# Patient Record
Sex: FEMALE | Race: WHITE | Employment: OTHER | URBAN - METROPOLITAN AREA
[De-identification: names, ages, dates, MRNs, and addresses within clinical notes are randomized per-mention and may not be internally consistent; named-entity substitution may affect disease eponyms.]

---

## 2017-01-10 ENCOUNTER — OFFICE VISIT (OUTPATIENT)
Dept: INTERNAL MEDICINE CLINIC | Age: 82
End: 2017-01-10

## 2017-01-10 VITALS
SYSTOLIC BLOOD PRESSURE: 122 MMHG | TEMPERATURE: 97 F | RESPIRATION RATE: 16 BRPM | HEIGHT: 62 IN | OXYGEN SATURATION: 97 % | HEART RATE: 70 BPM | WEIGHT: 193.6 LBS | BODY MASS INDEX: 35.63 KG/M2 | DIASTOLIC BLOOD PRESSURE: 68 MMHG

## 2017-01-10 DIAGNOSIS — J01.40 SUBACUTE PANSINUSITIS: Primary | ICD-10-CM

## 2017-01-10 RX ORDER — AZITHROMYCIN 250 MG/1
250 TABLET, FILM COATED ORAL SEE ADMIN INSTRUCTIONS
Qty: 6 TAB | Refills: 0 | Status: SHIPPED | OUTPATIENT
Start: 2017-01-10 | End: 2021-01-01 | Stop reason: SDUPTHER

## 2017-01-10 NOTE — PROGRESS NOTES
HISTORY OF PRESENT ILLNESS  Eduardo Santos is a 80 y.o. female. Wheezing    The history is provided by the patient. This is a new problem. Episode onset: 10 d. The problem occurs every several days. The problem has not changed since onset. Associated symptoms include ear pain, rhinorrhea, sore throat and cough. Pertinent negatives include no fever, no vomiting, no diarrhea and no sputum production. Precipitated by: Teri Lively. Treatments tried: claritin x 3 dose. The treatment provided no relief. Her past medical history is significant for heart failure. Her past medical history does not include asthma. Nasal Congestion    The history is provided by the patient. This is a new problem. The current episode started more than 1 week ago. Associated symptoms include ear pain, sore throat, cough and rhinorrhea. Review of Systems   Constitutional: Negative for fever. HENT: Positive for ear pain, rhinorrhea and sore throat. Respiratory: Positive for cough and wheezing. Negative for sputum production. Gastrointestinal: Negative for diarrhea and vomiting. Physical Exam   Constitutional: No distress. HENT:   Right Ear: Tympanic membrane and ear canal normal.   Left Ear: Tympanic membrane and ear canal normal.   Nose: Right sinus exhibits no maxillary sinus tenderness and no frontal sinus tenderness. Left sinus exhibits no maxillary sinus tenderness and no frontal sinus tenderness. Mouth/Throat: Posterior oropharyngeal erythema present. No oropharyngeal exudate or posterior oropharyngeal edema. Eyes: Conjunctivae are normal. Right eye exhibits no discharge. Left eye exhibits no discharge. Neck: Neck supple. Cardiovascular: Normal rate and regular rhythm. Exam reveals no gallop and no friction rub. No murmur heard. Pulmonary/Chest: Effort normal and breath sounds normal.   Lymphadenopathy:     She has no cervical adenopathy. Nursing note and vitals reviewed.       ASSESSMENT and PLAN  Jannie Byers was seen today for wheezing and nasal congestion. Diagnoses and all orders for this visit:    Subacute pansinusitis  -     azithromycin (ZITHROMAX) 250 mg tablet; Take 1 Tab by mouth See Admin Instructions for 5 days.  -     guaiFENesin SR (MUCINEX) 600 mg SR tablet; Take 1 Tab by mouth two (2) times a day for 10 days. This has been fully explained to the patient, who indicates understanding.

## 2017-01-10 NOTE — MR AVS SNAPSHOT
Visit Information Date & Time Provider Department Dept. Phone Encounter #  
 1/10/2017  1:40 PM Ann Marie Mcnulty, [facility] Internal Medicine [phone] [id number] Follow-up Instructions Return in about 3 weeks (around 1/30/2017), or if symptoms worsen or fail to improve. Your Appointments 1/30/2017  1:20 PM  
ROUTINE CARE with Ann Marie Mcnulty MD  
[facility] Internal Medicine [address]) Appt Note: f/u 2m  
 [address] [facility] [address]  
[address] [facility] Upcoming Health Maintenance Date Due  
 MEDICARE YEARLY EXAM 1/22/2016 FOOT EXAM Q1 3/1/2017 LIPID PANEL Q1 3/21/2017 HEMOGLOBIN A1C Q6M 5/28/2017 MICROALBUMIN Q1 6/2/2017 EYE EXAM RETINAL OR DILATED Q1 8/29/2017 GLAUCOMA SCREENING Q2Y 8/29/2018 DTaP/Tdap/Td series (2 - Td) 9/21/2026 Allergies as of 1/10/2017  Review Complete On: 1/10/2017 By: Ann Marie Mcnulty MD  
  
 Severity Noted Reaction Type Reactions Amoxicillin  03/28/2014    Other (comments) \"makes me feel like I\"m going to faint\" Aspirin  03/28/2014    Palpitations Levaquin [Levofloxacin]  03/20/2015    Hives, Rash Losartan  06/17/2015    Other (comments) Tongue swelling Lyrica [Pregabalin]  03/28/2014    Rash Current Immunizations  Reviewed on 11/3/2016 Name Date Influenza High Dose Vaccine PF 11/3/2016, 11/2/2015 Influenza Vaccine 9/18/2014 Pneumococcal Conjugate (PCV-13) 1/22/2015 Pneumococcal Vaccine (Unspecified Type) 11/12/2011 Zoster Vaccine, Live 1/28/2016 Not reviewed this visit You Were Diagnosed With   
  
 Codes Comments Subacute pansinusitis    -  Primary ICD-10-CM: J01.40 ICD-9-CM: 461.8 Vitals BP Pulse Temp Resp Height(growth percentile) Weight(growth percentile) 122/68 70 97 °F (36.1 °C) (Oral) 16 5' 2\" (1.575 m) 193 lb 9.6 oz (87.8 kg) SpO2 BMI OB Status Smoking Status 97% 35.41 kg/m2 Postmenopausal Former Smoker BMI and BSA Data Body Mass Index Body Surface Area  
 35.41 kg/m 2 1.96 m 2 Preferred Pharmacy Pharmacy Name Phone [phone], [phone] [facility] [address] [phone] Your Updated Medication List  
  
   
This list is accurate as of: 1/10/17  2:34 PM.  Always use your most recent med list. amLODIPine 5 mg tablet Commonly known as:  Esvin Frantz TAKE 1 TAB BY MOUTH DAILY. aspirin delayed-release 81 mg tablet Take 1 Tab by mouth daily. azithromycin 250 mg tablet Commonly known as:  Hedwig Pine Take 1 Tab by mouth See Admin Instructions for 5 days. BIOTIN PO Take 5,000 mcg by mouth daily. diclofenac 1 % Gel Commonly known as:  VOLTAREN Apply  to affected area four (4) times daily. Apply 4 gm QID prn. fluocinoNIDE 0.05 % topical cream  
Commonly known as:  LIDEX Apply  to affected area two (2) times daily as needed for Skin Irritation. furosemide 40 mg tablet Commonly known as:  LASIX Take 1 Tab by mouth daily. As needed only with leg swelling  
  
 glimepiride 1 mg tablet Commonly known as:  AMARYL Take 1 Tab by mouth Daily (before breakfast). Take only if sugar is above 140  
  
 glucose blood VI test strips strip Commonly known as:  CONTOUR NEXT STRIPS  
TEST 3 TIMES DAILY. Dx E11.42  
  
 guaiFENesin  mg SR tablet Commonly known as:  Lionel & Lionel Take 1 Tab by mouth two (2) times a day for 10 days. Lancets Misc  
1 Package by Does Not Apply route daily. Test glucose daily and as needed Dx type 2 dm E11.42  
  
 linaclotide 145 mcg Cap capsule Commonly known as:  Gross Coon Take 1 Cap by mouth Daily (before breakfast). Per dr Leann Johnson  
  
 loratadine 10 mg tablet Commonly known as:  Napamanda Linnette Take 1 Tab by mouth daily as needed for Allergies. magnesium hydroxide 2,400 mg/10 mL Susp suspension Commonly known as:  MILK OF MAGNESIA Take 10 mL by mouth nightly. OTHER  
  
 potassium chloride SR 10 mEq tablet Commonly known as:  KLOR-CON 10 Take 2 Tabs by mouth daily. Only when furosemide taken VITAMIN D3 1,000 unit Cap Generic drug:  cholecalciferol Take  by mouth. Prescriptions Sent to Pharmacy Refills  
 azithromycin (ZITHROMAX) 250 mg tablet 0 Sig: Take 1 Tab by mouth See Admin Instructions for 5 days. Class: Normal  
 Pharmacy: [facility] [address] [facility] #: [phone] Route: Oral  
  
Follow-up Instructions Return in about 3 weeks (around 1/30/2017), or if symptoms worsen or fail to improve. Patient Instructions Sinusitis: Care Instructions Your Care Instructions Sinusitis is an infection of the lining of the sinus cavities in your head. Sinusitis often follows a cold. It causes pain and pressure in your head and face. In most cases, sinusitis gets better on its own in 1 to 2 weeks. But some mild symptoms may last for several weeks. Sometimes antibiotics are needed. Follow-up care is a key part of your treatment and safety. Be sure to make and go to all appointments, and call your doctor if you are having problems. It's also a good idea to know your test results and keep a list of the medicines you take. How can you care for yourself at home? · Take an over-the-counter pain medicine, such as acetaminophen (Tylenol), ibuprofen (Advil, Motrin), or naproxen (Aleve). Read and follow all instructions on the label. · If the doctor prescribed antibiotics, take them as directed. Do not stop taking them just because you feel better. You need to take the full course of antibiotics.  
· Be careful when taking over-the-counter cold or flu medicines and Tylenol at the same time. Many of these medicines have acetaminophen, which is Tylenol. Read the labels to make sure that you are not taking more than the recommended dose. Too much acetaminophen (Tylenol) can be harmful. · Breathe warm, moist air from a steamy shower, a hot bath, or a sink filled with hot water. Avoid cold, dry air. Using a humidifier in your home may help. Follow the directions for cleaning the machine. · Use saline (saltwater) nasal washes to help keep your nasal passages open and wash out mucus and bacteria. You can buy saline nose drops at a grocery store or drugstore. Or you can make your own at home by adding 1 teaspoon of salt and 1 teaspoon of baking soda to 2 cups of distilled water. If you make your own, fill a bulb syringe with the solution, insert the tip into your nostril, and squeeze gently. Marleta Messina your nose. · Put a hot, wet towel or a warm gel pack on your face 3 or 4 times a day for 5 to 10 minutes each time. · Try a decongestant nasal spray like oxymetazoline (Afrin). Do not use it for more than 3 days in a row. Using it for more than 3 days can make your congestion worse. When should you call for help? Call your doctor now or seek immediate medical care if: 
· You have new or worse swelling or redness in your face or around your eyes. · You have a new or higher fever. Watch closely for changes in your health, and be sure to contact your doctor if: 
· You have new or worse facial pain. · The mucus from your nose becomes thicker (like pus) or has new blood in it. · You are not getting better as expected. Where can you learn more? Go to http://myrna-alayna.info/. Enter G732 in the search box to learn more about \"Sinusitis: Care Instructions. \" Current as of: July 29, 2016 Content Version: 11.1 © 9007-8551 [employer].  Care instructions adapted under license by [employer] (which disclaims liability or warranty for this information). If you have questions about a medical condition or this instruction, always ask your healthcare professional. [address] any warranty or liability for your use of this information. Introducing [facility] SERVICES! Dear Sonny Salamanca: 
Thank you for requesting a [employer] account. Our records indicate that you have previously registered for a [employer] account but its currently inactive. Please call our [employer] support line at [phone]. Additional Information If you have questions, please visit the Frequently Asked Questions section of the [employer] website at [email or web address] Remember, [employer]apor[employer] is NOT to be used for urgent needs. For medical emergencies, dial 911. Now available from your iPhone and Android! Please provide this summary of care documentation to your next provider. Your primary care clinician is listed as JOCELYN MCKEON. If you have any questions after today's visit, please call [phone].

## 2017-01-10 NOTE — PATIENT INSTRUCTIONS
Sinusitis: Care Instructions  Your Care Instructions    Sinusitis is an infection of the lining of the sinus cavities in your head. Sinusitis often follows a cold. It causes pain and pressure in your head and face. In most cases, sinusitis gets better on its own in 1 to 2 weeks. But some mild symptoms may last for several weeks. Sometimes antibiotics are needed. Follow-up care is a key part of your treatment and safety. Be sure to make and go to all appointments, and call your doctor if you are having problems. It's also a good idea to know your test results and keep a list of the medicines you take. How can you care for yourself at home? · Take an over-the-counter pain medicine, such as acetaminophen (Tylenol), ibuprofen (Advil, Motrin), or naproxen (Aleve). Read and follow all instructions on the label. · If the doctor prescribed antibiotics, take them as directed. Do not stop taking them just because you feel better. You need to take the full course of antibiotics. · Be careful when taking over-the-counter cold or flu medicines and Tylenol at the same time. Many of these medicines have acetaminophen, which is Tylenol. Read the labels to make sure that you are not taking more than the recommended dose. Too much acetaminophen (Tylenol) can be harmful. · Breathe warm, moist air from a steamy shower, a hot bath, or a sink filled with hot water. Avoid cold, dry air. Using a humidifier in your home may help. Follow the directions for cleaning the machine. · Use saline (saltwater) nasal washes to help keep your nasal passages open and wash out mucus and bacteria. You can buy saline nose drops at a grocery store or drugstore. Or you can make your own at home by adding 1 teaspoon of salt and 1 teaspoon of baking soda to 2 cups of distilled water. If you make your own, fill a bulb syringe with the solution, insert the tip into your nostril, and squeeze gently. Sushil Opitz your nose.   · Put a hot, wet towel or a warm gel pack on your face 3 or 4 times a day for 5 to 10 minutes each time. · Try a decongestant nasal spray like oxymetazoline (Afrin). Do not use it for more than 3 days in a row. Using it for more than 3 days can make your congestion worse. When should you call for help? Call your doctor now or seek immediate medical care if:  · You have new or worse swelling or redness in your face or around your eyes. · You have a new or higher fever. Watch closely for changes in your health, and be sure to contact your doctor if:  · You have new or worse facial pain. · The mucus from your nose becomes thicker (like pus) or has new blood in it. · You are not getting better as expected. Where can you learn more? Go to http://myrna-alayna.info/. Enter D191 in the search box to learn more about \"Sinusitis: Care Instructions. \"  Current as of: July 29, 2016  Content Version: 11.1  © [id number] [employer]. Care instructions adapted under license by [employer] (which disclaims liability or warranty for this information). If you have questions about a medical condition or this instruction, always ask your healthcare professional. [place] [address] any warranty or liability for your use of this information.

## 2017-01-13 DIAGNOSIS — E11.40 TYPE 2 DIABETES, CONTROLLED, WITH NEUROPATHY (HCC): ICD-10-CM

## 2017-01-30 ENCOUNTER — OFFICE VISIT (OUTPATIENT)
Dept: INTERNAL MEDICINE CLINIC | Age: 82
End: 2017-01-30

## 2017-01-30 ENCOUNTER — HOSPITAL ENCOUNTER (OUTPATIENT)
Dept: LAB | Age: 82
Discharge: HOME | End: 2017-01-30
Payer: MEDICARE

## 2017-01-30 VITALS
HEART RATE: 67 BPM | TEMPERATURE: 97.7 F | SYSTOLIC BLOOD PRESSURE: 140 MMHG | DIASTOLIC BLOOD PRESSURE: 62 MMHG | OXYGEN SATURATION: 96 % | RESPIRATION RATE: 18 BRPM | HEIGHT: 62 IN | BODY MASS INDEX: 33.82 KG/M2 | WEIGHT: 183.8 LBS

## 2017-01-30 DIAGNOSIS — Z71.89 ADVANCED CARE PLANNING/COUNSELING DISCUSSION: ICD-10-CM

## 2017-01-30 DIAGNOSIS — I48.20 CHRONIC ATRIAL FIBRILLATION (HCC): ICD-10-CM

## 2017-01-30 DIAGNOSIS — R60.0 LOCALIZED EDEMA: ICD-10-CM

## 2017-01-30 DIAGNOSIS — E11.40 TYPE 2 DIABETES, CONTROLLED, WITH NEUROPATHY (HCC): ICD-10-CM

## 2017-01-30 DIAGNOSIS — Z13.31 SCREENING FOR DEPRESSION: ICD-10-CM

## 2017-01-30 DIAGNOSIS — I10 ESSENTIAL HYPERTENSION, BENIGN: Primary | ICD-10-CM

## 2017-01-30 PROCEDURE — 80048 BASIC METABOLIC PNL TOTAL CA: CPT

## 2017-01-30 PROCEDURE — 36415 COLL VENOUS BLD VENIPUNCTURE: CPT

## 2017-01-30 PROCEDURE — 83735 ASSAY OF MAGNESIUM: CPT

## 2017-01-30 NOTE — PROGRESS NOTES
HISTORY OF PRESENT ILLNESS  Esther Meier is a 80 y.o. female. [facility] is seen today for follow up of diabetes and other problems. 1. Sinusitis. This was a pan sinusitis. It is improved significantly. She did end up seeing her ENT specialist.  2. Diabetes, type 2, controlled, no complication, without long term insulin usage. Up to date with A1c assessment which has been excellent. 3. Essential hypertension. Stable on current regimen. 4. Peripheral edema, bilateral, stasis. Improved with BEULAH's. She is on daily Lasix. Review of systems notable for an episode of back pain that resolved on its own in a very brief period of time. MedDATA/gwo     Past Medical History   Diagnosis Date    Bleeding nose     Diabetes ([address]     Hypertension          Review of Systems   Constitutional: Positive for weight loss. Respiratory: Negative. Cardiovascular: Positive for leg swelling. Negative for chest pain, palpitations and PND. Musculoskeletal: Negative for myalgias. Neurological: Negative for focal weakness. Physical Exam   Constitutional: She appears well-nourished. Neck: Carotid bruit is not present. Cardiovascular: Normal rate. An irregularly irregular rhythm present. Exam reveals no gallop and no friction rub. No murmur heard. Pulmonary/Chest: Effort normal and breath sounds normal. No respiratory distress. Musculoskeletal: She exhibits edema. Moderate bilateral lower extremity edema - improved   Nursing note and vitals reviewed. RC and Keri Adams was seen today for annual wellness visit.     Diagnoses and all orders for this visit:    Essential hypertension, benign- Continue current regimen of prescription and / or OTC medications   -     METABOLIC PANEL, BASIC  -     MAGNESIUM    Screening for depression    Advanced care planning/counseling discussion    Type 2 diabetes, controlled, with neuropathy ([address]- Follow off treatment     Localized edema  -     METABOLIC PANEL, BASIC  -     MAGNESIUM    Chronic atrial fibrillation ([facility]- Continue current regimen of prescription and / or OTC medications     This has been fully explained to the patient, who indicates understanding. This has been fully explained to the patient, who indicates understanding.

## 2017-01-30 NOTE — MR AVS SNAPSHOT
Visit Information Date & Time Provider Department Dept. Phone Encounter #  
 1/30/2017  1:20 PM Kj Ordoñez, [address] [place] [address] Internal Medicine [phone] [id number] Follow-up Instructions Return in about 2 months (around 3/30/2017). Upcoming Health Maintenance Date Due  
 FOOT EXAM Q1 3/1/2017 LIPID PANEL Q1 3/21/2017 HEMOGLOBIN A1C Q6M 5/28/2017 MICROALBUMIN Q1 6/2/2017 EYE EXAM RETINAL OR DILATED Q1 8/29/2017 MEDICARE YEARLY EXAM 1/31/2018 GLAUCOMA SCREENING Q2Y 8/29/2018 DTaP/Tdap/Td series (2 - Td) 9/21/2026 Allergies as of 1/30/2017  Review Complete On: 1/30/2017 By: Kj Ordoñez MD  
  
 Severity Noted Reaction Type Reactions Amoxicillin  03/28/2014    Other (comments) \"makes me feel like I\"m going to faint\" Aspirin  03/28/2014    Palpitations Levaquin [Levofloxacin]  03/20/2015    Hives, Rash Losartan  06/17/2015    Other (comments) Tongue swelling Lyrica [Pregabalin]  03/28/2014    Rash Current Immunizations  Reviewed on 11/3/2016 Name Date Influenza High Dose Vaccine PF 11/3/2016, 11/2/2015 Influenza Vaccine 9/18/2014 Pneumococcal Conjugate (PCV-13) 1/22/2015 Pneumococcal Vaccine (Unspecified Type) 11/12/2011 Zoster Vaccine, Live 1/28/2016 Not reviewed this visit You Were Diagnosed With   
  
 Codes Comments Essential hypertension, benign    -  Primary ICD-10-CM: I10 
ICD-9-CM: 401.1 Screening for depression     ICD-10-CM: Z13.89 ICD-9-CM: V79.0 Advanced care planning/counseling discussion     ICD-10-CM: Z71.89 ICD-9-CM: V65.49 Type 2 diabetes, controlled, with neuropathy ([facility]     ICD-10-CM: E11.40 ICD-9-CM: 250.60, 357.2 Localized edema     ICD-10-CM: R60.0 ICD-9-CM: 029. 3 Chronic atrial fibrillation (HCC)     ICD-10-CM: G00.9 ICD-9-CM: 427.31 Vitals BP Pulse Temp Resp Height(growth percentile) Weight(growth percentile) 140/62 (BP 1 Location: Right arm, BP Patient Position: Sitting) 67 97.7 °F (36.5 °C) (Oral) 18 5' 2\" (1.575 m) 183 lb 12.8 oz (83.4 kg) SpO2 BMI OB Status Smoking Status 96% 33.62 kg/m2 Postmenopausal Former Smoker BMI and BSA Data Body Mass Index Body Surface Area  
 33.62 kg/m 2 1.91 m 2 Preferred Pharmacy Pharmacy Name Phone [address], [address] [facility] [address] [phone] Your Updated Medication List  
  
   
This list is accurate as of: 1/30/17  1:49 PM.  Always use your most recent med list. amLODIPine 5 mg tablet Commonly known as:  Gay Oumou TAKE 1 TAB BY MOUTH DAILY. aspirin delayed-release 81 mg tablet Take 1 Tab by mouth daily. BIOTIN PO Take 5,000 mcg by mouth daily. diclofenac 1 % Gel Commonly known as:  VOLTAREN Apply  to affected area four (4) times daily. Apply 4 gm QID prn. fluocinoNIDE 0.05 % topical cream  
Commonly known as:  LIDEX Apply  to affected area two (2) times daily as needed for Skin Irritation. furosemide 40 mg tablet Commonly known as:  LASIX Take 1 Tab by mouth daily. As needed only with leg swelling  
  
 glimepiride 1 mg tablet Commonly known as:  AMARYL Take 1 Tab by mouth Daily (before breakfast). Take only if sugar is above 140  
  
 glucose blood VI test strips strip Commonly known as:  FREESTYLE TEST  
TEST 3 TIMES DAILY. Dx M33.99 Lancets Misc  
1 Package by Does Not Apply route daily. Test glucose daily and as needed Dx type 2 dm E11.42  
  
 linaclotide 145 mcg Cap capsule Commonly known as:  Angie Timber Take 1 Cap by mouth Daily (before breakfast). Per dr Neal Corona  
  
 loratadine 10 mg tablet Commonly known as:  Avani Grossman Take 1 Tab by mouth daily as needed for Allergies. magnesium hydroxide 2,400 mg/10 mL Susp suspension Commonly known as:  MILK OF MAGNESIA Take 10 mL by mouth nightly. OTHER  
  
 potassium chloride SR 10 mEq tablet Commonly known as:  KLOR-CON 10 Take 2 Tabs by mouth daily. Only when furosemide taken VITAMIN D3 1,000 unit Cap Generic drug:  cholecalciferol Take  by mouth. We Performed the Following MAGNESIUM [id number] CPT(R)] METABOLIC PANEL, BASIC [26933 CPT(R)] Follow-up Instructions Return in about 2 months (around 3/30/2017). Introducing [facility] & HEALTH SERVICES! Dear Michelle Rose: 
Thank you for requesting a misterbnb account. Our records indicate that you have previously registered for a misterbnb account but its currently inactive. Please call our misterbnb support line at [phone]. Additional Information If you have questions, please visit the Frequently Asked Questions section of the misterbnb website at [email or web address] Remember, misterbnb is NOT to be used for urgent needs. For medical emergencies, dial 911. Now available from your iPhone and Android! Please provide this summary of care documentation to your next provider. Your primary care clinician is listed as JOCELYN MCKEON. If you have any questions after today's visit, please call [phone].

## 2017-01-31 LAB
BUN SERPL-MCNC: 22 MG/DL (ref 10–36)
BUN/CREAT SERPL: 28 (ref 11–26)
CALCIUM SERPL-MCNC: 8.4 MG/DL (ref 8.7–10.3)
CHLORIDE SERPL-SCNC: 98 MMOL/L (ref 96–106)
CO2 SERPL-SCNC: 26 MMOL/L (ref 18–29)
CREAT SERPL-MCNC: 0.8 MG/DL (ref 0.57–1)
GLUCOSE SERPL-MCNC: 153 MG/DL (ref 65–99)
MAGNESIUM SERPL-MCNC: 2.3 MG/DL (ref 1.6–2.3)
POTASSIUM SERPL-SCNC: 4.6 MMOL/L (ref 3.5–5.2)
SODIUM SERPL-SCNC: 139 MMOL/L (ref 134–144)

## 2017-03-06 DIAGNOSIS — M79.89 LEG SWELLING: ICD-10-CM

## 2017-03-06 DIAGNOSIS — R06.02 SOB (SHORTNESS OF BREATH) ON EXERTION: ICD-10-CM

## 2017-03-06 RX ORDER — POTASSIUM CHLORIDE 750 MG/1
20 TABLET, FILM COATED, EXTENDED RELEASE ORAL DAILY
Qty: 30 TAB | Refills: 11 | Status: SHIPPED | OUTPATIENT
Start: 2017-03-06 | End: 2017-09-11 | Stop reason: SDUPTHER

## 2017-03-30 ENCOUNTER — HOSPITAL ENCOUNTER (OUTPATIENT)
Dept: LAB | Age: 82
Discharge: HOME | End: 2017-03-30
Payer: MEDICARE

## 2017-03-30 ENCOUNTER — OFFICE VISIT (OUTPATIENT)
Dept: INTERNAL MEDICINE CLINIC | Age: 82
End: 2017-03-30

## 2017-03-30 VITALS
HEART RATE: 60 BPM | RESPIRATION RATE: 18 BRPM | TEMPERATURE: 96.7 F | WEIGHT: 186 LBS | SYSTOLIC BLOOD PRESSURE: 120 MMHG | OXYGEN SATURATION: 99 % | BODY MASS INDEX: 34.23 KG/M2 | DIASTOLIC BLOOD PRESSURE: 60 MMHG | HEIGHT: 62 IN

## 2017-03-30 DIAGNOSIS — R60.0 LOCALIZED EDEMA: ICD-10-CM

## 2017-03-30 DIAGNOSIS — I10 ESSENTIAL HYPERTENSION, BENIGN: ICD-10-CM

## 2017-03-30 DIAGNOSIS — I48.20 CHRONIC ATRIAL FIBRILLATION (HCC): ICD-10-CM

## 2017-03-30 DIAGNOSIS — E11.40 TYPE 2 DIABETES, CONTROLLED, WITH NEUROPATHY (HCC): Primary | ICD-10-CM

## 2017-03-30 PROCEDURE — 83036 HEMOGLOBIN GLYCOSYLATED A1C: CPT

## 2017-03-30 PROCEDURE — 36415 COLL VENOUS BLD VENIPUNCTURE: CPT

## 2017-03-30 PROCEDURE — 80048 BASIC METABOLIC PNL TOTAL CA: CPT

## 2017-03-30 PROCEDURE — 85025 COMPLETE CBC W/AUTO DIFF WBC: CPT

## 2017-03-30 NOTE — PROGRESS NOTES
HISTORY OF PRESENT ILLNESS  Shanita Young is a 80 y.o. female. JELANI Garcia was seen today for follow up of diabetes and other concerns. She is accompanied by her aide and friend Justice Álvarez. 1. Chronic atrial fibrillation. Asymptomatic. Denies symptoms of CHF. 2. 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 Due for an A1c.  3. Lower extremity edema, chronic stasis. Reviewed conservative measures. She does use compression stockings but has trouble getting a good fit. I have asked her to consider going to [address] [place] [address] to see if she can be custom fitted. 4. Essential hypertension. Stable on current regimen. Review of systems notable for bilateral knee pain improvement recently. The neuropathy symptoms are stable. She notes mild bruising but no significant bleeding. MedDATA/gwo     Current Outpatient Prescriptions   Medication Sig    potassium chloride SR (KLOR-CON 10) 10 mEq tablet Take 2 Tabs by mouth daily. Only when furosemide taken    glucose blood VI test strips (FREESTYLE TEST) strip TEST 3 TIMES DAILY. Dx E11.42    fluocinoNIDE (LIDEX) 0.05 % topical cream Apply  to affected area two (2) times daily as needed for Skin Irritation.  OTHER     diclofenac (VOLTAREN) 1 % gel Apply  to affected area four (4) times daily. Apply 4 gm QID prn.  amLODIPine (NORVASC) 5 mg tablet TAKE 1 TAB BY MOUTH DAILY.  furosemide (LASIX) 40 mg tablet Take 1 Tab by mouth daily. As needed only with leg swelling    loratadine (CLARITIN) 10 mg tablet Take 1 Tab by mouth daily as needed for Allergies.  Lancets misc 1 Package by Does Not Apply route daily. Test glucose daily and as needed Dx type 2 dm E11.42    aspirin delayed-release 81 mg tablet Take 1 Tab by mouth daily. (Patient taking differently: Take 81 mg by mouth every other day.)    glimepiride (AMARYL) 1 mg tablet Take 1 Tab by mouth Daily (before breakfast).  Take only if sugar is above 140    linaclotide (LINZESS) 145 mcg cap capsule Take 1 Cap by mouth Daily (before breakfast). Per dr Anthony Garza    magnesium hydroxide (MILK OF MAGNESIA) 2,400 mg/10 mL susp suspension Take 10 mL by mouth nightly.  BIOTIN PO Take 5,000 mcg by mouth daily.  Cholecalciferol, Vitamin D3, (VITAMIN D3) 1,000 unit cap Take  by mouth. No current facility-administered medications for this visit. Review of Systems   Constitutional: Negative for weight loss. Respiratory: Negative. Cardiovascular: Positive for leg swelling. Negative for chest pain, palpitations and PND. Musculoskeletal: Positive for joint pain. Negative for myalgias. Neurological: Negative for focal weakness. Endo/Heme/Allergies: Bruises/bleeds easily. Physical Exam   Constitutional: She appears well-nourished. Neck: Carotid bruit is not present. Cardiovascular: Normal rate. An irregular rhythm present. Exam reveals no gallop and no friction rub. No murmur heard. Pulmonary/Chest: Effort normal and breath sounds normal. No respiratory distress. Musculoskeletal: She exhibits edema. Moderate bilateral lower extremity edema    Nursing note and vitals reviewed. ASSESSMENT and Saleem Cummings was seen today for follow-up.     Diagnoses and all orders for this visit:    Type 2 diabetes, controlled, with neuropathy ([facility]  -     CBC WITH AUTOMATED DIFF  -     HEMOGLOBIN A1C WITH EAG  -     METABOLIC PANEL, BASIC    Chronic atrial fibrillation ([facility]- Continue current regimen of prescription and / or OTC medications     Localized edema- Continue current regimen of prescription and / or OTC medications     Essential hypertension, benign- Continue current regimen of prescription and / or OTC medications     Other orders  -     Cancel: LIPID PANEL  -     Cancel: METABOLIC PANEL, COMPREHENSIVE  -     Cancel: MAGNESIUM

## 2017-03-30 NOTE — MR AVS SNAPSHOT
Visit Information Date & Time Provider Department Dept. Phone Encounter #  
 3/30/2017  1:20 PM Debbie Ferrara, [address] [place] [address] Internal Medicine [phone] [id number] Follow-up Instructions Return in about 2 months (around 5/30/2017). Upcoming Health Maintenance Date Due  
 LIPID PANEL Q1 3/21/2017 HEMOGLOBIN A1C Q6M 5/28/2017 MICROALBUMIN Q1 6/2/2017 EYE EXAM RETINAL OR DILATED Q1 8/29/2017 MEDICARE YEARLY EXAM 1/31/2018 FOOT EXAM Q1 3/20/2018 GLAUCOMA SCREENING Q2Y 8/29/2018 DTaP/Tdap/Td series (2 - Td) 9/21/2026 Allergies as of 3/30/2017  Review Complete On: 3/30/2017 By: Debbie Ferrara MD  
  
 Severity Noted Reaction Type Reactions Amoxicillin  03/28/2014    Other (comments) \"makes me feel like I\"m going to faint\" Aspirin  03/28/2014    Palpitations Levaquin [Levofloxacin]  03/20/2015    Hives, Rash Losartan  06/17/2015    Other (comments) Tongue swelling Lyrica [Pregabalin]  03/28/2014    Rash Current Immunizations  Reviewed on 11/3/2016 Name Date Influenza High Dose Vaccine PF 11/3/2016, 11/2/2015 Influenza Vaccine 9/18/2014 Pneumococcal Conjugate (PCV-13) 1/22/2015 Pneumococcal Vaccine (Unspecified Type) 11/12/2011 Zoster Vaccine, Live 1/28/2016 Not reviewed this visit You Were Diagnosed With   
  
 Codes Comments Type 2 diabetes, controlled, with neuropathy ([facility]    -  Primary ICD-10-CM: E11.40 ICD-9-CM: 250.60, 357.2 Chronic atrial fibrillation (HCC)     ICD-10-CM: V62.3 ICD-9-CM: 427.31 Localized edema     ICD-10-CM: R60.0 ICD-9-CM: 782.3 Essential hypertension, benign     ICD-10-CM: I10 
ICD-9-CM: 401.1 Vitals BP Pulse Temp Resp Height(growth percentile) Weight(growth percentile) 120/60 (BP 1 Location: Right arm, BP Patient Position: Sitting) 60 96.7 °F (35.9 °C) (Oral) 18 5' 2\" (1.575 m) 186 lb (84.4 kg) SpO2 BMI OB Status Smoking Status 99% 34.02 kg/m2 Postmenopausal Former Smoker BMI and BSA Data Body Mass Index Body Surface Area 34.02 kg/m 2 1.92 m 2 Preferred Pharmacy Pharmacy Name Phone [address] [facility] [address] [phone] Your Updated Medication List  
  
   
This list is accurate as of: 3/30/17  2:05 PM.  Always use your most recent med list. amLODIPine 5 mg tablet Commonly known as:  Valladares Mullet TAKE 1 TAB BY MOUTH DAILY. aspirin delayed-release 81 mg tablet Take 1 Tab by mouth daily. BIOTIN PO Take 5,000 mcg by mouth daily. diclofenac 1 % Gel Commonly known as:  VOLTAREN Apply  to affected area four (4) times daily. Apply 4 gm QID prn. fluocinoNIDE 0.05 % topical cream  
Commonly known as:  LIDEX Apply  to affected area two (2) times daily as needed for Skin Irritation. furosemide 40 mg tablet Commonly known as:  LASIX Take 1 Tab by mouth daily. As needed only with leg swelling  
  
 glimepiride 1 mg tablet Commonly known as:  AMARYL Take 1 Tab by mouth Daily (before breakfast). Take only if sugar is above 140  
  
 glucose blood VI test strips strip Commonly known as:  FREESTYLE TEST  
TEST 3 TIMES DAILY. Dx K62.89 Lancets Misc  
1 Package by Does Not Apply route daily. Test glucose daily and as needed Dx type 2 dm E11.42  
  
 linaclotide 145 mcg Cap capsule Commonly known as:  Leonor Andrea Take 1 Cap by mouth Daily (before breakfast). Per dr Heraclio Daley  
  
 loratadine 10 mg tablet Commonly known as:  Jemma Draft Take 1 Tab by mouth daily as needed for Allergies. magnesium hydroxide 2,400 mg/10 mL Susp suspension Commonly known as:  MILK OF MAGNESIA Take 10 mL by mouth nightly. OTHER  
  
 potassium chloride SR 10 mEq tablet Commonly known as:  KLOR-CON 10 Take 2 Tabs by mouth daily. Only when furosemide taken VITAMIN D3 1,000 unit Cap Generic drug:  cholecalciferol Take  by mouth. We Performed the Following CBC WITH AUTOMATED DIFF [87414 CPT(R)] HEMOGLOBIN A1C WITH EAG [89921 CPT(R)] METABOLIC PANEL, BASIC [64702 CPT(R)] Follow-up Instructions Return in about 2 months (around 5/30/2017). Introducing \[facility]\"" & HEALTH SERVICES! Dear Jyotsna Martinez: 
Thank you for requesting a [facility] account. Our records indicate that you have previously registered for a [facility] account but its currently inactive. Please call our [facility] support line at [phone]. Additional Information If you have questions, please visit the Frequently Asked Questions section of the [facility] website at [email or web address] Remember, [facility] is NOT to be used for urgent needs. For medical emergencies, dial 911. Now available from your iPhone and Android! Please provide this summary of care documentation to your next provider. Your primary care clinician is listed as JOCELYN MCKEON. If you have any questions after today's visit, please call [phone].

## 2017-03-31 LAB
BASOPHILS # BLD AUTO: 0 X10E3/UL (ref 0–0.2)
BASOPHILS NFR BLD AUTO: 1 %
BUN SERPL-MCNC: 14 MG/DL (ref 10–36)
BUN/CREAT SERPL: 22 (ref 11–26)
CALCIUM SERPL-MCNC: 8.6 MG/DL (ref 8.7–10.3)
CHLORIDE SERPL-SCNC: 100 MMOL/L (ref 96–106)
CO2 SERPL-SCNC: 25 MMOL/L (ref 18–29)
CREAT SERPL-MCNC: 0.63 MG/DL (ref 0.57–1)
EOSINOPHIL # BLD AUTO: 0.1 X10E3/UL (ref 0–0.4)
EOSINOPHIL NFR BLD AUTO: 1 %
ERYTHROCYTE [DISTWIDTH] IN BLOOD BY AUTOMATED COUNT: 15.9 % (ref 12.3–15.4)
EST. AVERAGE GLUCOSE BLD GHB EST-MCNC: 157 MG/DL
GLUCOSE SERPL-MCNC: 86 MG/DL (ref 65–99)
HBA1C MFR BLD: 7.1 % (ref 4.8–5.6)
HCT VFR BLD AUTO: 34.9 % (ref 34–46.6)
HGB BLD-MCNC: 11.4 G/DL (ref 11.1–15.9)
IMM GRANULOCYTES # BLD: 0 X10E3/UL (ref 0–0.1)
IMM GRANULOCYTES NFR BLD: 0 %
LYMPHOCYTES # BLD AUTO: 1.6 X10E3/UL (ref 0.7–3.1)
LYMPHOCYTES NFR BLD AUTO: 31 %
MCH RBC QN AUTO: 28.6 PG (ref 26.6–33)
MCHC RBC AUTO-ENTMCNC: 32.7 G/DL (ref 31.5–35.7)
MCV RBC AUTO: 88 FL (ref 79–97)
MONOCYTES # BLD AUTO: 0.4 X10E3/UL (ref 0.1–0.9)
MONOCYTES NFR BLD AUTO: 8 %
NEUTROPHILS # BLD AUTO: 3.1 X10E3/UL (ref 1.4–7)
NEUTROPHILS NFR BLD AUTO: 59 %
PLATELET # BLD AUTO: 260 X10E3/UL (ref 150–379)
POTASSIUM SERPL-SCNC: 4.5 MMOL/L (ref 3.5–5.2)
RBC # BLD AUTO: 3.98 X10E6/UL (ref 3.77–5.28)
SODIUM SERPL-SCNC: 139 MMOL/L (ref 134–144)
WBC # BLD AUTO: 5.2 X10E3/UL (ref 3.4–10.8)

## 2017-04-02 NOTE — PROGRESS NOTES
Call- Labs look great overall except sugar average is up some. There is slight elevation of average blood sugar based on the A1c measurement. This can be controlled / improved by staying active and watching the diet for concentrated sweets and excessive starchy carbohydrates.

## 2017-04-03 NOTE — PROGRESS NOTES
Advised pt her labs look great overall except sugar average is up some. There is slight elevation of average blood sugar based on the A1c measurement. This can be controlled / improved by staying active and watching the diet for concentrated sweets and excessive starchy carbohydrates.

## 2017-04-24 ENCOUNTER — TELEPHONE (OUTPATIENT)
Dept: INTERNAL MEDICINE CLINIC | Age: 82
End: 2017-04-24

## 2017-04-24 NOTE — TELEPHONE ENCOUNTER
[phone] pt is requesting a call back regarding her test strips rx, she says that she is testing twice a day.

## 2017-04-24 NOTE — TELEPHONE ENCOUNTER
Left detailed message I spoke with her pharmacy - pt testing bid. She is waiting on insurance to approve. Have faxed request form back to [facility] [phone]. Confirmation received.

## 2017-05-01 ENCOUNTER — TELEPHONE (OUTPATIENT)
Dept: INTERNAL MEDICINE CLINIC | Age: 82
End: 2017-05-01

## 2017-05-01 NOTE — TELEPHONE ENCOUNTER
Called to check on pt - states she saw Dr Koko Matthews today - states she has \"bleeding behind left eye\" - has appt tomorrow 5/2/17 at 8:20 am with retinal specialist Dr Xiomy Gorman.  Will forward to MD.

## 2017-05-01 NOTE — TELEPHONE ENCOUNTER
[phone] pt says that she is having a problem with her eye and is not able to get in touch with her eye dr.  She says she is going to have her  take her to his office since she cannot reach them by phone.

## 2017-05-02 ENCOUNTER — TELEPHONE (OUTPATIENT)
Dept: INTERNAL MEDICINE CLINIC | Age: 82
End: 2017-05-02

## 2017-05-02 NOTE — TELEPHONE ENCOUNTER
Spoke with pt - states she saw Dr Herman Clark today - states he \"injected medicine in eye to help with the bleed. \" She has to go back for 3 more treatments. She was pleased with the visit. His office is to send office note from today.  Will forward to MD.

## 2017-05-31 ENCOUNTER — OFFICE VISIT (OUTPATIENT)
Dept: INTERNAL MEDICINE CLINIC | Age: 82
End: 2017-05-31

## 2017-05-31 VITALS
BODY MASS INDEX: 34.52 KG/M2 | DIASTOLIC BLOOD PRESSURE: 60 MMHG | RESPIRATION RATE: 20 BRPM | OXYGEN SATURATION: 97 % | SYSTOLIC BLOOD PRESSURE: 120 MMHG | HEART RATE: 62 BPM | TEMPERATURE: 98 F | WEIGHT: 187.6 LBS | HEIGHT: 62 IN

## 2017-05-31 DIAGNOSIS — I48.20 CHRONIC ATRIAL FIBRILLATION (HCC): ICD-10-CM

## 2017-05-31 DIAGNOSIS — H35.62 RETINAL HEMORRHAGE, LEFT: ICD-10-CM

## 2017-05-31 DIAGNOSIS — E11.40 TYPE 2 DIABETES, CONTROLLED, WITH NEUROPATHY (HCC): Primary | ICD-10-CM

## 2017-05-31 DIAGNOSIS — I10 ESSENTIAL HYPERTENSION, BENIGN: ICD-10-CM

## 2017-05-31 DIAGNOSIS — R60.0 LOCALIZED EDEMA: ICD-10-CM

## 2017-05-31 LAB — HBA1C MFR BLD HPLC: 7.1 %

## 2017-05-31 NOTE — PROGRESS NOTES
HISTORY OF PRESENT ILLNESS  Frederick Bolden is a 80 y.o. female. JELANI Acosta is seen today accompanied by her home health aide for follow up of diabetes and other concerns. 1. Left eye vision problem. She developed what sounds to be a retinal hemorrhage on 5/1/17. She saw her ophthalmologist and is referred ASA to a retinal specialist.  She did have injection treatment with some improvement. She has follow up in the near future. 2. Diabetes. Her diet has been fair. She admits to loving candy. Fingerstick readings in the morning are good but they are fair 2-4 hours after eating. 3. Hypertension. Stable on current regimen. 4. Atrial fibrillation, chronic. She is on aspirin, no symptoms of stroke or unusual bleeding. MedDATA/gwo     Current Outpatient Prescriptions   Medication Sig    glucose blood VI test strips (FREESTYLE TEST) strip TEST 2 TIMES DAILY. Dx E11.42    potassium chloride SR (KLOR-CON 10) 10 mEq tablet Take 2 Tabs by mouth daily. Only when furosemide taken    fluocinoNIDE (LIDEX) 0.05 % topical cream Apply  to affected area two (2) times daily as needed for Skin Irritation.  OTHER     diclofenac (VOLTAREN) 1 % gel Apply  to affected area four (4) times daily. Apply 4 gm QID prn.  amLODIPine (NORVASC) 5 mg tablet TAKE 1 TAB BY MOUTH DAILY.  furosemide (LASIX) 40 mg tablet Take 1 Tab by mouth daily. As needed only with leg swelling    loratadine (CLARITIN) 10 mg tablet Take 1 Tab by mouth daily as needed for Allergies.  Lancets misc 1 Package by Does Not Apply route daily. Test glucose daily and as needed Dx type 2 dm E11.42    aspirin delayed-release 81 mg tablet Take 1 Tab by mouth daily. (Patient taking differently: Take 81 mg by mouth every other day.)    glimepiride (AMARYL) 1 mg tablet Take 1 Tab by mouth Daily (before breakfast). Take only if sugar is above 140    linaclotide (LINZESS) 145 mcg cap capsule Take 1 Cap by mouth Daily (before breakfast).  Per dr Keith Feldman  magnesium hydroxide (MILK OF MAGNESIA) 2,400 mg/10 mL susp suspension Take 10 mL by mouth nightly.  BIOTIN PO Take 5,000 mcg by mouth daily. No current facility-administered medications for this visit. Review of Systems   Constitutional: Negative for weight loss. Eyes: Positive for blurred vision. Respiratory: Negative. Cardiovascular: Positive for leg swelling. Negative for chest pain, palpitations and PND. Chronic   Musculoskeletal: Negative for myalgias. Neurological: Negative for focal weakness. Physical Exam   Constitutional: No distress. Cardiovascular: Normal rate. An irregularly irregular rhythm present. Exam reveals no gallop and no friction rub. No murmur heard. Pulmonary/Chest: Effort normal and breath sounds normal.   Musculoskeletal: She exhibits edema. Moderate bilateral lower extremity edema    Nursing note and vitals reviewed. ASSESSMENT and PLAN  Rommel Whitfield was seen today for medication evaluation.     Diagnoses and all orders for this visit:    Type 2 diabetes, controlled, with neuropathy ([facility] [address]  -     AMB POC HEMOGLOBIN A1C, encouraged Diet and exercise     Chronic atrial fibrillation (HCC)- Continue current regimen of prescription and / or OTC medications     Essential hypertension, benign- Continue current regimen of prescription and / or OTC medications     Localized edema- Continue current regimen of prescription and / or OTC medications     Retinal hemorrhage, left- See ophthalmologist as discussed/directed

## 2017-05-31 NOTE — MR AVS SNAPSHOT
Visit Information Date & Time Provider Department Dept. Phone Encounter #  
 5/31/2017  1:20 PM Vince Fitch, [address] [place] [address] Internal Medicine [phone] [id number] Follow-up Instructions Return in about 2 months (around 7/31/2017). Upcoming Health Maintenance Date Due  
 LIPID PANEL Q1 3/21/2017 MICROALBUMIN Q1 6/2/2017 INFLUENZA AGE 9 TO ADULT 8/1/2017 EYE EXAM RETINAL OR DILATED Q1 8/29/2017 HEMOGLOBIN A1C Q6M 9/30/2017 MEDICARE YEARLY EXAM 1/31/2018 FOOT EXAM Q1 3/20/2018 GLAUCOMA SCREENING Q2Y 8/29/2018 DTaP/Tdap/Td series (2 - Td) 9/21/2026 Allergies as of 5/31/2017  Review Complete On: 5/31/2017 By: Vince Fitch MD  
  
 Severity Noted Reaction Type Reactions Amoxicillin  03/28/2014    Other (comments) \"makes me feel like I\"m going to faint\" Aspirin  03/28/2014    Palpitations Levaquin [Levofloxacin]  03/20/2015    Hives, Rash Losartan  06/17/2015    Other (comments) Tongue swelling Lyrica [Pregabalin]  03/28/2014    Rash Current Immunizations  Reviewed on 11/3/2016 Name Date Influenza High Dose Vaccine PF 11/3/2016, 11/2/2015 Influenza Vaccine 9/18/2014 Pneumococcal Conjugate (PCV-13) 1/22/2015 Pneumococcal Vaccine (Unspecified Type) 11/12/2011 Zoster Vaccine, Live 1/28/2016 Not reviewed this visit You Were Diagnosed With   
  
 Codes Comments Type 2 diabetes, controlled, with neuropathy [facility]    -  Primary ICD-10-CM: E11.40 ICD-9-CM: 250.60, 357.2 Chronic atrial fibrillation (HCC)     ICD-10-CM: U23.5 ICD-9-CM: 427.31 Essential hypertension, benign     ICD-10-CM: I10 
ICD-9-CM: 401.1 Localized edema     ICD-10-CM: R60.0 ICD-9-CM: 173. 3 Retinal hemorrhage, left     ICD-10-CM: H35.62 
ICD-9-CM: 362.81 Vitals BP Pulse Temp Resp Height(growth percentile) Weight(growth percentile)  120/60 (BP 1 Location: Right arm, BP Patient Position: Sitting) 62 98 °F (36.7 °C) (Oral) 20 5' 2\" (1.575 m) 187 lb 9.6 oz (85.1 kg) SpO2 BMI OB Status Smoking Status 97% 34.31 kg/m2 Postmenopausal Former Smoker BMI and BSA Data Body Mass Index Body Surface Area  
 34.31 kg/m 2 1.93 m 2 Preferred Pharmacy Pharmacy Name Phone [address], [address] [place] [address] [address] [phone] Your Updated Medication List  
  
   
This list is accurate as of: 5/31/17  2:00 PM.  Always use your most recent med list. amLODIPine 5 mg tablet Commonly known as:  Eward Pallavi TAKE 1 TAB BY MOUTH DAILY. aspirin delayed-release 81 mg tablet Take 1 Tab by mouth daily. BIOTIN PO Take 5,000 mcg by mouth daily. diclofenac 1 % Gel Commonly known as:  VOLTAREN Apply  to affected area four (4) times daily. Apply 4 gm QID prn. fluocinoNIDE 0.05 % topical cream  
Commonly known as:  LIDEX Apply  to affected area two (2) times daily as needed for Skin Irritation. furosemide 40 mg tablet Commonly known as:  LASIX Take 1 Tab by mouth daily. As needed only with leg swelling  
  
 glimepiride 1 mg tablet Commonly known as:  AMARYL Take 1 Tab by mouth Daily (before breakfast). Take only if sugar is above 140  
  
 glucose blood VI test strips strip Commonly known as:  FREESTYLE TEST  
TEST 2 TIMES DAILY. Dx X90.75 Lancets Misc  
1 Package by Does Not Apply route daily. Test glucose daily and as needed Dx type 2 dm E11.42  
  
 linaclotide 145 mcg Cap capsule Commonly known as:  Rhina Getting Take 1 Cap by mouth Daily (before breakfast). Per dr Angie Banerjee  
  
 loratadine 10 mg tablet Commonly known as:  Rosey Pound Take 1 Tab by mouth daily as needed for Allergies. magnesium hydroxide 2,400 mg/10 mL Susp suspension Commonly known as:  MILK OF MAGNESIA Take 10 mL by mouth nightly. OTHER  
  
 potassium chloride SR 10 mEq tablet Commonly known as:  KLOR-CON 10 Take 2 Tabs by mouth daily. Only when furosemide taken We Performed the Following AMB POC HEMOGLOBIN A1C [57023 CPT(R)] Follow-up Instructions Return in about 2 months (around 7/31/2017). Introducing \[facility]\"" & HEALTH SERVICES! Dear Floyr Fernández: 
Thank you for requesting a [employer] account. Our records indicate that you have previously registered for a [employer] account but its currently inactive. Please call our [employer] support line at [phone]. Additional Information If you have questions, please visit the Frequently Asked Questions section of the [employer] website at [email or web address] Remember, [employer] is NOT to be used for urgent needs. For medical emergencies, dial 911. Now available from your iPhone and Android! Please provide this summary of care documentation to your next provider. Your primary care clinician is listed as JOCELYN MCKEON. If you have any questions after today's visit, please call [phone].

## 2017-06-08 RX ORDER — AMLODIPINE BESYLATE 5 MG/1
TABLET ORAL
Qty: 30 TAB | Refills: 11 | Status: SHIPPED | OUTPATIENT
Start: 2017-06-08 | End: 2017-12-11

## 2017-08-03 ENCOUNTER — OFFICE VISIT (OUTPATIENT)
Dept: INTERNAL MEDICINE CLINIC | Age: 82
End: 2017-08-03

## 2017-08-03 ENCOUNTER — HOSPITAL ENCOUNTER (OUTPATIENT)
Dept: LAB | Age: 82
Discharge: HOME | End: 2017-08-03
Payer: MEDICARE

## 2017-08-03 VITALS
WEIGHT: 191 LBS | RESPIRATION RATE: 18 BRPM | DIASTOLIC BLOOD PRESSURE: 72 MMHG | TEMPERATURE: 97.7 F | OXYGEN SATURATION: 99 % | HEIGHT: 62 IN | HEART RATE: 52 BPM | SYSTOLIC BLOOD PRESSURE: 140 MMHG | BODY MASS INDEX: 35.15 KG/M2

## 2017-08-03 DIAGNOSIS — I48.20 CHRONIC ATRIAL FIBRILLATION (HCC): ICD-10-CM

## 2017-08-03 DIAGNOSIS — I10 ESSENTIAL HYPERTENSION, BENIGN: ICD-10-CM

## 2017-08-03 DIAGNOSIS — R60.0 LOCALIZED EDEMA: ICD-10-CM

## 2017-08-03 DIAGNOSIS — M79.10 MYALGIA: ICD-10-CM

## 2017-08-03 DIAGNOSIS — E11.40 TYPE 2 DIABETES, CONTROLLED, WITH NEUROPATHY (HCC): Primary | ICD-10-CM

## 2017-08-03 PROCEDURE — 85651 RBC SED RATE NONAUTOMATED: CPT

## 2017-08-03 PROCEDURE — 82043 UR ALBUMIN QUANTITATIVE: CPT

## 2017-08-03 PROCEDURE — 80053 COMPREHEN METABOLIC PANEL: CPT

## 2017-08-03 PROCEDURE — 81001 URINALYSIS AUTO W/SCOPE: CPT

## 2017-08-03 PROCEDURE — 36415 COLL VENOUS BLD VENIPUNCTURE: CPT

## 2017-08-03 PROCEDURE — 86140 C-REACTIVE PROTEIN: CPT

## 2017-08-03 PROCEDURE — 83036 HEMOGLOBIN GLYCOSYLATED A1C: CPT

## 2017-08-03 PROCEDURE — 85025 COMPLETE CBC W/AUTO DIFF WBC: CPT

## 2017-08-03 NOTE — PROGRESS NOTES
HISTORY OF PRESENT ILLNESS  Lucille Panda is a 80 y.o. female. HPI Yue May is seen today accompanied by her home health aide for follow up of hypertension and other problems. 1. Hypertension. Stable on current regimen. 2. Diabetes. Due for routine lab. 3. Atrial fibrillation with chronic lower extremity edema. Her leg swelling has worsened. She has gained weight, about four pounds. She is not taking Lasix as it gives her the side effect of excessive urination. I have given her the okay to try taking 1/2 tablet and I would like her to do this on a regular basis for the next three weeks. She will also try to elevate the legs as much as possible and watch sodium intake. Review of Systems. 1. Notable for some morning sore throat recently, but this has actually resolved. 2. She notes some myalgias and malaise. The myalgias are especially marked in the morning. Reviewed with her the need to check inflammatory markers to rule out PMR. 3. Lastly, she has some lower extremity neuropathy symptoms which she has had in the past.    MedDATA/gwo     Current Outpatient Prescriptions   Medication Sig    amLODIPine (NORVASC) 5 mg tablet TAKE 1 TAB BY MOUTH DAILY.  glucose blood VI test strips (FREESTYLE TEST) strip TEST 2 TIMES DAILY. Dx E11.42    potassium chloride SR (KLOR-CON 10) 10 mEq tablet Take 2 Tabs by mouth daily. Only when furosemide taken    fluocinoNIDE (LIDEX) 0.05 % topical cream Apply  to affected area two (2) times daily as needed for Skin Irritation.  OTHER     diclofenac (VOLTAREN) 1 % gel Apply  to affected area four (4) times daily. Apply 4 gm QID prn.  furosemide (LASIX) 40 mg tablet Take 1 Tab by mouth daily. As needed only with leg swelling    loratadine (CLARITIN) 10 mg tablet Take 1 Tab by mouth daily as needed for Allergies.  Lancets misc 1 Package by Does Not Apply route daily.  Test glucose daily and as needed Dx type 2 dm E11.42    aspirin delayed-release 81 mg tablet Take 1 Tab by mouth daily. (Patient taking differently: Take 81 mg by mouth every other day.)    glimepiride (AMARYL) 1 mg tablet Take 1 Tab by mouth Daily (before breakfast). Take only if sugar is above 140    linaclotide (LINZESS) 145 mcg cap capsule Take 1 Cap by mouth Daily (before breakfast). Per dr Isabelle Blood    magnesium hydroxide (MILK OF MAGNESIA) 2,400 mg/10 mL susp suspension Take 10 mL by mouth nightly.  BIOTIN PO Take 5,000 mcg by mouth daily. No current facility-administered medications for this visit. Review of Systems   Constitutional: Positive for malaise/fatigue. Negative for weight loss. Respiratory: Negative. Cardiovascular: Positive for leg swelling. Negative for chest pain, palpitations and PND. Musculoskeletal: Positive for myalgias. Neurological: Positive for weakness. Negative for focal weakness. Physical Exam   Constitutional: No distress. Cardiovascular: Normal rate. An irregularly irregular rhythm present. Exam reveals no gallop and no friction rub. No murmur heard. Pulmonary/Chest: Effort normal and breath sounds normal.   Musculoskeletal: She exhibits edema. Bilateral lower extremity edema, severe    Nursing note and vitals reviewed. ASSESSMENT and PLAN  Diagnoses and all orders for this visit:    1. Type 2 diabetes, controlled, with neuropathy (HCC)  -     MICROALBUMIN, UR, RAND W/ MICROALBUMIN/CREA RATIO  -     METABOLIC PANEL, COMPREHENSIVE  -     HEMOGLOBIN A1C WITH EAG  -     URINALYSIS W/ RFLX MICROSCOPIC    2. Myalgia  -     SED RATE (ESR)  -     C REACTIVE PROTEIN, QT    3. Chronic atrial fibrillation (HCC)  -     CBC WITH AUTOMATED DIFF    4. Localized edema- see hpi    5.  Essential hypertension, benign- Continue current regimen of prescription and / or OTC medications

## 2017-08-03 NOTE — MR AVS SNAPSHOT
Visit Information Date & Time Provider Department Dept. Phone Encounter #  
 8/3/2017  1:00 PM Cuco Worthy, [address] [place] [address] Internal Medicine [phone] [id number] Follow-up Instructions Return in about 3 weeks (around 8/24/2017). Upcoming Health Maintenance Date Due  
 LIPID PANEL Q1 3/21/2017 MICROALBUMIN Q1 6/2/2017 INFLUENZA AGE 9 TO ADULT 8/1/2017 EYE EXAM RETINAL OR DILATED Q1 8/29/2017 HEMOGLOBIN A1C Q6M 11/30/2017 MEDICARE YEARLY EXAM 1/31/2018 FOOT EXAM Q1 3/20/2018 GLAUCOMA SCREENING Q2Y 8/29/2018 DTaP/Tdap/Td series (2 - Td) 9/21/2026 Allergies as of 8/3/2017  Review Complete On: 8/3/2017 By: Cuco Worthy MD  
  
 Severity Noted Reaction Type Reactions Amoxicillin  03/28/2014    Other (comments) \"makes me feel like I\"m going to faint\" Aspirin  03/28/2014    Palpitations Levaquin [Levofloxacin]  03/20/2015    Hives, Rash Losartan  06/17/2015    Other (comments) Tongue swelling Lyrica [Pregabalin]  03/28/2014    Rash Current Immunizations  Reviewed on 11/3/2016 Name Date Influenza High Dose Vaccine PF 11/3/2016, 11/2/2015 Influenza Vaccine 9/18/2014 Pneumococcal Conjugate (PCV-13) 1/22/2015 Pneumococcal Vaccine (Unspecified Type) 11/12/2011 Zoster Vaccine, Live 1/28/2016 Not reviewed this visit You Were Diagnosed With   
  
 Codes Comments Type 2 diabetes, controlled, with neuropathy ([facility]    -  Primary ICD-10-CM: E11.40 ICD-9-CM: 250.60, 357.2 Myalgia     ICD-10-CM: M79.1 ICD-9-CM: 729.1 Chronic atrial fibrillation (HCC)     ICD-10-CM: J57.2 ICD-9-CM: 427.31 Localized edema     ICD-10-CM: R60.0 ICD-9-CM: 782.3 Essential hypertension, benign     ICD-10-CM: I10 
ICD-9-CM: 401.1 Vitals BP Pulse Temp Resp Height(growth percentile) Weight(growth percentile)  140/72 (BP 1 Location: Right arm, BP Patient Position: Sitting) (!) 52 97.7 °F (36.5 °C) 18 5' 2\" (1.575 m) 191 lb (86.6 kg) SpO2 BMI OB Status Smoking Status 99% 34.93 kg/m2 Postmenopausal Former Smoker BMI and BSA Data Body Mass Index Body Surface Area 34.93 kg/m 2 1.95 m 2 Preferred Pharmacy Pharmacy Name Phone [address] [facility], [address] [place] [address] [address] [phone] Your Updated Medication List  
  
   
This list is accurate as of: 8/3/17  1:37 PM.  Always use your most recent med list. amLODIPine 5 mg tablet Commonly known as:  [place] TAKE 1 TAB BY MOUTH DAILY. aspirin delayed-release 81 mg tablet Take 1 Tab by mouth daily. BIOTIN PO Take 5,000 mcg by mouth daily. diclofenac 1 % Gel Commonly known as:  VOLTAREN Apply  to affected area four (4) times daily. Apply 4 gm QID prn. fluocinoNIDE 0.05 % topical cream  
Commonly known as:  LIDEX Apply  to affected area two (2) times daily as needed for Skin Irritation. furosemide 40 mg tablet Commonly known as:  LASIX Take 1 Tab by mouth daily. As needed only with leg swelling  
  
 glimepiride 1 mg tablet Commonly known as:  AMARYL Take 1 Tab by mouth Daily (before breakfast). Take only if sugar is above 140  
  
 glucose blood VI test strips strip Commonly known as:  FREESTYLE TEST  
TEST 2 TIMES DAILY. Dx I11.48 Lancets Misc  
1 Package by Does Not Apply route daily. Test glucose daily and as needed Dx type 2 dm E11.42  
  
 linaclotide 145 mcg Cap capsule Commonly known as:  Cheril Sorrow Take 1 Cap by mouth Daily (before breakfast). Per dr Rolando Zhang  
  
 loratadine 10 mg tablet Commonly known as:  Marilu Schlossman Take 1 Tab by mouth daily as needed for Allergies. magnesium hydroxide 2,400 mg/10 mL Susp suspension Commonly known as:  MILK OF MAGNESIA Take 10 mL by mouth nightly. OTHER  
  
 potassium chloride SR 10 mEq tablet Commonly known as:  KLOR-CON 10 Take 2 Tabs by mouth daily. Only when furosemide taken We Performed the Following C REACTIVE PROTEIN, QT [59936 CPT(R)] CBC WITH AUTOMATED DIFF [65328 CPT(R)] HEMOGLOBIN A1C WITH EAG [23162 CPT(R)] METABOLIC PANEL, COMPREHENSIVE [15071 CPT(R)] MICROALBUMIN, UR, RAND W/ MICROALBUMIN/CREA RATIO [id number] CPT(R)] SED RATE (ESR) [id number] CPT(R)] URINALYSIS W/ RFLX MICROSCOPIC [03974 CPT(R)] Follow-up Instructions Return in about 3 weeks (around 8/24/2017). Introducing [facility] & HE[facility] SERVICES! Dear Clotilde Gutierrez: 
Thank you for requesting a [employer] account. Our records indicate that you have previously registered for a [employer] account but its currently inactive. Please call our [employer] support line at [phone]. Additional Information If you have questions, please visit the Frequently Asked Questions section of the [employer] website at [email or web address] Remember, [employer] is NOT to be used for urgent needs. For medical emergencies, dial 911. Now available from your iPhone and Android! Please provide this summary of care documentation to your next provider. Your primary care clinician is listed as JOCELYN MCKEON. If you have any questions after today's visit, please call [phone].

## 2017-08-04 LAB
ALBUMIN SERPL-MCNC: 3.9 G/DL (ref 3.2–4.6)
ALBUMIN/CREAT UR: 170.7 MG/G CREAT (ref 0–30)
ALBUMIN/GLOB SERPL: 1.4 {RATIO} (ref 1.2–2.2)
ALP SERPL-CCNC: 102 IU/L (ref 39–117)
ALT SERPL-CCNC: 20 IU/L (ref 0–32)
APPEARANCE UR: CLEAR
AST SERPL-CCNC: 23 IU/L (ref 0–40)
BASOPHILS # BLD AUTO: 0 X10E3/UL (ref 0–0.2)
BASOPHILS NFR BLD AUTO: 0 %
BILIRUB SERPL-MCNC: 0.4 MG/DL (ref 0–1.2)
BILIRUB UR QL STRIP: NEGATIVE
BUN SERPL-MCNC: 18 MG/DL (ref 10–36)
BUN/CREAT SERPL: 25 (ref 12–28)
CALCIUM SERPL-MCNC: 8.6 MG/DL (ref 8.7–10.3)
CHLORIDE SERPL-SCNC: 99 MMOL/L (ref 96–106)
CO2 SERPL-SCNC: 27 MMOL/L (ref 18–29)
COLOR UR: YELLOW
CREAT SERPL-MCNC: 0.73 MG/DL (ref 0.57–1)
CREAT UR-MCNC: 22.9 MG/DL
CRP SERPL-MCNC: 2.7 MG/L (ref 0–4.9)
EOSINOPHIL # BLD AUTO: 0.1 X10E3/UL (ref 0–0.4)
EOSINOPHIL NFR BLD AUTO: 1 %
ERYTHROCYTE [DISTWIDTH] IN BLOOD BY AUTOMATED COUNT: 15.4 % (ref 12.3–15.4)
ERYTHROCYTE [SEDIMENTATION RATE] IN BLOOD BY WESTERGREN METHOD: 11 MM/HR (ref 0–40)
EST. AVERAGE GLUCOSE BLD GHB EST-MCNC: 137 MG/DL
GLOBULIN SER CALC-MCNC: 2.8 G/DL (ref 1.5–4.5)
GLUCOSE SERPL-MCNC: 78 MG/DL (ref 65–99)
GLUCOSE UR QL: NEGATIVE
HBA1C MFR BLD: 6.4 % (ref 4.8–5.6)
HCT VFR BLD AUTO: 35.8 % (ref 34–46.6)
HGB BLD-MCNC: 11.5 G/DL (ref 11.1–15.9)
HGB UR QL STRIP: NEGATIVE
IMM GRANULOCYTES # BLD: 0 X10E3/UL (ref 0–0.1)
IMM GRANULOCYTES NFR BLD: 0 %
KETONES UR QL STRIP: NEGATIVE
LEUKOCYTE ESTERASE UR QL STRIP: NEGATIVE
LYMPHOCYTES # BLD AUTO: 1.4 X10E3/UL (ref 0.7–3.1)
LYMPHOCYTES NFR BLD AUTO: 24 %
MCH RBC QN AUTO: 28.3 PG (ref 26.6–33)
MCHC RBC AUTO-ENTMCNC: 32.1 G/DL (ref 31.5–35.7)
MCV RBC AUTO: 88 FL (ref 79–97)
MICRO URNS: NORMAL
MICROALBUMIN UR-MCNC: 39.1 UG/ML
MONOCYTES # BLD AUTO: 0.5 X10E3/UL (ref 0.1–0.9)
MONOCYTES NFR BLD AUTO: 9 %
NEUTROPHILS # BLD AUTO: 3.8 X10E3/UL (ref 1.4–7)
NEUTROPHILS NFR BLD AUTO: 66 %
NITRITE UR QL STRIP: NEGATIVE
PH UR STRIP: 7 [PH] (ref 5–7.5)
PLATELET # BLD AUTO: 283 X10E3/UL (ref 150–379)
POTASSIUM SERPL-SCNC: 4.4 MMOL/L (ref 3.5–5.2)
PROT SERPL-MCNC: 6.7 G/DL (ref 6–8.5)
PROT UR QL STRIP: NEGATIVE
RBC # BLD AUTO: 4.06 X10E6/UL (ref 3.77–5.28)
SODIUM SERPL-SCNC: 139 MMOL/L (ref 134–144)
SP GR UR: 1.01 (ref 1–1.03)
UROBILINOGEN UR STRIP-MCNC: 0.2 MG/DL (ref 0.2–1)
WBC # BLD AUTO: 5.8 X10E3/UL (ref 3.4–10.8)

## 2017-08-05 NOTE — PROGRESS NOTES
Call- diabetes is excellent, Labs look great overall , inflammatory markers are normal. Only problem is higher protein in the urine which we'll continue to track

## 2017-08-07 NOTE — PROGRESS NOTES
Advised pt her diabetes is excellent. Labs look great overall - inflammatory markers are normal. Only problem is higher protein in the urine which MD will continue to track.

## 2017-08-21 ENCOUNTER — TELEPHONE (OUTPATIENT)
Dept: INTERNAL MEDICINE CLINIC | Age: 82
End: 2017-08-21

## 2017-08-21 NOTE — TELEPHONE ENCOUNTER
Advised pt MD said medicines for anxiety can be dangerous in her age group - she really should be checked before this could be considered. Pt has appt already on 8/24/17.
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
## 2017-08-24 ENCOUNTER — OFFICE VISIT (OUTPATIENT)
Dept: INTERNAL MEDICINE CLINIC | Age: 82
End: 2017-08-24

## 2017-08-24 VITALS
WEIGHT: 183.2 LBS | HEART RATE: 64 BPM | BODY MASS INDEX: 33.71 KG/M2 | RESPIRATION RATE: 16 BRPM | TEMPERATURE: 97.7 F | SYSTOLIC BLOOD PRESSURE: 120 MMHG | OXYGEN SATURATION: 99 % | HEIGHT: 62 IN | DIASTOLIC BLOOD PRESSURE: 62 MMHG

## 2017-08-24 DIAGNOSIS — R04.0 BLEEDING NOSE: ICD-10-CM

## 2017-08-24 DIAGNOSIS — F43.0 STRESS REACTION: Primary | ICD-10-CM

## 2017-08-24 DIAGNOSIS — I48.20 CHRONIC ATRIAL FIBRILLATION (HCC): ICD-10-CM

## 2017-08-24 RX ORDER — CHOLECALCIFEROL (VITAMIN D3) 125 MCG
1000 CAPSULE ORAL DAILY
COMMUNITY

## 2017-08-24 RX ORDER — SERTRALINE HYDROCHLORIDE 25 MG/1
25 TABLET, FILM COATED ORAL DAILY
Qty: 30 TAB | Refills: 11 | Status: SHIPPED | OUTPATIENT
Start: 2017-08-24 | End: 2017-09-06 | Stop reason: ALTCHOICE
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
## 2017-08-24 NOTE — MR AVS SNAPSHOT
Visit Information Date & Time Provider Department Dept. Phone Encounter #  
 8/24/2017  1:40 PM Mitul Joel, [address] [place] [address] Internal Medicine [phone] [id number] Follow-up Instructions Return in about 3 weeks (around 9/14/2017). Upcoming Health Maintenance Date Due  
 LIPID PANEL Q1 3/21/2017 INFLUENZA AGE 9 TO ADULT 8/1/2017 EYE EXAM RETINAL OR DILATED Q1 8/29/2017 MEDICARE YEARLY EXAM 1/31/2018 HEMOGLOBIN A1C Q6M 2/3/2018 FOOT EXAM Q1 3/20/2018 MICROALBUMIN Q1 8/3/2018 GLAUCOMA SCREENING Q2Y 8/29/2018 DTaP/Tdap/Td series (2 - Td) 9/21/2026 Allergies as of 8/24/2017  Review Complete On: 8/24/2017 By: Mitul Joel MD  
  
 Severity Noted Reaction Type Reactions Amoxicillin  03/28/2014    Other (comments) \"makes me feel like I\"m going to faint\" Aspirin  03/28/2014    Palpitations Levaquin [Levofloxacin]  03/20/2015    Hives, Rash Losartan  06/17/2015    Other (comments) Tongue swelling Lyrica [Pregabalin]  03/28/2014    Rash Current Immunizations  Reviewed on 11/3/2016 Name Date Influenza High Dose Vaccine PF 11/3/2016, 11/2/2015 Influenza Vaccine 9/18/2014 Pneumococcal Conjugate (PCV-13) 1/22/2015 Pneumococcal Vaccine (Unspecified Type) 11/12/2011 Zoster Vaccine, Live 1/28/2016 Not reviewed this visit You Were Diagnosed With   
  
 Codes Comments Stress reaction    -  Primary ICD-10-CM: F43.0 ICD-9-CM: 308. 9 Chronic atrial fibrillation (HCC)     ICD-10-CM: R16.6 ICD-9-CM: 427.31 Bleeding nose     ICD-10-CM: R04.0 ICD-9-CM: 418. 7 Vitals BP Pulse Temp Resp Height(growth percentile) Weight(growth percentile) 120/62 (BP 1 Location: Right arm, BP Patient Position: Sitting) 64 97.7 °F (36.5 °C) (Oral) 16 5' 2\" (1.575 m) 183 lb 3.2 oz (83.1 kg) SpO2 BMI OB Status Smoking Status 99% 33.51 kg/m2 Postmenopausal Former Smoker Vitals History BMI and BSA Data Body Mass Index Body Surface Area  
 33.51 kg/m 2 1.91 m 2 Preferred Pharmacy Pharmacy Name Phone [address] [facility], [facility] [address] [phone] Your Updated Medication List  
  
   
This list is accurate as of: 8/24/17  2:26 PM.  Always use your most recent med list. amLODIPine 5 mg tablet Commonly known as:  Harlon Doyne TAKE 1 TAB BY MOUTH DAILY. aspirin delayed-release 81 mg tablet Take 1 Tab by mouth daily. BIOTIN PO Take 5,000 mcg by mouth daily. diclofenac 1 % Gel Commonly known as:  VOLTAREN Apply  to affected area four (4) times daily. Apply 4 gm QID prn. fluocinoNIDE 0.05 % topical cream  
Commonly known as:  LIDEX Apply  to affected area two (2) times daily as needed for Skin Irritation. furosemide 40 mg tablet Commonly known as:  LASIX Take 1 Tab by mouth daily. As needed only with leg swelling  
  
 glimepiride 1 mg tablet Commonly known as:  AMARYL Take 1 Tab by mouth Daily (before breakfast). Take only if sugar is above 140  
  
 glucose blood VI test strips strip Commonly known as:  FREESTYLE TEST  
TEST 2 TIMES DAILY. Dx V91.04 Lancets Misc  
1 Package by Does Not Apply route daily. Test glucose daily and as needed Dx type 2 dm E11.42  
  
 linaclotide 145 mcg Cap capsule Commonly known as:  Alissa Laos Take 1 Cap by mouth Daily (before breakfast). Per dr Kelley Meyers  
  
 loratadine 10 mg tablet Commonly known as:  Curlee Arms Take 1 Tab by mouth daily as needed for Allergies. magnesium hydroxide 2,400 mg/10 mL Susp suspension Commonly known as:  MILK OF MAGNESIA Take 10 mL by mouth nightly. OTHER  
  
 potassium chloride SR 10 mEq tablet Commonly known as:  KLOR-CON 10 Take 2 Tabs by mouth daily. Only when furosemide taken  
  
 sertraline 25 mg tablet Commonly known as:  ZOLOFT Take 1 Tab by mouth daily. VITAMIN D3 PO Take  by mouth daily. Prescriptions Sent to Pharmacy Refills  
 sertraline (ZOLOFT) 25 mg tablet 11 Sig: Take 1 Tab by mouth daily. Class: Normal  
 Pharmacy: [facility] [address] [place] [address] Drive [address]  #: [phone] Route: Oral  
  
Follow-up Instructions Return in about 3 weeks (around 9/14/2017). Introducing [facility] SERVICES! Dear [facility] [employer] [facility]: 
Thank you for requesting a [employer] account. Our records indicate that you have previously registered for a [employer] account but its currently inactive. Please call our [employer] support line at [phone]. Additional Information If you have questions, please visit the Frequently Asked Questions section of the [employer] website at [email or web address] Remember, [employer] is NOT to be used for urgent needs. For medical emergencies, dial 911. Now available from your iPhone and Android! Please provide this summary of care documentation to your next provider. Your primary care clinician is listed as JOCELYN MCKEON. If you have any questions after today's visit, please call [phone].

## 2017-08-25 ENCOUNTER — TELEPHONE (OUTPATIENT)
Dept: INTERNAL MEDICINE CLINIC | Age: 82
End: 2017-08-25

## 2017-08-25 NOTE — TELEPHONE ENCOUNTER
Spoke with pt - states she started this am with right sided abdominal pain that has radiated to her mid back area - same side. Pain is dull and constant. No fever, nausea or vomiting. Worried it may be her appendix. Advised her it could be many different things - may be kidney stone (no hx of this) or UTI. Recommended she go to ER for further evaluation.  Will forward to MD.
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
## 2017-08-28 DIAGNOSIS — R06.02 SOB (SHORTNESS OF BREATH) ON EXERTION: ICD-10-CM

## 2017-08-28 DIAGNOSIS — M79.89 LEG SWELLING: ICD-10-CM

## 2017-08-28 RX ORDER — FUROSEMIDE 40 MG/1
40 TABLET ORAL DAILY
Qty: 30 TAB | Refills: 11 | Status: SHIPPED | OUTPATIENT
Start: 2017-08-28 | End: 2017-10-02 | Stop reason: SINTOL

## 2017-09-06 ENCOUNTER — TELEPHONE (OUTPATIENT)
Dept: INTERNAL MEDICINE CLINIC | Age: 82
End: 2017-09-06

## 2017-09-06 RX ORDER — LORAZEPAM 0.5 MG/1
0.5 TABLET ORAL
Qty: 30 TAB | Refills: 0 | OUTPATIENT
Start: 2017-09-06 | End: 2017-10-18 | Stop reason: SDDI

## 2017-09-11 DIAGNOSIS — M79.89 LEG SWELLING: ICD-10-CM

## 2017-09-11 DIAGNOSIS — R06.02 SOB (SHORTNESS OF BREATH) ON EXERTION: ICD-10-CM

## 2017-09-11 RX ORDER — POTASSIUM CHLORIDE 750 MG/1
20 TABLET, FILM COATED, EXTENDED RELEASE ORAL DAILY
Qty: 90 TAB | Refills: 3 | Status: SHIPPED | OUTPATIENT
Start: 2017-09-11 | End: 2017-11-09 | Stop reason: SDUPTHER

## 2017-09-14 ENCOUNTER — OFFICE VISIT (OUTPATIENT)
Dept: INTERNAL MEDICINE CLINIC | Age: 82
End: 2017-09-14

## 2017-09-14 ENCOUNTER — HOSPITAL ENCOUNTER (OUTPATIENT)
Dept: LAB | Age: 82
Discharge: HOME | End: 2017-09-14
Payer: MEDICARE

## 2017-09-14 VITALS
SYSTOLIC BLOOD PRESSURE: 133 MMHG | OXYGEN SATURATION: 99 % | HEIGHT: 62 IN | HEART RATE: 61 BPM | TEMPERATURE: 98.4 F | BODY MASS INDEX: 33.2 KG/M2 | WEIGHT: 180.4 LBS | RESPIRATION RATE: 16 BRPM | DIASTOLIC BLOOD PRESSURE: 85 MMHG

## 2017-09-14 DIAGNOSIS — R10.31 RIGHT LOWER QUADRANT ABDOMINAL PAIN: Primary | ICD-10-CM

## 2017-09-14 DIAGNOSIS — F43.0 STRESS REACTION: ICD-10-CM

## 2017-09-14 LAB
BILIRUB UR QL STRIP: NEGATIVE
GLUCOSE UR-MCNC: NEGATIVE MG/DL
KETONES P FAST UR STRIP-MCNC: NEGATIVE MG/DL
PH UR STRIP: 7 [PH] (ref 4.6–8)
PROT UR QL STRIP: NORMAL MG/DL
SP GR UR STRIP: 1.02 (ref 1–1.03)
UA UROBILINOGEN AMB POC: NORMAL (ref 0.2–1)
URINALYSIS CLARITY POC: CLEAR
URINALYSIS COLOR POC: YELLOW
URINE BLOOD POC: NEGATIVE
URINE LEUKOCYTES POC: NORMAL
URINE NITRITES POC: NEGATIVE

## 2017-09-14 PROCEDURE — 81001 URINALYSIS AUTO W/SCOPE: CPT

## 2017-09-14 PROCEDURE — 87086 URINE CULTURE/COLONY COUNT: CPT

## 2017-09-14 RX ORDER — SERTRALINE HYDROCHLORIDE 25 MG/1
25 TABLET, FILM COATED ORAL DAILY
Qty: 30 TAB | Refills: 11
Start: 2017-09-14 | End: 2017-10-02 | Stop reason: SDUPTHER

## 2017-09-14 NOTE — MR AVS SNAPSHOT
Visit Information Date & Time Provider Department Dept. Phone Encounter #  
 9/14/2017  2:20 PM Ba [place], [address] [facility] Internal Medicine [phone] [id number] Follow-up Instructions Return in about 3 weeks (around 10/5/2017). Upcoming Health Maintenance Date Due  
 LIPID PANEL Q1 3/21/2017 INFLUENZA AGE 9 TO ADULT 8/1/2017 EYE EXAM RETINAL OR DILATED Q1 8/29/2017 MEDICARE YEARLY EXAM 1/31/2018 HEMOGLOBIN A1C Q6M 2/3/2018 FOOT EXAM Q1 3/20/2018 MICROALBUMIN Q1 8/3/2018 GLAUCOMA SCREENING Q2Y 8/29/2018 DTaP/Tdap/Td series (2 - Td) 9/21/2026 Allergies as of 9/14/2017  Review Complete On: 9/14/2017 By: Ba [place] MD  
  
 Severity Noted Reaction Type Reactions Amoxicillin  03/28/2014    Other (comments) \"makes me feel like I\"m going to faint\" Aspirin  03/28/2014    Palpitations Levaquin [Levofloxacin]  03/20/2015    Hives, Rash Losartan  06/17/2015    Other (comments) Tongue swelling Lyrica [Pregabalin]  03/28/2014    Rash Current Immunizations  Reviewed on 11/3/2016 Name Date Influenza High Dose Vaccine PF 11/3/2016, 11/2/2015 Influenza Vaccine 9/18/2014 Pneumococcal Conjugate (PCV-13) 1/22/2015 Pneumococcal Vaccine (Unspecified Type) 11/12/2011 Zoster Vaccine, Live 1/28/2016 Not reviewed this visit You Were Diagnosed With   
  
 Codes Comments Right lower quadrant abdominal pain    -  Primary ICD-10-CM: R10.31 ICD-9-CM: 789.03 Stress reaction     ICD-10-CM: F43.0 ICD-9-CM: 308. 9 Vitals BP Pulse Temp Resp Height(growth percentile) Weight(growth percentile) 133/85 (BP 1 Location: Left arm, BP Patient Position: Sitting) 61 98.4 °F (36.9 °C) (Oral) 16 5' 2\" (1.575 m) 180 lb 6.4 oz (81.8 kg) SpO2 BMI OB Status Smoking Status 99% 33 kg/m2 Postmenopausal Former Smoker BMI and BSA Data  Body Mass Index Body Surface Area  
 33 kg/m 2 1.89 m 2  
  
  
 Preferred Pharmacy Pharmacy Name Phone [phone] [facility] [address] [phone] Your Updated Medication List  
  
   
This list is accurate as of: 9/14/17  3:20 PM.  Always use your most recent med list. amLODIPine 5 mg tablet Commonly known as:  [place] TAKE 1 TAB BY MOUTH DAILY. aspirin delayed-release 81 mg tablet Take 1 Tab by mouth daily. BIOTIN PO Take 5,000 mcg by mouth daily. diclofenac 1 % Gel Commonly known as:  VOLTAREN Apply  to affected area four (4) times daily. Apply 4 gm QID prn. fluocinoNIDE 0.05 % topical cream  
Commonly known as:  LIDEX Apply  to affected area two (2) times daily as needed for Skin Irritation. furosemide 40 mg tablet Commonly known as:  LASIX Take 1 Tab by mouth daily. As needed only with leg swelling  
  
 glimepiride 1 mg tablet Commonly known as:  AMARYL Take 1 Tab by mouth Daily (before breakfast). Take only if sugar is above 140  
  
 glucose blood VI test strips strip Commonly known as:  FREESTYLE TEST  
TEST 2 TIMES DAILY. Dx T48.23 Lancets Misc  
1 Package by Does Not Apply route daily. Test glucose daily and as needed Dx type 2 dm E11.42  
  
 linaclotide 145 mcg Cap capsule Commonly known as:  Trenda Amabile Take 1 Cap by mouth Daily (before breakfast). Per dr Khurram Ayala  
  
 loratadine 10 mg tablet Commonly known as:  Susa Fuchs Take 1 Tab by mouth daily as needed for Allergies. LORazepam 0.5 mg tablet Commonly known as:  ATIVAN Take 1 Tab by mouth every eight (8) hours as needed for Anxiety. Max Daily Amount: 1.5 mg.  
  
 magnesium hydroxide 2,400 mg/10 mL Susp suspension Commonly known as:  MILK OF MAGNESIA Take 10 mL by mouth nightly. OTHER  
  
 potassium chloride SR 10 mEq tablet Commonly known as:  KLOR-CON 10 Take 2 Tabs by mouth daily. Only when furosemide taken sertraline 25 mg tablet Commonly known as:  ZOLOFT Take 1 Tab by mouth daily. VITAMIN D3 PO Take  by mouth daily. We Performed the Following AMB POC URINALYSIS DIP STICK AUTO W/O MICRO [22349 CPT(R)] Follow-up Instructions Return in about 3 weeks (around 10/5/2017). Introducing [facility] & HEALTH SERVICES! Dear Srini Toledo: 
Thank you for requesting a [employer] account. Our records indicate that you have previously registered for a [employer] account but its currently inactive. Please call our [employer] support line at [phone]. Additional Information If you have questions, please visit the Frequently Asked Questions section of the [employer] website at [email or web address] Remember, [employer] is NOT to be used for urgent needs. For medical emergencies, dial 911. Now available from your iPhone and Android! Please provide this summary of care documentation to your next provider. Your primary care clinician is listed as JOCELYN MCKEON. If you have any questions after today's visit, please call [phone].

## 2017-09-14 NOTE — PROGRESS NOTES
HISTORY OF PRESENT ILLNESS  Yuni Duran is a 80 y.o. female. HPI Nevin Fajardo is seen today accompanied by her home health aide for follow up of anxiety and evaluation of recurrent abdominal pain. Right lower quadrant abdominal pain. This has been present for several days. It radiates toward the groin. A urinalysis today is normal. Her exam is normal. I suspect this is either muscular or some functional balance issue which she has been susceptible to. She reports having normal BMs, she denies fevers. Anxiety. It turns out she never took Zoloft. She had called and reported side effects but it turns out it was more concern about possible side effects based on the information from the pharmacy on the medication. I have reassured her the medication is very safe and represents no danger to her. I told her I really thought this is what she needed and she agrees to give it a trial. She will use Ativan as a backup. She has not taken this either. MedDATA/gwo                 Review of Systems   Gastrointestinal: Positive for abdominal pain. Negative for blood in stool, melena and nausea. Psychiatric/Behavioral: The patient is nervous/anxious. Physical Exam   Constitutional: No distress. Abdominal: Soft. She exhibits no distension. There is no tenderness. There is no rebound and no guarding. Psychiatric:   anxious   Nursing note and vitals reviewed. ASSESSMENT and PLAN  Diagnoses and all orders for this visit:    1. Right lower quadrant abdominal pain  -     AMB POC URINALYSIS DIP STICK AUTO W/O MICRO  -     CULTURE, URINE  -     URINALYSIS W/MICROSCOPIC    2. Stress reaction  -     sertraline (ZOLOFT) 25 mg tablet; Take 1 Tab by mouth daily.

## 2017-09-16 LAB
APPEARANCE UR: CLEAR
BACTERIA #/AREA URNS HPF: NORMAL /[HPF]
BACTERIA UR CULT: NORMAL
BILIRUB UR QL STRIP: NEGATIVE
CASTS URNS QL MICRO: NORMAL /LPF
COLOR UR: YELLOW
EPI CELLS #/AREA URNS HPF: NORMAL /HPF
GLUCOSE UR QL: NEGATIVE
HGB UR QL STRIP: NEGATIVE
KETONES UR QL STRIP: NEGATIVE
LEUKOCYTE ESTERASE UR QL STRIP: ABNORMAL
MICRO URNS: ABNORMAL
MUCOUS THREADS URNS QL MICRO: PRESENT
NITRITE UR QL STRIP: NEGATIVE
PH UR STRIP: 7 [PH] (ref 5–7.5)
PROT UR QL STRIP: ABNORMAL
RBC #/AREA URNS HPF: NORMAL /HPF
SP GR UR: 1.02 (ref 1–1.03)
UROBILINOGEN UR STRIP-MCNC: 0.2 MG/DL (ref 0.2–1)
WBC #/AREA URNS HPF: NORMAL /HPF

## 2017-09-20 ENCOUNTER — TELEPHONE (OUTPATIENT)
Dept: INTERNAL MEDICINE CLINIC | Age: 82
End: 2017-09-20

## 2017-09-20 RX ORDER — RANITIDINE 150 MG/1
150 TABLET, FILM COATED ORAL DAILY
Qty: 30 TAB | Refills: 11 | Status: SHIPPED | OUTPATIENT
Start: 2017-09-20 | End: 2017-10-02 | Stop reason: ALTCHOICE

## 2017-09-20 NOTE — TELEPHONE ENCOUNTER
Called pt to follow up on how she was feeling and if Lorazepam helped any with anxiety. She states she still felt the same. Advised her MD wants her to try Zantac 150 mg to see if this will ease her stomach pains. She will call me to follow up how she is doing. Rx Sent to pharmacy.
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
## 2017-09-29 ENCOUNTER — TELEPHONE (OUTPATIENT)
Dept: INTERNAL MEDICINE CLINIC | Age: 82
End: 2017-09-29

## 2017-09-29 NOTE — TELEPHONE ENCOUNTER
Called to see how she was doing since talking to [facility] this morning. She states better this afternoon. She states she really felt like she was going to die today. She was shaking all over and her heart was racing. She thinks she may have taken 2 lasix pills instead of one the past 2 days by mistake. She did not want to take anymore. Advised her she has an appt with MD on Monday and can discuss this with him. She also told me she has been experiencing burning in both feet and lower legs x 2 weeks. She can not sleep because of it. By end of conversation she was calmer.  Will forward to MD.

## 2017-10-02 ENCOUNTER — OFFICE VISIT (OUTPATIENT)
Dept: INTERNAL MEDICINE CLINIC | Age: 82
End: 2017-10-02

## 2017-10-02 ENCOUNTER — TELEPHONE (OUTPATIENT)
Dept: INTERNAL MEDICINE CLINIC | Age: 82
End: 2017-10-02

## 2017-10-02 VITALS
HEART RATE: 78 BPM | OXYGEN SATURATION: 97 % | BODY MASS INDEX: 32.39 KG/M2 | SYSTOLIC BLOOD PRESSURE: 139 MMHG | DIASTOLIC BLOOD PRESSURE: 79 MMHG | TEMPERATURE: 98.5 F | HEIGHT: 62 IN | WEIGHT: 176 LBS | RESPIRATION RATE: 18 BRPM

## 2017-10-02 DIAGNOSIS — R60.0 LOCALIZED EDEMA: Primary | ICD-10-CM

## 2017-10-02 DIAGNOSIS — F43.0 STRESS REACTION: ICD-10-CM

## 2017-10-02 DIAGNOSIS — F41.9 ANXIETY: ICD-10-CM

## 2017-10-02 RX ORDER — SERTRALINE HYDROCHLORIDE 50 MG/1
50 TABLET, FILM COATED ORAL DAILY
Qty: 30 TAB | Refills: 11 | Status: SHIPPED | OUTPATIENT
Start: 2017-10-02 | End: 2017-10-18 | Stop reason: SINTOL

## 2017-10-02 RX ORDER — BUMETANIDE 1 MG/1
1 TABLET ORAL
Qty: 15 TAB | Refills: 11 | Status: SHIPPED | OUTPATIENT
Start: 2017-10-02 | End: 2017-10-20 | Stop reason: SDUPTHER
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
## 2017-10-02 NOTE — MR AVS SNAPSHOT
Visit Information Date & Time Provider Department Dept. Phone Encounter #  
 10/2/2017 10:20 AM Kristi Reese MD [facility] Internal Medicine [phone] [id number] Follow-up Instructions Return in about 2 weeks (around 10/16/2017). Your Appointments 10/5/2017  1:40 PM  
ROUTINE CARE with Kristi Reese MD  
[facility] Internal Medicine [facility] Appt Note: 3wk fuv  
 [address] [facility] [address]  
[address] [address] [place] [address] Upcoming Health Maintenance Date Due  
 LIPID PANEL Q1 3/21/2017 INFLUENZA AGE 9 TO ADULT 8/1/2017 EYE EXAM RETINAL OR DILATED Q1 8/29/2017 MEDICARE YEARLY EXAM 1/31/2018 HEMOGLOBIN A1C Q6M 2/3/2018 FOOT EXAM Q1 3/20/2018 MICROALBUMIN Q1 8/3/2018 GLAUCOMA SCREENING Q2Y 8/29/2018 DTaP/Tdap/Td series (2 - Td) 9/21/2026 Allergies as of 10/2/2017  Review Complete On: 10/2/2017 By: Kristi Reese MD  
  
 Severity Noted Reaction Type Reactions Amoxicillin  03/28/2014    Other (comments) \"makes me feel like I\"m going to faint\" Aspirin  03/28/2014    Palpitations Levaquin [Levofloxacin]  03/20/2015    Hives, Rash Losartan  06/17/2015    Other (comments) Tongue swelling Lyrica [Pregabalin]  03/28/2014    Rash Current Immunizations  Reviewed on 11/3/2016 Name Date Influenza High Dose Vaccine PF 11/3/2016, 11/2/2015 Influenza Vaccine 9/18/2014 Pneumococcal Conjugate (PCV-13) 1/22/2015 Pneumococcal Vaccine (Unspecified Type) 11/12/2011 Zoster Vaccine, Live 1/28/2016 Not reviewed this visit You Were Diagnosed With   
  
 Codes Comments Localized edema    -  Primary ICD-10-CM: R60.0 ICD-9-CM: 060. 3 Anxiety     ICD-10-CM: F41.9 ICD-9-CM: 300.00 Stress reaction     ICD-10-CM: F43.0 ICD-9-CM: 308. 9 Vitals BP Pulse Temp Resp Height(growth percentile) Weight(growth percentile) 139/79 (BP 1 Location: Right arm, BP Patient Position: Sitting) 78 98.5 °F (36.9 °C) (Oral) 18 5' 2\" (1.575 m) 176 lb (79.8 kg) SpO2 BMI OB Status Smoking Status 97% 32.19 kg/m2 Postmenopausal Former Smoker BMI and BSA Data Body Mass Index Body Surface Area  
 32.19 kg/m 2 1.87 m 2 Preferred Pharmacy Pharmacy Name Phone [address] [facility],[address], [address] [facility] [address] [place] [address] [phone] Your Updated Medication List  
  
   
This list is accurate as of: 10/2/17 11:11 AM.  Always use your most recent med list. amLODIPine 5 mg tablet Commonly known as:  Tad Augustine TAKE 1 TAB BY MOUTH DAILY. aspirin delayed-release 81 mg tablet Take 1 Tab by mouth daily. BIOTIN PO Take 5,000 mcg by mouth daily. bumetanide 1 mg tablet Commonly known as:  Royal Hutching Take 1 Tab by mouth every fourty-eight (48) hours. Replaces furosemide  
  
 fluocinoNIDE 0.05 % topical cream  
Commonly known as:  LIDEX Apply  to affected area two (2) times daily as needed for Skin Irritation. glimepiride 1 mg tablet Commonly known as:  AMARYL Take 1 Tab by mouth Daily (before breakfast). Take only if sugar is above 140  
  
 glucose blood VI test strips strip Commonly known as:  FREESTYLE TEST  
TEST 2 TIMES DAILY. Dx L48.34 Lancets Misc  
1 Package by Does Not Apply route daily. Test glucose daily and as needed Dx type 2 dm E11.42  
  
 linaclotide 145 mcg Cap capsule Commonly known as:  [place] Runner Take 1 Cap by mouth Daily (before breakfast). Per dr Kelsey Sneed  
  
 loratadine 10 mg tablet Commonly known as:  Radha Hunting Take 1 Tab by mouth daily as needed for Allergies. LORazepam 0.5 mg tablet Commonly known as:  ATIVAN Take 1 Tab by mouth every eight (8) hours as needed for Anxiety.  Max Daily Amount: 1.5 mg.  
  
 magnesium hydroxide 2,400 mg/10 mL Susp suspension Commonly known as:  MILK OF MAGNESIA Take 10 mL by mouth nightly. OTHER  
  
 potassium chloride SR 10 mEq tablet Commonly known as:  KLOR-CON 10 Take 2 Tabs by mouth daily. Only when furosemide taken  
  
 sertraline 50 mg tablet Commonly known as:  ZOLOFT Take 1 Tab by mouth daily. New dose VITAMIN D3 PO Take  by mouth daily. Prescriptions Sent to Pharmacy Refills  
 bumetanide (BUMEX) 1 mg tablet 11 Sig: Take 1 Tab by mouth every fourty-eight (48) hours. Replaces furosemide Class: Normal  
 Pharmacy: [address] [place] [address] [facility] Ph #: [phone] Route: Oral  
 sertraline (ZOLOFT) 50 mg tablet 11 Sig: Take 1 Tab by mouth daily. New dose Class: Normal  
 Pharmacy: [address] Ph #: [phone] Route: Oral  
  
Follow-up Instructions Return in about 2 weeks (around 10/16/2017). Introducing [address] [facility] IN [facility]: 
Thank you for requesting a [employer] account. Our records indicate that you have previously registered for a [employer] account but its currently inactive. Please call our [employer] support line at [phone]. Additional Information If you have questions, please visit the Frequently Asked Questions section of the [employer] website at [email or web address] Remember, [employer] is NOT to be used for urgent needs. For medical emergencies, dial 911. Now available from your iPhone and Android! Please provide this summary of care documentation to your next provider. Your primary care clinician is listed as JOCELYN MCKEON. If you have any questions after today's visit, please call [phone].

## 2017-10-04 ENCOUNTER — TELEPHONE (OUTPATIENT)
Dept: INTERNAL MEDICINE CLINIC | Age: 82
End: 2017-10-04

## 2017-10-09 ENCOUNTER — TELEPHONE (OUTPATIENT)
Dept: INTERNAL MEDICINE CLINIC | Age: 82
End: 2017-10-09
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
## 2017-10-10 NOTE — TELEPHONE ENCOUNTER
Advised pt MD think the zoloft will kick in. He feels she may benefit from counseling. He recommends Dr Meet Orellana - number given [phone]. She should continue to solicit the support of her family as well.

## 2017-10-16 ENCOUNTER — TELEPHONE (OUTPATIENT)
Dept: INTERNAL MEDICINE CLINIC | Age: 82
End: 2017-10-16

## 2017-10-16 NOTE — TELEPHONE ENCOUNTER
Advised pt MD has reviewed her med list - he said there really are no meds he can see that should be discontinued - but he said she may stop the zoloft/sertraline. MD also recommended she try a lorazepam at bedtime, perhaps the anxiety reduction will help her sleep better and feel better overall. Pt states she is too scared to take that. She should see psychologist as discussed/directed.
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
## 2017-10-18 ENCOUNTER — OFFICE VISIT (OUTPATIENT)
Dept: INTERNAL MEDICINE CLINIC | Age: 82
End: 2017-10-18

## 2017-10-18 VITALS
TEMPERATURE: 98.1 F | DIASTOLIC BLOOD PRESSURE: 52 MMHG | HEART RATE: 68 BPM | HEIGHT: 62 IN | SYSTOLIC BLOOD PRESSURE: 144 MMHG | OXYGEN SATURATION: 98 % | WEIGHT: 172.2 LBS | BODY MASS INDEX: 31.69 KG/M2 | RESPIRATION RATE: 20 BRPM

## 2017-10-18 DIAGNOSIS — F41.9 ANXIETY: Primary | ICD-10-CM

## 2017-10-18 DIAGNOSIS — Z23 ENCOUNTER FOR IMMUNIZATION: ICD-10-CM

## 2017-10-18 RX ORDER — PAROXETINE 10 MG/1
10 TABLET, FILM COATED ORAL DAILY
Qty: 30 TAB | Refills: 11 | Status: SHIPPED | OUTPATIENT
Start: 2017-10-18 | End: 2017-11-09 | Stop reason: ALTCHOICE
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
## 2017-10-18 NOTE — MR AVS SNAPSHOT
Visit Information Date & Time Provider Department Dept. Phone Encounter #  
 10/18/2017  2:40 PM Katie Valles, [address] [place] [address] Internal Medicine [phone] [id number] Follow-up Instructions Return in about 3 weeks (around 11/8/2017). Upcoming Health Maintenance Date Due  
 LIPID PANEL Q1 3/21/2017 INFLUENZA AGE 9 TO ADULT 8/1/2017 MEDICARE YEARLY EXAM 1/31/2018 HEMOGLOBIN A1C Q6M 2/3/2018 FOOT EXAM Q1 3/20/2018 MICROALBUMIN Q1 8/3/2018 EYE EXAM RETINAL OR DILATED Q1 9/1/2018 GLAUCOMA SCREENING Q2Y 9/1/2019 DTaP/Tdap/Td series (2 - Td) 9/21/2026 Allergies as of 10/18/2017  Review Complete On: 10/18/2017 By: Katie Valles MD  
  
 Severity Noted Reaction Type Reactions Amoxicillin  03/28/2014    Other (comments) \"makes me feel like I\"m going to faint\" Aspirin  03/28/2014    Palpitations Gabapentin  10/18/2017    Drowsiness * pt states this med makes her feel very drowsy , gives her a drunk feeling. Levaquin [Levofloxacin]  03/20/2015    Hives, Rash Lorazepam  10/18/2017    Other (comments) Feeling faint. Losartan  06/17/2015    Other (comments) Tongue swelling Lyrica [Pregabalin]  03/28/2014    Rash Other Medication  10/18/2017    Other (comments) Feeling faint, does not decrease pain. Sertraline  10/18/2017    Other (comments) Feeling faint. Current Immunizations  Reviewed on 11/3/2016 Name Date Influenza High Dose Vaccine PF  Incomplete, 11/3/2016, 11/2/2015 Influenza Vaccine 9/18/2014 Pneumococcal Conjugate (PCV-13) 1/22/2015 Pneumococcal Vaccine (Unspecified Type) 11/12/2011 Zoster Vaccine, Live 1/28/2016 Not reviewed this visit You Were Diagnosed With   
  
 Codes Comments Anxiety    -  Primary ICD-10-CM: F41.9 ICD-9-CM: 300.00 Encounter for immunization     ICD-10-CM: B88 ICD-9-CM: V03.89 Vitals BP Pulse Temp Resp Height(growth percentile) Weight(growth percentile) 144/52 (BP 1 Location: Right arm, BP Patient Position: Sitting) 68 98.1 °F (36.7 °C) (Oral) 20 5' 2\" (1.575 m) 172 lb 3.2 oz (78.1 kg) SpO2 BMI OB Status Smoking Status 98% 31.5 kg/m2 Postmenopausal Former Smoker BMI and BSA Data Body Mass Index Body Surface Area 31.5 kg/m 2 1.85 m 2 Preferred Pharmacy Pharmacy Name Phone [address], [address] [facility] [address] [phone] Your Updated Medication List  
  
   
This list is accurate as of: 10/18/17  3:36 PM.  Always use your most recent med list. amLODIPine 5 mg tablet Commonly known as:  [place] TAKE 1 TAB BY MOUTH DAILY. aspirin delayed-release 81 mg tablet Take 1 Tab by mouth daily. BIOTIN PO Take 5,000 mcg by mouth daily. bumetanide 1 mg tablet Commonly known as:  Beata Wall Take 1 Tab by mouth every fourty-eight (48) hours. Replaces furosemide EYE HEALTH PO Take  by mouth daily. fluocinoNIDE 0.05 % topical cream  
Commonly known as:  LIDEX Apply  to affected area two (2) times daily as needed for Skin Irritation. glimepiride 1 mg tablet Commonly known as:  AMARYL Take 1 Tab by mouth Daily (before breakfast). Take only if sugar is above 140  
  
 glucose blood VI test strips strip Commonly known as:  FREESTYLE TEST  
TEST 2 TIMES DAILY. Dx M64.22 Lancets Misc  
1 Package by Does Not Apply route daily. Test glucose daily and as needed Dx type 2 dm E11.42  
  
 linaclotide 145 mcg Cap capsule Commonly known as:  Cameron Flock Take 1 Cap by mouth Daily (before breakfast). Per dr Solo Iglesias  
  
 loratadine 10 mg tablet Commonly known as:  Malini Julio Take 1 Tab by mouth daily as needed for Allergies. magnesium hydroxide 2,400 mg/10 mL Susp suspension Commonly known as:  MILK OF MAGNESIA Take 10 mL by mouth nightly. OTHER PARoxetine 10 mg tablet Commonly known as:  PAXIL Take 1 Tab by mouth daily. potassium chloride SR 10 mEq tablet Commonly known as:  KLOR-CON 10 Take 2 Tabs by mouth daily. Only when furosemide taken VITAMIN D3 PO Take  by mouth daily. Prescriptions Printed Refills PARoxetine (PAXIL) 10 mg tablet 11 Sig: Take 1 Tab by mouth daily. Class: Print Route: Oral  
  
We Performed the Following ADMIN INFLUENZA VIRUS VAC [ [facility]] INFLUENZA VIRUS VACCINE, HIGH DOSE SEASONAL, PRESERVATIVE FREE [71884 CPT(R)] Follow-up Instructions Return in about 3 weeks (around 11/8/2017). Introducing [facility]! Dear [facility]: 
Thank you for requesting a [employer] account. Our records indicate that you have previously registered for a [employer] account but its currently inactive. Please call our [employer] support line at [phone]. Additional Information If you have questions, please visit the Frequently Asked Questions section of the [employer] website at [email or web address]. [email or web address]/"[employer]/. Remember, [employer] is NOT to be used for urgent needs. For medical emergencies, dial 911. Now available from your iPhone and Android! Please provide this summary of care documentation to your next provider. Your primary care clinician is listed as JOCELYN MCKEON. If you have any questions after today's visit, please call [phone].

## 2017-10-20 ENCOUNTER — TELEPHONE (OUTPATIENT)
Dept: INTERNAL MEDICINE CLINIC | Age: 82
End: 2017-10-20

## 2017-10-20 DIAGNOSIS — R60.0 LOCALIZED EDEMA: ICD-10-CM

## 2017-10-20 RX ORDER — BUMETANIDE 1 MG/1
0.5 TABLET ORAL
Qty: 15 TAB | Refills: 11
Start: 2017-10-20 | End: 2017-12-11

## 2017-10-20 NOTE — TELEPHONE ENCOUNTER
Advised pt MD said she may adjust her Bumex as she was asking - med list updated. He doesn't feel she'll do well with stopping altogether as her swelling would increase and she might get SOB.

## 2017-10-20 NOTE — TELEPHONE ENCOUNTER
Spoke with pt - states she had another \"bad night\" with burning all over, shaky and up all night. She feels it is her Bumex that's causing her issues. Wants o know if she could change to 1/2 tab every other day or stop it completely?  She said she saw Leidy Marinelli yesterday - she wants to hold off on pt starting Paxil at this time - she did not know why but she wanted to discuss with Dr Sinan Swartz first. Will forward to MD.

## 2017-10-29 ENCOUNTER — TELEPHONE (OUTPATIENT)
Dept: INTERNAL MEDICINE CLINIC | Age: 82
End: 2017-10-29

## 2017-11-09 ENCOUNTER — HOSPITAL ENCOUNTER (OUTPATIENT)
Dept: LAB | Age: 82
Discharge: HOME | End: 2017-11-09
Payer: MEDICARE

## 2017-11-09 ENCOUNTER — OFFICE VISIT (OUTPATIENT)
Dept: INTERNAL MEDICINE CLINIC | Age: 82
End: 2017-11-09

## 2017-11-09 VITALS
HEART RATE: 63 BPM | RESPIRATION RATE: 16 BRPM | WEIGHT: 178.2 LBS | HEIGHT: 62 IN | BODY MASS INDEX: 32.79 KG/M2 | SYSTOLIC BLOOD PRESSURE: 118 MMHG | OXYGEN SATURATION: 95 % | DIASTOLIC BLOOD PRESSURE: 52 MMHG | TEMPERATURE: 97.8 F

## 2017-11-09 DIAGNOSIS — I10 ESSENTIAL HYPERTENSION, BENIGN: ICD-10-CM

## 2017-11-09 DIAGNOSIS — I48.20 CHRONIC ATRIAL FIBRILLATION (HCC): Primary | ICD-10-CM

## 2017-11-09 DIAGNOSIS — M79.89 LEG SWELLING: ICD-10-CM

## 2017-11-09 DIAGNOSIS — R60.0 LOCALIZED EDEMA: ICD-10-CM

## 2017-11-09 DIAGNOSIS — E11.40 TYPE 2 DIABETES, CONTROLLED, WITH NEUROPATHY (HCC): ICD-10-CM

## 2017-11-09 DIAGNOSIS — R06.02 SOB (SHORTNESS OF BREATH) ON EXERTION: ICD-10-CM

## 2017-11-09 PROCEDURE — 36415 COLL VENOUS BLD VENIPUNCTURE: CPT

## 2017-11-09 PROCEDURE — 80048 BASIC METABOLIC PNL TOTAL CA: CPT

## 2017-11-09 PROCEDURE — 85025 COMPLETE CBC W/AUTO DIFF WBC: CPT

## 2017-11-09 PROCEDURE — 83036 HEMOGLOBIN GLYCOSYLATED A1C: CPT

## 2017-11-09 RX ORDER — POTASSIUM CHLORIDE 750 MG/1
20 TABLET, FILM COATED, EXTENDED RELEASE ORAL
Qty: 1 TAB | Refills: 0 | Status: ON HOLD
Start: 2017-11-09 | End: 2017-12-11

## 2017-11-09 NOTE — PROGRESS NOTES
HISTORY OF PRESENT ILLNESS  Chey Willson is a 80 y.o. female. HPI Chica Lynn is seen today for follow up of anxiety, edema and other concerns. 1. Anxiety. Much improved. She is in counseling with Dr. Jarrod Mallory. She did not require starting Paxil. 2. Chronic lower extremity edema, paroxysmal atrial fibrillation. Due to an intolerance of Lasix, she is now on Bumex one-half tab every other day. She is concerned because she continued with daily potassium. I told her it was likely fine, but we will check a BMP. She should be able to change the potassium to every other day as well. Edema is well controlled. 3. Diabetes. Will check labs. I have reviewed the fingerstick BG readings and overall they look pretty good. Review of systems notable for ongoing vision disturbance, but following up closely with her ophthalmologist. Weight has gone up which seems to be more from eating better than fluid retention. MedDATA/gwo     Current Outpatient Prescriptions   Medication Sig    potassium chloride SR (KLOR-CON 10) 10 mEq tablet Take 2 Tabs by mouth every fourty-eight (48) hours. Only when furosemide taken    bumetanide (BUMEX) 1 mg tablet Take 0.5 Tabs by mouth every fourty-eight (48) hours. Replaces furosemide    BETA-CAROTENE,A,-VITS C,E/MINS (EYE HEALTH PO) Take  by mouth daily.  CHOLECALCIFEROL, VITAMIN D3, (VITAMIN D3 PO) Take  by mouth daily.  amLODIPine (NORVASC) 5 mg tablet TAKE 1 TAB BY MOUTH DAILY.  glucose blood VI test strips (FREESTYLE TEST) strip TEST 2 TIMES DAILY. Dx E11.42    fluocinoNIDE (LIDEX) 0.05 % topical cream Apply  to affected area two (2) times daily as needed for Skin Irritation.  OTHER     loratadine (CLARITIN) 10 mg tablet Take 1 Tab by mouth daily as needed for Allergies.  Lancets misc 1 Package by Does Not Apply route daily. Test glucose daily and as needed Dx type 2 dm E11.42    aspirin delayed-release 81 mg tablet Take 1 Tab by mouth daily.  (Patient taking differently: Take 81 mg by mouth every other day.)    glimepiride (AMARYL) 1 mg tablet Take 1 Tab by mouth Daily (before breakfast). Take only if sugar is above 140    linaclotide (LINZESS) 145 mcg cap capsule Take 1 Cap by mouth Daily (before breakfast). Per dr Kim Gutierrez    magnesium hydroxide (MILK OF MAGNESIA) 2,400 mg/10 mL susp suspension Take 10 mL by mouth nightly.  BIOTIN PO Take 5,000 mcg by mouth daily. No current facility-administered medications for this visit. Review of Systems   Cardiovascular: Positive for leg swelling. Psychiatric/Behavioral: The patient is nervous/anxious. Physical Exam   Constitutional: No distress. Cardiovascular: Normal rate and regular rhythm. Exam reveals no gallop and no friction rub. No murmur heard. Pulmonary/Chest: Effort normal and breath sounds normal.   Musculoskeletal: She exhibits edema. Mild bilateral lower extremity edema    Nursing note and vitals reviewed. ASSESSMENT and PLAN  Diagnoses and all orders for this visit:    1. Chronic atrial fibrillation ([facility] [address]- Continue current regimen of prescription and / or OTC medications     2. Type 2 diabetes, controlled, with neuropathy ([facility] [address]  -     CBC WITH AUTOMATED DIFF  -     METABOLIC PANEL, BASIC  -     HEMOGLOBIN A1C WITH EAG    3. Essential hypertension, benign-  Continue current regimen of prescription and / or OTC medications     4. Localized edema- Continue current regimen of prescription and / or OTC medications     5. SOB (shortness of breath) on exertion  -     potassium chloride SR (KLOR-CON 10) 10 mEq tablet; Take 2 Tabs by mouth every fourty-eight (48) hours. Only when furosemide taken    6. Leg swelling  -     potassium chloride SR (KLOR-CON 10) 10 mEq tablet; Take 2 Tabs by mouth every fourty-eight (48) hours.  Only when furosemide taken

## 2017-11-09 NOTE — MR AVS SNAPSHOT
Visit Information Date & Time Provider Department Dept. Phone Encounter #  
 11/9/2017  3:25 PM Sandhya Pelletier, [facility] [phone] [id number] Follow-up Instructions Return in about 2 months (around 1/9/2018). Upcoming Health Maintenance Date Due  
 LIPID PANEL Q1 3/21/2017 MEDICARE YEARLY EXAM 1/31/2018 HEMOGLOBIN A1C Q6M 2/3/2018 FOOT EXAM Q1 3/20/2018 MICROALBUMIN Q1 8/3/2018 EYE EXAM RETINAL OR DILATED Q1 9/1/2018 GLAUCOMA SCREENING Q2Y 9/1/2019 DTaP/Tdap/Td series (2 - Td) 9/21/2026 Allergies as of 11/9/2017  Review Complete On: 11/9/2017 By: Sandhya Pelletier MD  
  
 Severity Noted Reaction Type Reactions Amoxicillin  03/28/2014    Other (comments) \"makes me feel like I\"m going to faint\" Aspirin  03/28/2014    Palpitations Gabapentin  10/18/2017    Drowsiness * pt states this med makes her feel very drowsy , gives her a drunk feeling. Levaquin [Levofloxacin]  03/20/2015    Hives, Rash Lorazepam  10/18/2017    Other (comments) Feeling faint. Losartan  06/17/2015    Other (comments) Tongue swelling Lyrica [Pregabalin]  03/28/2014    Rash Other Medication  10/18/2017    Other (comments) Feeling faint, does not decrease pain. Sertraline  10/18/2017    Other (comments) Feeling faint. Current Immunizations  Reviewed on 10/18/2017 Name Date Influenza High Dose Vaccine PF 10/18/2017, 11/3/2016, 11/2/2015 Influenza Vaccine 9/18/2014 Pneumococcal Conjugate (PCV-13) 1/22/2015 Pneumococcal Vaccine (Unspecified Type) 11/12/2011 Zoster Vaccine, Live 1/28/2016 Not reviewed this visit You Were Diagnosed With   
  
 Codes Comments Chronic atrial fibrillation (HCC)    -  Primary ICD-10-CM: B71.1 ICD-9-CM: 427.31 Type 2 diabetes, controlled, with neuropathy [facility]     ICD-10-CM: E11.40 ICD-9-CM: 250.60, 357.2 Essential hypertension, benign     ICD-10-CM: I10 
ICD-9-CM: 401.1 Localized edema     ICD-10-CM: R60.0 ICD-9-CM: 782.3 SOB (shortness of breath) on exertion     ICD-10-CM: R06.02 
ICD-9-CM: 786.05 Leg swelling     ICD-10-CM: M79.89 ICD-9-CM: 729.81 Vitals BP Pulse Temp Resp Height(growth percentile) Weight(growth percentile) 118/52 (BP 1 Location: Right arm, BP Patient Position: Sitting) 63 97.8 °F (36.6 °C) (Oral) 16 5' 2\" (1.575 m) 178 lb 3.2 oz (80.8 kg) SpO2 BMI OB Status Smoking Status 95% 32.59 kg/m2 Postmenopausal Former Smoker BMI and BSA Data Body Mass Index Body Surface Area 32.59 kg/m 2 1.88 m 2 Preferred Pharmacy Pharmacy Name Phone [address] [address] [phone] Your Updated Medication List  
  
   
This list is accurate as of: 11/9/17  4:23 PM.  Always use your most recent med list. amLODIPine 5 mg tablet Commonly known as:  Suzon Salle TAKE 1 TAB BY MOUTH DAILY. aspirin delayed-release 81 mg tablet Take 1 Tab by mouth daily. BIOTIN PO Take 5,000 mcg by mouth daily. bumetanide 1 mg tablet Commonly known as:  Vista Vick Take 0.5 Tabs by mouth every fourty-eight (48) hours. Replaces furosemide EYE HEALTH PO Take  by mouth daily. fluocinoNIDE 0.05 % topical cream  
Commonly known as:  LIDEX Apply  to affected area two (2) times daily as needed for Skin Irritation. glimepiride 1 mg tablet Commonly known as:  AMARYL Take 1 Tab by mouth Daily (before breakfast). Take only if sugar is above 140  
  
 glucose blood VI test strips strip Commonly known as:  FREESTYLE TEST  
TEST 2 TIMES DAILY. Dx Z90.84 Lancets Misc  
1 Package by Does Not Apply route daily. Test glucose daily and as needed Dx type 2 dm E11.42  
  
 linaclotide 145 mcg Cap capsule Commonly known as:  Song Carcamo  
 Take 1 Cap by mouth Daily (before breakfast). Per dr Tessy Yap  
  
 loratadine 10 mg tablet Commonly known as:  Ragland Petri Take 1 Tab by mouth daily as needed for Allergies. magnesium hydroxide 2,400 mg/10 mL Susp suspension Commonly known as:  MILK OF MAGNESIA Take 10 mL by mouth nightly. OTHER  
  
 potassium chloride SR 10 mEq tablet Commonly known as:  KLOR-CON 10 Take 2 Tabs by mouth every fourty-eight (48) hours. Only when furosemide taken VITAMIN D3 PO Take  by mouth daily. We Performed the Following CBC WITH AUTOMATED DIFF [26614 CPT(R)] HEMOGLOBIN A1C WITH EAG [46975 CPT(R)] METABOLIC PANEL, BASIC [96576 CPT(R)] Follow-up Instructions Return in about 2 months (around 1/9/2018). Introducing [facility] & HEALTH SERVICES! Dear Jyotsna Martinez: 
Thank you for requesting a Telly account. Our records indicate that you have previously registered for a Telly account but its currently inactive. Please call our Telly support line at [phone]. Additional Information If you have questions, please visit the Frequently Asked Questions section of the Telly website at [email or web address] Remember, Telly is NOT to be used for urgent needs. For medical emergencies, dial 911. Now available from your iPhone and Android! Please provide this summary of care documentation to your next provider. Your primary care clinician is listed as JOCELYN MCKEON. If you have any questions after today's visit, please call [phone].

## 2017-11-10 LAB
BASOPHILS # BLD AUTO: 0 X10E3/UL (ref 0–0.2)
BASOPHILS NFR BLD AUTO: 1 %
BUN SERPL-MCNC: 25 MG/DL (ref 10–36)
BUN/CREAT SERPL: 28 (ref 12–28)
CALCIUM SERPL-MCNC: 8.6 MG/DL (ref 8.7–10.3)
CHLORIDE SERPL-SCNC: 100 MMOL/L (ref 96–106)
CO2 SERPL-SCNC: 29 MMOL/L (ref 18–29)
CREAT SERPL-MCNC: 0.89 MG/DL (ref 0.57–1)
EOSINOPHIL # BLD AUTO: 0.1 X10E3/UL (ref 0–0.4)
EOSINOPHIL NFR BLD AUTO: 1 %
ERYTHROCYTE [DISTWIDTH] IN BLOOD BY AUTOMATED COUNT: 15.8 % (ref 12.3–15.4)
EST. AVERAGE GLUCOSE BLD GHB EST-MCNC: 143 MG/DL
GFR SERPLBLD CREATININE-BSD FMLA CKD-EPI: 56 ML/MIN/1.73
GFR SERPLBLD CREATININE-BSD FMLA CKD-EPI: 64 ML/MIN/1.73
GLUCOSE SERPL-MCNC: 106 MG/DL (ref 65–99)
HBA1C MFR BLD: 6.6 % (ref 4.8–5.6)
HCT VFR BLD AUTO: 35.7 % (ref 34–46.6)
HGB BLD-MCNC: 11.7 G/DL (ref 11.1–15.9)
IMM GRANULOCYTES # BLD: 0 X10E3/UL (ref 0–0.1)
IMM GRANULOCYTES NFR BLD: 0 %
LYMPHOCYTES # BLD AUTO: 1.5 X10E3/UL (ref 0.7–3.1)
LYMPHOCYTES NFR BLD AUTO: 25 %
MCH RBC QN AUTO: 28.9 PG (ref 26.6–33)
MCHC RBC AUTO-ENTMCNC: 32.8 G/DL (ref 31.5–35.7)
MCV RBC AUTO: 88 FL (ref 79–97)
MONOCYTES # BLD AUTO: 0.5 X10E3/UL (ref 0.1–0.9)
MONOCYTES NFR BLD AUTO: 8 %
NEUTROPHILS # BLD AUTO: 4 X10E3/UL (ref 1.4–7)
NEUTROPHILS NFR BLD AUTO: 65 %
PLATELET # BLD AUTO: 321 X10E3/UL (ref 150–379)
POTASSIUM SERPL-SCNC: 5.1 MMOL/L (ref 3.5–5.2)
RBC # BLD AUTO: 4.05 X10E6/UL (ref 3.77–5.28)
SODIUM SERPL-SCNC: 139 MMOL/L (ref 134–144)
WBC # BLD AUTO: 6.1 X10E3/UL (ref 3.4–10.8)

## 2017-11-13 NOTE — PROGRESS NOTES
Advised pt her labs look great overall. Continue current regimen of prescription and / or OTC medications.

## 2017-12-04 ENCOUNTER — TELEPHONE (OUTPATIENT)
Dept: INTERNAL MEDICINE CLINIC | Age: 82
End: 2017-12-04

## 2017-12-04 NOTE — TELEPHONE ENCOUNTER
Pt called with complaints of swelling in lower legs and feet x 2 weeks. She has been having trouble sleeping due to this. She has Bumex 1 mg that she takes 1/2 tab every other day. She took whole pill today to see if it would help with swelling. She wanted to know what MD could do for her? Advised her she needs to schedule an appt for further evaluation.  She is scheduled for tomorrow 12/5/17 at 11:15 am. Will forward to MD.

## 2017-12-05 ENCOUNTER — OFFICE VISIT (OUTPATIENT)
Dept: INTERNAL MEDICINE CLINIC | Age: 82
End: 2017-12-05

## 2017-12-05 VITALS
HEIGHT: 62 IN | OXYGEN SATURATION: 94 % | SYSTOLIC BLOOD PRESSURE: 149 MMHG | BODY MASS INDEX: 36.58 KG/M2 | DIASTOLIC BLOOD PRESSURE: 47 MMHG | TEMPERATURE: 98 F | HEART RATE: 76 BPM | WEIGHT: 198.8 LBS | RESPIRATION RATE: 20 BRPM

## 2017-12-05 DIAGNOSIS — I48.20 CHRONIC ATRIAL FIBRILLATION (HCC): ICD-10-CM

## 2017-12-05 DIAGNOSIS — R60.0 LOCALIZED EDEMA: Primary | ICD-10-CM

## 2017-12-05 NOTE — PATIENT INSTRUCTIONS
Leg and Ankle Edema: Care Instructions  Your Care Instructions  Swelling in the legs, ankles, and feet is called edema. It is common after you sit or stand for a while. Long plane flights or car rides often cause swelling in the legs and feet. You may also have swelling if you have to stand for long periods of time at your job. Problems with the veins in the legs (varicose veins) and changes in hormones can also cause swelling. Sometimes the swelling in the ankles and feet is caused by a more serious problem, such as heart failure, infection, blood clots, or liver or kidney disease. Follow-up care is a key part of your treatment and safety. Be sure to make and go to all appointments, and call your doctor if you are having problems. It's also a good idea to know your test results and keep a list of the medicines you take. How can you care for yourself at home? · If your doctor gave you medicine, take it as prescribed. Call your doctor if you think you are having a problem with your medicine. · Whenever you are resting, raise your legs up. Try to keep the swollen area higher than the level of your heart. · Take breaks from standing or sitting in one position. ¨ Walk around to increase the blood flow in your lower legs. ¨ Move your feet and ankles often while you stand, or tighten and relax your leg muscles. · Wear support stockings. Put them on in the morning, before swelling gets worse. · Eat a balanced diet. Lose weight if you need to. · Limit the amount of salt (sodium) in your diet. Salt holds fluid in the body and may increase swelling. When should you call for help? Call 911 anytime you think you may need emergency care. For example, call if:  ? · You have symptoms of a blood clot in your lung (called a pulmonary embolism). These may include:  ¨ Sudden chest pain. ¨ Trouble breathing. ¨ Coughing up blood.    ?Call your doctor now or seek immediate medical care if:  ? · You have signs of a blood clot, such as:  ¨ Pain in your calf, back of the knee, thigh, or groin. ¨ Redness and swelling in your leg or groin. ? · You have symptoms of infection, such as:  ¨ Increased pain, swelling, warmth, or redness. ¨ Red streaks or pus. ¨ A fever. ? Watch closely for changes in your health, and be sure to contact your doctor if:  ? · Your swelling is getting worse. ? · You have new or worsening pain in your legs. ? · You do not get better as expected. Where can you learn more? Go to http://myrna-alayna.info/. Enter Z663 in the search box to learn more about \"Leg and Ankle Edema: Care Instructions. \"  Current as of: March 20, 2017  Content Version: 11.4  © 8808-6198 [employer]. Care instructions adapted under license by "[employer] (which disclaims liability or warranty for this information). If you have questions about a medical condition or this instruction, always ask your healthcare professional. [place] [address] any warranty or liability for your use of this information.

## 2017-12-05 NOTE — PROGRESS NOTES
HISTORY OF PRESENT ILLNESS  Abraham Kohli is a 80 y.o. female. Leg Swelling   The history is provided by the patient and caregiver (in with aide). This is a chronic problem. Episode onset: 2 wks. The problem has been gradually worsening. Associated symptoms include shortness of breath. Pertinent negatives include no chest pain. Treatments tried: yesterday and this AM took full 1 mg bumex. The treatment provided mild relief. Shortness of Breath   The history is provided by the patient and caregiver. This is a recurrent problem. The average episode lasts 2 weeks. The problem has not changed since onset. Associated symptoms include wheezing, PND, orthopnea and leg swelling. Pertinent negatives include no fever, no sore throat, no cough, no sputum production and no chest pain. It is unknown what precipitated the problem. She has had prior hospitalizations. She has had prior ED visits. She has had no prior ICU admissions. Associated medical issues include heart failure. Associated medical issues comments: chf is diastolic, also chronic a fib. Review of Systems   Constitutional: Negative for fever. HENT: Negative for sore throat. Respiratory: Positive for shortness of breath and wheezing. Negative for cough and sputum production. Cardiovascular: Positive for orthopnea, leg swelling and PND. Negative for chest pain. Physical Exam   Constitutional: No distress. Cardiovascular: Normal rate. An irregularly irregular rhythm present. No murmur heard. Pulmonary/Chest: No accessory muscle usage. No respiratory distress. She has no wheezes. She has no rhonchi. She has rales in the right lower field and the left lower field. Musculoskeletal: She exhibits edema. Bilateral lower extremity edema, severe   Wt gain 20 # one month   Nursing note and vitals reviewed. ASSESSMENT and PLAN  Diagnoses and all orders for this visit:    1.  Localized edema- Continue current regimen of prescription and / or OTC medications , take potassium with bumex, ER red flags reviewed    2.  Chronic atrial fibrillation ([facility] [facility]- Continue current regimen of prescription and / or OTC medications

## 2017-12-05 NOTE — MR AVS SNAPSHOT
Visit Information Date & Time Provider Department Dept. Phone Encounter #  
 12/5/2017 11:15 AM Debbie Ferrara MD [address] [facility] [facility] [phone] [id number] Follow-up Instructions Return in about 2 days (around 12/7/2017). Your Appointments 1/9/2018  2:20 PM  
ROUTINE CARE with Debbie Ferrara MD  
[address] Internal Medicine [facility] Appt Note: 2mo f/u  
 [address] [facility] [address]  
[address] [address] [place] [address] Upcoming Health Maintenance Date Due  
 LIPID PANEL Q1 3/21/2017 MEDICARE YEARLY EXAM 1/31/2018 FOOT EXAM Q1 3/20/2018 HEMOGLOBIN A1C Q6M 5/9/2018 MICROALBUMIN Q1 8/3/2018 EYE EXAM RETINAL OR DILATED Q1 9/1/2018 GLAUCOMA SCREENING Q2Y 9/1/2019 DTaP/Tdap/Td series (2 - Td) 9/21/2026 Allergies as of 12/5/2017  Review Complete On: 12/5/2017 By: Debbie Ferrara MD  
  
 Severity Noted Reaction Type Reactions Amoxicillin  03/28/2014    Other (comments) \"makes me feel like I\"m going to faint\" Aspirin  03/28/2014    Palpitations Gabapentin  10/18/2017    Drowsiness * pt states this med makes her feel very drowsy , gives her a drunk feeling. Levaquin [Levofloxacin]  03/20/2015    Hives, Rash Lorazepam  10/18/2017    Other (comments) Feeling faint. Losartan  06/17/2015    Other (comments) Tongue swelling Lyrica [Pregabalin]  03/28/2014    Rash Other Medication  10/18/2017    Other (comments) Feeling faint, does not decrease pain. Sertraline  10/18/2017    Other (comments) Feeling faint. Current Immunizations  Reviewed on 10/18/2017 Name Date Influenza High Dose Vaccine PF 10/18/2017, 11/3/2016, 11/2/2015 Influenza Vaccine 9/18/2014 Pneumococcal Conjugate (PCV-13) 1/22/2015 Pneumococcal Vaccine (Unspecified Type) 11/12/2011 Zoster Vaccine, Live 1/28/2016 Not reviewed this visit You Were Diagnosed With   
  
 Codes Comments Localized edema    -  Primary ICD-10-CM: R60.0 ICD-9-CM: 405. 3 Chronic atrial fibrillation (HCC)     ICD-10-CM: B74.8 ICD-9-CM: 427.31 Vitals BP Pulse Temp Resp Height(growth percentile) Weight(growth percentile) 149/47 (BP 1 Location: Right arm, BP Patient Position: Sitting) 76 98 °F (36.7 °C) (Oral) 20 5' 2\" (1.575 m) 198 lb 12.8 oz (90.2 kg) SpO2 BMI OB Status Smoking Status 94% 36.36 kg/m2 Postmenopausal Former Smoker BMI and BSA Data Body Mass Index Body Surface Area  
 36.36 kg/m 2 1.99 m 2 Preferred Pharmacy Pharmacy Name Phone [address] [phone] Your Updated Medication List  
  
   
This list is accurate as of: 12/5/17 12:17 PM.  Always use your most recent med list. amLODIPine 5 mg tablet Commonly known as:  Herrera Fanti TAKE 1 TAB BY MOUTH DAILY. aspirin delayed-release 81 mg tablet Take 1 Tab by mouth daily. BIOTIN PO Take 5,000 mcg by mouth daily. bumetanide 1 mg tablet Commonly known as:  Eduardo Mauro Take 0.5 Tabs by mouth every fourty-eight (48) hours. Replaces furosemide EYE HEALTH PO Take  by mouth daily. fluocinoNIDE 0.05 % topical cream  
Commonly known as:  LIDEX Apply  to affected area two (2) times daily as needed for Skin Irritation. glimepiride 1 mg tablet Commonly known as:  AMARYL Take 1 Tab by mouth Daily (before breakfast). Take only if sugar is above 140  
  
 glucose blood VI test strips strip Commonly known as:  FREESTYLE TEST  
TEST 2 TIMES DAILY. Dx Z63.66 Lancets Misc  
1 Package by Does Not Apply route daily. Test glucose daily and as needed Dx type 2 dm E11.42  
  
 linaclotide 145 mcg Cap capsule Commonly known as:  Xochitl Leaven Take 1 Cap by mouth Daily (before breakfast). Per dr Stephen Agarwal loratadine 10 mg tablet Commonly known as:  Landen Adrianna Take 1 Tab by mouth daily as needed for Allergies. magnesium hydroxide 2,400 mg/10 mL Susp suspension Commonly known as:  MILK OF MAGNESIA Take 10 mL by mouth nightly. OTHER  
  
 potassium chloride SR 10 mEq tablet Commonly known as:  KLOR-CON 10 Take 2 Tabs by mouth every fourty-eight (48) hours. Only when furosemide taken VITAMIN D3 PO Take  by mouth daily. Follow-up Instructions Return in about 2 days (around 12/7/2017). Patient Instructions Leg and Ankle Edema: Care Instructions Your Care Instructions Swelling in the legs, ankles, and feet is called edema. It is common after you sit or stand for a while. Long plane flights or car rides often cause swelling in the legs and feet. You may also have swelling if you have to stand for long periods of time at your job. Problems with the veins in the legs (varicose veins) and changes in hormones can also cause swelling. Sometimes the swelling in the ankles and feet is caused by a more serious problem, such as heart failure, infection, blood clots, or liver or kidney disease. Follow-up care is a key part of your treatment and safety. Be sure to make and go to all appointments, and call your doctor if you are having problems. It's also a good idea to know your test results and keep a list of the medicines you take. How can you care for yourself at home? · If your doctor gave you medicine, take it as prescribed. Call your doctor if you think you are having a problem with your medicine. · Whenever you are resting, raise your legs up. Try to keep the swollen area higher than the level of your heart. · Take breaks from standing or sitting in one position. ¨ Walk around to increase the blood flow in your lower legs. ¨ Move your feet and ankles often while you stand, or tighten and relax your leg muscles. · Wear support stockings. Put them on in the morning, before swelling gets worse. · Eat a balanced diet. Lose weight if you need to. · Limit the amount of salt (sodium) in your diet. Salt holds fluid in the body and may increase swelling. When should you call for help? Call 911 anytime you think you may need emergency care. For example, call if: 
? · You have symptoms of a blood clot in your lung (called a pulmonary embolism). These may include: 
¨ Sudden chest pain. ¨ Trouble breathing. ¨ Coughing up blood. ?Call your doctor now or seek immediate medical care if: 
? · You have signs of a blood clot, such as: 
¨ Pain in your calf, back of the knee, thigh, or groin. ¨ Redness and swelling in your leg or groin. ? · You have symptoms of infection, such as: 
¨ Increased pain, swelling, warmth, or redness. ¨ Red streaks or pus. ¨ A fever. ? Watch closely for changes in your health, and be sure to contact your doctor if: 
? · Your swelling is getting worse. ? · You have new or worsening pain in your legs. ? · You do not get better as expected. Where can you learn more? Go to http://myrna-alayna.info/. Enter I447 in the search box to learn more about \"Leg and Ankle Edema: Care Instructions. \" Current as of: March 20, 2017 Content Version: 11.4 © 2319-4569 [employer]. Care instructions adapted under license by [employer] (which disclaims liability or warranty for this information). If you have questions about a medical condition or this instruction, always ask your healthcare professional. [place] [address] any warranty or liability for your use of this information. Introducing [facility] & HEALTH SERVICES! Dear Zelalem Vieyra: 
Thank you for requesting a CoinEx.pw account. Our records indicate that you have previously registered for a CoinEx.pw account but its currently inactive. Please call our CoinEx.pw support line at [phone]. Additional Information If you have questions, please visit the Frequently Asked Questions section of the [employer] website at [email or web address] Remember, Pixalate is NOT to be used for urgent needs. For medical emergencies, dial 911. Now available from your iPhone and Android! Please provide this summary of care documentation to your next provider. Your primary care clinician is listed as JOCELYN MCKEON. If you have any questions after today's visit, please call [phone].

## 2017-12-06 ENCOUNTER — TELEPHONE (OUTPATIENT)
Dept: INTERNAL MEDICINE CLINIC | Age: 82
End: 2017-12-06

## 2017-12-06 ENCOUNTER — HOSPITAL ENCOUNTER (INPATIENT)
Age: 82
LOS: 5 days | Discharge: HOME HEALTH | DRG: 292 | End: 2017-12-11
Attending: EMERGENCY MEDICINE | Admitting: FAMILY MEDICINE
Payer: MEDICARE

## 2017-12-06 ENCOUNTER — APPOINTMENT (OUTPATIENT)
Dept: GENERAL RADIOLOGY | Age: 82
DRG: 292 | End: 2017-12-06
Attending: NURSE PRACTITIONER
Payer: MEDICARE

## 2017-12-06 DIAGNOSIS — M79.89 LEG SWELLING: ICD-10-CM

## 2017-12-06 DIAGNOSIS — R06.02 SOB (SHORTNESS OF BREATH) ON EXERTION: ICD-10-CM

## 2017-12-06 DIAGNOSIS — I50.33 ACUTE ON CHRONIC DIASTOLIC HEART FAILURE (HCC): Primary | ICD-10-CM

## 2017-12-06 PROBLEM — I50.9 CHF (CONGESTIVE HEART FAILURE) (HCC): Status: ACTIVE | Noted: 2017-12-06

## 2017-12-06 LAB
ALBUMIN SERPL-MCNC: 3.1 G/DL (ref 3.5–5)
ALBUMIN/GLOB SERPL: 0.8 {RATIO} (ref 1.1–2.2)
ALP SERPL-CCNC: 158 U/L (ref 45–117)
ALT SERPL-CCNC: 27 U/L (ref 12–78)
ANION GAP SERPL CALC-SCNC: 6 MMOL/L (ref 5–15)
APPEARANCE UR: CLEAR
AST SERPL-CCNC: 22 U/L (ref 15–37)
BACTERIA URNS QL MICRO: NEGATIVE /HPF
BASOPHILS # BLD: 0 K/UL (ref 0–0.1)
BASOPHILS NFR BLD: 0 % (ref 0–1)
BILIRUB SERPL-MCNC: 0.4 MG/DL (ref 0.2–1)
BILIRUB UR QL: NEGATIVE
BNP SERPL-MCNC: 3786 PG/ML (ref 0–450)
BUN SERPL-MCNC: 17 MG/DL (ref 6–20)
BUN/CREAT SERPL: 15 (ref 12–20)
CALCIUM SERPL-MCNC: 8 MG/DL (ref 8.5–10.1)
CHLORIDE SERPL-SCNC: 101 MMOL/L (ref 97–108)
CO2 SERPL-SCNC: 30 MMOL/L (ref 21–32)
COLOR UR: NORMAL
CREAT SERPL-MCNC: 1.16 MG/DL (ref 0.55–1.02)
EOSINOPHIL # BLD: 0.1 K/UL (ref 0–0.4)
EOSINOPHIL NFR BLD: 1 % (ref 0–7)
EPITH CASTS URNS QL MICRO: NORMAL /LPF
ERYTHROCYTE [DISTWIDTH] IN BLOOD BY AUTOMATED COUNT: 15.8 % (ref 11.5–14.5)
EST. AVERAGE GLUCOSE BLD GHB EST-MCNC: 151 MG/DL
GLOBULIN SER CALC-MCNC: 4.1 G/DL (ref 2–4)
GLUCOSE BLD STRIP.AUTO-MCNC: 233 MG/DL (ref 65–100)
GLUCOSE SERPL-MCNC: 139 MG/DL (ref 65–100)
GLUCOSE UR STRIP.AUTO-MCNC: NEGATIVE MG/DL
HBA1C MFR BLD: 6.9 % (ref 4.2–6.3)
HCT VFR BLD AUTO: 28.6 % (ref 35–47)
HGB BLD-MCNC: 9.2 G/DL (ref 11.5–16)
HGB UR QL STRIP: NEGATIVE
HYALINE CASTS URNS QL MICRO: NORMAL /LPF (ref 0–5)
IRON SATN MFR SERPL: 11 % (ref 20–50)
IRON SERPL-MCNC: 32 UG/DL (ref 35–150)
KETONES UR QL STRIP.AUTO: NEGATIVE MG/DL
LEUKOCYTE ESTERASE UR QL STRIP.AUTO: NEGATIVE
LYMPHOCYTES # BLD: 1.2 K/UL (ref 0.8–3.5)
LYMPHOCYTES NFR BLD: 21 % (ref 12–49)
MAGNESIUM SERPL-MCNC: 2.2 MG/DL (ref 1.6–2.4)
MCH RBC QN AUTO: 29 PG (ref 26–34)
MCHC RBC AUTO-ENTMCNC: 32.2 G/DL (ref 30–36.5)
MCV RBC AUTO: 90.2 FL (ref 80–99)
MONOCYTES # BLD: 0.5 K/UL (ref 0–1)
MONOCYTES NFR BLD: 9 % (ref 5–13)
NEUTS SEG # BLD: 4.1 K/UL (ref 1.8–8)
NEUTS SEG NFR BLD: 69 % (ref 32–75)
NITRITE UR QL STRIP.AUTO: NEGATIVE
PH UR STRIP: 7.5 [PH] (ref 5–8)
PLATELET # BLD AUTO: 259 K/UL (ref 150–400)
POTASSIUM SERPL-SCNC: 4.4 MMOL/L (ref 3.5–5.1)
PROT SERPL-MCNC: 7.2 G/DL (ref 6.4–8.2)
PROT UR STRIP-MCNC: NEGATIVE MG/DL
RBC # BLD AUTO: 3.17 M/UL (ref 3.8–5.2)
RBC #/AREA URNS HPF: NORMAL /HPF (ref 0–5)
SERVICE CMNT-IMP: ABNORMAL
SODIUM SERPL-SCNC: 137 MMOL/L (ref 136–145)
SP GR UR REFRACTOMETRY: 1.01 (ref 1–1.03)
TIBC SERPL-MCNC: 281 UG/DL (ref 250–450)
TROPONIN I SERPL-MCNC: <0.04 NG/ML
UR CULT HOLD, URHOLD: NORMAL
UROBILINOGEN UR QL STRIP.AUTO: 0.2 EU/DL (ref 0.2–1)
WBC # BLD AUTO: 5.9 K/UL (ref 3.6–11)
WBC URNS QL MICRO: NORMAL /HPF (ref 0–4)

## 2017-12-06 PROCEDURE — 74011250636 HC RX REV CODE- 250/636: Performed by: NURSE PRACTITIONER

## 2017-12-06 PROCEDURE — 74011250637 HC RX REV CODE- 250/637: Performed by: INTERNAL MEDICINE

## 2017-12-06 PROCEDURE — 83036 HEMOGLOBIN GLYCOSYLATED A1C: CPT | Performed by: FAMILY MEDICINE

## 2017-12-06 PROCEDURE — 65270000029 HC RM PRIVATE

## 2017-12-06 PROCEDURE — 71020 XR CHEST PA LAT: CPT

## 2017-12-06 PROCEDURE — 83540 ASSAY OF IRON: CPT | Performed by: FAMILY MEDICINE

## 2017-12-06 PROCEDURE — 93005 ELECTROCARDIOGRAM TRACING: CPT

## 2017-12-06 PROCEDURE — 74011000250 HC RX REV CODE- 250: Performed by: FAMILY MEDICINE

## 2017-12-06 PROCEDURE — 74011636637 HC RX REV CODE- 636/637: Performed by: FAMILY MEDICINE

## 2017-12-06 PROCEDURE — 83735 ASSAY OF MAGNESIUM: CPT | Performed by: NURSE PRACTITIONER

## 2017-12-06 PROCEDURE — 74011250636 HC RX REV CODE- 250/636: Performed by: FAMILY MEDICINE

## 2017-12-06 PROCEDURE — 94640 AIRWAY INHALATION TREATMENT: CPT

## 2017-12-06 PROCEDURE — 83880 ASSAY OF NATRIURETIC PEPTIDE: CPT | Performed by: NURSE PRACTITIONER

## 2017-12-06 PROCEDURE — 80053 COMPREHEN METABOLIC PANEL: CPT | Performed by: NURSE PRACTITIONER

## 2017-12-06 PROCEDURE — 85025 COMPLETE CBC W/AUTO DIFF WBC: CPT | Performed by: NURSE PRACTITIONER

## 2017-12-06 PROCEDURE — 84484 ASSAY OF TROPONIN QUANT: CPT | Performed by: NURSE PRACTITIONER

## 2017-12-06 PROCEDURE — 82962 GLUCOSE BLOOD TEST: CPT

## 2017-12-06 PROCEDURE — 99284 EMERGENCY DEPT VISIT MOD MDM: CPT

## 2017-12-06 PROCEDURE — 36415 COLL VENOUS BLD VENIPUNCTURE: CPT | Performed by: NURSE PRACTITIONER

## 2017-12-06 PROCEDURE — 96374 THER/PROPH/DIAG INJ IV PUSH: CPT

## 2017-12-06 PROCEDURE — 81001 URINALYSIS AUTO W/SCOPE: CPT | Performed by: NURSE PRACTITIONER

## 2017-12-06 RX ORDER — SODIUM CHLORIDE 0.9 % (FLUSH) 0.9 %
5-10 SYRINGE (ML) INJECTION AS NEEDED
Status: DISCONTINUED | OUTPATIENT
Start: 2017-12-06 | End: 2017-12-11 | Stop reason: HOSPADM

## 2017-12-06 RX ORDER — DEXTROSE 50 % IN WATER (D50W) INTRAVENOUS SYRINGE
12.5-25 AS NEEDED
Status: DISCONTINUED | OUTPATIENT
Start: 2017-12-06 | End: 2017-12-11 | Stop reason: HOSPADM

## 2017-12-06 RX ORDER — ADHESIVE BANDAGE
10 BANDAGE TOPICAL EVERY EVENING
Status: DISCONTINUED | OUTPATIENT
Start: 2017-12-06 | End: 2017-12-11 | Stop reason: HOSPADM

## 2017-12-06 RX ORDER — AMLODIPINE BESYLATE 5 MG/1
5 TABLET ORAL DAILY
Status: DISCONTINUED | OUTPATIENT
Start: 2017-12-07 | End: 2017-12-08

## 2017-12-06 RX ORDER — FUROSEMIDE 10 MG/ML
60 INJECTION INTRAMUSCULAR; INTRAVENOUS ONCE
Status: COMPLETED | OUTPATIENT
Start: 2017-12-06 | End: 2017-12-06

## 2017-12-06 RX ORDER — IPRATROPIUM BROMIDE AND ALBUTEROL SULFATE 2.5; .5 MG/3ML; MG/3ML
3 SOLUTION RESPIRATORY (INHALATION)
Status: DISCONTINUED | OUTPATIENT
Start: 2017-12-06 | End: 2017-12-07

## 2017-12-06 RX ORDER — ASPIRIN 81 MG/1
81 TABLET ORAL DAILY
Status: DISCONTINUED | OUTPATIENT
Start: 2017-12-07 | End: 2017-12-11 | Stop reason: HOSPADM

## 2017-12-06 RX ORDER — MAGNESIUM SULFATE 100 %
4 CRYSTALS MISCELLANEOUS AS NEEDED
Status: DISCONTINUED | OUTPATIENT
Start: 2017-12-06 | End: 2017-12-11 | Stop reason: HOSPADM

## 2017-12-06 RX ORDER — FUROSEMIDE 10 MG/ML
40 INJECTION INTRAMUSCULAR; INTRAVENOUS EVERY 12 HOURS
Status: DISCONTINUED | OUTPATIENT
Start: 2017-12-06 | End: 2017-12-09

## 2017-12-06 RX ORDER — INSULIN LISPRO 100 [IU]/ML
INJECTION, SOLUTION INTRAVENOUS; SUBCUTANEOUS EVERY 6 HOURS
Status: DISCONTINUED | OUTPATIENT
Start: 2017-12-06 | End: 2017-12-11 | Stop reason: HOSPADM

## 2017-12-06 RX ORDER — SODIUM CHLORIDE 0.9 % (FLUSH) 0.9 %
5-10 SYRINGE (ML) INJECTION EVERY 8 HOURS
Status: DISCONTINUED | OUTPATIENT
Start: 2017-12-06 | End: 2017-12-11 | Stop reason: HOSPADM

## 2017-12-06 RX ORDER — GLIMEPIRIDE 1 MG/1
1 TABLET ORAL
Status: DISCONTINUED | OUTPATIENT
Start: 2017-12-07 | End: 2017-12-11 | Stop reason: HOSPADM

## 2017-12-06 RX ADMIN — INSULIN LISPRO 3 UNITS: 100 INJECTION, SOLUTION INTRAVENOUS; SUBCUTANEOUS at 22:29

## 2017-12-06 RX ADMIN — FUROSEMIDE 60 MG: 10 INJECTION, SOLUTION INTRAMUSCULAR; INTRAVENOUS at 18:36

## 2017-12-06 RX ADMIN — MAGNESIUM HYDROXIDE 10 ML: 400 SUSPENSION ORAL at 23:22

## 2017-12-06 RX ADMIN — IPRATROPIUM BROMIDE AND ALBUTEROL SULFATE 3 ML: .5; 3 SOLUTION RESPIRATORY (INHALATION) at 20:44

## 2017-12-06 RX ADMIN — Medication 10 ML: at 22:30

## 2017-12-06 NOTE — IP AVS SNAPSHOT
[address] [facility] [address]  
[phone] Patient: Shanita Young MRN: [id number] ZVU:[id number] My Medications STOP taking these medications   
 amLODIPine 5 mg tablet Commonly known as:  NORVASC  
   
  
 bumetanide 1 mg tablet Commonly known as:  Rufina Meyer TAKE these medications as instructed Instructions Each Dose to Equal  
 Morning Noon Evening Bedtime  
 aspirin delayed-release 81 mg tablet Your last dose was: Your next dose is: Take 1 Tab by mouth daily. 81 mg  
    
   
   
   
  
 BIOTIN PO Your last dose was: Your next dose is: Take 5,000 mcg by mouth daily. 5000 mcg  
    
   
   
   
  
 carvedilol 6.25 mg tablet Commonly known as:  Elizabeth Stoner Your last dose was: Your next dose is: Take 1 Tab by mouth two (2) times daily (with meals). Indications: Chronic Heart Failure, hypertension 6.25 mg  
    
   
   
   
  
 EYE HEALTH PO Your last dose was: Your next dose is: Take  by mouth daily. ferrous sulfate 325 mg (65 mg iron) tablet Start taking on:  12/12/2017 Your last dose was: Your next dose is: Take 1 Tab by mouth daily (with breakfast). 325 mg  
    
   
   
   
  
 fluocinoNIDE 0.05 % topical cream  
Commonly known as:  LIDEX Your last dose was: Your next dose is:    
   
   
 Apply  to affected area two (2) times daily as needed for Skin Irritation. furosemide 40 mg tablet Commonly known as:  LASIX Your last dose was: Your next dose is: Take 1 Tab by mouth daily. 40 mg  
    
   
   
   
  
 glimepiride 1 mg tablet Commonly known as:  AMARYL Your last dose was: Your next dose is: Take 1 Tab by mouth Daily (before breakfast). Take only if sugar is above 140  
 1 mg  
    
   
   
   
  
 glucose blood VI test strips strip Commonly known as:  FREESTYLE TEST Your last dose was: Your next dose is:    
   
   
 TEST 2 TIMES DAILY. Dx L71.30  
     
   
   
   
  
 guaiFENesin 100 mg/5 mL liquid Commonly known as:  ROBITUSSIN MUCUS-CHEST CONGEST Your last dose was: Your next dose is: Take 5 mL by mouth three (3) times daily as needed for Cough. Indications: Cough 100 mg Lancets Misc Your last dose was: Your next dose is:    
   
   
 1 Package by Does Not Apply route daily. Test glucose daily and as needed Dx type 2 dm E11.42  
 1 Package  
    
   
   
   
  
 linaclotide 145 mcg Cap capsule Commonly known as:  Umer Thomas Your last dose was: Your next dose is: Take 1 Cap by mouth Daily (before breakfast). Per dr dean  
 145 mcg  
    
   
   
   
  
 loratadine 10 mg tablet Commonly known as:  Landen Rodas Your last dose was: Your next dose is: Take 1 Tab by mouth daily as needed for Allergies. 10 mg  
    
   
   
   
  
 magnesium hydroxide 2,400 mg/10 mL Susp suspension Commonly known as:  MILK OF MAGNESIA Your last dose was: Your next dose is: Take 10 mL by mouth nightly. 10 mL  
    
   
   
   
  
 OTHER Your last dose was: Your next dose is:    
   
   
      
   
   
   
  
 potassium chloride SR 10 mEq tablet Commonly known as:  KLOR-CON 10 Your last dose was: Your next dose is: Take 2 Tabs by mouth daily. Only when furosemide taken  Indications: hypokalemia prevention 20 mEq VITAMIN D3 PO Your last dose was: Your next dose is: Take  by mouth daily. Where to Get Your Medications These medications were sent to [address] [place] [address] AT [address]  [address], [address] [facility] [address] Phone:  [phone]  
  carvedilol 6.25 mg tablet Information on where to get these meds will be given to you by the nurse or doctor. ! Ask your nurse or doctor about these medications  
  ferrous sulfate 325 mg (65 mg iron) tablet  
 furosemide 40 mg tablet  
 guaiFENesin 100 mg/5 mL liquid  
 potassium chloride SR 10 mEq tablet

## 2017-12-06 NOTE — ED PROVIDER NOTES
HPI Comments: The patient is a 80-year-old female who presents to the emergency room accompanied by her nurses with complaints of weight gain, bilateral lower extremity edema, PND, orthopnea, dyspnea on exertion, and shortness of breath at rest that has been progressive over the last 2 weeks. Her primary care physician attempted to manage her outpatient by increasing her p.o. Bumex. Patient states that last week she does think she missed 2 of her diuretic doses on 2 days. She was referred to the emergency room by her primary care for admission of CHF exacerbation. History of atrial fibrillation but only takes a baby aspirin every other day. She is very concerned about her breathing which she believes is making her baseline anxiety worse. Pt denies fevers, chills, night sweats, chest pain, pressure, abdominal pain, n/v/d, melena, hematuria, dysuria, constipation, HA, dizziness, and syncope. Past Medical History:  No date: Anxiety  No date: Bleeding nose  No date: Chronic atrial fibrillation (HCC)  No date: Diabetes ([facility] [address]  No date: Hypertension    Past Surgical History:  No date: HX CHOLECYSTECTOMY  No date: HX HEENT      Comment: myringotomy  07/2016: HX HEENT      Comment: basal cell removal -forehead  No date: HX TONSILLECTOMY      Comment: childhood  07/11/2016: HX TYMPANOSTOMY      Comment: left ear    PCP:  Abe Barcenas MD        Patient is a 80 y.o. female presenting with shortness of breath. The history is provided by the patient. Shortness of Breath   Associated symptoms include leg swelling. Pertinent negatives include no fever, no headaches, no rhinorrhea, no sore throat, no ear pain, no neck pain, no cough, no wheezing, no chest pain, no vomiting, no abdominal pain and no rash.         Past Medical History:   Diagnosis Date    Anxiety     Bleeding nose     Chronic atrial fibrillation (HCC)     Diabetes (HCC)     Hypertension        Past Surgical History:   Procedure Laterality Date    HX CHOLECYSTECTOMY      HX HEENT      myringotomy    HX HEENT  07/2016    basal cell removal -forehead    HX TONSILLECTOMY      childhood    HX TYMPANOSTOMY  07/11/2016    left ear         No family history on file. Social History     Social History    Marital status:      Spouse name: N/A    Number of children: N/A    Years of education: N/A     Occupational History    Not on file. Social History Main Topics    Smoking status: Former Smoker     Packs/day: 1.00     Years: 10.00     Quit date: 10/12/1993    Smokeless tobacco: Never Used    Alcohol use Yes      Comment: 1 glass of wine nightly    Drug use: No    Sexual activity: Not Currently     Other Topics Concern    Not on file     Social History Narrative         ALLERGIES: Amoxicillin; Aspirin; Gabapentin; Levaquin [levofloxacin]; Lorazepam; Losartan; Lyrica [pregabalin]; Other medication; and Sertraline    Review of Systems   Constitutional: Positive for unexpected weight change. Negative for activity change, appetite change, chills, diaphoresis, fatigue and fever. HENT: Negative for congestion, ear pain, rhinorrhea, sinus pressure, sore throat and tinnitus. Eyes: Negative for photophobia, pain, discharge and visual disturbance. Respiratory: Positive for shortness of breath. Negative for apnea, cough, choking, chest tightness, wheezing and stridor. Cardiovascular: Positive for leg swelling. Negative for chest pain and palpitations. Gastrointestinal: Negative for abdominal pain, constipation, diarrhea, nausea and vomiting. Endocrine: Negative for polydipsia, polyphagia and polyuria. Genitourinary: Negative for decreased urine volume, dyspareunia, dysuria, enuresis, flank pain, frequency, hematuria and urgency. Musculoskeletal: Negative for arthralgias, back pain, gait problem, myalgias and neck pain. Skin: Negative for color change, pallor, rash and wound. Allergic/Immunologic: Negative for immunocompromised state. Neurological: Negative for dizziness, seizures, syncope, weakness, light-headedness and headaches. Hematological: Does not bruise/bleed easily. Psychiatric/Behavioral: Negative for agitation and confusion. The patient is not nervous/anxious. Vitals:    12/06/17 1641   BP: 120/58   Pulse: 74   Resp: 16   Temp: 98.2 °F (36.8 °C)   SpO2: 96%   Height: 4' 11\" (1.499 m)            Physical Exam   Constitutional: She is oriented to person, place, and time. She appears well-developed and well-nourished. No distress. HENT:   Head: Normocephalic. Right Ear: External ear normal.   Left Ear: External ear normal.   Mouth/Throat: Oropharynx is clear and moist. No oropharyngeal exudate. Eyes: Conjunctivae and EOM are normal. Pupils are equal, round, and reactive to light. Right eye exhibits no discharge. Left eye exhibits no discharge. No scleral icterus. Neck: Normal range of motion. Neck supple. No JVD present. No tracheal deviation present. No thyromegaly present. Cardiovascular: Normal rate, regular rhythm, normal heart sounds and intact distal pulses. Exam reveals no gallop and no friction rub. No murmur heard. +3 pitting edema of BLE   Pulmonary/Chest: Effort normal. No stridor. No respiratory distress. She has decreased breath sounds in the right lower field and the left lower field. She has wheezes in the right middle field. She has no rhonchi. She has rales in the left middle field. She exhibits no tenderness. Abdominal: Soft. Bowel sounds are normal. She exhibits no distension and no mass. There is no tenderness. There is no rebound and no guarding. Musculoskeletal: Normal range of motion. She exhibits no edema or tenderness. Lymphadenopathy:     She has no cervical adenopathy. Neurological: She is alert and oriented to person, place, and time. She displays normal reflexes. No cranial nerve deficit. Coordination normal.   Skin: Skin is warm and dry. No rash noted.  She is not diaphoretic. No erythema. No pallor. Psychiatric: She has a normal mood and affect. Her behavior is normal. Judgment and thought content normal.   Nursing note and vitals reviewed. MDM  Number of Diagnoses or Management Options  Diagnosis management comments:    * routine laboratory data and UA   * CXR   * EKG          Amount and/or Complexity of Data Reviewed  Clinical lab tests: ordered and reviewed  Tests in the radiology section of CPT®: ordered and reviewed  Discussion of test results with the performing providers: yes  Review and summarize past medical records: yes  Discuss the patient with other providers: yes    Risk of Complications, Morbidity, and/or Mortality  General comments:    - hemodynamically stable pt in NAD    Patient Progress  Patient progress: stable    ED Course       Procedures           ED EKG interpretation:  Rhythm: atrial fib; and irregular. Rate (approx.): 72; Axis: normal; P wave: absent; QRS interval: normal ; ST/T wave: non-specific changes; in  Lead. This EKG was interpreted by Sindy Ennis NP,ED Provider. CONSULT NOTE:   Aileen Villa NP spoke with Dr. Amador Agarwal MD,   Specialty: Hospitalist  Discussed pt's hx, disposition, and available diagnostic and imaging results. Reviewed care plans. Consultant agrees with plans as outlined. Admit to inpatient. Sindy Ennis NP        7:26 PM  Patient is being admitted to the hospital.  The results of their tests and reasons for their admission have been discussed with them and/or available family. They convey agreement and understanding for the need to be admitted and for their admission diagnosis. Consultation has been made with the inpatient physician specialist for hospitalization.     LABORATORY TESTS:  Recent Results (from the past 12 hour(s))   EKG, 12 LEAD, INITIAL    Collection Time: 12/06/17  4:49 PM   Result Value Ref Range    Ventricular Rate 72 BPM    Atrial Rate 277 BPM    QRS Duration 76 ms    Q-T Interval 438 ms    QTC Calculation (Bezet) 479 ms    Calculated R Axis 9 degrees    Calculated T Axis 21 degrees    Diagnosis       Atrial fibrillation  When compared with ECG of 02-JAN-2015 10:48,  No significant change was found     METABOLIC PANEL, COMPREHENSIVE    Collection Time: 12/06/17  4:55 PM   Result Value Ref Range    Sodium 137 136 - 145 mmol/L    Potassium 4.4 3.5 - 5.1 mmol/L    Chloride 101 97 - 108 mmol/L    CO2 30 21 - 32 mmol/L    Anion gap 6 5 - 15 mmol/L    Glucose 139 (H) 65 - 100 mg/dL    BUN 17 6 - 20 MG/DL    Creatinine 1.16 (H) 0.55 - 1.02 MG/DL    BUN/Creatinine ratio 15 12 - 20      GFR est AA 53 (L) >60 ml/min/1.73m2    GFR est non-AA 44 (L) >60 ml/min/1.73m2    Calcium 8.0 (L) 8.5 - 10.1 MG/DL    Bilirubin, total 0.4 0.2 - 1.0 MG/DL    ALT (SGPT) 27 12 - 78 U/L    AST (SGOT) 22 15 - 37 U/L    Alk. phosphatase 158 (H) 45 - 117 U/L    Protein, total 7.2 6.4 - 8.2 g/dL    Albumin 3.1 (L) 3.5 - 5.0 g/dL    Globulin 4.1 (H) 2.0 - 4.0 g/dL    A-G Ratio 0.8 (L) 1.1 - 2.2     CBC WITH AUTOMATED DIFF    Collection Time: 12/06/17  4:55 PM   Result Value Ref Range    WBC 5.9 3.6 - 11.0 K/uL    RBC 3.17 (L) 3.80 - 5.20 M/uL    HGB 9.2 (L) 11.5 - 16.0 g/dL    HCT 28.6 (L) 35.0 - 47.0 %    MCV 90.2 80.0 - 99.0 FL    MCH 29.0 26.0 - 34.0 PG    MCHC 32.2 30.0 - 36.5 g/dL    RDW 15.8 (H) 11.5 - 14.5 %    PLATELET 560 752 - 500 K/uL    NEUTROPHILS 69 32 - 75 %    LYMPHOCYTES 21 12 - 49 %    MONOCYTES 9 5 - 13 %    EOSINOPHILS 1 0 - 7 %    BASOPHILS 0 0 - 1 %    ABS. NEUTROPHILS 4.1 1.8 - 8.0 K/UL    ABS. LYMPHOCYTES 1.2 0.8 - 3.5 K/UL    ABS. MONOCYTES 0.5 0.0 - 1.0 K/UL    ABS. EOSINOPHILS 0.1 0.0 - 0.4 K/UL    ABS.  BASOPHILS 0.0 0.0 - 0.1 K/UL   NT-PRO BNP    Collection Time: 12/06/17  4:55 PM   Result Value Ref Range    NT pro-BNP 3786 (H) 0 - 450 PG/ML   TROPONIN I    Collection Time: 12/06/17  4:55 PM   Result Value Ref Range    Troponin-I, Qt. <0.04 <0.05 ng/mL   MAGNESIUM    Collection Time: 12/06/17  4:55 PM   Result Value Ref Range    Magnesium 2.2 1.6 - 2.4 mg/dL   URINALYSIS W/MICROSCOPIC    Collection Time: 12/06/17  4:56 PM   Result Value Ref Range    Color YELLOW/STRAW      Appearance CLEAR CLEAR      Specific gravity 1.006 1.003 - 1.030      pH (UA) 7.5 5.0 - 8.0      Protein NEGATIVE  NEG mg/dL    Glucose NEGATIVE  NEG mg/dL    Ketone NEGATIVE  NEG mg/dL    Bilirubin NEGATIVE  NEG      Blood NEGATIVE  NEG      Urobilinogen 0.2 0.2 - 1.0 EU/dL    Nitrites NEGATIVE  NEG      Leukocyte Esterase NEGATIVE  NEG      WBC 0-4 0 - 4 /hpf    RBC 0-5 0 - 5 /hpf    Epithelial cells FEW FEW /lpf    Bacteria NEGATIVE  NEG /hpf    Hyaline cast 0-2 0 - 5 /lpf   URINE CULTURE HOLD SAMPLE    Collection Time: 12/06/17  4:56 PM   Result Value Ref Range    Urine culture hold URINE ON HOLD IN MICROBIOLOGY DEPT FOR 3 DAYS         IMAGING RESULTS:  XR CHEST PA LAT   Final Result        Xr Chest Pa Lat    Result Date: 12/6/2017  EXAM:  XR CHEST PA LAT INDICATION:   Two-week history of shortness of breath. Increased diuretic though not getting any better. Has had increased leg swelling as well. COMPARISON: April 6, 2016. FINDINGS: PA and lateral radiographs of the chest demonstrate pulmonary venous congestion with borderline interstitial pulmonary edema. There is no consolidation, pneumothorax or pleural effusion. Cardiac size is mildly enlarged. Atherosclerotic calcification of the thoracic aorta is again noted. No mediastinal or hilar enlargement is shown. The bones are moderately osteopenic. IMPRESSION: Probably venous congestion with borderline interstitial pulmonary edema. MEDICATIONS GIVEN:  Medications   furosemide (LASIX) injection 60 mg (60 mg IntraVENous Given 12/6/17 1836)       IMPRESSION:  1. Acute on chronic diastolic heart failure ([facility] [address]        PLAN:  1.  Admit to [address] [place] [address]   7:26 PM

## 2017-12-06 NOTE — ED NOTES
I personally saw and examined the patient. I have reviewed and agree with the MLP's findings, including all diagnostic interpretations, and plans as written. I was present during the key portions of separately billed procedures.     Alie Maurer MD

## 2017-12-06 NOTE — ED TRIAGE NOTES
C/o  Intermittent SOB x 2 weeks. Seen yesterday at PCP, increased PO diuretic but not getting any better.   +increased leg swelling, but reports \"a tiny bit better today\"

## 2017-12-06 NOTE — IP AVS SNAPSHOT
[address] 
[phone] Patient: Gerald Montaño MRN: [id number] SSQ:[id number] About your hospitalization You were admitted on:  December 6, 2017 You last received care in the:  [facility] [place] 6S NEURO-SCI TELE You were discharged on:  December 11, 2017 Why you were hospitalized Your primary diagnosis was:  Chf (Congestive Heart Failure) (Hcc) Things You Need To Do (next 8 weeks) Follow up with [facility] Office on Aging Phone:  [phone] Where:  [address] [address] Follow up with [address] [place] [address] PT, OT, SN/ If you have not heard from this agency by 12 noon on the day after discharge, please call them. Phone:  [phone] Where:  [address] [facility], [address] [place] [address] Follow up with Smith Owusu MD  
Monday, 12/18/17 at 1:20pm.  
  
Phone:  [phone] Where:  [address] [address] Thursday Dec 14, 2017 ESTABLISHED PATIENT with Yang Wade MD at  3:00 PM  
Where:  [address] ([address] [place] [address]) Monday Dec 18, 2017 TRANSITIONAL CARE MANAGEMENT with Smith Owusu MD at  1:20 PM  
Where:  [facility] ([address] [place] [address]) Tuesday Jan 09, 2018 ROUTINE CARE with Smith Owusu MD at  2:20 PM  
Where:  [facility] [address] [place] [address]) Discharge Orders None A check arnol indicates which time of day the medication should be taken. My Medications STOP taking these medications   
 amLODIPine 5 mg tablet Commonly known as:  NORVASC  
   
  
 bumetanide 1 mg tablet Commonly known as:  Tuckernie Pulse TAKE these medications as instructed Instructions Each Dose to Equal  
 Morning Noon Evening Bedtime  
 aspirin delayed-release 81 mg tablet Your last dose was: Your next dose is: Take 1 Tab by mouth daily. 81 mg  
    
   
   
   
  
 BIOTIN PO Your last dose was: Your next dose is: Take 5,000 mcg by mouth daily. 5000 mcg  
    
   
   
   
  
 carvedilol 6.25 mg tablet Commonly known as:  Micki Bejarano Your last dose was: Your next dose is: Take 1 Tab by mouth two (2) times daily (with meals). Indications: Chronic Heart Failure, hypertension 6.25 mg  
    
   
   
   
  
 EYE HEALTH PO Your last dose was: Your next dose is: Take  by mouth daily. ferrous sulfate 325 mg (65 mg iron) tablet Start taking on:  12/12/2017 Your last dose was: Your next dose is: Take 1 Tab by mouth daily (with breakfast). 325 mg  
    
   
   
   
  
 fluocinoNIDE 0.05 % topical cream  
Commonly known as:  LIDEX Your last dose was: Your next dose is:    
   
   
 Apply  to affected area two (2) times daily as needed for Skin Irritation. furosemide 40 mg tablet Commonly known as:  LASIX Your last dose was: Your next dose is: Take 1 Tab by mouth daily. 40 mg  
    
   
   
   
  
 glimepiride 1 mg tablet Commonly known as:  AMARYL Your last dose was: Your next dose is: Take 1 Tab by mouth Daily (before breakfast). Take only if sugar is above 140  
 1 mg  
    
   
   
   
  
 glucose blood VI test strips strip Commonly known as:  FREESTYLE TEST Your last dose was: Your next dose is:    
   
   
 TEST 2 TIMES DAILY. Dx M22.62  
     
   
   
   
  
 guaiFENesin 100 mg/5 mL liquid Commonly known as:  ROBITUSSIN MUCUS-CHEST CONGEST Your last dose was: Your next dose is: Take 5 mL by mouth three (3) times daily as needed for Cough. Indications: Cough  100 mg  
    
   
   
   
 Lancets Misc Your last dose was: Your next dose is:    
   
   
 1 Package by Does Not Apply route daily. Test glucose daily and as needed Dx type 2 dm E11.42  
 1 Package  
    
   
   
   
  
 linaclotide 145 mcg Cap capsule Commonly known as:  Genevive Bird Your last dose was: Your next dose is: Take 1 Cap by mouth Daily (before breakfast). Per dr dean  
 145 mcg  
    
   
   
   
  
 loratadine 10 mg tablet Commonly known as:  Garlan Baas Your last dose was: Your next dose is: Take 1 Tab by mouth daily as needed for Allergies. 10 mg  
    
   
   
   
  
 magnesium hydroxide 2,400 mg/10 mL Susp suspension Commonly known as:  MILK OF MAGNESIA Your last dose was: Your next dose is: Take 10 mL by mouth nightly. 10 mL  
    
   
   
   
  
 OTHER Your last dose was: Your next dose is:    
   
   
      
   
   
   
  
 potassium chloride SR 10 mEq tablet Commonly known as:  KLOR-CON 10 Your last dose was: Your next dose is: Take 2 Tabs by mouth daily. Only when furosemide taken  Indications: hypokalemia prevention 20 mEq VITAMIN D3 PO Your last dose was: Your next dose is: Take  by mouth daily. Where to Get Your Medications These medications were sent to [address] [place] [address] AT Memo Jovel [address] [place] [address], [address] [place] [id number] Phone:  [phone]  
  carvedilol 6.25 mg tablet Information on where to get these meds will be given to you by the nurse or doctor. ! Ask your nurse or doctor about these medications  
  ferrous sulfate 325 mg (65 mg iron) tablet  
 furosemide 40 mg tablet  
 guaiFENesin 100 mg/5 mL liquid  
 potassium chloride SR 10 mEq tablet Discharge Instructions Discharge Instructions PATIENT ID: Pepe Echeverria MRN: [id number] YOB: 1922 DATE OF ADMISSION: 12/6/2017  4:39 PM   
DATE OF DISCHARGE: 12/11/2017 PRIMARY CARE PROVIDER: Lazara Eugene MD  
 
 
DISCHARGING PHYSICIAN: Ritesh Nassar MD   
To contact this individual call [phone] and ask the  to page. If unavailable ask to be transferred the Adult Hospitalist Department. DISCHARGE DIAGNOSES : Acute on chronic diastolic heart failure, Shortness of breath CONSULTATIONS: IP CONSULT TO HOSPITALIST 
IP CONSULT TO CARDIOLOGY PROCEDURES/SURGERIES: * No surgery found * PENDING TEST RESULTS:  
At the time of discharge the following test results are still pending: FOLLOW UP APPOINTMENTS:  
Follow-up Information Follow up With Details Comments Contact [facility] Office on [place] [address] 
[phone] Lewis Borges MD On 12/14/2017 at 3:00 pm [address] [facility] 57 
[phone] 350[address] [facility] PT, OT, SN/ If you have not heard from this agency by 12 noon on the day after discharge, please call them. [address] 
1st Floor [facility] [address] 
[phone] Lazara Eugene MD  Monday, 12/18/17 at 1:20pm. [address] [facility] [address] [facility] 57 
[phone] ADDITIONAL CARE RECOMMENDATIONS:  
 
DIET: Cardiac Diet and Diabetic Diet ACTIVITY: Activity as tolerated WOUND CARE: none EQUIPMENT needed:  
 
 
DISCHARGE MEDICATIONS: 
 See Medication Reconciliation Form · It is important that you take the medication exactly as they are prescribed. · Keep your medication in the bottles provided by the pharmacist and keep a list of the medication names, dosages, and times to be taken in your wallet. · Do not take other medications without consulting your doctor. NOTIFY YOUR PHYSICIAN FOR ANY OF THE FOLLOWING:  
Fever over 101 degrees for 24 hours. Chest pain, shortness of breath, fever, chills, nausea, vomiting, diarrhea, change in mentation, falling, weakness, bleeding. Severe pain or pain not relieved by medications. Or, any other signs or symptoms that you may have questions about. DISPOSITION: 
x  Home With: 
 OT  PT  [facility]  RN  
  
 SNF/Inpatient Rehab/LTAC Independent/assisted living Hospice Other: CDMP Checked: Yes X Signed:  
Rosita Duke MD 
12/11/2017 
1:03 PM 
 
ACO Transitions of Care Introducing [employer] [address] Naomi Abel offers a voluntary care coordination program to provide high quality service and care to [facility] fee-for-service beneficiaries. Baljit Engel was designed to help you enhance your health and well-being through the following services: ? Transitions of Care  support for individuals who are transitioning from one care setting to another (example: Hospital to home). ? Chronic and Complex Care Coordination  support for individuals and caregivers of those with serious or chronic illnesses or with more than one chronic (ongoing) condition and those who take a number of different medications. If you meet specific medical criteria, a [address] [place] [address] Rd may call you directly to coordinate your care with your primary care physician and your other care providers. For questions about the [facility] programs, please, contact your physicians office. For general questions or additional information about Accountable Care Organizations: 
Please visit www.medicare.gov/acos. html or call 1-800-MEDICARE (0-176.844.1472) TTY users should call 4-948.402.7391. Jackiehart Announcement  We are excited to announce that we are making your provider's discharge notes available to you in [employer]. You will see these notes when they are completed and signed by the physician that discharged you from your recent hospital stay. If you have any questions or concerns about any information you see in [employer], please call the Health Information Department where you were seen or reach out to your Primary Care Provider for more information about your plan of care. Introducing [facility]! Dear Vignesh Jones: 
Thank you for requesting a [employer] account. Our records indicate that you have previously registered for a [employer] account but its currently inactive. Please call our [employer] support line at [phone]. Additional Information If you have questions, please visit the Frequently Asked Questions section of the [employer] website at [email or web address] Remember, [employer] is NOT to be used for urgent needs. For medical emergencies, dial 911. Now available from your iPhone and Android! Providers Seen During Your Hospitalization Provider Specialty Primary office phone Kirby Tracey MD Emergency Medicine [phone] Derrell Delgadillo MD Hospitalist [phone] Lisa Anderson MD Internal Medicine [phone] Your Primary Care Physician (PCP) Primary Care Physician Office Phone Office Fax Diana Stewart [phone] You are allergic to the following Allergen Reactions Amoxicillin Other (comments) \"makes me feel like I\"m going to faint\" Aspirin Palpitations Gabapentin Drowsiness * pt states this med makes her feel very drowsy , gives her a drunk feeling. Levaquin (Levofloxacin) Hives Rash Lorazepam Other (comments) Feeling faint. Losartan Other (comments) Tongue swelling Lyrica (Pregabalin) Rash Other Medication Other (comments) Feeling faint, does not decrease pain. Sertraline Other (comments) Feeling faint. Recent Documentation Height Weight BMI OB Status Smoking Status 1.499 m 89.8 kg 39.97 kg/m2 Postmenopausal Former Smoker Emergency Contacts Name Discharge Info Relation Home Work Mobile [address] CAREGIVER [3] Sister [23] [phone] Patient Belongings The following personal items are in your possession at time of discharge: 
  Dental Appliances: Uppers, Lowers  Visual Aid: Glasses, With patient      Home Medications: None   Jewelry: Ring, Watch  Clothing: Pants, Socks, Footwear, Shirt    Other Valuables:  (cell phone) Discharge Instructions Attachments/References HEART FAILURE: AVOIDING TRIGGERS (ENGLISH) Patient Handouts Avoiding Triggers With Heart Failure: Care Instructions Your Care Instructions Triggers are anything that make your heart failure flare up. A flare-up is also called \"sudden heart failure\" or \"acute heart failure. \" When you have a flare-up, fluid builds up in your lungs, and you have problems breathing. You might need to go to the hospital. By watching for changes in your condition and avoiding triggers, you can prevent heart failure flare-ups. Follow-up care is a key part of your treatment and safety. Be sure to make and go to all appointments, and call your doctor if you are having problems. It's also a good idea to know your test results and keep a list of the medicines you take. How can you care for yourself at home? Watch for changes in your weight and condition · Weigh yourself without clothing at the same time each day. Record your weight. Call your doctor if you have sudden weight gain, such as more than 2 to 3 pounds in a day or 5 pounds in a week. (Your doctor may suggest a different range of weight gain.) A sudden weight gain may mean that your heart failure is getting worse. · Keep a daily record of your symptoms.  Write down any changes in how you feel, such as new shortness of breath, cough, or problems eating. Also record if your ankles are more swollen than usual and if you feel more tired than usual. Note anything that you ate or did that could have triggered these changes. Limit sodium Sodium causes your body to hold on to extra water. This may cause your heart failure symptoms to get worse. People get most of their sodium from processed foods. Fast food and restaurant meals also tend to be very high in sodium. · Your doctor may suggest that you limit sodium to 2,000 milligrams (mg) a day or less. That is less than 1 teaspoon of salt a day, including all the salt you eat in cooking or in packaged foods. · Read food labels on cans and food packages. They tell you how much sodium you get in one serving. Check the serving size. If you eat more than one serving, you are getting more sodium. · Be aware that sodium can come in forms other than salt, including monosodium glutamate (MSG), sodium citrate, and sodium bicarbonate (baking soda). MSG is often added to Asian food. You can sometimes ask for food without MSG or salt. · Slowly reducing salt will help you adjust to the taste. Take the salt shaker off the table. · Flavor your food with garlic, lemon juice, onion, vinegar, herbs, and spices instead of salt. Do not use soy sauce, steak sauce, onion salt, garlic salt, mustard, or ketchup on your food, unless it is labeled \"low-sodium\" or \"low-salt. \" 
· Make your own salad dressings, sauces, and ketchup without adding salt. · Use fresh or frozen ingredients, instead of canned ones, whenever you can. Choose low-sodium canned goods. · Eat less processed food and food from restaurants, including fast food. Exercise as directed Moderate, regular exercise is very good for your heart. It improves your blood flow and helps control your weight. But too much exercise can stress your heart and cause a heart failure flare-up. · Check with your doctor before you start an exercise program. 
· Walking is an easy way to get exercise. Start out slowly. Gradually increase the length and pace of your walk. Swimming, riding a bike, and using a treadmill are also good forms of exercise. · When you exercise, watch for signs that your heart is working too hard. You are pushing yourself too hard if you cannot talk while you are exercising. If you become short of breath or dizzy or have chest pain, stop, sit down, and rest. 
· Do not exercise when you do not feel well. Take medicines correctly · Take your medicines exactly as prescribed. Call your doctor if you think you are having a problem with your medicine. · Make a list of all the medicines you take. Include those prescribed to you by other doctors and any over-the-counter medicines, vitamins, or supplements you take. Take this list with you when you go to any doctor. · Take your medicines at the same time every day. It may help you to post a list of all the medicines you take every day and what time of day you take them. · Make taking your medicine as simple as you can. Plan times to take your medicines when you are doing other things, such as eating a meal or getting ready for bed. This will make it easier to remember to take your medicines. · Get organized. Use helpful tools, such as daily or weekly pill containers. When should you call for help? Call 911 if you have symptoms of sudden heart failure such as: 
? · You have severe trouble breathing. ? · You cough up pink, foamy mucus. ? · You have a new irregular or rapid heartbeat. ?Call your doctor now or seek immediate medical care if: 
? · You have new or increased shortness of breath. ? · You are dizzy or lightheaded, or you feel like you may faint. ? · You have sudden weight gain, such as more than 2 to 3 pounds in a day or 5 pounds in a week. (Your doctor may suggest a different range of weight gain.) ? · You have increased swelling in your legs, ankles, or feet. ? · You are suddenly so tired or weak that you cannot do your usual activities. ? Watch closely for changes in your health, and be sure to contact your doctor if you develop new symptoms. Where can you learn more? Go to http://myrna-alayna.info/. Enter N548 in the search box to learn more about \"Avoiding Triggers With Heart Failure: Care Instructions. \" Current as of: September 21, 2016 Content Version: 11.4 © 4117-4077 Healthwise, Incorporated. Care instructions adapted under license by [employer] (which disclaims liability or warranty for this information). If you have questions about a medical condition or this instruction, always ask your healthcare professional. [address] any warranty or liability for your use of this information. Please provide this summary of care documentation to your next provider. Signatures-by signing, you are acknowledging that this After Visit Summary has been reviewed with you and you have received a copy. Patient Signature:  ____________________________________________________________ Date:  ____________________________________________________________  
  
Yary Duron Provider Signature:  ____________________________________________________________ Date:  ____________________________________________________________

## 2017-12-06 NOTE — TELEPHONE ENCOUNTER
Spoke with Lisseth Evans- not improving with increased PO diuretic, advised ER. She agrees.  Not in severe distress so Aide will drive her

## 2017-12-07 ENCOUNTER — HOME HEALTH ADMISSION (OUTPATIENT)
Dept: HOME HEALTH SERVICES | Facility: HOME HEALTH | Age: 82
End: 2017-12-07
Payer: MEDICARE

## 2017-12-07 LAB
ANION GAP SERPL CALC-SCNC: 8 MMOL/L (ref 5–15)
ANION GAP SERPL CALC-SCNC: 9 MMOL/L (ref 5–15)
BASOPHILS # BLD: 0 K/UL (ref 0–0.1)
BASOPHILS NFR BLD: 0 % (ref 0–1)
BUN SERPL-MCNC: 16 MG/DL (ref 6–20)
BUN SERPL-MCNC: 17 MG/DL (ref 6–20)
BUN/CREAT SERPL: 18 (ref 12–20)
BUN/CREAT SERPL: 18 (ref 12–20)
CALCIUM SERPL-MCNC: 7.8 MG/DL (ref 8.5–10.1)
CALCIUM SERPL-MCNC: 8.1 MG/DL (ref 8.5–10.1)
CHLORIDE SERPL-SCNC: 101 MMOL/L (ref 97–108)
CHLORIDE SERPL-SCNC: 101 MMOL/L (ref 97–108)
CO2 SERPL-SCNC: 30 MMOL/L (ref 21–32)
CO2 SERPL-SCNC: 31 MMOL/L (ref 21–32)
CREAT SERPL-MCNC: 0.9 MG/DL (ref 0.55–1.02)
CREAT SERPL-MCNC: 0.95 MG/DL (ref 0.55–1.02)
EOSINOPHIL # BLD: 0.1 K/UL (ref 0–0.4)
EOSINOPHIL NFR BLD: 1 % (ref 0–7)
ERYTHROCYTE [DISTWIDTH] IN BLOOD BY AUTOMATED COUNT: 15.9 % (ref 11.5–14.5)
GLUCOSE BLD STRIP.AUTO-MCNC: 103 MG/DL (ref 65–100)
GLUCOSE BLD STRIP.AUTO-MCNC: 152 MG/DL (ref 65–100)
GLUCOSE BLD STRIP.AUTO-MCNC: 156 MG/DL (ref 65–100)
GLUCOSE BLD STRIP.AUTO-MCNC: 175 MG/DL (ref 65–100)
GLUCOSE BLD STRIP.AUTO-MCNC: 196 MG/DL (ref 65–100)
GLUCOSE SERPL-MCNC: 140 MG/DL (ref 65–100)
GLUCOSE SERPL-MCNC: 162 MG/DL (ref 65–100)
HCT VFR BLD AUTO: 26.6 % (ref 35–47)
HGB BLD-MCNC: 8.6 G/DL (ref 11.5–16)
LYMPHOCYTES # BLD: 1 K/UL (ref 0.8–3.5)
LYMPHOCYTES NFR BLD: 17 % (ref 12–49)
MCH RBC QN AUTO: 29.1 PG (ref 26–34)
MCHC RBC AUTO-ENTMCNC: 32.3 G/DL (ref 30–36.5)
MCV RBC AUTO: 89.9 FL (ref 80–99)
MONOCYTES # BLD: 0.8 K/UL (ref 0–1)
MONOCYTES NFR BLD: 14 % (ref 5–13)
NEUTS SEG # BLD: 3.9 K/UL (ref 1.8–8)
NEUTS SEG NFR BLD: 68 % (ref 32–75)
PLATELET # BLD AUTO: 232 K/UL (ref 150–400)
POTASSIUM SERPL-SCNC: 3.5 MMOL/L (ref 3.5–5.1)
POTASSIUM SERPL-SCNC: 3.5 MMOL/L (ref 3.5–5.1)
RBC # BLD AUTO: 2.96 M/UL (ref 3.8–5.2)
SERVICE CMNT-IMP: ABNORMAL
SODIUM SERPL-SCNC: 140 MMOL/L (ref 136–145)
SODIUM SERPL-SCNC: 140 MMOL/L (ref 136–145)
TROPONIN I SERPL-MCNC: <0.04 NG/ML
TROPONIN I SERPL-MCNC: <0.04 NG/ML
TSH SERPL DL<=0.05 MIU/L-ACNC: 3.86 UIU/ML (ref 0.36–3.74)
WBC # BLD AUTO: 5.8 K/UL (ref 3.6–11)

## 2017-12-07 PROCEDURE — 80061 LIPID PANEL: CPT | Performed by: FAMILY MEDICINE

## 2017-12-07 PROCEDURE — 74011636637 HC RX REV CODE- 636/637: Performed by: FAMILY MEDICINE

## 2017-12-07 PROCEDURE — 84443 ASSAY THYROID STIM HORMONE: CPT | Performed by: FAMILY MEDICINE

## 2017-12-07 PROCEDURE — 82962 GLUCOSE BLOOD TEST: CPT

## 2017-12-07 PROCEDURE — 36415 COLL VENOUS BLD VENIPUNCTURE: CPT | Performed by: FAMILY MEDICINE

## 2017-12-07 PROCEDURE — 93306 TTE W/DOPPLER COMPLETE: CPT

## 2017-12-07 PROCEDURE — 74011250636 HC RX REV CODE- 250/636: Performed by: FAMILY MEDICINE

## 2017-12-07 PROCEDURE — 80048 BASIC METABOLIC PNL TOTAL CA: CPT | Performed by: FAMILY MEDICINE

## 2017-12-07 PROCEDURE — 74011250637 HC RX REV CODE- 250/637: Performed by: INTERNAL MEDICINE

## 2017-12-07 PROCEDURE — 65660000000 HC RM CCU STEPDOWN

## 2017-12-07 PROCEDURE — 74011250637 HC RX REV CODE- 250/637: Performed by: FAMILY MEDICINE

## 2017-12-07 PROCEDURE — 94640 AIRWAY INHALATION TREATMENT: CPT

## 2017-12-07 PROCEDURE — 85025 COMPLETE CBC W/AUTO DIFF WBC: CPT | Performed by: FAMILY MEDICINE

## 2017-12-07 PROCEDURE — 77010033678 HC OXYGEN DAILY

## 2017-12-07 PROCEDURE — 84484 ASSAY OF TROPONIN QUANT: CPT | Performed by: FAMILY MEDICINE

## 2017-12-07 PROCEDURE — 77030029684 HC NEB SM VOL KT MONA -A

## 2017-12-07 PROCEDURE — 74011000250 HC RX REV CODE- 250: Performed by: FAMILY MEDICINE

## 2017-12-07 RX ORDER — LANOLIN ALCOHOL/MO/W.PET/CERES
1 CREAM (GRAM) TOPICAL
Status: DISCONTINUED | OUTPATIENT
Start: 2017-12-08 | End: 2017-12-11 | Stop reason: HOSPADM

## 2017-12-07 RX ORDER — IPRATROPIUM BROMIDE AND ALBUTEROL SULFATE 2.5; .5 MG/3ML; MG/3ML
3 SOLUTION RESPIRATORY (INHALATION)
Status: DISCONTINUED | OUTPATIENT
Start: 2017-12-08 | End: 2017-12-11 | Stop reason: HOSPADM

## 2017-12-07 RX ADMIN — AMLODIPINE BESYLATE 5 MG: 5 TABLET ORAL at 09:14

## 2017-12-07 RX ADMIN — FUROSEMIDE 40 MG: 10 INJECTION, SOLUTION INTRAMUSCULAR; INTRAVENOUS at 21:59

## 2017-12-07 RX ADMIN — IPRATROPIUM BROMIDE AND ALBUTEROL SULFATE 3 ML: .5; 3 SOLUTION RESPIRATORY (INHALATION) at 03:25

## 2017-12-07 RX ADMIN — ASPIRIN 81 MG: 81 TABLET, COATED ORAL at 09:14

## 2017-12-07 RX ADMIN — INSULIN LISPRO 2 UNITS: 100 INJECTION, SOLUTION INTRAVENOUS; SUBCUTANEOUS at 07:10

## 2017-12-07 RX ADMIN — Medication 10 ML: at 07:03

## 2017-12-07 RX ADMIN — INSULIN LISPRO 2 UNITS: 100 INJECTION, SOLUTION INTRAVENOUS; SUBCUTANEOUS at 13:41

## 2017-12-07 RX ADMIN — Medication 10 ML: at 13:42

## 2017-12-07 RX ADMIN — Medication 10 ML: at 21:59

## 2017-12-07 RX ADMIN — IPRATROPIUM BROMIDE AND ALBUTEROL SULFATE 3 ML: .5; 3 SOLUTION RESPIRATORY (INHALATION) at 12:37

## 2017-12-07 RX ADMIN — IPRATROPIUM BROMIDE AND ALBUTEROL SULFATE 3 ML: .5; 3 SOLUTION RESPIRATORY (INHALATION) at 18:05

## 2017-12-07 RX ADMIN — INSULIN LISPRO 2 UNITS: 100 INJECTION, SOLUTION INTRAVENOUS; SUBCUTANEOUS at 18:03

## 2017-12-07 RX ADMIN — MAGNESIUM HYDROXIDE 10 ML: 400 SUSPENSION ORAL at 18:03

## 2017-12-07 RX ADMIN — FUROSEMIDE 40 MG: 10 INJECTION, SOLUTION INTRAMUSCULAR; INTRAVENOUS at 09:14

## 2017-12-07 NOTE — ED NOTES
Bedside and Verbal shift change report given to Damon Horner (oncoming nurse) by Tobias Kiser RN (offgoing nurse). Report included the following information SBAR, Kardex, ED Summary and MAR.

## 2017-12-07 NOTE — PROGRESS NOTES
Hospitalist Progress Note  Ritesh Nassar MD  Answering service: [phone] OR [phone] from in house phone  Cell: [phone]      Date of Service:  2017  NAME:  Pepe Echeverria  :  1922  MRN:  [id number]      Admission Summary:     Patient is a 80year old  female with past medical history of volume overload, lower extremity edema, who presented to the ER with shortness of breath. Interval history / Subjective:       No acute overnight events. No new complaints. Feeling better now. States the lower extremity swelling is getting better     Assessment & Plan:     Acute on chronic diastolic heart failure: NYHA class 1/2  She has not been complaint with her lasix  CXR shows probably venous congestion with borderline interstitial pulmonary edema. Echo: EF 60-65% with no obvious wall motion abnormalities. LA/RA- Mod dilation  On Lasix, will switch to PO tomorrow. Continue aspirin. Cardiology eval appreciated. Daily weights    SOB: probably due to above in the setting of anemia. Resolved  Oxygen sat stable    DM type 2; BG is stable  Continue ISS and amaryl     Anemia: Iron panel consistent with iron def anemia. Probably related to intermittent nose bleeding she has been having. Start ferrous sulfate  Monitor H/H closely    Bilateral lower extremity edema: Continue lasix    Code status: Full  DVT prophylaxis:     Care Plan discussed with: Patient/Family and Nurse  Disposition: TBD     Hospital Problems  Date Reviewed: 2017          Codes Class Noted POA    * (Principal)CHF (congestive heart failure) ([facility] [address] ICD-10-CM: I50.9  ICD-9-CM: 428.0  2017 Unknown                Review of Systems:   Pertinent items are noted in HPI. Vital Signs:    Last 24hrs VS reviewed since prior progress note.  Most recent are:  Visit Vitals    /43 (BP 1 Location: Left arm, BP Patient Position: At rest)    Pulse 73    Temp 97.9 °F (36.6 °C)    Resp 16    Ht 4' 11\" (1.499 m)    Wt 87.8 kg (193 lb 9.6 oz)    SpO2 98%    BMI 39.1 kg/m2         Intake/Output Summary (Last 24 hours) at 12/07/17 1401  Last data filed at 12/07/17 1200   Gross per 24 hour   Intake              170 ml   Output             3900 ml   Net            -3730 ml        Physical Examination:             Constitutional:  No acute distress, cooperative, pleasant    ENT:  Oral mucous moist, oropharynx benign. Neck supple,    Resp:  CTA bilaterally. No wheezing/rhonchi/rales. No accessory muscle use   CV:  Regular rhythm, normal rate, no murmurs, gallops, rubs    GI:  Soft, non distended, non tender. normoactive bowel sounds, no hepatosplenomegaly     Musculoskeletal:  No edema, warm, 2+ pulses throughout    Neurologic:  Moves all extremities. AAOx3, CN II-XII reviewed    Ext: +2-3 pitting pedal edema. Data Review:          Labs:     Recent Labs      12/07/17 0324 12/06/17   1655   WBC  5.8  5.9   HGB  8.6*  9.2*   HCT  26.6*  28.6*   PLT  232  259     Recent Labs      12/07/17   0324 12/07/17   0039  12/06/17   1655   NA  140  140  137   K  3.5  3.5  4.4   CL  101  101  101   CO2  31  30  30   BUN  16 17 17   CREA  0.90  0.95  1.16*   GLU  140*  162*  139*   CA  7.8*  8.1*  8.0*   MG   --    --   2.2     Recent Labs      12/06/17   1655   SGOT  22   ALT  27   AP  158*   TBILI  0.4   TP  7.2   ALB  3.1*   GLOB  4.1*     No results for input(s): INR, PTP, APTT in the last 72 hours. No lab exists for component: INREXT   Recent Labs      12/06/17   1655   TIBC  281   PSAT  11*      No results found for: FOL, RBCF   No results for input(s): PH, PCO2, PO2 in the last 72 hours.   Recent Labs      12/07/17   0324  12/07/17   0039  12/06/17   1655   TROIQ  <0.04  <0.04  <0.04     Lab Results   Component Value Date/Time    Cholesterol, total 102 12/07/2017 03:24 AM    HDL Cholesterol 66 12/07/2017 03:24 AM    LDL, calculated 25.6 12/07/2017 03:24 AM    Triglyceride 52 12/07/2017 03:24 AM    CHOL/HDL Ratio 1.5 12/07/2017 03:24 AM     Lab Results   Component Value Date/Time    Glucose (POC) 196 12/07/2017 11:45 AM    Glucose (POC) 152 12/07/2017 07:02 AM    Glucose (POC) 103 12/07/2017 01:28 AM    Glucose (POC) 233 12/06/2017 10:15 PM     Lab Results   Component Value Date/Time    Color YELLOW/STRAW 12/06/2017 04:56 PM    Appearance CLEAR 12/06/2017 04:56 PM    Specific gravity 1.006 12/06/2017 04:56 PM    pH (UA) 7.5 12/06/2017 04:56 PM    Protein NEGATIVE  12/06/2017 04:56 PM    Glucose NEGATIVE  12/06/2017 04:56 PM    Ketone NEGATIVE  12/06/2017 04:56 PM    Bilirubin NEGATIVE  12/06/2017 04:56 PM    Urobilinogen 0.2 12/06/2017 04:56 PM    Nitrites NEGATIVE  12/06/2017 04:56 PM    Leukocyte Esterase NEGATIVE  12/06/2017 04:56 PM    Epithelial cells FEW 12/06/2017 04:56 PM    Bacteria NEGATIVE  12/06/2017 04:56 PM    WBC 0-4 12/06/2017 04:56 PM    RBC 0-5 12/06/2017 04:56 PM         Medications Reviewed:     Current Facility-Administered Medications   Medication Dose Route Frequency    amLODIPine (NORVASC) tablet 5 mg  5 mg Oral DAILY    aspirin delayed-release tablet 81 mg  81 mg Oral DAILY    glimepiride (AMARYL) tablet 1 mg  1 mg Oral ACB    . PHARMACY TO SUBSTITUTE PER PROTOCOL    Per Protocol    sodium chloride (NS) flush 5-10 mL  5-10 mL IntraVENous Q8H    sodium chloride (NS) flush 5-10 mL  5-10 mL IntraVENous PRN    furosemide (LASIX) injection 40 mg  40 mg IntraVENous Q12H    glucose chewable tablet 16 g  4 Tab Oral PRN    dextrose (D50W) injection syrg 12.5-25 g  12.5-25 g IntraVENous PRN    glucagon (GLUCAGEN) injection 1 mg  1 mg IntraMUSCular PRN    insulin lispro (HUMALOG) injection   SubCUTAneous Q6H    albuterol-ipratropium (DUO-NEB) 2.5 MG-0.5 MG/3 ML  3 mL Nebulization Q4H RT    magnesium hydroxide (MILK OF MAGNESIA) 400 mg/5 mL oral suspension 10 mL  10 mL Oral QPM ______________________________________________________________________  EXPECTED LENGTH OF STAY: 2d 12h  ACTUAL LENGTH OF STAY:          1                 Nancy Swift MD

## 2017-12-07 NOTE — PROGRESS NOTES

## 2017-12-07 NOTE — PROGRESS NOTES
Bedside and Verbal shift change report given to ALLYSON Higgins (oncoming nurse) by Hermelinda Palmer RN (offgoing nurse). Report included the following information SBAR, Kardex, Intake/Output, MAR and Recent Results.

## 2017-12-07 NOTE — PROGRESS NOTES
CM met with pt to introduce her to the role of CM and transition of care. She verbalized understanding. This pt lives at home alone and is doing well alone. She stated that she has a transfer chair that she \"shuffles\" in around the home. She stated that she has 2 or 3 aides that she has hired to assist her with transportation, housecleaning, and preparing food. Pt plans to go home at d/c. Matias informed this pt that we have an order for a hospital to home visit. She is in agreement with this. MATIAS sent referral to [address] [place] [address] for this H to H visit. [address] Management Interventions  PCP Verified by CM: Yes  Palliative Care Criteria Met (RRAT>21 & CHF Dx)?: No  Transition of Care Consult (CM Consult): Discharge Planning  MyChart Signup: No  Discharge Durable Medical Equipment: No  Physical Therapy Consult: No  Occupational Therapy Consult: No  Speech Therapy Consult: No  Current Support Network: Lives Alone  Confirm Follow Up Transport: Family  Plan discussed with Pt/Family/Caregiver: Yes  Freedom of Choice Offered:  Yes

## 2017-12-07 NOTE — PROGRESS NOTES
Bedside and Verbal shift change report given to Den Barrios RN (oncoming nurse) by Isabel Woo RN (offgoing nurse). Report included the following information SBAR, Kardex, MAR, Recent Results and Cardiac Rhythm NSR/Afib.

## 2017-12-07 NOTE — CDMP QUERY
Dear Hospitalists,    Noted a diagnosis of \"Acute congestive heart failure exacerbation\". Please include specificity (systolic, diastolic or combined) in the documentation, including discharge summary. =>Other Explanation of clinical findings  =>Unable to Determine (no explanation of clinical findings)    The medical record reflects the following clinical findings, treatment, and risk factors:    Risk Factors: adm with SOB and leg edema  Clinical Indicators: BNP 3786 , per H&P \"Shortness of breath. The patient appears to be in acute congestive heart failure exacerbation\"  Treatment: lab monitoring, echo, lasix    Please clarify and document your clinical opinion in the progress notes and discharge summary including the definitive and/or presumptive diagnosis, (suspected or probable), related to the above clinical findings. Please include clinical findings supporting your diagnosis.     Thank You  David Lara,MSN,BSN,RN,[facility]  [phone]

## 2017-12-07 NOTE — H&P
[address] [facility] [place] [address]   HISTORY AND PHYSICAL       Name:  Shubham Almazan   MR#:  [id number]   :  1922   Account #:  [de-identified]        Date of Adm:  2017       CHIEF COMPLAINT: Shortness of breath. HISTORY OF PRESENT ILLNESS: The patient is a 80-year-old   female with past medical history of volume overload, lower extremity   edema, history of anxiety, diabetes mellitus type 2, chronic atrial   fibrillation, hypertension, presents to the hospital with the above   mentioned symptoms. The patient reports that for about a week or so,   she has been getting some swelling in her legs and has been having   shortness of breath. The patient reports that she thinks she has gained   weight and feels that she is \"bloated with water\". The patient reports   that she was seen by her primary care physician, was started on   increased dose of diuretics, but continued to be short of breath and got   concerned and decided to come to the hospital. The patient reports   that last week she did miss 2 of her diuretic doses for 2 consecutive   days. The patient reports that today she called her primary care   physician who told her to come to the ER for further management and   evaluation. The patient reports she also has some orthopnea   associated symptoms and shortness of breath gets worse when she   lies down. The patient reports that she has had increased dyspnea on   exertion, has not been able to walk much because she feels her legs   are pretty heavy. She has history of atrial fibrillation since \"I was 6  years old\" which has been maintained on Plavix. The patient reports   that she has some gait issues and walks with the help of a cane or a   walker, but denies any falls recently. The patient denies any other   complaints or problems. Denies any productive cough.  Denies any   headache, blurry vision, sore throat, trouble swallowing, trouble with   speech, any chest pain, abdominal pain, constipation, diarrhea, urinary   symptoms, focal or generalized neurological weakness, recent travel,   sick contacts, falls, injuries or any other concerns or problems. The   patient denies any hematemesis, melena, hemoptysis. PAST MEDICAL HISTORY: See above. HOME MEDICATIONS   Currently the patient is on:   1. Potassium chloride 20 mEq q.48 hours. 2. Bumex 0.5 mg every 48 hours. 3. Beta-carotene. 4. Amlodipine 5 mg daily. 5. Lidex. 6. Loratadine 10 mg daily for allergies. 7. Aspirin 81 mg daily. 8.  1 mg daily. 9. Linzess. 10. Magnesium hydroxide. 11. Biotin. SOCIAL HISTORY: The patient is a former smoker, quit in 1993,   smoked for \"many years\". The patient drinks 1 glass of wine every   night. Denies IV drug abuse. Lives at home. ALLERGIES   1. AMOXICILLIN. 2. GABAPENTIN. 3. LAMISIL. 4. LORAZEPAM.   5. LOSARTAN. 6. LYRICA. 7. SERTRALINE. REVIEW OF SYSTEMS: All systems were reviewed and were found to   be essentially negative except for the symptoms mentioned above. FAMILY HISTORY: Was discussed, was found to be noncontributory. PHYSICAL EXAMINATION   VITAL SIGNS: Temperature 98.2, pulse 72, respiratory rate 17, blood   pressure 111/98, pulse oximetry 98% on room air. GENERAL: Alert and oriented x3, awake, mildly distressed, pleasant   female, appears to be stated age. HEENT: Pupils equal and reactive to light. Dry mucous membranes. Tympanic membranes clear. NECK: Supple. CHEST: Decreased basal breath sounds with crackles. CORONARY: S1, S2 were heard. ABDOMEN: Soft, nontender, nondistended. Bowel sounds are   physiological.   EXTREMITIES: No clubbing, no cyanosis; 2+ pitting edema, bilateral   lower extremities. NEUROPSYCHIATRIC: Pleasant mood and affect. No focal   neurological deficits were noted. SKIN: Warm. LABORATORY DATA: White count 5.9, hemoglobin 9.2, hematocrit   28.6, platelets 010.  Urine shows no signs of infection. Sodium 137,   potassium 4.4, chloride 101, bicarbonate 30, glucose 139, BUN 17,   creatinine 1.16, calcium 8, magnesium 2.2, bilirubin total 0.4, ALT 27,   AST , alkaline phosphatase 158. Troponin less than 0.04. BNP   3786. X-ray of the chest shows probably venous congestion with   borderline interstitial pulmonary edema. ASSESSMENT AND PLAN   1. Shortness of breath. The patient appears to be in acute congestive   heart failure exacerbation. Will admit the patient on a telemetry bed. Will start the patient on intravenous diuresis, strict intake and output,   daily weights, troponins. Will get an echocardiogram and a cardiology   consultation in the morning and continue to closely. Will continue to   closely monitor on telemetry bed. We will await input from Cardiology. Continue aspirin and further intervention will be per hospital course. We will reassess as needed. Continue to monitor. The patient reports   that she does not like Bumex and the last time she was in the hospital   was given Lasix, and the Bumex intravenous does not work for her,   thus will continue Lasix for now. Further intervention will be per   hospital course. Will continue to monitor. 2. Diabetes. The patient with sliding scale NovoLog insulin, Accu-  Cheks, diet control and close monitoring. Further intervention will be   per hospital course. 3. History of atrial fibrillation, currently rate controlled. We will continue   to closely monitor. Continue aspirin. 4. Anemia, questionable . The patient denies any hematemesis,   melena, hemoptysis, hematuria. Will check stool for occult blood and   iron profile, and continue to monitor hemoglobin and hematocrit. Further intervention will be per hospital course, reassess as needed. 5. Gastrointestinal and deep venous thrombosis prophylaxis. The   patient will be on sequential compression devices.         Kamila Alexander MD MM / MP   D:  12/06/2017   19:41   T: 12/06/2017   20:42   Job #:  [id number]

## 2017-12-07 NOTE — PROGRESS NOTES
Problem: Falls - Risk of  Goal: *Absence of Falls  Document Polo Fall Risk and appropriate interventions in the flowsheet.    Outcome: Progressing Towards Goal  Fall Risk Interventions:  Mobility Interventions: Bed/chair exit alarm, OT consult for ADLs, Patient to call before getting OOB, PT Consult for assist device competence, PT Consult for mobility concerns         Medication Interventions: Evaluate medications/consider consulting pharmacy, Patient to call before getting OOB, Teach patient to arise slowly    Elimination Interventions: Call light in reach, Patient to call for help with toileting needs, Toileting schedule/hourly rounds

## 2017-12-07 NOTE — CONSULTS
Cardiology Consult Note      Patient Name: Elayne Soulier  : 1922 MRN: [id number]  Date: 2017  Time: 11:06 AM    Admit Diagnosis: CHF (congestive heart failure) ([facility]    Primary Cardiologist: Marco Antonio Mayers M.D. Consulting Cardiologist: Vanessa James M.D.    Reason for Consult: CHF    Requesting MD:    HPI:  Elayne Soulier is a 80 y.o. female admitted on 2017  for CHF (congestive heart failure) ([facility] has a past medical history of Anxiety; Bleeding nose; Chronic atrial fibrillation ([facility] Diabetes ([facility] and Hypertension. Reports to ED for subjective SOB, swelling and feelings of weight gain. She is currently under the care of Dr. Andrew Arenas and last saw him on . She was getting oral diuretics for chronic leg edema. She also reports orthopnea. This morning, she does not seem SOB and is currently only concerned for getting her Linzess for her bowel habits. She does note her edema is better. Subjective:  Reports SOB and swelling of the legs. Denies CP. Assessment and Plan     1. SOB    - PCP notes this is a chronic problem -    - CXR with scant edema   - Scant, rare rales in bases   - proBNP 9000   - Echo completed, results pending   - Lasix 40 mg IV BID  2. DM   - per Primary team  3. Chronic AF   - ASA   - No rate controllers  4. Chronic leg edema   - Lasix as above  5. HTN   - Norvasc 5 mg  6. Body mass index is 39.1 kg/(m^2). - Morbidly obese - she has dietary indiscretions and does not exercise d/t back pain. Review of office notes from Dr. Ndiaye Agent, leg edema is a chronic problem for which this patient is somewhat non compliant with treatment. She has dietary indiscretions, she refuses Lasix because it makes her use the bathroom too much, she refuses to wear compression stockings. Weights in office in 2016 187-189 pounds. Continue Lasix IV, follow up echo results.   I have seen and examined the patient and agree with PA assessment. Sob better I suspect HFpEF, preliminary EF 60%  Continue IV lasix follow I/o  Dr. Lakia Arenas to see tomorrow          Review of Symptoms:  A comprehensive review of systems was negative except for that written in the HPI. Previous treatment/evaluation includes echocardiogram .  Cardiac risk factors: sedentary life style, hypertension, post-menopausal.    Past Medical History:   Diagnosis Date    Anxiety     Bleeding nose     Chronic atrial fibrillation (HCC)     Diabetes (HCC)     Hypertension      Past Surgical History:   Procedure Laterality Date    HX CHOLECYSTECTOMY      HX HEENT      myringotomy    HX HEENT  07/2016    basal cell removal -forehead    HX TONSILLECTOMY      childhood    HX TYMPANOSTOMY  07/11/2016    left ear     Current Facility-Administered Medications   Medication Dose Route Frequency    amLODIPine (NORVASC) tablet 5 mg  5 mg Oral DAILY    aspirin delayed-release tablet 81 mg  81 mg Oral DAILY    glimepiride (AMARYL) tablet 1 mg  1 mg Oral ACB    . PHARMACY TO SUBSTITUTE PER PROTOCOL    Per Protocol    sodium chloride (NS) flush 5-10 mL  5-10 mL IntraVENous Q8H    sodium chloride (NS) flush 5-10 mL  5-10 mL IntraVENous PRN    furosemide (LASIX) injection 40 mg  40 mg IntraVENous Q12H    glucose chewable tablet 16 g  4 Tab Oral PRN    dextrose (D50W) injection syrg 12.5-25 g  12.5-25 g IntraVENous PRN    glucagon (GLUCAGEN) injection 1 mg  1 mg IntraMUSCular PRN    insulin lispro (HUMALOG) injection   SubCUTAneous Q6H    albuterol-ipratropium (DUO-NEB) 2.5 MG-0.5 MG/3 ML  3 mL Nebulization Q4H RT    magnesium hydroxide (MILK OF MAGNESIA) 400 mg/5 mL oral suspension 10 mL  10 mL Oral QPM       Allergies   Allergen Reactions    Amoxicillin Other (comments)     \"makes me feel like I\"m going to faint\"    Aspirin Palpitations    Gabapentin Drowsiness     * pt states this med makes her feel very drowsy , gives her a drunk feeling.  Levaquin [Levofloxacin] Hives and Rash    Lorazepam Other (comments)     Feeling faint.  Losartan Other (comments)     Tongue swelling      Lyrica [Pregabalin] Rash    Other Medication Other (comments)     Feeling faint, does not decrease pain.  Sertraline Other (comments)     Feeling faint. No family history on file. Social History     Social History    Marital status:      Spouse name: N/A    Number of children: N/A    Years of education: N/A     Social History Main Topics    Smoking status: Former Smoker     Packs/day: 1.00     Years: 10.00     Quit date: 10/12/1993    Smokeless tobacco: Never Used    Alcohol use Yes      Comment: 1 glass of wine nightly    Drug use: No    Sexual activity: Not Currently     Other Topics Concern    Not on file     Social History Narrative       Objective:    Physical Exam    Vitals:   Vitals:    12/07/17 0106 12/07/17 0325 12/07/17 0700 12/07/17 1100   BP: 140/63  133/77 130/43   Pulse: 74  86 73   Resp: 18  17 16   Temp: 98.3 °F (36.8 °C)  97.8 °F (36.6 °C) 97.9 °F (36.6 °C)   SpO2: 95% 94% 99% 98%   Weight: 87.8 kg (193 lb 9.6 oz)      Height:           General:    Alert, cooperative, no distress, appears stated age. Neck:   Supple, no carotid bruit and no JVD. Back:     Symmetric, normal curvature. Lungs:     Scant rales to auscultation bilaterally. Heart[de-identified]    Regular rate and rhythm, S1, S2 normal, no murmur, click, rub or gallop. Abdomen:     Soft, non-tender. Bowel sounds normal.    Extremities:   Extremities normal, atraumatic, no cyanosis. 1-2+ edema. Vascular:   Pulses - 2+ radials   Skin:   Skin color normal. No rashes or lesions   Neurologic:   CN II-XII grossly intact.         Telemetry: AFIB    ECG:   atrial fibrillation, rate 74    Data Review:     Radiology:   XR Results (most recent):    Results from Hospital Encounter encounter on 12/06/17   XR CHEST PA LAT   Narrative EXAM:  XR CHEST PA LAT    INDICATION: Two-week history of shortness of breath. Increased diuretic though  not getting any better. Has had increased leg swelling as well. COMPARISON: April 6, 2016. FINDINGS: PA and lateral radiographs of the chest demonstrate pulmonary venous  congestion with borderline interstitial pulmonary edema. There is no  consolidation, pneumothorax or pleural effusion. Cardiac size is mildly  enlarged. Atherosclerotic calcification of the thoracic aorta is again noted. No  mediastinal or hilar enlargement is shown. The bones are moderately osteopenic. Impression IMPRESSION: Probably venous congestion with borderline interstitial pulmonary  edema. Recent Labs      12/07/17   0324  12/07/17   0039  12/06/17   1655   TROIQ  <0.04  <0.04  <0.04     Recent Labs      12/07/17   0324  12/07/17   0039   NA  140  140   K  3.5  3.5   CL  101  101   CO2  31  30   BUN  16  17   CREA  0.90  0.95   GLU  140*  162*   CA  7.8*  8.1*     Recent Labs      12/07/17   0324  12/06/17   1655   WBC  5.8  5.9   HGB  8.6*  9.2*   HCT  26.6*  28.6*   PLT  232  259     Recent Labs      12/06/17   1655   SGOT  22   AP  158*     Recent Labs      12/07/17   0324   CHOL  102   LDLC  25.6     Recent Labs      12/07/17   0324   TSH  3.86*       Seth Kwon MD         [facility] [address] [place] [address] [address]     [facility]     [phone]    CC: Wendy Neal MD

## 2017-12-07 NOTE — INTERDISCIPLINARY ROUNDS
IDR/SLIDR Summary          Patient: Paolo Mendes MRN: [id number]    Age: 80 y.o. YOB: 1922 Room/Bed: [facility]   Admit Diagnosis: CHF (congestive heart failure) ([facility])  Principal Diagnosis: CHF (congestive heart failure) ([facility]   Goals: safety, consults  Readmission: NO  Quality Measure: CHF  VTE Prophylaxis: Mechanical  Influenza Vaccine screening completed? YES  Pneumococcal Vaccine screening completed? NO  Mobility needs: Yes   Nutrition plan:No  Consults: P. T and O.T. Financial concerns:No  Escalated to CM? NO  RRAT Score: 27   Interventions:  Testing due for pt today?  YES  LOS: 1 days Expected length of stay 2 days  Discharge plan: home when diuresed   PCP: Gabriel Higgins MD  Transportation needs: Yes    Days before discharge:one day until discharge   Discharge disposition: Home    Signed:     Ora Quinonez RN  12/7/2017  11:33 AM

## 2017-12-07 NOTE — PROGRESS NOTES
TRANSFER - OUT REPORT:    Verbal report given to Shreya(name) on Cristina Almendarez  being transferred to NSTU(unit) for routine progression of care       Report consisted of patients Situation, Background, Assessment and   Recommendations(SBAR). Information from the following report(s) SBAR, Kardex, ED Summary, Intake/Output, MAR, Recent Results, Med Rec Status and Cardiac Rhythm Afib was reviewed with the receiving nurse. Lines:   Peripheral IV 12/06/17 Right Forearm (Active)   Site Assessment Clean, dry, & intact 12/6/2017  8:36 PM   Phlebitis Assessment 0 12/6/2017  8:36 PM   Infiltration Assessment 0 12/6/2017  8:36 PM   Dressing Status Clean, dry, & intact 12/6/2017  8:36 PM   Dressing Type Transparent 12/6/2017  8:30 PM   Hub Color/Line Status Blue;Flushed;Capped 12/6/2017  8:30 PM   Action Taken Open ports on tubing capped 12/6/2017  8:30 PM   Alcohol Cap Used Yes 12/6/2017  8:30 PM        Opportunity for questions and clarification was provided.       Patient transported with:   Monitor  Registered Nurse  Tech

## 2017-12-07 NOTE — NURSE NAVIGATOR
Chart reviewed by Heart Failure Nurse Navigator. Heart Failure database completed. EF -Pending;  Previous EF 55-60% (echo dated 4/8/16). ACEi/ARB: not indicated. Listed as allergy to Losartan   BB: not indicated. CRT not indicated. NYHA Functional Class not assigned. Documentation requested NYHA Functional Class via Provider message on the [employer]  Heart Failure Teach Back in Patient Education. Heart Failure Avoiding Triggers on Discharge Instructions. Cardiology consulted- not yet done.   OPNN/PCP JRusincovitch/[place]allard notified of admission

## 2017-12-07 NOTE — PROGRESS NOTES
Primary Nurse Jensen Garay RN and Gerhard Reyes RN performed a dual skin assessment on this patient No impairment noted  Isaias score is 19

## 2017-12-08 LAB
ANION GAP SERPL CALC-SCNC: 7 MMOL/L (ref 5–15)
ATRIAL RATE: 277 BPM
BASOPHILS # BLD: 0 K/UL (ref 0–0.1)
BASOPHILS NFR BLD: 0 % (ref 0–1)
BUN SERPL-MCNC: 17 MG/DL (ref 6–20)
BUN/CREAT SERPL: 21 (ref 12–20)
CALCIUM SERPL-MCNC: 8.2 MG/DL (ref 8.5–10.1)
CALCULATED R AXIS, ECG10: 9 DEGREES
CALCULATED T AXIS, ECG11: 21 DEGREES
CHLORIDE SERPL-SCNC: 99 MMOL/L (ref 97–108)
CO2 SERPL-SCNC: 34 MMOL/L (ref 21–32)
CREAT SERPL-MCNC: 0.8 MG/DL (ref 0.55–1.02)
DIAGNOSIS, 93000: NORMAL
EOSINOPHIL # BLD: 0.2 K/UL (ref 0–0.4)
EOSINOPHIL NFR BLD: 3 % (ref 0–7)
ERYTHROCYTE [DISTWIDTH] IN BLOOD BY AUTOMATED COUNT: 15.8 % (ref 11.5–14.5)
GLUCOSE BLD STRIP.AUTO-MCNC: 113 MG/DL (ref 65–100)
GLUCOSE BLD STRIP.AUTO-MCNC: 146 MG/DL (ref 65–100)
GLUCOSE BLD STRIP.AUTO-MCNC: 166 MG/DL (ref 65–100)
GLUCOSE BLD STRIP.AUTO-MCNC: 93 MG/DL (ref 65–100)
GLUCOSE SERPL-MCNC: 78 MG/DL (ref 65–100)
HCT VFR BLD AUTO: 28.8 % (ref 35–47)
HEMOCCULT STL QL: NEGATIVE
HGB BLD-MCNC: 9.1 G/DL (ref 11.5–16)
LYMPHOCYTES # BLD: 1.7 K/UL (ref 0.8–3.5)
LYMPHOCYTES NFR BLD: 27 % (ref 12–49)
MAGNESIUM SERPL-MCNC: 2.1 MG/DL (ref 1.6–2.4)
MCH RBC QN AUTO: 28.4 PG (ref 26–34)
MCHC RBC AUTO-ENTMCNC: 31.6 G/DL (ref 30–36.5)
MCV RBC AUTO: 90 FL (ref 80–99)
MONOCYTES # BLD: 0.6 K/UL (ref 0–1)
MONOCYTES NFR BLD: 10 % (ref 5–13)
NEUTS SEG # BLD: 3.7 K/UL (ref 1.8–8)
NEUTS SEG NFR BLD: 60 % (ref 32–75)
PLATELET # BLD AUTO: 286 K/UL (ref 150–400)
POTASSIUM SERPL-SCNC: 3.6 MMOL/L (ref 3.5–5.1)
Q-T INTERVAL, ECG07: 438 MS
QRS DURATION, ECG06: 76 MS
QTC CALCULATION (BEZET), ECG08: 479 MS
RBC # BLD AUTO: 3.2 M/UL (ref 3.8–5.2)
SERVICE CMNT-IMP: ABNORMAL
SERVICE CMNT-IMP: NORMAL
SODIUM SERPL-SCNC: 140 MMOL/L (ref 136–145)
VENTRICULAR RATE, ECG03: 72 BPM
WBC # BLD AUTO: 6.2 K/UL (ref 3.6–11)

## 2017-12-08 PROCEDURE — 74011250637 HC RX REV CODE- 250/637: Performed by: FAMILY MEDICINE

## 2017-12-08 PROCEDURE — 82272 OCCULT BLD FECES 1-3 TESTS: CPT | Performed by: HOSPITALIST

## 2017-12-08 PROCEDURE — 74011636637 HC RX REV CODE- 636/637: Performed by: FAMILY MEDICINE

## 2017-12-08 PROCEDURE — 74011250636 HC RX REV CODE- 250/636: Performed by: FAMILY MEDICINE

## 2017-12-08 PROCEDURE — 82962 GLUCOSE BLOOD TEST: CPT

## 2017-12-08 PROCEDURE — 80048 BASIC METABOLIC PNL TOTAL CA: CPT | Performed by: HOSPITALIST

## 2017-12-08 PROCEDURE — 36415 COLL VENOUS BLD VENIPUNCTURE: CPT | Performed by: HOSPITALIST

## 2017-12-08 PROCEDURE — 83735 ASSAY OF MAGNESIUM: CPT | Performed by: HOSPITALIST

## 2017-12-08 PROCEDURE — 77010033678 HC OXYGEN DAILY

## 2017-12-08 PROCEDURE — 65660000000 HC RM CCU STEPDOWN

## 2017-12-08 PROCEDURE — 74011250637 HC RX REV CODE- 250/637: Performed by: HOSPITALIST

## 2017-12-08 PROCEDURE — 85025 COMPLETE CBC W/AUTO DIFF WBC: CPT | Performed by: HOSPITALIST

## 2017-12-08 PROCEDURE — 74011250637 HC RX REV CODE- 250/637: Performed by: INTERNAL MEDICINE

## 2017-12-08 RX ORDER — RANITIDINE 150 MG/1
150 TABLET, FILM COATED ORAL DAILY
Status: DISCONTINUED | OUTPATIENT
Start: 2017-12-09 | End: 2017-12-11 | Stop reason: HOSPADM

## 2017-12-08 RX ORDER — CARVEDILOL 6.25 MG/1
6.25 TABLET ORAL 2 TIMES DAILY WITH MEALS
Status: DISCONTINUED | OUTPATIENT
Start: 2017-12-08 | End: 2017-12-11 | Stop reason: HOSPADM

## 2017-12-08 RX ADMIN — INSULIN LISPRO 2 UNITS: 100 INJECTION, SOLUTION INTRAVENOUS; SUBCUTANEOUS at 01:03

## 2017-12-08 RX ADMIN — FERROUS SULFATE TAB 325 MG (65 MG ELEMENTAL FE) 325 MG: 325 (65 FE) TAB at 09:08

## 2017-12-08 RX ADMIN — CARVEDILOL 6.25 MG: 6.25 TABLET, FILM COATED ORAL at 09:08

## 2017-12-08 RX ADMIN — CARVEDILOL 6.25 MG: 6.25 TABLET, FILM COATED ORAL at 18:17

## 2017-12-08 RX ADMIN — Medication 10 ML: at 05:50

## 2017-12-08 RX ADMIN — INSULIN LISPRO 2 UNITS: 100 INJECTION, SOLUTION INTRAVENOUS; SUBCUTANEOUS at 18:16

## 2017-12-08 RX ADMIN — MAGNESIUM HYDROXIDE 10 ML: 400 SUSPENSION ORAL at 18:40

## 2017-12-08 RX ADMIN — FUROSEMIDE 40 MG: 10 INJECTION, SOLUTION INTRAMUSCULAR; INTRAVENOUS at 21:35

## 2017-12-08 RX ADMIN — ASPIRIN 81 MG: 81 TABLET, COATED ORAL at 09:08

## 2017-12-08 RX ADMIN — Medication 10 ML: at 21:41

## 2017-12-08 RX ADMIN — GLIMEPIRIDE 1 MG: 1 TABLET ORAL at 07:57

## 2017-12-08 RX ADMIN — Medication 10 ML: at 13:08

## 2017-12-08 RX ADMIN — FUROSEMIDE 40 MG: 10 INJECTION, SOLUTION INTRAMUSCULAR; INTRAVENOUS at 09:08

## 2017-12-08 NOTE — PROGRESS NOTES
0000 Bedside and Verbal shift change report given to Mulugeta Farmer (oncoming nurse) by Charmaine Manning (offgoing nurse). Report included the following information SBAR, MAR and Recent Results.

## 2017-12-08 NOTE — PROGRESS NOTES
Bedside and Verbal shift change report given to Faye Yañez RN (oncoming nurse) by Dvaid Shaikh RN (offgoing nurse). Report included the following information SBAR, Kardex, MAR, Recent Results and Cardiac Rhythm Afib.

## 2017-12-08 NOTE — PROGRESS NOTES
Problem: Falls - Risk of  Goal: *Absence of Falls  Document Polo Fall Risk and appropriate interventions in the flowsheet.    Outcome: Progressing Towards Goal  Fall Risk Interventions:  Mobility Interventions: PT Consult for assist device competence, PT Consult for mobility concerns, Patient to call before getting OOB, OT consult for ADLs, Bed/chair exit alarm, Communicate number of staff needed for ambulation/transfer         Medication Interventions: Teach patient to arise slowly, Patient to call before getting OOB, Bed/chair exit alarm, Evaluate medications/consider consulting pharmacy    Elimination Interventions: Bed/chair exit alarm, Call light in reach, Patient to call for help with toileting needs, Toileting schedule/hourly rounds

## 2017-12-08 NOTE — PROGRESS NOTES
Cardiology Progress Note            Admit Date: 12/6/2017  Admit Diagnosis: CHF (congestive heart failure) ([address]  Date: 12/8/2017     Time: 10:38 AM    Subjective:  Denies CP. SOB some better. Admits to not keeping up with her diuretic. Assessment and Plan     1. SOB - improved                        - Scant, rare rales in bases                        - proBNP 9000                        - Echo EF 60-65%. [address]. Moderate wall thickness. Mod dilated LA/RA, mild to mod MR and TR. Mod PHTN                        - Lasix 40 mg IV BID  2. DM                        - per Primary team  3. Chronic AF                        - ASA                        - No rate controllers  4. Chronic leg edema                        - Lasix as above             - Agreeable to compression stockings  5. HTN                        - Norvasc 5 mg  6. Body mass index is 39.1 kg/(m^2). - Morbidly obese - she has dietary indiscretions and does not exercise d/t back pain. She admits to not using her diuretics. Continue Lasix and follow up BMP. Agreeable to compression stockings, will order. PT/OT ordered. Needs no further cardiac testing at this time. Cardiology attending: seen and examined. Agree with assess and plan  Talked at length about need to take diuretics daily and keep ahead of edema. ROS:  A comprehensive review of systems was negative except for that written in the HPI. Objective:      Physical Exam:                Visit Vitals    /57 (BP 1 Location: Left arm, BP Patient Position: At rest)    Pulse 72    Temp 98.2 °F (36.8 °C)    Resp 14    Ht 4' 11\" (1.499 m)    Wt 193 lb 9 oz (87.8 kg)    SpO2 100%    BMI 39.1 kg/m2        General Appearance:   Well developed, well nourished,alert and oriented x 3, and   individual in no acute distress.    Ears/Nose/Mouth/Throat:    Hearing grossly normal.         Neck: Supple. Chest:    Lungs clear to auscultation bilaterally. Cardiovascular:   Regular rate and rhythm, S1, S2 normal, no murmur. Abdomen:    Soft, non-tender, bowel sounds are active. Extremities:  2+ edema bilaterally. Skin:  Warm and dry. Telemetry: normal sinus rhythm          Data Review:    Labs:    Recent Results (from the past 24 hour(s))   GLUCOSE, POC    Collection Time: 12/07/17 11:45 AM   Result Value Ref Range    Glucose (POC) 196 (H) 65 - 100 mg/dL    Performed by Arlin Hernandez    GLUCOSE, POC    Collection Time: 12/07/17  5:57 PM   Result Value Ref Range    Glucose (POC) 175 (H) 65 - 100 mg/dL    Performed by Bruno Tamayo    GLUCOSE, POC    Collection Time: 12/07/17 11:24 PM   Result Value Ref Range    Glucose (POC) 156 (H) 65 - 100 mg/dL    Performed by [address] [place] [address] Street, POC    Collection Time: 12/08/17 12:57 AM   Result Value Ref Range    Glucose (POC) 146 (H) 65 - 100 mg/dL    Performed by Perfecto Worthy    CBC WITH AUTOMATED DIFF    Collection Time: 12/08/17  3:58 AM   Result Value Ref Range    WBC 6.2 3.6 - 11.0 K/uL    RBC 3.20 (L) 3.80 - 5.20 M/uL    HGB 9.1 (L) 11.5 - 16.0 g/dL    HCT 28.8 (L) 35.0 - 47.0 %    MCV 90.0 80.0 - 99.0 FL    MCH 28.4 26.0 - 34.0 PG    MCHC 31.6 30.0 - 36.5 g/dL    RDW 15.8 (H) 11.5 - 14.5 %    PLATELET 524 422 - 620 K/uL    NEUTROPHILS 60 32 - 75 %    LYMPHOCYTES 27 12 - 49 %    MONOCYTES 10 5 - 13 %    EOSINOPHILS 3 0 - 7 %    BASOPHILS 0 0 - 1 %    ABS. NEUTROPHILS 3.7 1.8 - 8.0 K/UL    ABS. LYMPHOCYTES 1.7 0.8 - 3.5 K/UL    ABS. MONOCYTES 0.6 0.0 - 1.0 K/UL    ABS. EOSINOPHILS 0.2 0.0 - 0.4 K/UL    ABS.  BASOPHILS 0.0 0.0 - 0.1 K/UL   METABOLIC PANEL, BASIC    Collection Time: 12/08/17  3:58 AM   Result Value Ref Range    Sodium 140 136 - 145 mmol/L    Potassium 3.6 3.5 - 5.1 mmol/L    Chloride 99 97 - 108 mmol/L    CO2 34 (H) 21 - 32 mmol/L    Anion gap 7 5 - 15 mmol/L    Glucose 78 65 - 100 mg/dL    BUN 17 6 - 20 MG/DL    Creatinine 0. 80 0.55 - 1.02 MG/DL    BUN/Creatinine ratio 21 (H) 12 - 20      GFR est AA >60 >60 ml/min/1.73m2    GFR est non-AA >60 >60 ml/min/1.73m2    Calcium 8.2 (L) 8.5 - 10.1 MG/DL   MAGNESIUM    Collection Time: 12/08/17  3:58 AM   Result Value Ref Range    Magnesium 2.1 1.6 - 2.4 mg/dL   GLUCOSE, POC    Collection Time: 12/08/17  5:48 AM   Result Value Ref Range    Glucose (POC) 93 65 - 100 mg/dL    Performed by Guss Severin           Radiology:        Current Facility-Administered Medications   Medication Dose Route Frequency    carvedilol (COREG) tablet 6.25 mg  6.25 mg Oral BID WITH MEALS    ferrous sulfate tablet 325 mg  1 Tab Oral DAILY WITH BREAKFAST    albuterol-ipratropium (DUO-NEB) 2.5 MG-0.5 MG/3 ML  3 mL Nebulization Q4H PRN    aspirin delayed-release tablet 81 mg  81 mg Oral DAILY    glimepiride (AMARYL) tablet 1 mg  1 mg Oral ACB    linaclotide (LINZESS) capsule 145 mcg (Patient Supplied)  145 mcg Oral ACB    sodium chloride (NS) flush 5-10 mL  5-10 mL IntraVENous Q8H    sodium chloride (NS) flush 5-10 mL  5-10 mL IntraVENous PRN    furosemide (LASIX) injection 40 mg  40 mg IntraVENous Q12H    glucose chewable tablet 16 g  4 Tab Oral PRN    dextrose (D50W) injection syrg 12.5-25 g  12.5-25 g IntraVENous PRN    glucagon (GLUCAGEN) injection 1 mg  1 mg IntraMUSCular PRN    insulin lispro (HUMALOG) injection   SubCUTAneous Q6H    magnesium hydroxide (MILK OF MAGNESIA) 400 mg/5 mL oral suspension 10 mL  10 mL Oral QPM   Yang Wade MD         [facility] [address] [facility] [address]   Lawrence Ferrer   [phone]

## 2017-12-08 NOTE — NURSE NAVIGATOR
Follow up scheduled with Dr. Steffany Lopez for 12/14/17 at 3 pm.  Information on After Visit Summary.

## 2017-12-08 NOTE — PROGRESS NOTES
Spiritual Care Partner Volunteer visited patient in room 674 on 12.08.17. Documented by: : Rev. Harmeet Andrade.  Adams Thorne; [facility], to contact [phone] [address] call: [phone]

## 2017-12-08 NOTE — PROGRESS NOTES
Hospitalist Progress Note  Maxine Méndez MD  Answering service: [phone] OR [phone] from in house phone  Cell: [phone]      Date of Service:  2017  NAME:  Zaid Iglesias  :  1922  MRN:  [id number]      Admission Summary:     Patient is a 80year old  female with past medical history of volume overload, lower extremity edema, who presented to the ER with shortness of breath. Interval history / Subjective:       No acute overnight events. She said her lips feels sore. Overall feels better. SOB is better. She tells me she was wheezing earlier but not anymore. No fever or chills. She lives in an apartment alone. Assessment & Plan:     Concern for Acute on chronic diastolic heart failure: Preserved EF; NYHA class 1/2  She has not been complaint with her lasix  CXR shows probably venous congestion with borderline interstitial pulmonary edema. Pro BNP 3786  Echo: EF 60-65% with no obvious wall motion abnormalities. LA/RA- Mod dilation  Continue aspirin, coreg, lasix. Cardiology eval appreciated. Daily weights    SOB: probably due to above in the setting of anemia. Resolved  Oxygen sat stable  Resolved. DM type 2; BG is stable  Continue ISS and amaryl     Iron deficiency Anemia: Iron panel consistent with iron def anemia. Continue ferrous sulfate  H/H stable    Bilateral lower extremity edema: Continue lasix  Compression stockings    Consult PT/OT for evaluation and then dispo    Code status: Full  DVT prophylaxis:     Care Plan discussed with: Patient/Family and Nurse  Disposition: TBD     Hospital Problems  Date Reviewed: 2017          Codes Class Noted POA    * (Principal)CHF (congestive heart failure) ([facility] ICD-10-CM: I50.9  ICD-9-CM: 428.0  2017 Unknown                Review of Systems:   Pertinent items are noted in HPI. Vital Signs:    Last 24hrs VS reviewed since prior progress note.  Most recent are:  Visit Vitals    /52 (BP 1 Location: Left arm, BP Patient Position: At rest)    Pulse 76    Temp 98.3 °F (36.8 °C)    Resp 24    Ht 4' 11\" (1.499 m)    Wt 87.8 kg (193 lb 9 oz)    SpO2 96%    BMI 39.1 kg/m2         Intake/Output Summary (Last 24 hours) at 12/08/17 1646  Last data filed at 12/08/17 0330   Gross per 24 hour   Intake                0 ml   Output             2950 ml   Net            -2950 ml        Physical Examination:             Constitutional:  No acute distress, cooperative, pleasant    ENT:  Oral mucous moist, oropharynx benign. Neck supple,    Resp:  CTA bilaterally. No wheezing/rhonchi/rales. No accessory muscle use   CV:  Regular rhythm, normal rate, no murmurs, gallops, rubs    GI:  Soft, non distended, non tender. normoactive bowel sounds, no hepatosplenomegaly     Musculoskeletal:  No edema, warm, 2+ pulses throughout    Neurologic:  Moves all extremities. AAOx3, CN II-XII reviewed    Ext: +2-3 pitting pedal edema on compression stockings       Data Review:          Labs:     Recent Labs      12/08/17   0358  12/07/17   0324   WBC  6.2  5.8   HGB  9.1*  8.6*   HCT  28.8*  26.6*   PLT  286  232     Recent Labs      12/08/17   0358  12/07/17   0324  12/07/17   0039  12/06/17   1655   NA  140  140  140  137   K  3.6  3.5  3.5  4.4   CL  99  101  101  101   CO2  34*  31  30  30   BUN  17  16  17  17   CREA  0.80  0.90  0.95  1.16*   GLU  78  140*  162*  139*   CA  8.2*  7.8*  8.1*  8.0*   MG  2.1   --    --   2.2     Recent Labs      12/06/17   1655   SGOT  22   ALT  27   AP  158*   TBILI  0.4   TP  7.2   ALB  3.1*   GLOB  4.1*     No results for input(s): INR, PTP, APTT in the last 72 hours. No lab exists for component: INREXT, INREXT   Recent Labs      12/06/17   1655   TIBC  281   PSAT  11*      No results found for: FOL, RBCF   No results for input(s): PH, PCO2, PO2 in the last 72 hours.   Recent Labs      12/07/17   0324  12/07/17   0039  12/06/17   1655   CHEY <0.04  <0.04  <0.04     Lab Results   Component Value Date/Time    Cholesterol, total 102 12/07/2017 03:24 AM    HDL Cholesterol 66 12/07/2017 03:24 AM    LDL, calculated 25.6 12/07/2017 03:24 AM    Triglyceride 52 12/07/2017 03:24 AM    CHOL/HDL Ratio 1.5 12/07/2017 03:24 AM     Lab Results   Component Value Date/Time    Glucose (POC) 113 12/08/2017 11:47 AM    Glucose (POC) 93 12/08/2017 05:48 AM    Glucose (POC) 146 12/08/2017 12:57 AM    Glucose (POC) 156 12/07/2017 11:24 PM    Glucose (POC) 175 12/07/2017 05:57 PM     Lab Results   Component Value Date/Time    Color YELLOW/STRAW 12/06/2017 04:56 PM    Appearance CLEAR 12/06/2017 04:56 PM    Specific gravity 1.006 12/06/2017 04:56 PM    pH (UA) 7.5 12/06/2017 04:56 PM    Protein NEGATIVE  12/06/2017 04:56 PM    Glucose NEGATIVE  12/06/2017 04:56 PM    Ketone NEGATIVE  12/06/2017 04:56 PM    Bilirubin NEGATIVE  12/06/2017 04:56 PM    Urobilinogen 0.2 12/06/2017 04:56 PM    Nitrites NEGATIVE  12/06/2017 04:56 PM    Leukocyte Esterase NEGATIVE  12/06/2017 04:56 PM    Epithelial cells FEW 12/06/2017 04:56 PM    Bacteria NEGATIVE  12/06/2017 04:56 PM    WBC 0-4 12/06/2017 04:56 PM    RBC 0-5 12/06/2017 04:56 PM         Medications Reviewed:     Current Facility-Administered Medications   Medication Dose Route Frequency    carvedilol (COREG) tablet 6.25 mg  6.25 mg Oral BID WITH MEALS    ferrous sulfate tablet 325 mg  1 Tab Oral DAILY WITH BREAKFAST    albuterol-ipratropium (DUO-NEB) 2.5 MG-0.5 MG/3 ML  3 mL Nebulization Q4H PRN    aspirin delayed-release tablet 81 mg  81 mg Oral DAILY    glimepiride (AMARYL) tablet 1 mg  1 mg Oral ACB    linaclotide (LINZESS) capsule 145 mcg (Patient Supplied)  145 mcg Oral ACB    sodium chloride (NS) flush 5-10 mL  5-10 mL IntraVENous Q8H    sodium chloride (NS) flush 5-10 mL  5-10 mL IntraVENous PRN    furosemide (LASIX) injection 40 mg  40 mg IntraVENous Q12H    glucose chewable tablet 16 g  4 Tab Oral PRN    dextrose (D50W) injection syrg 12.5-25 g  12.5-25 g IntraVENous PRN    glucagon (GLUCAGEN) injection 1 mg  1 mg IntraMUSCular PRN    insulin lispro (HUMALOG) injection   SubCUTAneous Q6H    magnesium hydroxide (MILK OF MAGNESIA) 400 mg/5 mL oral suspension 10 mL  10 mL Oral QPM     ______________________________________________________________________  EXPECTED LENGTH OF STAY: 2d 12h  ACTUAL LENGTH OF STAY:          2                 Thaddeus Mccullough MD

## 2017-12-08 NOTE — PROGRESS NOTES
Problem: Falls - Risk of  Goal: *Absence of Falls  Document Polo Fall Risk and appropriate interventions in the flowsheet.    Outcome: Progressing Towards Goal  Fall Risk Interventions:  Mobility Interventions: Bed/chair exit alarm, Assess mobility with egress test, Communicate number of staff needed for ambulation/transfer, OT consult for ADLs, Patient to call before getting OOB, PT Consult for mobility concerns, PT Consult for assist device competence, Strengthening exercises (ROM-active/passive), Utilize walker, cane, or other assitive device         Medication Interventions: Assess postural VS orthostatic hypotension, Bed/chair exit alarm, Evaluate medications/consider consulting pharmacy, Patient to call before getting OOB, Teach patient to arise slowly, Utilize gait belt for transfers/ambulation    Elimination Interventions: Bed/chair exit alarm, Call light in reach, Patient to call for help with toileting needs, Toilet paper/wipes in reach

## 2017-12-08 NOTE — PROGRESS NOTES
Problem: Falls - Risk of  Goal: *Absence of Falls  Document Polo Fall Risk and appropriate interventions in the flowsheet.    Outcome: Progressing Towards Goal  Fall Risk Interventions:  Mobility Interventions: Bed/chair exit alarm, Communicate number of staff needed for ambulation/transfer, Patient to call before getting OOB, PT Consult for mobility concerns, PT Consult for assist device competence, Utilize walker, cane, or other assitive device         Medication Interventions: Patient to call before getting OOB, Evaluate medications/consider consulting pharmacy, Bed/chair exit alarm    Elimination Interventions: Bed/chair exit alarm, Call light in reach, Patient to call for help with toileting needs, Toileting schedule/hourly rounds

## 2017-12-09 LAB
ANION GAP SERPL CALC-SCNC: 7 MMOL/L (ref 5–15)
BUN SERPL-MCNC: 27 MG/DL (ref 6–20)
BUN/CREAT SERPL: 26 (ref 12–20)
CALCIUM SERPL-MCNC: 8.1 MG/DL (ref 8.5–10.1)
CHLORIDE SERPL-SCNC: 95 MMOL/L (ref 97–108)
CO2 SERPL-SCNC: 34 MMOL/L (ref 21–32)
CREAT SERPL-MCNC: 1.03 MG/DL (ref 0.55–1.02)
GLUCOSE BLD STRIP.AUTO-MCNC: 123 MG/DL (ref 65–100)
GLUCOSE BLD STRIP.AUTO-MCNC: 180 MG/DL (ref 65–100)
GLUCOSE BLD STRIP.AUTO-MCNC: 241 MG/DL (ref 65–100)
GLUCOSE BLD STRIP.AUTO-MCNC: 95 MG/DL (ref 65–100)
GLUCOSE BLD STRIP.AUTO-MCNC: 95 MG/DL (ref 65–100)
GLUCOSE SERPL-MCNC: 92 MG/DL (ref 65–100)
POTASSIUM SERPL-SCNC: 3.4 MMOL/L (ref 3.5–5.1)
SERVICE CMNT-IMP: ABNORMAL
SERVICE CMNT-IMP: NORMAL
SERVICE CMNT-IMP: NORMAL
SODIUM SERPL-SCNC: 136 MMOL/L (ref 136–145)

## 2017-12-09 PROCEDURE — 82962 GLUCOSE BLOOD TEST: CPT

## 2017-12-09 PROCEDURE — 65660000000 HC RM CCU STEPDOWN

## 2017-12-09 PROCEDURE — 74011250637 HC RX REV CODE- 250/637: Performed by: INTERNAL MEDICINE

## 2017-12-09 PROCEDURE — 74011636637 HC RX REV CODE- 636/637: Performed by: FAMILY MEDICINE

## 2017-12-09 PROCEDURE — 74011250637 HC RX REV CODE- 250/637: Performed by: HOSPITALIST

## 2017-12-09 PROCEDURE — 80048 BASIC METABOLIC PNL TOTAL CA: CPT | Performed by: HOSPITALIST

## 2017-12-09 PROCEDURE — 36415 COLL VENOUS BLD VENIPUNCTURE: CPT | Performed by: HOSPITALIST

## 2017-12-09 PROCEDURE — 74011250637 HC RX REV CODE- 250/637: Performed by: FAMILY MEDICINE

## 2017-12-09 RX ORDER — POTASSIUM CHLORIDE 750 MG/1
40 TABLET, FILM COATED, EXTENDED RELEASE ORAL DAILY
Status: DISCONTINUED | OUTPATIENT
Start: 2017-12-10 | End: 2017-12-11

## 2017-12-09 RX ORDER — FUROSEMIDE 10 MG/ML
40 INJECTION INTRAMUSCULAR; INTRAVENOUS DAILY
Status: DISCONTINUED | OUTPATIENT
Start: 2017-12-10 | End: 2017-12-10

## 2017-12-09 RX ORDER — FUROSEMIDE 40 MG/1
40 TABLET ORAL DAILY
Status: DISCONTINUED | OUTPATIENT
Start: 2017-12-09 | End: 2017-12-09

## 2017-12-09 RX ADMIN — RANITIDINE 150 MG: 150 TABLET, FILM COATED ORAL at 09:34

## 2017-12-09 RX ADMIN — CARVEDILOL 6.25 MG: 6.25 TABLET, FILM COATED ORAL at 17:44

## 2017-12-09 RX ADMIN — FUROSEMIDE 40 MG: 40 TABLET ORAL at 09:34

## 2017-12-09 RX ADMIN — INSULIN LISPRO 3 UNITS: 100 INJECTION, SOLUTION INTRAVENOUS; SUBCUTANEOUS at 23:45

## 2017-12-09 RX ADMIN — CARVEDILOL 6.25 MG: 6.25 TABLET, FILM COATED ORAL at 09:28

## 2017-12-09 RX ADMIN — GLIMEPIRIDE 1 MG: 1 TABLET ORAL at 07:20

## 2017-12-09 RX ADMIN — Medication 10 ML: at 07:20

## 2017-12-09 RX ADMIN — MAGNESIUM HYDROXIDE 10 ML: 400 SUSPENSION ORAL at 17:43

## 2017-12-09 RX ADMIN — Medication 10 ML: at 22:00

## 2017-12-09 RX ADMIN — FERROUS SULFATE TAB 325 MG (65 MG ELEMENTAL FE) 325 MG: 325 (65 FE) TAB at 09:27

## 2017-12-09 RX ADMIN — INSULIN LISPRO 2 UNITS: 100 INJECTION, SOLUTION INTRAVENOUS; SUBCUTANEOUS at 00:16

## 2017-12-09 RX ADMIN — Medication 10 ML: at 14:53

## 2017-12-09 NOTE — PROGRESS NOTES
Problem: Falls - Risk of  Goal: *Absence of Falls  Document Polo Fall Risk and appropriate interventions in the flowsheet.    Outcome: Progressing Towards Goal  Fall Risk Interventions:  Mobility Interventions: Bed/chair exit alarm, Communicate number of staff needed for ambulation/transfer, OT consult for ADLs, Patient to call before getting OOB, PT Consult for mobility concerns, PT Consult for assist device competence, Strengthening exercises (ROM-active/passive), Utilize walker, cane, or other assitive device, Utilize gait belt for transfers/ambulation         Medication Interventions: Bed/chair exit alarm, Evaluate medications/consider consulting pharmacy, Patient to call before getting OOB, Teach patient to arise slowly, Utilize gait belt for transfers/ambulation    Elimination Interventions: Bed/chair exit alarm, Call light in reach, Patient to call for help with toileting needs, Toileting schedule/hourly rounds, Toilet paper/wipes in reach

## 2017-12-09 NOTE — PROGRESS NOTES
Hospitalist Progress Note  Maryana Henderson MD  Answering service: [phone] OR [phone] from in house phone  Cell: [phone]      Date of Service:  2017  NAME:  Randolph Adkins  :  1922  MRN:  [id number]      Admission Summary:     Patient is a 80year old  female with past medical history of volume overload, lower extremity edema, who presented to the ER with shortness of breath. Interval history / Subjective:       No acute overnight events. She states that she is still feeling short of breath. Her lower extremity edema is resolving. She will like to walk. No fever or chills. No N/V or abdominal pain     Assessment & Plan:     Concern for Acute on chronic diastolic heart failure: Preserved EF; NYHA class 1/2  She has not been complaint with her lasix  CXR shows probably venous congestion with borderline interstitial pulmonary edema. Pro BNP 3786  Echo: EF 60-65% with no obvious wall motion abnormalities. LA/RA- Mod dilation  Continue aspirin, coreg, lasix. Cardiology eval appreciated. Daily weights- weight is stable  Reduce lasix to once daily due to rising creatinine. SOB: probably due to above in the setting of anemia. Resolved  Oxygen sat stable  Resolving    DM type 2; BG is stable  Continue ISS and amaryl     Iron deficiency Anemia: Iron panel consistent with iron def anemia. Continue ferrous sulfate  H/H stable    Bilateral lower extremity edema: Continue lasix  Compression stockings    Hypokalemia: start kcl 40 mg po daily.  Keep k > 4   monitor    PT/OT consult    Code status: Full  DVT prophylaxis:     Care Plan discussed with: Patient/Family and Nurse  Disposition: TBD     Hospital Problems  Date Reviewed: 2017          Codes Class Noted POA    * (Principal)CHF (congestive heart failure) ([facility] ICD-10-CM: I50.9  ICD-9-CM: 428.0  2017 Unknown                Review of Systems:   Pertinent items are noted in HPI. Vital Signs:    Last 24hrs VS reviewed since prior progress note. Most recent are:  Visit Vitals    BP (!) 110/38 (BP 1 Location: Left arm)    Pulse 64    Temp 98.1 °F (36.7 °C)    Resp 15    Ht 4' 11\" (1.499 m)    Wt 86.6 kg (190 lb 14.4 oz)    SpO2 96%    BMI 38.56 kg/m2         Intake/Output Summary (Last 24 hours) at 12/09/17 1417  Last data filed at 12/08/17 2100   Gross per 24 hour   Intake              120 ml   Output              650 ml   Net             -530 ml        Physical Examination:             Constitutional:  No acute distress, cooperative, pleasant    ENT:  Oral mucous moist, oropharynx benign. Neck supple,    Resp:  CTA bilaterally. Fine posterior rales. No accessory muscle use   CV:  Regular rhythm, normal rate, no murmurs, gallops, rubs    GI:  Soft, non distended, non tender. normoactive bowel sounds, no hepatosplenomegaly     Musculoskeletal:  No edema, warm, 2+ pulses throughout    Neurologic:  Moves all extremities. AAOx3, CN II-XII reviewed    Ext: +2 pitting pedal edema on compression stockings       Data Review:          Labs:     Recent Labs      12/08/17   0358  12/07/17   0324   WBC  6.2  5.8   HGB  9.1*  8.6*   HCT  28.8*  26.6*   PLT  286  232     Recent Labs      12/09/17   0250  12/08/17   0358  12/07/17   0324   12/06/17   1655   NA  136  140  140   < >  137   K  3.4*  3.6  3.5   < >  4.4   CL  95*  99  101   < >  101   CO2  34*  34*  31   < >  30   BUN  27*  17  16   < >  17   CREA  1.03*  0.80  0.90   < >  1.16*   GLU  92  78  140*   < >  139*   CA  8.1*  8.2*  7.8*   < >  8.0*   MG   --   2.1   --    --   2.2    < > = values in this interval not displayed. Recent Labs      12/06/17   1655   SGOT  22   ALT  27   AP  158*   TBILI  0.4   TP  7.2   ALB  3.1*   GLOB  4.1*     No results for input(s): INR, PTP, APTT in the last 72 hours.     No lab exists for component: INREXT, INREXT   Recent Labs      12/06/17   1655   TIBC  281   PSAT  11*      No results found for: FOL, RBCF   No results for input(s): PH, PCO2, PO2 in the last 72 hours.   Recent Labs      12/07/17   0324  12/07/17   0039  12/06/17   1655   TROIQ  <0.04  <0.04  <0.04     Lab Results   Component Value Date/Time    Cholesterol, total 102 12/07/2017 03:24 AM    HDL Cholesterol 66 12/07/2017 03:24 AM    LDL, calculated 25.6 12/07/2017 03:24 AM    Triglyceride 52 12/07/2017 03:24 AM    CHOL/HDL Ratio 1.5 12/07/2017 03:24 AM     Lab Results   Component Value Date/Time    Glucose (POC) 123 12/09/2017 12:15 PM    Glucose (POC) 95 12/09/2017 06:04 AM    Glucose (POC) 180 12/09/2017 12:00 AM    Glucose (POC) 166 12/08/2017 06:07 PM    Glucose (POC) 113 12/08/2017 11:47 AM     Lab Results   Component Value Date/Time    Color YELLOW/STRAW 12/06/2017 04:56 PM    Appearance CLEAR 12/06/2017 04:56 PM    Specific gravity 1.006 12/06/2017 04:56 PM    pH (UA) 7.5 12/06/2017 04:56 PM    Protein NEGATIVE  12/06/2017 04:56 PM    Glucose NEGATIVE  12/06/2017 04:56 PM    Ketone NEGATIVE  12/06/2017 04:56 PM    Bilirubin NEGATIVE  12/06/2017 04:56 PM    Urobilinogen 0.2 12/06/2017 04:56 PM    Nitrites NEGATIVE  12/06/2017 04:56 PM    Leukocyte Esterase NEGATIVE  12/06/2017 04:56 PM    Epithelial cells FEW 12/06/2017 04:56 PM    Bacteria NEGATIVE  12/06/2017 04:56 PM    WBC 0-4 12/06/2017 04:56 PM    RBC 0-5 12/06/2017 04:56 PM         Medications Reviewed:     Current Facility-Administered Medications   Medication Dose Route Frequency    furosemide (LASIX) tablet 40 mg  40 mg Oral DAILY    carvedilol (COREG) tablet 6.25 mg  6.25 mg Oral BID WITH MEALS    vit C,T-Ef-dtals-lutein-zeaxan [id number] mg-unit-mg-mg cap (PRESERVISION 2) 1 cap (Patient Supplied)  1 Tab Oral BID    raNITIdine (ZANTAC) tablet 150 mg (Patient Supplied)  150 mg Oral DAILY    ferrous sulfate tablet 325 mg  1 Tab Oral DAILY WITH BREAKFAST    albuterol-ipratropium (DUO-NEB) 2.5 MG-0.5 MG/3 ML  3 mL Nebulization Q4H PRN    aspirin delayed-release tablet 81 mg  81 mg Oral DAILY    glimepiride (AMARYL) tablet 1 mg  1 mg Oral ACB    linaclotide (LINZESS) capsule 145 mcg (Patient Supplied)  145 mcg Oral ACB    sodium chloride (NS) flush 5-10 mL  5-10 mL IntraVENous Q8H    sodium chloride (NS) flush 5-10 mL  5-10 mL IntraVENous PRN    glucose chewable tablet 16 g  4 Tab Oral PRN    dextrose (D50W) injection syrg 12.5-25 g  12.5-25 g IntraVENous PRN    glucagon (GLUCAGEN) injection 1 mg  1 mg IntraMUSCular PRN    insulin lispro (HUMALOG) injection   SubCUTAneous Q6H    magnesium hydroxide (MILK OF MAGNESIA) 400 mg/5 mL oral suspension 10 mL  10 mL Oral QPM     ______________________________________________________________________  EXPECTED LENGTH OF STAY: 2d 12h  ACTUAL LENGTH OF STAY:          3                 Adenike Snow MD

## 2017-12-09 NOTE — PROGRESS NOTES
Problem: Falls - Risk of  Goal: *Absence of Falls  Document Polo Fall Risk and appropriate interventions in the flowsheet.    Outcome: Progressing Towards Goal  Fall Risk Interventions:  Mobility Interventions: Communicate number of staff needed for ambulation/transfer, OT consult for ADLs, Patient to call before getting OOB, PT Consult for mobility concerns, PT Consult for assist device competence, Strengthening exercises (ROM-active/passive)         Medication Interventions: Evaluate medications/consider consulting pharmacy, Patient to call before getting OOB, Teach patient to arise slowly    Elimination Interventions: Call light in reach, Patient to call for help with toileting needs, Toileting schedule/hourly rounds, Elevated toilet seat

## 2017-12-09 NOTE — PROGRESS NOTES
Bedside and Verbal shift change report given to Rosita Patten RN (oncoming nurse) by Nadia Hernandez RN (offgoing nurse). Report included the following information SBAR, Kardex, MAR, Recent Results and Cardiac Rhythm Afib.

## 2017-12-09 NOTE — PROGRESS NOTES
Cardiology Progress Note            Admit Date: 12/6/2017  Admit Diagnosis: CHF (congestive heart failure) ([address]  Date: 12/9/2017     Time: 10:38 AM    Subjective:  Denies CP. Breathing improved but still short of breath. Assessment and Plan     1. Acute on chronic diastolic CHF, NYHA II-III - improved                        - Scant, rare rales in bases                        - proBNP 9000                        - Echo EF 60-65%. [address] [place]. Moderate wall thickness. Mod dilated LA/RA, mild to mod MR and TR. Mod PHTN                        - Continue IV lasix. 2. DM                        - per Primary team  3. Chronic AF                        - ASA                        - No rate controllers  4. Chronic leg edema                        - Lasix as above             - Agreeable to compression stockings  5. HTN                        - Norvasc 5 mg  6. Body mass index is 39.1 kg/(m^2). - Morbidly obese - she has dietary indiscretions and does not exercise d/t back pain. ROS:  A comprehensive review of systems was negative except for that written in the HPI. Objective:      Physical Exam:                Visit Vitals    /45    Pulse 76    Temp 97.6 °F (36.4 °C)    Resp 18    Ht 4' 11\" (1.499 m)    Wt 190 lb 14.4 oz (86.6 kg)    SpO2 96%    BMI 38.56 kg/m2        General Appearance:   alert, appropriate, no acute distress. Ears/Nose/Mouth/Throat:    Hearing grossly normal.         Neck:  Supple. Chest:    Lungs clear to auscultation bilaterally. Cardiovascular:   Regular rate and rhythm, S1, S2 normal, no murmur. Abdomen:    Soft, non-tender, bowel sounds are active. Extremities:  2+ edema bilaterally. Skin:  Warm and dry.      Telemetry: normal sinus rhythm          Data Review:    Labs:    Recent Results (from the past 24 hour(s))   OCCULT BLOOD, STOOL    Collection Time: 12/08/17 10:30 AM   Result Value Ref Range    Occult blood, stool NEGATIVE  NEG     GLUCOSE, POC    Collection Time: 12/08/17 11:47 AM   Result Value Ref Range    Glucose (POC) 113 (H) 65 - 100 mg/dL    Performed by Rebecca Hartley    GLUCOSE, POC    Collection Time: 12/08/17  6:07 PM   Result Value Ref Range    Glucose (POC) 166 (H) 65 - 100 mg/dL    Performed by STUART CARPENTER(CON)    GLUCOSE, POC    Collection Time: 12/09/17 12:00 AM   Result Value Ref Range    Glucose (POC) 180 (H) 65 - 100 mg/dL    Performed by Huma Doshi    METABOLIC PANEL, BASIC    Collection Time: 12/09/17  2:50 AM   Result Value Ref Range    Sodium 136 136 - 145 mmol/L    Potassium 3.4 (L) 3.5 - 5.1 mmol/L    Chloride 95 (L) 97 - 108 mmol/L    CO2 34 (H) 21 - 32 mmol/L    Anion gap 7 5 - 15 mmol/L    Glucose 92 65 - 100 mg/dL    BUN 27 (H) 6 - 20 MG/DL    Creatinine 1.03 (H) 0.55 - 1.02 MG/DL    BUN/Creatinine ratio 26 (H) 12 - 20      GFR est AA >60 >60 ml/min/1.73m2    GFR est non-AA 50 (L) >60 ml/min/1.73m2    Calcium 8.1 (L) 8.5 - 10.1 MG/DL   GLUCOSE, POC    Collection Time: 12/09/17  6:04 AM   Result Value Ref Range    Glucose (POC) 95 65 - 100 mg/dL    Performed by Evangelina Cervantes           Radiology:        Current Facility-Administered Medications   Medication Dose Route Frequency    furosemide (LASIX) tablet 40 mg  40 mg Oral DAILY    carvedilol (COREG) tablet 6.25 mg  6.25 mg Oral BID WITH MEALS    vit C,Q-Lr-rsmcx-lutein-zeaxan 681-653-61-1 mg-unit-mg-mg cap (PRESERVISION 2) 1 cap (Patient Supplied)  1 Tab Oral BID    raNITIdine (ZANTAC) tablet 150 mg (Patient Supplied)  150 mg Oral DAILY    ferrous sulfate tablet 325 mg  1 Tab Oral DAILY WITH BREAKFAST    albuterol-ipratropium (DUO-NEB) 2.5 MG-0.5 MG/3 ML  3 mL Nebulization Q4H PRN    aspirin delayed-release tablet 81 mg  81 mg Oral DAILY    glimepiride (AMARYL) tablet 1 mg  1 mg Oral ACB    linaclotide (LINZESS) capsule 145 mcg (Patient Supplied)  145 mcg Oral ACB    sodium chloride (NS) flush 5-10 mL  5-10 mL IntraVENous Q8H    sodium chloride (NS) flush 5-10 mL  5-10 mL IntraVENous PRN    glucose chewable tablet 16 g  4 Tab Oral PRN    dextrose (D50W) injection syrg 12.5-25 g  12.5-25 g IntraVENous PRN    glucagon (GLUCAGEN) injection 1 mg  1 mg IntraMUSCular PRN    insulin lispro (HUMALOG) injection   SubCUTAneous Q6H    magnesium hydroxide (MILK OF MAGNESIA) 400 mg/5 mL oral suspension 10 mL  10 mL Oral QPM   Mike Bello MD         [facility], [address] [facility] [address]   [address]   [phone]

## 2017-12-10 LAB
ANION GAP SERPL CALC-SCNC: 8 MMOL/L (ref 5–15)
BUN SERPL-MCNC: 31 MG/DL (ref 6–20)
BUN/CREAT SERPL: 27 (ref 12–20)
CALCIUM SERPL-MCNC: 8 MG/DL (ref 8.5–10.1)
CHLORIDE SERPL-SCNC: 95 MMOL/L (ref 97–108)
CO2 SERPL-SCNC: 31 MMOL/L (ref 21–32)
CREAT SERPL-MCNC: 1.15 MG/DL (ref 0.55–1.02)
GLUCOSE BLD STRIP.AUTO-MCNC: 100 MG/DL (ref 65–100)
GLUCOSE BLD STRIP.AUTO-MCNC: 153 MG/DL (ref 65–100)
GLUCOSE BLD STRIP.AUTO-MCNC: 219 MG/DL (ref 65–100)
GLUCOSE SERPL-MCNC: 123 MG/DL (ref 65–100)
POTASSIUM SERPL-SCNC: 3.6 MMOL/L (ref 3.5–5.1)
SERVICE CMNT-IMP: ABNORMAL
SERVICE CMNT-IMP: ABNORMAL
SERVICE CMNT-IMP: NORMAL
SODIUM SERPL-SCNC: 134 MMOL/L (ref 136–145)

## 2017-12-10 PROCEDURE — 74011250637 HC RX REV CODE- 250/637: Performed by: INTERNAL MEDICINE

## 2017-12-10 PROCEDURE — 80048 BASIC METABOLIC PNL TOTAL CA: CPT | Performed by: HOSPITALIST

## 2017-12-10 PROCEDURE — 97116 GAIT TRAINING THERAPY: CPT | Performed by: PHYSICAL THERAPIST

## 2017-12-10 PROCEDURE — 74011250637 HC RX REV CODE- 250/637: Performed by: HOSPITALIST

## 2017-12-10 PROCEDURE — 97161 PT EVAL LOW COMPLEX 20 MIN: CPT | Performed by: PHYSICAL THERAPIST

## 2017-12-10 PROCEDURE — 74011636637 HC RX REV CODE- 636/637: Performed by: FAMILY MEDICINE

## 2017-12-10 PROCEDURE — 74011250637 HC RX REV CODE- 250/637: Performed by: FAMILY MEDICINE

## 2017-12-10 PROCEDURE — 82962 GLUCOSE BLOOD TEST: CPT

## 2017-12-10 PROCEDURE — 36415 COLL VENOUS BLD VENIPUNCTURE: CPT | Performed by: HOSPITALIST

## 2017-12-10 PROCEDURE — 65660000000 HC RM CCU STEPDOWN

## 2017-12-10 RX ORDER — FUROSEMIDE 40 MG/1
20 TABLET ORAL DAILY
Status: DISCONTINUED | OUTPATIENT
Start: 2017-12-10 | End: 2017-12-11

## 2017-12-10 RX ADMIN — Medication 10 ML: at 07:15

## 2017-12-10 RX ADMIN — POTASSIUM CHLORIDE 40 MEQ: 750 TABLET, FILM COATED, EXTENDED RELEASE ORAL at 10:06

## 2017-12-10 RX ADMIN — CARVEDILOL 6.25 MG: 6.25 TABLET, FILM COATED ORAL at 10:06

## 2017-12-10 RX ADMIN — CARVEDILOL 6.25 MG: 6.25 TABLET, FILM COATED ORAL at 18:34

## 2017-12-10 RX ADMIN — ASPIRIN 81 MG: 81 TABLET, COATED ORAL at 10:06

## 2017-12-10 RX ADMIN — MAGNESIUM HYDROXIDE 10 ML: 400 SUSPENSION ORAL at 22:39

## 2017-12-10 RX ADMIN — Medication 10 ML: at 22:00

## 2017-12-10 RX ADMIN — INSULIN LISPRO 2 UNITS: 100 INJECTION, SOLUTION INTRAVENOUS; SUBCUTANEOUS at 18:42

## 2017-12-10 RX ADMIN — Medication 10 ML: at 14:08

## 2017-12-10 RX ADMIN — RANITIDINE 150 MG: 150 TABLET, FILM COATED ORAL at 10:07

## 2017-12-10 RX ADMIN — GLIMEPIRIDE 1 MG: 1 TABLET ORAL at 07:15

## 2017-12-10 RX ADMIN — FUROSEMIDE 20 MG: 40 TABLET ORAL at 10:06

## 2017-12-10 RX ADMIN — FERROUS SULFATE TAB 325 MG (65 MG ELEMENTAL FE) 325 MG: 325 (65 FE) TAB at 10:06

## 2017-12-10 RX ADMIN — INSULIN LISPRO 3 UNITS: 100 INJECTION, SOLUTION INTRAVENOUS; SUBCUTANEOUS at 14:08

## 2017-12-10 NOTE — PROGRESS NOTES
Problem: Mobility Impaired (Adult and Pediatric)  Goal: *Acute Goals and Plan of Care (Insert Text)  Physical Therapy Goals  Initiated 12/10/2017  1. Patient will move from supine to sit and sit to supine  in bed with modified independence within 7 day(s). 2.  Patient will transfer from bed to chair and chair to bed with modified independence using the least restrictive device within 7 day(s). 3.  Patient will perform sit to stand with modified independence within 7 day(s). 4.  Patient will ambulate with modified independence for 200 feet with the least restrictive device within 7 day(s). physical Therapy EVALUATION  Patient: Wendy Guzmán (64 y.o. female)  Date: 12/10/2017  Primary Diagnosis: CHF (congestive heart failure) ([facility] [address]        Precautions:   Fall    ASSESSMENT :  Based on the objective data described below, the patient presents with decreased functional mobility from baseline level of function. Prior to admit patient was ambulatory for mainly short distances using a SPC vs pushing her transport chair and walking behind it. Lives alone in a 1 level home with ramp to enter. Has paid caregivers who help her but not 24/7. Currently sitting EOB and needs CGA for transfers. Amb approx 100 feet with RW and CGA with decreased step clearance and slow amy but no overt LOB. Patient returned to room and positioned up in bedside chair. Based on current level of function [facility] pending progress. Given that patient does not have 24 hour assistance she may need SNF. Will continue to follow for mobility progression and DC needs. Patient will benefit from skilled intervention to address the above impairments.   Patients rehabilitation potential is considered to be Good  Factors which may influence rehabilitation potential include:   [x]         None noted  []         Mental ability/status  []         Medical condition  []         Home/family situation and support systems  []         Safety awareness  []         Pain tolerance/management  []         Other:      PLAN :  Recommendations and Planned Interventions:  [x]           Bed Mobility Training             []    Neuromuscular Re-Education  [x]           Transfer Training                   []    Orthotic/Prosthetic Training  [x]           Gait Training                         []    Modalities  [x]           Therapeutic Exercises           []    Edema Management/Control  [x]           Therapeutic Activities            [x]    Patient and Family Training/Education  []           Other (comment):    Frequency/Duration: Patient will be followed by physical therapy  5 times a week to address goals. Discharge Recommendations: Rehab VS Home Health  Further Equipment Recommendations for Discharge: TBD (? If she owns a RW)     SUBJECTIVE:   Patient stated Ricky Soliz will go to rehab if you think that's where I need to go.     OBJECTIVE DATA SUMMARY:   HISTORY:    Past Medical History:   Diagnosis Date    Anxiety     Bleeding nose     [facility]     Diabetes ([facility]     Hypertension      Past Surgical History:   Procedure Laterality Date    HX CHOLECYSTECTOMY      HX HEENT      myringotomy    HX HEENT  07/2016    basal cell removal -forehead    HX TONSILLECTOMY      childhood    HX TYMPANOSTOMY  07/11/2016    left ear     Prior Level of Function/Home Situation: Amb mainly short distances and has paid caregivers at home but not 24/7. States she normally pushes her transport chair and walks behind it.   Has a ramp to enter house  Personal factors and/or comorbidities impacting plan of care:     Home Situation  Home Environment: Private residence  # Steps to Enter: 1  Wheelchair Ramp: Yes  One/Two Story Residence: One story  Living Alone: Yes  Support Systems: Home care staff  Patient Expects to be Discharged to[de-identified] Private residence  Current DME Used/Available at Home: alfredo Merritt, Wheelchair    EXAMINATION/PRESENTATION/DECISION MAKING:   Critical Behavior:  Neurologic State: Alert, Eyes open spontaneously  Orientation Level: Oriented X4  Cognition: Appropriate decision making, Appropriate for age attention/concentration, Appropriate safety awareness, Follows commands     Hearing: Auditory  Auditory Impairment: None    Range Of Motion:  AROM: Generally decreased, functional                       Strength:    Strength: Generally decreased, functional         Functional Mobility:  Bed Mobility:   not tested           Transfers:  Sit to Stand: Contact guard assistance;Assist x1  Stand to Sit: Contact guard assistance;Assist x1 (uncontrolled stand to sit)                       Balance:   Sitting: Intact  Standing: Impaired  Standing - Static: Constant support;Good  Standing - Dynamic : Fair  Ambulation/Gait Training:  Distance (ft): 100 Feet (ft)  Assistive Device: Gait belt;Walker, rolling  Ambulation - Level of Assistance: Contact guard assistance        Gait Abnormalities: Decreased step clearance; Path deviations; Shuffling gait        Base of Support: Narrowed     Speed/Aliyah: Pace decreased (<100 feet/min); Shuffled; Slow  Step Length: Left shortened;Right shortened       Functional Measure:  Barthel Index:    Bathin  Bladder: 5  Bowels: 5  Groomin  Dressin  Feeding: 10  Mobility: 10  Stairs: 5  Toilet Use: 5  Transfer (Bed to Chair and Back): 10  Total: 60       Barthel and G-code impairment scale:  Percentage of impairment CH  0% CI  1-19% CJ  20-39% CK  40-59% CL  60-79% CM  80-99% CN  100%   Barthel Score 0-100 100 99-80 79-60 59-40 20-39 1-19   0   Barthel Score 0-20 20 17-19 13-16 9-12 5-8 1-4 0      The Barthel ADL Index: Guidelines  1. The index should be used as a record of what a patient does, not as a record of what a patient could do. 2. The main aim is to establish degree of independence from any help, physical or verbal, however minor and for whatever reason.   3. The need for supervision renders the patient not independent. 4. A patient's performance should be established using the best available evidence. Asking the patient, friends/relatives and nurses are the usual sources, but direct observation and common sense are also important. However direct testing is not needed. 5. Usually the patient's performance over the preceding 24-48 hours is important, but occasionally longer periods will be relevant. 6. Middle categories imply that the patient supplies over 50 per cent of the effort. 7. Use of aids to be independent is allowed. Lizzie Escalona., Barthel, DCandyW. (2786). Functional evaluation: the Barthel Index. [address] [facility] (14)2. [facility] ajit CHRISTIANNE Hemphill, Manuela Ferguson., Michelle Blackwell., [place], [address] (1999). Measuring the change indisability after inpatient rehabilitation; comparison of the responsiveness of the Barthel Index and Functional Labette Measure. Journal of Neurology, Neurosurgery, and Psychiatry, 66(4), 153-780. [place], [place], ONELIA Whitlock, & Federica Alvarez MELISEO. (2004.) Assessment of post-stroke quality of life in cost-effectiveness studies: The usefulness of the Barthel Index and the EuroQoL-5D. Quality of Life Research, 13, 021-95         G codes: In compliance with CMSs Claims Based Outcome Reporting, the following G-code set was chosen for this patient based on their primary functional limitation being treated: The outcome measure chosen to determine the severity of the functional limitation was the Barthel with a score of 60/100 which was correlated with the impairment scale.     ? Mobility - Walking and Moving Around:     - CURRENT STATUS: CJ - 20%-39% impaired, limited or restricted    - GOAL STATUS: CI - 1%-19% impaired, limited or restricted    - D/C STATUS:  ---------------To be determined---------------       Pain:  Pain Scale 1: Numeric (0 - 10)  Pain Intensity 1: 0              Activity Tolerance: VSS  Please refer to the flowsheet for vital signs taken during this treatment. After treatment:   [x]         Patient left in no apparent distress sitting up in chair  []         Patient left in no apparent distress in bed  [x]         Call bell left within reach  [x]         Nursing notified  []         Caregiver present  []         Bed alarm activated    COMMUNICATION/EDUCATION:   The patients plan of care was discussed with: Physical Therapist, Occupational Therapist and Registered Nurse. [x]         Fall prevention education was provided and the patient/caregiver indicated understanding. [x]         Patient/family have participated as able in goal setting and plan of care. [x]         Patient/family agree to work toward stated goals and plan of care. []         Patient understands intent and goals of therapy, but is neutral about his/her participation. []         Patient is unable to participate in goal setting and plan of care.     Thank you for this referral.  Latricia Gallagher, PT, DPT   Time Calculation: 15 mins

## 2017-12-10 NOTE — ROUTINE PROCESS
Bedside and Verbal shift change report given to Duy Tinajero RN (oncoming nurse) by Chapo Driver RN (offgoing nurse). Report included the following information SBAR, Kardex, Intake/Output, MAR, Accordion, Recent Results and Cardiac Rhythm A fib.

## 2017-12-10 NOTE — PROGRESS NOTES
Problem: Falls - Risk of  Goal: *Absence of Falls  Document Polo Fall Risk and appropriate interventions in the flowsheet.    Outcome: Progressing Towards Goal  Fall Risk Interventions:  Mobility Interventions: Assess mobility with egress test, Bed/chair exit alarm, Communicate number of staff needed for ambulation/transfer, OT consult for ADLs, Patient to call before getting OOB, PT Consult for mobility concerns, PT Consult for assist device competence, Strengthening exercises (ROM-active/passive), Utilize walker, cane, or other assitive device, Utilize gait belt for transfers/ambulation         Medication Interventions: Assess postural VS orthostatic hypotension, Evaluate medications/consider consulting pharmacy, Utilize gait belt for transfers/ambulation, Teach patient to arise slowly, Patient to call before getting OOB    Elimination Interventions: Bed/chair exit alarm, Call light in reach, Patient to call for help with toileting needs, Toilet paper/wipes in reach, Toileting schedule/hourly rounds

## 2017-12-10 NOTE — PROGRESS NOTES
Cardiology Progress Note            Admit Date: 12/6/2017  Admit Diagnosis: CHF (congestive heart failure) ([facility] [address]  Date: 12/10/2017     Time: 10:38 AM    Subjective:  Denies CP. Breathing improved but still short of breath. Assessment and Plan     1. Acute on chronic diastolic CHF, NYHA II-III - improved                        - proBNP 9000                        - Echo EF 60-65%. [address] [place]. Moderate wall thickness. Mod dilated LA/RA, mild to mod MR and TR. Mod PHTN                        - Cr climbing, agree with lasix iv to po. Dr. Tawnya Caban to follow back tomorrow if pt is still here; outpt f/u.  2. DM                        - per Primary team  3. Chronic AF                        - ASA                        - No rate controllers  4. Chronic leg edema                        - Lasix as above             - Agreeable to compression stockings  5. HTN                        - Norvasc 5 mg  6. Body mass index is 39.1 kg/(m^2). - Morbidly obese - she has dietary indiscretions and does not exercise d/t back pain. ROS:  A comprehensive review of systems was negative except for that written in the HPI. Objective:      Physical Exam:                Visit Vitals    /47 (BP 1 Location: Left arm, BP Patient Position: At rest)    Pulse 61    Temp 97.6 °F (36.4 °C)    Resp 15    Ht 4' 11\" (1.499 m)    Wt 199 lb 8 oz (90.5 kg)    SpO2 98%    BMI 40.29 kg/m2        General Appearance:   alert, appropriate, no acute distress. Ears/Nose/Mouth/Throat:    Hearing grossly normal.         Neck:  Supple. Chest:    Lungs clear to auscultation bilaterally. Cardiovascular:   Regular rate and rhythm, S1, S2 normal, no murmur. Abdomen:    Soft, non-tender, bowel sounds are active. Extremities:  2+ edema bilaterally. Skin:  Warm and dry.      Telemetry: normal sinus rhythm          Data Review:    Labs:    Recent Results (from the past 24 hour(s))   GLUCOSE, POC    Collection Time: 12/09/17 12:15 PM   Result Value Ref Range    Glucose (POC) 123 (H) 65 - 100 mg/dL    Performed by Haroon Gutierrez    GLUCOSE, POC    Collection Time: 12/09/17  5:54 PM   Result Value Ref Range    Glucose (POC) 95 65 - 100 mg/dL    Performed by Jj Berry    GLUCOSE, POC    Collection Time: 12/09/17 11:19 PM   Result Value Ref Range    Glucose (POC) 241 (H) 65 - 100 mg/dL    Performed by [address] [place] [address], [address], BASIC    Collection Time: 12/10/17  3:08 AM   Result Value Ref Range    Sodium 134 (L) 136 - 145 mmol/L    Potassium 3.6 3.5 - 5.1 mmol/L    Chloride 95 (L) 97 - 108 mmol/L    CO2 31 21 - 32 mmol/L    Anion gap 8 5 - 15 mmol/L    Glucose 123 (H) 65 - 100 mg/dL    BUN 31 (H) 6 - 20 MG/DL    Creatinine 1.15 (H) 0.55 - 1.02 MG/DL    BUN/Creatinine ratio 27 (H) 12 - 20      GFR est AA 53 (L) >60 ml/min/1.73m2    GFR est non-AA 44 (L) >60 ml/min/1.73m2    Calcium 8.0 (L) 8.5 - 10.1 MG/DL   GLUCOSE, POC    Collection Time: 12/10/17  6:05 AM   Result Value Ref Range    Glucose (POC) 100 65 - 100 mg/dL    Performed by Gamal Krishnan           Radiology:        Current Facility-Administered Medications   Medication Dose Route Frequency    furosemide (LASIX) tablet 20 mg  20 mg Oral DAILY    potassium chloride SR (KLOR-CON 10) tablet 40 mEq  40 mEq Oral DAILY    carvedilol (COREG) tablet 6.25 mg  6.25 mg Oral BID WITH MEALS    vit C,C-Vg-taqoi-lutein-zeaxan 876-420-90-1 mg-unit-mg-mg cap (PRESERVISION 2) 1 cap (Patient Supplied)  1 Tab Oral BID    raNITIdine (ZANTAC) tablet 150 mg (Patient Supplied)  150 mg Oral DAILY    ferrous sulfate tablet 325 mg  1 Tab Oral DAILY WITH BREAKFAST    albuterol-ipratropium (DUO-NEB) 2.5 MG-0.5 MG/3 ML  3 mL Nebulization Q4H PRN    aspirin delayed-release tablet 81 mg  81 mg Oral DAILY    glimepiride (AMARYL) tablet 1 mg  1 mg Oral ACB    linaclotide (LINZESS) capsule 145 mcg (Patient Supplied)  145 mcg Oral ACB    sodium chloride (NS) flush 5-10 mL  5-10 mL IntraVENous Q8H    sodium chloride (NS) flush 5-10 mL  5-10 mL IntraVENous PRN    glucose chewable tablet 16 g  4 Tab Oral PRN    dextrose (D50W) injection syrg 12.5-25 g  12.5-25 g IntraVENous PRN    glucagon (GLUCAGEN) injection 1 mg  1 mg IntraMUSCular PRN    insulin lispro (HUMALOG) injection   SubCUTAneous Q6H    magnesium hydroxide (MILK OF MAGNESIA) 400 mg/5 mL oral suspension 10 mL  10 mL Oral QPM   Ann Marie Sanchez MD         [facility] [address] [place] [address] 13, [address] [facility] [address]   Katelyn Ferrer   [phone]

## 2017-12-10 NOTE — PROGRESS NOTES
IDR/SLIDR Summary          Patient: Rainer Davalos MRN: [id number]    Age: 80 y.o. YOB: 1922 Room/Bed: [facility]   Admit Diagnosis: CHF (congestive heart failure) ([facility]  Principal Diagnosis: CHF (congestive heart failure) ([facility]   Goals: Mobility, Oxygenation  Readmission: NO  Quality Measure: CHF  VTE Prophylaxis: Mechanical  Influenza Vaccine screening completed? YES  Pneumococcal Vaccine screening completed? NO  Mobility needs: Yes   Nutrition plan:No  Consults: P. T and Case Management    Financial concerns:No  Escalated to CM? NO  RRAT Score: 27   Interventions:H2H  Testing due for pt today?  NO  LOS: 4 days Expected length of stay 5 days  Discharge plan: Home    PCP: Minnie Phan MD  Transportation needs: Yes    Days before discharge:one day until discharge   Discharge disposition: Home    Signed:     Chanda Barrera RN  12/10/2017  10:15 AM

## 2017-12-10 NOTE — PROGRESS NOTES
Bedside and Verbal shift change report given to Sophie Vargas (oncoming nurse) by Radha Degroot (offgoing nurse). Report included the following information SBAR, Kardex, Intake/Output, MAR and Recent Results.

## 2017-12-10 NOTE — PROGRESS NOTES
Hospitalist Progress Note  Victory Nissen, MD  Answering service: [phone] OR [phone] from in house phone  Cell: [phone]      Date of Service:  12/10/2017  NAME:  Wendy Guzmán  :  1922  MRN:  [id number]      Admission Summary:     Patient is a 80year old  female with past medical history of volume overload, lower extremity edema, who presented to the ER with shortness of breath. Interval history / Subjective:       No acute overnight events. No new complaints     Assessment & Plan:     Concern for Acute on chronic diastolic heart failure: Preserved EF; NYHA class 2/3  She has not been complaint with her lasix  CXR shows probably venous congestion with borderline interstitial pulmonary edema. Pro BNP 3786  Echo: EF 60-65% with no obvious wall motion abnormalities. LA/RA- Mod dilation  Continue aspirin, coreg, lasix. Cardiology eval appreciated. Switch I.V lasix to PO lasix due to rising creating  Continue medical management. F/u with cardiology as an OP. SOB: probably due to above in the setting of anemia. Resolved  Oxygen sat stable  improved    DM type 2; BG is stable  Continue ISS and amaryl     Iron deficiency Anemia: Iron panel consistent with iron def anemia. Continue ferrous sulfate  H/H stable    Bilateral lower extremity edema: Continue lasix  Compression stockings      Hyponatremia: Due to lasix  Hypokalemia: Continue kcl 40 mg po daily. Keep k > 4   Monitor. Reduce lasix    Renal insufficiency: Due to lasix  Switch I.V lasix to PO and a reduced dose  monitor    PT/OT consult    Code status: Full  DVT prophylaxis:     Care Plan discussed with: Patient/Family and Nurse  Disposition: Likely discharge home with Home health tomorrow.      Hospital Problems  Date Reviewed: 2017          Codes Class Noted POA    * (Principal)[facility]congestive [facility]) [facility] ICD-10-CM: I50.9  ICD-9-CM: 428.0  2017 Unknown                Review of Systems:   Pertinent items are noted in HPI. Vital Signs:    Last 24hrs VS reviewed since prior progress note. Most recent are:  Visit Vitals    /54 (BP Patient Position: At rest)    Pulse 62    Temp 97.8 °F (36.6 °C)    Resp 20    Ht 4' 11\" (1.499 m)    Wt 90.5 kg (199 lb 8 oz)    SpO2 99%    BMI 40.29 kg/m2         Intake/Output Summary (Last 24 hours) at 12/10/17 1323  Last data filed at 12/10/17 1200   Gross per 24 hour   Intake              800 ml   Output             1150 ml   Net             -350 ml        Physical Examination:             Constitutional:  No acute distress, cooperative, pleasant    ENT:  Oral mucous moist, oropharynx benign. Neck supple,    Resp:  CTA bilaterally. No wheeze or rales. No accessory muscle use   CV:  Regular rhythm, normal rate, no murmurs, gallops, rubs    GI:  Soft, non distended, non tender. normoactive bowel sounds, no hepatosplenomegaly     Musculoskeletal:  No edema, warm, 2+ pulses throughout    Neurologic:  Moves all extremities. AAOx3, CN II-XII reviewed    Ext: +2 pitting pedal edema on compression stockings       Data Review:          Labs:     Recent Labs      12/08/17   0358   WBC  6.2   HGB  9.1*   HCT  28.8*   PLT  286     Recent Labs      12/10/17   0308  12/09/17   0250  12/08/17   0358   NA  134*  136  140   K  3.6  3.4*  3.6   CL  95*  95*  99   CO2  31  34*  34*   BUN  31*  27*  17   CREA  1.15*  1.03*  0.80   GLU  123*  92  78   CA  8.0*  8.1*  8.2*   MG   --    --   2.1     No results for input(s): SGOT, GPT, ALT, AP, TBIL, TBILI, TP, ALB, GLOB, GGT, AML, LPSE in the last 72 hours. No lab exists for component: AMYP, HLPSE  No results for input(s): INR, PTP, APTT in the last 72 hours. No lab exists for component: INREXT, INREXT   No results for input(s): FE, TIBC, PSAT, FERR in the last 72 hours. No results found for: FOL, RBCF   No results for input(s): PH, PCO2, PO2 in the last 72 hours.   No results for input(s): CPK, CKNDX, TROIQ in the last 72 hours.     No lab exists for component: CPKMB  Lab Results   Component Value Date/Time    Cholesterol, total 146 03/21/2016 10:31 AM    HDL Cholesterol 85 03/21/2016 10:31 AM    LDL, calculated 48 03/21/2016 10:31 AM    Triglyceride 64 03/21/2016 10:31 AM     Lab Results   Component Value Date/Time    Glucose (POC) 219 12/10/2017 12:34 PM    Glucose (POC) 100 12/10/2017 06:05 AM    Glucose (POC) 241 12/09/2017 11:19 PM    Glucose (POC) 95 12/09/2017 05:54 PM    Glucose (POC) 123 12/09/2017 12:15 PM     Lab Results   Component Value Date/Time    Color YELLOW/STRAW 12/06/2017 04:56 PM    Appearance CLEAR 12/06/2017 04:56 PM    Specific gravity 1.006 12/06/2017 04:56 PM    pH (UA) 7.5 12/06/2017 04:56 PM    Protein NEGATIVE  12/06/2017 04:56 PM    Glucose NEGATIVE  12/06/2017 04:56 PM    Ketone NEGATIVE  12/06/2017 04:56 PM    Bilirubin NEGATIVE  12/06/2017 04:56 PM    Urobilinogen 0.2 12/06/2017 04:56 PM    Nitrites NEGATIVE  12/06/2017 04:56 PM    Leukocyte Esterase NEGATIVE  12/06/2017 04:56 PM    Epithelial cells FEW 12/06/2017 04:56 PM    Bacteria NEGATIVE  12/06/2017 04:56 PM    WBC 0-4 12/06/2017 04:56 PM    RBC 0-5 12/06/2017 04:56 PM         Medications Reviewed:     Current Facility-Administered Medications   Medication Dose Route Frequency    furosemide (LASIX) tablet 20 mg  20 mg Oral DAILY    potassium chloride SR (KLOR-CON 10) tablet 40 mEq  40 mEq Oral DAILY    carvedilol (COREG) tablet 6.25 mg  6.25 mg Oral BID WITH MEALS    vit C,J-Ea-giapk-lutein-zeaxan [id number] mg-unit-mg-mg cap (PRESERVISION 2) 1 cap (Patient Supplied)  1 Tab Oral BID    raNITIdine (ZANTAC) tablet 150 mg (Patient Supplied)  150 mg Oral DAILY    ferrous sulfate tablet 325 mg  1 Tab Oral DAILY WITH BREAKFAST    albuterol-ipratropium (DUO-NEB) 2.5 MG-0.5 MG/3 ML  3 mL Nebulization Q4H PRN    aspirin delayed-release tablet 81 mg  81 mg Oral DAILY    glimepiride (AMARYL) tablet 1 mg  1 mg Oral ACB    linaclotide (LINZESS) capsule 145 mcg (Patient Supplied)  145 mcg Oral ACB    sodium chloride (NS) flush 5-10 mL  5-10 mL IntraVENous Q8H    sodium chloride (NS) flush 5-10 mL  5-10 mL IntraVENous PRN    glucose chewable tablet 16 g  4 Tab Oral PRN    dextrose (D50W) injection syrg 12.5-25 g  12.5-25 g IntraVENous PRN    glucagon (GLUCAGEN) injection 1 mg  1 mg IntraMUSCular PRN    insulin lispro (HUMALOG) injection   SubCUTAneous Q6H    magnesium hydroxide (MILK OF MAGNESIA) 400 mg/5 mL oral suspension 10 mL  10 mL Oral QPM     ______________________________________________________________________  EXPECTED LENGTH OF STAY: 2d 12h  ACTUAL LENGTH OF STAY:          [id number] [facility] MD Dania

## 2017-12-10 NOTE — PROGRESS NOTES
Problem: Falls - Risk of  Goal: *Absence of Falls  Document [place] Fall Risk and appropriate interventions in the flowsheet.    Outcome: Progressing Towards Goal  Fall Risk Interventions:  Mobility Interventions: Communicate number of staff needed for ambulation/transfer, Patient to call before getting OOB, PT Consult for mobility concerns, PT Consult for assist device competence, Strengthening exercises (ROM-active/passive)         Medication Interventions: Evaluate medications/consider consulting pharmacy, Patient to call before getting OOB, Teach patient to arise slowly    Elimination Interventions: Call light in reach, Elevated toilet seat, Patient to call for help with toileting needs, Toileting schedule/hourly rounds

## 2017-12-11 ENCOUNTER — PATIENT OUTREACH (OUTPATIENT)
Dept: INTERNAL MEDICINE CLINIC | Age: 82
End: 2017-12-11

## 2017-12-11 VITALS
RESPIRATION RATE: 18 BRPM | SYSTOLIC BLOOD PRESSURE: 112 MMHG | TEMPERATURE: 98 F | OXYGEN SATURATION: 100 % | WEIGHT: 197.9 LBS | DIASTOLIC BLOOD PRESSURE: 39 MMHG | BODY MASS INDEX: 39.9 KG/M2 | HEIGHT: 59 IN | HEART RATE: 55 BPM

## 2017-12-11 LAB
ANION GAP SERPL CALC-SCNC: 8 MMOL/L (ref 5–15)
BUN SERPL-MCNC: 30 MG/DL (ref 6–20)
BUN/CREAT SERPL: 31 (ref 12–20)
CALCIUM SERPL-MCNC: 8.1 MG/DL (ref 8.5–10.1)
CHLORIDE SERPL-SCNC: 98 MMOL/L (ref 97–108)
CO2 SERPL-SCNC: 29 MMOL/L (ref 21–32)
CREAT SERPL-MCNC: 0.97 MG/DL (ref 0.55–1.02)
GLUCOSE BLD STRIP.AUTO-MCNC: 150 MG/DL (ref 65–100)
GLUCOSE BLD STRIP.AUTO-MCNC: 171 MG/DL (ref 65–100)
GLUCOSE BLD STRIP.AUTO-MCNC: 81 MG/DL (ref 65–100)
GLUCOSE SERPL-MCNC: 105 MG/DL (ref 65–100)
MAGNESIUM SERPL-MCNC: 2.5 MG/DL (ref 1.6–2.4)
POTASSIUM SERPL-SCNC: 3.9 MMOL/L (ref 3.5–5.1)
SERVICE CMNT-IMP: ABNORMAL
SERVICE CMNT-IMP: ABNORMAL
SERVICE CMNT-IMP: NORMAL
SODIUM SERPL-SCNC: 135 MMOL/L (ref 136–145)

## 2017-12-11 PROCEDURE — 97116 GAIT TRAINING THERAPY: CPT

## 2017-12-11 PROCEDURE — 36415 COLL VENOUS BLD VENIPUNCTURE: CPT | Performed by: HOSPITALIST

## 2017-12-11 PROCEDURE — 80048 BASIC METABOLIC PNL TOTAL CA: CPT | Performed by: HOSPITALIST

## 2017-12-11 PROCEDURE — 74011250637 HC RX REV CODE- 250/637: Performed by: FAMILY MEDICINE

## 2017-12-11 PROCEDURE — 83735 ASSAY OF MAGNESIUM: CPT | Performed by: HOSPITALIST

## 2017-12-11 PROCEDURE — 74011250637 HC RX REV CODE- 250/637: Performed by: HOSPITALIST

## 2017-12-11 PROCEDURE — 82962 GLUCOSE BLOOD TEST: CPT

## 2017-12-11 PROCEDURE — 74011636637 HC RX REV CODE- 636/637: Performed by: FAMILY MEDICINE

## 2017-12-11 RX ORDER — CARVEDILOL 6.25 MG/1
6.25 TABLET ORAL 2 TIMES DAILY WITH MEALS
Qty: 30 TAB | Refills: 2 | Status: SHIPPED | OUTPATIENT
Start: 2017-12-11 | End: 2018-01-25 | Stop reason: SDUPTHER

## 2017-12-11 RX ORDER — LANOLIN ALCOHOL/MO/W.PET/CERES
325 CREAM (GRAM) TOPICAL
Qty: 30 TAB | Refills: 2 | Status: SHIPPED | OUTPATIENT
Start: 2017-12-12 | End: 2018-03-03 | Stop reason: SDUPTHER

## 2017-12-11 RX ORDER — GUAIFENESIN 100 MG/5ML
100 SOLUTION ORAL
Qty: 1 BOTTLE | Refills: 0 | Status: SHIPPED | OUTPATIENT
Start: 2017-12-11 | End: 2017-12-19

## 2017-12-11 RX ORDER — FUROSEMIDE 40 MG/1
40 TABLET ORAL DAILY
Qty: 30 TAB | Refills: 2 | Status: SHIPPED | OUTPATIENT
Start: 2017-12-11 | End: 2018-03-09 | Stop reason: SDUPTHER

## 2017-12-11 RX ORDER — FUROSEMIDE 40 MG/1
40 TABLET ORAL DAILY
Status: DISCONTINUED | OUTPATIENT
Start: 2017-12-12 | End: 2017-12-11 | Stop reason: HOSPADM

## 2017-12-11 RX ORDER — POTASSIUM CHLORIDE 750 MG/1
20 TABLET, FILM COATED, EXTENDED RELEASE ORAL DAILY
Qty: 1 TAB | Refills: 0 | Status: SHIPPED | OUTPATIENT
Start: 2017-12-11 | End: 2018-03-05 | Stop reason: SDUPTHER

## 2017-12-11 RX ORDER — POTASSIUM CHLORIDE 750 MG/1
20 TABLET, FILM COATED, EXTENDED RELEASE ORAL DAILY
Status: DISCONTINUED | OUTPATIENT
Start: 2017-12-12 | End: 2017-12-11 | Stop reason: HOSPADM

## 2017-12-11 RX ADMIN — GLIMEPIRIDE 1 MG: 1 TABLET ORAL at 07:23

## 2017-12-11 RX ADMIN — Medication 10 ML: at 13:35

## 2017-12-11 RX ADMIN — CARVEDILOL 6.25 MG: 6.25 TABLET, FILM COATED ORAL at 08:17

## 2017-12-11 RX ADMIN — ASPIRIN 81 MG: 81 TABLET, COATED ORAL at 08:17

## 2017-12-11 RX ADMIN — FERROUS SULFATE TAB 325 MG (65 MG ELEMENTAL FE) 325 MG: 325 (65 FE) TAB at 08:17

## 2017-12-11 RX ADMIN — INSULIN LISPRO 2 UNITS: 100 INJECTION, SOLUTION INTRAVENOUS; SUBCUTANEOUS at 00:36

## 2017-12-11 RX ADMIN — POTASSIUM CHLORIDE 40 MEQ: 750 TABLET, FILM COATED, EXTENDED RELEASE ORAL at 08:17

## 2017-12-11 RX ADMIN — INSULIN LISPRO 2 UNITS: 100 INJECTION, SOLUTION INTRAVENOUS; SUBCUTANEOUS at 13:35

## 2017-12-11 RX ADMIN — FUROSEMIDE 20 MG: 40 TABLET ORAL at 08:17

## 2017-12-11 RX ADMIN — RANITIDINE 150 MG: 150 TABLET, FILM COATED ORAL at 08:19

## 2017-12-11 RX ADMIN — Medication 10 ML: at 07:23

## 2017-12-11 NOTE — PROGRESS NOTES
Discharge medications reviewed with patient and appropriate educational materials and side effects teaching were provided. Discharge medications reviewed with patient and appropriate educational materials and side effects teaching were provided. Bedside RN performed patient education and medication education. Discharge concerns initiated and discussed with patient, including clarification on \"who\" assists the patient at their home and instructions for when the home going patient should call their provider after discharge. Opportunity for questions and clarification was provided. Patient receptive to education: YES  Patient stated: thank you  Barriers to Education:   Diagnosis Education given:  YES    Length of stay: 5  Expected Day of Discharge: today  Ask if they have \"Help at Home\" & add to white board? YES    Education Day #: 4    Medication Education Given:  YES  M in the box Medication name: coreg    Pt aware of [facility]CA[facility] survey: YES      PIV and telemetry removed from pt. Signed copy of discharge instructions placed on pt chart. Pt wheeled down via volunteer to transportation home via family.

## 2017-12-11 NOTE — PROGRESS NOTES
CM met with attending today and he is planning to discharge pt today. CM reviewed therapy notes and they are recommending short term SNF vs home health. CM met with pt at her bedside to discuss. She stated that she would like to go home today with home health. She called one of her caregivers as this CM listened and she is going to have a caregiver to pick her up today and another to stay with her at home tonight. She made those arrangements for today. Cm discussed available home health agencies and offered her choice.  She did not have a preference, so CM sent a referral to [address] [place] [address] in the [facility] portal. This information was placed on pt's AVS.VIVEK Stoner,ACM-SW

## 2017-12-11 NOTE — PROGRESS NOTES
Problem: Mobility Impaired (Adult and Pediatric)  Goal: *Acute Goals and Plan of Care (Insert Text)  Physical Therapy Goals  Initiated 12/10/2017  1. Patient will move from supine to sit and sit to supine  in bed with modified independence within 7 day(s). 2.  Patient will transfer from bed to chair and chair to bed with modified independence using the least restrictive device within 7 day(s). 3.  Patient will perform sit to stand with modified independence within 7 day(s). 4.  Patient will ambulate with modified independence for 200 feet with the least restrictive device within 7 day(s). physical Therapy TREATMENT  Patient: Aly Mosuqeda (77 y.o. female)  Date: 12/11/2017  Diagnosis: CHF (congestive heart failure) ([facility]) [facility]est[facility]       Precautions: Fall  Chart, physical therapy assessment, plan of care and goals were reviewed. ASSESSMENT:  Pt was able to ambulate with pushing transport chair with CGA. This is how pt typically ambulates at home. Pt has caregivers at home to assist as needed. Attempted 6MWT but unable to complete. Pt may benefit from HHPT and continued assist from caregivers. Progression toward goals:  [x]      Improving appropriately and progressing toward goals  []      Improving slowly and progressing toward goals  []      Not making progress toward goals and plan of care will be adjusted     PLAN:  Patient continues to benefit from skilled intervention to address the above impairments. Continue treatment per established plan of care. Discharge Recommendations:  Home Health with caregivers   Further Equipment Recommendations for Discharge: Owns transport chair     SUBJECTIVE:   Patient stated I haven't walked much since I have been here  .     OBJECTIVE DATA SUMMARY   Critical Behavior:  Neurologic State: Alert, Eyes open spontaneously  Orientation Level: Oriented X4  Cognition: Follows commands, Appropriate for age attention/concentration Functional Mobility Training:  Bed Mobility:                     Transfers:  Sit to Stand: Contact guard assistance (required several trial. pt reports doesnt sit in low chairs)  Stand to Sit: Contact guard assistance (decrease control)                             Balance:  Sitting: Intact  Standing: Impaired  Standing - Static: Good  Standing - Dynamic : Fair  Ambulation/Gait Training:  Distance (ft): 105 Feet (ft)  Assistive Device: Gait belt  Ambulation - Level of Assistance: Contact guard assistance        Gait Abnormalities: Decreased step clearance              Speed/Aliyah: Pace decreased (<100 feet/min)  Step Length: Left shortened;Right shortened          6 MWT results: (Specify if any supplemental oxygen is used, the type, pre, during and post sats.)  Distance Walked in Feet (ft): 105 ft. (did not complete 6MWT did [id number])    Pre Heart Rate: 60    Pre O2 Saturation: 95        Post Heart Rate: 56    Post O2 Saturation: 96    Assistive device used: Assistive Device: Gait belt        Normative data:   Men 39-80 years old = 1889 feet; Women 3680 years jrd=7311 feet  Modified 10 point Gallito RPE scale utilized:  0 = no breathlessness at all ---> 10 = maximum exertion  Please refer to the flowsheet for any additional vital signs taken during this treatment. Stairs:             Neuro Re-Education:    Therapeutic Exercises:     Pain:  Pain Scale 1: Numeric (0 - 10)  Pain Intensity 1: 0              Activity Tolerance:   Limited   Please refer to the flowsheet for vital signs taken during this treatment.   After treatment:   [x] Patient left in no apparent distress sitting up in chair  [] Patient left in no apparent distress in bed  [x] Call bell left within reach  [x] Nursing notified  [] Caregiver present  [] Bed alarm activated    COMMUNICATION/COLLABORATION:   The patients plan of care was discussed with: Registered Nurse    Paulette Baird PTA   Time Calculation: 15 mins

## 2017-12-11 NOTE — PROGRESS NOTES
Cardiology Progress Note            Admit Date: 12/6/2017  Admit Diagnosis: CHF (congestive heart failure) ([facility]  Date: 12/11/2017     Time: 10:38 AM    Subjective:  Feels her leg swelling is improved. Offers no complaints for SOB or CP     Assessment and Plan     1. SOB - improved                        - Scant, rare rales in bases                        - Echo EF 60-65%. [address]. Moderate wall thickness. Mod dilated LA/RA, mild to mod MR and TR. Mod PHTN                        - Lasix changed to 40 mg PO daily. Would discharge on this dose             - Klor con changed to 20 meq daily and would discharge on this dose  2. DM                        - per Primary team  3. Chronic AF                        - ASA                        - No rate controllers  4. Chronic leg edema - much improved. - Lasix as above             - Agreeable to compression stockings             - Norvasc stopped  5. HTN                        - Norvasc stopped for possible contribution to edema of legs             - Coreg 6.25 mg BID - watch rate  6. Body mass index is 39.1 kg/(m^2). - Morbidly obese - she has dietary indiscretions and does not exercise d/t back pain. - will ask for standing weight    Much improved leg edema. Slight crackles in lungs, but oxygen sats good on room air for several days. Overall better. Would continue Lasix 40 mg PO daily and Klor Con 20 meq daily through to discharge. knee high compression stockings. Stable and improved from a Cardiology standpoint and can be discharged when ready. F/u as below and will see again as needed. Cardiology attending: seen and examined.  Agree with assess and plan      Future Appointments  Date Time Provider Tristan Ebony   12/14/2017 3:00 PM Hitesh Kohli  [address]   1/9/2018 2:20 PM Main Boyce [place] [address] [facility] ROS:  A comprehensive review of systems was negative except for that written in the subjective. Objective:      Physical Exam:                Visit Vitals    /56    Pulse 70    Temp 97.8 °F (36.6 °C)    Resp 16    Ht 4' 11\" (1.499 m)    Wt 197 lb 14.4 oz (89.8 kg)    SpO2 98%    BMI 39.97 kg/m2        General Appearance:   Well developed, well nourished,alert and oriented x 3, and   individual in no acute distress. Ears/Nose/Mouth/Throat:    Hearing grossly normal.         Neck:  Supple. Chest:    Lungs scant crackles in bases to auscultation bilaterally. Cardiovascular:   Regular rate and rhythm, S1, S2 normal, no murmur. Abdomen:    Soft, non-tender, bowel sounds are active. Extremities:  tr edema bilaterally. Skin:  Warm and dry.      Telemetry: normal sinus rhythm          Data Review:    Labs:    Recent Results (from the past 24 hour(s))   GLUCOSE, POC    Collection Time: 12/10/17 12:34 PM   Result Value Ref Range    Glucose (POC) 219 (H) 65 - 100 mg/dL    Performed by Haroon Gutierrez    GLUCOSE, POC    Collection Time: 12/10/17  6:12 PM   Result Value Ref Range    Glucose (POC) 153 (H) 65 - 100 mg/dL    Performed by Jj Berry    GLUCOSE, POC    Collection Time: 12/11/17 12:13 AM   Result Value Ref Range    Glucose (POC) 171 (H) 65 - 100 mg/dL    Performed by Gamal Krishnan    GLUCOSE, POC    Collection Time: 12/11/17  6:10 AM   Result Value Ref Range    Glucose (POC) 81 65 - 100 mg/dL    Performed by Gamal Krishnan    METABOLIC PANEL, BASIC    Collection Time: 12/11/17  7:17 AM   Result Value Ref Range    Sodium 135 (L) 136 - 145 mmol/L    Potassium 3.9 3.5 - 5.1 mmol/L    Chloride 98 97 - 108 mmol/L    CO2 29 21 - 32 mmol/L    Anion gap 8 5 - 15 mmol/L    Glucose 105 (H) 65 - 100 mg/dL    BUN 30 (H) 6 - 20 MG/DL    Creatinine 0.97 0.55 - 1.02 MG/DL    BUN/Creatinine ratio 31 (H) 12 - 20      GFR est AA >60 >60 ml/min/1.73m2    GFR est non-AA 53 (L) >60 ml/min/1.73m2 Calcium 8.1 (L) 8.5 - 10.1 MG/DL   MAGNESIUM    Collection Time: 12/11/17  7:17 AM   Result Value Ref Range    Magnesium 2.5 (H) 1.6 - 2.4 mg/dL          Radiology:        Current Facility-Administered Medications   Medication Dose Route Frequency    [START ON 12/12/2017] furosemide (LASIX) tablet 40 mg  40 mg Oral DAILY    [START ON 12/12/2017] potassium chloride SR (KLOR-CON 10) tablet 20 mEq  20 mEq Oral DAILY    carvedilol (COREG) tablet 6.25 mg  6.25 mg Oral BID WITH MEALS    vit C,W-Lm-skgoq-lutein-zeaxan 892-653-89-1 mg-unit-mg-mg cap (PRESERVISION 2) 1 cap (Patient Supplied)  1 Tab Oral BID    raNITIdine (ZANTAC) tablet 150 mg (Patient Supplied)  150 mg Oral DAILY    ferrous sulfate tablet 325 mg  1 Tab Oral DAILY WITH BREAKFAST    albuterol-ipratropium (DUO-NEB) 2.5 MG-0.5 MG/3 ML  3 mL Nebulization Q4H PRN    aspirin delayed-release tablet 81 mg  81 mg Oral DAILY    glimepiride (AMARYL) tablet 1 mg  1 mg Oral ACB    linaclotide (LINZESS) capsule 145 mcg (Patient Supplied)  145 mcg Oral ACB    sodium chloride (NS) flush 5-10 mL  5-10 mL IntraVENous Q8H    sodium chloride (NS) flush 5-10 mL  5-10 mL IntraVENous PRN    glucose chewable tablet 16 g  4 Tab Oral PRN    dextrose (D50W) injection syrg 12.5-25 g  12.5-25 g IntraVENous PRN    glucagon (GLUCAGEN) injection 1 mg  1 mg IntraMUSCular PRN    insulin lispro (HUMALOG) injection   SubCUTAneous Q6H    magnesium hydroxide (MILK OF MAGNESIA) 400 mg/5 mL oral suspension 10 mL  10 mL Oral QPM   Yang Wade MD         [facility] [facility] [address]   Katelyn Ferrer   [phone]

## 2017-12-11 NOTE — PROGRESS NOTES
NN Transitions of Care Note:    Hospital Follow Up for [facility] Admission from 12/8/17 - 12/11/17 for CHF. Pt d/c home with [facility] for PT,OT,SN. RRAT score: 27 High    Advance Medical Directive on file in EMR? no has an advanced directive - a copy HAS NOT been provided. This represents Transitions of Care because NN spoke with patient and/or caregiver within 1 business days of discharge. Pt's TCM follow up appt is scheduled with Dr. Pranav Yap as noted below and is within 4 days of discharge. Future Appointments  Date Time Provider Tristan Beck   12/14/2017 3:00 PM Aníbal Kong  [address]   12/18/2017 1:20 PM Frederick Webb MD [facility] [address]   1/9/2018 2:20 PM Frederick Webb MD [address] [facility]     Called patient on 12/12/17 and verified with 2 identifiers. Says she doesn't feel well d/t lack of sleep and constipation. Says her SOB is better. Pt states she is not weighing daily d/t not being able to see the scale. Doesn't sound like she is willing to change this. Says her ankles \"aren't pretty\", but not swollen. Reviewed meds. She is very knowledgeable about what she takes. She currently has an aide staying with her. HH was currently at house for therapy. Summary of patients top three problems:     Problem 1: Constipation   Pt will have BM return to normal within 3 days. On Linzess (3-4 yrs now) and takes MOM  at . She is trying to drink more water. Told her she needs to be careful about how  much water she drinks since her heart isn't as strong as it used to be. Explained to her  that she just needs to stay hydrated. Encouraged her to drink six 8oz of water daily. Says this is a lot for her. Recommended a stool softener, but she really doesn't want  to take  another medication. Problem 2: Risk of Falls   Pt will not fall x 30 days. Pt will work with PT and OT to get stronger. Pt will use DME  when ambulating in house.   Pt will ask aides for assistance with ADLs. Admitted to Hospitalist Service with consults from Cardiology. Course of current Hospitalization (referenced by Gómez Birmingham MD note dated 12/11/17): Concern for Acute on chronic diastolic heart failure: Preserved EF; NYHA class 2/3  She has not been complaint with her lasix  CXR shows probably venous congestion with borderline interstitial pulmonary edema. Pro BNP 3786  Echo: EF 60-65% with no obvious wall motion abnormalities. LA/RA- Mod dilation  Continue aspirin, coreg, lasix. Cardiology eval appreciated. Switch I.V lasix to PO lasix due to rising creating  Continue lasix 40 mg Po daily and klor-con 20 mg po daily. F/u with PCP/cardiology as an outpatient.      SOB: probably due to above in the setting of anemia. Resolved  Oxygen sat stable  improved      DM type 2; BG is stable  Continue ISS and amaryl       Iron deficiency Anemia: Iron panel consistent with iron def anemia. Continue ferrous sulfate  H/H stable      Bilateral lower extremity edema: Continue lasix  Compression stockings      Hyponatremia: Due to lasix    Hypokalemia: Continue kcl 40 mg po daily. Keep k > 4   Monitor. resolved      Renal insufficiency: Due to lasix  Switch I.V lasix to PO  Resolved         Follow up With Details Comments Contact [facility] Office on Aging     [place] [address]  [phone]     Teodoro Vallecillo MD On 12/14/2017 at 3:00 pm [address] [facility] health PT, OT, SN/ If you have not heard from this agency by 12 noon on the day after discharge, please call them. [address].  [address] Sharma MD   Monday, 12/18/17 at 1:20pm. [address] orders at discharge? yes If yes, what agency and what services?  [facility], PT,OT,SN Medication Reconciliation completed: yes            CONTINUE these medications which have CHANGED     Details   potassium chloride SR (KLOR-CON 10) 10 mEq tablet Take 2 Tabs by mouth daily. Only when furosemide taken  Indications: hypokalemia prevention  Qty: 1 Tab, Refills: 0     Associated Diagnoses: SOB (shortness of breath) on exertion; Leg swelling                 CONTINUE these medications which have NOT CHANGED     Details   aspirin delayed-release 81 mg tablet Take 1 Tab by mouth daily. Qty: 30 Tab, Refills: 11     Associated Diagnoses: Chronic atrial fibrillation (HCC)       linaclotide (LINZESS) 145 mcg cap capsule Take 1 Cap by mouth Daily (before breakfast). Per dr Lizett Hutchinson: 1 Cap, Refills: 0     Associated Diagnoses: Unspecified constipation       BETA-CAROTENE,A,-VITS C,E/MINS (EYE HEALTH PO) Take  by mouth daily.       CHOLECALCIFEROL, VITAMIN D3, (VITAMIN D3 PO) Take  by mouth daily.       glucose blood VI test strips (FREESTYLE TEST) strip TEST 2 TIMES DAILY. Dx E11.42  Qty: 100 Strip, Refills: 11     Comments: Use freestyle lite test strips       fluocinoNIDE (LIDEX) 0.05 % topical cream Apply  to affected area two (2) times daily as needed for Skin Irritation. Qty: 30 g, Refills: 3     Associated Diagnoses: Dermatitis       OTHER         loratadine (CLARITIN) 10 mg tablet Take 1 Tab by mouth daily as needed for Allergies. Qty: 30 Tab, Refills: 11     Associated Diagnoses: Other allergic rhinitis       Lancets misc 1 Package by Does Not Apply route daily. Test glucose daily and as needed Dx type 2 dm E11.42  Qty: 1 Package, Refills: 6       glimepiride (AMARYL) 1 mg tablet Take 1 Tab by mouth Daily (before breakfast). Take only if sugar is above 140  Qty: 1 Tab, Refills: 11       magnesium hydroxide (MILK OF MAGNESIA) 2,400 mg/10 mL susp suspension Take 10 mL by mouth nightly.   Qty: 1 Bottle, Refills: 11     Associated Diagnoses: Unspecified constipation       BIOTIN PO Take 5,000 mcg by mouth daily.                STOP taking these medications         amLODIPine (NORVASC) 5 mg tablet Comments:   Reason for Stopping:            bumetanide (BUMEX) 1 mg tablet Comments:   Reason for Stopping:                New medications at discharge include            START taking these medications     Details   carvedilol (COREG) 6.25 mg tablet Take 1 Tab by mouth two (2) times daily (with meals). Indications: Chronic Heart Failure, hypertension  Qty: 30 Tab, Refills: 2       ferrous sulfate 325 mg (65 mg iron) tablet Take 1 Tab by mouth daily (with breakfast). Qty: 30 Tab, Refills: 2       furosemide (LASIX) 40 mg tablet Take 1 Tab by mouth daily.   Qty: 30 Tab, Refills: 2     Prescription Medication total: 6 (pharmacy consult for polypharm needed?) no     Adherence to previous treatment and likelihood for f/u: Good

## 2017-12-11 NOTE — PROGRESS NOTES
Bedside and Verbal shift change report given to Mary Centeno RN (oncoming nurse) by Hamp Pallas, RN (offgoing nurse). Report included the following information SBAR, Kardex, MAR, Recent Results and Cardiac Rhythm Afit.

## 2017-12-11 NOTE — DISCHARGE SUMMARY
Discharge Summary       PATIENT ID: Eduardo Santos  MRN: [id number]   YOB: 1922    DATE OF ADMISSION: 12/6/2017  4:39 PM    DATE OF DISCHARGE: 12/11/2017  PRIMARY CARE PROVIDER: Gaurav Ruelas MD       DISCHARGING PHYSICIAN: Jean Osorio MD    To contact this individual call [phone] and ask the  to page. If unavailable ask to be transferred the Adult Hospitalist Department. CONSULTATIONS: IP CONSULT TO HOSPITALIST  IP CONSULT TO CARDIOLOGY    PROCEDURES/SURGERIES: * No surgery found *    ADMITTING [address] [place] [address] COURSE:     She is a 80year old  female with past medical history of volume overload, lower extremity edema, anxiety disorder, diabetes mellitus, chronic atrial fibrillation who presented to the ER with the complaint of SOB. CXR done showed boderline interstitial pulmonary edema. She was admitted for acute on chronic heart failure. Echocardiogram done EF of 60-65% with no abvious well motion abnormalities. Cardiology was consulted. She was diurese with lasix on admission with improvement of her symptoms. Her Lower extremity edema also improved. She will follow up with cardiology as an out-patient. Patient was discharged home with home gaurav services including Physical therapy and skilled nursing services. DISCHARGE DIAGNOSES / PLAN:      Concern for Acute on chronic diastolic heart failure: Preserved EF; NYHA class 2/3  She has not been complaint with her lasix  CXR shows probably venous congestion with borderline interstitial pulmonary edema. Pro BNP 3786  Echo: EF 60-65% with no obvious wall motion abnormalities. LA/RA- Mod dilation  Continue aspirin, coreg, lasix. Cardiology eval appreciated. Switch I.V lasix to PO lasix due to rising creating  Continue lasix 40 mg Po daily and klor-con 20 mg po daily. F/u with PCP/cardiology as an outpatient.     SOB: probably due to above in the setting of anemia.   Resolved  Oxygen sat stable  improved     DM type 2; BG is stable  Continue ISS and amaryl      Iron deficiency Anemia: Iron panel consistent with iron def anemia. Continue ferrous sulfate  H/H stable     Bilateral lower extremity edema: Continue lasix  Compression stockings     Hyponatremia: Due to lasix  Hypokalemia: Continue kcl 40 mg po daily. Keep k > 4   Monitor. resolved     Renal insufficiency: Due to lasix  Switch I.V lasix to PO  Resolved    Disposition; Patient is hemodynamically stable and has improved. PENDING TEST RESULTS:   At the time of discharge the following test results are still pending:    FOLLOW UP APPOINTMENTS:    Follow-up Information     Follow up With Details Comments Contact [facility] Office on Aging   [address]    Rosalinda Gregorio MD On 12/14/2017 at 3:00 pm [address] PT, OT, SN/ If you have not heard from this agency by 12 noon on the day after discharge, please call them. [address] [facility] [address], MD  Monday, 12/18/17 at 1:20pm. [address] [place] [address]  [phone]             ADDITIONAL CARE RECOMMENDATIONS:     DIET: Diabetic Diet    ACTIVITY: Activity as tolerated    WOUND CARE: none    EQUIPMENT needed:       DISCHARGE MEDICATIONS:  Current Discharge Medication List      START taking these medications    Details   carvedilol (COREG) 6.25 mg tablet Take 1 Tab by mouth two (2) times daily (with meals). Indications: Chronic Heart Failure, hypertension  Qty: 30 Tab, Refills: 2      ferrous sulfate 325 mg (65 mg iron) tablet Take 1 Tab by mouth daily (with breakfast). Qty: 30 Tab, Refills: 2      furosemide (LASIX) 40 mg tablet Take 1 Tab by mouth daily.   Qty: 30 Tab, Refills: 2         CONTINUE these medications which have CHANGED    Details potassium chloride SR (KLOR-CON 10) 10 mEq tablet Take 2 Tabs by mouth daily. Only when furosemide taken  Indications: hypokalemia prevention  Qty: 1 Tab, Refills: 0    Associated Diagnoses: SOB (shortness of breath) on exertion; Leg swelling         CONTINUE these medications which have NOT CHANGED    Details   aspirin delayed-release 81 mg tablet Take 1 Tab by mouth daily. Qty: 30 Tab, Refills: 11    Associated Diagnoses: Chronic atrial fibrillation (HCC)      linaclotide (LINZESS) 145 mcg cap capsule Take 1 Cap by mouth Daily (before breakfast). Per dr Antonia Schaefer [place]: 1 Cap, Refills: 0    Associated Diagnoses: Unspecified constipation      BETA-CAROTENE,A,-VITS C,E/MINS (EYE HEALTH PO) Take  by mouth daily. CHOLECALCIFEROL, VITAMIN D3, (VITAMIN D3 PO) Take  by mouth daily. glucose blood VI test strips (FREESTYLE TEST) strip TEST 2 TIMES DAILY. Dx E11.42  Qty: 100 Strip, Refills: 11    Comments: Use freestyle lite test strips      fluocinoNIDE (LIDEX) 0.05 % topical cream Apply  to affected area two (2) times daily as needed for Skin Irritation. Qty: 30 g, Refills: 3    Associated Diagnoses: Dermatitis      OTHER       loratadine (CLARITIN) 10 mg tablet Take 1 Tab by mouth daily as needed for Allergies. Qty: 30 Tab, Refills: 11    Associated Diagnoses: Other allergic rhinitis      Lancets misc 1 Package by Does Not Apply route daily. Test glucose daily and as needed Dx type 2 dm E11.42  Qty: 1 Package, Refills: 6      glimepiride (AMARYL) 1 mg tablet Take 1 Tab by mouth Daily (before breakfast). Take only if sugar is above 140  Qty: 1 Tab, Refills: 11      magnesium hydroxide (MILK OF MAGNESIA) 2,400 mg/10 mL susp suspension Take 10 mL by mouth nightly. Qty: 1 Bottle, Refills: 11    Associated Diagnoses: Unspecified constipation      BIOTIN PO Take 5,000 mcg by mouth daily.          STOP taking these medications       amLODIPine (NORVASC) 5 mg tablet Comments:   Reason for Stopping:         bumetanide (BUMEX) 1 mg tablet Comments:   Reason for Stopping:                 NOTIFY YOUR PHYSICIAN FOR ANY OF THE FOLLOWING:   Fever over 101 degrees for 24 hours. Chest pain, shortness of breath, fever, chills, nausea, vomiting, diarrhea, change in mentation, falling, weakness, bleeding. Severe pain or pain not relieved by medications. Or, any other signs or symptoms that you may have questions about. DISPOSITION:  x  Home With:   OT  PT  HH  RN       Long term SNF/Inpatient Rehab    Independent/assisted living    Hospice    Other:       PATIENT CONDITION AT DISCHARGE:     Functional status    Poor    x Deconditioned     Independent      Cognition   x  Lucid     Forgetful     Dementia      Catheters/lines (plus indication)    Cifuentes     PICC     PEG    x None      Code status    x Full code     DNR      PHYSICAL EXAMINATION AT DISCHARGE:   Refer to Progress Note      CHRONIC MEDICAL DIAGNOSES:  Problem List as of 12/11/2017  Date Reviewed: 12/6/2017          Codes Class Noted - Resolved    * (Principal)CHF (congestive heart failure) ([facility] ICD-10-CM: I50.9  ICD-9-CM: 428.0  12/6/2017 - Present        Anxiety ICD-10-CM: F41.9  ICD-9-CM: 300.00  10/2/2017 - Present        Allergic rhinitis ICD-10-CM: J30.9  ICD-9-CM: 477.9  4/11/2016 - Present        Chronic atrial fibrillation ([facility].) ICD-10-CM: I48.2  ICD-9-CM: 427.31  4/8/2016 - Present        Bleeding nose ICD-10-CM: R04.0  ICD-9-CM: 375. 7  Unknown - Present        Advanced directives, counseling/discussion ICD-10-CM: Z71.89  ICD-9-CM: V65.49  3/15/2016 - Present        Type 2 diabetes, controlled, with neuropathy ([facility].) ICD-10-CM: E11.40  ICD-9-CM: 250.60, 357.2  1/4/2016 - Present        Essential hypertension, benign ICD-10-CM: I10  ICD-9-CM: 401.1  6/4/2015 - Present        Edema ICD-10-CM: R60.9  ICD-9-CM: 782.3  11/12/2014 - Present    Overview Signed 4/8/2016  5:36 PM by Rufus Almeida MD     4/16 echo normal lvef, lae, mild tr             Headache(784.0) ICD-10-CM: R51  ICD-9-CM: 784.0  11/12/2014 - Present        Diverticulosis of colon (without mention of hemorrhage) ICD-10-CM: K57.30  ICD-9-CM: 562.10  11/12/2014 - Present        Constipation ICD-10-CM: K59.00  ICD-9-CM: 564.00  11/12/2014 - Present        Cyst of left kidney ICD-10-CM: N28.1  ICD-9-CM: 753.10  11/12/2014 - Present        RESOLVED: Diabetes mellitus type 2, controlled, without complications ([facility] [id number]: E11.9  ICD-9-CM: 250.00  1/4/2016 - 3/15/2016        RESOLVED: Elevated blood pressure reading without diagnosis of hypertension ICD-10-CM: R03.0  ICD-9-CM: 796.2  1/21/2015 - 9/1/2015        RESOLVED: Type II or unspecified type diabetes mellitus with unspecified complication, not stated as uncontrolled ICD-10-CM: E11.8  ICD-9-CM: 250.90  11/12/2014 - 11/2/2015              Greater than 30 minutes were spent with the patient on counseling and coordination of care    Signed:   Sean Ma MD  12/11/2017  12:52 PM

## 2017-12-11 NOTE — PROGRESS NOTES
Occupational Therapy Note 1210    Orders acknowledged, chart reviewed. Spoke with RN, patient planning to d/c home today with family and aides assistance. Patient ambulating 100' CGA using RW with PT. Patient has assist for all ADLs. Patient is at her functional baseline and has no further acute OT needs at this time. Will complete orders. Thank you.     Remi Wooten OTR/ALTAGRACIA

## 2017-12-11 NOTE — PROGRESS NOTES
Problem: Falls - Risk of  Goal: *Absence of Falls  Document Polo Fall Risk and appropriate interventions in the flowsheet.    Outcome: Progressing Towards Goal  Fall Risk Interventions:  Mobility Interventions: Assess mobility with egress test, Bed/chair exit alarm, Communicate number of staff needed for ambulation/transfer, OT consult for ADLs, Patient to call before getting OOB, PT Consult for mobility concerns         Medication Interventions: Assess postural VS orthostatic hypotension, Bed/chair exit alarm, Evaluate medications/consider consulting pharmacy, Patient to call before getting OOB, Teach patient to arise slowly    Elimination Interventions: Bed/chair exit alarm, Call light in reach, Patient to call for help with toileting needs

## 2017-12-11 NOTE — PROGRESS NOTES
Problem: Falls - Risk of  Goal: *Absence of Falls  Document Polo Fall Risk and appropriate interventions in the flowsheet.    Fall Risk Interventions:  Mobility Interventions: Assess mobility with egress test, Bed/chair exit alarm, Communicate number of staff needed for ambulation/transfer, OT consult for ADLs, Patient to call before getting OOB, PT Consult for mobility concerns         Medication Interventions: Assess postural VS orthostatic hypotension, Bed/chair exit alarm, Evaluate medications/consider consulting pharmacy, Patient to call before getting OOB, Teach patient to arise slowly    Elimination Interventions: Bed/chair exit alarm, Call light in reach, Patient to call for help with toileting needs

## 2017-12-11 NOTE — INTERDISCIPLINARY ROUNDS
IDR/SLIDR Summary          Patient: Dante Paulino MRN: [id number]    Age: 80 y.o. YOB: 1922 Room/Bed: [facility]   Admit Diagnosis: CHF (congestive heart failure) (HCC)  Principal Diagnosis: CHF (congestive heart failure) (HCC)   Goals: discharge  Readmission: NO  Quality Measure: CHF  VTE Prophylaxis: Mechanical  Influenza Vaccine screening completed? YES  Pneumococcal Vaccine screening completed? NO  Mobility needs: Yes   Nutrition plan:Yes  Consults:P.T, O.T. and Case Management    Financial concerns:No  Escalated to CM? YES  RRAT Score: 27   Interventions:  Testing due for pt today?  NO  LOS: 5 days Expected length of stay 6 days  Discharge plan: TBD   PCP: Braulio Saini MD  Transportation needs: Yes    Days before discharge:one day until discharge   Discharge disposition: Home    Signed:     Yanira Kemp  12/11/2017  7:47 AM

## 2017-12-11 NOTE — DISCHARGE INSTRUCTIONS
Discharge Instructions       PATIENT ID: Delmi Jacobo  MRN: [id number]   YOB: 1922    DATE OF ADMISSION: 12/6/2017  4:39 PM    DATE OF DISCHARGE: 12/11/2017    PRIMARY CARE PROVIDER: Samantha Land MD       DISCHARGING PHYSICIAN: Gómez Birmingham MD    To contact this individual call [phone] and ask the  to page. If unavailable ask to be transferred the Adult Hospitalist Department. DISCHARGE DIAGNOSES : Acute on chronic diastolic heart failure, Shortness of breath    CONSULTATIONS: IP CONSULT TO HOSPITALIST  IP CONSULT TO CARDIOLOGY    PROCEDURES/SURGERIES: * No surgery found *    PENDING TEST RESULTS:   At the time of discharge the following test results are still pending:     FOLLOW UP APPOINTMENTS:   Follow-up Information     Follow up With Details Comments Contact [facility] Office on Aging   [facility]    Teodoro Vallecillo MD On 12/14/2017 at 3:00 pm [address] PT, OT, SN/ If you have not heard from this agency by 12 noon on the day after discharge, please call them. 719 [facility] [address], MD  Monday, 12/18/17 at 1:20pm. [address]  [phone]             ADDITIONAL CARE RECOMMENDATIONS:     DIET: Cardiac Diet and Diabetic Diet    ACTIVITY: Activity as tolerated    WOUND CARE: none    EQUIPMENT needed:       DISCHARGE MEDICATIONS:   See Medication Reconciliation Form    · It is important that you take the medication exactly as they are prescribed. · Keep your medication in the bottles provided by the pharmacist and keep a list of the medication names, dosages, and times to be taken in your wallet. · Do not take other medications without consulting your doctor.        NOTIFY [facility] THE FOLLOWING:   Fever over 101 degrees for 24 hours. Chest pain, shortness of breath, fever, chills, nausea, vomiting, diarrhea, change in mentation, falling, weakness, bleeding. Severe pain or pain not relieved by medications. Or, any other signs or symptoms that you may have questions about. DISPOSITION:  x  Home With:   OT  PT  HH  RN       SNF/Inpatient Rehab/LTAC    Independent/assisted living    Hospice    Other:     CDMP Checked:    Yes X       Signed:   Tiffany Dockery MD  12/11/2017  1:03 PM

## 2017-12-11 NOTE — PROGRESS NOTES
CM noted that [facility] is able to accept this pt for home care. Pt to be discharged today.  Sonny Rosales

## 2017-12-12 ENCOUNTER — HOME CARE VISIT (OUTPATIENT)
Dept: SCHEDULING | Facility: HOME HEALTH | Age: 82
End: 2017-12-12
Payer: MEDICARE

## 2017-12-12 ENCOUNTER — PATIENT OUTREACH (OUTPATIENT)
Dept: CARDIOLOGY CLINIC | Age: 82
End: 2017-12-12

## 2017-12-12 VITALS
RESPIRATION RATE: 16 BRPM | DIASTOLIC BLOOD PRESSURE: 66 MMHG | HEART RATE: 67 BPM | TEMPERATURE: 98.7 F | SYSTOLIC BLOOD PRESSURE: 132 MMHG | OXYGEN SATURATION: 96 %

## 2017-12-12 PROCEDURE — G0299 HHS/HOSPICE OF RN EA 15 MIN: HCPCS

## 2017-12-12 PROCEDURE — 3331090002 HH PPS REVENUE DEBIT

## 2017-12-12 PROCEDURE — 3331090001 HH PPS REVENUE CREDIT

## 2017-12-12 PROCEDURE — 400013 HH SOC

## 2017-12-12 PROCEDURE — G0151 HHCP-SERV OF PT,EA 15 MIN: HCPCS

## 2017-12-12 RX ORDER — GLIMEPIRIDE 1 MG/1
1 TABLET ORAL DAILY
Qty: 30 TAB | Refills: 3 | Status: SHIPPED | OUTPATIENT
Start: 2017-12-12 | End: 2019-01-15 | Stop reason: SDUPTHER

## 2017-12-12 NOTE — PROGRESS NOTES
Tito Velasco RN NN contacted the patient by telephone to perform CHF Follow Up. Ms. Storm Rodriguez came to [facility] because of difficulty breathing; which was escalating and because of lower leg swelling - she was in contact with her primary MD - Her RRAT was noted to be high at 27. Verified  and address as identifiers. ?  Noted Priorities/Plan:    Concern for Acute on chronic diastolic heart failure: Preserved EF; NYHA class 2/3        She has not been complaint with her lasix        CXR shows probably venous congestion with borderline interstitial pulmonary edema. Pro BNP 3786        Echo: EF 60-65% with no obvious wall motion abnormalities. LA/RA- Mod dilation        Continue aspirin, coreg, lasix. Cardiology eval appreciated. Switch I.V lasix to PO lasix due to rising creating        Continue lasix 40 mg Po daily and klor-con 20 mg po daily. F/u with PCP/cardiology as an outpatient. SOB: probably due to above in the setting of anemia. Resolved  Oxygen sat stable  improved      DM type 2; BG is stable  Continue ISS and amaryl       Iron deficiency Anemia: Iron panel consistent with iron def anemia. Continue ferrous sulfate  H/H stable      Bilateral lower extremity edema: Continue lasix  Compression stockings      Hyponatremia: Due to lasix  Hypokalemia: Continue kcl 40 mg po daily. Keep k > 4 (Pt d/c on Klor Con 20 meq)  Monitor. resolved      Renal insufficiency: Due to lasix- D/c on Lasix 40 mg every day - (don't skip, miss)  Switch I.V lasix to PO  Resolved  ? Provider Notified: n/a  Pt reports lower leg edema - needs compression stockings - notified provider  ? Zone:Green  ? Goals    ?  Attends follow-up appointments as directed. ?   ? ? ? ?   ? ? 17 - Pt had cardiology follow up  - Stacey Low; pcp follow up .    ?  Knowledge and adherence of prescribed medication (ie. action, side effects, missed dose, etc.). ?   ? ? ? ?   ? 17  Compliance with meds - Lasix 40 mg every day - Klor Con 20 meq every day, Coreg 6.25 mg bid; stop Norvasc and bumex    ?  Prevent complications post hospitalization. ?   ? ? ? ?   ? ? 2017 Home health - nursing, PT/ OT / condition mgmt, safety and mobility in the home - Medication reconciliation. ?  Supportive resources in place to maintain patient in the community (ie. Home Health, DME equipment, refer to, medication assistant plan, etc.) ?   ? ? ? ?   ? ? 17 -Pt has help in home - self pay 10-2p M-; she is alone otherwise - family in area - but not involved per pt -  staff/ nursing, for condition mgmt; PT for strength, mobility/ OT for safety and functionality. Life Alert - safety device - to be evaluated - discussed and recommended. ?   ?? Needs addressed from pathway: Discharge 17 - NN call 17 - follow up 17 - cardiology  PCP: [address] [place] [address]  Cardiologist: Dr. Cal Quinonez st: Cardiology Follow up appointment with Cardiology (1-3 days): Dr. Stacey Low 17   Follow up appointment with PCP (within 14 days): [facility] [place] [facility]  Cardiologist consult while IP: Yes - CAV - Stacey Low  ? EF: 60-67% - echo 2017. Type of HF:??HFpER  Cardiac Device present:None  Heart Failure Medications: Lasix 40 mg , Klor Con 20 meq, Coreg 6.25 mg bid; stop Bumex and Amlodipine - JOYCE? Disposition of patient:? Home with care aids - 10a -2p M-F  ?  Services/Tele Monitorin [address], PT/OT  Social support: Paid  care M-F 10a-2p  Do you have a Scale: ? Yes - but cannot see it - Will give scale at office visit - encouraged pt to enlist aides to help her monitor. How often do you weigh:? to start daily  Does patient have an Advance Directive: - No - will provide information   ?   Patient reminded that there are physicians on call 24 hours a day / 7 days a week (M- 5pm to 8am and from Friday 5pm until Monday 8a for the weekend) should the patient have questions or concerns. ? Patient reminded to call 911 if situation is emergent or patient feels the situation is emergent. ? Pt verbalizes understanding.  ______________________________________________________________________________________________________________________     Note to providers - Kenan/ Manoj re: areas of concern / pt needs/ and appts.     Ronnell Restrepo [facility]  Honoring Choices facilitator   [address]  [phone]

## 2017-12-13 ENCOUNTER — TELEPHONE (OUTPATIENT)
Dept: INTERNAL MEDICINE CLINIC | Age: 82
End: 2017-12-13

## 2017-12-13 VITALS
DIASTOLIC BLOOD PRESSURE: 60 MMHG | RESPIRATION RATE: 17 BRPM | HEART RATE: 72 BPM | OXYGEN SATURATION: 97 % | SYSTOLIC BLOOD PRESSURE: 122 MMHG

## 2017-12-13 DIAGNOSIS — I48.20 CHRONIC ATRIAL FIBRILLATION (HCC): ICD-10-CM

## 2017-12-13 PROCEDURE — 3331090002 HH PPS REVENUE DEBIT

## 2017-12-13 PROCEDURE — 3331090001 HH PPS REVENUE CREDIT

## 2017-12-13 RX ORDER — ASPIRIN 81 MG/1
81 TABLET ORAL DAILY
Qty: 30 TAB | Refills: 11
Start: 2017-12-13 | End: 2022-01-01

## 2017-12-13 RX ORDER — RANITIDINE 150 MG/1
TABLET, FILM COATED ORAL
Qty: 1 TAB | Refills: 0
Start: 2017-12-13 | End: 2017-12-19 | Stop reason: ALTCHOICE

## 2017-12-13 NOTE — TELEPHONE ENCOUNTER
Pt called - confused about 2 medications - wants to clarify with MD. She use to take asa every other day but now has on her med list after dc from hospital has daily. Also she takes Zantac and this is not listed on med list - she takes as needed. Reviewing chart this was last filled back 9/20/17 to take daily. She does not want to do that - wants to take as needed. Discussed with MD - he wants pt to take asa daily and she may take her zantac as needed. Med list updated.  Will forward to MD.

## 2017-12-14 ENCOUNTER — OFFICE VISIT (OUTPATIENT)
Dept: CARDIOLOGY CLINIC | Age: 82
End: 2017-12-14

## 2017-12-14 ENCOUNTER — PATIENT OUTREACH (OUTPATIENT)
Dept: INTERNAL MEDICINE CLINIC | Age: 82
End: 2017-12-14

## 2017-12-14 VITALS
HEART RATE: 60 BPM | SYSTOLIC BLOOD PRESSURE: 112 MMHG | WEIGHT: 190 LBS | HEIGHT: 59 IN | DIASTOLIC BLOOD PRESSURE: 70 MMHG | BODY MASS INDEX: 38.3 KG/M2

## 2017-12-14 DIAGNOSIS — R60.0 LOCALIZED EDEMA: ICD-10-CM

## 2017-12-14 DIAGNOSIS — I48.20 CHRONIC ATRIAL FIBRILLATION (HCC): Primary | ICD-10-CM

## 2017-12-14 DIAGNOSIS — R06.02 SOB (SHORTNESS OF BREATH) ON EXERTION: ICD-10-CM

## 2017-12-14 DIAGNOSIS — I10 ESSENTIAL HYPERTENSION, BENIGN: ICD-10-CM

## 2017-12-14 DIAGNOSIS — F41.9 ANXIETY: ICD-10-CM

## 2017-12-14 PROCEDURE — 3331090002 HH PPS REVENUE DEBIT

## 2017-12-14 PROCEDURE — 3331090001 HH PPS REVENUE CREDIT

## 2017-12-14 NOTE — PROGRESS NOTES
[address] Follow Up Phone Call #1    Called pt. Verified her with 2 identifiers. States her constipation is better. Still planning on attending her appt this afternoon with Dr Lauren Chapman. Goals      Advanced Medical Directive            12/14/17 - Pt will bring in her AMD to review. Pt states she has a document. Asked she bring to her appt. Pt said \"I am not guaranteeing that I will bring it in, since I don't like what it says. I may just tear it up. I don't know why you need a copy of it. \"  Explained to her that we can make any necessary changes at office. Stef Dave, RN, BSN, [facility]  Nurse Navigator  DRE         Constipation Relief            12/12/17 - Pt will have BM return to normal within 3 days. On Linzess (3-4 yrs now) and takes MOM at HS. She is trying to drink more water. Told her she needs to be careful about how much water she drinks since her heart isn't as strong as it used to be. Explained to her that she just needs to stay hydrated. Encouraged her to drink six 8oz of water daily. Says this is a lot for her. Recommended a stool softener, but she really doesn't want to take another medication. 12/14/17 - Pt states her constipation is better. Stef Dave, RN, BSN, [facility]  Nurse Navigator  DRE         Risk for Falls            12/12/17 - Pt will not fall x 30 days. Pt will work with PT and OT to get stronger. Pt will use DME when ambulating in house. Pt will ask aides for assistance with ADLs.    Stef Dave, RN, BSN, [facility]  Nurse Navigator  Erasto Walsh

## 2017-12-14 NOTE — PROGRESS NOTES
HISTORY OF PRESENT ILLNESS  Loida Fuentes is a 80 y.o. female     SUMMARY:   Problem List  Date Reviewed: 12/14/2017          Codes Class Noted    CHF (congestive heart failure) (HCC) ICD-10-CM: I50.9  ICD-9-CM: 428.0  12/6/2017        Anxiety ICD-10-CM: F41.9  ICD-9-CM: 300.00  10/2/2017        Allergic rhinitis ICD-10-CM: J30.9  ICD-9-CM: 477.9  4/11/2016        Chronic atrial fibrillation ([facility] ICD-10-CM: I48.2  ICD-9-CM: 427.31  4/8/2016        Bleeding nose ICD-10-CM: R04.0  ICD-9-CM: 367. 7  Unknown        Advanced directives, counseling/discussion ICD-10-CM: Z71.89  ICD-9-CM: V65.49  3/15/2016        Type 2 diabetes, controlled, with neuropathy ([facility] ICD-10-CM: E11.40  ICD-9-CM: 250.60, 357.2  1/4/2016        Essential hypertension, benign ICD-10-CM: I10  ICD-9-CM: 401.1  6/4/2015        Edema ICD-10-CM: R60.9  ICD-9-CM: 782.3  11/12/2014    Overview Addendum 12/14/2017  8:24 AM by James Bartholomew MD     4/16 echo normal lvef, lae, mild tr  12/17 echo normal lvef, elisha, mild to mod mr and tr, pa pressure 50mm             Headache(784.0) ICD-10-CM: R51  ICD-9-CM: 784.0  11/12/2014        Diverticulosis of colon (without mention of hemorrhage) ICD-10-CM: K57.30  ICD-9-CM: 562.10  11/12/2014        Constipation ICD-10-CM: K59.00  ICD-9-CM: 564.00  11/12/2014        Cyst of left kidney ICD-10-CM: N28.1  ICD-9-CM: 753.10  11/12/2014              Current Outpatient Prescriptions on File Prior to Visit   Medication Sig    aspirin delayed-release 81 mg tablet Take 1 Tab by mouth daily.  raNITIdine (ZANTAC) 150 mg tablet As needed.  glimepiride (AMARYL) 1 mg tablet Take 1 Tab by mouth daily. Take only if sugar is above 140    potassium chloride SR (KLOR-CON 10) 10 mEq tablet Take 2 Tabs by mouth daily. Only when furosemide taken  Indications: hypokalemia prevention    carvedilol (COREG) 6.25 mg tablet Take 1 Tab by mouth two (2) times daily (with meals).  Indications: Chronic Heart Failure, hypertension    ferrous sulfate 325 mg (65 mg iron) tablet Take 1 Tab by mouth daily (with breakfast).  furosemide (LASIX) 40 mg tablet Take 1 Tab by mouth daily.  guaiFENesin (ROBITUSSIN MUCUS-CHEST CONGEST) 100 mg/5 mL liquid Take 5 mL by mouth three (3) times daily as needed for Cough. Indications: Cough    BETA-CAROTENE,A,-VITS C,E/MINS (EYE HEALTH PO) Take  by mouth daily.  CHOLECALCIFEROL, VITAMIN D3, (VITAMIN D3 PO) Take  by mouth daily.  glucose blood VI test strips (FREESTYLE TEST) strip TEST 2 TIMES DAILY. Dx E11.42    fluocinoNIDE (LIDEX) 0.05 % topical cream Apply  to affected area two (2) times daily as needed for Skin Irritation.  OTHER     loratadine (CLARITIN) 10 mg tablet Take 1 Tab by mouth daily as needed for Allergies.  Lancets misc 1 Package by Does Not Apply route daily. Test glucose daily and as needed Dx type 2 dm E11.42    linaclotide (LINZESS) 145 mcg cap capsule Take 1 Cap by mouth Daily (before breakfast). Per dr Ortega Matthew    magnesium hydroxide (MILK OF MAGNESIA) 2,400 mg/10 mL susp suspension Take 10 mL by mouth nightly.  BIOTIN PO Take 5,000 mcg by mouth daily. No current facility-administered medications on file prior to visit. CARDIOLOGY STUDIES TO DATE:  4/16 echo normal lvef, lae, mild tr  12/17 echo normal lvef, elisha, mild to mod mr and tr, pa pressure 50mm        Chief Complaint   Patient presents with    Irregular Heart Beat     HPI :  This is a transition of care visit for Ms. Almendarez, who was recently hospitalized with volume overload. Our nurse navigator reached out to her yesterday and it is documented in her chart. She had been slowly developing edema and shortness of breath over a period of about a month. This was associated with worsening and fairly significant anemia, as well as her unwillingness to take Lasix or Bumex regularly and her inability to avoid salt.   She was diuresed in the hospital and is now wearing compression stockings and doing well. Today, our navigator provided her with a home scale, which she is going to use daily. We spent a long time talking about salt restriction and strategies in this regard with both Ms. Almendarez, her out-of-town sister who is with her now and her helper, Stella Langsamir. She says she is still not totally back to normal with some mild dyspnea on exertion, but she swears she is taking all her medications. She is concerned about a lot of nasal drainage and mucus at night and is going to discuss this with Dr. Sinan Swartz on Monday when they meet. CARDIAC ROS:   negative for chest pain, palpitations, syncope, orthopnea, paroxysmal nocturnal dyspnea, exertional chest pressure/discomfort, claudication    No family history on file. Past Medical History:   Diagnosis Date    Anxiety     Bleeding nose     Chronic atrial fibrillation (HCC)     Diabetes ([facility]     Hypertension        GENERAL ROS:  A comprehensive review of systems was negative except for that written in the HPI.     Visit Vitals    /70    Pulse 60    Ht 4' 11\" (1.499 m)    Wt 190 lb (86.2 kg)    BMI 38.38 kg/m2       Wt Readings from Last 3 Encounters:   12/14/17 190 lb (86.2 kg)   12/11/17 197 lb 14.4 oz (89.8 kg)   12/05/17 198 lb 12.8 oz (90.2 kg)            BP Readings from Last 3 Encounters:   12/14/17 112/70   12/12/17 132/66   12/12/17 122/60       PHYSICAL EXAM  General appearance: alert, cooperative, no distress, appears stated age  Neck: supple, symmetrical, trachea midline, no adenopathy, no carotid bruit and no JVD  Lungs: clear to auscultation bilaterally  Heart: irregularly irregular rhythm, S1, S2 normal, no S3 or S4  Extremities: edema tr compression hose in place    Lab Results   Component Value Date/Time    Cholesterol, total 146 03/21/2016 10:31 AM    Cholesterol, total 147 03/13/2015 02:06 PM    HDL Cholesterol 85 03/21/2016 10:31 AM    HDL Cholesterol 60 03/13/2015 02:06 PM    LDL, calculated 48 03/21/2016 10:31 AM    LDL, calculated 69 03/13/2015 02:06 PM    Triglyceride 64 03/21/2016 10:31 AM    Triglyceride 90 03/13/2015 02:06 PM     ASSESSMENT  Ms. Almendarez is stable, asymptomatic and well compensated at this point and I think if her hemoglobin comes up and she continues to avoid salt, wear compression hose and take her diuretics, her issues should be fairly easy to manage. current treatment plan is effective, no change in therapy  lab results and schedule of future lab studies reviewed with patient  reviewed diet, exercise and weight control    Encounter Diagnoses   Name Primary?  Chronic atrial fibrillation (HCC) Yes    Essential hypertension, benign     Localized edema     Anxiety     SOB (shortness of breath) on exertion      No orders of the defined types were placed in this encounter. Follow-up Disposition:  Return in about 3 months (around 3/14/2018).     Lewis Borges MD  12/14/2017

## 2017-12-14 NOTE — MR AVS SNAPSHOT
Visit Information Date & Time Provider Department Dept. Phone Encounter #  
 12/14/2017  3:00 PM Hilario Lockwood MD [facility] Stella Montez [phone] [id number] Follow-up Instructions Return in about 3 months (around 3/14/2018). Your Appointments 1/9/2018  2:20 PM  
ROUTINE CARE with Bell Madison MD  
[facility]) Appt Note: 2mo f/u  
 [address] [place]  
[address] [place]  
  
    
  
 12/18/2017  1:20 PM  
TRANSITIONAL CARE MANAGEMENT with Bell Madison MD  
[facility]) Appt Note: [facility]- Acute on Chronic DHF  
 [address] [facility] [address]  
[address] [place] Upcoming Health Maintenance Date Due  
 LIPID PANEL Q1 3/21/2017 MEDICARE YEARLY EXAM 1/31/2018 FOOT EXAM Q1 3/20/2018 HEMOGLOBIN A1C Q6M 6/6/2018 MICROALBUMIN Q1 8/3/2018 EYE EXAM RETINAL OR DILATED Q1 9/1/2018 GLAUCOMA SCREENING Q2Y 9/1/2019 DTaP/Tdap/Td series (2 - Td) 9/21/2026 Allergies as of 12/14/2017  Review Complete On: 12/14/2017 By: Hilario Lockwood MD  
  
 Severity Noted Reaction Type Reactions Amoxicillin  03/28/2014    Other (comments) \"makes me feel like I\"m going to faint\" Aspirin  03/28/2014    Palpitations Gabapentin  10/18/2017    Drowsiness * pt states this med makes her feel very drowsy , gives her a drunk feeling. Levaquin [Levofloxacin]  03/20/2015    Hives, Rash Lorazepam  10/18/2017    Other (comments) Feeling faint. Losartan  06/17/2015    Other (comments) Tongue swelling Lyrica [Pregabalin]  03/28/2014    Rash Other Medication  10/18/2017    Other (comments) Feeling faint, does not decrease pain. Sertraline  10/18/2017    Other (comments) Feeling faint. Current Immunizations  Reviewed on 10/18/2017 Name Date Influenza High Dose Vaccine PF 10/18/2017, 11/3/2016, 11/2/2015 Influenza Vaccine 9/18/2014 Pneumococcal Conjugate (PCV-13) 1/22/2015 Pneumococcal Vaccine (Unspecified Type) 11/12/2011 Zoster Vaccine, Live 1/28/2016 Not reviewed this visit You Were Diagnosed With   
  
 Codes Comments Chronic atrial fibrillation (HCC)    -  Primary ICD-10-CM: E91.0 ICD-9-CM: 427.31 Essential hypertension, benign     ICD-10-CM: I10 
ICD-9-CM: 401.1 Localized edema     ICD-10-CM: R60.0 ICD-9-CM: 278. 3 Anxiety     ICD-10-CM: F41.9 ICD-9-CM: 300.00 SOB (shortness of breath) on exertion     ICD-10-CM: R06.02 
ICD-9-CM: 786.05 Vitals BP Pulse Height(growth percentile) Weight(growth percentile) BMI OB Status 112/70 60 4' 11\" (1.499 m) 190 lb (86.2 kg) 38.38 kg/m2 Postmenopausal  
 Smoking Status Former Smoker Vitals History BMI and BSA Data Body Mass Index Body Surface Area  
 38.38 kg/m 2 1.89 m 2 Preferred Pharmacy Pharmacy Name Phone [facility] [facility] [address] [phone] Your Updated Medication List  
  
   
This list is accurate as of: 12/14/17  3:25 PM.  Always use your most recent med list.  
  
  
  
  
 aspirin delayed-release 81 mg tablet Take 1 Tab by mouth daily. BIOTIN PO Take 5,000 mcg by mouth daily. carvedilol 6.25 mg tablet Commonly known as:  Braulio Pintos Take 1 Tab by mouth two (2) times daily (with meals). Indications: Chronic Heart Failure, hypertension EYE HEALTH PO Take  by mouth daily. ferrous sulfate 325 mg (65 mg iron) tablet Take 1 Tab by mouth daily (with breakfast). fluocinoNIDE 0.05 % topical cream  
Commonly known as:  LIDEX Apply  to affected area two (2) times daily as needed for Skin Irritation. furosemide 40 mg tablet Commonly known as:  LASIX Take 1 Tab by mouth daily. glimepiride 1 mg tablet Commonly known as:  AMARYL Take 1 Tab by mouth daily. Take only if sugar is above 140  
  
 glucose blood VI test strips strip Commonly known as:  FREESTYLE TEST  
TEST 2 TIMES DAILY. Dx Y47.23  
  
 guaiFENesin 100 mg/5 mL liquid Commonly known as:  ROBITUSSIN MUCUS-CHEST CONGEST Take 5 mL by mouth three (3) times daily as needed for Cough. Indications: Cough Lancets Misc  
1 Package by Does Not Apply route daily. Test glucose daily and as needed Dx type 2 dm E11.42  
  
 linaclotide 145 mcg Cap capsule Commonly known as:  Valorie Maay Take 1 Cap by mouth Daily (before breakfast). Per dr Cj Rios  
  
 loratadine 10 mg tablet Commonly known as:  Peerless Fleeting Take 1 Tab by mouth daily as needed for Allergies. magnesium hydroxide 2,400 mg/10 mL Susp suspension Commonly known as:  MILK OF MAGNESIA Take 10 mL by mouth nightly. OTHER  
  
 potassium chloride SR 10 mEq tablet Commonly known as:  KLOR-CON 10 Take 2 Tabs by mouth daily. Only when furosemide taken  Indications: hypokalemia prevention  
  
 raNITIdine 150 mg tablet Commonly known as:  ZANTAC As needed. VITAMIN D3 PO Take  by mouth daily. Follow-up Instructions Return in about 3 months (around 3/14/2018). To-Do List   
 12/15/2017 To Be Determined Appointment with Heather Nuno PTA at [place] [address]  
  
 12/15/2017 To Be Determined Appointment with Julita Moody RN at [place] [address]  
  
 12/15/2017 10:30 AM  
  Appointment with Sumaya Mayer OT at [place] [address]  
  
 12/18/2017 To Be Determined Appointment with Heather Nuno PTA at [place] [address]  
  
 12/19/2017 To Be Determined   Appointment with Rima Hernandez LPN at [place] [address]  
  
 12/21/2017 To Be Determined Appointment with Barbara Gautam PTA at [place] [address]  
  
 12/22/2017 To Be Determined Appointment with Art Casey LPN at [place] [address]  
  
 12/26/2017 To Be Determined Appointment with Art Casey LPN at [place] [address]  
  
 12/26/2017 To Be Determined Appointment with Barbara Gautam PTA at [place] [address]  
  
 12/29/2017 To Be Determined Appointment with Tameka Ventura. ALLYSON Moody at [place] [address]  
  
 12/29/2017 To Be Determined Appointment with Barbara Gautam PTA at [place] [address]  
  
 01/02/2018 To Be Determined Appointment with Barbara Gautam PTA at [place] [address]  
  
 01/05/2018 To Be Determined Appointment with Sindi Rosas PT at [address]! Dear Zelalem Vieyra: 
Thank you for requesting a [employer] account. Our records indicate that you have previously registered for a [employer] account but its currently inactive. Please call our [employer] support line at [phone]. Additional Information If you have questions, please visit the Frequently Asked Questions section of the Ex[employer] website at [email or web address] Remember, Ex[employer] is NOT to be used for urgent needs. For medical emergencies, dial 911. Now available from your iPhone and Android! Please provide this summary of care documentation to your next provider. Your primary care clinician is listed as JOCELYN MCKEON. If you have any questions after today's visit, please call [phone].

## 2017-12-15 ENCOUNTER — HOME CARE VISIT (OUTPATIENT)
Dept: HOME HEALTH SERVICES | Facility: HOME HEALTH | Age: 82
End: 2017-12-15
Payer: MEDICARE

## 2017-12-15 ENCOUNTER — HOME CARE VISIT (OUTPATIENT)
Dept: SCHEDULING | Facility: HOME HEALTH | Age: 82
End: 2017-12-15
Payer: MEDICARE

## 2017-12-15 VITALS
HEART RATE: 50 BPM | DIASTOLIC BLOOD PRESSURE: 58 MMHG | OXYGEN SATURATION: 98 % | SYSTOLIC BLOOD PRESSURE: 106 MMHG | RESPIRATION RATE: 18 BRPM | TEMPERATURE: 98 F

## 2017-12-15 PROCEDURE — 3331090001 HH PPS REVENUE CREDIT

## 2017-12-15 PROCEDURE — G0152 HHCP-SERV OF OT,EA 15 MIN: HCPCS

## 2017-12-15 PROCEDURE — 3331090002 HH PPS REVENUE DEBIT

## 2017-12-15 PROCEDURE — G0157 HHC PT ASSISTANT EA 15: HCPCS

## 2017-12-16 ENCOUNTER — TELEPHONE (OUTPATIENT)
Dept: INTERNAL MEDICINE CLINIC | Age: 82
End: 2017-12-16

## 2017-12-16 PROCEDURE — 3331090001 HH PPS REVENUE CREDIT

## 2017-12-16 PROCEDURE — 3331090002 HH PPS REVENUE DEBIT

## 2017-12-16 NOTE — TELEPHONE ENCOUNTER
----- Message from Lillie Fan, RN sent at 12/12/2017  1:50 PM EST -----  Regarding: discharge 12/11 - to Dr. Ilia Rojo and [address] [place] [address] 12/14 - [address] [place] [address] - 12/18/ ttk  Discharge 12/11 - HFpEF - ACO pt -   Dr. Ilia Rojo - [facility] [address] - with follow up visit 12/14    NN call to pt 12/12 -   Home health in place/nursing/ PT/OT    Fall risk - I have information for her re: Life Alert - she needs one - she lives alone with help from 10-2 M-F ; weakness, frailty, debility  She needs family oversight - if they are not actively involved -    Med changes - please do careful check - as she does her meds -   Lasix 40, Klor con 20 , Coreg 6.25 bid;  amaryl 1 mg, iron; Asa -   Amlodipine and Bumex stopped - (leg swelling - ) watch rate!  With stopping Amlodipine -     She needs rx for compression stockings - [facility] - she should call before going for appt - [phone] - as they do fittings    PCP will watch re: anemia - I did see something about nose bleeds -     She needs advanced directive and determine code status wishes -     Pt given scale at cardiology follow up / Life Alert information - advanced directive information    Thanks all -  Triston Finney

## 2017-12-17 VITALS
DIASTOLIC BLOOD PRESSURE: 58 MMHG | SYSTOLIC BLOOD PRESSURE: 106 MMHG | TEMPERATURE: 98 F | HEART RATE: 50 BPM | OXYGEN SATURATION: 98 % | RESPIRATION RATE: 18 BRPM

## 2017-12-17 PROCEDURE — 3331090001 HH PPS REVENUE CREDIT

## 2017-12-17 PROCEDURE — 3331090002 HH PPS REVENUE DEBIT

## 2017-12-18 PROCEDURE — 3331090002 HH PPS REVENUE DEBIT

## 2017-12-18 PROCEDURE — 3331090001 HH PPS REVENUE CREDIT

## 2017-12-19 ENCOUNTER — HOME CARE VISIT (OUTPATIENT)
Dept: SCHEDULING | Facility: HOME HEALTH | Age: 82
End: 2017-12-19
Payer: MEDICARE

## 2017-12-19 ENCOUNTER — HOME CARE VISIT (OUTPATIENT)
Dept: HOME HEALTH SERVICES | Facility: HOME HEALTH | Age: 82
End: 2017-12-19
Payer: MEDICARE

## 2017-12-19 ENCOUNTER — OFFICE VISIT (OUTPATIENT)
Dept: INTERNAL MEDICINE CLINIC | Age: 82
End: 2017-12-19

## 2017-12-19 ENCOUNTER — TELEPHONE (OUTPATIENT)
Dept: INTERNAL MEDICINE CLINIC | Age: 82
End: 2017-12-19

## 2017-12-19 VITALS
OXYGEN SATURATION: 96 % | WEIGHT: 190.2 LBS | RESPIRATION RATE: 20 BRPM | HEART RATE: 50 BPM | SYSTOLIC BLOOD PRESSURE: 110 MMHG | HEIGHT: 59 IN | TEMPERATURE: 97.9 F | DIASTOLIC BLOOD PRESSURE: 50 MMHG | BODY MASS INDEX: 38.34 KG/M2

## 2017-12-19 DIAGNOSIS — E11.21 TYPE 2 DIABETES MELLITUS WITH NEPHROPATHY (HCC): ICD-10-CM

## 2017-12-19 DIAGNOSIS — I48.20 CHRONIC ATRIAL FIBRILLATION (HCC): ICD-10-CM

## 2017-12-19 DIAGNOSIS — I10 ESSENTIAL HYPERTENSION, BENIGN: ICD-10-CM

## 2017-12-19 DIAGNOSIS — I50.32 CHRONIC DIASTOLIC CONGESTIVE HEART FAILURE (HCC): Primary | ICD-10-CM

## 2017-12-19 PROCEDURE — G0157 HHC PT ASSISTANT EA 15: HCPCS

## 2017-12-19 PROCEDURE — 3331090002 HH PPS REVENUE DEBIT

## 2017-12-19 PROCEDURE — 3331090001 HH PPS REVENUE CREDIT

## 2017-12-19 PROCEDURE — G0158 HHC OT ASSISTANT EA 15: HCPCS

## 2017-12-19 NOTE — TELEPHONE ENCOUNTER
Called [facility] - spoke with Josh Perera in regards to order for over night pulse oximetry. She states we need to fax order, demographics and office note with diagnosis to ATTN: Josh Perera [phone]. Will forward to MD - need office note from today.

## 2017-12-19 NOTE — PROGRESS NOTES
HISTORY OF PRESENT ILLNESS  Jason Medrano is a 80 y.o. female. JELANI Rivera is seen today accompanied by her home health aide for follow up of recent hospitalization with CHF. She has other concerns as well. 1. Chronic atrial fibrillation, CHF (diastolic). This is improved. She has seen Dr. Katelynn Brandt of cardiology. She is on a low dose of Coreg as well as her regular dosage of Lasix. She is wearing compression hose for her lower leg edema. She has previously had some compliance issues. She is worried that Coreg may be giving her some side effects. She is suffering with some general malaise. She feels as if it is taking her a very long time to arouse in the mornings. She was on oxygen in the hospital. I think we should check an overnight oxygen record to see if she needs nocturnal O2.   2. Left eye vision change. She believes this is due to her difficulty waking up. 3. Diabetes. Stable. Social history notable for transitional visit being conducted by her cardiologist last week. This represents a face-to-face visit for home health and PT. She is homebound. Full medication reconciliation undertaken. See below. This was an extended visit of high complexity. MedDATA/gwo     Current Outpatient Prescriptions   Medication Sig    aspirin delayed-release 81 mg tablet Take 1 Tab by mouth daily.  glimepiride (AMARYL) 1 mg tablet Take 1 Tab by mouth daily. Take only if sugar is above 140    potassium chloride SR (KLOR-CON 10) 10 mEq tablet Take 2 Tabs by mouth daily. Only when furosemide taken  Indications: hypokalemia prevention    carvedilol (COREG) 6.25 mg tablet Take 1 Tab by mouth two (2) times daily (with meals). Indications: Chronic Heart Failure, hypertension    ferrous sulfate 325 mg (65 mg iron) tablet Take 1 Tab by mouth daily (with breakfast).  furosemide (LASIX) 40 mg tablet Take 1 Tab by mouth daily.  BETA-CAROTENE,A,-VITS C,E/MINS (EYE HEALTH PO) Take  by mouth two (2) times a day.     CHOLECALCIFEROL, VITAMIN D3, (VITAMIN D3 PO) Take  by mouth daily.  glucose blood VI test strips (FREESTYLE TEST) strip TEST 2 TIMES DAILY. Dx E11.42    fluocinoNIDE (LIDEX) 0.05 % topical cream Apply  to affected area two (2) times daily as needed for Skin Irritation.  loratadine (CLARITIN) 10 mg tablet Take 1 Tab by mouth daily as needed for Allergies.  Lancets misc 1 Package by Does Not Apply route daily. Test glucose daily and as needed Dx type 2 dm E11.42    linaclotide (LINZESS) 145 mcg cap capsule Take 1 Cap by mouth Daily (before breakfast). Per dr Antonia Aguilar    magnesium hydroxide (MILK OF MAGNESIA) 2,400 mg/10 mL susp suspension Take 10 mL by mouth nightly. No current facility-administered medications for this visit. Review of Systems   Constitutional: Negative for weight loss. Respiratory: Positive for cough and wheezing. Cardiovascular: Positive for leg swelling. Negative for chest pain, palpitations and PND. Musculoskeletal: Negative for myalgias. Neurological: Negative for focal weakness. All other systems reviewed and are negative. Physical Exam   Constitutional: No distress. Cardiovascular: Normal rate. An irregularly irregular rhythm present. Exam reveals no gallop and no friction rub. No murmur heard. Pulmonary/Chest: Effort normal and breath sounds normal.   Musculoskeletal: She exhibits edema. Mild bilateral lower extremity edema with TEDS   Nursing note and vitals reviewed. ASSESSMENT and PLAN  Diagnoses and all orders for this visit:    1. Chronic diastolic congestive heart failure (HCC)  -     AMB SUPPLY ORDER, Continue current regimen of prescription and / or OTC medications , See cardiologist as directed. 2. Type 2 diabetes mellitus with nephropathy ([facility] [address]- Continue current regimen of prescription and / or OTC medications     3. Chronic atrial fibrillation (HCC)- Continue current regimen of prescription and / or OTC medications     4. Essential hypertension, benign- Continue current regimen of prescription and / or OTC medications     5. Chronic nasal congestion- declines nasal spray due to concern re epistaxis .  Agrees to every day claritin    Plan was reviewed with patient and aide, understanding expressed

## 2017-12-19 NOTE — MR AVS SNAPSHOT
Visit Information Date & Time Provider Department Dept. Phone Encounter #  
 12/19/2017 11:45 AM Sandhya Pelletier MD [facility] Internal Medicine [phone] [id number] Follow-up Instructions Return in about 3 weeks (around 1/9/2018). Your Appointments 1/9/2018  2:20 PM  
ROUTINE CARE with Sandhya Pelletier MD  
[facility] Internal Medicine [facility]) Appt Note: 2mo f/u  
 [address]  [address] [place] [address] [address]  
[address] [facility]  
  
    
 3/14/2018  2:00 PM  
ESTABLISHED PATIENT with [place] MD Kadeem  
[facility] ([facility]) Appt Note: 3 mo f/u per Dr. Fabrizio Vargas [address]  [address] [place] [address]  
[address] [address] Upcoming Health Maintenance Date Due  
 LIPID PANEL Q1 3/21/2017 MEDICARE YEARLY EXAM 1/31/2018 FOOT EXAM Q1 3/20/2018 HEMOGLOBIN A1C Q6M 6/6/2018 MICROALBUMIN Q1 8/3/2018 EYE EXAM RETINAL OR DILATED Q1 9/1/2018 GLAUCOMA SCREENING Q2Y 9/1/2019 DTaP/Tdap/Td series (2 - Td) 9/21/2026 Allergies as of 12/19/2017  Review Complete On: 12/19/2017 By: Sandhya Pelletier MD  
  
 Severity Noted Reaction Type Reactions Amoxicillin  03/28/2014    Other (comments) \"makes me feel like I\"m going to faint\" Aspirin  03/28/2014    Palpitations Gabapentin  10/18/2017    Drowsiness * pt states this med makes her feel very drowsy , gives her a drunk feeling. Levaquin [Levofloxacin]  03/20/2015    Hives, Rash Lorazepam  10/18/2017    Other (comments) Feeling faint. Losartan  06/17/2015    Other (comments) Tongue swelling Lyrica [Pregabalin]  03/28/2014    Rash Other Medication  10/18/2017    Other (comments) Feeling faint, does not decrease pain. Sertraline  10/18/2017    Other (comments) Feeling faint. Current Immunizations  Reviewed on 10/18/2017 Name Date Influenza High Dose Vaccine PF 10/18/2017, 11/3/2016, 11/2/2015 Influenza Vaccine 9/18/2014 Pneumococcal Conjugate (PCV-13) 1/22/2015 Pneumococcal Vaccine (Unspecified Type) 11/12/2011 Zoster Vaccine, Live 1/28/2016 Not reviewed this visit You Were Diagnosed With   
  
 Codes Comments Chronic diastolic congestive heart failure (HCC)    -  Primary ICD-10-CM: I50.32 
ICD-9-CM: 428.32, 428.0 Type 2 diabetes mellitus with nephropathy (HCC)     ICD-10-CM: E11.21 
ICD-9-CM: 250.40, 583.81 Chronic atrial fibrillation (HCC)     ICD-10-CM: F34.6 ICD-9-CM: 427.31 Essential hypertension, benign     ICD-10-CM: I10 
ICD-9-CM: 401.1 Vitals BP Pulse Temp Resp Height(growth percentile) Weight(growth percentile) 110/50 (BP 1 Location: Left arm, BP Patient Position: Sitting) (!) 50 97.9 °F (36.6 °C) (Oral) 20 4' 11\" (1.499 m) 190 lb 3.2 oz (86.3 kg) SpO2 BMI OB Status Smoking Status 96% 38.42 kg/m2 Postmenopausal Former Smoker Vitals History BMI and BSA Data Body Mass Index Body Surface Area  
 38.42 kg/m 2 1.9 m 2 Preferred Pharmacy Pharmacy Name Phone [address] [place] [address] [phone] Your Updated Medication List  
  
   
This list is accurate as of: 12/19/17 12:36 PM.  Always use your most recent med list.  
  
  
  
  
 aspirin delayed-release 81 mg tablet Take 1 Tab by mouth daily. carvedilol 6.25 mg tablet Commonly known as:  Jestine Pellet Take 1 Tab by mouth two (2) times daily (with meals). Indications: Chronic Heart Failure, hypertension EYE HEALTH PO Take  by mouth two (2) times a day. ferrous sulfate 325 mg (65 mg iron) tablet Take 1 Tab by mouth daily (with breakfast). fluocinoNIDE 0.05 % topical cream  
Commonly known as:  LIDEX Apply  to affected area two (2) times daily as needed for Skin Irritation. furosemide 40 mg tablet Commonly known as:  LASIX Take 1 Tab by mouth daily. glimepiride 1 mg tablet Commonly known as:  AMARYL Take 1 Tab by mouth daily. Take only if sugar is above 140  
  
 glucose blood VI test strips strip Commonly known as:  FREESTYLE TEST  
TEST 2 TIMES DAILY. Dx Y03.37 Lancets Misc  
1 Package by Does Not Apply route daily. Test glucose daily and as needed Dx type 2 dm E11.42  
  
 linaclotide 145 mcg Cap capsule Commonly known as:  Angie [place] Take 1 Cap by mouth Daily (before breakfast). Per dr Neal Corona  
  
 loratadine 10 mg tablet Commonly known as:  Avani Grossman Take 1 Tab by mouth daily as needed for Allergies. magnesium hydroxide 2,400 mg/10 mL Susp suspension Commonly known as:  MILK OF MAGNESIA Take 10 mL by mouth nightly. potassium chloride SR 10 mEq tablet Commonly known as:  KLOR-CON 10 Take 2 Tabs by mouth daily. Only when furosemide taken  Indications: hypokalemia prevention VITAMIN D3 PO Take  by mouth daily. We Performed the Following AMB SUPPLY ORDER [[id number] Custom] Comments:  
 Overnight pulse oximetry, dx= chf, diasotolic Follow-up Instructions Return in about 3 weeks (around 1/9/2018). To-Do List   
 12/19/2017 3:00 PM  
  Appointment with Marcell Harmon PTA at [place] [address]  
  
 12/20/2017 To Be Determined Appointment with Alyssa Chandra at [place] [address]  
  
 12/21/2017 12:00 PM  
  Appointment with Marcell Harmon PTA at [place] [address]  
  
 12/22/2017 To Be Determined Appointment with Farzaneh Moran LPN at [place] [address]  
  
 12/26/2017 To Be Determined Appointment with Farzaneh Moran LPN at [place] [address]  
  
 12/26/2017 To Be Determined Appointment with Keira Landa PTA at [place] [address]  
  
 12/27/2017 To Be Determined Appointment with Gabrielle Bodily, OT at [place] [address]  
  
 12/28/2017 To Be Determined Appointment with Keira Landa PTA at [place] [address]  
  
 12/29/2017 To Be Determined Appointment with Gabrielle Bodily, OT at [place] [address]  
  
 12/29/2017 To Be Determined Appointment with Bhargav Moody RN at [place] [address]  
  
 01/02/2018 To Be Determined Appointment with Keira Landa PTA at [place] [address]  
  
 01/02/2018 To Be Determined Appointment with Gabrielle Bodily, OT at [place] [address]  
  
 01/04/2018 To Be Determined Appointment with Gabrielle Bodily, OT at [place] [address]  
  
 01/05/2018 To Be Determined Appointment with Marina Ugarte PT at [place] [address]  
  
 01/08/2018 To Be Determined Appointment with Gabrielle Bodily, OT at [place] [address] Introducing [facility] SERVICES! Dear Sheila Comer: 
Thank you for requesting a [employer] account. Our records indicate that you have previously registered for a [employer] account but its currently inactive. Please call our [employer] support line at [phone]. Additional Information If you have questions, please visit the Frequently Asked Questions section of the [employer] website at [email or web address] Remember, [employer] is NOT to be used for urgent needs. For medical emergencies, dial 911. Now available from your iPhone and Android! Please provide this summary of care documentation to your next provider. Your primary care clinician is listed as JOCELYN MCKEON.  If you have any questions after today's visit, please call [phone].

## 2017-12-20 VITALS
SYSTOLIC BLOOD PRESSURE: 124 MMHG | TEMPERATURE: 97.3 F | OXYGEN SATURATION: 96 % | RESPIRATION RATE: 17 BRPM | HEART RATE: 55 BPM | DIASTOLIC BLOOD PRESSURE: 65 MMHG

## 2017-12-20 VITALS
OXYGEN SATURATION: 96 % | WEIGHT: 190.3 LBS | TEMPERATURE: 97.4 F | RESPIRATION RATE: 18 BRPM | DIASTOLIC BLOOD PRESSURE: 66 MMHG | BODY MASS INDEX: 38.44 KG/M2 | SYSTOLIC BLOOD PRESSURE: 124 MMHG | HEART RATE: 55 BPM

## 2017-12-20 PROCEDURE — 3331090001 HH PPS REVENUE CREDIT

## 2017-12-20 PROCEDURE — 3331090002 HH PPS REVENUE DEBIT

## 2017-12-20 NOTE — TELEPHONE ENCOUNTER
Faxed pt's office note, order and demographics to [facility] ATTN: [place] [phone]. Confirmation received.

## 2017-12-21 ENCOUNTER — HOME CARE VISIT (OUTPATIENT)
Dept: SCHEDULING | Facility: HOME HEALTH | Age: 82
End: 2017-12-21
Payer: MEDICARE

## 2017-12-21 VITALS
RESPIRATION RATE: 16 BRPM | TEMPERATURE: 98.4 F | OXYGEN SATURATION: 98 % | DIASTOLIC BLOOD PRESSURE: 50 MMHG | HEART RATE: 54 BPM | SYSTOLIC BLOOD PRESSURE: 115 MMHG

## 2017-12-21 PROCEDURE — 3331090001 HH PPS REVENUE CREDIT

## 2017-12-21 PROCEDURE — G0158 HHC OT ASSISTANT EA 15: HCPCS

## 2017-12-21 PROCEDURE — G0157 HHC PT ASSISTANT EA 15: HCPCS

## 2017-12-21 PROCEDURE — 3331090002 HH PPS REVENUE DEBIT

## 2017-12-22 ENCOUNTER — HOME CARE VISIT (OUTPATIENT)
Dept: SCHEDULING | Facility: HOME HEALTH | Age: 82
End: 2017-12-22
Payer: MEDICARE

## 2017-12-22 ENCOUNTER — PATIENT OUTREACH (OUTPATIENT)
Dept: INTERNAL MEDICINE CLINIC | Age: 82
End: 2017-12-22

## 2017-12-22 VITALS
OXYGEN SATURATION: 98 % | SYSTOLIC BLOOD PRESSURE: 124 MMHG | RESPIRATION RATE: 18 BRPM | BODY MASS INDEX: 37.49 KG/M2 | DIASTOLIC BLOOD PRESSURE: 60 MMHG | HEART RATE: 57 BPM | WEIGHT: 185.6 LBS | TEMPERATURE: 98.4 F

## 2017-12-22 PROCEDURE — 3331090001 HH PPS REVENUE CREDIT

## 2017-12-22 PROCEDURE — 3331090002 HH PPS REVENUE DEBIT

## 2017-12-22 PROCEDURE — G0300 HHS/HOSPICE OF LPN EA 15 MIN: HCPCS

## 2017-12-22 NOTE — PROGRESS NOTES
[address] [place] Follow Up Phone Call # 1    Called pt. Verified her with 2 identifiers. Pt came to [facility] appt with Dr Rayna Padgett on 12/19/17. Pt says she is not getting any rest.  Keeps getting interrupted by [facility] staff, phone calls, packages being delivered. Progress Towards Goals:      Goals      Advanced Medical Directive            12/14/17 - Pt will bring in her AMD to review. Pt states she has a document. Asked she bring to her appt. Pt said \"I am not guaranteeing that I will bring it in, since I don't like what it says. I may just tear it up. I don't know why you need a copy of it. \"  Explained to her that we can make any necessary changes at office. Cristino Pallas, RN, BSN, [facility]  Nurse Navigator  DRE         Constipation Relief            12/12/17 - Pt will have BM return to normal within 3 days. On Linzess (3-4 yrs now) and takes MOM at . She is trying to drink more water. Told her she needs to be careful about how much water she drinks since her heart isn't as strong as it used to be. Explained to her that she just needs to stay hydrated. Encouraged her to drink six 8oz of water daily. Says this is a lot for her. Recommended a stool softener, but she really doesn't want to take another medication. 12/14/17 - Pt states her constipation is better. Cristino Pallas, RN, BSN, [facility]  Nurse Navigator  DRE         Low Sodium Diet            12/21/17 - Pt will maintain a low sodium diet daily. Pt will monitor her salt intake over the holidays. She is worried that she will not be able to find anything to eat. Says she will take nibbles of things. Cristino Pallas, RN, BSN, [facility]  Nurse Navigator  DRE         Risk for Falls            12/12/17 - Pt will not fall x 30 days. Pt will work with PT and OT to get stronger. Pt will use DME when ambulating in house. Pt will ask aides for assistance with ADLs. 12/21/17 - Pt denies any falls. Continues to get support from [facility] and aides.    Cristino Pallas, RN, BSN, [facility]  Nurse Navigator  Erasto Walsh

## 2017-12-23 PROCEDURE — 3331090002 HH PPS REVENUE DEBIT

## 2017-12-23 PROCEDURE — 3331090001 HH PPS REVENUE CREDIT

## 2017-12-24 PROCEDURE — 3331090002 HH PPS REVENUE DEBIT

## 2017-12-24 PROCEDURE — 3331090001 HH PPS REVENUE CREDIT

## 2017-12-25 PROCEDURE — 3331090002 HH PPS REVENUE DEBIT

## 2017-12-25 PROCEDURE — 3331090001 HH PPS REVENUE CREDIT

## 2017-12-26 ENCOUNTER — TELEPHONE (OUTPATIENT)
Dept: INTERNAL MEDICINE CLINIC | Age: 82
End: 2017-12-26

## 2017-12-26 ENCOUNTER — HOME CARE VISIT (OUTPATIENT)
Dept: SCHEDULING | Facility: HOME HEALTH | Age: 82
End: 2017-12-26
Payer: MEDICARE

## 2017-12-26 VITALS
RESPIRATION RATE: 16 BRPM | HEART RATE: 50 BPM | TEMPERATURE: 98.4 F | DIASTOLIC BLOOD PRESSURE: 62 MMHG | SYSTOLIC BLOOD PRESSURE: 123 MMHG | OXYGEN SATURATION: 98 %

## 2017-12-26 VITALS — WEIGHT: 186.8 LBS | BODY MASS INDEX: 37.73 KG/M2 | HEART RATE: 50 BPM | OXYGEN SATURATION: 99 % | TEMPERATURE: 99 F

## 2017-12-26 DIAGNOSIS — G89.29 OTHER CHRONIC BACK PAIN: Primary | ICD-10-CM

## 2017-12-26 DIAGNOSIS — M54.9 OTHER CHRONIC BACK PAIN: Primary | ICD-10-CM

## 2017-12-26 PROCEDURE — G0299 HHS/HOSPICE OF RN EA 15 MIN: HCPCS

## 2017-12-26 PROCEDURE — 3331090001 HH PPS REVENUE CREDIT

## 2017-12-26 PROCEDURE — G0157 HHC PT ASSISTANT EA 15: HCPCS

## 2017-12-26 PROCEDURE — 3331090002 HH PPS REVENUE DEBIT

## 2017-12-26 NOTE — TELEPHONE ENCOUNTER
Spoke with Jon with [facility] - she is requesting order for rollator walker. Will forward to MD for approval.  Order rollator with hand breaks, seat and basket.

## 2017-12-26 NOTE — TELEPHONE ENCOUNTER
Spoke with Aerial with [facility] - advised MD has approved request for order for rollator. Needed fax # - she was driving - will call me tomorrow with number. Order printed.

## 2017-12-26 NOTE — TELEPHONE ENCOUNTER
carlos with [facility] [phone]     Requesting a call back regarding a order for brayan for this pt.

## 2017-12-27 ENCOUNTER — TELEPHONE (OUTPATIENT)
Dept: INTERNAL MEDICINE CLINIC | Age: 82
End: 2017-12-27

## 2017-12-27 PROCEDURE — 3331090001 HH PPS REVENUE CREDIT

## 2017-12-27 PROCEDURE — 3331090002 HH PPS REVENUE DEBIT

## 2017-12-28 ENCOUNTER — HOME CARE VISIT (OUTPATIENT)
Dept: SCHEDULING | Facility: HOME HEALTH | Age: 82
End: 2017-12-28
Payer: MEDICARE

## 2017-12-28 VITALS
DIASTOLIC BLOOD PRESSURE: 78 MMHG | OXYGEN SATURATION: 96 % | SYSTOLIC BLOOD PRESSURE: 132 MMHG | RESPIRATION RATE: 18 BRPM | TEMPERATURE: 98 F | HEART RATE: 61 BPM

## 2017-12-28 VITALS
WEIGHT: 186.2 LBS | BODY MASS INDEX: 37.61 KG/M2 | DIASTOLIC BLOOD PRESSURE: 60 MMHG | RESPIRATION RATE: 18 BRPM | SYSTOLIC BLOOD PRESSURE: 108 MMHG | HEART RATE: 50 BPM | OXYGEN SATURATION: 95 % | TEMPERATURE: 98.1 F

## 2017-12-28 PROCEDURE — G0158 HHC OT ASSISTANT EA 15: HCPCS

## 2017-12-28 PROCEDURE — 3331090001 HH PPS REVENUE CREDIT

## 2017-12-28 PROCEDURE — 3331090002 HH PPS REVENUE DEBIT

## 2017-12-28 PROCEDURE — G0157 HHC PT ASSISTANT EA 15: HCPCS

## 2017-12-29 ENCOUNTER — HOME CARE VISIT (OUTPATIENT)
Dept: HOME HEALTH SERVICES | Facility: HOME HEALTH | Age: 82
End: 2017-12-29
Payer: MEDICARE

## 2017-12-29 PROCEDURE — 3331090001 HH PPS REVENUE CREDIT

## 2017-12-29 PROCEDURE — 3331090002 HH PPS REVENUE DEBIT

## 2017-12-30 VITALS
WEIGHT: 184.6 LBS | DIASTOLIC BLOOD PRESSURE: 78 MMHG | TEMPERATURE: 98 F | RESPIRATION RATE: 18 BRPM | OXYGEN SATURATION: 96 % | SYSTOLIC BLOOD PRESSURE: 132 MMHG | BODY MASS INDEX: 37.28 KG/M2 | HEART RATE: 61 BPM

## 2017-12-30 PROCEDURE — 3331090001 HH PPS REVENUE CREDIT

## 2017-12-30 PROCEDURE — 3331090002 HH PPS REVENUE DEBIT

## 2017-12-31 PROCEDURE — 3331090002 HH PPS REVENUE DEBIT

## 2017-12-31 PROCEDURE — 3331090001 HH PPS REVENUE CREDIT

## 2018-01-01 PROCEDURE — 3331090001 HH PPS REVENUE CREDIT

## 2018-01-01 PROCEDURE — 3331090002 HH PPS REVENUE DEBIT

## 2018-01-02 ENCOUNTER — HOME CARE VISIT (OUTPATIENT)
Dept: SCHEDULING | Facility: HOME HEALTH | Age: 83
End: 2018-01-02
Payer: MEDICARE

## 2018-01-02 ENCOUNTER — PATIENT OUTREACH (OUTPATIENT)
Dept: INTERNAL MEDICINE CLINIC | Age: 83
End: 2018-01-02

## 2018-01-02 VITALS
RESPIRATION RATE: 17 BRPM | OXYGEN SATURATION: 97 % | HEART RATE: 50 BPM | TEMPERATURE: 97.8 F | DIASTOLIC BLOOD PRESSURE: 58 MMHG | SYSTOLIC BLOOD PRESSURE: 120 MMHG

## 2018-01-02 VITALS — WEIGHT: 185.8 LBS | BODY MASS INDEX: 37.53 KG/M2

## 2018-01-02 PROCEDURE — 3331090001 HH PPS REVENUE CREDIT

## 2018-01-02 PROCEDURE — G0299 HHS/HOSPICE OF RN EA 15 MIN: HCPCS

## 2018-01-02 PROCEDURE — 3331090002 HH PPS REVENUE DEBIT

## 2018-01-02 PROCEDURE — G0157 HHC PT ASSISTANT EA 15: HCPCS

## 2018-01-02 NOTE — PROGRESS NOTES
[address] Follow Up Phone Call #3    Had an enjoyable holiday. Many family members visited her. Stated \"I got a lot of attention. \"    Goals      Advanced Medical Directive            12/14/17 - Pt will bring in her AMD to review. Pt states she has a document. Asked she bring to her appt. Pt said \"I am not guaranteeing that I will bring it in, since I don't like what it says. I may just tear it up. I don't know why you need a copy of it. \"  Explained to her that we can make any necessary changes at office. Rosa Elena Andrew, RN, BSN, [facility]  Nurse Navigator  DRE         Constipation Relief            12/12/17 - Pt will have BM return to normal within 3 days. On Linzess (3-4 yrs now) and takes MOM at [facility]. She is trying to drink more water. Told her she needs to be careful about how much water she drinks since her heart isn't as strong as it used to be. Explained to her that she just needs to stay hydrated. Encouraged her to drink six 8oz of water daily. Says this is a lot for her. Recommended a stool softener, but she really doesn't want to take another medication. 12/14/17 - Pt states her constipation is better. 1/2/18 - Denies any constipation. Rosa Elena Andrew RN, BSN, [facility]  Nurse Navigator  DRE         Low Sodium Diet            12/21/17 - Pt will maintain a low sodium diet daily. Pt will monitor her salt intake over the holidays. She is worried that she will not be able to find anything to eat. Says she will take nibbles of things. 1/2/18 - Following diet. Edema in legs are at baseline. Rosa Elena Andrew RN, BSN, [facility]  Nurse Navigator  DRE         Risk for Falls            12/12/17 - Pt will not fall x 30 days. Pt will work with PT and OT to get stronger. Pt will use DME when ambulating in house. Pt will ask aides for assistance with ADLs. 12/21/17 - Pt denies any falls. Continues to get support from [facility] and aides. 1/2/18 - Denies any falls. Received rollator on 12/31/17.     Rosa Elena Andrew RN, BSN, [facility]  Nurse Navigator  DRE         Stable weights            12/12/17 - Pt's will maintain stable weight x 3 months. Pt will weigh daily and call Cardiology if gain 3lbs in 1 day or 5lbs in 1 week.   1/2/18 - Pt's weight is stable around 184-185lbs  VIANEY Laureano RN, BSN, [facility]  Nurse Navigator  [facility]

## 2018-01-03 ENCOUNTER — HOME CARE VISIT (OUTPATIENT)
Dept: SCHEDULING | Facility: HOME HEALTH | Age: 83
End: 2018-01-03
Payer: MEDICARE

## 2018-01-03 VITALS
RESPIRATION RATE: 18 BRPM | DIASTOLIC BLOOD PRESSURE: 55 MMHG | SYSTOLIC BLOOD PRESSURE: 130 MMHG | TEMPERATURE: 98.3 F | HEART RATE: 83 BPM | OXYGEN SATURATION: 96 %

## 2018-01-03 PROCEDURE — G0152 HHCP-SERV OF OT,EA 15 MIN: HCPCS

## 2018-01-03 PROCEDURE — 3331090001 HH PPS REVENUE CREDIT

## 2018-01-03 PROCEDURE — 3331090002 HH PPS REVENUE DEBIT

## 2018-01-04 ENCOUNTER — HOME CARE VISIT (OUTPATIENT)
Dept: SCHEDULING | Facility: HOME HEALTH | Age: 83
End: 2018-01-04
Payer: MEDICARE

## 2018-01-04 PROCEDURE — 3331090001 HH PPS REVENUE CREDIT

## 2018-01-04 PROCEDURE — 3331090003 HH PPS REVENUE ADJ

## 2018-01-04 PROCEDURE — G0151 HHCP-SERV OF PT,EA 15 MIN: HCPCS

## 2018-01-04 PROCEDURE — 3331090002 HH PPS REVENUE DEBIT

## 2018-01-05 VITALS
SYSTOLIC BLOOD PRESSURE: 131 MMHG | HEART RATE: 70 BPM | OXYGEN SATURATION: 97 % | RESPIRATION RATE: 17 BRPM | TEMPERATURE: 98 F | DIASTOLIC BLOOD PRESSURE: 70 MMHG

## 2018-01-09 ENCOUNTER — OFFICE VISIT (OUTPATIENT)
Dept: INTERNAL MEDICINE CLINIC | Age: 83
End: 2018-01-09

## 2018-01-09 ENCOUNTER — HOSPITAL ENCOUNTER (OUTPATIENT)
Dept: LAB | Age: 83
Discharge: HOME | End: 2018-01-09
Payer: MEDICARE

## 2018-01-09 VITALS
HEART RATE: 52 BPM | WEIGHT: 190 LBS | HEIGHT: 59 IN | BODY MASS INDEX: 38.3 KG/M2 | TEMPERATURE: 97.6 F | SYSTOLIC BLOOD PRESSURE: 136 MMHG | RESPIRATION RATE: 20 BRPM | DIASTOLIC BLOOD PRESSURE: 41 MMHG | OXYGEN SATURATION: 95 %

## 2018-01-09 DIAGNOSIS — F41.9 ANXIETY: ICD-10-CM

## 2018-01-09 DIAGNOSIS — I48.20 CHRONIC ATRIAL FIBRILLATION (HCC): ICD-10-CM

## 2018-01-09 DIAGNOSIS — I50.32 CHRONIC DIASTOLIC CONGESTIVE HEART FAILURE (HCC): Primary | ICD-10-CM

## 2018-01-09 DIAGNOSIS — E11.40 TYPE 2 DIABETES, CONTROLLED, WITH NEUROPATHY (HCC): ICD-10-CM

## 2018-01-09 DIAGNOSIS — I10 ESSENTIAL HYPERTENSION, BENIGN: ICD-10-CM

## 2018-01-09 DIAGNOSIS — E11.21 TYPE 2 DIABETES MELLITUS WITH NEPHROPATHY (HCC): ICD-10-CM

## 2018-01-09 PROCEDURE — 80053 COMPREHEN METABOLIC PANEL: CPT

## 2018-01-09 PROCEDURE — 36415 COLL VENOUS BLD VENIPUNCTURE: CPT

## 2018-01-09 PROCEDURE — 85025 COMPLETE CBC W/AUTO DIFF WBC: CPT

## 2018-01-09 PROCEDURE — 83036 HEMOGLOBIN GLYCOSYLATED A1C: CPT

## 2018-01-09 NOTE — PROGRESS NOTES
HISTORY OF PRESENT ILLNESS  Tyler Reilly is a 80 y.o. female. HPI Adi Lujan is seen today for follow up of congestive heart failure with atrial fibrillation and other problems. 1. New problems reviewed. She has some swelling around her eyes which I reassured her was dependent edema. This is mainly noted when she awakens from sleep. She also has lethargy which she believes may be med related. I have asked her to follow up with her cardiologist for adjustments. Lastly, she has some spots on her arms that are ecchymoses and I reassured her these were benign. 2. CHF, atrial fibrillation. See above. She is stable with her condition. Given her question of medication side effect, I will have her check in with Dr. Lelia Ahumada. 3. Hypertension. Stable on current regimen. 4. Diabetes. Fingerstick blood sugars reviewed and look excellent. 5. Anxiety. She is on no medications. Follow up with her psychologist is pending. Review of systems notable for an increased weight of 4 pounds here, but at home there is no weight change. Will follow. MedDATA/gwo     Current Outpatient Prescriptions   Medication Sig    aspirin delayed-release 81 mg tablet Take 1 Tab by mouth daily.  glimepiride (AMARYL) 1 mg tablet Take 1 Tab by mouth daily. Take only if sugar is above 140    potassium chloride SR (KLOR-CON 10) 10 mEq tablet Take 2 Tabs by mouth daily. Only when furosemide taken  Indications: hypokalemia prevention    carvedilol (COREG) 6.25 mg tablet Take 1 Tab by mouth two (2) times daily (with meals). Indications: Chronic Heart Failure, hypertension    ferrous sulfate 325 mg (65 mg iron) tablet Take 1 Tab by mouth daily (with breakfast).  furosemide (LASIX) 40 mg tablet Take 1 Tab by mouth daily.  BETA-CAROTENE,A,-VITS C,E/MINS (EYE HEALTH PO) Take  by mouth two (2) times a day.  CHOLECALCIFEROL, VITAMIN D3, (VITAMIN D3 PO) Take  by mouth daily.     glucose blood VI test strips (FREESTYLE TEST) strip TEST 2 TIMES DAILY. Dx E11.42    fluocinoNIDE (LIDEX) 0.05 % topical cream Apply  to affected area two (2) times daily as needed for Skin Irritation.  loratadine (CLARITIN) 10 mg tablet Take 1 Tab by mouth daily as needed for Allergies.  Lancets misc 1 Package by Does Not Apply route daily. Test glucose daily and as needed Dx type 2 dm E11.42    linaclotide (LINZESS) 145 mcg cap capsule Take 1 Cap by mouth Daily (before breakfast). Per dr Keily Mabry    magnesium hydroxide (MILK OF MAGNESIA) 2,400 mg/10 mL susp suspension Take 10 mL by mouth nightly. No current facility-administered medications for this visit. Review of Systems   Constitutional: Positive for malaise/fatigue. Negative for weight loss. Respiratory: Negative. Cardiovascular: Positive for leg swelling. Negative for chest pain, palpitations and PND. Stable   Musculoskeletal: Negative for myalgias. Skin: Positive for rash. Neurological: Negative for focal weakness. Physical Exam   Constitutional: No distress. Cardiovascular: Normal rate. An irregularly irregular rhythm present. Exam reveals no gallop and no friction rub. Murmur heard. Systolic murmur is present with a grade of 1/6   Pulmonary/Chest: Effort normal and breath sounds normal.   Musculoskeletal: She exhibits edema. Moderate bilateral lower extremity edema    Nursing note and vitals reviewed. ASSESSMENT and PLAN  Diagnoses and all orders for this visit:    1. Chronic diastolic congestive heart failure (HCC)  -     REFERRAL TO CARDIOLOGY, for now Continue current regimen of prescription and / or OTC medications     2. Type 2 diabetes, controlled, with neuropathy [facility]  -     HEMOGLOBIN A1C WITH EAG  -     METABOLIC PANEL, COMPREHENSIVE    3. Chronic atrial fibrillation (HCC)  -     CBC WITH AUTOMATED DIFF  -     REFERRAL TO CARDIOLOGY    4. Type 2 diabetes mellitus with nephropathy ([facility]- see above    5.  Essential hypertension, benign- Continue current regimen of prescription and / or OTC medications     6.  Anxiety- See psychologist as discussed/directed

## 2018-01-09 NOTE — MR AVS SNAPSHOT
Visit Information Date & Time Provider Department Dept. Phone Encounter #  
 1/9/2018  2:20 PM Hermes Underowod, [address] [facility] Internal [facility] [phone] [id number] Follow-up Instructions Return in about 1 month (around 2/9/2018). Your Appointments 3/14/2018  2:00 PM  
ESTABLISHED PATIENT with Marco Hallman MD  
[facility] ([facility] Appt Note: 3 mo f/u per Dr. Saunders  [address]  
[address] [address] Upcoming Health Maintenance Date Due  
 LIPID PANEL Q1 3/21/2017 MEDICARE YEARLY EXAM 1/31/2018 FOOT EXAM Q1 3/20/2018 HEMOGLOBIN A1C Q6M 6/6/2018 MICROALBUMIN Q1 8/3/2018 EYE EXAM RETINAL OR DILATED Q1 9/1/2018 GLAUCOMA SCREENING Q2Y 9/1/2019 DTaP/Tdap/Td series (2 - Td) 9/21/2026 Allergies as of 1/9/2018  Review Complete On: 1/9/2018 By: Hermes Underwood MD  
  
 Severity Noted Reaction Type Reactions Amoxicillin  03/28/2014    Other (comments) \"makes me feel like I\"m going to faint\" Aspirin  03/28/2014    Palpitations Gabapentin  10/18/2017    Drowsiness * pt states this med makes her feel very drowsy , gives her a drunk feeling. Levaquin [Levofloxacin]  03/20/2015    Hives, Rash Lorazepam  10/18/2017    Other (comments) Feeling faint. Losartan  06/17/2015    Other (comments) Tongue swelling Lyrica [Pregabalin]  03/28/2014    Rash Other Medication  10/18/2017    Other (comments) Feeling faint, does not decrease pain. Sertraline  10/18/2017    Other (comments) Feeling faint. Current Immunizations  Reviewed on 10/18/2017 Name Date Influenza High Dose Vaccine PF 10/18/2017, 11/3/2016, 11/2/2015 Influenza Vaccine 9/18/2014 Pneumococcal Conjugate (PCV-13) 1/22/2015 Pneumococcal Vaccine (Unspecified Type) 11/12/2011 Zoster Vaccine, Live 1/28/2016 Not reviewed this visit You Were Diagnosed With   
  
 Codes Comments Chronic diastolic congestive heart failure (HCC)    -  Primary ICD-10-CM: I50.32 
ICD-9-CM: 428.32, 428.0 Type 2 diabetes, controlled, with neuropathy ([facility] [address]     ICD-10-CM: E11.40 ICD-9-CM: 250.60, 357.2 Chronic atrial fibrillation (HCC)     ICD-10-CM: Q12.8 ICD-9-CM: 427.31 Type 2 diabetes mellitus with nephropathy (HCC)     ICD-10-CM: E11.21 
ICD-9-CM: 250.40, 583.81 Essential hypertension, benign     ICD-10-CM: I10 
ICD-9-CM: 401.1 Anxiety     ICD-10-CM: F41.9 ICD-9-CM: 300.00 Vitals BP Pulse Temp Resp Height(growth percentile) Weight(growth percentile) 136/41 (BP 1 Location: Right arm, BP Patient Position: Sitting) (!) 52 97.6 °F (36.4 °C) (Oral) 20 4' 11\" (1.499 m) 190 lb (86.2 kg) SpO2 BMI OB Status Smoking Status 95% 38.38 kg/m2 Postmenopausal Former Smoker BMI and BSA Data Body Mass Index Body Surface Area  
 38.38 kg/m 2 1.89 m 2 Preferred Pharmacy Pharmacy Name Phone [address], [facility] [address] [phone] Your Updated Medication List  
  
   
This list is accurate as of: 1/9/18  3:11 PM.  Always use your most recent med list.  
  
  
  
  
 aspirin delayed-release 81 mg tablet Take 1 Tab by mouth daily. carvedilol 6.25 mg tablet Commonly known as:  Clerance Barley Take 1 Tab by mouth two (2) times daily (with meals). Indications: Chronic Heart Failure, hypertension EYE HEALTH PO Take  by mouth two (2) times a day. ferrous sulfate 325 mg (65 mg iron) tablet Take 1 Tab by mouth daily (with breakfast). fluocinoNIDE 0.05 % topical cream  
Commonly known as:  LIDEX Apply  to affected area two (2) times daily as needed for Skin Irritation. furosemide 40 mg tablet Commonly known as:  LASIX Take 1 Tab by mouth daily. glimepiride 1 mg tablet Commonly known as:  AMARYL Take 1 Tab by mouth daily. Take only if sugar is above 140  
  
 glucose blood VI test strips strip Commonly known as:  FREESTYLE TEST  
TEST 2 TIMES DAILY. Dx K46.32 Lancets Misc  
1 Package by Does Not Apply route daily. Test glucose daily and as needed Dx type 2 dm E11.42  
  
 linaclotide 145 mcg Cap capsule Commonly known as:  Tresea Rey Take 1 Cap by mouth Daily (before breakfast). Per dr Tej Dumont  
  
 loratadine 10 mg tablet Commonly known as:  Familia Woodard Take 1 Tab by mouth daily as needed for Allergies. magnesium hydroxide 2,400 mg/10 mL Susp suspension Commonly known as:  MILK OF MAGNESIA Take 10 mL by mouth nightly. potassium chloride SR 10 mEq tablet Commonly known as:  KLOR-CON 10 Take 2 Tabs by mouth daily. Only when furosemide taken  Indications: hypokalemia prevention VITAMIN D3 PO Take  by mouth daily. We Performed the Following CBC WITH AUTOMATED DIFF [69323 CPT(R)] HEMOGLOBIN A1C WITH EAG [97871 CPT(R)] METABOLIC PANEL, COMPREHENSIVE [36516 CPT(R)] REFERRAL TO CARDIOLOGY [XZJ38 Custom] Follow-up Instructions Return in about 1 month (around 2/9/2018). Referral Information Referral ID Referred By Referred To  
  
 [id number] Boby MCKEON MD   
   [address] Phone: [phone] Fax: [phone] Visits Status Start Date End Date 1 New Request 1/9/18 1/9/19 If your referral has a status of pending review or denied, additional information will be sent to support the outcome of this decision. Introducing [facility] & HEALTH SERVICES! Dear Dianna Jauregui: 
Thank you for requesting a [employer] account. Our records indicate that you have previously registered for a [employer] account but its currently inactive. Please call our [employer] support line at [phone]. Additional Information If you have questions, please visit the Frequently Asked Questions section of the [employer] website at [email or web address] Remember, [employer] is NOT to be used for urgent needs. For medical emergencies, dial 911. Now available from your iPhone and Android! Please provide this summary of care documentation to your next provider. Your primary care clinician is listed as JOCELYN MCKEON. If you have any questions after today's visit, please call [phone].

## 2018-01-10 LAB
ALBUMIN SERPL-MCNC: 3.9 G/DL (ref 3.2–4.6)
ALBUMIN/GLOB SERPL: 1.5 {RATIO} (ref 1.2–2.2)
ALP SERPL-CCNC: 127 IU/L (ref 39–117)
ALT SERPL-CCNC: 23 IU/L (ref 0–32)
AST SERPL-CCNC: 23 IU/L (ref 0–40)
BASOPHILS # BLD AUTO: 0 X10E3/UL (ref 0–0.2)
BASOPHILS NFR BLD AUTO: 0 %
BILIRUB SERPL-MCNC: 0.4 MG/DL (ref 0–1.2)
BUN SERPL-MCNC: 26 MG/DL (ref 10–36)
BUN/CREAT SERPL: 28 (ref 12–28)
CALCIUM SERPL-MCNC: 8.4 MG/DL (ref 8.7–10.3)
CHLORIDE SERPL-SCNC: 101 MMOL/L (ref 96–106)
CO2 SERPL-SCNC: 28 MMOL/L (ref 18–29)
CREAT SERPL-MCNC: 0.92 MG/DL (ref 0.57–1)
EOSINOPHIL # BLD AUTO: 0.1 X10E3/UL (ref 0–0.4)
EOSINOPHIL NFR BLD AUTO: 1 %
ERYTHROCYTE [DISTWIDTH] IN BLOOD BY AUTOMATED COUNT: 16.7 % (ref 12.3–15.4)
EST. AVERAGE GLUCOSE BLD GHB EST-MCNC: 123 MG/DL
GLOBULIN SER CALC-MCNC: 2.6 G/DL (ref 1.5–4.5)
GLUCOSE SERPL-MCNC: 109 MG/DL (ref 65–99)
HBA1C MFR BLD: 5.9 % (ref 4.8–5.6)
HCT VFR BLD AUTO: 31.1 % (ref 34–46.6)
HGB BLD-MCNC: 10.1 G/DL (ref 11.1–15.9)
IMM GRANULOCYTES # BLD: 0 X10E3/UL (ref 0–0.1)
IMM GRANULOCYTES NFR BLD: 0 %
LYMPHOCYTES # BLD AUTO: 1.2 X10E3/UL (ref 0.7–3.1)
LYMPHOCYTES NFR BLD AUTO: 23 %
MCH RBC QN AUTO: 29.1 PG (ref 26.6–33)
MCHC RBC AUTO-ENTMCNC: 32.5 G/DL (ref 31.5–35.7)
MCV RBC AUTO: 90 FL (ref 79–97)
MONOCYTES # BLD AUTO: 0.5 X10E3/UL (ref 0.1–0.9)
MONOCYTES NFR BLD AUTO: 9 %
NEUTROPHILS # BLD AUTO: 3.7 X10E3/UL (ref 1.4–7)
NEUTROPHILS NFR BLD AUTO: 67 %
PLATELET # BLD AUTO: 215 X10E3/UL (ref 150–379)
POTASSIUM SERPL-SCNC: 4.9 MMOL/L (ref 3.5–5.2)
PROT SERPL-MCNC: 6.5 G/DL (ref 6–8.5)
RBC # BLD AUTO: 3.47 X10E6/UL (ref 3.77–5.28)
SODIUM SERPL-SCNC: 142 MMOL/L (ref 134–144)
WBC # BLD AUTO: 5.5 X10E3/UL (ref 3.4–10.8)

## 2018-01-11 ENCOUNTER — PATIENT OUTREACH (OUTPATIENT)
Dept: CARDIOLOGY CLINIC | Age: 83
End: 2018-01-11

## 2018-01-11 ENCOUNTER — TELEPHONE (OUTPATIENT)
Dept: CARDIOLOGY CLINIC | Age: 83
End: 2018-01-11

## 2018-01-11 NOTE — TELEPHONE ENCOUNTER
Called patient. Verified patient's identity with two identifiers. Patient states she just talked to [facility] Meche and that Chelsea was going to talk to Dr. Eileen Link. Patient denies further questions or concerns from me.

## 2018-01-11 NOTE — LETTER
1/11/2018 10:42 AM 
 
Ms. Cristina Almendarez Brfan [address] [phone] Ms. Almendarez, Here is some information about the safety / security system - this is just one option - I do recommend these - in case you slip or fall and cannot get to a phone - they can come and help you - it is a good safety mechanism. If you need some help in looking for help in the home - a resource is [facility] - Address: [address] [place] [address] [facility], [address] [place] [address] Hours: Open today · 8:30AM5PM 
Phone: [phone] Here are some agencies for reference for  care in the home - [address] [facility] 12 
[phone] [facility]  care Call us 24/7 [phone] [facility] [address] [facility] [address], [facility] [facility] [address] Phone: [phone]

## 2018-01-11 NOTE — TELEPHONE ENCOUNTER
Pt calling because she's been having low blood pressure, she's been very fatigued, she states that she's winded when she walks 10 ft and she would like to know if she needs to come in sooner than March or if her medication needs to be adjusted. Please give her call back @ [phone]. Thanks!   Pastor Valencia

## 2018-01-11 NOTE — PROGRESS NOTES
Nurse navigator note - cardiology  Incoming call from Ms. Almendarez - reporting - visit to pcp Tuesday 1/6 - and she reported fatigue, malaise and swelling - peripheral edema -     /41     Pulse  52     SpO2 95%     NN call to pt - to assess-    She has stable weight 190 range; she reports her edema is better; reduced in am; reduce with leg elevation. She is concerned about her dyspnea - walking 10 feet makes her short of breath -   She is concerned that her diastolic bp is low / and is always low. She denies chest pain. She is asking if she should see Dr. Paul Mcghee sooner. Home health discharged after services completed - d/c last week - week of Jan 2nd - and she felt she benefit from this. She is on Coreg 6.25 mg bid - new since hospital -     Plan: Confer with Dr. Paul Mcghee / message Dr. Ruano Guess with outcome    Continue daily weights/ pt does not have bp cuff. Hydrate and eat normally. Call if any worsening -     Call pt with advisements.  care and Life Alert information - discussed with pt - who lives alone in private home.     She has pcp follow up in Feb - had labs done 1/9/18   1/10/2018      Component Value Flag Ref Range Units Status   WBC 5.5  3.4 - 10.8 x10E3/uL Final   RBC 3.47 (L) 3.77 - 5.28 x10E6/uL Final   HGB 10.1 (L) 11.1 - 15.9 g/dL Final   HCT 31.1 (L) 34.0 - 46.6 % Final   MCV 90  79 - 97 fL Final   MCH 29.1  26.6 - 33.0 pg Final   MCHC 32.5  31.5 - 35.7 g/dL Final   RDW 16.7 (H) 12.3 - 15.4 % Final   PLATELET 806         1/10/2018  3:42 PM - Sang, [facility] Lab Results In   Component Results   Component Value Flag Ref Range Units Status   Glucose 109 (H) 65 - 99 mg/dL Final   BUN 26  10 - 36 mg/dL Final   Creatinine 0.92  0.57 - 1.00 mg/dL Final   GFR est non-AA 53 (L) >59 mL/min/1.73 Final   GFR est AA 61  >59 mL/min/1.73 Final   BUN/Creatinine ratio 28  12 - 28  Final   Sodium 142  134 - 144 mmol/L Final   Potassium 4.9  3.5 - 5.2 mmol/L Final   Chloride 101  96 - 106 mmol/L Final   CO2 28  18 - 29 mmol/L Final   Calcium 8.4 (L) 8.7 - 10.3 mg/dL Final   Protein, total 6.5  6.0 - 8.5 g/dL Final   Albumin 3.9  3.2 - 4.6 g/dL Final   GLOBULIN, TOTAL 2.6  1.5 - 4.5 g/dL Final   A-G Ratio 1.5  1.2 - 2.2  Final   Bilirubin, total 0.4  0.0 - 1.2 mg/dL Final   Alk.  phosphatase 127 (H) 39 - 117 IU/L Final   AST (SGOT) 23  0 - 40 IU/L Final   ALT (SGPT) 23  0 - 32 IU/L Final     Letitia Negrete RN , [facility], [facility]  NN [facility]  [phone]

## 2018-01-17 NOTE — PROGRESS NOTES
Advised pt her labs are fine. Her diabetes is much better. Continue current regimen of prescription and / or OTC medications.

## 2018-01-17 NOTE — PROGRESS NOTES
Call - labs are fine, diabetes is much better.  Continue current regimen of prescription and / or OTC medications

## 2018-01-25 RX ORDER — CARVEDILOL 6.25 MG/1
6.25 TABLET ORAL 2 TIMES DAILY WITH MEALS
Qty: 30 TAB | Refills: 2 | Status: SHIPPED | OUTPATIENT
Start: 2018-01-25 | End: 2018-02-05 | Stop reason: SDUPTHER

## 2018-01-25 NOTE — TELEPHONE ENCOUNTER
Pharmacy verified. Pt out of medication. 60 day supply. Requested Prescriptions     Pending Prescriptions Disp Refills    carvedilol (COREG) 6.25 mg tablet 30 Tab 2     Sig: Take 1 Tab by mouth two (2) times daily (with meals). Indications: Chronic Heart Failure, hypertension     Thanks!   Daren Counter

## 2018-02-01 ENCOUNTER — TELEPHONE (OUTPATIENT)
Dept: INTERNAL MEDICINE CLINIC | Age: 83
End: 2018-02-01

## 2018-02-01 ENCOUNTER — TELEPHONE (OUTPATIENT)
Dept: CARDIOLOGY CLINIC | Age: 83
End: 2018-02-01

## 2018-02-01 NOTE — TELEPHONE ENCOUNTER
Spoke with pt - states she has been experiencing abdominal pain and feeling nauseated for 2 weeks intermittently. She can not sleep due to these symptoms. She feels it may be her medication Coreg. She wants to know what she should do? Advised her Dr Roula Damico prescribes this medication - she should call his office to let him know. I will also send message to Dr Tanna Gonzales.

## 2018-02-01 NOTE — TELEPHONE ENCOUNTER
Pt called stating that she has not been feeling well (nauseous), and it's been going on for quite some time now. Pt thinks it might be her carvedilol. Please give her a call back @ [phone]. Thanks!   Ramon Boxer

## 2018-02-02 NOTE — TELEPHONE ENCOUNTER
2 identifiers. Spoke to patient. She is C/o of nausea and lack of sleep which has been going on for a few weeks. The past few nights when she lays down she feels like she is going to \"regurgtate\". Because of this she has been staying up all night. Recommended she call her PCP because its probably not from the [place] [address]. She spoke to her PCP office and they recommended she take it with food. She took it with dinner last night and slept through the night with no nausea. Told her to give it a try for a while to see how if that helps.

## 2018-02-05 RX ORDER — CARVEDILOL 6.25 MG/1
6.25 TABLET ORAL 2 TIMES DAILY WITH MEALS
Qty: 30 TAB | Refills: 2 | Status: SHIPPED | OUTPATIENT
Start: 2018-02-05 | End: 2018-02-05 | Stop reason: SDUPTHER

## 2018-02-08 ENCOUNTER — OFFICE VISIT (OUTPATIENT)
Dept: INTERNAL MEDICINE CLINIC | Age: 83
End: 2018-02-08

## 2018-02-08 VITALS
BODY MASS INDEX: 37.38 KG/M2 | WEIGHT: 185.4 LBS | OXYGEN SATURATION: 96 % | SYSTOLIC BLOOD PRESSURE: 121 MMHG | DIASTOLIC BLOOD PRESSURE: 43 MMHG | HEART RATE: 53 BPM | HEIGHT: 59 IN | RESPIRATION RATE: 16 BRPM | TEMPERATURE: 97.8 F

## 2018-02-08 DIAGNOSIS — F41.9 ANXIETY: ICD-10-CM

## 2018-02-08 DIAGNOSIS — I50.32 CHRONIC DIASTOLIC CONGESTIVE HEART FAILURE (HCC): Primary | ICD-10-CM

## 2018-02-08 DIAGNOSIS — E11.21 TYPE 2 DIABETES MELLITUS WITH NEPHROPATHY (HCC): ICD-10-CM

## 2018-02-08 DIAGNOSIS — I10 ESSENTIAL HYPERTENSION, BENIGN: ICD-10-CM

## 2018-02-08 DIAGNOSIS — E11.40 TYPE 2 DIABETES, CONTROLLED, WITH NEUROPATHY (HCC): ICD-10-CM

## 2018-02-08 RX ORDER — CARVEDILOL 6.25 MG/1
TABLET ORAL
Qty: 1 TAB | Refills: 0
Start: 2018-02-08 | End: 2018-07-06 | Stop reason: SDUPTHER

## 2018-02-08 NOTE — PROGRESS NOTES
HISTORY OF PRESENT ILLNESS  Lalo Capps is a 80 y.o. female. HPI Yoel Wolfe is seen today accompanied by her home health aide for follow up of hypertension and other problems. 1. Hypertension. Stable on current regimen. 2. CHF, atrial fibrillation. She follows up with Dr. Newberry. Weight at home is stable. She has no CHF symptoms currently. 3. Diabetes. Up to date with labs. 4. Anxiety. Stable. Review of systems notable for having seen ENT. She had a tube placed in her ear drum. Review of systems also notable for malaise which she attributes to Coreg. Her blood pressure may, in fact, be lower at home. We will decrease Coreg to 3.125 mg bid and recheck her status in two weeks. MedDATA/gwo     Current Outpatient Prescriptions   Medication Sig    carvedilol (COREG) 6.25 mg tablet TAKE 1/2 TABLET BY MOUTH TWICE DAILY WITH MEALS- new dosage    aspirin delayed-release 81 mg tablet Take 1 Tab by mouth daily.  glimepiride (AMARYL) 1 mg tablet Take 1 Tab by mouth daily. Take only if sugar is above 140    potassium chloride SR (KLOR-CON 10) 10 mEq tablet Take 2 Tabs by mouth daily. Only when furosemide taken  Indications: hypokalemia prevention    ferrous sulfate 325 mg (65 mg iron) tablet Take 1 Tab by mouth daily (with breakfast).  furosemide (LASIX) 40 mg tablet Take 1 Tab by mouth daily.  BETA-CAROTENE,A,-VITS C,E/MINS (EYE HEALTH PO) Take  by mouth two (2) times a day.  CHOLECALCIFEROL, VITAMIN D3, (VITAMIN D3 PO) Take  by mouth daily.  glucose blood VI test strips (FREESTYLE TEST) strip TEST 2 TIMES DAILY. Dx E11.42    fluocinoNIDE (LIDEX) 0.05 % topical cream Apply  to affected area two (2) times daily as needed for Skin Irritation.  loratadine (CLARITIN) 10 mg tablet Take 1 Tab by mouth daily as needed for Allergies.  Lancets misc 1 Package by Does Not Apply route daily.  Test glucose daily and as needed Dx type 2 dm E11.42    linaclotide (LINZESS) 145 mcg cap capsule Take 1 Cap by mouth Daily (before breakfast). Per dr Aziza Whitlock    magnesium hydroxide (MILK OF MAGNESIA) 2,400 mg/10 mL susp suspension Take 10 mL by mouth nightly. No current facility-administered medications for this visit. Review of Systems   Constitutional: Positive for malaise/fatigue. Negative for weight loss. Respiratory: Negative. Cardiovascular: Positive for leg swelling. Negative for chest pain, palpitations and PND. Musculoskeletal: Negative for myalgias. Neurological: Negative for focal weakness. Physical Exam   Constitutional: No distress. Cardiovascular: Normal rate and regular rhythm. Exam reveals no gallop and no friction rub. No murmur heard. Pulmonary/Chest: Effort normal and breath sounds normal.   Musculoskeletal: She exhibits edema. Mild bilateral lower extremity edema with TEDS   Neurological: She is alert. Nursing note and vitals reviewed. ASSESSMENT and PLAN  Diagnoses and all orders for this visit:    1. Chronic diastolic congestive heart failure (HCC)  -     carvedilol (COREG) 6.25 mg tablet; TAKE 1/2 TABLET BY MOUTH TWICE DAILY WITH MEALS- new dosage. Continue other medications in addition to current changes     2. Type 2 diabetes, controlled, with neuropathy ([address]- Follow off treatment     3. Type 2 diabetes mellitus with nephropathy ([address]- Follow off treatment     4. Essential hypertension, benign- Continue current regimen of prescription and / or OTC medications     5.  Anxiety- See psychologist as discussed/directed     Plan was reviewed with patient and family, understanding expressed

## 2018-02-08 NOTE — MR AVS SNAPSHOT
[address] [place] [address] 
[phone] Patient: Case Cortez MRN: [id number] Visit Information Date & Time Provider Department Dept. Phone Encounter #  
 2/8/2018  1:20 PM Yennifer Parker, 1229 [facility] Internal Medicine [id number] Follow-up Instructions Return in about 2 weeks (around 2/22/2018). Your Appointments 3/14/2018  2:00 PM  
ESTABLISHED PATIENT with Suraj Tomlin MD  
[facility] ([facility]) Appt Note: 3 mo f/u per Dr. Giles Diane [address] [place]  
[address] [address] Upcoming Health Maintenance Date Due  
 LIPID PANEL Q1 3/21/2017 MEDICARE YEARLY EXAM 1/31/2018 FOOT EXAM Q1 3/20/2018 HEMOGLOBIN A1C Q6M 7/9/2018 MICROALBUMIN Q1 8/3/2018 EYE EXAM RETINAL OR DILATED Q1 9/1/2018 GLAUCOMA SCREENING Q2Y 9/1/2019 DTaP/Tdap/Td series (2 - Td) 9/21/2026 Allergies as of 2/8/2018  Review Complete On: 2/8/2018 By: Dave Larson Severity Noted Reaction Type Reactions Amoxicillin  03/28/2014    Other (comments) \"makes me feel like I\"m going to faint\" Aspirin  03/28/2014    Palpitations Gabapentin  10/18/2017    Drowsiness * pt states this med makes her feel very drowsy , gives her a drunk feeling. Levaquin [Levofloxacin]  03/20/2015    Hives, Rash Lorazepam  10/18/2017    Other (comments) Feeling faint. Losartan  06/17/2015    Other (comments) Tongue swelling Lyrica [Pregabalin]  03/28/2014    Rash Other Medication  10/18/2017    Other (comments) Feeling faint, does not decrease pain. Sertraline  10/18/2017    Other (comments) Feeling faint. Current Immunizations  Reviewed on 10/18/2017 Name Date Influenza High Dose Vaccine PF 10/18/2017, 11/3/2016, 11/2/2015 Influenza Vaccine 9/18/2014 Pneumococcal Conjugate (PCV-13) 1/22/2015 Pneumococcal Vaccine (Unspecified Type) 11/12/2011 Zoster Vaccine, Live 1/28/2016 Not reviewed this visit You Were Diagnosed With   
  
 Codes Comments Chronic diastolic congestive heart failure (HCC)    -  Primary ICD-10-CM: I50.32 
ICD-9-CM: 428.32, 428.0 Type 2 diabetes, controlled, with neuropathy ([facility] [address]     ICD-10-CM: E11.40 ICD-9-CM: 250.60, 357.2 Type 2 diabetes mellitus with nephropathy (HCC)     ICD-10-CM: E11.21 
ICD-9-CM: 250.40, 583.81 Essential hypertension, benign     ICD-10-CM: I10 
ICD-9-CM: 401.1 Anxiety     ICD-10-CM: F41.9 ICD-9-CM: 300.00 Vitals BP Pulse Temp Resp Height(growth percentile) Weight(growth percentile) 121/43 (BP 1 Location: Right arm, BP Patient Position: Sitting) (!) 53 97.8 °F (36.6 °C) (Oral) 16 4' 11\" (1.499 m) 185 lb 6.4 oz (84.1 kg) SpO2 BMI OB Status Smoking Status 96% 37.45 kg/m2 Postmenopausal Former Smoker Vitals History BMI and BSA Data Body Mass Index Body Surface Area  
 37.45 kg/m 2 1.87 m 2 Preferred Pharmacy Pharmacy Name Phone [address] [phone] Your Updated Medication List  
  
   
This list is accurate as of: 2/8/18  2:20 PM.  Always use your most recent med list.  
  
  
  
  
 aspirin delayed-release 81 mg tablet Take 1 Tab by mouth daily. carvedilol 6.25 mg tablet Commonly known as:  COREG  
TAKE 1/2 TABLET BY MOUTH TWICE DAILY WITH MEALS- new dosage EYE HEALTH PO Take  by mouth two (2) times a day. ferrous sulfate 325 mg (65 mg iron) tablet Take 1 Tab by mouth daily (with breakfast). fluocinoNIDE 0.05 % topical cream  
Commonly known as:  LIDEX Apply  to affected area two (2) times daily as needed for Skin Irritation. furosemide 40 mg tablet Commonly known as:  LASIX Take 1 Tab by mouth daily. glimepiride 1 mg tablet Commonly known as:  AMARYL Take 1 Tab by mouth daily. Take only if sugar is above 140  
  
 glucose blood VI test strips strip Commonly known as:  FREESTYLE TEST  
TEST 2 TIMES DAILY. Dx B24.75 Lancets Misc  
1 Package by Does Not Apply route daily. Test glucose daily and as needed Dx type 2 dm E11.42  
  
 linaclotide 145 mcg Cap capsule Commonly known as:  Lauretha Mylar Take 1 Cap by mouth Daily (before breakfast). Per dr Terrell Cedeno  
  
 loratadine 10 mg tablet Commonly known as:  Thermon Marker Take 1 Tab by mouth daily as needed for Allergies. magnesium hydroxide 2,400 mg/10 mL Susp suspension Commonly known as:  MILK OF MAGNESIA Take 10 mL by mouth nightly. potassium chloride SR 10 mEq tablet Commonly known as:  KLOR-CON 10 Take 2 Tabs by mouth daily. Only when furosemide taken  Indications: hypokalemia prevention VITAMIN D3 PO Take  by mouth daily. Follow-up Instructions Return in about 2 weeks (around 2/22/2018). Introducing [facility] & HEALTH SERVICES! Dear Franny Diane: 
Thank you for requesting a [employer] account. Our records indicate that you have previously registered for a [employer] account but its currently inactive. Please call our [employer] support line at [phone]. Additional Information If you have questions, please visit the Frequently Asked Questions section of the [employer] website at [email or web address] Remember, [employer] is NOT to be used for urgent needs. For medical emergencies, dial 911. Now available from your iPhone and Android! Please provide this summary of care documentation to your next provider. Your primary care clinician is listed as JOCELYN MCKEON.  If you have any questions after today's visit, please call [phone].

## 2018-02-13 ENCOUNTER — TELEPHONE (OUTPATIENT)
Dept: INTERNAL MEDICINE CLINIC | Age: 83
End: 2018-02-13

## 2018-02-13 NOTE — TELEPHONE ENCOUNTER
----- Message from Eugenio Jernigan MD sent at 2/12/2018  7:57 AM EST -----  Regarding: RE: med update  Makes sense. Could always try low dose cardiazem if needed for rate control, hopefully that will not cause edema issues. Thanks for the update  ----- Message -----     From: Arvin Engle MD     Sent: 2/10/2018   6:14 PM       To: Eugenio Jernigan MD  Subject: med update                                       [facility]- I hope you're well . I just wanted to let you know I've reduced her carvedilol dose by half, rechecking her in 2 wks. She's fairly convinced it is the cause of her lethargy and malaise.

## 2018-02-21 ENCOUNTER — OFFICE VISIT (OUTPATIENT)
Dept: INTERNAL MEDICINE CLINIC | Age: 83
End: 2018-02-21

## 2018-02-21 VITALS
RESPIRATION RATE: 20 BRPM | WEIGHT: 182 LBS | BODY MASS INDEX: 36.69 KG/M2 | SYSTOLIC BLOOD PRESSURE: 125 MMHG | OXYGEN SATURATION: 96 % | HEIGHT: 59 IN | DIASTOLIC BLOOD PRESSURE: 46 MMHG | TEMPERATURE: 97.9 F | HEART RATE: 61 BPM

## 2018-02-21 DIAGNOSIS — E11.21 TYPE 2 DIABETES MELLITUS WITH NEPHROPATHY (HCC): ICD-10-CM

## 2018-02-21 DIAGNOSIS — I48.20 CHRONIC ATRIAL FIBRILLATION (HCC): ICD-10-CM

## 2018-02-21 DIAGNOSIS — I50.32 CHRONIC DIASTOLIC CONGESTIVE HEART FAILURE (HCC): Primary | ICD-10-CM

## 2018-02-21 NOTE — MR AVS SNAPSHOT
[address] [place] 
[phone] Patient: Sydnie Oakes MRN: [id number] GGS:[birth year] Visit Information Date & Time Provider Department Dept. Phone Encounter #  
 2/21/2018  2:20 PM Adilene Jaime, [address] [place] [address] Internal Medicine [phone] Follow-up Instructions Return in about 2 months (around 4/21/2018). Your Appointments 3/14/2018  2:00 PM  
ESTABLISHED PATIENT with Chyna Walter MD  
[facility] ([facility]) Appt Note: 3 mo f/u per Dr. Rea Ket [address] [place] [address]  
[address] [address] Upcoming Health Maintenance Date Due  
 LIPID PANEL Q1 3/21/2017 MEDICARE YEARLY EXAM 1/31/2018 FOOT EXAM Q1 3/20/2018 HEMOGLOBIN A1C Q6M 7/9/2018 MICROALBUMIN Q1 8/3/2018 EYE EXAM RETINAL OR DILATED Q1 9/1/2018 GLAUCOMA SCREENING Q2Y 9/1/2019 DTaP/Tdap/Td series (2 - Td) 9/21/2026 Allergies as of 2/21/2018  Review Complete On: 2/21/2018 By: Adilene Jaime MD  
  
 Severity Noted Reaction Type Reactions Amoxicillin  03/28/2014    Other (comments) \"makes me feel like I\"m going to faint\" Aspirin  03/28/2014    Palpitations Gabapentin  10/18/2017    Drowsiness * pt states this med makes her feel very drowsy , gives her a drunk feeling. Levaquin [Levofloxacin]  03/20/2015    Hives, Rash Lorazepam  10/18/2017    Other (comments) Feeling faint. Losartan  06/17/2015    Other (comments) Tongue swelling Lyrica [Pregabalin]  03/28/2014    Rash Other Medication  10/18/2017    Other (comments) Feeling faint, does not decrease pain. Sertraline  10/18/2017    Other (comments) Feeling faint. Current Immunizations  Reviewed on 10/18/2017 Name Date Influenza High Dose Vaccine PF 10/18/2017, 11/3/2016, 11/2/2015 Influenza Vaccine 9/18/2014 Pneumococcal Conjugate (PCV-13) 1/22/2015 Pneumococcal Vaccine (Unspecified Type) 11/12/2011 Zoster Vaccine, Live 1/28/2016 Not reviewed this visit You Were Diagnosed With   
  
 Codes Comments Chronic diastolic congestive heart failure (HCC)    -  Primary ICD-10-CM: I50.32 
ICD-9-CM: 428.32, 428.0 Type 2 diabetes mellitus with nephropathy (HCC)     ICD-10-CM: E11.21 
ICD-9-CM: 250.40, 583.81 Chronic atrial fibrillation (HCC)     ICD-10-CM: Q26.0 ICD-9-CM: 427.31 Vitals BP Pulse Temp Resp Height(growth percentile) Weight(growth percentile) 125/46 (BP 1 Location: Left arm, BP Patient Position: Sitting) 61 97.9 °F (36.6 °C) (Oral) 20 4' 11\" (1.499 m) 182 lb (82.6 kg) SpO2 BMI OB Status Smoking Status 96% 36.76 kg/m2 Postmenopausal Former Smoker BMI and BSA Data Body Mass Index Body Surface Area  
 36.76 kg/m 2 1.85 m 2 Preferred Pharmacy Pharmacy Name Phone [address] [place] [address] [phone] Your Updated Medication List  
  
   
This list is accurate as of 2/21/18  2:54 PM.  Always use your most recent med list.  
  
  
  
  
 aspirin delayed-release 81 mg tablet Take 1 Tab by mouth daily. carvedilol 6.25 mg tablet Commonly known as:  COREG  
TAKE 1/2 TABLET BY MOUTH TWICE DAILY WITH MEALS- new dosage EYE HEALTH PO Take  by mouth two (2) times a day. ferrous sulfate 325 mg (65 mg iron) tablet Take 1 Tab by mouth daily (with breakfast). fluocinoNIDE 0.05 % topical cream  
Commonly known as:  LIDEX Apply  to affected area two (2) times daily as needed for Skin Irritation. furosemide 40 mg tablet Commonly known as:  LASIX Take 1 Tab by mouth daily. glimepiride 1 mg tablet Commonly known as:  AMARYL Take 1 Tab by mouth daily. Take only if sugar is above 140  
  
 glucose blood VI test strips strip Commonly known as:  FREESTYLE TEST  
TEST 2 TIMES DAILY. Dx F37.49 Lancets Misc  
1 Package by Does Not Apply route daily. Test glucose daily and as needed Dx type 2 dm E11.42  
  
 linaclotide 145 mcg Cap capsule Commonly known as:  Toño Conley Take 1 Cap by mouth Daily (before breakfast). Per dr Loco Wood  
  
 loratadine 10 mg tablet Commonly known as:  Levonia Beams Take 1 Tab by mouth daily as needed for Allergies. magnesium hydroxide 2,400 mg/10 mL Susp suspension Commonly known as:  MILK OF MAGNESIA Take 10 mL by mouth nightly. potassium chloride SR 10 mEq tablet Commonly known as:  KLOR-CON 10 Take 2 Tabs by mouth daily. Only when furosemide taken  Indications: hypokalemia prevention VITAMIN D3 PO Take  by mouth daily. Follow-up Instructions Return in about 2 months (around 4/21/2018). Introducing [facility] & HEALTH SERVICES! Dear Myrtle Watkins: 
Thank you for requesting a sones account. Our records indicate that you have previously registered for a sones account but its currently inactive. Please call our sones support line at [phone]. Additional Information If you have questions, please visit the Frequently Asked Questions section of the sones website at [email or web address] Remember, sones is NOT to be used for urgent needs. For medical emergencies, dial 911. Now available from your iPhone and Android! Please provide this summary of care documentation to your next provider. Your primary care clinician is listed as JOCELYN MCKEON. If you have any questions after today's visit, please call [phone].

## 2018-02-21 NOTE — PROGRESS NOTES
HISTORY OF PRESENT ILLNESS  Jessica Graham is a 80 y.o. female. JELANI Michael is seen today accompanied by her home health aide for follow up of malaise and fatigue. She feels better on the lower Coreg dose. Her weight has decreased and she has no symptoms of increased CHF. Blood pressure is fine. Will continue current regimen at this time. MedDATA/gwo         Review of Systems   Constitutional: Negative for malaise/fatigue. Respiratory: Negative for shortness of breath. Physical Exam   Constitutional: No distress. Cardiovascular: Normal rate and regular rhythm. Exam reveals no gallop and no friction rub. No murmur heard. Pulmonary/Chest: Effort normal and breath sounds normal.   Musculoskeletal: She exhibits edema. Mild bilateral lower extremity edema with TEDS   Nursing note and vitals reviewed. ASSESSMENT and PLAN  Diagnoses and all orders for this visit:    1. Chronic diastolic congestive heart failure ([address]- Continue current regimen of prescription and / or OTC medications     2. Type 2 diabetes mellitus with nephropathy ([address]- Continue current regimen of prescription and / or OTC medications     3. Chronic atrial fibrillation ([address]- Continue current regimen of prescription and / or OTC medications , See cardiologist as directed.

## 2018-02-28 NOTE — TELEPHONE ENCOUNTER
Spoke with pt - wanted to let MD know she had \"the worst night of her life last night. \" She woke up with burning sensation from her waist down. She was shaky and weak. She states she feels dehydrated even though she drinks plenty of water - when asked how much does she drink she didn't know but she drinks water every time she is in the kitchen and bathroom. Recommended she get a glass she know how many ounces it is and keep up with how much she is drinking this way. Advised her that her symptoms are about the same as prior calls. That I saw where MD had increased her Zoloft and she should give it a week at least to see if her symptoms improve.  Will forward to MD.
The patient said she fells like one of her meds is making her feel like she is burning up .  Please call
never used

## 2018-03-05 DIAGNOSIS — M79.89 LEG SWELLING: ICD-10-CM

## 2018-03-05 DIAGNOSIS — R06.02 SOB (SHORTNESS OF BREATH) ON EXERTION: ICD-10-CM

## 2018-03-05 RX ORDER — POTASSIUM CHLORIDE 750 MG/1
20 TABLET, FILM COATED, EXTENDED RELEASE ORAL DAILY
Qty: 1 TAB | Refills: 0 | OUTPATIENT
Start: 2018-03-05

## 2018-03-05 RX ORDER — POTASSIUM CHLORIDE 750 MG/1
20 TABLET, FILM COATED, EXTENDED RELEASE ORAL DAILY
Qty: 180 TAB | Refills: 3 | Status: SHIPPED | OUTPATIENT
Start: 2018-03-05 | End: 2018-04-19 | Stop reason: SDUPTHER

## 2018-03-05 NOTE — TELEPHONE ENCOUNTER
Called and spoke to the pt she states she has been taking the potassium -2 tabs daily. Directions for this med have been updated in the pts chart and new refill has been sent to the pharmacy.

## 2018-03-09 RX ORDER — FUROSEMIDE 40 MG/1
40 TABLET ORAL DAILY
Qty: 30 TAB | Refills: 2 | Status: SHIPPED | OUTPATIENT
Start: 2018-03-09 | End: 2018-10-29 | Stop reason: SDUPTHER

## 2018-03-09 NOTE — TELEPHONE ENCOUNTER
(Incoming) [address] [facility] [address] [address] AT [place] [address] (Pharmacy) [phone]     Refill on furosemide to [facility]x, pt says that she will be out of this med today.

## 2018-03-13 ENCOUNTER — OFFICE VISIT (OUTPATIENT)
Dept: CARDIOLOGY CLINIC | Age: 83
End: 2018-03-13

## 2018-03-13 VITALS
SYSTOLIC BLOOD PRESSURE: 132 MMHG | BODY MASS INDEX: 36.97 KG/M2 | DIASTOLIC BLOOD PRESSURE: 68 MMHG | WEIGHT: 183.4 LBS | HEIGHT: 59 IN | HEART RATE: 60 BPM

## 2018-03-13 DIAGNOSIS — R60.0 LOCALIZED EDEMA: ICD-10-CM

## 2018-03-13 DIAGNOSIS — I10 ESSENTIAL HYPERTENSION, BENIGN: ICD-10-CM

## 2018-03-13 DIAGNOSIS — I48.20 CHRONIC ATRIAL FIBRILLATION (HCC): Primary | ICD-10-CM

## 2018-03-13 NOTE — MR AVS SNAPSHOT
[address] [facility] [address] [facility] 
[phone] Patient: Naty Barnard MRN: [id number] [facility]:66/99/8794 Visit Information Date & Time Provider Department Dept. Phone Encounter #  
 3/13/2018  2:00 PM Farhat Morse MD [facility] [phone] [id number] Follow-up Instructions Return in about 6 months (around 9/13/2018). Your Appointments 4/24/2018  1:40 PM  
ROUTINE CARE with Betsey Swenson MD  
[address] [facility] Internal Medicine [facility] Appt Note: F/U  
 [address] [facility] [address] [place] [id number] [address] Upcoming Health Maintenance Date Due  
 LIPID PANEL Q1 3/21/2017 MEDICARE YEARLY EXAM 1/31/2018 FOOT EXAM Q1 3/20/2018 HEMOGLOBIN A1C Q6M 7/9/2018 MICROALBUMIN Q1 8/3/2018 EYE EXAM RETINAL OR DILATED Q1 9/1/2018 GLAUCOMA SCREENING Q2Y 9/1/2019 DTaP/Tdap/Td series (2 - Td) 9/21/2026 Allergies as of 3/13/2018  Review Complete On: 3/13/2018 By: Farhat Morse MD  
  
 Severity Noted Reaction Type Reactions Amoxicillin  03/28/2014    Other (comments) \"makes me feel like I\"m going to faint\" Aspirin  03/28/2014    Palpitations Gabapentin  10/18/2017    Drowsiness * pt states this med makes her feel very drowsy , gives her a drunk feeling. Levaquin [Levofloxacin]  03/20/2015    Hives, Rash Lorazepam  10/18/2017    Other (comments) Feeling faint. Losartan  06/17/2015    Other (comments) Tongue swelling Lyrica [Pregabalin]  03/28/2014    Rash Other Medication  10/18/2017    Other (comments) Feeling faint, does not decrease pain. Sertraline  10/18/2017    Other (comments) Feeling faint. Current Immunizations  Reviewed on 10/18/2017 Name Date Influenza High Dose Vaccine PF 10/18/2017, 11/3/2016, 11/2/2015 Influenza Vaccine 9/18/2014 Pneumococcal Conjugate (PCV-13) 1/22/2015 Pneumococcal Vaccine (Unspecified Type) 11/12/2011 Zoster Vaccine, Live 1/28/2016 Not reviewed this visit You Were Diagnosed With   
  
 Codes Comments Chronic atrial fibrillation (HCC)    -  Primary ICD-10-CM: J68.0 ICD-9-CM: 427.31 Essential hypertension, benign     ICD-10-CM: I10 
ICD-9-CM: 401.1 Localized edema     ICD-10-CM: R60.0 ICD-9-CM: 439. 3 Vitals BP Pulse Height(growth percentile) Weight(growth percentile) BMI OB Status 132/68 60 4' 11\" (1.499 m) 183 lb 6.4 oz (83.2 kg) 37.04 kg/m2 Postmenopausal  
 Smoking Status Former Smoker Vitals History BMI and BSA Data Body Mass Index Body Surface Area 37.04 kg/m 2 1.86 m 2 Preferred Pharmacy Pharmacy Name Phone [address], [address] [facility] [place] [facility] [phone] Your Updated Medication List  
  
   
This list is accurate as of 3/13/18  2:13 PM.  Always use your most recent med list.  
  
  
  
  
 aspirin delayed-release 81 mg tablet Take 1 Tab by mouth daily. carvedilol 6.25 mg tablet Commonly known as:  COREG  
TAKE 1/2 TABLET BY MOUTH TWICE DAILY WITH MEALS- new dosage EYE HEALTH PO Take  by mouth two (2) times a day. ferrous sulfate 325 mg (65 mg iron) tablet TAKE 1 TABLET BY MOUTH DAILY WITH BREAKFAST  
  
 fluocinoNIDE 0.05 % topical cream  
Commonly known as:  LIDEX Apply  to affected area two (2) times daily as needed for Skin Irritation. furosemide 40 mg tablet Commonly known as:  LASIX Take 1 Tab by mouth daily. glimepiride 1 mg tablet Commonly known as:  AMARYL Take 1 Tab by mouth daily. Take only if sugar is above 140  
  
 glucose blood VI test strips strip Commonly known as:  FREESTYLE TEST  
TEST 2 TIMES DAILY. Dx E68.40 Lancets Misc 1 Package by Does Not Apply route daily. Test glucose daily and as needed Dx type 2 dm E11.42  
  
 linaclotide 145 mcg Cap capsule Commonly known as:  Dhiraj Mage Take 1 Cap by mouth Daily (before breakfast). Per dr Adel Goodell  
  
 loratadine 10 mg tablet Commonly known as:  Scarlett Bones Take 1 Tab by mouth daily as needed for Allergies. magnesium hydroxide 2,400 mg/10 mL Susp suspension Commonly known as:  MILK OF MAGNESIA Take 10 mL by mouth nightly. potassium chloride SR 10 mEq tablet Commonly known as:  KLOR-CON 10 Take 2 Tabs by mouth daily. New Directions. Indications: hypokalemia prevention VITAMIN D3 PO Take  by mouth daily. Follow-up Instructions Return in about 6 months (around 9/13/2018). Introducing [facility] & HEALTH SERVICES! Dear Paulino Vo: 
Thank you for requesting a [employer] account. Our records indicate that you have previously registered for a An[employer] account but its currently inactive. Please call our [employer] support line at [phone]. Additional Information If you have questions, please visit the Frequently Asked Questions section of the [employer] website at [email or web address]. [email or web address]/. Remember, [employer] is NOT to be used for urgent needs. For medical emergencies, dial 911. Now available from your iPhone and Android! Please provide this summary of care documentation to your next provider. Your primary care clinician is listed as JOCELYN MCKEON. If you have any questions after today's visit, please call [phone].

## 2018-03-13 NOTE — PROGRESS NOTES
HISTORY OF PRESENT ILLNESS  Dexter Rogel is a 80 y.o. female     SUMMARY:   Problem List  Date Reviewed: 3/13/2018          Codes Class Noted    Type 2 diabetes mellitus with nephropathy ([facility] ICD-10-CM: E11.21  ICD-9-CM: 250.40, 583.81  12/19/2017        CHF (congestive heart failure) ([facility] ICD-10-CM: I50.9  ICD-9-CM: 428.0  12/6/2017        Anxiety ICD-10-CM: F41.9  ICD-9-CM: 300.00  10/2/2017        Allergic rhinitis ICD-10-CM: J30.9  ICD-9-CM: 477.9  4/11/2016        Chronic atrial fibrillation ([facility] ICD-10-CM: I48.2  ICD-9-CM: 427.31  4/8/2016        Bleeding nose ICD-10-CM: R04.0  ICD-9-CM: 171. 7  Unknown        Advanced directives, counseling/discussion ICD-10-CM: Z71.89  ICD-9-CM: V65.49  3/15/2016        Type 2 diabetes, controlled, with neuropathy ([facility] ICD-10-CM: E11.40  ICD-9-CM: 250.60, 357.2  1/4/2016        Essential hypertension, benign ICD-10-CM: I10  ICD-9-CM: 401.1  6/4/2015        Edema ICD-10-CM: R60.9  ICD-9-CM: 782.3  11/12/2014    Overview Addendum 12/14/2017  8:24 AM by Shantanu Jaramillo MD     4/16 echo normal lvef, lae, mild tr  12/17 echo normal lvef, elisha, mild to mod mr and tr, pa pressure 50mm             Headache(784.0) ICD-10-CM: R51  ICD-9-CM: 784.0  11/12/2014        Diverticulosis of colon (without mention of hemorrhage) ICD-10-CM: K57.30  ICD-9-CM: 562.10  11/12/2014        Constipation ICD-10-CM: K59.00  ICD-9-CM: 564.00  11/12/2014        Cyst of left kidney ICD-10-CM: N28.1  ICD-9-CM: 753.10  11/12/2014              Current Outpatient Prescriptions on File Prior to Visit   Medication Sig    furosemide (LASIX) 40 mg tablet Take 1 Tab by mouth daily.  potassium chloride SR (KLOR-CON 10) 10 mEq tablet Take 2 Tabs by mouth daily. New Directions.   Indications: hypokalemia prevention    ferrous sulfate 325 mg (65 mg iron) tablet TAKE 1 TABLET BY MOUTH DAILY WITH BREAKFAST    carvedilol (COREG) 6.25 mg tablet TAKE 1/2 TABLET BY MOUTH TWICE DAILY WITH MEALS- new dosage  aspirin delayed-release 81 mg tablet Take 1 Tab by mouth daily.  glimepiride (AMARYL) 1 mg tablet Take 1 Tab by mouth daily. Take only if sugar is above 140    BETA-CAROTENE,A,-VITS C,E/MINS (EYE HEALTH PO) Take  by mouth two (2) times a day.  CHOLECALCIFEROL, VITAMIN D3, (VITAMIN D3 PO) Take  by mouth daily.  glucose blood VI test strips (FREESTYLE TEST) strip TEST 2 TIMES DAILY. Dx E11.42    fluocinoNIDE (LIDEX) 0.05 % topical cream Apply  to affected area two (2) times daily as needed for Skin Irritation.  loratadine (CLARITIN) 10 mg tablet Take 1 Tab by mouth daily as needed for Allergies.  Lancets misc 1 Package by Does Not Apply route daily. Test glucose daily and as needed Dx type 2 dm E11.42    linaclotide (LINZESS) 145 mcg cap capsule Take 1 Cap by mouth Daily (before breakfast). Per dr Magalis Ennis    magnesium hydroxide (MILK OF MAGNESIA) 2,400 mg/10 mL susp suspension Take 10 mL by mouth nightly. No current facility-administered medications on file prior to visit. CARDIOLOGY STUDIES TO DATE:  4/16 echo normal lvef, lae, mild tr  12/17 echo normal lvef, elisha, mild to mod mr and tr, pa pressure 50mm         Chief Complaint   Patient presents with    Irregular Heart Beat     HPI :  Ms. [facility] dose was cut in half when she last met with Dr. Alexandr Davidson because she was complaining of a lot of fatigue and stuff and that has really worked out well for her. She is upset today because she forgot to take her morning medicines yesterday, got real excited about the snow that we had. Her edema is fairly well controlled with a combination of support hose and 40 mg a day of Lasix. The only time she really gets short of breath is when she gets anxious or upset. CARDIAC ROS:   negative for chest pain, palpitations, syncope, orthopnea, paroxysmal nocturnal dyspnea, exertional chest pressure/discomfort, claudication    No family history on file.     Past Medical History: Diagnosis Date    Anxiety     Bleeding nose     Chronic atrial fibrillation (HCC)     Diabetes (HCC)     Hypertension        GENERAL ROS:  A comprehensive review of systems was negative except for that written in the HPI. Visit Vitals    /68    Pulse 60    Ht 4' 11\" (1.499 m)    Wt 183 lb 6.4 oz (83.2 kg)    BMI 37.04 kg/m2       Wt Readings from Last 3 Encounters:   03/13/18 183 lb 6.4 oz (83.2 kg)   02/21/18 182 lb (82.6 kg)   02/08/18 185 lb 6.4 oz (84.1 kg)            BP Readings from Last 3 Encounters:   03/13/18 132/68   02/21/18 125/46   02/08/18 121/43       PHYSICAL EXAM  General appearance: alert, cooperative, no distress, appears stated age  Neck: supple, symmetrical, trachea midline, no adenopathy, no carotid bruit and no JVD  Lungs: clear to auscultation bilaterally  Heart: irregularly irregular rhythm, S1, S2 normal, no S3 or S4  Extremities: edema tr    Lab Results   Component Value Date/Time    Cholesterol, total 146 03/21/2016 10:31 AM    Cholesterol, total 147 03/13/2015 02:06 PM    HDL Cholesterol 85 03/21/2016 10:31 AM    HDL Cholesterol 60 03/13/2015 02:06 PM    LDL, calculated 48 03/21/2016 10:31 AM    LDL, calculated 69 03/13/2015 02:06 PM    Triglyceride 64 03/21/2016 10:31 AM    Triglyceride 90 03/13/2015 02:06 PM     ASSESSMENT  Ms. Almendarez appears to be fairly well compensated at this point on a reasonable medical regimen and needs no cardiac testing. She has done really well with her salt restriction and for the most part taking her medications. I offered to give her a prescription for the lower strength Carvedilol and at this point she has declined because she has a lot on hand and she does not mind cutting them. current treatment plan is effective, no change in therapy  lab results and schedule of future lab studies reviewed with patient  reviewed diet, exercise and weight control    Encounter Diagnoses   Name Primary?     Chronic [facility] fibrillation ([facility] Yes    Essential hypertension, benign     Localized edema      No orders of the defined types were placed in this encounter. Follow-up Disposition:  Return in about 6 months (around 9/13/2018).     Claribel Wolfe MD  3/13/2018

## 2018-04-10 ENCOUNTER — TELEPHONE (OUTPATIENT)
Dept: INTERNAL MEDICINE CLINIC | Age: 83
End: 2018-04-10

## 2018-04-10 NOTE — TELEPHONE ENCOUNTER
----- Message from Marifer Hilario MD sent at 4/7/2018 12:37 PM EDT -----  Regarding: FW: Contour Next Test   Call pt to see if she wants to change per insurance preference  ----- Message -----     From: Tyesha Bright     Sent: 4/4/2018  12:06 PM       To: Marifer Hilario MD  Subject: Contour Next Test                                [employer] [employer] prefers 75 Freestyle Strips or Precision Xtra. Both will process without PA. Contour not covered.

## 2018-04-10 NOTE — TELEPHONE ENCOUNTER
Advised pt [employer] prefers 75 Freestyle Strips or Precision Xtra. Her Contour not covered. She states she just recntly received 200 strips and does not need them now. Will call when need refilled.

## 2018-04-17 DIAGNOSIS — R06.02 SOB (SHORTNESS OF BREATH) ON EXERTION: ICD-10-CM

## 2018-04-17 DIAGNOSIS — M79.89 LEG SWELLING: ICD-10-CM

## 2018-04-18 RX ORDER — POTASSIUM CHLORIDE 750 MG/1
20 TABLET, FILM COATED, EXTENDED RELEASE ORAL DAILY
Qty: 180 TAB | Refills: 3 | OUTPATIENT
Start: 2018-04-18

## 2018-04-19 DIAGNOSIS — M79.89 LEG SWELLING: ICD-10-CM

## 2018-04-19 DIAGNOSIS — R06.02 SOB (SHORTNESS OF BREATH) ON EXERTION: ICD-10-CM

## 2018-04-20 RX ORDER — POTASSIUM CHLORIDE 750 MG/1
20 TABLET, FILM COATED, EXTENDED RELEASE ORAL DAILY
Qty: 180 TAB | Refills: 3 | Status: SHIPPED | OUTPATIENT
Start: 2018-04-20 | End: 2018-10-29 | Stop reason: SDUPTHER

## 2018-04-25 ENCOUNTER — HOSPITAL ENCOUNTER (OUTPATIENT)
Dept: LAB | Age: 83
Discharge: HOME | End: 2018-04-25
Payer: MEDICARE

## 2018-04-25 ENCOUNTER — OFFICE VISIT (OUTPATIENT)
Dept: INTERNAL MEDICINE CLINIC | Age: 83
End: 2018-04-25

## 2018-04-25 VITALS
HEART RATE: 61 BPM | TEMPERATURE: 97.9 F | HEIGHT: 59 IN | DIASTOLIC BLOOD PRESSURE: 44 MMHG | RESPIRATION RATE: 16 BRPM | OXYGEN SATURATION: 96 % | SYSTOLIC BLOOD PRESSURE: 136 MMHG | BODY MASS INDEX: 36.77 KG/M2 | WEIGHT: 182.4 LBS

## 2018-04-25 DIAGNOSIS — E11.21 TYPE 2 DIABETES MELLITUS WITH NEPHROPATHY (HCC): ICD-10-CM

## 2018-04-25 DIAGNOSIS — E66.01 SEVERE OBESITY (BMI 35.0-39.9) WITH COMORBIDITY (HCC): ICD-10-CM

## 2018-04-25 DIAGNOSIS — E11.40 TYPE 2 DIABETES, CONTROLLED, WITH NEUROPATHY (HCC): Primary | ICD-10-CM

## 2018-04-25 DIAGNOSIS — I10 ESSENTIAL HYPERTENSION, BENIGN: ICD-10-CM

## 2018-04-25 DIAGNOSIS — I50.22 CHRONIC SYSTOLIC CONGESTIVE HEART FAILURE (HCC): ICD-10-CM

## 2018-04-25 DIAGNOSIS — N39.46 MIXED STRESS AND URGE URINARY INCONTINENCE: ICD-10-CM

## 2018-04-25 DIAGNOSIS — I48.20 CHRONIC ATRIAL FIBRILLATION (HCC): ICD-10-CM

## 2018-04-25 PROCEDURE — 85025 COMPLETE CBC W/AUTO DIFF WBC: CPT

## 2018-04-25 PROCEDURE — 83036 HEMOGLOBIN GLYCOSYLATED A1C: CPT

## 2018-04-25 PROCEDURE — 36415 COLL VENOUS BLD VENIPUNCTURE: CPT

## 2018-04-25 PROCEDURE — 80053 COMPREHEN METABOLIC PANEL: CPT

## 2018-04-25 NOTE — MR AVS SNAPSHOT
[address] [facility] [address] [facility] 
[phone] Patient: Yuni Duran MRN: [id number] MNM:[id number] Visit Information Date & Time Provider Department Dept. Phone Encounter #  
 4/25/2018  2:20 PM Jaylyn Moe, [address] [place] [address] Internal Medicine [phone] [id number] Follow-up Instructions Return in about 2 months (around 6/25/2018). Your Appointments 9/14/2018  1:20 PM  
ESTABLISHED PATIENT with Dave Mckinnon MD  
[facility] ([facility]) Appt Note: 6 mo f/u [facility] [address]  [address] [place] [address]  
[address] [address] Upcoming Health Maintenance Date Due  
 LIPID PANEL Q1 3/21/2017 MEDICARE YEARLY EXAM 3/14/2018 FOOT EXAM Q1 3/20/2018 HEMOGLOBIN A1C Q6M 7/9/2018 MICROALBUMIN Q1 8/3/2018 EYE EXAM RETINAL OR DILATED Q1 9/1/2018 GLAUCOMA SCREENING Q2Y 9/1/2019 DTaP/Tdap/Td series (2 - Td) 9/21/2026 Allergies as of 4/25/2018  Review Complete On: 4/25/2018 By: Jaylyn Moe MD  
  
 Severity Noted Reaction Type Reactions Amoxicillin  03/28/2014    Other (comments) \"makes me feel like I\"m going to faint\" Aspirin  03/28/2014    Palpitations Gabapentin  10/18/2017    Drowsiness * pt states this med makes her feel very drowsy , gives her a drunk feeling. Levaquin [Levofloxacin]  03/20/2015    Hives, Rash Lorazepam  10/18/2017    Other (comments) Feeling faint. Losartan  06/17/2015    Other (comments) Tongue swelling Lyrica [Pregabalin]  03/28/2014    Rash Other Medication  10/18/2017    Other (comments) Feeling faint, does not decrease pain. Sertraline  10/18/2017    Other (comments) Feeling faint. Current Immunizations  Reviewed on 10/18/2017 Name Date Influenza High Dose Vaccine PF 10/18/2017, 11/3/2016, 11/2/2015 Influenza Vaccine 9/18/2014 Pneumococcal Conjugate (PCV-13) 1/22/2015 Pneumococcal Vaccine (Unspecified Type) 11/12/2011 Zoster Vaccine, Live 1/28/2016 Not reviewed this visit You Were Diagnosed With   
  
 Codes Comments Type 2 diabetes, controlled, with neuropathy ([facility]    -  Primary ICD-10-CM: E11.40 ICD-9-CM: 250.60, 357.2 Severe obesity (BMI 35.0-39. 9) with comorbidity ([facility].)     ICD-10-CM: E66.01 
ICD-9-CM: 278.01 Type 2 diabetes mellitus with nephropathy (HCC)     ICD-10-CM: E11.21 
ICD-9-CM: 250.40, 583.81 Chronic systolic congestive heart failure (HCC)     ICD-10-CM: I50.22 ICD-9-CM: 428.22, 428.0 Chronic atrial fibrillation (HCC)     ICD-10-CM: A07.5 ICD-9-CM: 427.31 Essential hypertension, benign     ICD-10-CM: I10 
ICD-9-CM: 401.1 Mixed stress and urge urinary incontinence     ICD-10-CM: N39.46 
ICD-9-CM: 788.33 Vitals BP Pulse Temp Resp Height(growth percentile) Weight(growth percentile) 136/44 (BP 1 Location: Left arm, BP Patient Position: Sitting) 61 97.9 °F (36.6 °C) (Oral) 16 4' 11\" (1.499 m) 182 lb 6.4 oz (82.7 kg) SpO2 BMI OB Status Smoking Status 96% 36.84 kg/m2 Postmenopausal Former Smoker BMI and BSA Data Body Mass Index Body Surface Area  
 36.84 kg/m 2 1.86 m 2 Preferred Pharmacy Pharmacy Name Phone [address], [facility] [address] [phone] Your Updated Medication List  
  
   
This list is accurate as of 4/25/18  3:15 PM.  Always use your most recent med list.  
  
  
  
  
 aspirin delayed-release 81 mg tablet Take 1 Tab by mouth daily. carvedilol 6.25 mg tablet Commonly known as:  COREG  
TAKE 1/2 TABLET BY MOUTH TWICE DAILY WITH MEALS- new dosage EYE HEALTH PO Take  by mouth two (2) times a day. ferrous sulfate 325 mg (65 mg iron) tablet TAKE 1 TABLET BY MOUTH DAILY WITH BREAKFAST  
  
 fluocinoNIDE 0.05 % topical cream  
Commonly known as:  LIDEX Apply  to affected area two (2) times daily as needed for Skin Irritation. furosemide 40 mg tablet Commonly known as:  LASIX Take 1 Tab by mouth daily. glimepiride 1 mg tablet Commonly known as:  AMARYL Take 1 Tab by mouth daily. Take only if sugar is above 140  
  
 glucose blood VI test strips strip Commonly known as:  CONTOUR NEXT TEST STRIPS  
TEST BLOOD SUGAR TWICE DAILY dx code E11.2 Lancets Misc  
1 Package by Does Not Apply route daily. Test glucose daily and as needed Dx type 2 dm E11.42  
  
 linaclotide 145 mcg Cap capsule Commonly known as:  Ancel Buerger Take 1 Cap by mouth Daily (before breakfast). Per dr Mala Nunn  
  
 loratadine 10 mg tablet Commonly known as:  [place] Pain Take 1 Tab by mouth daily as needed for Allergies. magnesium hydroxide 2,400 mg/10 mL Susp suspension Commonly known as:  MILK OF MAGNESIA Take 10 mL by mouth nightly. potassium chloride SR 10 mEq tablet Commonly known as:  KLOR-CON 10 Take 2 Tabs by mouth daily. New Directions. Indications: hypokalemia prevention VITAMIN D3 PO Take  by mouth daily. We Performed the Following CBC WITH AUTOMATED DIFF [01189 CPT(R)] HEMOGLOBIN A1C WITH EAG [02939 CPT(R)] METABOLIC PANEL, COMPREHENSIVE [15268 CPT(R)] REFERRAL TO FEMALE PELVIC MEDICINE AND RECONSTRUCTIVE SURGERY [[id number] Custom] Follow-up Instructions Return in about 2 months (around 6/25/2018). Referral Information Referral ID Referred By Referred To  
  
 [id number] Brittaney MCKEON MD   
   [address] [facility], [address] Phone: [phone] Fax: [phone] Visits Status Start Date End Date 1 New Request 4/25/18 4/25/19 If your referral has a status of pending review or denied, additional information will be sent to support the outcome of this decision. Introducing [facility] & [facility] SERVICES! Dear Jose Bowens: 
Thank you for requesting a [employer] account. Our records indicate that you have previously registered for a [employer] account but its currently inactive. Please call our [employer] support line at [phone]. Additional Information If you have questions, please visit the Frequently Asked Questions section of the [employer] website at [email or web address] Remember, [employer] is NOT to be used for urgent needs. For medical emergencies, dial 911. Now available from your iPhone and Android! Please provide this summary of care documentation to your next provider. Your primary care clinician is listed as JOCELYN MCKEON. If you have any questions after today's visit, please call [phone].

## 2018-04-25 NOTE — PROGRESS NOTES
HISTORY OF PRESENT ILLNESS  Abhishek Nazario is a 80 y.o. female. JELANI De Hernández is seen today for follow up of diabetes and other problems. She is accompanied by a close friend today. 1.  Diabetes. Due for routine labs. Reviewed home blood sugars and they look acceptable. 2.  Hypertension. Stable on current regimen. 3.  Anxiety, improved. She did not bring up any of the issues that have bothered her of late. 4.  CHF, atrial fibrillation. Weight remains stable and, in fact, may be down a bit. She has followed up with cardiology. No active symptoms currently. Review of systems notable for a rash on her upper extremity that she describes as spots. These appear to areas of ecchymoses and she is reassured they were benign. Other concerns include some foot pain consistent with pain associated with stasis edema. Reviewed need for continued use of compression hose and leg elevation. Lastly, she has some overactive bladder symptoms. I would like to confirm the primary type of incontinence she suffers with. I do worry about medication side effects if she has urge incontinence. Given these issues, we have referred her for a urology consultation. Current Outpatient Prescriptions   Medication Sig    potassium chloride SR (KLOR-CON 10) 10 mEq tablet Take 2 Tabs by mouth daily. New Directions. Indications: hypokalemia prevention    glucose blood VI test strips (CONTOUR NEXT STRIPS) strip TEST BLOOD SUGAR TWICE DAILY dx code E11.2    furosemide (LASIX) 40 mg tablet Take 1 Tab by mouth daily.  ferrous sulfate 325 mg (65 mg iron) tablet TAKE 1 TABLET BY MOUTH DAILY WITH BREAKFAST    carvedilol (COREG) 6.25 mg tablet TAKE 1/2 TABLET BY MOUTH TWICE DAILY WITH MEALS- new dosage    aspirin delayed-release 81 mg tablet Take 1 Tab by mouth daily.  glimepiride (AMARYL) 1 mg tablet Take 1 Tab by mouth daily.  Take only if sugar is above 140    BETA-CAROTENE,A,-VITS C,E/MINS (EYE HEALTH PO) Take  by mouth two (2) times a day.  CHOLECALCIFEROL, VITAMIN D3, (VITAMIN D3 PO) Take  by mouth daily.  fluocinoNIDE (LIDEX) 0.05 % topical cream Apply  to affected area two (2) times daily as needed for Skin Irritation.  loratadine (CLARITIN) 10 mg tablet Take 1 Tab by mouth daily as needed for Allergies.  Lancets misc 1 Package by Does Not Apply route daily. Test glucose daily and as needed Dx type 2 dm E11.42    linaclotide (LINZESS) 145 mcg cap capsule Take 1 Cap by mouth Daily (before breakfast). Per dr Darrell Maciel    magnesium hydroxide (MILK OF MAGNESIA) 2,400 mg/10 mL susp suspension Take 10 mL by mouth nightly. No current facility-administered medications for this visit. Review of Systems   Constitutional: Negative for weight loss. Respiratory: Negative. Cardiovascular: Positive for leg swelling. Negative for chest pain, palpitations and PND. Genitourinary: Positive for frequency. Negative for dysuria. Musculoskeletal: Positive for joint pain. Negative for myalgias. Neurological: Negative for focal weakness. Endo/Heme/Allergies: Bruises/bleeds easily. Physical Exam   Constitutional: No distress. Cardiovascular: Normal rate. An irregularly irregular rhythm present. Exam reveals no gallop and no friction rub. No murmur heard. Pulmonary/Chest: Effort normal and breath sounds normal.   Musculoskeletal: She exhibits edema. Mild bilateral lower extremity edema,   Neurological: She is alert. Nursing note and vitals reviewed. ASSESSMENT and PLAN  Diagnoses and all orders for this visit:    1. Type 2 diabetes, controlled, with neuropathy (HCC)  -     METABOLIC PANEL, COMPREHENSIVE  -     HEMOGLOBIN A1C WITH EAG    2. Severe obesity (BMI 35.0-39. 9) with comorbidity ([address]- Diet and exercise     3. Type 2 diabetes mellitus with nephropathy ([address]- Continue current regimen of prescription and / or OTC medications     4.  Chronic systolic congestive heart failure ([address]- Continue current regimen of prescription and / or OTC medications , See cardiologist as directed. 5. Chronic atrial fibrillation (HCC)  -     CBC WITH AUTOMATED DIFF    6. Essential hypertension, benign- Continue current regimen of prescription and / or OTC medications     7.  Mixed stress and urge urinary incontinence  -     REFERRAL TO FEMALE PELVIC MEDICINE AND RECONSTRUCTIVE SURGERY

## 2018-04-26 LAB
ALBUMIN SERPL-MCNC: 4 G/DL (ref 3.2–4.6)
ALBUMIN/GLOB SERPL: 1.5 {RATIO} (ref 1.2–2.2)
ALP SERPL-CCNC: 119 IU/L (ref 39–117)
ALT SERPL-CCNC: 25 IU/L (ref 0–32)
AST SERPL-CCNC: 22 IU/L (ref 0–40)
BASOPHILS # BLD AUTO: 0 X10E3/UL (ref 0–0.2)
BASOPHILS NFR BLD AUTO: 0 %
BILIRUB SERPL-MCNC: 0.3 MG/DL (ref 0–1.2)
BUN SERPL-MCNC: 25 MG/DL (ref 10–36)
BUN/CREAT SERPL: 31 (ref 12–28)
CALCIUM SERPL-MCNC: 8.5 MG/DL (ref 8.7–10.3)
CHLORIDE SERPL-SCNC: 100 MMOL/L (ref 96–106)
CO2 SERPL-SCNC: 27 MMOL/L (ref 18–29)
CREAT SERPL-MCNC: 0.8 MG/DL (ref 0.57–1)
EOSINOPHIL # BLD AUTO: 0 X10E3/UL (ref 0–0.4)
EOSINOPHIL NFR BLD AUTO: 1 %
ERYTHROCYTE [DISTWIDTH] IN BLOOD BY AUTOMATED COUNT: 16.5 % (ref 12.3–15.4)
EST. AVERAGE GLUCOSE BLD GHB EST-MCNC: 146 MG/DL
GFR SERPLBLD CREATININE-BSD FMLA CKD-EPI: 63 ML/MIN/1.73
GFR SERPLBLD CREATININE-BSD FMLA CKD-EPI: 72 ML/MIN/1.73
GLOBULIN SER CALC-MCNC: 2.7 G/DL (ref 1.5–4.5)
GLUCOSE SERPL-MCNC: 107 MG/DL (ref 65–99)
HBA1C MFR BLD: 6.7 % (ref 4.8–5.6)
HCT VFR BLD AUTO: 37.5 % (ref 34–46.6)
HGB BLD-MCNC: 12.2 G/DL (ref 11.1–15.9)
IMM GRANULOCYTES # BLD: 0 X10E3/UL (ref 0–0.1)
IMM GRANULOCYTES NFR BLD: 0 %
LYMPHOCYTES # BLD AUTO: 1.6 X10E3/UL (ref 0.7–3.1)
LYMPHOCYTES NFR BLD AUTO: 30 %
MCH RBC QN AUTO: 28.1 PG (ref 26.6–33)
MCHC RBC AUTO-ENTMCNC: 32.5 G/DL (ref 31.5–35.7)
MCV RBC AUTO: 86 FL (ref 79–97)
MONOCYTES # BLD AUTO: 0.5 X10E3/UL (ref 0.1–0.9)
MONOCYTES NFR BLD AUTO: 10 %
NEUTROPHILS # BLD AUTO: 3.1 X10E3/UL (ref 1.4–7)
NEUTROPHILS NFR BLD AUTO: 59 %
PLATELET # BLD AUTO: 252 X10E3/UL (ref 150–379)
POTASSIUM SERPL-SCNC: 4.8 MMOL/L (ref 3.5–5.2)
PROT SERPL-MCNC: 6.7 G/DL (ref 6–8.5)
RBC # BLD AUTO: 4.34 X10E6/UL (ref 3.77–5.28)
SODIUM SERPL-SCNC: 141 MMOL/L (ref 134–144)
WBC # BLD AUTO: 5.3 X10E3/UL (ref 3.4–10.8)

## 2018-06-15 ENCOUNTER — TELEPHONE (OUTPATIENT)
Dept: INTERNAL MEDICINE CLINIC | Age: 83
End: 2018-06-15

## 2018-06-15 NOTE — TELEPHONE ENCOUNTER
Spoke with patient states that Dr. Sumaya Orellana put her on myrbetriq for her bladder. States it made her BP go up and Dr. Sumaya Orellana told her to stop the medication for one week and monitor her Bp then restart the medication.  Pt wanted to inform Dr. Germán Kendrick

## 2018-06-20 ENCOUNTER — OFFICE VISIT (OUTPATIENT)
Dept: INTERNAL MEDICINE CLINIC | Age: 83
End: 2018-06-20

## 2018-06-20 VITALS
SYSTOLIC BLOOD PRESSURE: 158 MMHG | HEIGHT: 59 IN | DIASTOLIC BLOOD PRESSURE: 64 MMHG | BODY MASS INDEX: 36.49 KG/M2 | WEIGHT: 181 LBS | TEMPERATURE: 98.1 F | HEART RATE: 57 BPM | OXYGEN SATURATION: 98 %

## 2018-06-20 DIAGNOSIS — E66.01 SEVERE OBESITY (BMI 35.0-39.9) WITH COMORBIDITY (HCC): ICD-10-CM

## 2018-06-20 DIAGNOSIS — I10 ESSENTIAL HYPERTENSION, BENIGN: Primary | ICD-10-CM

## 2018-06-20 DIAGNOSIS — E11.21 TYPE 2 DIABETES MELLITUS WITH NEPHROPATHY (HCC): ICD-10-CM

## 2018-06-20 DIAGNOSIS — E11.40 TYPE 2 DIABETES, CONTROLLED, WITH NEUROPATHY (HCC): ICD-10-CM

## 2018-06-20 DIAGNOSIS — F41.9 ANXIETY: ICD-10-CM

## 2018-06-20 DIAGNOSIS — I50.22 CHRONIC SYSTOLIC CONGESTIVE HEART FAILURE (HCC): ICD-10-CM

## 2018-06-20 DIAGNOSIS — I48.20 CHRONIC ATRIAL FIBRILLATION (HCC): ICD-10-CM

## 2018-06-20 RX ORDER — AMLODIPINE BESYLATE 2.5 MG/1
2.5 TABLET ORAL DAILY
Qty: 30 TAB | Refills: 11 | Status: SHIPPED | OUTPATIENT
Start: 2018-06-20 | End: 2018-07-16 | Stop reason: SDUPTHER

## 2018-06-20 NOTE — PROGRESS NOTES
Chief Complaint   Patient presents with    Hypertension    Diabetes     1. Have you been to the ER, urgent care clinic since your last visit? Hospitalized since your last visit? No    2. Have you seen or consulted any other health care providers outside of the [address] [place] [address] since your last visit? Include any pap smears or colon screening.  No    Visit Vitals    /64 (BP 1 Location: Right arm, BP Patient Position: Sitting)    Pulse (!) 57    Temp 98.1 °F (36.7 °C) (Oral)    Ht 4' 11\" (1.499 m)    Wt 181 lb (82.1 kg)    SpO2 98%    BMI 36.56 kg/m2

## 2018-06-20 NOTE — MR AVS SNAPSHOT
[address] [facility] [address] [place] [address] 
[phone] Patient: Angelica Alston MRN: [id number] JAVI:[id number] Visit Information Date & Time Provider Department Dept. Phone Encounter #  
 6/20/2018  2:20 PM Himanshu Caruso, [address] [facility] Internal Medicine [phone] [id number] Follow-up Instructions Return in about 3 weeks (around 7/11/2018). Your Appointments 9/14/2018  1:20 PM  
ESTABLISHED PATIENT with Levar Lafleur MD  
[facility] ([facility]) Appt Note: 6 mo f/u per Dr. Tisha Lafleur [address]  [address] [place] [address]  
[address] [facility] [address] Upcoming Health Maintenance Date Due  
 LIPID PANEL Q1 3/21/2017 MEDICARE YEARLY EXAM 3/14/2018 FOOT EXAM Q1 3/20/2018 Influenza Age 5 to Adult 8/1/2018 MICROALBUMIN Q1 8/3/2018 EYE EXAM RETINAL OR DILATED Q1 9/1/2018 HEMOGLOBIN A1C Q6M 10/25/2018 GLAUCOMA SCREENING Q2Y 9/1/2019 DTaP/Tdap/Td series (2 - Td) 9/21/2026 Allergies as of 6/20/2018  Review Complete On: 6/20/2018 By: Himanshu Caruso MD  
  
 Severity Noted Reaction Type Reactions Amoxicillin  03/28/2014    Other (comments) \"makes me feel like I\"m going to faint\" Aspirin  03/28/2014    Palpitations Gabapentin  10/18/2017    Drowsiness * pt states this med makes her feel very drowsy , gives her a drunk feeling. Levaquin [Levofloxacin]  03/20/2015    Hives, Rash Lorazepam  10/18/2017    Other (comments) Feeling faint. Losartan  06/17/2015    Other (comments) Tongue swelling Lyrica [Pregabalin]  03/28/2014    Rash Other Medication  10/18/2017    Other (comments) Feeling faint, does not decrease pain. Sertraline  10/18/2017    Other (comments) Feeling faint. Current Immunizations  Reviewed on 10/18/2017 Name Date Influenza High Dose Vaccine PF 10/18/2017, 11/3/2016, 11/2/2015 Influenza Vaccine 9/18/2014 Pneumococcal Conjugate (PCV-13) 1/22/2015 Pneumococcal Vaccine (Unspecified Type) 11/12/2011 Zoster Vaccine, Live 1/28/2016 Not reviewed this visit You Were Diagnosed With   
  
 Codes Comments Essential hypertension, benign    -  Primary ICD-10-CM: I10 
ICD-9-CM: 401.1 Severe obesity (BMI 35.0-39. 9) with comorbidity ([facility]     ICD-10-CM: E66.01 
ICD-9-CM: 278.01 Type 2 diabetes mellitus with nephropathy (HCC)     ICD-10-CM: E11.21 
ICD-9-CM: 250.40, 583.81 Chronic systolic congestive heart failure (HCC)     ICD-10-CM: I50.22 ICD-9-CM: 428.22, 428.0 Chronic atrial fibrillation (HCC)     ICD-10-CM: N05.3 ICD-9-CM: 427.31 Type 2 diabetes, controlled, with neuropathy ([facility]     ICD-10-CM: E11.40 ICD-9-CM: 250.60, 357.2 Anxiety     ICD-10-CM: F41.9 ICD-9-CM: 300.00 Vitals BP Pulse Temp Height(growth percentile) Weight(growth percentile) SpO2  
 158/64 (BP 1 Location: Right arm, BP Patient Position: Sitting) (!) 57 98.1 °F (36.7 °C) (Oral) 4' 11\" (1.499 m) 181 lb (82.1 kg) 98% BMI OB Status Smoking Status 36.56 kg/m2 Postmenopausal Former Smoker Vitals History BMI and BSA Data Body Mass Index Body Surface Area  
 36.56 kg/m 2 1.85 m 2 Preferred Pharmacy Pharmacy Name Phone [facility], [address] [facility] [address] [phone] Your Updated Medication List  
  
   
This list is accurate as of 6/20/18  3:11 PM.  Always use your most recent med list. amLODIPine 2.5 mg tablet Commonly known as:  Michi Ape Take 1 Tab by mouth daily. aspirin delayed-release 81 mg tablet Take 1 Tab by mouth daily. carvedilol 6.25 mg tablet Commonly known as:  COREG  
TAKE 1/2 TABLET BY MOUTH TWICE DAILY WITH MEALS- new dosage  EYE HEALTH PO  
 Take  by mouth two (2) times a day. ferrous sulfate 325 mg (65 mg iron) tablet TAKE 1 TABLET BY MOUTH DAILY WITH BREAKFAST  
  
 fluocinoNIDE 0.05 % topical cream  
Commonly known as:  LIDEX Apply  to affected area two (2) times daily as needed for Skin Irritation. furosemide 40 mg tablet Commonly known as:  LASIX Take 1 Tab by mouth daily. glimepiride 1 mg tablet Commonly known as:  AMARYL Take 1 Tab by mouth daily. Take only if sugar is above 140  
  
 glucose blood VI test strips strip Commonly known as:  CONTOUR NEXT TEST STRIPS  
TEST BLOOD SUGAR TWICE DAILY dx code E11.2 Lancets Misc  
1 Package by Does Not Apply route daily. Test glucose daily and as needed Dx type 2 dm E11.42  
  
 linaclotide 145 mcg Cap capsule Commonly known as:  Lorelee Spikes Take 1 Cap by mouth Daily (before breakfast). Per dr Niles Carrera  
  
 loratadine 10 mg tablet Commonly known as:  Kip Manual Take 1 Tab by mouth daily as needed for Allergies. magnesium hydroxide 2,400 mg/10 mL Susp suspension Commonly known as:  MILK OF MAGNESIA Take 10 mL by mouth nightly. potassium chloride SR 10 mEq tablet Commonly known as:  KLOR-CON 10 Take 2 Tabs by mouth daily. New Directions. Indications: hypokalemia prevention VITAMIN D3 PO Take  by mouth daily. Prescriptions Sent to Pharmacy Refills  
 amLODIPine (NORVASC) 2.5 mg tablet 11 Sig: Take 1 Tab by mouth daily. Class: Normal  
 Pharmacy: [address] [facility] #: [phone] Route: Oral  
  
Follow-up Instructions Return in about 3 weeks (around 7/11/2018). Introducing [facility] & HEALTH SERVICES! Dear Yue May: 
Thank you for requesting a Dragonfly account. Our records indicate that you have previously registered for a Dragonfly account but its currently inactive. Please call our Dragonfly support line at [phone]. Additional Information If you have questions, please visit the Frequently Asked Questions section of the [employer] website at [email or web address] Remember, [employer] is NOT to be used for urgent needs. For medical emergencies, dial 911. Now available from your iPhone and Android! Please provide this summary of care documentation to your next provider. Your primary care clinician is listed as JOCELYN MCKEON. If you have any questions after today's visit, please call [phone].

## 2018-06-20 NOTE — PROGRESS NOTES
HISTORY OF PRESENT ILLNESS  Price Bond is a 80 y.o. female. HPI Subjective: Cassi Tran is seen today accompanied by her home health aide, Rodney Cruz. She is here for follow up of hypertension and other problems. Issues:   1. Diabetes. Up to date to with A1c assessment. Blood sugars appear to be stable upon review of home readings. 2. Hypertension. Home readings are elevated as well as being elevated here. We had reduced her Coreg dose due to malaise. She attributed her higher ratings to her new OAB medicine from Dr. Rod Jacobs. When she stopped the medication under the direction of Dr. Rod Jacobs, her blood pressures are still elevated. I do question the accuracy of her home apparatus as it is a wrist monitor. I have asked her to bring in it for a follow up in three weeks. 3. Anxiety. Fair. No significant worsening of her symptoms. 4. CHF, chronic atrial fibrillation, clinically stable. 5. OAB. See above. She has seen Dr. Rod Jacobs and will follow up with her as directed. Assessment: We will follow up with her as directed. Current Outpatient Prescriptions   Medication Sig    amLODIPine (NORVASC) 2.5 mg tablet Take 1 Tab by mouth daily.  potassium chloride SR (KLOR-CON 10) 10 mEq tablet Take 2 Tabs by mouth daily. New Directions. Indications: hypokalemia prevention    glucose blood VI test strips (CONTOUR NEXT STRIPS) strip TEST BLOOD SUGAR TWICE DAILY dx code E11.2    furosemide (LASIX) 40 mg tablet Take 1 Tab by mouth daily.  ferrous sulfate 325 mg (65 mg iron) tablet TAKE 1 TABLET BY MOUTH DAILY WITH BREAKFAST    carvedilol (COREG) 6.25 mg tablet TAKE 1/2 TABLET BY MOUTH TWICE DAILY WITH MEALS- new dosage    aspirin delayed-release 81 mg tablet Take 1 Tab by mouth daily.  glimepiride (AMARYL) 1 mg tablet Take 1 Tab by mouth daily. Take only if sugar is above 140    BETA-CAROTENE,A,-VITS C,E/MINS (EYE HEALTH PO) Take  by mouth two (2) times a day.     CHOLECALCIFEROL, VITAMIN D3, (VITAMIN D3 PO) Take  by mouth daily.  fluocinoNIDE (LIDEX) 0.05 % topical cream Apply  to affected area two (2) times daily as needed for Skin Irritation.  loratadine (CLARITIN) 10 mg tablet Take 1 Tab by mouth daily as needed for Allergies.  Lancets misc 1 Package by Does Not Apply route daily. Test glucose daily and as needed Dx type 2 dm E11.42    linaclotide (LINZESS) 145 mcg cap capsule Take 1 Cap by mouth Daily (before breakfast). Per dr Chantale Marin    magnesium hydroxide (MILK OF MAGNESIA) 2,400 mg/10 mL susp suspension Take 10 mL by mouth nightly. No current facility-administered medications for this visit. Lipid lowering therapy not prescibed for medical reasons. Review of Systems   Constitutional: Negative for chills and fever. Respiratory: Positive for shortness of breath. Mild, chronic   Genitourinary: Positive for frequency. Physical Exam   Constitutional: No distress. Cardiovascular: An irregularly irregular rhythm present. Bradycardia present. Exam reveals no gallop and no friction rub. No murmur heard. Pulmonary/Chest: Effort normal and breath sounds normal.   Musculoskeletal: She exhibits edema. Mild bilateral lower extremity edema - with [facility]   Nursing note and vitals reviewed. ASSESSMENT and PLAN  Diagnoses and all orders for this visit:    1. Essential hypertension, benign  -     amLODIPine (NORVASC) 2.5 mg tablet; Take 1 Tab by mouth daily. Continue other medications in addition to current changes     2. Severe obesity (BMI 35.0-39. 9) with comorbidity ([facility]- Diet and exercise     3. Type 2 diabetes mellitus with nephropathy ([facility]- Follow off treatment     4. Chronic systolic congestive heart failure [facility])- See cardiologist as directed. Continue current regimen of prescription and / or OTC medications     5. Chronic atrial fibrillation [facility])- See cardiologist as directed. Continue current regimen of prescription and / or OTC medications     6. Type 2 diabetes, controlled, with neuropathy ([facility]- Follow off treatment     7.  Anxiety- Follow off treatment     Plan was reviewed with patient and family, understanding expressed

## 2018-07-06 ENCOUNTER — TELEPHONE (OUTPATIENT)
Dept: INTERNAL MEDICINE CLINIC | Age: 83
End: 2018-07-06

## 2018-07-06 DIAGNOSIS — I50.32 CHRONIC DIASTOLIC CONGESTIVE HEART FAILURE (HCC): ICD-10-CM

## 2018-07-06 RX ORDER — CARVEDILOL 6.25 MG/1
TABLET ORAL
Qty: 90 TAB | Refills: 0 | Status: SHIPPED | OUTPATIENT
Start: 2018-07-06 | End: 2018-10-08 | Stop reason: SDUPTHER

## 2018-07-06 NOTE — TELEPHONE ENCOUNTER
Requested Prescriptions     Signed Prescriptions Disp Refills    carvedilol (COREG) 6.25 mg tablet 90 Tab 0     Sig: TAKE 1/2 TABLET BY MOUTH TWICE DAILY WITH MEALS- new dosage     Authorizing Provider: Armando Garcia     Ordering User: Joaquina Trammell       Per Dr. Brittney Huerta  Date Time Provider [facility]   7/16/2018 2:20 PM Terri Alonso MD [address] [facility]   9/14/2018 1:20 PM Nicky Delacruz  [address]

## 2018-07-06 NOTE — TELEPHONE ENCOUNTER
Suman Almendarez Lunch (Self) [phone] (H)     Pt out of contour strips, but says Dr. Elías Kam told her he'd switch her to something with zero out-of-pocket next time.   Req c/b

## 2018-07-09 NOTE — TELEPHONE ENCOUNTER
Patient is calling again about her medication refill b/c she said that it is being changed and she will be out soon.

## 2018-07-09 NOTE — TELEPHONE ENCOUNTER
While reviewing chart I see note back on 4/10/18 that Sadiq Baltazar checked with pt's insurance - [employer] prefers 75 Freestyle Strips or Precision Xtra. Sent Precision Xtra to pharmacy. Pt is aware this was being done.

## 2018-07-09 NOTE — TELEPHONE ENCOUNTER
Spoke with pt - states last visit MD had mention switching her to test strips that would be zero out of pocket for her. Advised her I would call her pharmacy to see what she was getting prior that she did not have a co pay. Spoke with pharmacist - she reviewed prior refills on test strip - always had a co pay.

## 2018-07-16 ENCOUNTER — OFFICE VISIT (OUTPATIENT)
Dept: INTERNAL MEDICINE CLINIC | Age: 83
End: 2018-07-16

## 2018-07-16 VITALS
DIASTOLIC BLOOD PRESSURE: 43 MMHG | RESPIRATION RATE: 17 BRPM | SYSTOLIC BLOOD PRESSURE: 118 MMHG | TEMPERATURE: 98 F | OXYGEN SATURATION: 96 % | HEART RATE: 66 BPM | WEIGHT: 184.6 LBS | HEIGHT: 59 IN | BODY MASS INDEX: 37.21 KG/M2

## 2018-07-16 DIAGNOSIS — I10 ESSENTIAL HYPERTENSION, BENIGN: ICD-10-CM

## 2018-07-16 DIAGNOSIS — I10 ESSENTIAL HYPERTENSION, BENIGN: Primary | ICD-10-CM

## 2018-07-16 DIAGNOSIS — F41.9 ANXIETY: ICD-10-CM

## 2018-07-16 DIAGNOSIS — I50.22 CHRONIC SYSTOLIC CONGESTIVE HEART FAILURE (HCC): ICD-10-CM

## 2018-07-16 DIAGNOSIS — E11.21 TYPE 2 DIABETES MELLITUS WITH NEPHROPATHY (HCC): ICD-10-CM

## 2018-07-16 RX ORDER — AMLODIPINE BESYLATE 5 MG/1
5 TABLET ORAL DAILY
Qty: 30 TAB | Refills: 11 | Status: SHIPPED | OUTPATIENT
Start: 2018-07-16 | End: 2018-07-16 | Stop reason: SDUPTHER

## 2018-07-16 NOTE — MR AVS SNAPSHOT
[address] [facility] [address] [facility] 57 
[phone] Patient: Imelda Camarena MRN: [id number] Visit Information Date & Time Provider Department Dept. Phone Encounter #  
 7/16/2018  2:20 PM Frederick Webb, [facility] Medicine [phone] [id number] Follow-up Instructions Return in about 2 months (around 9/16/2018). Your Appointments 9/14/2018  1:20 PM  
ESTABLISHED PATIENT with Aníbal Kong MD  
[facility] ([facility]) Appt Note: 6 mo f/u per Dr. Carrie Andersno [address] [place]  [address] [place] [address]  
[address] [address] Upcoming Health Maintenance Date Due  
 LIPID PANEL Q1 3/21/2017 MEDICARE YEARLY EXAM 3/14/2018 FOOT EXAM Q1 3/20/2018 MICROALBUMIN Q1 8/3/2018 Influenza Age 5 to Adult 8/1/2018 EYE EXAM RETINAL OR DILATED Q1 9/1/2018 HEMOGLOBIN A1C Q6M 10/25/2018 GLAUCOMA SCREENING Q2Y 9/1/2019 DTaP/Tdap/Td series (2 - Td) 9/21/2026 Allergies as of 7/16/2018  Review Complete On: 7/16/2018 By: Frederick Webb MD  
  
 Severity Noted Reaction Type Reactions Amoxicillin  03/28/2014    Other (comments) \"makes me feel like I\"m going to faint\" Aspirin  03/28/2014    Palpitations Gabapentin  10/18/2017    Drowsiness * pt states this med makes her feel very drowsy , gives her a drunk feeling. Levaquin [Levofloxacin]  03/20/2015    Hives, Rash Lorazepam  10/18/2017    Other (comments) Feeling faint. Losartan  06/17/2015    Other (comments) Tongue swelling Lyrica [Pregabalin]  03/28/2014    Rash Other Medication  10/18/2017    Other (comments) Feeling faint, does not decrease pain. Sertraline  10/18/2017    Other (comments) Feeling faint. Current Immunizations  Reviewed on 10/18/2017 Name Date Influenza High Dose Vaccine PF 10/18/2017, 11/3/2016, 11/2/2015 Influenza Vaccine 9/18/2014 Pneumococcal Conjugate (PCV-13) 1/22/2015 Pneumococcal Vaccine (Unspecified Type) 11/12/2011 Zoster Vaccine, Live 1/28/2016 Not reviewed this visit You Were Diagnosed With   
  
 Codes Comments Essential hypertension, benign    -  Primary ICD-10-CM: I10 
ICD-9-CM: 401.1 Type 2 diabetes mellitus with nephropathy (HCC)     ICD-10-CM: E11.21 
ICD-9-CM: 250.40, 583.81 Chronic systolic congestive heart failure (HCC)     ICD-10-CM: I50.22 ICD-9-CM: 428.22, 428.0 Anxiety     ICD-10-CM: F41.9 ICD-9-CM: 300.00 Vitals BP Pulse Temp Resp Height(growth percentile) Weight(growth percentile) 118/43 (BP 1 Location: Right arm, BP Patient Position: Sitting) 66 98 °F (36.7 °C) (Oral) 17 4' 11\" (1.499 m) 184 lb 9.6 oz (83.7 kg) SpO2 BMI OB Status Smoking Status 96% 37.28 kg/m2 Postmenopausal Former Smoker Vitals History BMI and BSA Data Body Mass Index Body Surface Area  
 37.28 kg/m 2 1.87 m 2 Preferred Pharmacy Pharmacy Name Phone [facility], [phone] [facility] [address] [phone] Your Updated Medication List  
  
   
This list is accurate as of 7/16/18  3:19 PM.  Always use your most recent med list. amLODIPine 5 mg tablet Commonly known as:  Naren Conine Take 1 Tab by mouth daily. New dosage  
  
 aspirin delayed-release 81 mg tablet Take 1 Tab by mouth daily. carvedilol 6.25 mg tablet Commonly known as:  COREG  
TAKE 1/2 TABLET BY MOUTH TWICE DAILY WITH MEALS- new dosage EYE HEALTH PO Take  by mouth two (2) times a day. ferrous sulfate 325 mg (65 mg iron) tablet TAKE 1 TABLET BY MOUTH DAILY WITH BREAKFAST  
  
 fluocinoNIDE 0.05 % topical cream  
Commonly known as:  LIDEX Apply  to affected area two (2) times daily as needed for Skin Irritation. furosemide 40 mg tablet Commonly known as:  LASIX Take 1 Tab by mouth daily. glimepiride 1 mg tablet Commonly known as:  AMARYL Take 1 Tab by mouth daily. Take only if sugar is above 140  
  
 glucose blood VI test strips strip Commonly known as:  PRECISION XTRA TEST  
TEST BLOOD SUGAR TWICE DAILY dx code E11.2. Lancets Misc  
1 Package by Does Not Apply route daily. Test glucose daily and as needed Dx type 2 dm E11.42  
  
 linaclotide 145 mcg Cap capsule Commonly known as:  [place] Dikes Take 1 Cap by mouth Daily (before breakfast). Per dr Azul Kiser  
  
 loratadine 10 mg tablet Commonly known as:  Paresh Benavides Take 1 Tab by mouth daily as needed for Allergies. magnesium hydroxide 2,400 mg/10 mL Susp suspension Commonly known as:  MILK OF MAGNESIA Take 10 mL by mouth nightly. potassium chloride SR 10 mEq tablet Commonly known as:  KLOR-CON 10 Take 2 Tabs by mouth daily. New Directions. Indications: hypokalemia prevention VITAMIN D3 PO Take  by mouth daily. Prescriptions Sent to Pharmacy Refills  
 amLODIPine (NORVASC) 5 mg tablet 11 Sig: Take 1 Tab by mouth daily. New dosage Class: Normal  
 Pharmacy: [facility] [address] [facility] Ph #: [phone] Route: Oral  
  
Follow-up Instructions Return in about 2 months (around 9/16/2018). Introducing [facility] & HEALTH SERVICES! [employer] introduces [employer] patient portal. Now you can access parts of your medical record, email your doctor's office, and request medication refills online. 1. In your internet browser, go to [email or web address] 2. Click on the First Time User? Click Here link in the Sign In box. You will see the New Member Sign Up page. 3. Enter your [employer] Access Code exactly as it appears below. You will not need to use this code after youve completed the sign-up process.  If you do not sign up before the expiration date, you must request a new code. · [employer] Access Code: [email or web address] Expires: 10/14/2018  2:11 PM 
 
4. Enter the last four digits of your Social Security Number (xxxx) and Date of Birth (mm/dd/yyyy) as indicated and click Submit. You will be taken to the next sign-up page. 5. Create a [employer] ID. This will be your [employer] login ID and cannot be changed, so think of one that is secure and easy to remember. 6. Create a [employer] password. You can change your password at any time. 7. Enter your Password Reset Question and Answer. This can be used at a later time if you forget your password. 8. Enter your e-mail address. You will receive e-mail notification when new information is available in [address] 9. Click Sign Up. You can now view and download portions of your medical record. 10. Click the Download Summary menu link to download a portable copy of your medical information. If you have questions, please visit the Frequently Asked Questions section of the [employer] website. Remember, [employer] is NOT to be used for urgent needs. For medical emergencies, dial 911. Now available from your iPhone and Android! Please provide this summary of care documentation to your next provider. Your primary care clinician is listed as JOCELYN MCKEON. If you have any questions after today's visit, please call [phone].

## 2018-07-16 NOTE — PROGRESS NOTES
HISTORY OF PRESENT ILLNESS  Ann-Marie Arango is a 80 y.o. female. HPI Riley Cotter is seen today accompanied by her home health aide Manuelito Lugo. She is here for follow up of hypertension and other concerns. 1. Hypertension. Stable readings here, but increased at home. Her monitor does appear accurate. I cannot really account for this, but I think we can continue with current regimen. 2. CHF, atrial fibrillation. Clinically stable. She will follow up with cardiology as directed. Review of systems notable for leg pain that worsens at night. She also has some very brief headaches lasting one second. She has shortness of breath with dyspnea on exertion that I suspect is multifactorial. Lastly, she has easy bruising and I have given her the okay to take her aspirin every 48 hours. Current Outpatient Prescriptions   Medication Sig    glucose blood VI test strips (PRECISION XTRA TEST) strip TEST BLOOD SUGAR TWICE DAILY dx code E11.2.  carvedilol (COREG) 6.25 mg tablet TAKE 1/2 TABLET BY MOUTH TWICE DAILY WITH MEALS- new dosage    potassium chloride SR (KLOR-CON 10) 10 mEq tablet Take 2 Tabs by mouth daily. New Directions. Indications: hypokalemia prevention    furosemide (LASIX) 40 mg tablet Take 1 Tab by mouth daily.  ferrous sulfate 325 mg (65 mg iron) tablet TAKE 1 TABLET BY MOUTH DAILY WITH BREAKFAST    aspirin delayed-release 81 mg tablet Take 1 Tab by mouth daily.  glimepiride (AMARYL) 1 mg tablet Take 1 Tab by mouth daily. Take only if sugar is above 140    BETA-CAROTENE,A,-VITS C,E/MINS (EYE HEALTH PO) Take  by mouth two (2) times a day.  CHOLECALCIFEROL, VITAMIN D3, (VITAMIN D3 PO) Take  by mouth daily.  fluocinoNIDE (LIDEX) 0.05 % topical cream Apply  to affected area two (2) times daily as needed for Skin Irritation.  loratadine (CLARITIN) 10 mg tablet Take 1 Tab by mouth daily as needed for Allergies.  Lancets misc 1 Package by Does Not Apply route daily.  Test glucose daily and as needed Dx type 2 dm E11.42    linaclotide (LINZESS) 145 mcg cap capsule Take 1 Cap by mouth Daily (before breakfast). Per dr Kelsey Sneed    magnesium hydroxide (MILK OF MAGNESIA) 2,400 mg/10 mL susp suspension Take 10 mL by mouth nightly.  amLODIPine (NORVASC) 5 mg tablet TAKE 1 TABLET BY MOUTH DAILY     No current facility-administered medications for this visit. Review of Systems   Respiratory: Positive for shortness of breath. Neurological: Positive for headaches. Endo/Heme/Allergies: Bruises/bleeds easily. Physical Exam   Constitutional: No distress. Cardiovascular: Normal rate and regular rhythm. Exam reveals no gallop and no friction rub. No murmur heard. Pulmonary/Chest: Effort normal and breath sounds normal.   Musculoskeletal: She exhibits edema. Moderate bilateral lower extremity edema    Nursing note and vitals reviewed. ASSESSMENT and PLAN  Diagnoses and all orders for this visit:    1. Essential hypertension, benign- Continue current regimen of prescription and / or OTC medications     2. Type 2 diabetes mellitus with nephropathy (HCC) - Continue current regimen of prescription and / or OTC medications     3. Chronic systolic congestive heart failure ([facility] [address]- Continue current regimen of prescription and / or OTC medications , See cardiologist as directed.      4. Anxiety- Follow off treatment     Plan was reviewed with patient and family, understanding expressed

## 2018-07-17 RX ORDER — AMLODIPINE BESYLATE 5 MG/1
TABLET ORAL
Qty: 90 TAB | Refills: 11 | Status: SHIPPED | OUTPATIENT
Start: 2018-07-17 | End: 2018-09-18 | Stop reason: SINTOL

## 2018-08-01 ENCOUNTER — TELEPHONE (OUTPATIENT)
Dept: INTERNAL MEDICINE CLINIC | Age: 83
End: 2018-08-01

## 2018-08-01 NOTE — TELEPHONE ENCOUNTER
Spoke with pt - states she thinks she took her am pills twice yesterday - in am and pm. Amlodipine, Lasix, Potassium and Ferrous Sulfate. Not 100% sure but wanted MD to know and if he thinks she should take am pills today?  Will forward to MD.

## 2018-08-01 NOTE — TELEPHONE ENCOUNTER
Advised pt MD advised it would be ok to take meds if sbp > 120 - if lower skip today and get back on track tomorrow. She states she was skipping today and get back on track tomorrow.

## 2018-08-01 NOTE — TELEPHONE ENCOUNTER
Rose Johns -- please call patient asap concerning her medications. She got confused and took them incorrectly and needs to speak with you. She is holding off taking anything this morning until she hears from you.

## 2018-08-17 ENCOUNTER — TELEPHONE (OUTPATIENT)
Dept: INTERNAL MEDICINE CLINIC | Age: 83
End: 2018-08-17

## 2018-08-21 ENCOUNTER — TELEPHONE (OUTPATIENT)
Dept: INTERNAL MEDICINE CLINIC | Age: 83
End: 2018-08-21

## 2018-08-23 ENCOUNTER — OFFICE VISIT (OUTPATIENT)
Dept: INTERNAL MEDICINE CLINIC | Age: 83
End: 2018-08-23

## 2018-08-23 VITALS
SYSTOLIC BLOOD PRESSURE: 112 MMHG | RESPIRATION RATE: 16 BRPM | DIASTOLIC BLOOD PRESSURE: 60 MMHG | TEMPERATURE: 98 F | BODY MASS INDEX: 37.7 KG/M2 | HEART RATE: 65 BPM | HEIGHT: 59 IN | WEIGHT: 187 LBS

## 2018-08-23 DIAGNOSIS — I48.20 CHRONIC ATRIAL FIBRILLATION (HCC): ICD-10-CM

## 2018-08-23 DIAGNOSIS — N32.81 OAB (OVERACTIVE BLADDER): ICD-10-CM

## 2018-08-23 DIAGNOSIS — I50.22 CHRONIC SYSTOLIC CONGESTIVE HEART FAILURE (HCC): Primary | ICD-10-CM

## 2018-08-23 DIAGNOSIS — I10 ESSENTIAL HYPERTENSION, BENIGN: ICD-10-CM

## 2018-08-23 NOTE — MR AVS SNAPSHOT
[address] [facility] [address] [place] [address] 
[phone] Patient: Delmi Has MRN: [id number] PEC:[id number] Visit Information Date & Time Provider Department Dept. Phone Encounter #  
 8/23/2018  1:20 PM Samantha Land, [facility] Internal Medicine [phone] [id number] Follow-up Instructions Return in about 4 weeks (around 9/18/2018). Your Appointments 9/14/2018  1:20 PM  
ESTABLISHED PATIENT with Teodoro Vallecillo MD  
[facility] ([address]) Appt Note: 6 mo f/u per Dr. Leonel Lincoln [address] [place]  [address] [facility] 57  
[address] [facility] [address] [address]  
  
    
 9/18/2018  2:40 PM  
ROUTINE CARE with Samantha Land MD  
[facility] Internal Medicine [address] [facility]) Appt Note: 2-month f/u  
 [address] [facility] [address]  
[address] [address] [facility] [place] [address] 57 Upcoming Health Maintenance Date Due  
 LIPID PANEL Q1 3/21/2017 MEDICARE YEARLY EXAM 3/14/2018 FOOT EXAM Q1 3/20/2018 Influenza Age 5 to Adult 8/1/2018 MICROALBUMIN Q1 8/3/2018 EYE EXAM RETINAL OR DILATED Q1 9/1/2018 HEMOGLOBIN A1C Q6M 10/25/2018 GLAUCOMA SCREENING Q2Y 9/1/2019 DTaP/Tdap/Td series (2 - Td) 9/21/2026 Allergies as of 8/23/2018  Review Complete On: 8/23/2018 By: Samantha Land MD  
  
 Severity Noted Reaction Type Reactions Amoxicillin  03/28/2014    Other (comments) \"makes me feel like I\"m going to faint\" Aspirin  03/28/2014    Palpitations Gabapentin  10/18/2017    Drowsiness * pt states this med makes her feel very drowsy , gives her a drunk feeling. Levaquin [Levofloxacin]  03/20/2015    Hives, Rash Lorazepam  10/18/2017    Other (comments) Feeling faint. Losartan  06/17/2015    Other (comments) Tongue swelling Lyrica [Pregabalin]  03/28/2014    Rash Other Medication  10/18/2017    Other (comments) Feeling faint, does not decrease pain. Sertraline  10/18/2017    Other (comments) Feeling faint. Current Immunizations  Reviewed on 10/18/2017 Name Date Influenza High Dose Vaccine PF 10/18/2017, 11/3/2016, 11/2/2015 Influenza Vaccine 9/18/2014 Pneumococcal Conjugate (PCV-13) 1/22/2015 Pneumococcal Vaccine (Unspecified Type) 11/12/2011 Zoster Vaccine, Live 1/28/2016 Not reviewed this visit You Were Diagnosed With   
  
 Codes Comments Chronic systolic congestive heart failure (HCC)    -  Primary ICD-10-CM: C22.32 ICD-9-CM: 428.22, 428.0 Essential hypertension, benign     ICD-10-CM: I10 
ICD-9-CM: 930. 1 Chronic atrial fibrillation (HCC)     ICD-10-CM: F78.6 ICD-9-CM: 427.31   
 OAB (overactive bladder)     ICD-10-CM: N32.81 ICD-9-CM: 596.51 Vitals BP Pulse Temp Resp Height(growth percentile) Weight(growth percentile) 112/60 65 98 °F (36.7 °C) (Oral) 16 4' 11\" (1.499 m) 187 lb (84.8 kg) BMI OB Status Smoking Status 37.77 kg/m2 Postmenopausal Former Smoker Vitals History BMI and BSA Data Body Mass Index Body Surface Area  
 37.77 kg/m 2 1.88 m 2 Preferred Pharmacy Pharmacy Name Phone [address], [facility] [facility] [address] [phone] Your Updated Medication List  
  
   
This list is accurate as of 8/23/18  1:44 PM.  Always use your most recent med list. amLODIPine 5 mg tablet Commonly known as:  Herrera Fanti TAKE 1 TABLET BY MOUTH DAILY  
  
 aspirin delayed-release 81 mg tablet Take 1 Tab by mouth daily. carvedilol 6.25 mg tablet Commonly known as:  COREG  
TAKE 1/2 TABLET BY MOUTH TWICE DAILY WITH MEALS- new dosage EYE HEALTH PO Take  by mouth two (2) times a day. ferrous sulfate 325 mg (65 mg iron) tablet TAKE 1 TABLET BY MOUTH DAILY WITH BREAKFAST  
  
 fluocinoNIDE 0.05 % topical cream  
Commonly known as:  LIDEX Apply  to affected area two (2) times daily as needed for Skin Irritation. furosemide 40 mg tablet Commonly known as:  LASIX Take 1 Tab by mouth daily. glimepiride 1 mg tablet Commonly known as:  AMARYL Take 1 Tab by mouth daily. Take only if sugar is above 140  
  
 glucose blood VI test strips strip Commonly known as:  PRECISION XTRA TEST  
TEST BLOOD SUGAR TWICE DAILY dx code E11.2. Lancets Misc  
1 Package by Does Not Apply route daily. Test glucose daily and as needed Dx type 2 dm E11.42  
  
 LINZESS 72 mcg Cap Generic drug:  linaclotide Take  by mouth.  
  
 loratadine 10 mg tablet Commonly known as:  [place] Lona Take 1 Tab by mouth daily as needed for Allergies. magnesium hydroxide 2,400 mg/10 mL Susp suspension Commonly known as:  MILK OF MAGNESIA Take 10 mL by mouth nightly. mirabegron ER 50 mg ER tablet Commonly known as:  MYRBETRIQ Per urology  
  
 potassium chloride SR 10 mEq tablet Commonly known as:  KLOR-CON 10 Take 2 Tabs by mouth daily. New Directions. Indications: hypokalemia prevention VITAMIN D3 PO Take  by mouth daily. Follow-up Instructions Return in about 4 weeks (around 9/18/2018). Introducing [facility] & HEALTH SERVICES! [employer] introduces [employer] patient portal. Now you can access parts of your medical record, email your doctor's office, and request medication refills online. 1. In your internet browser, go to [email or web address] 2. Click on the First Time User? Click Here link in the Sign In box. You will see the New Member Sign Up page. 3. Enter your [employer] Access Code exactly as it appears below. You will not need to use this code after youve completed the sign-up process. If you do not sign up before the expiration date, you must request a new code. · [employer] Access Code: [email or web address] Expires: 10/14/2018  2:11 PM 
 
4. Enter the last four digits of your Social Security Number (xxxx) and Date of Birth (mm/dd/yyyy) as indicated and click Submit. You will be taken to the next sign-up page. 5. Create a [employer] ID. This will be your [employer] login ID and cannot be changed, so think of one that is secure and easy to remember. 6. Create a [employer] password. You can change your password at any time. 7. Enter your Password Reset Question and Answer. This can be used at a later time if you forget your password. 8. Enter your e-mail address. You will receive e-mail notification when new information is available in [address] 9. Click Sign Up. You can now view and download portions of your medical record. 10. Click the Download Summary menu link to download a portable copy of your medical information. If you have questions, please visit the Frequently Asked Questions section of the [employer] website. Remember, [employer] is NOT to be used for urgent needs. For medical emergencies, dial 911. Now available from your iPhone and Android! Please provide this summary of care documentation to your next provider. Your primary care clinician is listed as JOCELYN MCKEON. If you have any questions after today's visit, please call [phone].

## 2018-08-23 NOTE — PROGRESS NOTES
HISTORY OF PRESENT ILLNESS  Chuck Villavicencio is a 80 y.o. female. HPI Tobi Him is seen today accompanied by her home health aide Celina Olvera for evaluation of eye problems and a host of other symptoms. She originally thought maybe she had a respiratory infection. Last week she had some eye drainage. She also thought she might have some wheezing. This did not persist and the eye symptoms have resolved. She denies fevers or any other symptoms of illness. She also notes one episode of nighttime palpitations and also an atypical history for PND about six days ago. She did not require sitting up in bed. She denies chest pains but does have some MCNAIR. Weight has risen about 3 pounds in 5 weeks. Edema is overall stable. I do not think she is having a CHF exacerbation but have asked her to monitor for increased weight. I suspect her symptoms might actually be from allergies and gave her the okay to try Claritin. Past Medical History:   Diagnosis Date    Anxiety     Bleeding nose     Chronic atrial fibrillation (HCC)     Diabetes (HCC)     Hypertension      Current Outpatient Prescriptions   Medication Sig    linaclotide (LINZESS) 72 mcg cap Take  by mouth.  mirabegron ER (MYRBETRIQ) 50 mg ER tablet Per urology    amLODIPine (NORVASC) 5 mg tablet TAKE 1 TABLET BY MOUTH DAILY    glucose blood VI test strips (PRECISION XTRA TEST) strip TEST BLOOD SUGAR TWICE DAILY dx code E11.2.  carvedilol (COREG) 6.25 mg tablet TAKE 1/2 TABLET BY MOUTH TWICE DAILY WITH MEALS- new dosage    potassium chloride SR (KLOR-CON 10) 10 mEq tablet Take 2 Tabs by mouth daily. New Directions. Indications: hypokalemia prevention    furosemide (LASIX) 40 mg tablet Take 1 Tab by mouth daily.  ferrous sulfate 325 mg (65 mg iron) tablet TAKE 1 TABLET BY MOUTH DAILY WITH BREAKFAST    aspirin delayed-release 81 mg tablet Take 1 Tab by mouth daily.  glimepiride (AMARYL) 1 mg tablet Take 1 Tab by mouth daily.  Take only if sugar is above 140    BETA-CAROTENE,A,-VITS C,E/MINS (EYE HEALTH PO) Take  by mouth two (2) times a day.  CHOLECALCIFEROL, VITAMIN D3, (VITAMIN D3 PO) Take  by mouth daily.  loratadine (CLARITIN) 10 mg tablet Take 1 Tab by mouth daily as needed for Allergies.  Lancets misc 1 Package by Does Not Apply route daily. Test glucose daily and as needed Dx type 2 dm E11.42    magnesium hydroxide (MILK OF MAGNESIA) 2,400 mg/10 mL susp suspension Take 10 mL by mouth nightly.  fluocinoNIDE (LIDEX) 0.05 % topical cream Apply  to affected area two (2) times daily as needed for Skin Irritation. No current facility-administered medications for this visit. Review of Systems   Constitutional: Negative for weight loss. Eyes: Positive for discharge. Cardiovascular: Positive for palpitations. Gastrointestinal: Positive for constipation. Genitourinary: Positive for frequency. Physical Exam   Constitutional: No distress. Absolutely comfortable, with full sentences   Cardiovascular: Normal rate. An irregularly irregular rhythm present. Exam reveals no gallop and no friction rub. No murmur heard. Pulmonary/Chest: Effort normal and breath sounds normal.   Musculoskeletal: She exhibits edema. Moderate bilateral lower extremity edema with TEDS, seems at baseline   Nursing note and vitals reviewed. ASSESSMENT and PLAN  Diagnoses and all orders for this visit:    1. Chronic systolic congestive heart failure ([address]- Continue current regimen of prescription and / or OTC medications     2. Essential hypertension, benign- Continue current regimen of prescription and / or OTC medications     3.  Chronic atrial fibrillation (HCC)- Continue current regimen of prescription and / or OTC medications ,    4. OAB (overactive bladder)  -     mirabegron ER (MYRBETRIQ) 50 mg ER tablet; Per urology  - See urologist as directed     Plan was reviewed with patient and family, understanding expressed

## 2018-09-14 ENCOUNTER — OFFICE VISIT (OUTPATIENT)
Dept: CARDIOLOGY CLINIC | Age: 83
End: 2018-09-14

## 2018-09-14 VITALS
HEIGHT: 59 IN | SYSTOLIC BLOOD PRESSURE: 118 MMHG | WEIGHT: 187 LBS | DIASTOLIC BLOOD PRESSURE: 70 MMHG | BODY MASS INDEX: 37.7 KG/M2 | HEART RATE: 60 BPM | RESPIRATION RATE: 18 BRPM

## 2018-09-14 DIAGNOSIS — I10 ESSENTIAL HYPERTENSION, BENIGN: ICD-10-CM

## 2018-09-14 DIAGNOSIS — R60.0 LOCALIZED EDEMA: ICD-10-CM

## 2018-09-14 DIAGNOSIS — I48.20 CHRONIC ATRIAL FIBRILLATION (HCC): Primary | ICD-10-CM

## 2018-09-14 RX ORDER — FLUTICASONE PROPIONATE 50 MCG
2 SPRAY, SUSPENSION (ML) NASAL DAILY
COMMUNITY
End: 2018-10-18 | Stop reason: ALTCHOICE

## 2018-09-14 NOTE — MR AVS SNAPSHOT
[address] [facility] 57 
[phone] Patient: Abraham Kohli MRN: [id number] Visit Information Date & Time Provider Department Dept. Phone Encounter #  
 9/14/2018  1:20 PM Scott Calvo MD [facility] Erlinda Polanco [phone] [id number] Follow-up Instructions Return in about 6 months (around 3/14/2019). Your Appointments 9/18/2018  2:40 PM  
ROUTINE CARE with Dante Andrade MD  
[facility] Internal Medicine [facility]) Appt Note: 2-month f/u  
 [address] [address]  
[address] [facility]  
  
    
 3/22/2019  1:20 PM  
ESTABLISHED PATIENT with Scott Calvo MD  
[facility] ([facility]) Appt Note: 6 mo fu . ..atb  
 [address] [facility]  
[address] [facility] 100 [address] [address] Upcoming Health Maintenance Date Due  
 LIPID PANEL Q1 3/21/2017 MEDICARE YEARLY EXAM 3/14/2018 FOOT EXAM Q1 3/20/2018 Influenza Age 5 to Adult 8/1/2018 MICROALBUMIN Q1 8/3/2018 EYE EXAM RETINAL OR DILATED Q1 9/1/2018 HEMOGLOBIN A1C Q6M 10/25/2018 GLAUCOMA SCREENING Q2Y 9/1/2019 DTaP/Tdap/Td series (2 - Td) 9/21/2026 Allergies as of 9/14/2018  Review Complete On: 9/14/2018 By: Scott Calvo MD  
  
 Severity Noted Reaction Type Reactions Amoxicillin  03/28/2014    Other (comments) \"makes me feel like I\"m going to faint\" Aspirin  03/28/2014    Palpitations Gabapentin  10/18/2017    Drowsiness * pt states this med makes her feel very drowsy , gives her a drunk feeling. Levaquin [Levofloxacin]  03/20/2015    Hives, Rash Lorazepam  10/18/2017    Other (comments) Feeling faint. Losartan  06/17/2015    Other (comments) Tongue swelling Lyrica [Pregabalin]  03/28/2014    Rash Other Medication  10/18/2017    Other (comments) Feeling faint, does not decrease pain. Sertraline  10/18/2017    Other (comments) Feeling faint. Current Immunizations  Reviewed on 10/18/2017 Name Date Influenza High Dose Vaccine PF 10/18/2017, 11/3/2016, 11/2/2015 Influenza Vaccine 9/18/2014 Pneumococcal Conjugate (PCV-13) 1/22/2015 Pneumococcal Vaccine (Unspecified Type) 11/12/2011 Zoster Vaccine, Live 1/28/2016 Not reviewed this visit You Were Diagnosed With   
  
 Codes Comments Chronic atrial fibrillation (HCC)    -  Primary ICD-10-CM: M99.2 ICD-9-CM: 427.31 Essential hypertension, benign     ICD-10-CM: I10 
ICD-9-CM: 401.1 Localized edema     ICD-10-CM: R60.0 ICD-9-CM: 087. 3 Vitals BP Pulse Resp Height(growth percentile) Weight(growth percentile) BMI  
 118/70 (BP 1 Location: Left arm, BP Patient Position: Sitting) 60 18 4' 11\" (1.499 m) 187 lb (84.8 kg) 37.77 kg/m2 OB Status Smoking Status Postmenopausal Former Smoker Vitals History BMI and BSA Data Body Mass Index Body Surface Area  
 37.77 kg/m 2 1.88 m 2 Preferred Pharmacy Pharmacy Name Phone [address] [facility] [address] [phone] Your Updated Medication List  
  
   
This list is accurate as of 9/14/18  1:41 PM.  Always use your most recent med list. amLODIPine 5 mg tablet Commonly known as:  Blaine Eckertlet TAKE 1 TABLET BY MOUTH DAILY  
  
 aspirin delayed-release 81 mg tablet Take 1 Tab by mouth daily. carvedilol 6.25 mg tablet Commonly known as:  COREG  
TAKE 1/2 TABLET BY MOUTH TWICE DAILY WITH MEALS- new dosage EYE HEALTH PO Take  by mouth two (2) times a day. ferrous sulfate 325 mg (65 mg iron) tablet TAKE 1 TABLET BY MOUTH DAILY WITH BREAKFAST FLONASE ALLERGY RELIEF 50 mcg/actuation nasal spray Generic drug:  fluticasone 2 Sprays by Both Nostrils route daily. fluocinoNIDE 0.05 % topical cream  
Commonly known as:  LIDEX Apply  to affected area two (2) times daily as needed for Skin Irritation. furosemide 40 mg tablet Commonly known as:  LASIX Take 1 Tab by mouth daily. glimepiride 1 mg tablet Commonly known as:  AMARYL Take 1 Tab by mouth daily. Take only if sugar is above 140  
  
 glucose blood VI test strips strip Commonly known as:  PRECISION XTRA TEST  
TEST BLOOD SUGAR TWICE DAILY dx code E11.2. Lancets Misc  
1 Package by Does Not Apply route daily. Test glucose daily and as needed Dx type 2 dm E11.42  
  
 LINZESS 72 mcg Cap Generic drug:  linaclotide Take  by mouth.  
  
 loratadine 10 mg tablet Commonly known as:  Landen Adrianna Take 1 Tab by mouth daily as needed for Allergies. magnesium hydroxide 2,400 mg/10 mL Susp suspension Commonly known as:  MILK OF MAGNESIA Take 10 mL by mouth nightly. mirabegron ER 50 mg ER tablet Commonly known as:  MYRBETRIQ Per urology  
  
 potassium chloride SR 10 mEq tablet Commonly known as:  KLOR-CON 10 Take 2 Tabs by mouth daily. New Directions. Indications: hypokalemia prevention VITAMIN D3 PO Take  by mouth daily. ZyrTEC 10 mg Cap Generic drug:  Cetirizine Take  by mouth. Follow-up Instructions Return in about 6 months (around 3/14/2019). Introducing [facility]! [employer] introduces [employer] patient portal. Now you can access parts of your medical record, email your doctor's office, and request medication refills online. 1. In your internet browser, go to [email or web address] 2. Click on the First Time User? Click Here link in the Sign In box. You will see the New Member Sign Up page. 3. Enter your [employer] Access Code exactly as it appears below.  You will not need to use this code after youve completed the sign-up process. If you do not sign up before the expiration date, you must request a new code. · [employer] Access Code: [email or web address] Expires: 10/14/2018  2:11 PM 
 
4. Enter the last four digits of your Social Security Number (xxxx) and Date of Birth (mm/dd/yyyy) as indicated and click Submit. You will be taken to the next sign-up page. 5. Create a [employer] ID. This will be your [employer] login ID and cannot be changed, so think of one that is secure and easy to remember. 6. Create a [employer] password. You can change your password at any time. 7. Enter your Password Reset Question and Answer. This can be used at a later time if you forget your password. 8. Enter your e-mail address. You will receive e-mail notification when new information is available in [address] 9. Click Sign Up. You can now view and download portions of your medical record. 10. Click the Download Summary menu link to download a portable copy of your medical information. If you have questions, please visit the Frequently Asked Questions section of the [employer] website. Remember, [employer] is NOT to be used for urgent needs. For medical emergencies, dial 911. Now available from your iPhone and Android! Please provide this summary of care documentation to your next provider. Your primary care clinician is listed as JOCELYN MCKEON. If you have any questions after today's visit, please call [phone].

## 2018-09-14 NOTE — PROGRESS NOTES
HISTORY OF PRESENT ILLNESS  Loretta Barker is a 80 y.o. female     SUMMARY:   Problem List  Date Reviewed: 9/14/2018          Codes Class Noted    Severe obesity (BMI 35.0-39. 9) with comorbidity ([facility] ICD-10-CM: E66.01  ICD-9-CM: 278.01  4/25/2018        Type 2 diabetes mellitus with nephropathy ([facility].) ICD-10-CM: E11.21  ICD-9-CM: 250.40, 583.81  12/19/2017        CHF (congestive heart failure) (HCC) ICD-10-CM: I50.9  ICD-9-CM: 428.0  12/6/2017        Anxiety ICD-10-CM: F41.9  ICD-9-CM: 300.00  10/2/2017        Allergic rhinitis ICD-10-CM: J30.9  ICD-9-CM: 477.9  4/11/2016        Chronic atrial fibrillation ([facility].) ICD-10-CM: I48.2  ICD-9-CM: 427.31  4/8/2016        Bleeding nose ICD-10-CM: R04.0  ICD-9-CM: 930. 7  Unknown        Advanced directives, counseling/discussion ICD-10-CM: Z71.89  ICD-9-CM: V65.49  3/15/2016        Type 2 diabetes, controlled, with neuropathy ([facility].) ICD-10-CM: E11.40  ICD-9-CM: 250.60, 357.2  1/4/2016        Essential hypertension, benign ICD-10-CM: I10  ICD-9-CM: 401.1  6/4/2015        Edema ICD-10-CM: R60.9  ICD-9-CM: 782.3  11/12/2014    Overview Addendum 12/14/2017  8:24 AM by Nicky Delacruz MD     4/16 echo normal lvef, lae, mild tr  12/17 echo normal lvef, elisha, mild to mod mr and tr, pa pressure 50mm             Headache(784.0) ICD-10-CM: R51  ICD-9-CM: 784.0  11/12/2014        Diverticulosis of colon (without mention of hemorrhage) ICD-10-CM: K57.30  ICD-9-CM: 562.10  11/12/2014        Constipation ICD-10-CM: K59.00  ICD-9-CM: 564.00  11/12/2014        Cyst of left kidney ICD-10-CM: N28.1  ICD-9-CM: 753.10  11/12/2014              Current Outpatient Prescriptions on File Prior to Visit   Medication Sig    linaclotide (LINZESS) 72 mcg cap Take  by mouth.     mirabegron ER (MYRBETRIQ) 50 mg ER tablet Per urology    amLODIPine (NORVASC) 5 mg tablet TAKE 1 TABLET BY MOUTH DAILY    glucose blood VI test strips (PRECISION XTRA TEST) strip TEST BLOOD SUGAR TWICE DAILY dx code E11.2.  carvedilol (COREG) 6.25 mg tablet TAKE 1/2 TABLET BY MOUTH TWICE DAILY WITH MEALS- new dosage    potassium chloride SR (KLOR-CON 10) 10 mEq tablet Take 2 Tabs by mouth daily. New Directions. Indications: hypokalemia prevention    furosemide (LASIX) 40 mg tablet Take 1 Tab by mouth daily.  ferrous sulfate 325 mg (65 mg iron) tablet TAKE 1 TABLET BY MOUTH DAILY WITH BREAKFAST    aspirin delayed-release 81 mg tablet Take 1 Tab by mouth daily.  glimepiride (AMARYL) 1 mg tablet Take 1 Tab by mouth daily. Take only if sugar is above 140    BETA-CAROTENE,A,-VITS C,E/MINS (EYE HEALTH PO) Take  by mouth two (2) times a day.  CHOLECALCIFEROL, VITAMIN D3, (VITAMIN D3 PO) Take  by mouth daily.  fluocinoNIDE (LIDEX) 0.05 % topical cream Apply  to affected area two (2) times daily as needed for Skin Irritation.  loratadine (CLARITIN) 10 mg tablet Take 1 Tab by mouth daily as needed for Allergies.  Lancets misc 1 Package by Does Not Apply route daily. Test glucose daily and as needed Dx type 2 dm E11.42    magnesium hydroxide (MILK OF MAGNESIA) 2,400 mg/10 mL susp suspension Take 10 mL by mouth nightly. No current facility-administered medications on file prior to visit. CARDIOLOGY STUDIES TO DATE:  4/16 echo normal lvef, lae, mild tr  12/17 echo normal lvef, elisha, mild to mod mr and tr, pa pressure 50mm        Chief Complaint   Patient presents with    Irregular Heart Beat     HPI :  Ms. Alison Smith is doing pretty darn well. In July, her pressure was running a little high, so Dr. Alison Smith added some Amlodipine and her pressures are really, really good now, if anything a little on the low side for her. She thinks that maybe her dependent edema is a little worse in that at nighttime it does not go all the way down since starting the Dihydropyridine. She has episodes where she will get short of breath for a couple of days in a row, but nothing consistent or worrisome.   She has not been sleeping particularly well having some strange dreams and as a consequence she has been a little tired during the daytime hours here recently. CARDIAC ROS:   negative for chest pain, palpitations, syncope, orthopnea, paroxysmal nocturnal dyspnea, exertional chest pressure/discomfort, claudication    No family history on file. Past Medical History:   Diagnosis Date    Anxiety     Bleeding nose     Chronic atrial fibrillation (HCC)     Diabetes ([facility]     Hypertension        GENERAL ROS:  A comprehensive review of systems was negative except for that written in the HPI. Visit Vitals    /70 (BP 1 Location: Left arm, BP Patient Position: Sitting)    Pulse 60    Resp 18    Ht 4' 11\" (1.499 m)    Wt 187 lb (84.8 kg)    BMI 37.77 kg/m2       Wt Readings from Last 3 Encounters:   09/14/18 187 lb (84.8 kg)   08/23/18 187 lb (84.8 kg)   07/16/18 184 lb 9.6 oz (83.7 kg)            BP Readings from Last 3 Encounters:   09/14/18 118/70   08/23/18 112/60   07/16/18 118/43       PHYSICAL EXAM  General appearance: alert, cooperative, no distress, appears stated age  Neurologic: Alert and oriented X 3  Neck: supple, symmetrical, trachea midline, no adenopathy, no carotid bruit and no JVD  Lungs: clear to auscultation bilaterally  Heart: irregularly irregular rhythm, S1, S2 normal, no S3 or S4. 1/6 sys murmur lusb  Extremities: edema tr, compression stockings in place    Lab Results   Component Value Date/Time    Cholesterol, total 146 03/21/2016 10:31 AM    Cholesterol, total 147 03/13/2015 02:06 PM    HDL Cholesterol 85 03/21/2016 10:31 AM    HDL Cholesterol 60 03/13/2015 02:06 PM    LDL, calculated 48 03/21/2016 10:31 AM    LDL, calculated 69 03/13/2015 02:06 PM    Triglyceride 64 03/21/2016 10:31 AM    Triglyceride 90 03/13/2015 02:06 PM     ASSESSMENT  I think Ms. Almendarez is stable and asymptomatic from a cardiac perspective.   I am not sure if the Amlodipine is contributing to her edema and how bad her blood pressure was in July, so she is going to be talking to Dr. Radha Miner next week and see what he thinks about all of this. She needs no cardiac testing at this time. current treatment plan is effective, no change in therapy  lab results and schedule of future lab studies reviewed with patient    Encounter Diagnoses   Name Primary?  Chronic atrial fibrillation (HCC) Yes    Essential hypertension, benign     Localized edema      Orders Placed This Encounter    fluticasone (FLONASE ALLERGY RELIEF) 50 mcg/actuation nasal spray    Cetirizine (ZYRTEC) 10 mg cap       Follow-up Disposition:  Return in about 6 months (around 3/14/2019).     Yang Wade MD  9/14/2018

## 2018-09-18 ENCOUNTER — OFFICE VISIT (OUTPATIENT)
Dept: INTERNAL MEDICINE CLINIC | Age: 83
End: 2018-09-18

## 2018-09-18 ENCOUNTER — HOSPITAL ENCOUNTER (OUTPATIENT)
Dept: LAB | Age: 83
Discharge: HOME | End: 2018-09-18
Payer: MEDICARE

## 2018-09-18 VITALS
HEART RATE: 57 BPM | WEIGHT: 187 LBS | BODY MASS INDEX: 37.7 KG/M2 | RESPIRATION RATE: 16 BRPM | TEMPERATURE: 97.8 F | DIASTOLIC BLOOD PRESSURE: 68 MMHG | SYSTOLIC BLOOD PRESSURE: 116 MMHG | HEIGHT: 59 IN | OXYGEN SATURATION: 96 %

## 2018-09-18 DIAGNOSIS — E11.21 TYPE 2 DIABETES MELLITUS WITH NEPHROPATHY (HCC): Primary | ICD-10-CM

## 2018-09-18 DIAGNOSIS — R60.0 LOCALIZED EDEMA: ICD-10-CM

## 2018-09-18 DIAGNOSIS — I48.20 CHRONIC ATRIAL FIBRILLATION (HCC): ICD-10-CM

## 2018-09-18 DIAGNOSIS — E11.40 TYPE 2 DIABETES, CONTROLLED, WITH NEUROPATHY (HCC): ICD-10-CM

## 2018-09-18 DIAGNOSIS — I50.22 CHRONIC SYSTOLIC CONGESTIVE HEART FAILURE (HCC): ICD-10-CM

## 2018-09-18 DIAGNOSIS — E66.01 SEVERE OBESITY (BMI 35.0-39.9) WITH COMORBIDITY (HCC): ICD-10-CM

## 2018-09-18 DIAGNOSIS — I10 ESSENTIAL HYPERTENSION, BENIGN: ICD-10-CM

## 2018-09-18 PROCEDURE — 36415 COLL VENOUS BLD VENIPUNCTURE: CPT

## 2018-09-18 PROCEDURE — 85025 COMPLETE CBC W/AUTO DIFF WBC: CPT

## 2018-09-18 PROCEDURE — 83036 HEMOGLOBIN GLYCOSYLATED A1C: CPT

## 2018-09-18 PROCEDURE — 80053 COMPREHEN METABOLIC PANEL: CPT

## 2018-09-18 NOTE — MR AVS SNAPSHOT
[address] [place] 
[phone] Patient: Eduardo Santos MRN: [id number] Visit Information Date & Time Provider Department Dept. Phone Encounter #  
 9/18/2018  2:40 PM Eben Ruelas, [address] [place] [address] Internal Medicine [phone] [id number] Follow-up Instructions Return in about 1 month (around 10/18/2018). Your Appointments 3/22/2019  1:20 PM  
ESTABLISHED PATIENT with Jeane Monroe MD  
[facility] ([facility]) Appt Note: 6 mo fu . ..atb  
 [address] [facility]  
[address] [facility] [address] Upcoming Health Maintenance Date Due  
 LIPID PANEL Q1 3/21/2017 MEDICARE YEARLY EXAM 3/14/2018 FOOT EXAM Q1 3/20/2018 Influenza Age 5 to Adult 8/1/2018 MICROALBUMIN Q1 8/3/2018 EYE EXAM RETINAL OR DILATED Q1 9/1/2018 HEMOGLOBIN A1C Q6M 10/25/2018 GLAUCOMA SCREENING Q2Y 9/1/2019 DTaP/Tdap/Td series (2 - Td) 9/21/2026 Allergies as of 9/18/2018  Review Complete On: 9/18/2018 By: Eben Ruelas MD  
  
 Severity Noted Reaction Type Reactions Amoxicillin  03/28/2014    Other (comments) \"makes me feel like I\"m going to faint\" Aspirin  03/28/2014    Palpitations Gabapentin  10/18/2017    Drowsiness * pt states this med makes her feel very drowsy , gives her a drunk feeling. Levaquin [Levofloxacin]  03/20/2015    Hives, Rash Lorazepam  10/18/2017    Other (comments) Feeling faint. Losartan  06/17/2015    Other (comments) Tongue swelling Lyrica [Pregabalin]  03/28/2014    Rash Other Medication  10/18/2017    Other (comments) Feeling faint, does not decrease pain. Sertraline  10/18/2017    Other (comments) Feeling faint. Current Immunizations  Reviewed on 10/18/2017 Name Date Influenza High Dose Vaccine PF 10/18/2017, 11/3/2016, 11/2/2015 Influenza Vaccine 9/18/2014 Pneumococcal Conjugate (PCV-13) 1/22/2015 Pneumococcal Vaccine (Unspecified Type) 11/12/2011 Zoster Vaccine, Live 1/28/2016 Not reviewed this visit You Were Diagnosed With   
  
 Codes Comments Type 2 diabetes mellitus with nephropathy ([facility]    -  Primary ICD-10-CM: E11.21 
ICD-9-CM: 250.40, 583.81 Severe obesity (BMI 35.0-39. 9) with comorbidity ([facility].)     ICD-10-CM: E66.01 
ICD-9-CM: 278.01 Chronic systolic congestive heart failure (HCC)     ICD-10-CM: I50.22 ICD-9-CM: 428.22, 428.0 Chronic atrial fibrillation (HCC)     ICD-10-CM: Y04.7 ICD-9-CM: 427.31 Type 2 diabetes, controlled, with neuropathy ([facility].)     ICD-10-CM: E11.40 ICD-9-CM: 250.60, 357.2 Essential hypertension, benign     ICD-10-CM: I10 
ICD-9-CM: 401.1 Localized edema     ICD-10-CM: R60.0 ICD-9-CM: 935. 3 Vitals BP Pulse Temp Resp Height(growth percentile) Weight(growth percentile) 116/68 (!) 57 97.8 °F (36.6 °C) (Oral) 16 4' 11\" (1.499 m) 187 lb (84.8 kg) SpO2 BMI OB Status Smoking Status 96% 37.77 kg/m2 Postmenopausal Former Smoker Vitals History BMI and BSA Data Body Mass Index Body Surface Area  
 37.77 kg/m 2 1.88 m 2 Preferred Pharmacy Pharmacy Name Phone [address], [facility] [address] [facility] [phone] Your Updated Medication List  
  
   
This list is accurate as of 9/18/18  3:39 PM.  Always use your most recent med list.  
  
  
  
  
 aspirin delayed-release 81 mg tablet Take 1 Tab by mouth daily. carvedilol 6.25 mg tablet Commonly known as:  COREG  
TAKE 1/2 TABLET BY MOUTH TWICE DAILY WITH MEALS- new dosage EYE HEALTH PO Take  by mouth two (2) times a day. ferrous sulfate 325 mg (65 mg iron) tablet TAKE 1 TABLET BY MOUTH DAILY WITH BREAKFAST FLONASE ALLERGY RELIEF 50 mcg/actuation nasal spray Generic drug:  fluticasone 2 Sprays by Both Nostrils route daily. fluocinoNIDE 0.05 % topical cream  
Commonly known as:  LIDEX Apply  to affected area two (2) times daily as needed for Skin Irritation. furosemide 40 mg tablet Commonly known as:  LASIX Take 1 Tab by mouth daily. glimepiride 1 mg tablet Commonly known as:  AMARYL Take 1 Tab by mouth daily. Take only if sugar is above 140  
  
 glucose blood VI test strips strip Commonly known as:  PRECISION XTRA TEST  
TEST BLOOD SUGAR TWICE DAILY dx code E11.2. Lancets Misc  
1 Package by Does Not Apply route daily. Test glucose daily and as needed Dx type 2 dm E11.42  
  
 LINZESS 72 mcg Cap Generic drug:  linaclotide Take  by mouth.  
  
 loratadine 10 mg tablet Commonly known as:  [place]ona Take 1 Tab by mouth daily as needed for Allergies. magnesium hydroxide 2,400 mg/10 mL Susp suspension Commonly known as:  MILK OF MAGNESIA Take 10 mL by mouth nightly. mirabegron ER 50 mg ER tablet Commonly known as:  MYRBETRIQ Per urology  
  
 potassium chloride SR 10 mEq tablet Commonly known as:  KLOR-CON 10 Take 2 Tabs by mouth daily. New Directions. Indications: hypokalemia prevention VITAMIN D3 PO Take  by mouth daily. ZyrTEC 10 mg Cap Generic drug:  Cetirizine Take  by mouth. We Performed the Following CBC WITH AUTOMATED DIFF [89561 CPT(R)] HEMOGLOBIN A1C WITH EAG [81994 CPT(R)] METABOLIC PANEL, COMPREHENSIVE [87013 CPT(R)] Follow-up Instructions Return in about 1 month (around 10/18/2018). Introducing [facility]! [employer] introduces [employer] patient portal. Now you can access parts of your medical record, email your doctor's office, and request medication refills online. 1. In your internet browser, go to [email or web address] 2. Click on the First Time User? Click Here link in the Sign In box. You will see the New Member Sign Up page. 3. Enter your [employer] Access Code exactly as it appears below. You will not need to use this code after youve completed the sign-up process. If you do not sign up before the expiration date, you must request a new code. · [employer] Access Code: [email or web address] Expires: 10/14/2018  2:11 PM 
 
4. Enter the last four digits of your Social Security Number (xxxx) and Date of Birth (mm/dd/yyyy) as indicated and click Submit. You will be taken to the next sign-up page. 5. Create a [employer] Online ID. This will be your [employer] Online login ID and cannot be changed, so think of one that is secure and easy to remember. 6. Create a [employer] Online password. You can change your password at any time. 7. Enter your Password Reset Question and Answer. This can be used at a later time if you forget your password. 8. Enter your e-mail address. You will receive e-mail notification when new information is available in [address] 9. Click Sign Up. You can now view and download portions of your medical record. 10. Click the Download Summary menu link to download a portable copy of your medical information. If you have questions, please visit the Frequently Asked Questions section of the [employer] Online website. Remember, [employer] is NOT to be used for urgent needs. For medical emergencies, dial 911. Now available from your iPhone and Android! Please provide this summary of care documentation to your next provider. Your primary care clinician is listed as JOCELYN MCKEON. If you have any questions after today's visit, please call [phone].

## 2018-09-18 NOTE — PROGRESS NOTES
HISTORY OF PRESENT ILLNESS  Loida Fuentes is a 80 y.o. female. JELANI Vieyra is seen today accompanied by her home health aide Gabbi Luis Alberto for follow up of diabetes and other problems. 1. Diabetes. Due for recheck of A1c. Fingerstick readings seem pretty good per review. 2. Hypertension, better at home. There is no change in overall symptoms with medication adjustments. 3. Chronic atrial fibrillation, congestive heart failure. She has MCNAIR. She denies chest pain. She is up to date with cardiology follow up. I think her congestive heart failure is well compensated. 4. OAB, unchanged. She does not take the medication any longer. 5. Peripheral edema, worsened. Dr. Marguerite Martinez, her cardiologist, wondered if the Amlodipine may have been playing a role. It certainly might have so will discontinue the medication. I have asked her to let me know her home blood pressure readings. 6. Allergies. She is up to date with specialist follow up. She is on nasal steroids. Current Outpatient Prescriptions   Medication Sig    fluticasone (FLONASE ALLERGY RELIEF) 50 mcg/actuation nasal spray 2 Sprays by Both Nostrils route daily.  Cetirizine (ZYRTEC) 10 mg cap Take  by mouth.  linaclotide (LINZESS) 72 mcg cap Take  by mouth.  mirabegron ER (MYRBETRIQ) 50 mg ER tablet Per urology    glucose blood VI test strips (PRECISION XTRA TEST) strip TEST BLOOD SUGAR TWICE DAILY dx code E11.2.  carvedilol (COREG) 6.25 mg tablet TAKE 1/2 TABLET BY MOUTH TWICE DAILY WITH MEALS- new dosage    potassium chloride SR (KLOR-CON 10) 10 mEq tablet Take 2 Tabs by mouth daily. New Directions. Indications: hypokalemia prevention    furosemide (LASIX) 40 mg tablet Take 1 Tab by mouth daily.  ferrous sulfate 325 mg (65 mg iron) tablet TAKE 1 TABLET BY MOUTH DAILY WITH BREAKFAST    aspirin delayed-release 81 mg tablet Take 1 Tab by mouth daily.  glimepiride (AMARYL) 1 mg tablet Take 1 Tab by mouth daily.  Take only if sugar is above 140    BETA-CAROTENE,A,-VITS C,E/MINS (EYE HEALTH PO) Take  by mouth two (2) times a day.  CHOLECALCIFEROL, VITAMIN D3, (VITAMIN D3 PO) Take  by mouth daily.  fluocinoNIDE (LIDEX) 0.05 % topical cream Apply  to affected area two (2) times daily as needed for Skin Irritation.  loratadine (CLARITIN) 10 mg tablet Take 1 Tab by mouth daily as needed for Allergies.  Lancets misc 1 Package by Does Not Apply route daily. Test glucose daily and as needed Dx type 2 dm E11.42    magnesium hydroxide (MILK OF MAGNESIA) 2,400 mg/10 mL susp suspension Take 10 mL by mouth nightly. No current facility-administered medications for this visit. Review of Systems   Respiratory: Positive for shortness of breath. MCNAIR   Cardiovascular: Positive for leg swelling. Genitourinary: Positive for frequency. Psychiatric/Behavioral: The patient is nervous/anxious. Physical Exam   Constitutional: No distress. Cardiovascular: Normal rate and regular rhythm. Exam reveals no gallop and no friction rub. Murmur heard. Systolic murmur is present with a grade of 2/6   Pulmonary/Chest: Effort normal and breath sounds normal.   Musculoskeletal: She exhibits edema. Moderate bilateral lower extremity edema - with [facility]   Nursing note and vitals reviewed. ASSESSMENT and PLAN  Diagnoses and all orders for this visit:    1. Type 2 diabetes mellitus with nephropathy (HCC)  -     HEMOGLOBIN A1C WITH EAG  -     METABOLIC PANEL, COMPREHENSIVE    2. Severe obesity (BMI 35.0-39. 9) with comorbidity ([address]- Diet and exercise     3. Chronic systolic congestive heart failure ([address]- Continue current regimen of prescription and / or OTC medications , See cardiologist as directed. 4. Chronic atrial fibrillation (HCC)  -     CBC WITH AUTOMATED DIFF, See cardiologist as directed. 5. Type 2 diabetes, controlled, with neuropathy ([address]  -     HEMOGLOBIN A1C WITH EAG  -     METABOLIC PANEL, COMPREHENSIVE    6.  Essential hypertension, benign- see hpi    7.  Localized edema- see hpi

## 2018-09-19 ENCOUNTER — TELEPHONE (OUTPATIENT)
Dept: INTERNAL MEDICINE CLINIC | Age: 83
End: 2018-09-19

## 2018-09-19 LAB
ALBUMIN SERPL-MCNC: 3.9 G/DL (ref 3.2–4.6)
ALBUMIN/GLOB SERPL: 1.2 {RATIO} (ref 1.2–2.2)
ALP SERPL-CCNC: 135 IU/L (ref 39–117)
ALT SERPL-CCNC: 26 IU/L (ref 0–32)
AST SERPL-CCNC: 26 IU/L (ref 0–40)
BASOPHILS # BLD AUTO: 0 X10E3/UL (ref 0–0.2)
BASOPHILS NFR BLD AUTO: 0 %
BILIRUB SERPL-MCNC: 0.4 MG/DL (ref 0–1.2)
BUN SERPL-MCNC: 22 MG/DL (ref 10–36)
BUN/CREAT SERPL: 20 (ref 12–28)
CALCIUM SERPL-MCNC: 8.8 MG/DL (ref 8.7–10.3)
CHLORIDE SERPL-SCNC: 100 MMOL/L (ref 96–106)
CO2 SERPL-SCNC: 28 MMOL/L (ref 20–29)
CREAT SERPL-MCNC: 1.09 MG/DL (ref 0.57–1)
EOSINOPHIL # BLD AUTO: 0.1 X10E3/UL (ref 0–0.4)
EOSINOPHIL NFR BLD AUTO: 1 %
ERYTHROCYTE [DISTWIDTH] IN BLOOD BY AUTOMATED COUNT: 15.6 % (ref 12.3–15.4)
EST. AVERAGE GLUCOSE BLD GHB EST-MCNC: 120 MG/DL
GLOBULIN SER CALC-MCNC: 3.2 G/DL (ref 1.5–4.5)
GLUCOSE SERPL-MCNC: 92 MG/DL (ref 65–99)
HBA1C MFR BLD: 5.8 % (ref 4.8–5.6)
HCT VFR BLD AUTO: 33.3 % (ref 34–46.6)
HGB BLD-MCNC: 10.7 G/DL (ref 11.1–15.9)
IMM GRANULOCYTES # BLD: 0 X10E3/UL (ref 0–0.1)
IMM GRANULOCYTES NFR BLD: 0 %
LYMPHOCYTES # BLD AUTO: 1.2 X10E3/UL (ref 0.7–3.1)
LYMPHOCYTES NFR BLD AUTO: 23 %
MCH RBC QN AUTO: 29.6 PG (ref 26.6–33)
MCHC RBC AUTO-ENTMCNC: 32.1 G/DL (ref 31.5–35.7)
MCV RBC AUTO: 92 FL (ref 79–97)
MONOCYTES # BLD AUTO: 0.5 X10E3/UL (ref 0.1–0.9)
MONOCYTES NFR BLD AUTO: 10 %
NEUTROPHILS # BLD AUTO: 3.3 X10E3/UL (ref 1.4–7)
NEUTROPHILS NFR BLD AUTO: 66 %
PLATELET # BLD AUTO: 201 X10E3/UL (ref 150–379)
POTASSIUM SERPL-SCNC: 4.7 MMOL/L (ref 3.5–5.2)
PROT SERPL-MCNC: 7.1 G/DL (ref 6–8.5)
RBC # BLD AUTO: 3.62 X10E6/UL (ref 3.77–5.28)
SODIUM SERPL-SCNC: 139 MMOL/L (ref 134–144)
WBC # BLD AUTO: 5 X10E3/UL (ref 3.4–10.8)

## 2018-09-19 NOTE — PROGRESS NOTES
Advised pt her labs look great overall - stable. Sugar control is excellent. Continue current regimen of prescription and / or OTC medications.

## 2018-09-19 NOTE — PROGRESS NOTES
Call- Labs look great overall , stable.  Sugar control is excellent, Continue current regimen of prescription and / or OTC medications

## 2018-10-08 DIAGNOSIS — I50.32 CHRONIC DIASTOLIC CONGESTIVE HEART FAILURE (HCC): ICD-10-CM

## 2018-10-08 RX ORDER — CARVEDILOL 3.12 MG/1
TABLET ORAL
Qty: 180 TAB | Refills: 1 | Status: SHIPPED | OUTPATIENT
Start: 2018-10-08 | End: 2018-10-29 | Stop reason: SDUPTHER

## 2018-10-08 NOTE — TELEPHONE ENCOUNTER
Requested Prescriptions     Signed Prescriptions Disp Refills    carvedilol (COREG) 3.125 mg tablet 180 Tab 1     Sig: TAKE 1 TABLET BY MOUTH TWICE DAILY WITH MEALS     Authorizing Provider: Ian Downing     Ordering User: Judith Mayorga       Per Dr. Blas Hand  Date Time Provider Tristan Beck   10/18/2018 1:20 PM Ayde Bass MD [facility] SCHED   3/22/2019 1:20 PM Afsaneh Alfred  [address]

## 2018-10-18 ENCOUNTER — OFFICE VISIT (OUTPATIENT)
Dept: INTERNAL MEDICINE CLINIC | Age: 83
End: 2018-10-18

## 2018-10-18 VITALS
WEIGHT: 175 LBS | OXYGEN SATURATION: 97 % | DIASTOLIC BLOOD PRESSURE: 61 MMHG | SYSTOLIC BLOOD PRESSURE: 122 MMHG | HEIGHT: 59 IN | TEMPERATURE: 98 F | HEART RATE: 58 BPM | BODY MASS INDEX: 35.28 KG/M2 | RESPIRATION RATE: 16 BRPM

## 2018-10-18 DIAGNOSIS — I48.20 CHRONIC ATRIAL FIBRILLATION (HCC): ICD-10-CM

## 2018-10-18 DIAGNOSIS — Z23 ENCOUNTER FOR IMMUNIZATION: ICD-10-CM

## 2018-10-18 DIAGNOSIS — I10 ESSENTIAL HYPERTENSION, BENIGN: Primary | ICD-10-CM

## 2018-10-18 DIAGNOSIS — N32.81 OAB (OVERACTIVE BLADDER): ICD-10-CM

## 2018-10-18 DIAGNOSIS — I50.22 CHRONIC SYSTOLIC CONGESTIVE HEART FAILURE (HCC): ICD-10-CM

## 2018-10-18 DIAGNOSIS — E11.21 TYPE 2 DIABETES MELLITUS WITH NEPHROPATHY (HCC): ICD-10-CM

## 2018-10-18 NOTE — PROGRESS NOTES
HISTORY OF PRESENT ILLNESS  Nikole Jordan is a 80 y.o. female. JELANI Dietrich is seen today for followup of hypertension and other problems. She is accompanied by her home health aide, Nicky Welch. 1. Hypertension. Stable here. She uses a wrist cuff at home, which I do not believe is giving accurate readings. 2. Edema. Much improved with discontinuation of Amlodipine. Her weight has come down significantly as well. 3. Anxiety. She is having a \"family issue\". She will not give more details. 4. CHF. Clinically stable. Review of Systems:  Notable for a nosebleed x1. She called EMT. Apparently, the practitioner gave some advice, but I have encouraged her to follow up with Dr. Zan Reyna to confirm. I did advise her it was okay to change aspirin to every 48 hours, but if at all possible I would like her to continue it. Current Outpatient Medications   Medication Sig    carvedilol (COREG) 3.125 mg tablet TAKE 1 TABLET BY MOUTH TWICE DAILY WITH MEALS    linaclotide (LINZESS) 72 mcg cap Take  by mouth.  glucose blood VI test strips (PRECISION XTRA TEST) strip TEST BLOOD SUGAR TWICE DAILY dx code E11.2.  potassium chloride SR (KLOR-CON 10) 10 mEq tablet Take 2 Tabs by mouth daily. New Directions. Indications: hypokalemia prevention    furosemide (LASIX) 40 mg tablet Take 1 Tab by mouth daily.  ferrous sulfate 325 mg (65 mg iron) tablet TAKE 1 TABLET BY MOUTH DAILY WITH BREAKFAST    aspirin delayed-release 81 mg tablet Take 1 Tab by mouth daily.  glimepiride (AMARYL) 1 mg tablet Take 1 Tab by mouth daily. Take only if sugar is above 140    BETA-CAROTENE,A,-VITS C,E/MINS (EYE HEALTH PO) Take  by mouth two (2) times a day.  CHOLECALCIFEROL, VITAMIN D3, (VITAMIN D3 PO) Take  by mouth daily.  fluocinoNIDE (LIDEX) 0.05 % topical cream Apply  to affected area two (2) times daily as needed for Skin Irritation.     loratadine (CLARITIN) 10 mg tablet Take 1 Tab by mouth daily as needed for Allergies.  Lancets misc 1 Package by Does Not Apply route daily. Test glucose daily and as needed Dx type 2 dm E11.42    magnesium hydroxide (MILK OF MAGNESIA) 2,400 mg/10 mL susp suspension Take 10 mL by mouth nightly.  mirabegron ER (MYRBETRIQ) 50 mg ER tablet Per urology     No current facility-administered medications for this visit. Review of Systems   Constitutional: Positive for weight loss. Cardiovascular: Positive for leg swelling. Negative for chest pain and orthopnea. Psychiatric/Behavioral: The patient is nervous/anxious. Physical Exam   Constitutional: No distress. Cardiovascular: Normal rate and regular rhythm. Exam reveals no gallop and no friction rub. No murmur heard. Pulmonary/Chest: Effort normal and breath sounds normal.   Musculoskeletal: She exhibits edema. Mild bilateral lower extremity edema    Neurological: She is alert. Skin: Skin is warm and dry. Psychiatric: She has a normal mood and affect. Her behavior is normal.   Nursing note and vitals reviewed. ASSESSMENT and PLAN  Diagnoses and all orders for this visit:    1. Essential hypertension, benign- Continue current regimen of prescription and / or OTC medications     2. Type 2 diabetes mellitus with nephropathy ([facility]- Follow off treatment     3. Chronic systolic congestive heart failure (HCC)- Continue current regimen of prescription and / or OTC medications , Continue other medications in addition to current changes     4. Chronic atrial fibrillation (HCC)- Continue current regimen of prescription and / or OTC medications , Continue other medications in addition to current changes     5. OAB (overactive bladder)- Continue current regimen of prescription and / or OTC medications , See urologist as directed     6.  Encounter for immunization  -     INFLUENZA VACCINE INACTIVATED (IIV), SUBUNIT, ADJUVANTED, IM  -     ADMIN INFLUENZA VIRUS VAC

## 2018-10-25 ENCOUNTER — TELEPHONE (OUTPATIENT)
Dept: INTERNAL MEDICINE CLINIC | Age: 83
End: 2018-10-25

## 2018-10-25 DIAGNOSIS — N32.81 OAB (OVERACTIVE BLADDER): ICD-10-CM

## 2018-10-25 NOTE — TELEPHONE ENCOUNTER
[facility], cannot send to local pharmacy anymore. Per verbal order Dr. Federica Augustin, new script sent for a 90 day supply to preferred pharmacy, mail order.

## 2018-10-29 ENCOUNTER — TELEPHONE (OUTPATIENT)
Dept: INTERNAL MEDICINE CLINIC | Age: 83
End: 2018-10-29

## 2018-10-29 DIAGNOSIS — M79.89 LEG SWELLING: ICD-10-CM

## 2018-10-29 DIAGNOSIS — I50.32 CHRONIC DIASTOLIC CONGESTIVE HEART FAILURE (HCC): ICD-10-CM

## 2018-10-29 DIAGNOSIS — R06.02 SOB (SHORTNESS OF BREATH) ON EXERTION: ICD-10-CM

## 2018-10-29 RX ORDER — FUROSEMIDE 40 MG/1
40 TABLET ORAL DAILY
Qty: 90 TAB | Refills: 0 | Status: SHIPPED | OUTPATIENT
Start: 2018-10-29 | End: 2019-02-11 | Stop reason: SDUPTHER

## 2018-10-29 RX ORDER — POTASSIUM CHLORIDE 750 MG/1
20 TABLET, FILM COATED, EXTENDED RELEASE ORAL DAILY
Qty: 180 TAB | Refills: 3 | Status: SHIPPED | OUTPATIENT
Start: 2018-10-29 | End: 2019-10-28 | Stop reason: SDUPTHER

## 2018-10-29 RX ORDER — CARVEDILOL 3.12 MG/1
TABLET ORAL
Qty: 180 TAB | Refills: 3 | Status: SHIPPED | OUTPATIENT
Start: 2018-10-29 | End: 2019-06-18 | Stop reason: ALTCHOICE

## 2018-10-29 RX ORDER — CARVEDILOL 3.12 MG/1
TABLET ORAL
Qty: 180 TAB | Refills: 3 | Status: SHIPPED | OUTPATIENT
Start: 2018-10-29 | End: 2018-10-29 | Stop reason: SDUPTHER

## 2018-10-29 NOTE — TELEPHONE ENCOUNTER
Requested Prescriptions     Signed Prescriptions Disp Refills    carvedilol (COREG) 3.125 mg tablet 180 Tab 3     Sig: TAKE 1 TABLET BY MOUTH TWICE DAILY WITH MEALS     Authorizing Provider: Sydni Fan     Ordering User: Neptali Birmingham    Per Dr. Amarjit Meyer verbal orders

## 2018-10-29 NOTE — TELEPHONE ENCOUNTER
Phone (Incoming) Delisa Rene (Self) [phone] (H)     Pt requesting call back regarding myrbetiq 50 mg. She said pharmacy has not received .    Also would like to have handicap sticker

## 2018-10-30 NOTE — TELEPHONE ENCOUNTER
Advised pt the Rx request was faxed back to pharmacy yesterday. DMV form mailed to pt. Copy to scan.

## 2018-11-30 ENCOUNTER — TELEPHONE (OUTPATIENT)
Dept: INTERNAL MEDICINE CLINIC | Age: 83
End: 2018-11-30

## 2018-11-30 RX ORDER — CLOTRIMAZOLE AND BETAMETHASONE DIPROPIONATE 10; .64 MG/G; MG/G
CREAM TOPICAL
Qty: 15 G | Refills: 2 | Status: SHIPPED | OUTPATIENT
Start: 2018-11-30 | End: 2021-01-01 | Stop reason: ALTCHOICE

## 2018-11-30 NOTE — TELEPHONE ENCOUNTER
Spoke with pt - states she is not having any issues - just likes to have on hand. Do not see in med hx but she states MD has filled for her. Will forward to MD for approval and order.

## 2018-11-30 NOTE — TELEPHONE ENCOUNTER
RX: Clotrimazole & Betamethaxone - cream  Pt needs to reorder this as she is out. Only uses and needed.   Please advise  - [phone]

## 2018-12-20 ENCOUNTER — OFFICE VISIT (OUTPATIENT)
Dept: INTERNAL MEDICINE CLINIC | Age: 83
End: 2018-12-20

## 2018-12-20 ENCOUNTER — HOSPITAL ENCOUNTER (OUTPATIENT)
Dept: LAB | Age: 83
Discharge: HOME | End: 2018-12-20
Payer: MEDICARE

## 2018-12-20 VITALS
DIASTOLIC BLOOD PRESSURE: 47 MMHG | SYSTOLIC BLOOD PRESSURE: 128 MMHG | WEIGHT: 178 LBS | HEIGHT: 59 IN | HEART RATE: 47 BPM | TEMPERATURE: 97.7 F | RESPIRATION RATE: 16 BRPM | BODY MASS INDEX: 35.88 KG/M2 | OXYGEN SATURATION: 96 %

## 2018-12-20 DIAGNOSIS — I50.22 CHRONIC SYSTOLIC CONGESTIVE HEART FAILURE (HCC): ICD-10-CM

## 2018-12-20 DIAGNOSIS — E11.21 TYPE 2 DIABETES MELLITUS WITH NEPHROPATHY (HCC): Primary | ICD-10-CM

## 2018-12-20 DIAGNOSIS — I48.20 CHRONIC ATRIAL FIBRILLATION (HCC): ICD-10-CM

## 2018-12-20 DIAGNOSIS — I10 ESSENTIAL HYPERTENSION, BENIGN: ICD-10-CM

## 2018-12-20 DIAGNOSIS — N32.81 OAB (OVERACTIVE BLADDER): ICD-10-CM

## 2018-12-20 DIAGNOSIS — F41.9 ANXIETY: ICD-10-CM

## 2018-12-20 PROCEDURE — 80053 COMPREHEN METABOLIC PANEL: CPT

## 2018-12-20 PROCEDURE — 83036 HEMOGLOBIN GLYCOSYLATED A1C: CPT

## 2018-12-20 PROCEDURE — 36415 COLL VENOUS BLD VENIPUNCTURE: CPT

## 2018-12-20 PROCEDURE — 85025 COMPLETE CBC W/AUTO DIFF WBC: CPT

## 2018-12-20 NOTE — PROGRESS NOTES
HISTORY OF PRESENT ILLNESS  Hamzah Curry is a 80 y.o. female. JELANI Bains is seen today for follow up of diabetes and other medical problems. She is accompanied by her home health aide, Grisel Alves. 1.  Diabetes. She is due for an A1c. Her home readings were reviewed and appear to be acceptable. 2.  Hypertension. Stable on current regimen which is prn use of medication only. 3.  CHF. Clinically stable. Her legs swelling has greatly improved off of Amlodipine. 4.  Cataracts. Surgery is scheduled for February 7. Reviewed with her the need for a preop exam. We will get her back in a month to complete this. 5.  OAB. She may be having a nerve stimulator procedure done given incomplete relief with medication. She is followed closely by urology. Current Outpatient Medications   Medication Sig    clotrimazole-betamethasone (LOTRISONE) topical cream Apply  to affected area two (2) times daily as needed for Skin Irritation.  potassium chloride SR (KLOR-CON 10) 10 mEq tablet Take 2 Tabs by mouth daily. New Directions.  furosemide (LASIX) 40 mg tablet Take 1 Tab by mouth daily.  carvedilol (COREG) 3.125 mg tablet TAKE 1 TABLET BY MOUTH TWICE DAILY WITH MEALS    mirabegron ER (MYRBETRIQ) 50 mg ER tablet Per urology    linaclotide (LINZESS) 72 mcg cap Take  by mouth.  glucose blood VI test strips (PRECISION XTRA TEST) strip TEST BLOOD SUGAR TWICE DAILY dx code E11.2.  ferrous sulfate 325 mg (65 mg iron) tablet TAKE 1 TABLET BY MOUTH DAILY WITH BREAKFAST    aspirin delayed-release 81 mg tablet Take 1 Tab by mouth daily.  glimepiride (AMARYL) 1 mg tablet Take 1 Tab by mouth daily. Take only if sugar is above 140    BETA-CAROTENE,A,-VITS C,E/MINS (EYE HEALTH PO) Take  by mouth two (2) times a day.  CHOLECALCIFEROL, VITAMIN D3, (VITAMIN D3 PO) Take  by mouth daily.  fluocinoNIDE (LIDEX) 0.05 % topical cream Apply  to affected area two (2) times daily as needed for Skin Irritation.     loratadine (CLARITIN) 10 mg tablet Take 1 Tab by mouth daily as needed for Allergies.  Lancets misc 1 Package by Does Not Apply route daily. Test glucose daily and as needed Dx type 2 dm E11.42    magnesium hydroxide (MILK OF MAGNESIA) 2,400 mg/10 mL susp suspension Take 10 mL by mouth nightly. No current facility-administered medications for this visit. Review of Systems   Constitutional: Negative for weight loss. Eyes: Positive for blurred vision. Respiratory: Negative. Cardiovascular: Positive for leg swelling. Negative for chest pain, palpitations and PND. Genitourinary: Positive for frequency. Musculoskeletal: Negative for myalgias. Neurological: Negative for focal weakness. Physical Exam   Constitutional: No distress. Cardiovascular: Regular rhythm. Bradycardia present. Exam reveals no gallop and no friction rub. No murmur heard. Pulmonary/Chest: Effort normal and breath sounds normal.   Musculoskeletal: She exhibits edema. Mild bilateral lower extremity edema    Neurological: She is alert. Skin: Skin is warm and dry. Psychiatric: She has a normal mood and affect. Her behavior is normal.   Nursing note and vitals reviewed. ASSESSMENT and PLAN  Diagnoses and all orders for this visit:    1. Type 2 diabetes mellitus with nephropathy (HCC)  -     METABOLIC PANEL, COMPREHENSIVE  -     HEMOGLOBIN A1C WITH EAG    2. OAB (overactive bladder)- See urologist as directed     3. Chronic systolic congestive heart failure ([address]- Continue current regimen of prescription and / or OTC medications , See cardiologist as directed. 4. Chronic atrial fibrillation (HCC)  -     CBC WITH AUTOMATED DIFF    5. Essential hypertension, benign- Continue current regimen of prescription and / or OTC medications     6.  Anxiety- Continue current regimen of prescription and / or OTC medications

## 2018-12-21 LAB
ALBUMIN SERPL-MCNC: 4.3 G/DL (ref 3.2–4.6)
ALBUMIN/GLOB SERPL: 1.7 {RATIO} (ref 1.2–2.2)
ALP SERPL-CCNC: 118 IU/L (ref 39–117)
ALT SERPL-CCNC: 20 IU/L (ref 0–32)
AST SERPL-CCNC: 23 IU/L (ref 0–40)
BASOPHILS # BLD AUTO: 0 X10E3/UL (ref 0–0.2)
BASOPHILS NFR BLD AUTO: 0 %
BILIRUB SERPL-MCNC: 0.4 MG/DL (ref 0–1.2)
BUN SERPL-MCNC: 24 MG/DL (ref 10–36)
BUN/CREAT SERPL: 28 (ref 12–28)
CALCIUM SERPL-MCNC: 8.6 MG/DL (ref 8.7–10.3)
CHLORIDE SERPL-SCNC: 99 MMOL/L (ref 96–106)
CO2 SERPL-SCNC: 27 MMOL/L (ref 20–29)
CREAT SERPL-MCNC: 0.86 MG/DL (ref 0.57–1)
EOSINOPHIL # BLD AUTO: 0.1 X10E3/UL (ref 0–0.4)
EOSINOPHIL NFR BLD AUTO: 1 %
ERYTHROCYTE [DISTWIDTH] IN BLOOD BY AUTOMATED COUNT: 15.6 % (ref 12.3–15.4)
EST. AVERAGE GLUCOSE BLD GHB EST-MCNC: 134 MG/DL
GLOBULIN SER CALC-MCNC: 2.6 G/DL (ref 1.5–4.5)
GLUCOSE SERPL-MCNC: 87 MG/DL (ref 65–99)
HBA1C MFR BLD: 6.3 % (ref 4.8–5.6)
HCT VFR BLD AUTO: 38.2 % (ref 34–46.6)
HGB BLD-MCNC: 12.5 G/DL (ref 11.1–15.9)
IMM GRANULOCYTES # BLD: 0 X10E3/UL (ref 0–0.1)
IMM GRANULOCYTES NFR BLD: 0 %
LYMPHOCYTES # BLD AUTO: 1.5 X10E3/UL (ref 0.7–3.1)
LYMPHOCYTES NFR BLD AUTO: 28 %
MCH RBC QN AUTO: 29.4 PG (ref 26.6–33)
MCHC RBC AUTO-ENTMCNC: 32.7 G/DL (ref 31.5–35.7)
MCV RBC AUTO: 90 FL (ref 79–97)
MONOCYTES # BLD AUTO: 0.5 X10E3/UL (ref 0.1–0.9)
MONOCYTES NFR BLD AUTO: 10 %
NEUTROPHILS # BLD AUTO: 3.2 X10E3/UL (ref 1.4–7)
NEUTROPHILS NFR BLD AUTO: 61 %
PLATELET # BLD AUTO: 245 X10E3/UL (ref 150–379)
POTASSIUM SERPL-SCNC: 4.5 MMOL/L (ref 3.5–5.2)
PROT SERPL-MCNC: 6.9 G/DL (ref 6–8.5)
RBC # BLD AUTO: 4.25 X10E6/UL (ref 3.77–5.28)
SODIUM SERPL-SCNC: 139 MMOL/L (ref 134–144)
WBC # BLD AUTO: 5.3 X10E3/UL (ref 3.4–10.8)

## 2019-01-08 ENCOUNTER — OFFICE VISIT (OUTPATIENT)
Dept: INTERNAL MEDICINE CLINIC | Age: 84
End: 2019-01-08

## 2019-01-08 VITALS
SYSTOLIC BLOOD PRESSURE: 136 MMHG | HEIGHT: 59 IN | DIASTOLIC BLOOD PRESSURE: 68 MMHG | TEMPERATURE: 97.5 F | WEIGHT: 180 LBS | HEART RATE: 56 BPM | OXYGEN SATURATION: 96 % | RESPIRATION RATE: 15 BRPM | BODY MASS INDEX: 36.29 KG/M2

## 2019-01-08 DIAGNOSIS — I10 ESSENTIAL HYPERTENSION, BENIGN: ICD-10-CM

## 2019-01-08 DIAGNOSIS — H25.013 CORTICAL AGE-RELATED CATARACT OF BOTH EYES: ICD-10-CM

## 2019-01-08 DIAGNOSIS — I48.20 CHRONIC ATRIAL FIBRILLATION (HCC): ICD-10-CM

## 2019-01-08 DIAGNOSIS — F41.9 ANXIETY: ICD-10-CM

## 2019-01-08 DIAGNOSIS — E11.21 TYPE 2 DIABETES MELLITUS WITH NEPHROPATHY (HCC): ICD-10-CM

## 2019-01-08 DIAGNOSIS — N32.81 OAB (OVERACTIVE BLADDER): Primary | ICD-10-CM

## 2019-01-08 DIAGNOSIS — I50.22 CHRONIC SYSTOLIC CONGESTIVE HEART FAILURE (HCC): ICD-10-CM

## 2019-01-08 NOTE — PROGRESS NOTES
HISTORY OF PRESENT ILLNESS  Abhishek Nazario is a 80 y.o. female. JELANI Hernández is seen today accompanied by her home health aide Patrick Horton. She is here for evaluation of hypertension as well as for medical clearance for cataract surgery. 1.  Decreased visual acuity. She has been diagnosed with cataracts. Surgery is pending for early February. She denies eye pain or infection symptoms. 2.  Hypertension. Fair readings, improved on recheck. 3.  Diabetes. Recent lab check showed acceptable control of diabetes with dietary management. She does have an oral agent for prn use only. 4.  CHF, clinically stable. 5.  Chronic atrial fibrillation. Stable. Assessment: She is at low risk for medical complications based on the nature of the procedure. She does have advanced age and some comorbid conditions, but they should not interfere with eye surgery. Recommendations:  1. Hold aspirin 7 days prior to the procedure. 2.  Take Coreg only the morning of surgery. 3.  Call prn if I may help with any post op medical problems or questions and will call prn with any problems. Otherwise,  follow up for routine visit in two months. Past Medical History:   Diagnosis Date    Anxiety     Bleeding nose     Chronic atrial fibrillation (HCC)     [facility]     Hypertension      Past Surgical History:   Procedure Laterality Date    HX CHOLECYSTECTOMY      HX HEENT      myringotomy    HX HEENT  07/2016    basal cell removal -forehead    HX TONSILLECTOMY      childhood    HX TYMPANOSTOMY  07/11/2016    left ear     Current Outpatient Medications   Medication Sig    clotrimazole-betamethasone (LOTRISONE) topical cream Apply  to affected area two (2) times daily as needed for Skin Irritation.  potassium chloride SR (KLOR-CON 10) 10 mEq tablet Take 2 Tabs by mouth daily. New Directions.  furosemide (LASIX) 40 mg tablet Take 1 Tab by mouth daily.     carvedilol (COREG) 3.125 mg tablet TAKE 1 TABLET BY MOUTH TWICE DAILY WITH MEALS    mirabegron ER (MYRBETRIQ) 50 mg ER tablet Per urology    linaclotide (LINZESS) 72 mcg cap Take  by mouth.  glucose blood VI test strips (PRECISION XTRA TEST) strip TEST BLOOD SUGAR TWICE DAILY dx code E11.2.  ferrous sulfate 325 mg (65 mg iron) tablet TAKE 1 TABLET BY MOUTH DAILY WITH BREAKFAST    aspirin delayed-release 81 mg tablet Take 1 Tab by mouth daily.  glimepiride (AMARYL) 1 mg tablet Take 1 Tab by mouth daily. Take only if sugar is above 140    BETA-CAROTENE,A,-VITS C,E/MINS (EYE HEALTH PO) Take  by mouth two (2) times a day.  CHOLECALCIFEROL, VITAMIN D3, (VITAMIN D3 PO) Take  by mouth daily.  fluocinoNIDE (LIDEX) 0.05 % topical cream Apply  to affected area two (2) times daily as needed for Skin Irritation.  loratadine (CLARITIN) 10 mg tablet Take 1 Tab by mouth daily as needed for Allergies.  Lancets misc 1 Package by Does Not Apply route daily. Test glucose daily and as needed Dx type 2 dm E11.42    magnesium hydroxide (MILK OF MAGNESIA) 2,400 mg/10 mL susp suspension Take 10 mL by mouth nightly. No current facility-administered medications for this visit. Allergies   Allergen Reactions    Amoxicillin Other (comments)     \"makes me feel like I\"m going to faint\"    Aspirin Palpitations    Gabapentin Drowsiness     * pt states this med makes her feel very drowsy , gives her a drunk feeling.  Levaquin [Levofloxacin] Hives and Rash    Lorazepam Other (comments)     Feeling faint.  Losartan Other (comments)     Tongue swelling      Lyrica [Pregabalin] Rash    Other Medication Other (comments)     Feeling faint, does not decrease pain.  Sertraline Other (comments)     Feeling faint.      Social History     Socioeconomic History    Marital status:      Spouse name: Not on file    Number of children: Not on file    Years of education: Not on file    Highest education level: Not on file   Social Needs    Financial resource strain: Not on file    Food insecurity - worry: Not on file    Food insecurity - inability: Not on file    Transportation needs - medical: Not on file   [employer] needs - non-medical: Not on file   Occupational History    Not on file   Tobacco Use    Smoking status: Former Smoker     Packs/day: 1.00     Years: 10.00     Pack years: 10.00     Last attempt to quit: 10/12/1993     Years since quittin.2    Smokeless tobacco: Never Used   Substance and Sexual Activity    Alcohol use: Yes     Comment: 1 glass of wine weekly    Drug use: No    Sexual activity: Not Currently   Other Topics Concern    Not on file   Social History Narrative    Not on file     No family history on file. Review of Systems   Constitutional: Negative for weight loss. Eyes: Positive for blurred vision. Respiratory: Negative. Cardiovascular: Negative for chest pain, palpitations, leg swelling and PND. Musculoskeletal: Positive for joint pain. Negative for myalgias. Neurological: Negative for focal weakness. Physical Exam   Constitutional: She appears well-nourished. Eyes: Conjunctivae are normal. Pupils are equal, round, and reactive to light. Right eye exhibits no discharge. Left eye exhibits no discharge. Neck: Neck supple. Carotid bruit is not present. Cardiovascular: Normal rate and regular rhythm. Exam reveals no gallop and no friction rub. No murmur heard. Pulmonary/Chest: Effort normal and breath sounds normal. No respiratory distress. Musculoskeletal: She exhibits edema. Mild bilateral lower extremity edema with TEDS   Lymphadenopathy:     She has no cervical adenopathy. Neurological: She is alert. Skin: Skin is warm and dry. Psychiatric: She has a normal mood and affect. Her behavior is normal.   Nursing note and vitals reviewed. ASSESSMENT and PLAN  Diagnoses and all orders for this visit:    1. OAB (overactive bladder)- See urologist as directed     2.  Type 2 diabetes mellitus with nephropathy ([facility]- Follow off treatment     3. Chronic systolic congestive heart failure ([facility]- Continue current regimen of prescription and / or OTC medications , See cardiologist as directed. 4. Chronic atrial fibrillation ([facility]- Continue current regimen of prescription and / or OTC medications , See cardiologist as directed. 5. Essential hypertension, benign- Continue current regimen of prescription and / or OTC medications     6. Anxiety- Follow off treatment     7.  Cortical age-related cataract of both eyes- See ophthalmologist as discussed/directed

## 2019-02-11 RX ORDER — FUROSEMIDE 40 MG/1
TABLET ORAL
Qty: 90 TAB | Refills: 0 | Status: SHIPPED | OUTPATIENT
Start: 2019-02-11 | End: 2019-03-22

## 2019-03-08 ENCOUNTER — HOSPITAL ENCOUNTER (OUTPATIENT)
Dept: LAB | Age: 84
Discharge: HOME | End: 2019-03-08
Payer: MEDICARE

## 2019-03-08 ENCOUNTER — OFFICE VISIT (OUTPATIENT)
Dept: INTERNAL MEDICINE CLINIC | Age: 84
End: 2019-03-08

## 2019-03-08 VITALS
WEIGHT: 179.6 LBS | HEIGHT: 59 IN | DIASTOLIC BLOOD PRESSURE: 53 MMHG | RESPIRATION RATE: 17 BRPM | SYSTOLIC BLOOD PRESSURE: 122 MMHG | BODY MASS INDEX: 36.21 KG/M2 | TEMPERATURE: 97.8 F | HEART RATE: 59 BPM | OXYGEN SATURATION: 96 %

## 2019-03-08 DIAGNOSIS — E11.40 TYPE 2 DIABETES, CONTROLLED, WITH NEUROPATHY (HCC): ICD-10-CM

## 2019-03-08 DIAGNOSIS — I50.22 CHRONIC SYSTOLIC CONGESTIVE HEART FAILURE (HCC): ICD-10-CM

## 2019-03-08 DIAGNOSIS — H25.013 CORTICAL AGE-RELATED CATARACT OF BOTH EYES: ICD-10-CM

## 2019-03-08 DIAGNOSIS — I48.20 CHRONIC ATRIAL FIBRILLATION (HCC): ICD-10-CM

## 2019-03-08 DIAGNOSIS — I10 ESSENTIAL HYPERTENSION, BENIGN: ICD-10-CM

## 2019-03-08 DIAGNOSIS — E11.21 TYPE 2 DIABETES MELLITUS WITH NEPHROPATHY (HCC): ICD-10-CM

## 2019-03-08 DIAGNOSIS — Z13.31 SCREENING FOR DEPRESSION: ICD-10-CM

## 2019-03-08 DIAGNOSIS — Z00.00 MEDICARE ANNUAL WELLNESS VISIT, SUBSEQUENT: Primary | ICD-10-CM

## 2019-03-08 DIAGNOSIS — Z23 ENCOUNTER FOR IMMUNIZATION: ICD-10-CM

## 2019-03-08 PROCEDURE — 36415 COLL VENOUS BLD VENIPUNCTURE: CPT

## 2019-03-08 PROCEDURE — 83036 HEMOGLOBIN GLYCOSYLATED A1C: CPT

## 2019-03-08 PROCEDURE — 80053 COMPREHEN METABOLIC PANEL: CPT

## 2019-03-08 PROCEDURE — 85025 COMPLETE CBC W/AUTO DIFF WBC: CPT

## 2019-03-08 RX ORDER — CAPSAICIN 0.1 %
CREAM (GRAM) TOPICAL
Qty: 56 G | Refills: 11
Start: 2019-03-08 | End: 2019-05-22

## 2019-03-08 NOTE — PROGRESS NOTES
HISTORY OF PRESENT ILLNESS  Monica Villasenor is a 80 y.o. female. HPI    Review of Systems   Constitutional: Positive for malaise/fatigue. Negative for weight loss. HENT: Positive for hearing loss. Respiratory: Negative. Cardiovascular: Negative for chest pain, palpitations, leg swelling and PND. Genitourinary: Positive for frequency. Musculoskeletal: Negative for myalgias. Neurological: Negative for focal weakness. Psychiatric/Behavioral: Positive for depression. The patient is nervous/anxious. Physical Exam    ASSESSMENT and PLAN  Diagnoses and all orders for this visit:    1. Medicare annual wellness visit, subsequent    2. Encounter for immunization  -     varicella-zoster recombinant, PF, (SHINGRIX, PF,) 50 mcg/0.5 mL susr injection; 0.5mL by IntraMUSCular route once now and then repeat in 2-6 months    3. Screening for depression  -     DEPRESSION SCREEN ANNUAL    4. Type 2 diabetes mellitus with nephropathy ([facility]    5. Chronic systolic congestive heart failure ([facility]    6. Chronic atrial fibrillation (HCC)    7. Essential hypertension, benign    8. Cortical age-related cataract of both eyes    9.  Type 2 diabetes, controlled, with neuropathy (HCC)  -     capsaicin (ZOSTRIX-HP) 0.1 % topical cream; Apply  to affected area three (3) times daily as needed for Pain.  -     METABOLIC PANEL, COMPREHENSIVE  -     CBC WITH AUTOMATED DIFF  -     HEMOGLOBIN A1C WITH EAG

## 2019-03-08 NOTE — PATIENT INSTRUCTIONS
Medicare Wellness Visit, Female     The best way to live healthy is to have a lifestyle where you eat a well-balanced diet, exercise regularly, limit alcohol use, and quit all forms of tobacco/nicotine, if applicable. Regular preventive services are another way to keep healthy. Preventive services (vaccines, screening tests, monitoring & exams) can help personalize your care plan, which helps you manage your own care. Screening tests can find health problems at the earliest stages, when they are easiest to treat. [facility] follows the current, evidence-based guidelines published by the [facility] ([facility]) when recommending preventive services for our patients. Because we follow these guidelines, sometimes recommendations change over time as research supports it. (For example, mammograms used to be recommended annually. Even though Medicare will still pay for an annual mammogram, the newer guidelines recommend a mammogram every two years for women of average risk.)  Of course, you and your doctor may decide to screen more often for some diseases, based on your risk and your health status. Preventive services for you include:  - Medicare offers their members a free annual wellness visit, which is time for you and your primary care provider to discuss and plan for your preventive service needs. Take advantage of this benefit every year!  -All adults over the age of 72 should receive the recommended pneumonia vaccines. Current USPSTF guidelines recommend a series of two vaccines for the best pneumonia protection.   -All adults should have a flu vaccine yearly and a tetanus vaccine every 10 years. All adults age 61 and older should receive a shingles vaccine once in their lifetime.    -A bone mass density test is recommended when a woman turns 65 to screen for osteoporosis. This test is only recommended one time, as a screening.  Some providers will use this same test as a disease monitoring tool if you already have osteoporosis. -All adults age 38-68 who are overweight should have a diabetes screening test once every three years.   -Other screening tests and preventive services for persons with diabetes include: an eye exam to screen for diabetic retinopathy, a kidney function test, a foot exam, and stricter control over your cholesterol.   -Cardiovascular screening for adults with routine risk involves an electrocardiogram (ECG) at intervals determined by your doctor.   -Colorectal cancer screenings should be done for adults age 54-65 with no increased risk factors for colorectal cancer. There are a number of acceptable methods of screening for this type of cancer. Each test has its own benefits and drawbacks. Discuss with your doctor what is most appropriate for you during your annual wellness visit. The different tests include: colonoscopy (considered the best screening method), a fecal occult blood test, a fecal DNA test, and sigmoidoscopy. -Breast cancer screenings are recommended every other year for women of normal risk, age 54-69.  -Cervical cancer screenings for women over age 72 are only recommended with certain risk factors.   -All adults born between [facility] should be screened once for Hepatitis C. Here is a list of your current Health Maintenance items (your personalized list of preventive services) with a due date: There are no preventive care reminders to display for this patient.

## 2019-03-08 NOTE — PROGRESS NOTES
HISTORY OF PRESENT ILLNESS  Sathish Del Real is a 80 y.o. female. HPI Subjective: Gwen Hernandez is seen today accompanied by her home health aide, Thao Lopez, for a Medicare wellness visit, follow up of chronic problems and to have medical clearance for upcoming eye surgery. 1. Medicare wellness visit. See attached note. 2. Preventive medicine. Full review. She is due for the shingles vaccine and TDAP booster. 3. Chronic problems are reviewed. a. Cataracts. She had left eye cataract surgery in January. Right eye surgery is pending for next Thursday. She has some decreased visual acuity. No eye pain or infection symptoms. b. Diabetes. This has been well controlled with diet. Will check an A1c today to assure stability prior to eye surgery, even though it has not quite been three months yet.  c. Hypertension, stable on current regimen. d. CHF with chronic atrial fibrillation. Weight is stable. She does have fatigue and malaise, which I think is likely multifactorial, but we will check some routine labs.  e. Question depression. She scored abnormally on screening. She has longstanding anxiety and mild depression. She does not want medication. She has had counseling in the past, but does not want that now either. Review of Systems:  Notable for recent ear infection with decreased hearing and pain. She follows up with Dr. Shelby Vargas. Assessment:  1. She is at low risk for medical complications based on clinical stability and the nature of the proposed procedure. Recommendations:  1. Aspirin discontinuation per ophthalmology protocol. 2. No other contraindication to medications. Follow ophthalmology protocol for NPO status. 3. Call if I may help with any postoperative medical problems. 4. She will follow up with me routinely in two months.     Past Medical History:   Diagnosis Date    Anxiety     Bleeding nose     Chronic atrial fibrillation (HCC)     Diabetes (HCC)     Hypertension      Past Surgical History:   Procedure Laterality Date    HX CHOLECYSTECTOMY      HX HEENT      myringotomy    HX HEENT  07/2016    basal cell removal -forehead    HX TONSILLECTOMY      childhood    HX TYMPANOSTOMY  07/11/2016    left ear     Current Outpatient Medications   Medication Sig    varicella-zoster recombinant, PF, (SHINGRIX, PF,) 50 mcg/0.5 mL susr injection 0.5mL by IntraMUSCular route once now and then repeat in 2-6 months    capsaicin (ZOSTRIX-HP) 0.1 % topical cream Apply  to affected area three (3) times daily as needed for Pain.  furosemide (LASIX) 40 mg tablet TAKE 1 TABLET DAILY    glimepiride (AMARYL) 1 mg tablet TAKE 1 TABLET BY MOUTH EVERY DAY. TAKE ONLY IF SUGAR IS ABOVE 140    clotrimazole-betamethasone (LOTRISONE) topical cream Apply  to affected area two (2) times daily as needed for Skin Irritation.  potassium chloride SR (KLOR-CON 10) 10 mEq tablet Take 2 Tabs by mouth daily. New Directions.  carvedilol (COREG) 3.125 mg tablet TAKE 1 TABLET BY MOUTH TWICE DAILY WITH MEALS    mirabegron ER (MYRBETRIQ) 50 mg ER tablet Per urology    linaclotide (LINZESS) 72 mcg cap Take  by mouth.  glucose blood VI test strips (PRECISION XTRA TEST) strip TEST BLOOD SUGAR TWICE DAILY dx code E11.2.  ferrous sulfate 325 mg (65 mg iron) tablet TAKE 1 TABLET BY MOUTH DAILY WITH BREAKFAST    aspirin delayed-release 81 mg tablet Take 1 Tab by mouth daily.  BETA-CAROTENE,A,-VITS C,E/MINS (EYE HEALTH PO) Take  by mouth two (2) times a day.  CHOLECALCIFEROL, VITAMIN D3, (VITAMIN D3 PO) Take  by mouth daily.  fluocinoNIDE (LIDEX) 0.05 % topical cream Apply  to affected area two (2) times daily as needed for Skin Irritation.  loratadine (CLARITIN) 10 mg tablet Take 1 Tab by mouth daily as needed for Allergies.  Lancets misc 1 Package by Does Not Apply route daily.  Test glucose daily and as needed Dx type 2 dm E11.42    magnesium hydroxide (MILK OF MAGNESIA) 2,400 mg/10 mL susp suspension Take 10 mL by mouth nightly. No current facility-administered medications for this visit. Allergies   Allergen Reactions    Amoxicillin Other (comments)     \"makes me feel like I\"m going to faint\"    Aspirin Palpitations    Gabapentin Drowsiness     * pt states this med makes her feel very drowsy , gives her a drunk feeling.  Levaquin [Levofloxacin] Hives and Rash    Lorazepam Other (comments)     Feeling faint.  Losartan Other (comments)     Tongue swelling      Lyrica [Pregabalin] Rash    Other Medication Other (comments)     Feeling faint, does not decrease pain.  Sertraline Other (comments)     Feeling faint. Social History     Socioeconomic History    Marital status:      Spouse name: Not on file    Number of children: Not on file    Years of education: Not on file    Highest education level: Not on file   Social Needs    Financial resource strain: Not on file    Food insecurity - worry: Not on file    Food insecurity - inability: Not on file    Transportation needs - medical: Not on file   [place] needs - non-medical: Not on file   Occupational History    Not on file   Tobacco Use    Smoking status: Former Smoker     Packs/day: 1.00     Years: 10.00     Pack years: 10.00     Last attempt to quit: 10/12/1993     Years since quittin.4    Smokeless tobacco: Never Used   Substance and Sexual Activity    Alcohol use: Yes     Comment: 1 glass of wine weekly    Drug use: No    Sexual activity: Not Currently   Other Topics Concern    Not on file   Social History Narrative    Not on file     History reviewed. No pertinent family history. Review of Systems   Constitutional: Positive for malaise/fatigue. Negative for weight loss. HENT: Positive for ear pain and hearing loss. Eyes: Positive for blurred vision. Respiratory: Negative. Cardiovascular: Negative for chest pain, palpitations, leg swelling and PND. Genitourinary: Positive for frequency. Musculoskeletal: Negative for myalgias. Neurological: Negative for focal weakness. Psychiatric/Behavioral: The patient is nervous/anxious. Physical Exam   Constitutional: She appears well-nourished. Neck: Carotid bruit is not present. Cardiovascular: Normal rate. An irregularly irregular rhythm present. Exam reveals no gallop and no friction rub. No murmur heard. Pulmonary/Chest: Effort normal and breath sounds normal. No respiratory distress. Musculoskeletal: She exhibits edema. Mild bilateral lower extremity edema    Neurological: She is alert. Skin: Skin is warm and dry. Psychiatric: She has a normal mood and affect. Her behavior is normal.   Nursing note and vitals reviewed. ASSESSMENT and PLAN  Diagnoses and all orders for this visit:    1. Medicare annual wellness visit, subsequent    2. Encounter for immunization  -     varicella-zoster recombinant, PF, (SHINGRIX, PF,) 50 mcg/0.5 mL susr injection; 0.5mL by IntraMUSCular route once now and then repeat in 2-6 months    3. Screening for depression  -     DEPRESSION SCREEN ANNUAL    4. Type 2 diabetes mellitus with nephropathy ([address]- Follow off treatment     5. Chronic systolic congestive heart failure ([address]- Continue current regimen of prescription and / or OTC medications , See cardiologist as directed. 6. Chronic atrial fibrillation ([address]- Continue current regimen of prescription and / or OTC medications , See cardiologist as directed. 7. Essential hypertension, benign- Continue current regimen of prescription and / or OTC medications     8. Cortical age-related cataract of both eyes- See ophthalmologist as discussed/directed     9.  Type 2 diabetes, controlled, with neuropathy (HCC)  -     capsaicin (ZOSTRIX-HP) 0.1 % topical cream; Apply  to affected area three (3) times daily as needed for Pain.  -     METABOLIC PANEL, COMPREHENSIVE  -     CBC WITH AUTOMATED DIFF  -     HEMOGLOBIN A1C WITH EAG

## 2019-03-08 NOTE — PROGRESS NOTES
This is the Subsequent Medicare Annual Wellness Exam, performed 12 months or more after the Initial AWV or the last Subsequent AWV    I have reviewed the patient's medical history in detail and updated the computerized patient record. History     Past Medical History:   Diagnosis Date    Anxiety     Bleeding nose     Chronic atrial fibrillation (HCC)     [facility]     Hypertension       Past Surgical History:   Procedure Laterality Date    HX CHOLECYSTECTOMY      HX HEENT      myringotomy    HX HEENT  07/2016    basal cell removal -forehead    HX TONSILLECTOMY      childhood    HX TYMPANOSTOMY  07/11/2016    left ear     Current Outpatient Medications   Medication Sig Dispense Refill    varicella-zoster recombinant, PF, (SHINGRIX, PF,) 50 mcg/0.5 mL susr injection 0.5mL by IntraMUSCular route once now and then repeat in 2-6 months 0.5 mL 1    furosemide (LASIX) 40 mg tablet TAKE 1 TABLET DAILY 90 Tab 0    glimepiride (AMARYL) 1 mg tablet TAKE 1 TABLET BY MOUTH EVERY DAY. TAKE ONLY IF SUGAR IS ABOVE 140 30 Tab 5    clotrimazole-betamethasone (LOTRISONE) topical cream Apply  to affected area two (2) times daily as needed for Skin Irritation. 15 g 2    potassium chloride SR (KLOR-CON 10) 10 mEq tablet Take 2 Tabs by mouth daily. New Directions. 180 Tab 3    carvedilol (COREG) 3.125 mg tablet TAKE 1 TABLET BY MOUTH TWICE DAILY WITH MEALS 180 Tab 3    mirabegron ER (MYRBETRIQ) 50 mg ER tablet Per urology 90 Tab 3    linaclotide (LINZESS) 72 mcg cap Take  by mouth.  glucose blood VI test strips (PRECISION XTRA TEST) strip TEST BLOOD SUGAR TWICE DAILY dx code E11.2. 200 Strip 3    ferrous sulfate 325 mg (65 mg iron) tablet TAKE 1 TABLET BY MOUTH DAILY WITH BREAKFAST 30 Tab 11    aspirin delayed-release 81 mg tablet Take 1 Tab by mouth daily. 30 Tab 11    BETA-CAROTENE,A,-VITS C,E/MINS (EYE HEALTH PO) Take  by mouth two (2) times a day.       CHOLECALCIFEROL, VITAMIN D3, (VITAMIN D3 PO) Take  by mouth daily.  fluocinoNIDE (LIDEX) 0.05 % topical cream Apply  to affected area two (2) times daily as needed for Skin Irritation. 30 g 3    loratadine (CLARITIN) 10 mg tablet Take 1 Tab by mouth daily as needed for Allergies. 30 Tab 11    Lancets misc 1 Package by Does Not Apply route daily. Test glucose daily and as needed Dx type 2 dm E11.42 1 Package 6    magnesium hydroxide (MILK OF MAGNESIA) 2,400 mg/10 mL susp suspension Take 10 mL by mouth nightly. 1 Bottle 11     Allergies   Allergen Reactions    Amoxicillin Other (comments)     \"makes me feel like I\"m going to faint\"    Aspirin Palpitations    Gabapentin Drowsiness     * pt states this med makes her feel very drowsy , gives her a drunk feeling.  Levaquin [Levofloxacin] Hives and Rash    Lorazepam Other (comments)     Feeling faint.  Losartan Other (comments)     Tongue swelling      Lyrica [Pregabalin] Rash    Other Medication Other (comments)     Feeling faint, does not decrease pain.  Sertraline Other (comments)     Feeling faint. History reviewed. No pertinent family history.   Social History     Tobacco Use    Smoking status: Former Smoker     Packs/day: 1.00     Years: 10.00     Pack years: 10.00     Last attempt to quit: 10/12/1993     Years since quittin.4    Smokeless tobacco: Never Used   Substance Use Topics    Alcohol use: Yes     Comment: 1 glass of wine weekly     Patient Active Problem List   Diagnosis Code    Edema R60.9    Headache(784.0) R51    Diverticulosis of colon (without mention of hemorrhage) K57.30    Constipation K59.00    Cyst of left kidney N28.1    Essential hypertension, benign I10    Type 2 diabetes, controlled, with neuropathy ([facility] E11.40    Advanced directives, counseling/discussion Z71.89    Chronic atrial fibrillation (HCC) I48.2    Allergic rhinitis J30.9    Anxiety F41.9    CHF (congestive heart failure) (HCC) I50.9    Type 2 diabetes mellitus with nephropathy ([facility] E11.21    Severe obesity (BMI 35.0-39. 9) with comorbidity ([facility] E66.01       Depression Risk Factor Screening:     3 most recent PHQ Screens 8/23/2018   Little interest or pleasure in doing things Not at all   Feeling down, depressed, irritable, or hopeless Not at all   Total Score PHQ 2 0   Trouble falling or staying asleep, or sleeping too much -   Feeling tired or having little energy -   Poor appetite, weight loss, or overeating -   Feeling bad about yourself - or that you are a failure or have let yourself or your family down -   Trouble concentrating on things such as school, work, reading, or watching TV -   Moving or speaking so slowly that other people could have noticed; or the opposite being so fidgety that others notice -   Thoughts of being better off dead, or hurting yourself in some way -   PHQ 9 Score -   How difficult have these problems made it for you to do your work, take care of your home and get along with others -     Alcohol Risk Factor Screening:   One per wk    Functional Ability and Level of Safety:   Hearing Loss  Hearing loss is moderate. Activities of Daily Living  The home contains: handrails  Patient needs help with:  transportation, shopping, laundry and housework    Fall Risk  Fall Risk Assessment, last 12 mths 3/8/2019   Able to walk? Yes   Fall in past 12 months?  No   Fall with injury? -   Number of falls in past 12 months -   Fall Risk Score -       Abuse Screen  Patient is not abused    Cognitive Screening   Evaluation of Cognitive Function:  Has your family/caregiver stated any concerns about your memory: no  Normal    Patient Care Team   Patient Care Team:  Maura Gomez MD as PCP - General (Internal Medicine)  Philipp Nguyen MD as Physician (Ophthalmology)  Joe Casey MD as Physician (Dermatology)  Aquilino Marrero MD as Physician (Otolaryngology)  Herman Montes MD (Cardiology)    Assessment/Plan   Education and counseling provided:  Are appropriate based on today's review and evaluation    Diagnoses and all orders for this visit:    1. Medicare annual wellness visit, subsequent    2. Encounter for immunization  -     varicella-zoster recombinant, PF, (SHINGRIX, PF,) 50 mcg/0.5 mL susr injection; 0.5mL by IntraMUSCular route once now and then repeat in 2-6 months    3. Screening for depression  -     DEPRESSION SCREEN ANNUAL        There are no preventive care reminders to display for this patient.

## 2019-03-09 LAB
ALBUMIN SERPL-MCNC: 3.8 G/DL (ref 3.2–4.6)
ALBUMIN/GLOB SERPL: 1.3 {RATIO} (ref 1.2–2.2)
ALP SERPL-CCNC: 111 IU/L (ref 39–117)
ALT SERPL-CCNC: 19 IU/L (ref 0–32)
AST SERPL-CCNC: 21 IU/L (ref 0–40)
BASOPHILS # BLD AUTO: 0 X10E3/UL (ref 0–0.2)
BASOPHILS NFR BLD AUTO: 0 %
BILIRUB SERPL-MCNC: 0.3 MG/DL (ref 0–1.2)
BUN SERPL-MCNC: 18 MG/DL (ref 10–36)
BUN/CREAT SERPL: 21 (ref 12–28)
CALCIUM SERPL-MCNC: 8.5 MG/DL (ref 8.7–10.3)
CHLORIDE SERPL-SCNC: 99 MMOL/L (ref 96–106)
CO2 SERPL-SCNC: 29 MMOL/L (ref 20–29)
CREAT SERPL-MCNC: 0.84 MG/DL (ref 0.57–1)
EOSINOPHIL # BLD AUTO: 0.1 X10E3/UL (ref 0–0.4)
EOSINOPHIL NFR BLD AUTO: 2 %
ERYTHROCYTE [DISTWIDTH] IN BLOOD BY AUTOMATED COUNT: 15.6 % (ref 12.3–15.4)
EST. AVERAGE GLUCOSE BLD GHB EST-MCNC: 137 MG/DL
GLOBULIN SER CALC-MCNC: 3 G/DL (ref 1.5–4.5)
GLUCOSE SERPL-MCNC: 122 MG/DL (ref 65–99)
HBA1C MFR BLD: 6.4 % (ref 4.8–5.6)
HCT VFR BLD AUTO: 33.9 % (ref 34–46.6)
HGB BLD-MCNC: 11.4 G/DL (ref 11.1–15.9)
IMM GRANULOCYTES # BLD AUTO: 0 X10E3/UL (ref 0–0.1)
IMM GRANULOCYTES NFR BLD AUTO: 0 %
LYMPHOCYTES # BLD AUTO: 1.5 X10E3/UL (ref 0.7–3.1)
LYMPHOCYTES NFR BLD AUTO: 27 %
MCH RBC QN AUTO: 30.2 PG (ref 26.6–33)
MCHC RBC AUTO-ENTMCNC: 33.6 G/DL (ref 31.5–35.7)
MCV RBC AUTO: 90 FL (ref 79–97)
MONOCYTES # BLD AUTO: 0.6 X10E3/UL (ref 0.1–0.9)
MONOCYTES NFR BLD AUTO: 11 %
NEUTROPHILS # BLD AUTO: 3.3 X10E3/UL (ref 1.4–7)
NEUTROPHILS NFR BLD AUTO: 60 %
PLATELET # BLD AUTO: 226 X10E3/UL (ref 150–379)
POTASSIUM SERPL-SCNC: 4.3 MMOL/L (ref 3.5–5.2)
PROT SERPL-MCNC: 6.8 G/DL (ref 6–8.5)
RBC # BLD AUTO: 3.77 X10E6/UL (ref 3.77–5.28)
SODIUM SERPL-SCNC: 141 MMOL/L (ref 134–144)
WBC # BLD AUTO: 5.6 X10E3/UL (ref 3.4–10.8)

## 2019-03-22 ENCOUNTER — OFFICE VISIT (OUTPATIENT)
Dept: CARDIOLOGY CLINIC | Age: 84
End: 2019-03-22

## 2019-03-22 VITALS
SYSTOLIC BLOOD PRESSURE: 124 MMHG | RESPIRATION RATE: 17 BRPM | WEIGHT: 178 LBS | OXYGEN SATURATION: 97 % | HEIGHT: 59 IN | DIASTOLIC BLOOD PRESSURE: 58 MMHG | BODY MASS INDEX: 35.88 KG/M2 | HEART RATE: 62 BPM

## 2019-03-22 DIAGNOSIS — I48.20 CHRONIC ATRIAL FIBRILLATION (HCC): Primary | ICD-10-CM

## 2019-03-22 DIAGNOSIS — I10 ESSENTIAL HYPERTENSION, BENIGN: ICD-10-CM

## 2019-03-22 DIAGNOSIS — I48.20 CHRONIC ATRIAL FIBRILLATION (HCC): ICD-10-CM

## 2019-03-22 RX ORDER — FUROSEMIDE 20 MG/1
20 TABLET ORAL DAILY
Qty: 30 TAB | Refills: 5 | Status: SHIPPED | OUTPATIENT
Start: 2019-03-22 | End: 2019-03-22 | Stop reason: SDUPTHER

## 2019-03-22 NOTE — PROGRESS NOTES
1. Have you been to the ER, urgent care clinic since your last visit? Hospitalized since your last visit? No 
 
2. Have you seen or consulted any other health care providers outside of the [address] [place] [address] since your last visit? Include any pap smears or colon screening. No 
 
3) Do you have an Advance Directive on file? no 
 
Patient is accompanied by self I have received verbal consent from Sury [address] to discuss any/all medical information while they are present in the room.

## 2019-03-22 NOTE — PROGRESS NOTES
HISTORY OF PRESENT ILLNESS Suellen Johnson is a 80 y.o. female SUMMARY:  
Problem List  Date Reviewed: 3/21/2019 Codes Class Noted Severe obesity (BMI 35.0-39. 9) with comorbidity ([facility] ICD-10-CM: E66.01 
ICD-9-CM: 278.01  4/25/2018 Type 2 diabetes mellitus with nephropathy ([facility] ICD-10-CM: E11.21 
ICD-9-CM: 250.40, 583.81  12/19/2017 CHF (congestive heart failure) (HCC) ICD-10-CM: I50.9 ICD-9-CM: 428.0  12/6/2017 Anxiety ICD-10-CM: F41.9 ICD-9-CM: 300.00  10/2/2017 Allergic rhinitis ICD-10-CM: J30.9 ICD-9-CM: 477.9  4/11/2016 Chronic atrial fibrillation (HCC) ICD-10-CM: G67.9 ICD-9-CM: 427.31  4/8/2016 Advanced directives, counseling/discussion ICD-10-CM: Z71.89 ICD-9-CM: V65.49  3/15/2016 Type 2 diabetes, controlled, with neuropathy ([facility] ICD-10-CM: E11.40 ICD-9-CM: 250.60, 357.2  1/4/2016 Essential hypertension, benign ICD-10-CM: I10 
ICD-9-CM: 401.1  6/4/2015 Edema ICD-10-CM: R60.9 ICD-9-CM: 782.3  11/12/2014 Overview Addendum 12/14/2017  8:24 AM by Luciano Arango MD  
  4/16 echo normal lvef, lae, mild tr 
12/17 echo normal lvef, elisha, mild to mod mr and tr, pa pressure 50mm VZVCLLZP(683.3) ICD-10-CM: L39 ICD-9-CM: 784.0  11/12/2014 Diverticulosis of colon (without mention of hemorrhage) ICD-10-CM: K57.30 ICD-9-CM: 562.10  11/12/2014 Constipation ICD-10-CM: K59.00 ICD-9-CM: 564.00  11/12/2014 Cyst of left kidney ICD-10-CM: N28.1 ICD-9-CM: 753.10  11/12/2014 Current Outpatient Medications on File Prior to Visit Medication Sig  varicella-zoster recombinant, PF, (SHINGRIX, PF,) 50 mcg/0.5 mL susr injection 0.5mL by IntraMUSCular route once now and then repeat in 2-6 months  capsaicin (ZOSTRIX-HP) 0.1 % topical cream Apply  to affected area three (3) times daily as needed for Pain.  glimepiride (AMARYL) 1 mg tablet TAKE 1 TABLET BY MOUTH EVERY DAY.  TAKE ONLY IF SUGAR IS ABOVE 140  clotrimazole-betamethasone (LOTRISONE) topical cream Apply  to affected area two (2) times daily as needed for Skin Irritation.  potassium chloride SR (KLOR-CON 10) 10 mEq tablet Take 2 Tabs by mouth daily. New Directions.  carvedilol (COREG) 3.125 mg tablet TAKE 1 TABLET BY MOUTH TWICE DAILY WITH MEALS  mirabegron ER (MYRBETRIQ) 50 mg ER tablet Per urology  linaclotide (LINZESS) 72 mcg cap Take  by mouth.  glucose blood VI test strips (PRECISION XTRA TEST) strip TEST BLOOD SUGAR TWICE DAILY dx code E11.2.  ferrous sulfate 325 mg (65 mg iron) tablet TAKE 1 TABLET BY MOUTH DAILY WITH BREAKFAST  aspirin delayed-release 81 mg tablet Take 1 Tab by mouth daily.  BETA-CAROTENE,A,-VITS C,E/MINS (EYE HEALTH PO) Take  by mouth two (2) times a day.  CHOLECALCIFEROL, VITAMIN D3, (VITAMIN D3 PO) Take  by mouth daily.  fluocinoNIDE (LIDEX) 0.05 % topical cream Apply  to affected area two (2) times daily as needed for Skin Irritation.  loratadine (CLARITIN) 10 mg tablet Take 1 Tab by mouth daily as needed for Allergies.  Lancets misc 1 Package by Does Not Apply route daily. Test glucose daily and as needed Dx type 2 dm E11.42  
 magnesium hydroxide (MILK OF MAGNESIA) 2,400 mg/10 mL susp suspension Take 10 mL by mouth nightly. No current facility-administered medications on file prior to visit. CARDIOLOGY STUDIES TO DATE: 
4/16 echo normal lvef, lae, mild tr 
12/17 echo normal lvef, elisha, mild to mod mr and tr, pa pressure 50mm Chief Complaint Patient presents with  Irregular Heart Beat HPI : 
Ms. Mike Thomas is doing pretty well. She has had to have tubes in her ears and she has had cataract surgery since we last met and that is going along pretty well. She is complaining a lot of frequent urination and she thinks it is from the Lasix.   She clearly has a bladder issue as well and we tried to work through that, but again she is convinced it is the Lasix. Her edema is under good control. She has episodes of shortness of breath from time to time, but it is usually with anxiety or emotional upset, not really with activity, though she is not that mobile, except around the house. CARDIAC ROS:  
negative for chest pain, palpitations, syncope, orthopnea, paroxysmal nocturnal dyspnea, exertional chest pressure/discomfort, claudication No family history on file. Past Medical History:  
Diagnosis Date  Anxiety  Bleeding nose  Chronic atrial fibrillation ([facility]  Diabetes ([facility]  Hypertension GENERAL ROS: 
A comprehensive review of systems was negative except for that written in the HPI. Visit Vitals /58 (BP 1 Location: Right arm, BP Patient Position: Sitting) Pulse 62 Resp 17 Ht 4' 11\" (1.499 m) Wt 178 lb (80.7 kg) SpO2 97% BMI 35.95 kg/m² Wt Readings from Last 3 Encounters:  
03/22/19 178 lb (80.7 kg) 03/08/19 179 lb 9.6 oz (81.5 kg) 01/08/19 180 lb (81.6 kg) BP Readings from Last 3 Encounters:  
03/22/19 124/58  
03/08/19 122/53  
01/08/19 136/68 PHYSICAL EXAM 
General appearance: alert, cooperative, no distress, appears stated age Neurologic: Alert and oriented X 3 Neck: supple, symmetrical, trachea midline, no adenopathy, no carotid bruit and no JVD Lungs: clear to auscultation bilaterally Heart: irregularly irregular rhythm, S1, S2 normal, no S3 or S4 Extremities: edema tr support hose in place Lab Results Component Value Date/Time Cholesterol, total 146 03/21/2016 10:31 AM  
 Cholesterol, total 147 03/13/2015 02:06 PM  
 HDL Cholesterol 85 03/21/2016 10:31 AM  
 HDL Cholesterol 60 03/13/2015 02:06 PM  
 LDL, calculated 48 03/21/2016 10:31 AM  
 LDL, calculated 69 03/13/2015 02:06 PM  
 Triglyceride 64 03/21/2016 10:31 AM  
 Triglyceride 90 03/13/2015 02:06 PM  
 
ASSESSMENT Ms. Luther Josue is stable and I think asymptomatic or minimally symptomatic. I think it is reasonable to change her from 40 to 20 mg a day of Lasix, but I told her if she starts to  weight or leg edema, she needs to go back to the 40 mg. She needs no cardiac testing at this time. current treatment plan is effective, no change in therapy 
lab results and schedule of future lab studies reviewed with patient 
reviewed diet, exercise and weight control Encounter Diagnoses Name Primary?  Chronic atrial fibrillation ([facility] Yes  Essential hypertension, benign Orders Placed This Encounter  furosemide (LASIX) 20 mg tablet Follow-up and Dispositions · Return in about 6 months (around 9/22/2019). Xiomy Marquez MD3/22/2019

## 2019-03-25 RX ORDER — FUROSEMIDE 20 MG/1
TABLET ORAL
Qty: 90 TAB | Refills: 2 | Status: SHIPPED | OUTPATIENT
Start: 2019-03-25 | End: 2019-06-11 | Stop reason: SDUPTHER

## 2019-03-25 NOTE — TELEPHONE ENCOUNTER
Requested Prescriptions     Signed Prescriptions Disp Refills    furosemide (LASIX) 20 mg tablet 90 Tab 2     Sig: TAKE 1 TABLET BY MOUTH DAILY     Authorizing Provider: Chandan Peter     Ordering User: Sher Salas    Per Dr. Diego Siemens verbal orders

## 2019-04-08 ENCOUNTER — APPOINTMENT (OUTPATIENT)
Dept: CT IMAGING | Age: 84
End: 2019-04-08
Attending: EMERGENCY MEDICINE
Payer: MEDICARE

## 2019-04-08 ENCOUNTER — HOSPITAL ENCOUNTER (EMERGENCY)
Age: 84
Discharge: HOME | End: 2019-04-08
Attending: EMERGENCY MEDICINE
Payer: MEDICARE

## 2019-04-08 VITALS
BODY MASS INDEX: 36.31 KG/M2 | RESPIRATION RATE: 18 BRPM | OXYGEN SATURATION: 98 % | WEIGHT: 180.12 LBS | DIASTOLIC BLOOD PRESSURE: 62 MMHG | HEIGHT: 59 IN | TEMPERATURE: 97.7 F | SYSTOLIC BLOOD PRESSURE: 170 MMHG | HEART RATE: 54 BPM

## 2019-04-08 DIAGNOSIS — S02.2XXB OPEN FRACTURE OF NASAL BONE, INITIAL ENCOUNTER: Primary | ICD-10-CM

## 2019-04-08 PROCEDURE — 99283 EMERGENCY DEPT VISIT LOW MDM: CPT

## 2019-04-08 PROCEDURE — 70486 CT MAXILLOFACIAL W/O DYE: CPT

## 2019-04-08 PROCEDURE — 70450 CT HEAD/BRAIN W/O DYE: CPT

## 2019-04-08 RX ORDER — CLINDAMYCIN HYDROCHLORIDE 300 MG/1
300 CAPSULE ORAL 4 TIMES DAILY
Qty: 40 CAP | Refills: 0 | Status: SHIPPED | OUTPATIENT
Start: 2019-04-08 | End: 2019-05-22 | Stop reason: ALTCHOICE

## 2019-04-08 NOTE — ED PROVIDER NOTES
The history is provided by the patient and a relative. No  was used. Fall The accident occurred less than 1 hour ago. The fall occurred while walking. She fell from a height of ground level. She landed on hard floor. The volume of blood lost was minimal. The point of impact was the head. The pain is present in the head. The pain is at a severity of 0/10. The patient is experiencing no pain. She was ambulatory at the scene. There was no entrapment after the fall. There was no drug use involved in the accident. There was no alcohol use involved in the accident. Pertinent negatives include no visual change, no fever, no numbness, no abdominal pain, no bowel incontinence, no nausea, no vomiting, no hematuria, no headaches, no extremity weakness, no hearing loss, no loss of consciousness, no tingling and no laceration. The risk factors include being elderly. The symptoms are aggravated by pressure on injury. She has tried nothing for the symptoms. It is unknown when the patient last had a tetanus shot. Past Medical History:  
Diagnosis Date  Anxiety  Bleeding nose  Chronic atrial fibrillation ([address]  Diabetes ([facility]  Hypertension Past Surgical History:  
Procedure Laterality Date  HX CATARACT REMOVAL    
 HX CHOLECYSTECTOMY  HX HEENT    
 myringotomy  HX HEENT  07/2016  
 basal cell removal -forehead  HX TONSILLECTOMY childhood  HX TYMPANOSTOMY  07/11/2016  
 left ear  HX TYMPANOSTOMY    
 bilateral  
 
   
History reviewed. No pertinent family history. Social History Socioeconomic History  Marital status:  Spouse name: Not on file  Number of children: Not on file  Years of education: Not on file  Highest education level: Not on file Occupational History  Not on file Social Needs  Financial resource strain: Not on file  Food insecurity:  
  Worry: Not on file Inability: Not on file  Transportation needs:  
  Medical: Not on file Non-medical: Not on file Tobacco Use  Smoking status: Former Smoker Packs/day: 1.00 Years: 10.00 Pack years: 10.00 Last attempt to quit: 10/12/1993 Years since quittin.5  Smokeless tobacco: Never Used Substance and Sexual Activity  Alcohol use: Yes Comment: 1 glass of wine weekly  Drug use: No  
 Sexual activity: Not Currently Lifestyle  Physical activity:  
  Days per week: Not on file Minutes per session: Not on file  Stress: Not on file Relationships  Social connections:  
  Talks on phone: Not on file Gets together: Not on file Attends [employer] service: Not on file Active member of club or organization: Not on file Attends meetings of clubs or organizations: Not on file Relationship status: Not on file  Intimate partner violence:  
  Fear of current or ex partner: Not on file Emotionally abused: Not on file Physically abused: Not on file Forced sexual activity: Not on file Other Topics Concern  Not on file Social History Narrative  Not on file ALLERGIES: Amoxicillin; Aspirin; Gabapentin; Levaquin [levofloxacin]; Lorazepam; Losartan; Lyrica [pregabalin]; Other medication; and Sertraline Review of Systems Constitutional: Negative for activity change, chills and fever. HENT: Positive for facial swelling, nosebleeds and sinus pain. Negative for sore throat, trouble swallowing and voice change. Eyes: Negative for visual disturbance. Respiratory: Negative for shortness of breath. Cardiovascular: Negative for chest pain and palpitations. Gastrointestinal: Negative for abdominal pain, bowel incontinence, constipation, diarrhea, nausea and vomiting. Genitourinary: Negative for difficulty urinating, dysuria, hematuria and urgency. Musculoskeletal: Negative for back pain, extremity weakness, neck pain and neck stiffness. Skin: Negative for color change. Allergic/Immunologic: Negative for immunocompromised state. Neurological: Negative for dizziness, tingling, seizures, loss of consciousness, syncope, weakness, light-headedness, numbness and headaches. Psychiatric/Behavioral: Negative for behavioral problems, confusion, hallucinations, self-injury and suicidal ideas. Vitals:  
 04/08/19 1531 BP: 157/69 Pulse: 67 Resp: 18 Temp: 97.6 °F (36.4 °C) SpO2: 96% Weight: 81.7 kg (180 lb 1.9 oz) Height: 4' 11\" (1.499 m) Physical Exam  
Constitutional: She appears well-developed and well-nourished. No distress. HENT:  
Head: Atraumatic. Nose: Nasal deformity and septal deviation present. No nasal septal hematoma. Epistaxis is observed. No foreign bodies. Eyes: EOM are normal.  
Neck: No tracheal deviation present. Cardiovascular:  
Warm and well perfused Pulmonary/Chest: Effort normal. No respiratory distress. Musculoskeletal: Normal range of motion. Neurological: She is alert. Coordination normal.  
Skin: Skin is warm and dry. No laceration noted. She is not diaphoretic. Psychiatric: She has a normal mood and affect. Her behavior is normal. Judgment and thought content normal.  
Nursing note and vitals reviewed. MDM This is an 55-year-old female with past medical history, review of systems, physical exam as above presenting with complaints of facial pain, nose pain, secondary to ground-level fall. The patient states she tripped on an object in her house, causing her to fall forward, striking her face without loss of consciousness. The patient states she experienced a nose bleed, primarily from the left nare denies neck pain, or other symptoms. Physical exam is remarkable for well-appearing elderly female, in no acute distress, with ecchymosis, edema, deformity of the nose, with dried blood in the left naris, without hematoma.  She has clear breath sounds, soft abdomen, no C-spine tenderness to palpation. The patient states she uses low-dose aspirin every other day. Suspect nasal bone fracture, patient refusing pain medication at this time, planned to obtain CT imaging of the head and face. We will make a disposition based on the patient's diagnostics and response to therapy. Procedures 4:46 PM 
No ICH by CT, positive for nasal bone fx w/o septal hematoma, patient has f/u with ENT already scheduled. Will provide PTX ABX, recommend PCP and ENT f/u, return precautions given.

## 2019-04-08 NOTE — DISCHARGE INSTRUCTIONS
Patient Education        Broken Nose: Care Instructions  Your Care Instructions    A broken nose is a break, or fracture, of the bone or cartilage. Most broken noses need only home care and a follow-up visit with a doctor. The swelling should go down in a few days. Bruises around your eyes and nose should go away in 2 to 3 weeks. You heal best when you take good care of yourself. Eat a variety of healthy foods, and don't smoke. Follow-up care is a key part of your treatment and safety. Be sure to make and go to all appointments, and call your doctor if you are having problems. It's also a good idea to know your test results and keep a list of the medicines you take. How can you care for yourself at home? · If you have a nasal splint or packing, leave it in place until a doctor removes it. · If your doctor prescribed antibiotics, take them as directed. Do not stop taking them just because you feel better. You need to take the full course of antibiotics. · Take decongestants as directed to help you breathe after the splint or packing is removed. Your doctor may give you a prescription or suggest over-the-counter medicine. · Be safe with medicines. Take pain medicines exactly as directed. ? If the doctor gave you a prescription medicine for pain, take it as prescribed. ? If you are not taking a prescription pain medicine, ask your doctor if you can take an over-the-counter medicine. · Put ice or a cold pack on your nose for 10 to 20 minutes at a time. Try to do this every 1 to 2 hours for the first 3 days (when you are awake) or until the swelling goes down. Put a thin cloth between the ice pack and your skin. · Sleep with your head slightly raised until the swelling goes down. Prop up your head and shoulders on pillows. · Do not play contact sports for 6 weeks. When should you call for help? Call 911 anytime you think you may need emergency care.  For example, call if:    · You have trouble breathing.     · You passed out (lost consciousness).    Call your doctor now or seek immediate medical care if:    · You have signs of infection, such as:  ? Increased pain, swelling, warmth, or redness. ? Red streaks leading from the area. ? Pus draining from the area. ? A fever.     · You have clear fluid draining from your nose.     · You have vision changes.     · Your nose is bleeding.     · You have new or worse pain.    Watch closely for changes in your health, and be sure to contact your doctor if:    · You do not get better as expected. Where can you learn more? Go to http://myrna-alayna.info/. Enter Y345 in the search box to learn more about \"Broken Nose: Care Instructions. \"  Current as of: September 20, 2018  Content Version: 11.9  © 3531-9608 [employer]. Care instructions adapted under license by [employer] (which disclaims liability or warranty for this information). If you have questions about a medical condition or this instruction, always ask your healthcare professional. [place] [address] any warranty or liability for your use of this information.

## 2019-04-08 NOTE — ED TRIAGE NOTES
TRIAGE NOTE: Patient arrived from home with c/o fall. Patient was walking when she tripped and fell. Denies LOC.

## 2019-04-09 ENCOUNTER — PATIENT OUTREACH (OUTPATIENT)
Dept: INTERNAL MEDICINE CLINIC | Age: 84
End: 2019-04-09

## 2019-04-09 NOTE — PROGRESS NOTES
ED Discharge Follow-Up Date/Time:     2019 10:19 AM 
 
Patient presented to [address] [place] [address]  ED on 19 and was diagnosed with fall. Top Challenges reviewed with the provider Fractured nose, f/u with Dr Rossana Valle Method of communication with provider :none Nurse Navigator(NN) contacted the  patient  by telephone to perform post ED discharge assessment. Verified name and  with patient as identifiers. Provided introduction to self, and explanation of the Nurse Navigator role. Patient reported assessment: feels tired, plans to rest today. Denies chest pain, shortness of breath. Reports she fell face down, denies LOC. Denies headache, N/V/D. Tolerating diet. Reports she has appointment with Dr Rossana Valle, ENT, scheduled for  to fx nose. Medication(s):  
New Medications at Discharge: Cleocin Changed Medications at Discharge: N/A Discontinued Medications at Discharge: N/A Reports she increased her Lasix from 20 mg to 40 mg as of 19 LE edema. Reports she will notify Dr Cherylene Figures There were no barriers to obtaining medications identified at this time. Reviewed discharge instructions and red flags with  patient who voiced understanding. Patient given an opportunity to ask questions and does not have any further questions or concerns at this time. The patient agrees to contact the PCP office for questions related to their healthcare. Patient reminded that there are physicians on call 24 hours a day / 7 days a week (M-F 5pm to 8am and from Friday 5pm until Monday 8am for the weekend) should the patient have questions or concerns. NN provided contact information for future reference. Offered follow up appointment with PCP: yes [facility] follow up appointment(s):  
Future Appointments Date Time Provider Tristan Beck 2019  1:00 PM Radha Aranda MD [id number] [facility]  
2019  1:40 PM Chelsea Scott  [address]  
 Non-[facility] follow up appointment(s): 4/11 Dr Dorian Guzman, ENT [facility]:  information provided as a resource  
 [email or web address] 9 AM- 9 PM.   Phone [phone] Goals  No falls 04/09/19 · Pt will not fall x 30 days. · Reviewed fall prevention/safety precaution Ramila Rosenbaum RN

## 2019-04-15 ENCOUNTER — PATIENT OUTREACH (OUTPATIENT)
Dept: INTERNAL MEDICINE CLINIC | Age: 84
End: 2019-04-15

## 2019-04-15 NOTE — PROGRESS NOTES
Called patient. No answer. Received message, \"your call can not be completed as dialed, please consult your directory\". NN will continue to monitor

## 2019-04-18 ENCOUNTER — PATIENT OUTREACH (OUTPATIENT)
Dept: INTERNAL MEDICINE CLINIC | Age: 84
End: 2019-04-18

## 2019-04-18 ENCOUNTER — OFFICE VISIT (OUTPATIENT)
Dept: INTERNAL MEDICINE CLINIC | Age: 84
End: 2019-04-18

## 2019-04-18 VITALS
WEIGHT: 176 LBS | SYSTOLIC BLOOD PRESSURE: 123 MMHG | DIASTOLIC BLOOD PRESSURE: 62 MMHG | HEIGHT: 59 IN | BODY MASS INDEX: 35.48 KG/M2 | RESPIRATION RATE: 18 BRPM | OXYGEN SATURATION: 96 % | HEART RATE: 59 BPM | TEMPERATURE: 97.5 F

## 2019-04-18 DIAGNOSIS — M79.604 PAIN IN BOTH LOWER EXTREMITIES: ICD-10-CM

## 2019-04-18 DIAGNOSIS — E11.21 TYPE 2 DIABETES MELLITUS WITH NEPHROPATHY (HCC): Primary | ICD-10-CM

## 2019-04-18 DIAGNOSIS — I48.20 CHRONIC ATRIAL FIBRILLATION (HCC): ICD-10-CM

## 2019-04-18 DIAGNOSIS — M79.605 PAIN IN BOTH LOWER EXTREMITIES: ICD-10-CM

## 2019-04-18 DIAGNOSIS — I50.22 CHRONIC SYSTOLIC CONGESTIVE HEART FAILURE (HCC): ICD-10-CM

## 2019-04-18 DIAGNOSIS — I10 ESSENTIAL HYPERTENSION, BENIGN: ICD-10-CM

## 2019-04-18 DIAGNOSIS — E11.40 TYPE 2 DIABETES, CONTROLLED, WITH NEUROPATHY (HCC): ICD-10-CM

## 2019-04-18 NOTE — PROGRESS NOTES
HISTORY OF PRESENT ILLNESS  Randolph Adkins is a 80 y.o. female. HPI Subjective: Deepa Inman is seen today for follow up of hypertension, diabetes and other problems. 1. Diabetes. This has been stable, mild, and she is up to date with an A1c. Will recheck in two months. 2. Hypertension, stable on current regimen. 3. Chronic atrial fibrillation, CHF, up to date with cardiology follow up and clinically stable. Review of Systems:  Notable for a fall. She stumbled in her house and hit her face. She has some residual bruising. She went to the ER and had a negative workup there. She did break her nose and followed up with ENT. Review of Systems:  Notable for some right thumbnail paronychia. I think it is more inflammatory than infection so we will advise some cortisone cream.  Consider dermatology consultation if persistent. Current Outpatient Medications   Medication Sig    clindamycin (CLEOCIN) 300 mg capsule Take 1 Cap by mouth four (4) times daily.  furosemide (LASIX) 20 mg tablet TAKE 1 TABLET BY MOUTH DAILY    capsaicin (ZOSTRIX-HP) 0.1 % topical cream Apply  to affected area three (3) times daily as needed for Pain.  glimepiride (AMARYL) 1 mg tablet TAKE 1 TABLET BY MOUTH EVERY DAY. TAKE ONLY IF SUGAR IS ABOVE 140    clotrimazole-betamethasone (LOTRISONE) topical cream Apply  to affected area two (2) times daily as needed for Skin Irritation.  potassium chloride SR (KLOR-CON 10) 10 mEq tablet Take 2 Tabs by mouth daily. New Directions.  carvedilol (COREG) 3.125 mg tablet TAKE 1 TABLET BY MOUTH TWICE DAILY WITH MEALS    mirabegron ER (MYRBETRIQ) 50 mg ER tablet Per urology    linaclotide (LINZESS) 72 mcg cap Take  by mouth.  glucose blood VI test strips (PRECISION XTRA TEST) strip TEST BLOOD SUGAR TWICE DAILY dx code E11.2.  ferrous sulfate 325 mg (65 mg iron) tablet TAKE 1 TABLET BY MOUTH DAILY WITH BREAKFAST    aspirin delayed-release 81 mg tablet Take 1 Tab by mouth daily.  BETA-CAROTENE,A,-VITS C,E/MINS (EYE HEALTH PO) Take  by mouth two (2) times a day.  CHOLECALCIFEROL, VITAMIN D3, (VITAMIN D3 PO) Take  by mouth daily.  fluocinoNIDE (LIDEX) 0.05 % topical cream Apply  to affected area two (2) times daily as needed for Skin Irritation.  loratadine (CLARITIN) 10 mg tablet Take 1 Tab by mouth daily as needed for Allergies.  Lancets misc 1 Package by Does Not Apply route daily. Test glucose daily and as needed Dx type 2 dm E11.42    magnesium hydroxide (MILK OF MAGNESIA) 2,400 mg/10 mL susp suspension Take 10 mL by mouth nightly.  varicella-zoster recombinant, PF, (SHINGRIX, PF,) 50 mcg/0.5 mL susr injection 0.5mL by IntraMUSCular route once now and then repeat in 2-6 months     No current facility-administered medications for this visit. Review of Systems   Constitutional: Negative for weight loss. Respiratory: Negative. Cardiovascular: Positive for leg swelling. Negative for chest pain, palpitations and PND. Chronic   Musculoskeletal: Positive for falls and joint pain. Negative for myalgias. Neurological: Negative for focal weakness. Endo/Heme/Allergies: Bruises/bleeds easily. Physical Exam   Constitutional: No distress. Cardiovascular: An irregularly irregular rhythm present. Bradycardia present. Exam reveals no gallop and no friction rub. No murmur heard. Pulmonary/Chest: Effort normal and breath sounds normal.   Musculoskeletal: She exhibits edema. Trace stasis edema bilateral lower extremities. Nursing note and vitals reviewed. ASSESSMENT and PLAN  Diagnoses and all orders for this visit:    1. Type 2 diabetes mellitus with nephropathy ([address]- Follow off treatment     2. Chronic systolic congestive heart failure ([address]- Continue current regimen of prescription and / or OTC medications     3.  Chronic atrial fibrillation ([address]- Continue current regimen of prescription and / or OTC medications , See cardiologist as directed. 4. Essential hypertension, benign- Continue current regimen of prescription and / or OTC medications     5. Type 2 diabetes, controlled, with neuropathy ([facility]- Follow off treatment     6.  Pain in both lower extremities  -     REFERRAL TO PHYSICAL THERAPY

## 2019-04-22 ENCOUNTER — PATIENT OUTREACH (OUTPATIENT)
Dept: INTERNAL MEDICINE CLINIC | Age: 84
End: 2019-04-22

## 2019-04-22 NOTE — PROGRESS NOTES
Called patient. Reports she had someone with her and requested return call this afternoon NN plans to contact patient later today Goals  No falls 04/09/19 · Pt will not fall x 30 days. · Reviewed fall prevention/safety precaution Varun Aguilera RN 
 
04/18/19 · No falls · Pt voiced she had transportation issues today for this appt · She ended up asking a neighbors CNA to drive her to her appt · Daughter-in-law will pick her up · Pt interested in \"live-in\" help to assist with meal preparation, transportation to md appointments · Pt voiced she does not wish to transition to [facility], wishes to remain at home · NN offered the following resources for assistance, [employer], [employer], [employer] · Patient will follow up with the resources 04/22/19 · Patient voiced she is not interested in taking down the information for assistance · NN advised when she is ready, to contact the office · NN discussed the option to transition to [facility], patient declined at this now · She does not want to leave her house · Reports she was contacted by [address] [place] [address], outpatient therapy and someone is coming on Wednesday, April 24 
· NN will continue to follow Varun Aguilera RN

## 2019-04-26 ENCOUNTER — TELEPHONE (OUTPATIENT)
Dept: CARDIOLOGY CLINIC | Age: 84
End: 2019-04-26

## 2019-04-26 NOTE — TELEPHONE ENCOUNTER
Neha Rios called from [facility]. She was visiting patient doing PT and stated patient's HR is around 47-57, even with exercise. Reviewed patient's chart and her HR does run low, but we usually do not check HR resting and with activity. Neha Rios stated patient thinks it may be one of her meds. She only takes carvedilol 3.125 mg bid and lasix 20 mg for her heart, which are low dose. Neha Riso stated /65, patient does have edema, but not too bad. Patient tolerated walking well. Dillonus CarcamoBlanca said she does not see patient drinking much water. Advised she stay hydrated and continue to monitor. Also advised watching salt intake. Neha Rios is going to leave her pulse ox with patient over the weekend so patient can monitor HR and keep a diary of HR and activity. She will discuss sxs qualifying urgent care or call to office. Dillonus CarcamoBlanca is also going to call patient's PCP. She denied further questions or concerns at the time.

## 2019-05-09 ENCOUNTER — PATIENT OUTREACH (OUTPATIENT)
Dept: FAMILY MEDICINE CLINIC | Age: 84
End: 2019-05-09

## 2019-05-09 NOTE — PROGRESS NOTES
Patient has graduated from the Transitions of Care Coordination  program on 5/9/19. Patient's symptoms are stable at this time. Patient/family has the ability to self-manage. Care management goals have been completed at this time. No further nurse navigator follow up scheduled. Confirmed follow up appointment in June Patient conveyed understanding Goals Addressed This Visit's Progress  COMPLETED: No falls 04/09/19 · Pt will not fall x 30 days. · Reviewed fall prevention/safety precaution Stephanie Lopez RN 
 
04/18/19 · No falls · Pt voiced she had transportation issues today for this appt · She ended up asking a neighbors CNA to drive her to her appt · Daughter-in-law will pick her up · Pt interested in \"live-in\" help to assist with meal preparation, transportation to md appointments · Pt voiced she does not wish to transition to [facility], wishes to remain at home · NN offered the following resources for assistance, [employer], [employer], [employer] · Patient will follow up with the resources 04/22/19 · Patient voiced she is not interested in taking down the information for assistance · NN advised when she is ready, to contact the office · NN discussed the option to transition to snf, patient declined at this now · She does not want to leave her house · Reports she was contacted by [address] [place] [address], outpatient therapy and someone is coming on Wednesday, April 24 
· NN will continue to follow Stephanie Lopez RN 
 
05/09/19 · No falls Pt has nurse navigator's contact information for any further questions, concerns, or needs. Patients upcoming visits:   
Future Appointments Date Time Provider Tristan Beck 6/18/2019  2:20 PM Esteban Aranda MD [id number] [facility]  
9/26/2019  1:40 PM Gokul Martin  [address]

## 2019-05-22 ENCOUNTER — TELEPHONE (OUTPATIENT)
Dept: INTERNAL MEDICINE CLINIC | Age: 84
End: 2019-05-22

## 2019-05-22 ENCOUNTER — OFFICE VISIT (OUTPATIENT)
Dept: INTERNAL MEDICINE CLINIC | Age: 84
End: 2019-05-22

## 2019-05-22 ENCOUNTER — HOSPITAL ENCOUNTER (OUTPATIENT)
Dept: LAB | Age: 84
Discharge: HOME | End: 2019-05-22
Payer: MEDICARE

## 2019-05-22 VITALS
DIASTOLIC BLOOD PRESSURE: 50 MMHG | WEIGHT: 177.38 LBS | HEART RATE: 57 BPM | BODY MASS INDEX: 35.76 KG/M2 | HEIGHT: 59 IN | OXYGEN SATURATION: 97 % | RESPIRATION RATE: 18 BRPM | SYSTOLIC BLOOD PRESSURE: 120 MMHG | TEMPERATURE: 98 F

## 2019-05-22 DIAGNOSIS — R31.9 HEMATURIA, UNSPECIFIED TYPE: Primary | ICD-10-CM

## 2019-05-22 DIAGNOSIS — N30.01 ACUTE CYSTITIS WITH HEMATURIA: ICD-10-CM

## 2019-05-22 LAB
BILIRUB UR QL STRIP: NEGATIVE
GLUCOSE UR-MCNC: NEGATIVE MG/DL
KETONES P FAST UR STRIP-MCNC: NEGATIVE MG/DL
PH UR STRIP: 6.5 [PH] (ref 4.6–8)
PROT UR QL STRIP: NEGATIVE
SP GR UR STRIP: 1.01 (ref 1–1.03)
UA UROBILINOGEN AMB POC: NORMAL (ref 0.2–1)
URINALYSIS CLARITY POC: NORMAL
URINALYSIS COLOR POC: YELLOW
URINE BLOOD POC: NORMAL
URINE LEUKOCYTES POC: NORMAL
URINE NITRITES POC: NEGATIVE

## 2019-05-22 PROCEDURE — 87186 SC STD MICRODIL/AGAR DIL: CPT

## 2019-05-22 PROCEDURE — 87086 URINE CULTURE/COLONY COUNT: CPT

## 2019-05-22 PROCEDURE — 87088 URINE BACTERIA CULTURE: CPT

## 2019-05-22 PROCEDURE — 81001 URINALYSIS AUTO W/SCOPE: CPT

## 2019-05-22 RX ORDER — SULFAMETHOXAZOLE AND TRIMETHOPRIM 800; 160 MG/1; MG/1
1 TABLET ORAL 2 TIMES DAILY
Qty: 10 TAB | Refills: 0 | Status: SHIPPED | OUTPATIENT
Start: 2019-05-22 | End: 2019-05-27

## 2019-05-22 NOTE — TELEPHONE ENCOUNTER
Spoke with pt - states she started with symptoms of urinary frequency and painful urination yesterday. Also noticed trace of blood in her urine. Denies any other symptoms such as abdominal/back pain. States painful urination has stopped today. Asked if she has hx of kidney stones?  No. She has appt today at 3:50 pm. Will forward to MD.

## 2019-05-22 NOTE — PROGRESS NOTES
HISTORY OF PRESENT ILLNESS  Miguel Angel Graham is a 80 y.o. female. Bladder Infection    The history is provided by the patient. This is a new problem. The current episode started yesterday. The problem occurs every urination. The problem has been gradually improving. The quality of the pain is described as burning. The pain is moderate. There has been no fever. She is not sexually active. Associated symptoms include chills, frequency, hematuria and urgency. Pertinent negatives include no nausea, no vomiting, no flank pain and no back pain. She has tried increased fluids for the symptoms. Her past medical history does not include kidney stones. Review of Systems   Constitutional: Positive for chills. Negative for fever and weight loss. Respiratory: Negative. Cardiovascular: Negative for chest pain, palpitations, leg swelling and PND. Gastrointestinal: Negative for nausea and vomiting. Genitourinary: Positive for frequency, hematuria and urgency. Negative for flank pain. Musculoskeletal: Negative for back pain and myalgias. Neurological: Negative for focal weakness. Physical Exam   Constitutional: No distress. Cardiovascular: Normal rate. An irregularly irregular rhythm present. Exam reveals no gallop and no friction rub. No murmur heard. Pulmonary/Chest: Effort normal and breath sounds normal.   Abdominal: There is no CVA tenderness. Nursing note and vitals reviewed. ASSESSMENT and PLAN  Diagnoses and all orders for this visit:    1. Hematuria, unspecified type  -     AMB POC URINALYSIS DIP STICK AUTO W/O MICRO  -     CULTURE, URINE  -     URINALYSIS W/ RFLX MICROSCOPIC  -     trimethoprim-sulfamethoxazole (BACTRIM DS, SEPTRA DS) 160-800 mg per tablet; Take 1 Tab by mouth two (2) times a day for 5 days. Indications: Bacterial Urinary Tract Infection    2. Acute cystitis with hematuria  -     trimethoprim-sulfamethoxazole (BACTRIM DS, SEPTRA DS) 160-800 mg per tablet;  Take 1 Tab by mouth two (2) times a day for 5 days.  Indications: Bacterial Urinary Tract Infection

## 2019-05-22 NOTE — TELEPHONE ENCOUNTER
----- Message from Soco [place] sent at 5/22/2019  8:21 AM EDT -----  Regarding: acs    callback/\"urgent\"  Emy Hicks scheduled this patient for 3:50 this afternoon, but patient must have called right back and left msg with the call center to have a nurse call her.    ----- Message -----  From: Joaquin Lomax: 5/22/2019   8:04 AM  To: [facility]  Subject: Dr. Nathaly Cesar                         Pt would like the nurse to give her a call back about her health and said it was urgent. .(did not state the matter).  Best Contact: [phone]

## 2019-05-22 NOTE — PATIENT INSTRUCTIONS
Urinary Tract Infection in Women: Care Instructions  Your Care Instructions    A urinary tract infection, or UTI, is a general term for an infection anywhere between the kidneys and the urethra (where urine comes out). Most UTIs are bladder infections. They often cause pain or burning when you urinate. UTIs are caused by bacteria and can be cured with antibiotics. Be sure to complete your treatment so that the infection goes away. Follow-up care is a key part of your treatment and safety. Be sure to make and go to all appointments, and call your doctor if you are having problems. It's also a good idea to know your test results and keep a list of the medicines you take. How can you care for yourself at home? · Take your antibiotics as directed. Do not stop taking them just because you feel better. You need to take the full course of antibiotics. · Drink extra water and other fluids for the next day or two. This may help wash out the bacteria that are causing the infection. (If you have kidney, heart, or liver disease and have to limit fluids, talk with your doctor before you increase your fluid intake.)  · Avoid drinks that are carbonated or have caffeine. They can irritate the bladder. · Urinate often. Try to empty your bladder each time. · To relieve pain, take a hot bath or lay a heating pad set on low over your lower belly or genital area. Never go to sleep with a heating pad in place. To prevent UTIs  · Drink plenty of water each day. This helps you urinate often, which clears bacteria from your system. (If you have kidney, heart, or liver disease and have to limit fluids, talk with your doctor before you increase your fluid intake.)  · Urinate when you need to. · Urinate right after you have sex. · Change sanitary pads often. · Avoid douches, bubble baths, feminine hygiene sprays, and other feminine hygiene products that have deodorants.   · After going to the bathroom, wipe from front to back.  When should you call for help? Call your doctor now or seek immediate medical care if:    · Symptoms such as fever, chills, nausea, or vomiting get worse or appear for the first time.     · You have new pain in your back just below your rib cage. This is called flank pain.     · There is new blood or pus in your urine.     · You have any problems with your antibiotic medicine.    Watch closely for changes in your health, and be sure to contact your doctor if:    · You are not getting better after taking an antibiotic for 2 days.     · Your symptoms go away but then come back. Where can you learn more? Go to http://myrna-alayna.info/. Enter M199 in the search box to learn more about \"Urinary Tract Infection in Women: Care Instructions. \"  Current as of: March 20, 2018  Content Version: 11.9  © 2305-1778 [employer]. Care instructions adapted under license by [employer] (which disclaims liability or warranty for this information). If you have questions about a medical condition or this instruction, always ask your healthcare professional. [address] any warranty or liability for your use of this information.

## 2019-05-23 LAB
APPEARANCE UR: CLEAR
BACTERIA #/AREA URNS HPF: ABNORMAL /[HPF]
BILIRUB UR QL STRIP: NEGATIVE
CASTS URNS QL MICRO: ABNORMAL /LPF
COLOR UR: YELLOW
EPI CELLS #/AREA URNS HPF: ABNORMAL /HPF (ref 0–10)
GLUCOSE UR QL: NEGATIVE
HGB UR QL STRIP: ABNORMAL
KETONES UR QL STRIP: NEGATIVE
LEUKOCYTE ESTERASE UR QL STRIP: ABNORMAL
MICRO URNS: ABNORMAL
MUCOUS THREADS URNS QL MICRO: PRESENT
NITRITE UR QL STRIP: NEGATIVE
PH UR STRIP: 6.5 [PH] (ref 5–7.5)
PROT UR QL STRIP: NEGATIVE
RBC #/AREA URNS HPF: ABNORMAL /HPF (ref 0–2)
SP GR UR: 1.01 (ref 1–1.03)
UROBILINOGEN UR STRIP-MCNC: 0.2 MG/DL (ref 0.2–1)
WBC #/AREA URNS HPF: ABNORMAL /HPF (ref 0–5)

## 2019-05-24 LAB — BACTERIA UR CULT: ABNORMAL

## 2019-05-31 ENCOUNTER — TELEPHONE (OUTPATIENT)
Dept: CARDIOLOGY CLINIC | Age: 84
End: 2019-05-31

## 2019-05-31 NOTE — TELEPHONE ENCOUNTER
Patient stated she feels very ill and light headed and she thinks its due to her medication   Phone: [phone]

## 2019-05-31 NOTE — TELEPHONE ENCOUNTER
appt scheduled 6/11 for f/u med evaluation as pt. Reports she feels her medication is still too strong for her. Pt. Reports feeling fine at time of call. Pt. Instructed to call back with any concerns or issues & if necessary go to ER.  She verbalized her understanding

## 2019-06-10 ENCOUNTER — TELEPHONE (OUTPATIENT)
Dept: INTERNAL MEDICINE CLINIC | Age: 84
End: 2019-06-10

## 2019-06-10 NOTE — TELEPHONE ENCOUNTER
Spoke with pt - states she needs her furosemide sent in as 40 mg not 20 mg. Advised her Dr Tawnya Caban filled last time. She will have to call his office. She has aoot with him tomorrow. Will ask him then.

## 2019-06-10 NOTE — TELEPHONE ENCOUNTER
Yosvany Almendarez ([facility]) [phone] (H)     Pt is requesting a call back regarding her furosemide rx.

## 2019-06-11 ENCOUNTER — OFFICE VISIT (OUTPATIENT)
Dept: CARDIOLOGY CLINIC | Age: 84
End: 2019-06-11

## 2019-06-11 VITALS
BODY MASS INDEX: 35.88 KG/M2 | SYSTOLIC BLOOD PRESSURE: 132 MMHG | DIASTOLIC BLOOD PRESSURE: 64 MMHG | RESPIRATION RATE: 16 BRPM | HEART RATE: 51 BPM | HEIGHT: 59 IN | WEIGHT: 178 LBS | OXYGEN SATURATION: 97 %

## 2019-06-11 DIAGNOSIS — I48.20 CHRONIC ATRIAL FIBRILLATION (HCC): Primary | ICD-10-CM

## 2019-06-11 DIAGNOSIS — I10 ESSENTIAL HYPERTENSION, BENIGN: ICD-10-CM

## 2019-06-11 DIAGNOSIS — R60.0 LOCALIZED EDEMA: ICD-10-CM

## 2019-06-11 RX ORDER — FUROSEMIDE 40 MG/1
TABLET ORAL
Qty: 90 TAB | Refills: 3 | Status: SHIPPED | OUTPATIENT
Start: 2019-06-11 | End: 2019-06-18 | Stop reason: SDUPTHER

## 2019-06-11 NOTE — PROGRESS NOTES
HISTORY OF PRESENT ILLNESS  Esther Meier is a 80 y.o. female     SUMMARY:   Problem List  Date Reviewed: 6/11/2019          Codes Class Noted    Severe obesity (BMI 35.0-39. 9) with comorbidity ([facility] ICD-10-CM: E66.01  ICD-9-CM: 278.01  4/25/2018        Type 2 diabetes mellitus with nephropathy ([facility] ICD-10-CM: E11.21  ICD-9-CM: 250.40, 583.81  12/19/2017        CHF (congestive heart failure) ([facility] ICD-10-CM: I50.9  ICD-9-CM: 428.0  12/6/2017        Anxiety ICD-10-CM: F41.9  ICD-9-CM: 300.00  10/2/2017        Allergic rhinitis ICD-10-CM: J30.9  ICD-9-CM: 477.9  4/11/2016        Chronic atrial fibrillation ([facility] ICD-10-CM: I48.2  ICD-9-CM: 427.31  4/8/2016        Advanced directives, counseling/discussion ICD-10-CM: Z71.89  ICD-9-CM: V65.49  3/15/2016        Type 2 diabetes, controlled, with neuropathy ([facility] ICD-10-CM: E11.40  ICD-9-CM: 250.60, 357.2  1/4/2016        Essential hypertension, benign ICD-10-CM: I10  ICD-9-CM: 401.1  6/4/2015        Edema ICD-10-CM: R60.9  ICD-9-CM: 782.3  11/12/2014    Overview Addendum 12/14/2017  8:24 AM by Kam Enriquez MD     4/16 echo normal lvef, lae, mild tr  12/17 echo normal lvef, elisha, mild to mod mr and tr, pa pressure 50mm             Headache(784.0) ICD-10-CM: R51  ICD-9-CM: 784.0  11/12/2014        Diverticulosis of colon (without mention of hemorrhage) ICD-10-CM: K57.30  ICD-9-CM: 562.10  11/12/2014        Constipation ICD-10-CM: K59.00  ICD-9-CM: 564.00  11/12/2014        Cyst of left kidney ICD-10-CM: N28.1  ICD-9-CM: 753.10  11/12/2014              Current Outpatient Medications on File Prior to Visit   Medication Sig    glimepiride (AMARYL) 1 mg tablet TAKE 1 TABLET BY MOUTH EVERY DAY. TAKE ONLY IF SUGAR IS ABOVE 140 (Patient taking differently: TAKE 1 TABLET BY MOUTH EVERY DAY.  TAKE ONLY IF SUGAR IS ABOVE 140 Patient reports taking as needed)    clotrimazole-betamethasone (LOTRISONE) topical cream Apply  to affected area two (2) times daily as needed for Skin Irritation.  potassium chloride SR (KLOR-CON 10) 10 mEq tablet Take 2 Tabs by mouth daily. New Directions.  carvedilol (COREG) 3.125 mg tablet TAKE 1 TABLET BY MOUTH TWICE DAILY WITH MEALS    mirabegron ER (MYRBETRIQ) 50 mg ER tablet Per urology    linaclotide (LINZESS) 72 mcg cap Take  by mouth.  glucose blood VI test strips (PRECISION XTRA TEST) strip TEST BLOOD SUGAR TWICE DAILY dx code E11.2.  ferrous sulfate 325 mg (65 mg iron) tablet TAKE 1 TABLET BY MOUTH DAILY WITH BREAKFAST    aspirin delayed-release 81 mg tablet Take 1 Tab by mouth daily. (Patient taking differently: Take 81 mg by mouth every other day.)    BETA-CAROTENE,A,-VITS C,E/MINS (EYE HEALTH PO) Take  by mouth two (2) times a day.  CHOLECALCIFEROL, VITAMIN D3, (VITAMIN D3 PO) Take  by mouth daily.  loratadine (CLARITIN) 10 mg tablet Take 1 Tab by mouth daily as needed for Allergies.  Lancets misc 1 Package by Does Not Apply route daily. Test glucose daily and as needed Dx type 2 dm E11.42    magnesium hydroxide (MILK OF MAGNESIA) 2,400 mg/10 mL susp suspension Take 10 mL by mouth nightly. No current facility-administered medications on file prior to visit. CARDIOLOGY STUDIES TO DATE:  4/16 echo normal lvef, lae, mild tr  12/17 echo normal lvef, elisha, mild to mod mr and tr, pa pressure 50mm    Chief Complaint   Patient presents with    Irregular Heart Beat     HPI :  When we last met, Ms. Almendarez was concerned about frequent urination, so we agreed to let her cut her Lasix dose back to 20 mg a day. Within four or five days, her legs were swelling so much that she decided to go back to 40 and she has been on that since. She still gets short of breath when she gets anxious or upset. This is an old problem and nothing new. She has had a couple of episodes where she has been sitting and suddenly just felt completely fatigued and like she could not even move.   Interestingly, her heart rate today is in the 50s and the last time we met it was in the 60s. She is still in atrial fibrillation on exam.       CARDIAC ROS:   negative for chest pain, palpitations, syncope, orthopnea, paroxysmal nocturnal dyspnea, exertional chest pressure/discomfort, claudication    History reviewed. No pertinent family history. Past Medical History:   Diagnosis Date    Anxiety     Bleeding nose     Chronic atrial fibrillation (HCC)     Diabetes ([address]     Hypertension        GENERAL ROS:  A comprehensive review of systems was negative except for that written in the HPI. Visit Vitals  /64 (BP 1 Location: Left arm, BP Patient Position: Sitting)   Pulse (!) 51   Resp 16   Ht 4' 11\" (1.499 m)   Wt 178 lb (80.7 kg)   SpO2 97%   BMI 35.95 kg/m²       Wt Readings from Last 3 Encounters:   06/11/19 178 lb (80.7 kg)   05/22/19 177 lb 6 oz (80.5 kg)   04/18/19 176 lb (79.8 kg)            BP Readings from Last 3 Encounters:   06/11/19 132/64   05/22/19 120/50   04/18/19 123/62       PHYSICAL EXAM  General appearance: alert, cooperative, no distress, appears stated age  Neurologic: Alert and oriented X 3  Neck: supple, symmetrical, trachea midline, no adenopathy, no carotid bruit and no JVD  Lungs: clear to auscultation bilaterally  Heart: irregularly irregular rhythm, S1, S2 normal, no S3 or S4  Extremities: edema tr    Lab Results   Component Value Date/Time    Cholesterol, total 146 03/21/2016 10:31 AM    Cholesterol, total 147 03/13/2015 02:06 PM    HDL Cholesterol 85 03/21/2016 10:31 AM    HDL Cholesterol 60 03/13/2015 02:06 PM    LDL, calculated 48 03/21/2016 10:31 AM    LDL, calculated 69 03/13/2015 02:06 PM    Triglyceride 64 03/21/2016 10:31 AM    Triglyceride 90 03/13/2015 02:06 PM     ASSESSMENT  I am not sure what caused Ms. Almendarez's symptoms, but given her slow heart rate, it is time to completely stop the Carvedilol. I reassured her this was completely safe.   It is possible she may have developed sick sinus and is heading for a pacemaker, so we briefly talked about that possibility as well. current treatment plan is effective, no change in therapy  lab results and schedule of future lab studies reviewed with patient  reviewed diet, exercise and weight control    Encounter Diagnoses   Name Primary?  Chronic atrial fibrillation (HCC) Yes    Essential hypertension, benign     Localized edema      Orders Placed This Encounter    furosemide (LASIX) 40 mg tablet       Follow-up and Dispositions    · Return in about 2 weeks (around 6/25/2019).          Sb Salazar MD  6/11/2019

## 2019-06-11 NOTE — PROGRESS NOTES
1. Have you been to the ER, urgent care clinic since your last visit? Hospitalized since your last visit? No    2. Have you seen or consulted any other health care providers outside of the [address] [place] [address] since your last visit? Include any pap smears or colon screening. No     81 y/o female medication making her feel fatigue.    Patient reports medicatoin refill Lasix 40mg      Visit Vitals  /64 (BP 1 Location: Left arm, BP Patient Position: Sitting)   Pulse (!) 51   Resp 16   Ht 4' 11\" (1.499 m)   Wt 178 lb (80.7 kg)   SpO2 97%   BMI 35.95 kg/m²

## 2019-06-12 ENCOUNTER — TELEPHONE (OUTPATIENT)
Dept: INTERNAL MEDICINE CLINIC | Age: 84
End: 2019-06-12

## 2019-06-12 NOTE — TELEPHONE ENCOUNTER
Pt states she was able to clear up the script with [facility]. Problem solved.   Pt - [phone]

## 2019-06-18 ENCOUNTER — OFFICE VISIT (OUTPATIENT)
Dept: INTERNAL MEDICINE CLINIC | Age: 84
End: 2019-06-18

## 2019-06-18 VITALS
TEMPERATURE: 97.9 F | RESPIRATION RATE: 16 BRPM | BODY MASS INDEX: 35.28 KG/M2 | SYSTOLIC BLOOD PRESSURE: 145 MMHG | DIASTOLIC BLOOD PRESSURE: 56 MMHG | HEART RATE: 68 BPM | HEIGHT: 59 IN | WEIGHT: 175 LBS | OXYGEN SATURATION: 97 %

## 2019-06-18 DIAGNOSIS — I48.20 CHRONIC ATRIAL FIBRILLATION (HCC): ICD-10-CM

## 2019-06-18 DIAGNOSIS — E11.21 TYPE 2 DIABETES MELLITUS WITH NEPHROPATHY (HCC): Primary | ICD-10-CM

## 2019-06-18 DIAGNOSIS — I10 ESSENTIAL HYPERTENSION, BENIGN: ICD-10-CM

## 2019-06-18 DIAGNOSIS — I50.22 CHRONIC SYSTOLIC CONGESTIVE HEART FAILURE (HCC): ICD-10-CM

## 2019-06-18 RX ORDER — FUROSEMIDE 40 MG/1
TABLET ORAL
Qty: 10 TAB | Refills: 3 | Status: SHIPPED | OUTPATIENT
Start: 2019-06-18 | End: 2020-06-11

## 2019-06-18 NOTE — PROGRESS NOTES
Chief Complaint   Patient presents with    Diabetes     1. Have you been to the ER, urgent care clinic since your last visit? Hospitalized since your last visit? No    2. Have you seen or consulted any other health care providers outside of the [address] [place] [address] since your last visit? Include any pap smears or colon screening.  No

## 2019-06-18 NOTE — PROGRESS NOTES
HISTORY OF PRESENT ILLNESS  Chuck Villavicencio is a 80 y.o. female. HPI Subjective: Tobi Him is seen today for follow up of diabetes and other problems. 1. Diabetes. Fingerstick readings look good. A1c is up to date. 2. Hypertension, fair initially. No change on recheck. Her apical heart rate is 68. I do not feel she needs aggressive BP control. Will check her BP in two months. 3. CHF. She is off Coreg per cardiology. This was stopped by Dr. Karla Nash secondary to weakness and she does feel better without the medication. 4. Recent UTI, resolved symptoms. Current Outpatient Medications   Medication Sig    furosemide (LASIX) 40 mg tablet TAKE 1 TABLET BY MOUTH DAILY    glimepiride (AMARYL) 1 mg tablet TAKE 1 TABLET BY MOUTH EVERY DAY. TAKE ONLY IF SUGAR IS ABOVE 140 (Patient taking differently: TAKE 1 TABLET BY MOUTH EVERY DAY. TAKE ONLY IF SUGAR IS ABOVE 140 Patient reports taking as needed)    clotrimazole-betamethasone (LOTRISONE) topical cream Apply  to affected area two (2) times daily as needed for Skin Irritation.  potassium chloride SR (KLOR-CON 10) 10 mEq tablet Take 2 Tabs by mouth daily. New Directions.  mirabegron ER (MYRBETRIQ) 50 mg ER tablet Per urology    linaclotide (LINZESS) 72 mcg cap Take  by mouth.  glucose blood VI test strips (PRECISION XTRA TEST) strip TEST BLOOD SUGAR TWICE DAILY dx code E11.2.  ferrous sulfate 325 mg (65 mg iron) tablet TAKE 1 TABLET BY MOUTH DAILY WITH BREAKFAST    aspirin delayed-release 81 mg tablet Take 1 Tab by mouth daily. (Patient taking differently: Take 81 mg by mouth every other day.)    BETA-CAROTENE,A,-VITS C,E/MINS (EYE HEALTH PO) Take  by mouth two (2) times a day.  CHOLECALCIFEROL, VITAMIN D3, (VITAMIN D3 PO) Take  by mouth daily.  loratadine (CLARITIN) 10 mg tablet Take 1 Tab by mouth daily as needed for Allergies.  Lancets misc 1 Package by Does Not Apply route daily.  Test glucose daily and as needed Dx type 2 dm E11.42    magnesium hydroxide (MILK OF MAGNESIA) 2,400 mg/10 mL susp suspension Take 10 mL by mouth nightly. No current facility-administered medications for this visit. Review of Systems   Constitutional: Negative for chills and fever. Cardiovascular: Positive for leg swelling. Neurological: Positive for weakness. Physical Exam   Constitutional: No distress. Cardiovascular: Normal rate. An irregularly irregular rhythm present. Exam reveals no gallop and no friction rub. No murmur heard. Pulmonary/Chest: Effort normal and breath sounds normal.   Musculoskeletal: She exhibits edema. Trace stasis edema bilateral lower extremities. Neurological: She is alert. Skin: Skin is warm and dry. Psychiatric: She has a normal mood and affect. Her behavior is normal.   Nursing note and vitals reviewed. ASSESSMENT and PLAN  Diagnoses and all orders for this visit:    1. Type 2 diabetes mellitus with nephropathy ([facility]- Follow off treatment     2. Chronic systolic congestive heart failure [facility])- See cardiologist as directed.      3. Chronic atrial fibrillation (HCC)  -     furosemide (LASIX) 40 mg tablet; TAKE 1 TABLET BY MOUTH DAILY    4. Essential hypertension, benign  -     furosemide (LASIX) 40 mg tablet; TAKE 1 TABLET BY MOUTH DAILY

## 2019-06-25 ENCOUNTER — OFFICE VISIT (OUTPATIENT)
Dept: CARDIOLOGY CLINIC | Age: 84
End: 2019-06-25

## 2019-06-25 VITALS
BODY MASS INDEX: 35.32 KG/M2 | OXYGEN SATURATION: 98 % | SYSTOLIC BLOOD PRESSURE: 126 MMHG | HEIGHT: 59 IN | WEIGHT: 175.2 LBS | RESPIRATION RATE: 16 BRPM | DIASTOLIC BLOOD PRESSURE: 58 MMHG | HEART RATE: 70 BPM

## 2019-06-25 DIAGNOSIS — I10 ESSENTIAL HYPERTENSION, BENIGN: ICD-10-CM

## 2019-06-25 DIAGNOSIS — F41.9 ANXIETY: ICD-10-CM

## 2019-06-25 DIAGNOSIS — I48.20 CHRONIC ATRIAL FIBRILLATION (HCC): Primary | ICD-10-CM

## 2019-06-25 DIAGNOSIS — R06.02 SOB (SHORTNESS OF BREATH) ON EXERTION: ICD-10-CM

## 2019-06-25 NOTE — PROGRESS NOTES
1. Have you been to the ER, urgent care clinic since your last visit? Hospitalized since your last visit? No    2. Have you seen or consulted any other health care providers outside of the [address] [place] [address] since your last visit? Include any pap smears or colon screening. No    3) Do you have an Advance Directive on file?  no

## 2019-06-25 NOTE — PROGRESS NOTES
HISTORY OF PRESENT ILLNESS  Esther Meier is a 80 y.o. female     SUMMARY:   Problem List  Date Reviewed: 6/25/2019          Codes Class Noted    Severe obesity (BMI 35.0-39. 9) with comorbidity ([facility] ICD-10-CM: E66.01  ICD-9-CM: 278.01  4/25/2018        Type 2 diabetes mellitus with nephropathy ([facility] ICD-10-CM: E11.21  ICD-9-CM: 250.40, 583.81  12/19/2017        CHF (congestive heart failure) ([facility] ICD-10-CM: I50.9  ICD-9-CM: 428.0  12/6/2017        Anxiety ICD-10-CM: F41.9  ICD-9-CM: 300.00  10/2/2017        Allergic rhinitis ICD-10-CM: J30.9  ICD-9-CM: 477.9  4/11/2016        Chronic atrial fibrillation ([facility] ICD-10-CM: I48.2  ICD-9-CM: 427.31  4/8/2016        Advanced directives, counseling/discussion ICD-10-CM: Z71.89  ICD-9-CM: V65.49  3/15/2016        Type 2 diabetes, controlled, with neuropathy ([facility] ICD-10-CM: E11.40  ICD-9-CM: 250.60, 357.2  1/4/2016        Essential hypertension, benign ICD-10-CM: I10  ICD-9-CM: 401.1  6/4/2015        Edema ICD-10-CM: R60.9  ICD-9-CM: 782.3  11/12/2014    Overview Addendum 12/14/2017  8:24 AM by Kam Enriquez MD     4/16 echo normal lvef, lae, mild tr  12/17 echo normal lvef, elisha, mild to mod mr and tr, pa pressure 50mm             Headache(784.0) ICD-10-CM: R51  ICD-9-CM: 784.0  11/12/2014        Diverticulosis of colon (without mention of hemorrhage) ICD-10-CM: K57.30  ICD-9-CM: 562.10  11/12/2014        Constipation ICD-10-CM: K59.00  ICD-9-CM: 564.00  11/12/2014        Cyst of left kidney ICD-10-CM: N28.1  ICD-9-CM: 753.10  11/12/2014              Current Outpatient Medications on File Prior to Visit   Medication Sig    furosemide (LASIX) 40 mg tablet TAKE 1 TABLET BY MOUTH DAILY    glimepiride (AMARYL) 1 mg tablet TAKE 1 TABLET BY MOUTH EVERY DAY. TAKE ONLY IF SUGAR IS ABOVE 140 (Patient taking differently: TAKE 1 TABLET BY MOUTH EVERY DAY.  TAKE ONLY IF SUGAR IS ABOVE 140 Patient reports taking as needed)    clotrimazole-betamethasone (LOTRISONE) topical cream Apply  to affected area two (2) times daily as needed for Skin Irritation.  potassium chloride SR (KLOR-CON 10) 10 mEq tablet Take 2 Tabs by mouth daily. New Directions.  mirabegron ER (MYRBETRIQ) 50 mg ER tablet Per urology    linaclotide (LINZESS) 72 mcg cap Take  by mouth.  glucose blood VI test strips (PRECISION XTRA TEST) strip TEST BLOOD SUGAR TWICE DAILY dx code E11.2.  ferrous sulfate 325 mg (65 mg iron) tablet TAKE 1 TABLET BY MOUTH DAILY WITH BREAKFAST    aspirin delayed-release 81 mg tablet Take 1 Tab by mouth daily. (Patient taking differently: Take 81 mg by mouth every other day.)    BETA-CAROTENE,A,-VITS C,E/MINS (EYE HEALTH PO) Take  by mouth two (2) times a day.  CHOLECALCIFEROL, VITAMIN D3, (VITAMIN D3 PO) Take  by mouth daily.  loratadine (CLARITIN) 10 mg tablet Take 1 Tab by mouth daily as needed for Allergies.  Lancets misc 1 Package by Does Not Apply route daily. Test glucose daily and as needed Dx type 2 dm E11.42    magnesium hydroxide (MILK OF MAGNESIA) 2,400 mg/10 mL susp suspension Take 10 mL by mouth nightly. No current facility-administered medications on file prior to visit. CARDIOLOGY STUDIES TO DATE:  4/16 echo normal lvef, lae, mild tr  12/17 echo normal lvef, elisha, mild to mod mr and tr, pa pressure 50mm    Chief Complaint   Patient presents with    Irregular Heart Beat     HPI :  Ms. Storm Rodriguez has felt much better off Coreg and her heart rate is still reasonably controlled. She still gets short of breath when she gets upset or anxious and her leg edema is under good control. CARDIAC ROS:   negative for chest pain, palpitations, syncope, orthopnea, paroxysmal nocturnal dyspnea, exertional chest pressure/discomfort, claudication    No family history on file.     Past Medical History:   Diagnosis Date    Anxiety     Bleeding nose     Chronic atrial fibrillation (HCC)     [facility]     Hypertension        GENERAL ROS:  A comprehensive review of systems was negative except for that written in the HPI. Visit Vitals  /58 (BP 1 Location: Right arm, BP Patient Position: Sitting)   Pulse 70   Resp 16   Ht 4' 11\" (1.499 m)   Wt 175 lb 3.2 oz (79.5 kg)   SpO2 98%   BMI 35.39 kg/m²       Wt Readings from Last 3 Encounters:   06/25/19 175 lb 3.2 oz (79.5 kg)   06/18/19 175 lb (79.4 kg)   06/11/19 178 lb (80.7 kg)            BP Readings from Last 3 Encounters:   06/25/19 126/58   06/18/19 145/56   06/11/19 132/64       PHYSICAL EXAM  General appearance: alert, cooperative, no distress, appears stated age  Neurologic: Alert and oriented X 3  Neck: supple, symmetrical, trachea midline, no adenopathy, no carotid bruit and no JVD  Lungs: clear to auscultation bilaterally  Heart: irregularly irregular rhythm, S1, S2 normal, no S3 or S4  Extremities: edema tr    Lab Results   Component Value Date/Time    Cholesterol, total 146 03/21/2016 10:31 AM    Cholesterol, total 147 03/13/2015 02:06 PM    HDL Cholesterol 85 03/21/2016 10:31 AM    HDL Cholesterol 60 03/13/2015 02:06 PM    LDL, calculated 48 03/21/2016 10:31 AM    LDL, calculated 69 03/13/2015 02:06 PM    Triglyceride 64 03/21/2016 10:31 AM    Triglyceride 90 03/13/2015 02:06 PM     ASSESSMENT  Ms. Almendarez is stable and asymptomatic I think from a cardiac standpoint and as long as she does not develop any rapid atrial fibrillation, I doubt she will end up needing a pacemaker. current treatment plan is effective, no change in therapy  lab results and schedule of future lab studies reviewed with patient  reviewed diet, exercise and weight control    Encounter Diagnoses   Name Primary?  Chronic atrial fibrillation (HCC) Yes    SOB (shortness of breath) on exertion     Anxiety     Essential hypertension, benign      No orders of the defined types were placed in this encounter. Follow-up and Dispositions    · Return in about 3 months (around 9/25/2019).          Selene Cordero Nirmala Padilla MD  6/25/2019

## 2019-07-10 ENCOUNTER — TELEPHONE (OUTPATIENT)
Dept: INTERNAL MEDICINE CLINIC | Age: 84
End: 2019-07-10

## 2019-08-19 ENCOUNTER — OFFICE VISIT (OUTPATIENT)
Dept: INTERNAL MEDICINE CLINIC | Age: 84
End: 2019-08-19

## 2019-08-19 ENCOUNTER — HOSPITAL ENCOUNTER (OUTPATIENT)
Dept: LAB | Age: 84
Discharge: HOME | End: 2019-08-19
Payer: MEDICARE

## 2019-08-19 VITALS
DIASTOLIC BLOOD PRESSURE: 74 MMHG | OXYGEN SATURATION: 96 % | SYSTOLIC BLOOD PRESSURE: 118 MMHG | HEART RATE: 61 BPM | HEIGHT: 59 IN | RESPIRATION RATE: 15 BRPM | BODY MASS INDEX: 34.84 KG/M2 | WEIGHT: 172.8 LBS | TEMPERATURE: 97.3 F

## 2019-08-19 DIAGNOSIS — R09.81 CHRONIC NASAL CONGESTION: ICD-10-CM

## 2019-08-19 DIAGNOSIS — I48.20 CHRONIC ATRIAL FIBRILLATION (HCC): ICD-10-CM

## 2019-08-19 DIAGNOSIS — I50.22 CHRONIC SYSTOLIC CONGESTIVE HEART FAILURE (HCC): ICD-10-CM

## 2019-08-19 DIAGNOSIS — I10 ESSENTIAL HYPERTENSION, BENIGN: ICD-10-CM

## 2019-08-19 DIAGNOSIS — E11.21 TYPE 2 DIABETES MELLITUS WITH NEPHROPATHY (HCC): Primary | ICD-10-CM

## 2019-08-19 PROCEDURE — 80048 BASIC METABOLIC PNL TOTAL CA: CPT

## 2019-08-19 PROCEDURE — 36415 COLL VENOUS BLD VENIPUNCTURE: CPT

## 2019-08-19 PROCEDURE — 85025 COMPLETE CBC W/AUTO DIFF WBC: CPT

## 2019-08-19 PROCEDURE — 83036 HEMOGLOBIN GLYCOSYLATED A1C: CPT

## 2019-08-19 RX ORDER — MONTELUKAST SODIUM 10 MG/1
10 TABLET ORAL DAILY
Qty: 30 TAB | Refills: 11 | Status: SHIPPED | OUTPATIENT
Start: 2019-08-19 | End: 2019-09-26

## 2019-08-19 RX ORDER — MONTELUKAST SODIUM 10 MG/1
10 TABLET ORAL DAILY
Qty: 30 TAB | Refills: 11 | Status: SHIPPED | OUTPATIENT
Start: 2019-08-19 | End: 2019-08-19 | Stop reason: SDUPTHER

## 2019-08-19 NOTE — PROGRESS NOTES
Identified pt with two pt identifiers(name and ). Reviewed record in preparation for visit and have obtained necessary documentation. Chief Complaint   Patient presents with    Diabetes     2 month follow up    Hypertension    Irregular Heart Beat      Visit Vitals  /74 (BP 1 Location: Right arm, BP Patient Position: Sitting)   Pulse 61   Temp 97.3 °F (36.3 °C) (Oral)   Resp 15   Ht 4' 11\" (1.499 m)   Wt 172 lb 12.8 oz (78.4 kg)   SpO2 96%   BMI 34.90 kg/m²       Pain Scale: 0 - No pain/10  Pain Location:       Health Maintenance Due   Topic    Influenza Age 5 to Adult     GLAUCOMA SCREENING Q2Y     EYE EXAM RETINAL OR DILATED     HEMOGLOBIN A1C Q6M        Coordination of Care Questionnaire:  :   1) Have you been to an emergency room, urgent care, or hospitalized since your last visit? If yes, where when, and reason for visit? no       2. Have seen or consulted any other health care provider since your last visit? If yes, where when, and reason for visit? NO      3) Do you have an Advanced Directive/ Living Will in place? YES  If yes, do we have a copy on file YES  If no, would you like information NO    Patient is accompanied by sister I have received verbal consent from [id number] to discuss any/all medical information while they are present in the room.

## 2019-08-19 NOTE — PROGRESS NOTES
HISTORY OF PRESENT ILLNESS  Eliza Faustin is a 80 y.o. female. HPI Subjective: [facility] [place] [facility] is seen today for follow up of hypertension and other problems. 1. Hypertension, stable on current regimen. 2. Diabetes. She is due for an A1c. Fingerstick readings are reviewed and are acceptable. 3. CHF, atrial fibrillation, chronic, but stable. She is up to date with cardiology follow up. Medications have been adjusted. Review of Systems:  Notable for chronic anxiety, but this seems stable overall. She also has chronic nasal congestion with occasional wheezing. This will occur mainly at night. I do not think she would be able to manage with an inhaler, so I offered a trial of Singulair. She will give this a try. Past Medical History:   Diagnosis Date    Anxiety     Bleeding nose     Chronic atrial fibrillation (HCC)     Diabetes ([address]     Hypertension          Review of Systems   Constitutional: Negative for weight loss. Respiratory: Negative. Cardiovascular: Negative for chest pain, palpitations, leg swelling and PND. Musculoskeletal: Negative for myalgias. Neurological: Negative for focal weakness. Psychiatric/Behavioral: The patient is nervous/anxious. Physical Exam   Constitutional: No distress. Cardiovascular: Normal rate. An irregularly irregular rhythm present. Exam reveals no gallop and no friction rub. No murmur heard. Pulmonary/Chest: Effort normal and breath sounds normal.   Musculoskeletal: She exhibits edema. Trace stasis edema bilateral lower extremities. Nursing note and vitals reviewed. ASSESSMENT and PLAN  Diagnoses and all orders for this visit:    1. Type 2 diabetes mellitus with nephropathy (HCC)  -     HEMOGLOBIN A1C WITH EAG    2. Chronic systolic congestive heart failure [facility])- See cardiologist as directed. , Continue current regimen of prescription and / or OTC medications     3.  Chronic atrial fibrillation (HCC)  -     CBC WITH AUTOMATED DIFF  - METABOLIC PANEL, BASIC    4. Essential hypertension, benign- Continue current regimen of prescription and / or OTC medications     5.  Chronic nasal congestion

## 2019-08-19 NOTE — TELEPHONE ENCOUNTER
Pt requested her test strips be sent to [facility] instead of [facility]. Called [facility] and canceled.     Singular 10 mg canceled at [facility] - sent to [facility].

## 2019-08-20 LAB
BASOPHILS # BLD AUTO: 0 X10E3/UL (ref 0–0.2)
BASOPHILS NFR BLD AUTO: 1 %
BUN SERPL-MCNC: 22 MG/DL (ref 10–36)
BUN/CREAT SERPL: 27 (ref 12–28)
CALCIUM SERPL-MCNC: 8.8 MG/DL (ref 8.7–10.3)
CHLORIDE SERPL-SCNC: 99 MMOL/L (ref 96–106)
CO2 SERPL-SCNC: 28 MMOL/L (ref 20–29)
CREAT SERPL-MCNC: 0.82 MG/DL (ref 0.57–1)
EOSINOPHIL # BLD AUTO: 0.1 X10E3/UL (ref 0–0.4)
EOSINOPHIL NFR BLD AUTO: 1 %
ERYTHROCYTE [DISTWIDTH] IN BLOOD BY AUTOMATED COUNT: 15.3 % (ref 12.3–15.4)
EST. AVERAGE GLUCOSE BLD GHB EST-MCNC: 128 MG/DL
GLUCOSE SERPL-MCNC: 104 MG/DL (ref 65–99)
HBA1C MFR BLD: 6.1 % (ref 4.8–5.6)
HCT VFR BLD AUTO: 37.8 % (ref 34–46.6)
HGB BLD-MCNC: 12.1 G/DL (ref 11.1–15.9)
IMM GRANULOCYTES # BLD AUTO: 0 X10E3/UL (ref 0–0.1)
IMM GRANULOCYTES NFR BLD AUTO: 0 %
LYMPHOCYTES # BLD AUTO: 1.5 X10E3/UL (ref 0.7–3.1)
LYMPHOCYTES NFR BLD AUTO: 28 %
MCH RBC QN AUTO: 29.7 PG (ref 26.6–33)
MCHC RBC AUTO-ENTMCNC: 32 G/DL (ref 31.5–35.7)
MCV RBC AUTO: 93 FL (ref 79–97)
MONOCYTES # BLD AUTO: 0.4 X10E3/UL (ref 0.1–0.9)
MONOCYTES NFR BLD AUTO: 8 %
NEUTROPHILS # BLD AUTO: 3.2 X10E3/UL (ref 1.4–7)
NEUTROPHILS NFR BLD AUTO: 62 %
PLATELET # BLD AUTO: 262 X10E3/UL (ref 150–450)
POTASSIUM SERPL-SCNC: 4.9 MMOL/L (ref 3.5–5.2)
RBC # BLD AUTO: 4.07 X10E6/UL (ref 3.77–5.28)
SODIUM SERPL-SCNC: 141 MMOL/L (ref 134–144)
WBC # BLD AUTO: 5.2 X10E3/UL (ref 3.4–10.8)

## 2019-09-26 ENCOUNTER — OFFICE VISIT (OUTPATIENT)
Dept: CARDIOLOGY CLINIC | Age: 84
End: 2019-09-26

## 2019-09-26 VITALS
SYSTOLIC BLOOD PRESSURE: 126 MMHG | BODY MASS INDEX: 35.48 KG/M2 | DIASTOLIC BLOOD PRESSURE: 60 MMHG | RESPIRATION RATE: 17 BRPM | HEIGHT: 59 IN | OXYGEN SATURATION: 97 % | WEIGHT: 176 LBS | HEART RATE: 60 BPM

## 2019-09-26 DIAGNOSIS — F41.9 ANXIETY: ICD-10-CM

## 2019-09-26 DIAGNOSIS — I48.20 CHRONIC ATRIAL FIBRILLATION (HCC): Primary | ICD-10-CM

## 2019-09-26 DIAGNOSIS — I10 ESSENTIAL HYPERTENSION, BENIGN: ICD-10-CM

## 2019-09-26 NOTE — PROGRESS NOTES
HISTORY OF PRESENT ILLNESS  Kayleen Oconnell is a 80 y.o. female     SUMMARY:   Problem List  Date Reviewed: 9/25/2019          Codes Class Noted    Severe obesity (BMI 35.0-39. 9) with comorbidity ([facility] ICD-10-CM: E66.01  ICD-9-CM: 278.01  4/25/2018        Type 2 diabetes mellitus with nephropathy ([facility] ICD-10-CM: E11.21  ICD-9-CM: 250.40, 583.81  12/19/2017        CHF (congestive heart failure) ([facility] ICD-10-CM: I50.9  ICD-9-CM: 428.0  12/6/2017        Anxiety ICD-10-CM: F41.9  ICD-9-CM: 300.00  10/2/2017        Allergic rhinitis ICD-10-CM: J30.9  ICD-9-CM: 477.9  4/11/2016        Chronic atrial fibrillation ([facility] ICD-10-CM: I48.2  ICD-9-CM: 427.31  4/8/2016        Advanced directives, counseling/discussion ICD-10-CM: Z71.89  ICD-9-CM: V65.49  3/15/2016        Type 2 diabetes, controlled, with neuropathy ([facility] ICD-10-CM: E11.40  ICD-9-CM: 250.60, 357.2  1/4/2016        Essential hypertension, benign ICD-10-CM: I10  ICD-9-CM: 401.1  6/4/2015        Edema ICD-10-CM: R60.9  ICD-9-CM: 782.3  11/12/2014    Overview Addendum 12/14/2017  8:24 AM by Patrice Smith MD     4/16 echo normal lvef, lae, mild tr  12/17 echo normal lvef, elisha, mild to mod mr and tr, pa pressure 50mm             Headache(784.0) ICD-10-CM: R51  ICD-9-CM: 784.0  11/12/2014        Diverticulosis of colon (without mention of hemorrhage) ICD-10-CM: K57.30  ICD-9-CM: 562.10  11/12/2014        Constipation ICD-10-CM: K59.00  ICD-9-CM: 564.00  11/12/2014        Cyst of left kidney ICD-10-CM: N28.1  ICD-9-CM: 753.10  11/12/2014              Current Outpatient Medications on File Prior to Visit   Medication Sig    glucose blood VI test strips (PRECISION XTRA TEST) strip USE TO TEST BLOOD SUGAR TWICE DAILY. Dx E11.21    furosemide (LASIX) 40 mg tablet TAKE 1 TABLET BY MOUTH DAILY    glimepiride (AMARYL) 1 mg tablet TAKE 1 TABLET BY MOUTH EVERY DAY. TAKE ONLY IF SUGAR IS ABOVE 140 (Patient taking differently: TAKE 1 TABLET BY MOUTH EVERY DAY.  TAKE ONLY IF SUGAR IS ABOVE 140 Patient reports taking as needed)    clotrimazole-betamethasone (LOTRISONE) topical cream Apply  to affected area two (2) times daily as needed for Skin Irritation.  potassium chloride SR (KLOR-CON 10) 10 mEq tablet Take 2 Tabs by mouth daily. New Directions.  mirabegron ER (MYRBETRIQ) 50 mg ER tablet Per urology    linaclotide (LINZESS) 72 mcg cap Take  by mouth.  ferrous sulfate 325 mg (65 mg iron) tablet TAKE 1 TABLET BY MOUTH DAILY WITH BREAKFAST    aspirin delayed-release 81 mg tablet Take 1 Tab by mouth daily. (Patient taking differently: Take 81 mg by mouth every other day.)    CHOLECALCIFEROL, VITAMIN D3, (VITAMIN D3 PO) Take  by mouth daily.  Lancets misc 1 Package by Does Not Apply route daily. Test glucose daily and as needed Dx type 2 dm E11.42    magnesium hydroxide (MILK OF MAGNESIA) 2,400 mg/10 mL susp suspension Take 10 mL by mouth nightly. No current facility-administered medications on file prior to visit. CARDIOLOGY STUDIES TO DATE:  4/16 echo normal lvef, lae, mild tr  12/17 echo normal lvef, elisha, mild to mod mr and tr, pa pressure 50mm    Chief Complaint   Patient presents with    Irregular Heart Beat     HPI :  Ms. Angelique Banks is doing pretty well. She has occasional fleeting chest pains in the center of her chest, side of her chest and so forth with no associated symptoms or pattern. She complains of frequent urination. She has not been wearing her support hose regularly recently because her helper apparently had some surgery and is still recovering. She has not felt any irregular heartbeat. Some intermittent wheezing, especially at night and still suffers from spells of anxiety associated with shortness of breath. CARDIAC ROS:   negative for syncope, orthopnea, paroxysmal nocturnal dyspnea, exertional chest pressure/discomfort, claudication    No family history on file.     Past Medical History:   Diagnosis Date    Anxiety     Bleeding nose     Chronic atrial fibrillation (HCC)     Diabetes ([facility]     Hypertension        GENERAL ROS:  A comprehensive review of systems was negative except for that written in the HPI. Visit Vitals  /60 (BP 1 Location: Left arm, BP Patient Position: Sitting)   Pulse 60   Resp 17   Ht 4' 11\" (1.499 m)   Wt 176 lb (79.8 kg)   SpO2 97%   BMI 35.55 kg/m²       Wt Readings from Last 3 Encounters:   09/26/19 176 lb (79.8 kg)   08/19/19 172 lb 12.8 oz (78.4 kg)   06/25/19 175 lb 3.2 oz (79.5 kg)            BP Readings from Last 3 Encounters:   09/26/19 126/60   08/19/19 118/74   06/25/19 126/58       PHYSICAL EXAM  General appearance: alert, cooperative, no distress, appears stated age  Neurologic: Alert and oriented X 3  Neck: supple, symmetrical, trachea midline, no adenopathy, no carotid bruit and no JVD  Lungs: clear to auscultation bilaterally  Heart: irregularly irregular rhythm, S1, S2 normal, no S3 or S4  Extremities: edema tr    Lab Results   Component Value Date/Time    Cholesterol, total 146 03/21/2016 10:31 AM    Cholesterol, total 147 03/13/2015 02:06 PM    HDL Cholesterol 85 03/21/2016 10:31 AM    HDL Cholesterol 60 03/13/2015 02:06 PM    LDL, calculated 48 03/21/2016 10:31 AM    LDL, calculated 69 03/13/2015 02:06 PM    Triglyceride 64 03/21/2016 10:31 AM    Triglyceride 90 03/13/2015 02:06 PM     ASSESSMENT  Ms. Almendarez is stable and I think asymptomatic from a cardiac perspective, well compensated on a reasonable medical regimen and needs no cardiac testing at this time. current treatment plan is effective, no change in therapy  lab results and schedule of future lab studies reviewed with patient  reviewed diet, exercise and weight control    Encounter Diagnoses   Name Primary?  Chronic atrial fibrillation (HCC) Yes    Essential hypertension, benign     Anxiety      No orders of the defined types were placed in this encounter.       Follow-up and Dispositions    · Return in about 4 months (around 1/26/2020).          Ernie Umaña MD  9/26/2019

## 2019-10-22 ENCOUNTER — OFFICE VISIT (OUTPATIENT)
Dept: INTERNAL MEDICINE CLINIC | Age: 84
End: 2019-10-22

## 2019-10-22 VITALS
OXYGEN SATURATION: 96 % | TEMPERATURE: 97.6 F | BODY MASS INDEX: 35.88 KG/M2 | HEART RATE: 68 BPM | RESPIRATION RATE: 16 BRPM | DIASTOLIC BLOOD PRESSURE: 56 MMHG | HEIGHT: 59 IN | WEIGHT: 178 LBS | SYSTOLIC BLOOD PRESSURE: 130 MMHG

## 2019-10-22 DIAGNOSIS — I48.20 CHRONIC ATRIAL FIBRILLATION (HCC): ICD-10-CM

## 2019-10-22 DIAGNOSIS — I10 ESSENTIAL HYPERTENSION, BENIGN: Primary | ICD-10-CM

## 2019-10-22 DIAGNOSIS — I50.22 CHRONIC SYSTOLIC CONGESTIVE HEART FAILURE (HCC): ICD-10-CM

## 2019-10-22 DIAGNOSIS — E11.21 TYPE 2 DIABETES MELLITUS WITH NEPHROPATHY (HCC): ICD-10-CM

## 2019-10-22 DIAGNOSIS — E11.40 TYPE 2 DIABETES, CONTROLLED, WITH NEUROPATHY (HCC): ICD-10-CM

## 2019-10-22 DIAGNOSIS — Z23 ENCOUNTER FOR IMMUNIZATION: ICD-10-CM

## 2019-10-22 NOTE — PROGRESS NOTES
HISTORY OF PRESENT ILLNESS  Marilynn Headley is a 80 y.o. female. HPI Subjective: Marycarmen Robles is seen today for follow up of diabetes and other problems. She is accompanied by a friend. 1. Diabetes. Up to date with lab assessment. She is not really requiring any treatments. I did review her home readings. 2. Hypertension, stable on current regimen. 3. CHF, chronic atrial fibrillation, up to date with cardiology follow up and is clinically stable. 4. OAB. Up to date with urology follow up. 5. Preventive medicine. Flu vaccine is due. Review of Systems:  Notable for chronic allergies. These symptoms tend to wax and wane. Claritin gives fair relief. She does have Flonase available, but does not use it on a regular basis. I have advised she restart this on a daily basis to see if she gets better symptom control. Current Outpatient Medications   Medication Sig    glucose blood VI test strips (PRECISION XTRA TEST) strip USE TO TEST BLOOD SUGAR TWICE DAILY. Dx E11.21    furosemide (LASIX) 40 mg tablet TAKE 1 TABLET BY MOUTH DAILY    glimepiride (AMARYL) 1 mg tablet TAKE 1 TABLET BY MOUTH EVERY DAY. TAKE ONLY IF SUGAR IS ABOVE 140 (Patient taking differently: TAKE 1 TABLET BY MOUTH EVERY DAY. TAKE ONLY IF SUGAR IS ABOVE 140 Patient reports taking as needed)    clotrimazole-betamethasone (LOTRISONE) topical cream Apply  to affected area two (2) times daily as needed for Skin Irritation.  potassium chloride SR (KLOR-CON 10) 10 mEq tablet Take 2 Tabs by mouth daily. New Directions.  mirabegron ER (MYRBETRIQ) 50 mg ER tablet Per urology    linaclotide (LINZESS) 72 mcg cap Take  by mouth.  ferrous sulfate 325 mg (65 mg iron) tablet TAKE 1 TABLET BY MOUTH DAILY WITH BREAKFAST    aspirin delayed-release 81 mg tablet Take 1 Tab by mouth daily. (Patient taking differently: Take 81 mg by mouth every other day.)    CHOLECALCIFEROL, VITAMIN D3, (VITAMIN D3 PO) Take  by mouth daily.     Lancets misc 1 Package by Does Not Apply route daily. Test glucose daily and as needed Dx type 2 dm E11.42    magnesium hydroxide (MILK OF MAGNESIA) 2,400 mg/10 mL susp suspension Take 10 mL by mouth nightly. No current facility-administered medications for this visit. Review of Systems   Constitutional: Negative for weight loss. HENT: Positive for congestion and ear pain. Respiratory: Negative. Cardiovascular: Negative for chest pain, palpitations, leg swelling and PND. Musculoskeletal: Negative for myalgias. Neurological: Negative for focal weakness. Physical Exam   Constitutional: No distress. Cardiovascular: Normal rate. An irregularly irregular rhythm present. Exam reveals no gallop and no friction rub. No murmur heard. Pulmonary/Chest: Effort normal and breath sounds normal.   Musculoskeletal: She exhibits edema. Mild bilateral lower extremity edema    Nursing note and vitals reviewed. ASSESSMENT and PLAN  Diagnoses and all orders for this visit:    1. Essential hypertension, benign- Continue current regimen of prescription and / or OTC medications     2. Type 2 diabetes, controlled, with neuropathy ([address]    3. Chronic atrial fibrillation- Continue current regimen of prescription and / or OTC medications , See cardiologist as directed. 4. Encounter for immunization  -     INFLUENZA VACCINE INACTIVATED (IIV), SUBUNIT, ADJUVANTED, IM  -     VT IMMUNIZ ADMIN,1 SINGLE/COMB VAC/TOXOID    5. Type 2 diabetes mellitus with nephropathy ([address]- Follow off treatment     6. Chronic systolic congestive heart failure ([address]- Continue current regimen of prescription and / or OTC medications . See cardiologist as directed.

## 2019-10-28 DIAGNOSIS — R06.02 SOB (SHORTNESS OF BREATH) ON EXERTION: ICD-10-CM

## 2019-10-28 DIAGNOSIS — M79.89 LEG SWELLING: ICD-10-CM

## 2019-10-28 RX ORDER — POTASSIUM CHLORIDE 750 MG/1
TABLET, FILM COATED, EXTENDED RELEASE ORAL
Qty: 180 TAB | Refills: 4 | Status: SHIPPED | OUTPATIENT
Start: 2019-10-28 | End: 2020-12-04 | Stop reason: SDUPTHER

## 2019-12-19 ENCOUNTER — OFFICE VISIT (OUTPATIENT)
Dept: INTERNAL MEDICINE CLINIC | Age: 84
End: 2019-12-19

## 2019-12-19 VITALS
WEIGHT: 175 LBS | HEIGHT: 59 IN | OXYGEN SATURATION: 97 % | DIASTOLIC BLOOD PRESSURE: 68 MMHG | HEART RATE: 60 BPM | TEMPERATURE: 97.8 F | BODY MASS INDEX: 35.28 KG/M2 | SYSTOLIC BLOOD PRESSURE: 129 MMHG | RESPIRATION RATE: 16 BRPM

## 2019-12-19 DIAGNOSIS — I50.22 CHRONIC SYSTOLIC CONGESTIVE HEART FAILURE (HCC): ICD-10-CM

## 2019-12-19 DIAGNOSIS — I48.20 CHRONIC ATRIAL FIBRILLATION (HCC): ICD-10-CM

## 2019-12-19 DIAGNOSIS — E11.40 TYPE 2 DIABETES, CONTROLLED, WITH NEUROPATHY (HCC): Primary | ICD-10-CM

## 2019-12-19 DIAGNOSIS — E11.21 TYPE 2 DIABETES MELLITUS WITH NEPHROPATHY (HCC): ICD-10-CM

## 2019-12-19 DIAGNOSIS — I10 ESSENTIAL HYPERTENSION, BENIGN: ICD-10-CM

## 2019-12-19 NOTE — PROGRESS NOTES
HISTORY OF PRESENT ILLNESS  Cecilia Griffin is a 80 y.o. female. HPI Subjective: Yasmeen Cornejo is seen today accompanied by a friend for follow up of diabetes and hyperlipidemia. 1. Diabetes. Will defer A1c today because her fingerstick readings are excellent. Cholesterol is not applicable given her age. 2. CHF, chronic a-fib, clinically stable. Edema is stable as well. She follows up with Dr. Naheed Rod of cardiology as directed. 3. Hypertension, fair initially, but improved on recheck. Review of Systems:  Notable for nighttime leg pain. She also has mild carpal tunnel syndrome symptoms. I reviewed the use of wrist braces and she will give this a try. She does not want any treatment or referral for leg pain. Current Outpatient Medications   Medication Sig    potassium chloride SR (KLOR-CON 10) 10 mEq tablet TAKE 2 TABLETS DAILY (NEW DIRECTIONS)    glucose blood VI test strips (PRECISION XTRA TEST) strip USE TO TEST BLOOD SUGAR TWICE DAILY. Dx E11.21    furosemide (LASIX) 40 mg tablet TAKE 1 TABLET BY MOUTH DAILY    clotrimazole-betamethasone (LOTRISONE) topical cream Apply  to affected area two (2) times daily as needed for Skin Irritation.  mirabegron ER (MYRBETRIQ) 50 mg ER tablet Per urology    linaclotide (LINZESS) 72 mcg cap Take  by mouth.  ferrous sulfate 325 mg (65 mg iron) tablet TAKE 1 TABLET BY MOUTH DAILY WITH BREAKFAST    aspirin delayed-release 81 mg tablet Take 1 Tab by mouth daily. (Patient taking differently: Take 81 mg by mouth every other day.)    CHOLECALCIFEROL, VITAMIN D3, (VITAMIN D3 PO) Take  by mouth daily.  Lancets misc 1 Package by Does Not Apply route daily. Test glucose daily and as needed Dx type 2 dm E11.42    magnesium hydroxide (MILK OF MAGNESIA) 2,400 mg/10 mL susp suspension Take 10 mL by mouth nightly.  glimepiride (AMARYL) 1 mg tablet TAKE 1 TABLET BY MOUTH EVERY DAY.  TAKE ONLY IF SUGAR IS ABOVE 140 (Patient taking differently: TAKE 1 TABLET BY MOUTH EVERY DAY. TAKE ONLY IF SUGAR IS ABOVE 140 Patient reports taking as needed)     No current facility-administered medications for this visit. Review of Systems   Constitutional: Negative for weight loss. Respiratory: Negative. Cardiovascular: Negative for chest pain, palpitations, leg swelling and PND. Musculoskeletal: Negative for myalgias. Neurological: Negative for focal weakness. Physical Exam  Vitals signs and nursing note reviewed. Constitutional:       General: She is not in acute distress. Cardiovascular:      Rate and Rhythm: Normal rate. Rhythm irregular. Heart sounds: No murmur. No friction rub. No gallop. Pulmonary:      Effort: Pulmonary effort is normal.      Breath sounds: Normal breath sounds. Musculoskeletal:      Right lower leg: Edema present. Comments: trace         ASSESSMENT and PLAN  Diagnoses and all orders for this visit:    1. Type 2 diabetes, controlled, with neuropathy ([address]-     2. Essential hypertension, benign- Continue current regimen of prescription and / or OTC medications     3. Chronic atrial fibrillation- Continue current regimen of prescription and / or OTC medications     4. Type 2 diabetes mellitus with nephropathy ([address]- Follow off treatment     5. Chronic systolic congestive heart failure ([address]- Continue current regimen of prescription and / or OTC medications , See cardiologist as directed.

## 2020-01-20 ENCOUNTER — TELEPHONE (OUTPATIENT)
Dept: INTERNAL MEDICINE CLINIC | Age: 85
End: 2020-01-20

## 2020-01-20 NOTE — TELEPHONE ENCOUNTER
Patient states received letter form CMS Medicare. Spoke with  Collin Corea, she explained  Medicare Part B requires Audit. Advised to call [phone] for more details on coverage. Notified patient insurance does not cover the amount of times checking blood sugar daily. Advised if have a problem refilling testing supplies, will submit a prior auth. Understanding verbalized.

## 2020-01-20 NOTE — TELEPHONE ENCOUNTER
April Almendarez (Self) [phone] (H)     Pt is requesting a call back regarding some forms she received in the mail.

## 2020-01-27 ENCOUNTER — OFFICE VISIT (OUTPATIENT)
Dept: CARDIOLOGY CLINIC | Age: 85
End: 2020-01-27

## 2020-01-27 VITALS
SYSTOLIC BLOOD PRESSURE: 138 MMHG | RESPIRATION RATE: 16 BRPM | DIASTOLIC BLOOD PRESSURE: 60 MMHG | HEIGHT: 59 IN | HEART RATE: 60 BPM | OXYGEN SATURATION: 96 % | BODY MASS INDEX: 35.68 KG/M2 | WEIGHT: 177 LBS

## 2020-01-27 DIAGNOSIS — I10 ESSENTIAL HYPERTENSION, BENIGN: ICD-10-CM

## 2020-01-27 DIAGNOSIS — F41.9 ANXIETY: ICD-10-CM

## 2020-01-27 DIAGNOSIS — I48.20 CHRONIC ATRIAL FIBRILLATION (HCC): Primary | ICD-10-CM

## 2020-01-27 NOTE — PROGRESS NOTES
HISTORY OF PRESENT ILLNESS  Yinka Benson is a 80 y.o. female     SUMMARY:   Problem List  Date Reviewed: 1/24/2020          Codes Class Noted    Severe obesity (BMI 35.0-39. 9) with comorbidity ([facility] ICD-10-CM: E66.01  ICD-9-CM: 278.01  4/25/2018        Type 2 diabetes mellitus with nephropathy ([facility] ICD-10-CM: E11.21  ICD-9-CM: 250.40, 583.81  12/19/2017        CHF (congestive heart failure) ([facility] ICD-10-CM: I50.9  ICD-9-CM: 428.0  12/6/2017        Anxiety ICD-10-CM: F41.9  ICD-9-CM: 300.00  10/2/2017        Allergic rhinitis ICD-10-CM: J30.9  ICD-9-CM: 477.9  4/11/2016        Chronic atrial fibrillation ICD-10-CM: I48.20  ICD-9-CM: 427.31  4/8/2016        Advanced directives, counseling/discussion ICD-10-CM: Z71.89  ICD-9-CM: V65.49  3/15/2016        Type 2 diabetes, controlled, with neuropathy ([facility] ICD-10-CM: E11.40  ICD-9-CM: 250.60, 357.2  1/4/2016        Essential hypertension, benign ICD-10-CM: I10  ICD-9-CM: 401.1  6/4/2015        Edema ICD-10-CM: R60.9  ICD-9-CM: 782.3  11/12/2014    Overview Addendum 12/14/2017  8:24 AM by Milton Lee MD     4/16 echo normal lvef, lae, mild tr  12/17 echo normal lvef, elisha, mild to mod mr and tr, pa pressure 50mm             Headache(784.0) ICD-10-CM: R51  ICD-9-CM: 784.0  11/12/2014        Diverticulosis of colon (without mention of hemorrhage) ICD-10-CM: K57.30  ICD-9-CM: 562.10  11/12/2014        Constipation ICD-10-CM: K59.00  ICD-9-CM: 564.00  11/12/2014        Cyst of left kidney ICD-10-CM: N28.1  ICD-9-CM: 753.10  11/12/2014              Current Outpatient Medications on File Prior to Visit   Medication Sig    potassium chloride SR (KLOR-CON 10) 10 mEq tablet TAKE 2 TABLETS DAILY (NEW DIRECTIONS)    glucose blood VI test strips (PRECISION XTRA TEST) strip USE TO TEST BLOOD SUGAR TWICE DAILY. Dx E11.21    furosemide (LASIX) 40 mg tablet TAKE 1 TABLET BY MOUTH DAILY    glimepiride (AMARYL) 1 mg tablet TAKE 1 TABLET BY MOUTH EVERY DAY.  TAKE ONLY IF SUGAR IS ABOVE 140 (Patient taking differently: TAKE 1 TABLET BY MOUTH EVERY DAY. TAKE ONLY IF SUGAR IS ABOVE 140 Patient reports taking as needed)    clotrimazole-betamethasone (LOTRISONE) topical cream Apply  to affected area two (2) times daily as needed for Skin Irritation.  mirabegron ER (MYRBETRIQ) 50 mg ER tablet Per urology    linaclotide (LINZESS) 72 mcg cap Take  by mouth.  ferrous sulfate 325 mg (65 mg iron) tablet TAKE 1 TABLET BY MOUTH DAILY WITH BREAKFAST    aspirin delayed-release 81 mg tablet Take 1 Tab by mouth daily. (Patient taking differently: Take 81 mg by mouth every other day.)    CHOLECALCIFEROL, VITAMIN D3, (VITAMIN D3 PO) Take  by mouth daily.  Lancets misc 1 Package by Does Not Apply route daily. Test glucose daily and as needed Dx type 2 dm E11.42    magnesium hydroxide (MILK OF MAGNESIA) 2,400 mg/10 mL susp suspension Take 10 mL by mouth nightly. No current facility-administered medications on file prior to visit. CARDIOLOGY STUDIES TO DATE:  4/16 echo normal lvef, lae, mild tr  12/17 echo normal lvef, elisha, mild to mod mr and tr, pa pressure 50mm    Chief Complaint   Patient presents with    Irregular Heart Beat     HPI :  Ms. Inés Rojas is doing pretty well. She had another birthday in December she walks some but mostly gets around in a wheelchair. She complains of dependent edema but still takes her Lasix most the time and less she has to go out for a while. She also continues having episodes maybe once a month where she will get short of breath and wheezy for a few minutes. This is a longstanding problem nothing new  CARDIAC ROS:   negative for chest pain, palpitations, syncope, orthopnea, paroxysmal nocturnal dyspnea, exertional chest pressure/discomfort, claudication    No family history on file.     Past Medical History:   Diagnosis Date    Anxiety     Bleeding nose     Chronic atrial fibrillation     Diabetes ([facility]     Hypertension        GENERAL ROS:  A comprehensive review of systems was negative except for that written in the HPI. Visit Vitals  /60 (BP 1 Location: Left arm, BP Patient Position: Sitting)   Pulse 60   Resp 16   Ht 4' 11\" (1.499 m)   Wt 177 lb (80.3 kg)   SpO2 96%   BMI 35.75 kg/m²       Wt Readings from Last 3 Encounters:   01/27/20 177 lb (80.3 kg)   12/19/19 175 lb (79.4 kg)   10/22/19 178 lb (80.7 kg)            BP Readings from Last 3 Encounters:   01/27/20 138/60   12/19/19 129/68   10/22/19 130/56       PHYSICAL EXAM  General appearance: alert, cooperative, no distress, appears stated age  Neurologic: Alert and oriented X 3  Neck: supple, symmetrical, trachea midline, no adenopathy, no carotid bruit and no JVD  Lungs: clear to auscultation bilaterally  Heart: irregularly irregular rhythm, S1, S2 normal, no S3 or S4  Extremities: edema 1+    Lab Results   Component Value Date/Time    Cholesterol, total 146 03/21/2016 10:31 AM    Cholesterol, total 147 03/13/2015 02:06 PM    HDL Cholesterol 85 03/21/2016 10:31 AM    HDL Cholesterol 60 03/13/2015 02:06 PM    LDL, calculated 48 03/21/2016 10:31 AM    LDL, calculated 69 03/13/2015 02:06 PM    Triglyceride 64 03/21/2016 10:31 AM    Triglyceride 90 03/13/2015 02:06 PM     ASSESSMENT :      Ms. Storm Rodriguez is stable and I think asymptomatic from a cardiac perspective on a reasonable medical regimen and needs no cardiac testing at this time. current treatment plan is effective, no change in therapy  lab results and schedule of future lab studies reviewed with patient  reviewed diet, exercise and weight control    Encounter Diagnoses   Name Primary?  Chronic atrial fibrillation Yes    Essential hypertension, benign     Anxiety      No orders of the defined types were placed in this encounter. Follow-up and Dispositions    · Return in about 6 months (around 7/27/2020).          Marla Kaye MD  1/27/2020

## 2020-02-19 ENCOUNTER — OFFICE VISIT (OUTPATIENT)
Dept: INTERNAL MEDICINE CLINIC | Age: 85
End: 2020-02-19

## 2020-02-19 ENCOUNTER — HOSPITAL ENCOUNTER (OUTPATIENT)
Dept: LAB | Age: 85
Discharge: HOME | End: 2020-02-19
Payer: MEDICARE

## 2020-02-19 VITALS
OXYGEN SATURATION: 99 % | BODY MASS INDEX: 35.38 KG/M2 | HEART RATE: 76 BPM | SYSTOLIC BLOOD PRESSURE: 130 MMHG | HEIGHT: 59 IN | DIASTOLIC BLOOD PRESSURE: 74 MMHG | TEMPERATURE: 97.6 F | RESPIRATION RATE: 16 BRPM | WEIGHT: 175.5 LBS

## 2020-02-19 DIAGNOSIS — I10 ESSENTIAL HYPERTENSION, BENIGN: Primary | ICD-10-CM

## 2020-02-19 DIAGNOSIS — I50.22 CHRONIC SYSTOLIC CONGESTIVE HEART FAILURE (HCC): ICD-10-CM

## 2020-02-19 DIAGNOSIS — N39.0 RECURRENT UTI: ICD-10-CM

## 2020-02-19 DIAGNOSIS — I48.20 CHRONIC ATRIAL FIBRILLATION (HCC): ICD-10-CM

## 2020-02-19 DIAGNOSIS — E11.40 TYPE 2 DIABETES, CONTROLLED, WITH NEUROPATHY (HCC): ICD-10-CM

## 2020-02-19 LAB
BILIRUB UR QL STRIP: NEGATIVE
GLUCOSE UR-MCNC: NEGATIVE MG/DL
KETONES P FAST UR STRIP-MCNC: NEGATIVE MG/DL
PH UR STRIP: 6.5 [PH] (ref 4.6–8)
PROT UR QL STRIP: NEGATIVE
SP GR UR STRIP: 1.02 (ref 1–1.03)
UA UROBILINOGEN AMB POC: NORMAL (ref 0.2–1)
URINALYSIS CLARITY POC: CLEAR
URINALYSIS COLOR POC: YELLOW
URINE BLOOD POC: NEGATIVE
URINE LEUKOCYTES POC: NORMAL
URINE NITRITES POC: NEGATIVE

## 2020-02-19 PROCEDURE — 80048 BASIC METABOLIC PNL TOTAL CA: CPT

## 2020-02-19 PROCEDURE — 87086 URINE CULTURE/COLONY COUNT: CPT

## 2020-02-19 PROCEDURE — 81003 URINALYSIS AUTO W/O SCOPE: CPT

## 2020-02-19 PROCEDURE — 83036 HEMOGLOBIN GLYCOSYLATED A1C: CPT

## 2020-02-19 PROCEDURE — 36415 COLL VENOUS BLD VENIPUNCTURE: CPT

## 2020-02-19 PROCEDURE — 85025 COMPLETE CBC W/AUTO DIFF WBC: CPT

## 2020-02-19 NOTE — PROGRESS NOTES
HISTORY OF PRESENT ILLNESS  Ken Cedeno is a 80 y.o. female. HPI Subjective: Michelle Rose is seen today for follow up of hypertension and other problems. 1. Hypertension, stable without medication treatment. 2. Diabetes, due for routine lab recheck. Fingerstick readings are acceptable. She does not require medication for diabetes either. 3. CHF, atrial fibrillation. She is up to date with cardiology follow up. 4. Recurrent UTI. Up to date with urology follow up. She has had symptoms of UTI for the last two to three weeks, mild discomfort only. Her urine dipstick in the office is unremarkable, will send off a culture. Current Outpatient Medications   Medication Sig    glucose blood VI test strips (PRECISION XTRA TEST) strip USE TO TEST BLOOD SUGAR TWICE DAILY. Dx E11.21    potassium chloride SR (KLOR-CON 10) 10 mEq tablet TAKE 2 TABLETS DAILY (NEW DIRECTIONS)    furosemide (LASIX) 40 mg tablet TAKE 1 TABLET BY MOUTH DAILY    glimepiride (AMARYL) 1 mg tablet TAKE 1 TABLET BY MOUTH EVERY DAY. TAKE ONLY IF SUGAR IS ABOVE 140 (Patient taking differently: TAKE 1 TABLET BY MOUTH EVERY DAY. TAKE ONLY IF SUGAR IS ABOVE 140 Patient reports taking as needed)    clotrimazole-betamethasone (LOTRISONE) topical cream Apply  to affected area two (2) times daily as needed for Skin Irritation.  mirabegron ER (MYRBETRIQ) 50 mg ER tablet Per urology    linaclotide (LINZESS) 72 mcg cap Take  by mouth.  ferrous sulfate 325 mg (65 mg iron) tablet TAKE 1 TABLET BY MOUTH DAILY WITH BREAKFAST    aspirin delayed-release 81 mg tablet Take 1 Tab by mouth daily. (Patient taking differently: Take 81 mg by mouth every other day.)    CHOLECALCIFEROL, VITAMIN D3, (VITAMIN D3 PO) Take  by mouth daily.  Lancets misc 1 Package by Does Not Apply route daily. Test glucose daily and as needed Dx type 2 dm E11.42    magnesium hydroxide (MILK OF MAGNESIA) 2,400 mg/10 mL susp suspension Take 10 mL by mouth nightly.      No current facility-administered medications for this visit. Review of Systems   Constitutional: Negative for weight loss. Eyes: Positive for blurred vision. Respiratory: Negative. Cardiovascular: Negative for chest pain, palpitations, leg swelling and PND. Genitourinary: Positive for frequency. Musculoskeletal: Negative for myalgias. Neurological: Negative for focal weakness. Physical Exam  Vitals signs and nursing note reviewed. Neck:      Vascular: No carotid bruit. Cardiovascular:      Rate and Rhythm: Normal rate. Rhythm irregularly irregular. Heart sounds: No murmur. No friction rub. No gallop. Pulmonary:      Effort: Pulmonary effort is normal. No respiratory distress. Breath sounds: Normal breath sounds. Musculoskeletal:      Right lower le+ Edema present. Left lower le+ Edema present. Skin:     General: Skin is warm and dry. Neurological:      Mental Status: She is alert. Psychiatric:         Behavior: Behavior normal.         ASSESSMENT and PLAN  Diagnoses and all orders for this visit:    1. Essential hypertension, benign- Follow off treatment     2. Type 2 diabetes, controlled, with neuropathy (HCC)  -     METABOLIC PANEL, BASIC  -     HEMOGLOBIN A1C WITH EAG  -     glucose blood VI test strips (PRECISION XTRA TEST) strip; USE TO TEST BLOOD SUGAR TWICE DAILY. Dx E11.21    3. Chronic atrial fibrillation  -     CBC WITH AUTOMATED DIFF    4. Chronic systolic congestive heart failure ([facility]- Continue current regimen of prescription and / or OTC medications     5.  Recurrent UTI  -     AMB POC URINALYSIS DIP STICK AUTO W/O MICRO  -     URINALYSIS W/ RFLX MICROSCOPIC  -     CULTURE, URINE    Other orders  -     URINALYSIS W/ RFLX MICROSCOPIC

## 2020-02-20 LAB
BASOPHILS # BLD AUTO: 0 X10E3/UL (ref 0–0.2)
BASOPHILS NFR BLD AUTO: 1 %
BUN SERPL-MCNC: 17 MG/DL (ref 10–36)
BUN/CREAT SERPL: 20 (ref 12–28)
CALCIUM SERPL-MCNC: 8.7 MG/DL (ref 8.7–10.3)
CHLORIDE SERPL-SCNC: 101 MMOL/L (ref 96–106)
CO2 SERPL-SCNC: 27 MMOL/L (ref 20–29)
CREAT SERPL-MCNC: 0.86 MG/DL (ref 0.57–1)
EOSINOPHIL # BLD AUTO: 0.1 X10E3/UL (ref 0–0.4)
EOSINOPHIL NFR BLD AUTO: 1 %
ERYTHROCYTE [DISTWIDTH] IN BLOOD BY AUTOMATED COUNT: 13.6 % (ref 11.7–15.4)
EST. AVERAGE GLUCOSE BLD GHB EST-MCNC: 128 MG/DL
GLUCOSE SERPL-MCNC: 96 MG/DL (ref 65–99)
HBA1C MFR BLD: 6.1 % (ref 4.8–5.6)
HCT VFR BLD AUTO: 35.5 % (ref 34–46.6)
HGB BLD-MCNC: 11.8 G/DL (ref 11.1–15.9)
IMM GRANULOCYTES # BLD AUTO: 0 X10E3/UL (ref 0–0.1)
IMM GRANULOCYTES NFR BLD AUTO: 0 %
LYMPHOCYTES # BLD AUTO: 1 X10E3/UL (ref 0.7–3.1)
LYMPHOCYTES NFR BLD AUTO: 20 %
MCH RBC QN AUTO: 30.3 PG (ref 26.6–33)
MCHC RBC AUTO-ENTMCNC: 33.2 G/DL (ref 31.5–35.7)
MCV RBC AUTO: 91 FL (ref 79–97)
MONOCYTES # BLD AUTO: 0.5 X10E3/UL (ref 0.1–0.9)
MONOCYTES NFR BLD AUTO: 10 %
NEUTROPHILS # BLD AUTO: 3.5 X10E3/UL (ref 1.4–7)
NEUTROPHILS NFR BLD AUTO: 68 %
PLATELET # BLD AUTO: 248 X10E3/UL (ref 150–450)
POTASSIUM SERPL-SCNC: 4.4 MMOL/L (ref 3.5–5.2)
RBC # BLD AUTO: 3.9 X10E6/UL (ref 3.77–5.28)
SODIUM SERPL-SCNC: 142 MMOL/L (ref 134–144)
WBC # BLD AUTO: 5.1 X10E3/UL (ref 3.4–10.8)

## 2020-02-21 LAB
APPEARANCE UR: CLEAR
BACTERIA UR CULT: NO GROWTH
BILIRUB UR QL STRIP: NEGATIVE
COLOR UR: YELLOW
GLUCOSE UR QL: NEGATIVE
HGB UR QL STRIP: NEGATIVE
KETONES UR QL STRIP: NEGATIVE
LEUKOCYTE ESTERASE UR QL STRIP: NEGATIVE
MICRO URNS: NORMAL
NITRITE UR QL STRIP: NEGATIVE
PH UR STRIP: 6 [PH] (ref 5–7.5)
PROT UR QL STRIP: NEGATIVE
SP GR UR: 1.01 (ref 1–1.03)
UROBILINOGEN UR STRIP-MCNC: 0.2 MG/DL (ref 0.2–1)

## 2020-06-11 DIAGNOSIS — I48.20 CHRONIC ATRIAL FIBRILLATION (HCC): ICD-10-CM

## 2020-06-11 DIAGNOSIS — I10 ESSENTIAL HYPERTENSION, BENIGN: ICD-10-CM

## 2020-06-11 RX ORDER — FUROSEMIDE 40 MG/1
TABLET ORAL
Qty: 90 TAB | Refills: 0 | Status: SHIPPED | OUTPATIENT
Start: 2020-06-11 | End: 2020-06-30

## 2020-06-11 NOTE — TELEPHONE ENCOUNTER
Requested Prescriptions     Signed Prescriptions Disp Refills    furosemide (LASIX) 40 mg tablet 90 Tab 0     Sig: TAKE 1 TABLET DAILY     Authorizing Provider: Héctor Franklin     Ordering User: Jaja Marcos    Per Dr. Sam Blanco verbal orders

## 2020-06-30 ENCOUNTER — OFFICE VISIT (OUTPATIENT)
Dept: INTERNAL MEDICINE CLINIC | Age: 85
End: 2020-06-30

## 2020-06-30 VITALS
BODY MASS INDEX: 35.96 KG/M2 | SYSTOLIC BLOOD PRESSURE: 153 MMHG | TEMPERATURE: 97.5 F | HEIGHT: 59 IN | DIASTOLIC BLOOD PRESSURE: 70 MMHG | WEIGHT: 178.4 LBS | OXYGEN SATURATION: 98 % | RESPIRATION RATE: 16 BRPM | HEART RATE: 57 BPM

## 2020-06-30 DIAGNOSIS — I48.20 CHRONIC ATRIAL FIBRILLATION (HCC): ICD-10-CM

## 2020-06-30 DIAGNOSIS — E11.40 TYPE 2 DIABETES, CONTROLLED, WITH NEUROPATHY (HCC): Primary | ICD-10-CM

## 2020-06-30 DIAGNOSIS — K21.9 GASTROESOPHAGEAL REFLUX DISEASE, ESOPHAGITIS PRESENCE NOT SPECIFIED: ICD-10-CM

## 2020-06-30 DIAGNOSIS — I50.22 CHRONIC SYSTOLIC CONGESTIVE HEART FAILURE (HCC): ICD-10-CM

## 2020-06-30 DIAGNOSIS — E11.21 TYPE 2 DIABETES MELLITUS WITH NEPHROPATHY (HCC): ICD-10-CM

## 2020-06-30 DIAGNOSIS — I10 ESSENTIAL HYPERTENSION, BENIGN: ICD-10-CM

## 2020-06-30 RX ORDER — OMEPRAZOLE 20 MG/1
20 TABLET, DELAYED RELEASE ORAL DAILY
Qty: 14 TAB | Refills: 0
Start: 2020-06-30 | End: 2020-07-14

## 2020-06-30 RX ORDER — FUROSEMIDE 40 MG/1
TABLET ORAL
Qty: 1 TAB | Refills: 0
Start: 2020-06-30 | End: 2020-07-21

## 2020-06-30 NOTE — PROGRESS NOTES
HISTORY OF PRESENT ILLNESS  Carol Miranda is a 80 y.o. female. HPI Subjective: Otilia Woo is seen today for follow up of diabetes and other problems. She has several questions as well. She is accompanied by a friend, Mardejone Jing, today. 1. Diabetes. Reviewed blood sugars. They look good. We will check routine labs as these have not been done for a while. She does wonder about the [employer] system. I had our staff pharmacist, Mary Kay Holder, review this with her and she is going to consider it. 2. GERD. This is episodic. She has pain lasting about 10 seconds. This has occurred x three on a weekly basis. She denies any exertional chest pain or other cardiac symptoms. 3. She has had some scapula pain. 4. OAB. She is up to date with urology follow up. Symptoms do not really seem to improve much. She is very bothered by increased urinary output caused by Lasix. I offered her a change in her regimen to every other day versus lower dose. I prefer the every other day regimen as I think this will keep fluid control. She agrees to this approach and we will check her status in three weeks. 5. She has dry skin and eyes. Reviewed with her this is a definite factor of aging and I reassured her there was nothing really abnormal about this and she should aim her efforts at good moisturization, eyedrops, etc.    Current Outpatient Medications   Medication Sig    furosemide (LASIX) 40 mg tablet TAKE 1 TABLET EVERY OTHER DAY    omeprazole (PriLOSEC OTC) 20 mg tablet Take 1 Tab by mouth daily for 14 days.  glucose blood VI test strips (PRECISION XTRA TEST) strip USE TO TEST BLOOD SUGAR TWICE DAILY. Dx E11.21    potassium chloride SR (KLOR-CON 10) 10 mEq tablet TAKE 2 TABLETS DAILY (NEW DIRECTIONS)    glimepiride (AMARYL) 1 mg tablet TAKE 1 TABLET BY MOUTH EVERY DAY. TAKE ONLY IF SUGAR IS ABOVE 140 (Patient taking differently: TAKE 1 TABLET BY MOUTH EVERY DAY.  TAKE ONLY IF SUGAR IS ABOVE 140 Patient reports taking as needed)    clotrimazole-betamethasone (LOTRISONE) topical cream Apply  to affected area two (2) times daily as needed for Skin Irritation.  mirabegron ER (MYRBETRIQ) 50 mg ER tablet Per urology    linaclotide (LINZESS) 72 mcg cap Take  by mouth.  ferrous sulfate 325 mg (65 mg iron) tablet TAKE 1 TABLET BY MOUTH DAILY WITH BREAKFAST    aspirin delayed-release 81 mg tablet Take 1 Tab by mouth daily. (Patient taking differently: Take 81 mg by mouth every other day.)    CHOLECALCIFEROL, VITAMIN D3, (VITAMIN D3 PO) Take  by mouth daily.  Lancets misc 1 Package by Does Not Apply route daily. Test glucose daily and as needed Dx type 2 dm E11.42    magnesium hydroxide (MILK OF MAGNESIA) 2,400 mg/10 mL susp suspension Take 10 mL by mouth nightly. No current facility-administered medications for this visit. Review of Systems   Constitutional: Negative for weight loss. Respiratory: Negative. Cardiovascular: Negative for chest pain, palpitations, leg swelling and PND. Gastrointestinal: Positive for heartburn. Genitourinary: Positive for frequency. Negative for dysuria. Musculoskeletal: Negative for myalgias. Neurological: Negative for focal weakness. Physical Exam  Vitals signs and nursing note reviewed. Neck:      Vascular: No carotid bruit. Cardiovascular:      Rate and Rhythm: Normal rate. Rhythm regularly irregular. Heart sounds: No murmur. No friction rub. No gallop. Pulmonary:      Effort: Pulmonary effort is normal. No respiratory distress. Breath sounds: Normal breath sounds. Musculoskeletal:      Right lower le+ Edema present. Left lower le+ Edema present. ASSESSMENT and PLAN  Diagnoses and all orders for this visit:    1. Type 2 diabetes, controlled, with neuropathy ([address]  -     METABOLIC PANEL, BASIC    2.  Chronic atrial fibrillation (HCC)  -     furosemide (LASIX) 40 mg tablet; TAKE 1 TABLET EVERY OTHER DAY  -     CBC WITH AUTOMATED DIFF    3. Essential hypertension, benign  -     furosemide (LASIX) 40 mg tablet; TAKE 1 TABLET EVERY OTHER DAY    4. Chronic systolic congestive heart failure (HCC)  -     METABOLIC PANEL, COMPREHENSIVE    5. Type 2 diabetes mellitus with nephropathy (HCC)  -     METABOLIC PANEL, BASIC    6. Gastroesophageal reflux disease, esophagitis presence not specified  -     omeprazole (PriLOSEC OTC) 20 mg tablet; Take 1 Tab by mouth daily for 14 days.     Plan was reviewed with patient and family, understanding expressed

## 2020-06-30 NOTE — PROGRESS NOTES
Met with patient during PCP appointment to provide patient information about the [facility]. Currently patient tests her blood sugar 1-2 times daily. She reports that she often has problems getting adequate drop of blood and has to reprick herself and her fingers are sore. The only thing that helps is washing her hands in warm water. She heard about the [facility] and thought it might be helpful. Current medications for Diabetes:  Glimepiride 1 mg daily if her blood sugar is > 140. Discussed the following with patient:  · How it measures blood sugar  · Components needed  · Benefits  · Drawbacks, which for patient would be cost as it would not be covered under Medicare part D plan based on her current drug therapy. Provided patient with cost information using its learning coupon ($70-80 for reader, $616-187 for 2 sensors) and patient is going to think over as she is not sure it is worth the money. She also asked if I could send her some information since her daily  is not with her today.     Isabel Nuñez, TahiraD, BCACP      CLINICAL PHARMACY CONSULT: MED RECONCILIATION/REVIEW ADDENDUM    For Pharmacy Admin Tracking Only    PHSO: P[facility] Patient?: Yes  Total # of Interventions Recommended: Count: 1  Total Interventions Accepted: 1  Time Spent (min): 10     TAHIRA SahaD, BCACP

## 2020-07-01 ENCOUNTER — DOCUMENTATION ONLY (OUTPATIENT)
Dept: INTERNAL MEDICINE CLINIC | Age: 85
End: 2020-07-01

## 2020-07-01 NOTE — PROGRESS NOTES
Patient information on [facility] mailed to patient.       CLINICAL PHARMACY CONSULT: MED RECONCILIATION/REVIEW ADDENDUM    For Pharmacy Admin Tracking Only    PHSO: P[facility]O Patient?: Yes  Total # of Interventions Recommended: Count: 1  Total Interventions Accepted: 1  Time Spent (min): Moose Munguia, PHARMD, BCACP  M

## 2020-07-06 ENCOUNTER — TELEPHONE (OUTPATIENT)
Dept: INTERNAL MEDICINE CLINIC | Age: 85
End: 2020-07-06

## 2020-07-06 NOTE — TELEPHONE ENCOUNTER
Patient states her legs are swelling since cutting back on the Lasix, thinks she needs to go back to taking it everyday. Please call.

## 2020-07-06 NOTE — TELEPHONE ENCOUNTER
Spoke with pt - states she talked with MD at last appt about stopping her Lasix. She stopped taking in on 7/2/20. Since then her ankles are swelling a bit and she wants to restart Lasix. Does not want to take a chance on swelling getting worse.  Will forward to MD.

## 2020-07-21 ENCOUNTER — HOSPITAL ENCOUNTER (OUTPATIENT)
Dept: LAB | Age: 85
Discharge: HOME | End: 2020-07-21
Payer: MEDICARE

## 2020-07-21 ENCOUNTER — OFFICE VISIT (OUTPATIENT)
Dept: INTERNAL MEDICINE CLINIC | Age: 85
End: 2020-07-21

## 2020-07-21 VITALS
DIASTOLIC BLOOD PRESSURE: 64 MMHG | OXYGEN SATURATION: 97 % | HEIGHT: 59 IN | HEART RATE: 54 BPM | WEIGHT: 175.8 LBS | TEMPERATURE: 97.8 F | SYSTOLIC BLOOD PRESSURE: 128 MMHG | RESPIRATION RATE: 16 BRPM | BODY MASS INDEX: 35.44 KG/M2

## 2020-07-21 DIAGNOSIS — I48.20 CHRONIC ATRIAL FIBRILLATION (HCC): Primary | ICD-10-CM

## 2020-07-21 DIAGNOSIS — E11.40 TYPE 2 DIABETES, CONTROLLED, WITH NEUROPATHY (HCC): ICD-10-CM

## 2020-07-21 DIAGNOSIS — I10 ESSENTIAL HYPERTENSION, BENIGN: ICD-10-CM

## 2020-07-21 DIAGNOSIS — M54.32 BILATERAL SCIATICA: ICD-10-CM

## 2020-07-21 DIAGNOSIS — M54.31 BILATERAL SCIATICA: ICD-10-CM

## 2020-07-21 DIAGNOSIS — I50.22 CHRONIC SYSTOLIC CONGESTIVE HEART FAILURE (HCC): ICD-10-CM

## 2020-07-21 PROCEDURE — 85025 COMPLETE CBC W/AUTO DIFF WBC: CPT

## 2020-07-21 PROCEDURE — 83036 HEMOGLOBIN GLYCOSYLATED A1C: CPT

## 2020-07-21 PROCEDURE — 36415 COLL VENOUS BLD VENIPUNCTURE: CPT

## 2020-07-21 PROCEDURE — 80053 COMPREHEN METABOLIC PANEL: CPT

## 2020-07-21 RX ORDER — LORATADINE 10 MG/1
10 TABLET ORAL DAILY
COMMUNITY

## 2020-07-21 RX ORDER — FUROSEMIDE 40 MG/1
TABLET ORAL
Qty: 1 TAB | Refills: 0
Start: 2020-07-21 | End: 2020-09-09

## 2020-07-21 NOTE — PROGRESS NOTES
HISTORY OF PRESENT ILLNESS  Carol Miranda is a 80 y.o. female. HPI Subjective: Otilia Woo is seen today for follow up of edema and other concerns. She is accompanied by a friend. 1. Edema. She had an adjustment in her Lasix dosage due to urinary frequency. She did not tolerate the decreased dosage and has gone back to daily Lasix. Her weight is actually down 3 pounds and edema is better. 2. Bilateral sciatica. This is worse at night. We have decided on a PT referral.  3. Hypertension, stable on current regimen. Review of Systems:  Notable for some wrist pain consistent with DeQuervain's tendinitis. I reviewed some basic treatments for this. If persistent, will refer to ortho. Current Outpatient Medications   Medication Sig    loratadine (Claritin) 10 mg tablet Take 10 mg by mouth daily.  furosemide (LASIX) 40 mg tablet TAKE 1 TABLET EVERY  DAY    glucose blood VI test strips (PRECISION XTRA TEST) strip USE TO TEST BLOOD SUGAR TWICE DAILY. Dx E11.21    potassium chloride SR (KLOR-CON 10) 10 mEq tablet TAKE 2 TABLETS DAILY (NEW DIRECTIONS)    glimepiride (AMARYL) 1 mg tablet TAKE 1 TABLET BY MOUTH EVERY DAY. TAKE ONLY IF SUGAR IS ABOVE 140 (Patient taking differently: TAKE 1 TABLET BY MOUTH EVERY DAY. TAKE ONLY IF SUGAR IS ABOVE 140 Patient reports taking as needed)    clotrimazole-betamethasone (LOTRISONE) topical cream Apply  to affected area two (2) times daily as needed for Skin Irritation.  mirabegron ER (MYRBETRIQ) 50 mg ER tablet Per urology (Patient taking differently: Take 50 mg by mouth daily. Per urology)   Óscar Marino linaclotide [facility]) 72 mcg cap Take  by mouth.  ferrous sulfate 325 mg (65 mg iron) tablet TAKE 1 TABLET BY MOUTH DAILY WITH BREAKFAST    aspirin delayed-release 81 mg tablet Take 1 Tab by mouth daily. (Patient taking differently: Take 81 mg by mouth every other day.)    CHOLECALCIFEROL, VITAMIN D3, (VITAMIN D3 PO) Take  by mouth daily.     Lancets misc 1 Package by Does Not Apply route daily. Test glucose daily and as needed Dx type 2 dm E11.42    magnesium hydroxide (MILK OF MAGNESIA) 2,400 mg/10 mL susp suspension Take 10 mL by mouth nightly. No current facility-administered medications for this visit. Review of Systems   Constitutional: Negative for chills and fever. Genitourinary: Positive for frequency. Musculoskeletal: Positive for joint pain and myalgias. Physical Exam  Vitals signs and nursing note reviewed. Constitutional:       General: She is not in acute distress. Cardiovascular:      Rate and Rhythm: Normal rate. Rhythm irregularly irregular. Heart sounds: No murmur. No friction rub. No gallop. Pulmonary:      Effort: Pulmonary effort is normal.      Breath sounds: Normal breath sounds. Musculoskeletal:      Right lower le+ Edema present. Left lower le+ Edema present. ASSESSMENT and PLAN  Diagnoses and all orders for this visit:    1. Chronic atrial fibrillation (HCC)  -     METABOLIC PANEL, COMPREHENSIVE  -     CBC WITH AUTOMATED DIFF  -     furosemide (LASIX) 40 mg tablet; TAKE 1 TABLET EVERY  DAY    2. Essential hypertension, benign  -     furosemide (LASIX) 40 mg tablet; TAKE 1 TABLET EVERY  DAY    3. Chronic systolic congestive heart failure ([address]- Continue current regimen of prescription and / or OTC medications , See cardiologist as directed. 4. Type 2 diabetes, controlled, with neuropathy ([address]  -     HEMOGLOBIN A1C WITH EAG    5.  Bilateral sciatica  -     REFERRAL TO PHYSICAL THERAPY    Plan was reviewed with patient and family, understanding expressed

## 2020-07-22 LAB
ALBUMIN SERPL-MCNC: 4.1 G/DL (ref 3.5–4.6)
ALBUMIN/GLOB SERPL: 1.6 {RATIO} (ref 1.2–2.2)
ALP SERPL-CCNC: 96 IU/L (ref 39–117)
ALT SERPL-CCNC: 19 IU/L (ref 0–32)
AST SERPL-CCNC: 24 IU/L (ref 0–40)
BASOPHILS # BLD AUTO: 0 X10E3/UL (ref 0–0.2)
BASOPHILS NFR BLD AUTO: 1 %
BILIRUB SERPL-MCNC: 0.4 MG/DL (ref 0–1.2)
BUN SERPL-MCNC: 16 MG/DL (ref 10–36)
BUN/CREAT SERPL: 23 (ref 12–28)
CALCIUM SERPL-MCNC: 8.7 MG/DL (ref 8.7–10.3)
CHLORIDE SERPL-SCNC: 99 MMOL/L (ref 96–106)
CO2 SERPL-SCNC: 23 MMOL/L (ref 20–29)
CREAT SERPL-MCNC: 0.7 MG/DL (ref 0.57–1)
EOSINOPHIL # BLD AUTO: 0 X10E3/UL (ref 0–0.4)
EOSINOPHIL NFR BLD AUTO: 1 %
ERYTHROCYTE [DISTWIDTH] IN BLOOD BY AUTOMATED COUNT: 13.9 % (ref 11.7–15.4)
EST. AVERAGE GLUCOSE BLD GHB EST-MCNC: 120 MG/DL
GLOBULIN SER CALC-MCNC: 2.5 G/DL (ref 1.5–4.5)
GLUCOSE SERPL-MCNC: 164 MG/DL (ref 65–99)
HBA1C MFR BLD: 5.8 % (ref 4.8–5.6)
HCT VFR BLD AUTO: 35.8 % (ref 34–46.6)
HGB BLD-MCNC: 11.8 G/DL (ref 11.1–15.9)
IMM GRANULOCYTES # BLD AUTO: 0 X10E3/UL (ref 0–0.1)
IMM GRANULOCYTES NFR BLD AUTO: 0 %
LYMPHOCYTES # BLD AUTO: 1.1 X10E3/UL (ref 0.7–3.1)
LYMPHOCYTES NFR BLD AUTO: 21 %
MCH RBC QN AUTO: 30.3 PG (ref 26.6–33)
MCHC RBC AUTO-ENTMCNC: 33 G/DL (ref 31.5–35.7)
MCV RBC AUTO: 92 FL (ref 79–97)
MONOCYTES # BLD AUTO: 0.4 X10E3/UL (ref 0.1–0.9)
MONOCYTES NFR BLD AUTO: 8 %
NEUTROPHILS # BLD AUTO: 3.7 X10E3/UL (ref 1.4–7)
NEUTROPHILS NFR BLD AUTO: 69 %
PLATELET # BLD AUTO: 227 X10E3/UL (ref 150–450)
POTASSIUM SERPL-SCNC: 4.2 MMOL/L (ref 3.5–5.2)
PROT SERPL-MCNC: 6.6 G/DL (ref 6–8.5)
RBC # BLD AUTO: 3.89 X10E6/UL (ref 3.77–5.28)
SODIUM SERPL-SCNC: 138 MMOL/L (ref 134–144)
WBC # BLD AUTO: 5.3 X10E3/UL (ref 3.4–10.8)

## 2020-07-27 ENCOUNTER — OFFICE VISIT (OUTPATIENT)
Dept: CARDIOLOGY CLINIC | Age: 85
End: 2020-07-27

## 2020-07-27 VITALS
HEART RATE: 61 BPM | RESPIRATION RATE: 16 BRPM | BODY MASS INDEX: 35.24 KG/M2 | WEIGHT: 174.8 LBS | HEIGHT: 59 IN | SYSTOLIC BLOOD PRESSURE: 140 MMHG | DIASTOLIC BLOOD PRESSURE: 50 MMHG | OXYGEN SATURATION: 95 %

## 2020-07-27 DIAGNOSIS — R60.0 LOCALIZED EDEMA: ICD-10-CM

## 2020-07-27 DIAGNOSIS — I10 ESSENTIAL HYPERTENSION, BENIGN: ICD-10-CM

## 2020-07-27 DIAGNOSIS — I48.20 CHRONIC ATRIAL FIBRILLATION (HCC): Primary | ICD-10-CM

## 2020-07-27 NOTE — PROGRESS NOTES
HISTORY OF PRESENT ILLNESS  Ketan Scrhoeder is a 80 y.o. female     SUMMARY:   Problem List  Date Reviewed: 7/27/2020          Codes Class Noted    Severe obesity (BMI 35.0-39. 9) with comorbidity ([facility] ICD-10-CM: E66.01  ICD-9-CM: 278.01  4/25/2018        Type 2 diabetes mellitus with nephropathy ([facility] ICD-10-CM: E11.21  ICD-9-CM: 250.40, 583.81  12/19/2017        CHF (congestive heart failure) ([facility] ICD-10-CM: I50.9  ICD-9-CM: 428.0  12/6/2017        Anxiety ICD-10-CM: F41.9  ICD-9-CM: 300.00  10/2/2017        Allergic rhinitis ICD-10-CM: J30.9  ICD-9-CM: 477.9  4/11/2016        Chronic atrial fibrillation ([facility] ICD-10-CM: I48.20  ICD-9-CM: 427.31  4/8/2016        Advanced directives, counseling/discussion ICD-10-CM: Z71.89  ICD-9-CM: V65.49  3/15/2016        Type 2 diabetes, controlled, with neuropathy ([facility] ICD-10-CM: E11.40  ICD-9-CM: 250.60, 357.2  1/4/2016        Essential hypertension, benign ICD-10-CM: I10  ICD-9-CM: 401.1  6/4/2015        Edema ICD-10-CM: R60.9  ICD-9-CM: 782.3  11/12/2014    Overview Addendum 12/14/2017  8:24 AM by Joann Larry MD     4/16 echo normal lvef, lae, mild tr  12/17 echo normal lvef, elisha, mild to mod mr and tr, pa pressure 50mm             Headache(784.0) ICD-10-CM: R51  ICD-9-CM: 784.0  11/12/2014        Diverticulosis of colon (without mention of hemorrhage) ICD-10-CM: K57.30  ICD-9-CM: 562.10  11/12/2014        Constipation ICD-10-CM: K59.00  ICD-9-CM: 564.00  11/12/2014        Cyst of left kidney ICD-10-CM: N28.1  ICD-9-CM: 753.10  11/12/2014              Current Outpatient Medications on File Prior to Visit   Medication Sig    loratadine (Claritin) 10 mg tablet Take 10 mg by mouth daily.  furosemide (LASIX) 40 mg tablet TAKE 1 TABLET EVERY  DAY    glucose blood VI test strips (PRECISION XTRA TEST) strip USE TO TEST BLOOD SUGAR TWICE DAILY.  Dx E11.21    potassium chloride SR (KLOR-CON 10) 10 mEq tablet TAKE 2 TABLETS DAILY (NEW DIRECTIONS)    glimepiride (AMARYL) 1 mg tablet TAKE 1 TABLET BY MOUTH EVERY DAY. TAKE ONLY IF SUGAR IS ABOVE 140 (Patient taking differently: TAKE 1 TABLET BY MOUTH EVERY DAY. TAKE ONLY IF SUGAR IS ABOVE 140 Patient reports taking as needed)    clotrimazole-betamethasone (LOTRISONE) topical cream Apply  to affected area two (2) times daily as needed for Skin Irritation.  mirabegron ER (MYRBETRIQ) 50 mg ER tablet Per urology (Patient taking differently: Take 50 mg by mouth daily. Per urology)   [facility] 72 mcg cap Take  by mouth.  ferrous sulfate 325 mg (65 mg iron) tablet TAKE 1 TABLET BY MOUTH DAILY WITH BREAKFAST    aspirin delayed-release 81 mg tablet Take 1 Tab by mouth daily. (Patient taking differently: Take 81 mg by mouth every other day.)    CHOLECALCIFEROL, VITAMIN D3, (VITAMIN D3 PO) Take  by mouth daily.  Lancets misc 1 Package by Does Not Apply route daily. Test glucose daily and as needed Dx type 2 dm E11.42    magnesium hydroxide (MILK OF MAGNESIA) 2,400 mg/10 mL susp suspension Take 10 mL by mouth nightly. No current facility-administered medications on file prior to visit. CARDIOLOGY STUDIES TO DATE:  4/16 echo normal lvef, lae, mild tr  12/17 echo normal lvef, elisha, mild to mod mr and tr, pa pressure 50mm    Chief Complaint   Patient presents with    Follow-up     HPI :  She tried coming off the Lasix both within just a few days she did not feel good and the swelling was terrible so she went right back on it. She does not have any help at this time can get support hose on and off for her, so she not been wearing them even though in the past that was quite effective  . She has had 2 episodes in the last 3 to 4 weeks lasting a minute or 2 where she had a strange sensation in her left upper anterior chest, into her back and into her left shoulder.   It was not associated with any other symptoms and it occurred at rest.    CARDIAC ROS:   negative for dyspnea, palpitations, syncope, orthopnea, paroxysmal nocturnal dyspnea, exertional chest pressure/discomfort, claudication    No family history on file. Past Medical History:   Diagnosis Date    Anxiety     Bleeding nose     Chronic atrial fibrillation (HCC)     Diabetes ([address]     Hypertension        GENERAL ROS:  A comprehensive review of systems was negative except for that written in the HPI. Visit Vitals  /50 (BP 1 Location: Left arm, BP Patient Position: Sitting)   Pulse 61   Resp 16   Ht 4' 11\" (1.499 m)   Wt 174 lb 12.8 oz (79.3 kg)   SpO2 95%   BMI 35.31 kg/m²       Wt Readings from Last 3 Encounters:   07/27/20 174 lb 12.8 oz (79.3 kg)   07/21/20 175 lb 12.8 oz (79.7 kg)   06/30/20 178 lb 6.4 oz (80.9 kg)            BP Readings from Last 3 Encounters:   07/27/20 140/50   07/21/20 128/64   06/30/20 153/70       PHYSICAL EXAM  General appearance: alert, cooperative, no distress, appears stated age  Neurologic: Alert and oriented X 3  Neck: supple, symmetrical, trachea midline, no adenopathy, no carotid bruit and no JVD  Lungs: clear to auscultation bilaterally  Heart: regular rate and rhythm, S1, S2 normal, no murmur, click, rub or gallop  Extremities: edema tr    Lab Results   Component Value Date/Time    Cholesterol, total 146 03/21/2016 10:31 AM    Cholesterol, total 147 03/13/2015 02:06 PM    HDL Cholesterol 85 03/21/2016 10:31 AM    HDL Cholesterol 60 03/13/2015 02:06 PM    LDL, calculated 48 03/21/2016 10:31 AM    LDL, calculated 69 03/13/2015 02:06 PM    Triglyceride 64 03/21/2016 10:31 AM    Triglyceride 90 03/13/2015 02:06 PM     ASSESSMENT :      I am not sure what to make of those 2 episode she had in the last few weeks, But given the duration of the symptoms and lack of other symptoms I doubt it with a serious cardiac event. She needs no further cardiac testing at this time.   current treatment plan is effective, no change in therapy  lab results and schedule of future lab studies reviewed with patient  reviewed diet, exercise and weight control    Encounter Diagnoses   Name Primary?  Chronic atrial fibrillation (HCC) Yes    Essential hypertension, benign     Localized edema      No orders of the defined types were placed in this encounter. Follow-up and Dispositions    · Return in about 6 months (around 1/27/2021). Kvng Bauer MD  7/27/2020  Please note that this dictation was completed with [employer], the computer voice recognition software. Quite often unanticipated grammatical, syntax, homophones, and other interpretive errors are inadvertently transcribed by the computer software. Please disregard these errors. Please excuse any errors that have escaped final proofreading. Thank you.

## 2020-09-09 DIAGNOSIS — I48.20 CHRONIC ATRIAL FIBRILLATION (HCC): ICD-10-CM

## 2020-09-09 DIAGNOSIS — I10 ESSENTIAL HYPERTENSION, BENIGN: ICD-10-CM

## 2020-09-09 RX ORDER — FUROSEMIDE 40 MG/1
TABLET ORAL
Qty: 90 TAB | Refills: 3 | Status: SHIPPED | OUTPATIENT
Start: 2020-09-09 | End: 2021-01-25

## 2020-09-09 NOTE — TELEPHONE ENCOUNTER
Requested Prescriptions     Signed Prescriptions Disp Refills    furosemide (LASIX) 40 mg tablet 90 Tab 3     Sig: TAKE 1 TABLET DAILY     Authorizing Provider: Melanie Elise     Ordering User: Yenni Adkins    Per Dr. David Dale verbal orders     Future Appointments   Date Time Provider Tristan Beck   9/21/2020  1:40 PM Susie Aranda MD WEIM BS AMB   1/29/2021  1:20 PM MD TK Epps BS AMB

## 2020-09-15 ENCOUNTER — TELEPHONE (OUTPATIENT)
Dept: INTERNAL MEDICINE CLINIC | Age: 85
End: 2020-09-15

## 2020-09-15 NOTE — TELEPHONE ENCOUNTER
Spoke with pt - she states [facility] called her. Told her to call her pcp - they sent request for her Furosemide and he has not sent it in. Reviewing chart noted Dr Alejandro Begum sent in #[phone] with 3 refills back on 9/9/20 - pt informed. She will call her mail order tomorrow.

## 2020-09-21 ENCOUNTER — OFFICE VISIT (OUTPATIENT)
Dept: INTERNAL MEDICINE CLINIC | Age: 85
End: 2020-09-21
Payer: MEDICARE

## 2020-09-21 VITALS
OXYGEN SATURATION: 100 % | SYSTOLIC BLOOD PRESSURE: 135 MMHG | RESPIRATION RATE: 18 BRPM | BODY MASS INDEX: 36.29 KG/M2 | TEMPERATURE: 97.8 F | DIASTOLIC BLOOD PRESSURE: 63 MMHG | HEART RATE: 56 BPM | WEIGHT: 180 LBS | HEIGHT: 59 IN

## 2020-09-21 DIAGNOSIS — I50.22 CHRONIC SYSTOLIC CONGESTIVE HEART FAILURE (HCC): ICD-10-CM

## 2020-09-21 DIAGNOSIS — I10 ESSENTIAL HYPERTENSION, BENIGN: ICD-10-CM

## 2020-09-21 DIAGNOSIS — E11.21 TYPE 2 DIABETES MELLITUS WITH NEPHROPATHY (HCC): ICD-10-CM

## 2020-09-21 DIAGNOSIS — Z23 NEEDS FLU SHOT: ICD-10-CM

## 2020-09-21 DIAGNOSIS — I48.20 CHRONIC ATRIAL FIBRILLATION (HCC): Primary | ICD-10-CM

## 2020-09-21 DIAGNOSIS — E11.40 TYPE 2 DIABETES, CONTROLLED, WITH NEUROPATHY (HCC): ICD-10-CM

## 2020-09-21 PROCEDURE — G8417 CALC BMI ABV UP PARAM F/U: HCPCS | Performed by: INTERNAL MEDICINE

## 2020-09-21 PROCEDURE — 1090F PRES/ABSN URINE INCON ASSESS: CPT | Performed by: INTERNAL MEDICINE

## 2020-09-21 PROCEDURE — 99214 OFFICE O/P EST MOD 30 MIN: CPT | Performed by: INTERNAL MEDICINE

## 2020-09-21 PROCEDURE — G8510 SCR DEP NEG, NO PLAN REQD: HCPCS | Performed by: INTERNAL MEDICINE

## 2020-09-21 PROCEDURE — G8536 NO DOC ELDER MAL SCRN: HCPCS | Performed by: INTERNAL MEDICINE

## 2020-09-21 PROCEDURE — 1101F PT FALLS ASSESS-DOCD LE1/YR: CPT | Performed by: INTERNAL MEDICINE

## 2020-09-21 PROCEDURE — G8427 DOCREV CUR MEDS BY ELIG CLIN: HCPCS | Performed by: INTERNAL MEDICINE

## 2020-09-21 PROCEDURE — G0463 HOSPITAL OUTPT CLINIC VISIT: HCPCS | Performed by: INTERNAL MEDICINE

## 2020-09-21 PROCEDURE — 90694 VACC AIIV4 NO PRSRV 0.5ML IM: CPT

## 2020-09-21 NOTE — PROGRESS NOTES
HISTORY OF PRESENT ILLNESS  Darlin Goldsmith is a 80 y.o. female. HPI Subjective: Luis Lane is seen today for follow up of diabetes and other problems. She is accompanied by her caretaker. 1. Diabetes. Reviewed her blood sugars. As is typical for her, they are excellent. She is not requiring medication treatment. A1c is up to date. 2. Hypertension, stable on current regimen. 3. CHF, stable. She is asymptomatic. It turns out she does require daily Lasix. 4. Urinary incontinence. She follows up with urology as directed. Current Outpatient Medications   Medication Sig    vit A/vit C/vit E/zinc/copper (PRESERVISION AREDS PO) Take  by mouth.  furosemide (LASIX) 40 mg tablet TAKE 1 TABLET DAILY    loratadine (Claritin) 10 mg tablet Take 10 mg by mouth daily.  glucose blood VI test strips (PRECISION XTRA TEST) strip USE TO TEST BLOOD SUGAR TWICE DAILY. Dx E11.21    potassium chloride SR (KLOR-CON 10) 10 mEq tablet TAKE 2 TABLETS DAILY (NEW DIRECTIONS)    mirabegron ER (MYRBETRIQ) 50 mg ER tablet Per urology (Patient taking differently: Take 50 mg by mouth daily. Per urology)   [facility] 72 mcg cap Take  by mouth.  ferrous sulfate 325 mg (65 mg iron) tablet TAKE 1 TABLET BY MOUTH DAILY WITH BREAKFAST    aspirin delayed-release 81 mg tablet Take 1 Tab by mouth daily. (Patient taking differently: Take 81 mg by mouth every other day.)    CHOLECALCIFEROL, VITAMIN D3, (VITAMIN D3 PO) Take  by mouth daily.  Lancets misc 1 Package by Does Not Apply route daily. Test glucose daily and as needed Dx type 2 dm E11.42    glimepiride (AMARYL) 1 mg tablet TAKE 1 TABLET BY MOUTH EVERY DAY. TAKE ONLY IF SUGAR IS ABOVE 140 (Patient taking differently: TAKE 1 TABLET BY MOUTH EVERY DAY. TAKE ONLY IF SUGAR IS ABOVE 140 Patient reports taking as needed)    clotrimazole-betamethasone (LOTRISONE) topical cream Apply  to affected area two (2) times daily as needed for Skin Irritation.     magnesium hydroxide (MILK OF MAGNESIA) 2,400 mg/10 mL susp suspension Take 10 mL by mouth nightly. No current facility-administered medications for this visit. Review of Systems   Constitutional: Negative for weight loss. HENT: Positive for ear pain. Respiratory: Negative. Cardiovascular: Negative for chest pain, palpitations, leg swelling and PND. Musculoskeletal: Negative for myalgias. Neurological: Negative for focal weakness. Physical Exam  Vitals signs and nursing note reviewed. Neck:      Vascular: No carotid bruit. Cardiovascular:      Rate and Rhythm: Normal rate and regular rhythm. Heart sounds: No murmur. No friction rub. No gallop. Pulmonary:      Effort: Pulmonary effort is normal. No respiratory distress. Breath sounds: Normal breath sounds. Musculoskeletal:      Right lower leg: Edema present. Left lower leg: Edema present. Comments: Trace stasis edema bilateral lower extremities. ASSESSMENT and PLAN  Diagnoses and all orders for this visit:    1. Chronic atrial fibrillation ([facility]- Continue current regimen of prescription and / or OTC medications , See cardiologist as directed. 2. Essential hypertension, benign- Continue current regimen of prescription and / or OTC medications     3. Chronic systolic congestive heart failure [facility])- See cardiologist as directed. 4. Type 2 diabetes, controlled, with neuropathy ([address]- Follow off treatment     5. Type 2 diabetes mellitus with nephropathy ([address]    6.  Needs flu shot  -     FLU (FLUAD QUAD INFLUENZA VACCINE,QUAD,ADJUVANTED)

## 2020-09-21 NOTE — PATIENT INSTRUCTIONS
Vaccine Information Statement Influenza (Flu) Vaccine (Inactivated or Recombinant): What You Need to Know Many Vaccine Information Statements are available in Khmer and other languages. See www.immunize.org/vis Hojas de información sobre vacunas están disponibles en español y en muchos otros idiomas. Visite www.immunize.org/vis 1. Why get vaccinated? Influenza vaccine can prevent influenza (flu). Flu is a contagious disease that spreads around the [facility] every year, usually between October and May. Anyone can get the flu, but it is more dangerous for some people. Infants and young children, people 72years of age and older, pregnant women, and people with certain health conditions or a weakened immune system are at greatest risk of flu complications. Pneumonia, bronchitis, sinus infections and ear infections are examples of flu-related complications. If you have a medical condition, such as heart disease, cancer or diabetes, flu can make it worse. Flu can cause fever and chills, sore throat, muscle aches, fatigue, cough, headache, and runny or stuffy nose. Some people may have vomiting and diarrhea, though this is more common in children than adults. Each year thousands of people in the [facility] die from flu, and many more are hospitalized. Flu vaccine prevents millions of illnesses and flu-related visits to the doctor each year. 2. Influenza vaccines CDC recommends everyone 10months of age and older get vaccinated every flu season. Children 6 months through 6years of age may need 2 doses during a single flu season. Everyone else needs only 1 dose each flu season. It takes about 2 weeks for protection to develop after vaccination. There are many flu viruses, and they are always changing. Each year a new flu vaccine is made to protect against three or four viruses that are likely to cause disease in the upcoming flu season.  Even when the vaccine doesnt exactly match these viruses, it may still provide some protection. Influenza vaccine does not cause flu. Influenza vaccine may be given at the same time as other vaccines. 3. Talk with your health care provider Tell your vaccine provider if the person getting the vaccine: 
 Has had an allergic reaction after a previous dose of influenza vaccine, or has any severe, life-threatening allergies.  Has ever had Guillain-Barré Syndrome (also called GBS). In some cases, your health care provider may decide to postpone influenza vaccination to a future visit. People with minor illnesses, such as a cold, may be vaccinated. People who are moderately or severely ill should usually wait until they recover before getting influenza vaccine. Your health care provider can give you more information. 4. Risks of a reaction  Soreness, redness, and swelling where shot is given, fever, muscle aches, and headache can happen after influenza vaccine.  There may be a very small increased risk of Guillain-Barré Syndrome (GBS) after inactivated influenza vaccine (the flu shot). Maame Dietrich children who get the flu shot along with pneumococcal vaccine (PCV13), and/or DTaP vaccine at the same time might be slightly more likely to have a seizure caused by fever. Tell your health care provider if a child who is getting flu vaccine has ever had a seizure. People sometimes faint after medical procedures, including vaccination. Tell your provider if you feel dizzy or have vision changes or ringing in the ears. As with any medicine, there is a very remote chance of a vaccine causing a severe allergic reaction, other serious injury, or death. 5. What if there is a serious problem? An allergic reaction could occur after the vaccinated person leaves the clinic.  If you see signs of a severe allergic reaction (hives, swelling of the face and throat, difficulty breathing, a fast heartbeat, dizziness, or weakness), call 9-1-1 and get the person to the nearest hospital. 
 
For other signs that concern you, call your health care provider. Adverse reactions should be reported to the Vaccine Adverse Event Reporting System (VAERS). Your health care provider will usually file this report, or you can do it yourself. Visit the VAERS website at www.vaers. hhs.gov or call 0-368.684.4608. VAERS is only for reporting reactions, and VAERS staff do not give medical advice. 6. The National Vaccine Injury Compensation Program 
 
The [facility] Vaccine Injury Compensation Program (VICP) is a federal program that was created to compensate people who may have been injured by certain vaccines. Visit the VICP website at www.[facility].gov/vaccinecompensation or call 3-831.724.6961 to learn about the program and about filing a claim. There is a time limit to file a claim for compensation. 7. How can I learn more?  Ask your health care provider.  Call your local or state health department.  Contact the Centers for Disease Control and Prevention (CDC): 
- Call 5-795.865.5315 (1-800-CDC-INFO) or 
- Visit CDCs influenza website at www.cdc.gov/flu Vaccine Information Statement (Interim) Inactivated Influenza Vaccine 8/15/2019 
42 DEBRA Dale [id number] Department of [facility] and [employer] Centers for Disease Control and Prevention Office Use Only

## 2020-09-23 ENCOUNTER — DOCUMENTATION ONLY (OUTPATIENT)
Dept: INTERNAL MEDICINE CLINIC | Age: 85
End: 2020-09-23

## 2020-09-23 ENCOUNTER — TELEPHONE (OUTPATIENT)
Dept: INTERNAL MEDICINE CLINIC | Age: 85
End: 2020-09-23

## 2020-09-23 NOTE — TELEPHONE ENCOUNTER
Saw pt on Monday -- Dr. Chintan Loja told her he would try and find a cheaper alternative for Precision Extra Test Strips and let her know by yesterday. Please call -- patient states she is running out of current strips.

## 2020-09-23 NOTE — PROGRESS NOTES
Patient's current medication list faxed per PCP to Dr. Edwin Alfaro, fax no. [phone]. Confirmation of fax received.

## 2020-11-23 ENCOUNTER — TELEPHONE (OUTPATIENT)
Dept: INTERNAL MEDICINE CLINIC | Age: 85
End: 2020-11-23

## 2020-11-23 NOTE — TELEPHONE ENCOUNTER
Advised pt her forms were faxed back on 11/12/20 to [employer] [address] [place] [address]. She states she spoke with them last week and told her they had nothing there for her. Advised her I would re-fax tonight for her - done and confirmation received. She can check with them end of week to follow up. Pt also wanted to know when she was to return to see MD. Myron Peña her at her last appt in September he wanted her back in 2 months. Will forward message to Lillie Peterson to call pt in am to schedule her fuv.

## 2020-11-24 ENCOUNTER — TELEPHONE (OUTPATIENT)
Dept: INTERNAL MEDICINE CLINIC | Age: 85
End: 2020-11-24

## 2020-11-25 ENCOUNTER — OFFICE VISIT (OUTPATIENT)
Dept: INTERNAL MEDICINE CLINIC | Age: 85
End: 2020-11-25
Payer: MEDICARE

## 2020-11-25 VITALS
BODY MASS INDEX: 35.56 KG/M2 | OXYGEN SATURATION: 97 % | HEART RATE: 65 BPM | TEMPERATURE: 97.6 F | HEIGHT: 59 IN | SYSTOLIC BLOOD PRESSURE: 146 MMHG | RESPIRATION RATE: 16 BRPM | DIASTOLIC BLOOD PRESSURE: 75 MMHG | WEIGHT: 176.4 LBS

## 2020-11-25 DIAGNOSIS — I48.20 CHRONIC ATRIAL FIBRILLATION (HCC): ICD-10-CM

## 2020-11-25 DIAGNOSIS — I10 ESSENTIAL HYPERTENSION, BENIGN: ICD-10-CM

## 2020-11-25 DIAGNOSIS — E11.21 TYPE 2 DIABETES MELLITUS WITH NEPHROPATHY (HCC): ICD-10-CM

## 2020-11-25 DIAGNOSIS — E11.40 TYPE 2 DIABETES, CONTROLLED, WITH NEUROPATHY (HCC): ICD-10-CM

## 2020-11-25 DIAGNOSIS — I48.20 CHRONIC ATRIAL FIBRILLATION (HCC): Primary | ICD-10-CM

## 2020-11-25 DIAGNOSIS — E66.01 SEVERE OBESITY (BMI 35.0-39.9) WITH COMORBIDITY (HCC): ICD-10-CM

## 2020-11-25 LAB
ANION GAP SERPL CALC-SCNC: 5 MMOL/L (ref 5–15)
BASOPHILS # BLD: 0.1 K/UL (ref 0–0.1)
BASOPHILS NFR BLD: 1 % (ref 0–1)
BUN SERPL-MCNC: 23 MG/DL (ref 6–20)
BUN/CREAT SERPL: 28 (ref 12–20)
CALCIUM SERPL-MCNC: 8.5 MG/DL (ref 8.5–10.1)
CHLORIDE SERPL-SCNC: 104 MMOL/L (ref 97–108)
CO2 SERPL-SCNC: 30 MMOL/L (ref 21–32)
CREAT SERPL-MCNC: 0.83 MG/DL (ref 0.55–1.02)
DIFFERENTIAL METHOD BLD: ABNORMAL
EOSINOPHIL # BLD: 0.1 K/UL (ref 0–0.4)
EOSINOPHIL NFR BLD: 1 % (ref 0–7)
ERYTHROCYTE [DISTWIDTH] IN BLOOD BY AUTOMATED COUNT: 14.3 % (ref 11.5–14.5)
EST. AVERAGE GLUCOSE BLD GHB EST-MCNC: 126 MG/DL
GLUCOSE SERPL-MCNC: 93 MG/DL (ref 65–100)
HBA1C MFR BLD: 6 % (ref 4–5.6)
HCT VFR BLD AUTO: 34.3 % (ref 35–47)
HGB BLD-MCNC: 11.4 G/DL (ref 11.5–16)
IMM GRANULOCYTES # BLD AUTO: 0 K/UL (ref 0–0.04)
IMM GRANULOCYTES NFR BLD AUTO: 0 % (ref 0–0.5)
LYMPHOCYTES # BLD: 1.4 K/UL (ref 0.8–3.5)
LYMPHOCYTES NFR BLD: 25 % (ref 12–49)
MCH RBC QN AUTO: 31.4 PG (ref 26–34)
MCHC RBC AUTO-ENTMCNC: 33.2 G/DL (ref 30–36.5)
MCV RBC AUTO: 94.5 FL (ref 80–99)
MONOCYTES # BLD: 0.5 K/UL (ref 0–1)
MONOCYTES NFR BLD: 9 % (ref 5–13)
NEUTS SEG # BLD: 3.5 K/UL (ref 1.8–8)
NEUTS SEG NFR BLD: 64 % (ref 32–75)
NRBC # BLD: 0 K/UL (ref 0–0.01)
NRBC BLD-RTO: 0 PER 100 WBC
PLATELET # BLD AUTO: 232 K/UL (ref 150–400)
PMV BLD AUTO: 10.7 FL (ref 8.9–12.9)
POTASSIUM SERPL-SCNC: 4.3 MMOL/L (ref 3.5–5.1)
RBC # BLD AUTO: 3.63 M/UL (ref 3.8–5.2)
SODIUM SERPL-SCNC: 139 MMOL/L (ref 136–145)
WBC # BLD AUTO: 5.5 K/UL (ref 3.6–11)

## 2020-11-25 PROCEDURE — G8417 CALC BMI ABV UP PARAM F/U: HCPCS | Performed by: INTERNAL MEDICINE

## 2020-11-25 PROCEDURE — G8427 DOCREV CUR MEDS BY ELIG CLIN: HCPCS | Performed by: INTERNAL MEDICINE

## 2020-11-25 PROCEDURE — G8536 NO DOC ELDER MAL SCRN: HCPCS | Performed by: INTERNAL MEDICINE

## 2020-11-25 PROCEDURE — G0463 HOSPITAL OUTPT CLINIC VISIT: HCPCS | Performed by: INTERNAL MEDICINE

## 2020-11-25 PROCEDURE — G8432 DEP SCR NOT DOC, RNG: HCPCS | Performed by: INTERNAL MEDICINE

## 2020-11-25 PROCEDURE — 1090F PRES/ABSN URINE INCON ASSESS: CPT | Performed by: INTERNAL MEDICINE

## 2020-11-25 PROCEDURE — 99214 OFFICE O/P EST MOD 30 MIN: CPT | Performed by: INTERNAL MEDICINE

## 2020-11-25 PROCEDURE — 1101F PT FALLS ASSESS-DOCD LE1/YR: CPT | Performed by: INTERNAL MEDICINE

## 2020-11-25 NOTE — PROGRESS NOTES
HISTORY OF PRESENT ILLNESS  Manpreet Arnold is a 80 y.o. female. JELANI Zavala is seen today accompanied by her friend and Micki Sullivan for follow up of hypertension and other problems. 1. Hypertension. Fair readings. I have asked her to check some home readings. Sima Rosen will help with the arm monitor. I will adjust medications if needed. 2. Diabetes. Due for lab recheck. Reviewed home sugars which look OK. She requires diabetic shoes and inserts due to foot deformity and circulatory issues. I am prescribing these and forms are completed. 3. Atrial fibrillation, congestive heart failure. Stable on current regimen. Current Outpatient Medications   Medication Sig    vit A/vit C/vit E/zinc/copper (PRESERVISION AREDS PO) Take  by mouth.  furosemide (LASIX) 40 mg tablet TAKE 1 TABLET DAILY    loratadine (Claritin) 10 mg tablet Take 10 mg by mouth daily.  glucose blood VI test strips (PRECISION XTRA TEST) strip USE TO TEST BLOOD SUGAR TWICE DAILY. Dx E11.21    potassium chloride SR (KLOR-CON 10) 10 mEq tablet TAKE 2 TABLETS DAILY (NEW DIRECTIONS)    glimepiride (AMARYL) 1 mg tablet TAKE 1 TABLET BY MOUTH EVERY DAY. TAKE ONLY IF SUGAR IS ABOVE 140 (Patient taking differently: TAKE 1 TABLET BY MOUTH EVERY DAY. TAKE ONLY IF SUGAR IS ABOVE 140 Patient reports taking as needed)    clotrimazole-betamethasone (LOTRISONE) topical cream Apply  to affected area two (2) times daily as needed for Skin Irritation.  mirabegron ER (MYRBETRIQ) 50 mg ER tablet Per urology (Patient taking differently: Take 50 mg by mouth daily. Per urology)   [facility] linaclotide [facility]) 72 mcg cap Take  by mouth.  ferrous sulfate 325 mg (65 mg iron) tablet TAKE 1 TABLET BY MOUTH DAILY WITH BREAKFAST    aspirin delayed-release 81 mg tablet Take 1 Tab by mouth daily. (Patient taking differently: Take 81 mg by mouth every other day.)    CHOLECALCIFEROL, VITAMIN D3, (VITAMIN D3 PO) Take  by mouth daily.     Lancets misc 1 Package by Does Not Apply route daily. Test glucose daily and as needed Dx type 2 dm E11.42    magnesium hydroxide (MILK OF MAGNESIA) 2,400 mg/10 mL susp suspension Take 10 mL by mouth nightly. No current facility-administered medications for this visit. Review of Systems   Constitutional: Negative for weight loss. Respiratory: Negative. Cardiovascular: Negative for chest pain, palpitations, leg swelling and PND. Musculoskeletal: Positive for back pain and joint pain. Negative for myalgias. Neurological: Negative for focal weakness. Physical Exam  Vitals signs and nursing note reviewed. Constitutional:       General: She is not in acute distress. Cardiovascular:      Rate and Rhythm: Normal rate. Rhythm regularly irregular. Heart sounds: Murmur present. Systolic murmur present with a grade of 2/6. No friction rub. No gallop. Pulmonary:      Effort: Pulmonary effort is normal.      Breath sounds: Normal breath sounds. ASSESSMENT and PLAN  Diagnoses and all orders for this visit:    1. Chronic atrial fibrillation (HCC)  -     CBC WITH AUTOMATED DIFF; Future    2. Essential hypertension, benign- Proceed with plan as discussed     3. Type 2 diabetes, controlled, with neuropathy ([address]  -     HEMOGLOBIN A1C WITH EAG; Future  -     METABOLIC PANEL, BASIC; Future    4. Type 2 diabetes mellitus with nephropathy (HCC)  -     HEMOGLOBIN A1C WITH EAG; Future  -     METABOLIC PANEL, BASIC; Future    5. Severe obesity (BMI 35.0-39. 9) with comorbidity ([address]- follow    Plan was reviewed with patient and family, understanding expressed

## 2020-12-04 DIAGNOSIS — M79.89 LEG SWELLING: ICD-10-CM

## 2020-12-04 DIAGNOSIS — R06.02 SOB (SHORTNESS OF BREATH) ON EXERTION: ICD-10-CM

## 2020-12-04 RX ORDER — POTASSIUM CHLORIDE 750 MG/1
TABLET, FILM COATED, EXTENDED RELEASE ORAL
Qty: 180 TAB | Refills: 3 | Status: SHIPPED | OUTPATIENT
Start: 2020-12-04 | End: 2021-01-01

## 2020-12-11 ENCOUNTER — HOSPITAL ENCOUNTER (EMERGENCY)
Age: 85
Discharge: HOME | End: 2020-12-11
Attending: EMERGENCY MEDICINE | Admitting: EMERGENCY MEDICINE
Payer: MEDICARE

## 2020-12-11 VITALS
TEMPERATURE: 98.1 F | RESPIRATION RATE: 16 BRPM | DIASTOLIC BLOOD PRESSURE: 72 MMHG | SYSTOLIC BLOOD PRESSURE: 133 MMHG | HEART RATE: 71 BPM | OXYGEN SATURATION: 97 %

## 2020-12-11 DIAGNOSIS — H92.22 BLEEDING FROM LEFT EAR: Primary | ICD-10-CM

## 2020-12-11 PROCEDURE — 99282 EMERGENCY DEPT VISIT SF MDM: CPT

## 2020-12-11 RX ORDER — OFLOXACIN 3 MG/ML
4 SOLUTION AURICULAR (OTIC) 2 TIMES DAILY
Qty: 5 ML | Refills: 0 | Status: SHIPPED | OUTPATIENT
Start: 2020-12-11 | End: 2020-12-14

## 2020-12-11 NOTE — ED TRIAGE NOTES
Triage: [facility] her left [facility]. Pt reports the bleeding started abruptly and ceased without intervention. [facility]erred from [facility] as they don't have an ENT on staff.

## 2020-12-12 NOTE — ED PROVIDER NOTES
Patient is a 59-year-old female with history of anxiety, chronic atrial fibrillation not on anticoagulation, diabetes, hypertension, presenting to emergency department for evaluation of bleeding from the left ear   Which started around 4 PM today while she was sitting and eating lunch. Patient notes that she was coughing a lot last night, but was not coughing throughout the day today, denies any falls or trauma to the head today. She takes a baby aspirin every other day. [facility] referred her to ED. She had tympanostomy tubes placed bilaterally 2 to 3 months ago, she is followed by Dr. Marry Good for ENT. She does note that she had a hemorrhage in the left ear 2 years ago which resulted in decreased hearing, but she cannot remember why she had a hemorrhage. She denies any current decrease in hearing today. She denies headache, dizziness, vision changes, hearing loss, neck pain, facial pain, chest pain, shortness of breath,  or any other medical complaints at this time. She is in ED with her caretaker who aids in history. Discussed patient with ED attending Marialuisa Rivers MD who agrees with current management plan. Past Medical History:   Diagnosis Date    Anxiety     Bleeding nose     Chronic atrial fibrillation (HCC)     Diabetes ([facility] [address]     Hypertension        Past Surgical History:   Procedure Laterality Date    HX CATARACT REMOVAL      HX CHOLECYSTECTOMY      HX HEENT      myringotomy    HX HEENT  07/2016    basal cell removal -forehead    HX TONSILLECTOMY      childhood    HX TYMPANOSTOMY  07/11/2016    left ear    HX TYMPANOSTOMY      bilateral         No family history on file.     Social History     Socioeconomic History    Marital status:      Spouse name: Not on file    Number of children: Not on file    Years of education: Not on file    Highest education level: Not on file   Occupational History    Not on file Social Needs    Financial resource strain: Not on file    Food insecurity     Worry: Not on file     Inability: Not on file    Transportation needs     Medical: Not on file     Non-medical: Not on file   Tobacco Use    Smoking status: Former Smoker     Packs/day: 1.00     Years: 10.00     Pack years: 10.00     Last attempt to quit: 10/12/1993     Years since quittin.1    Smokeless tobacco: Never Used   Substance and Sexual Activity    Alcohol use: Yes     Frequency: 2-4 times a month     Comment: 1 glass of wine weekly    Drug use: No    Sexual activity: Not Currently   Lifestyle    Physical activity     Days per week: Not on file     Minutes per session: Not on file    Stress: Not on file   Relationships    Social connections     Talks on phone: Not on file     Gets together: Not on file     Attends Orthodox service: Not on file     Active member of club or organization: Not on file     Attends meetings of clubs or organizations: Not on file     Relationship status: Not on file    Intimate partner violence     Fear of current or ex partner: Not on file     Emotionally abused: Not on file     Physically abused: Not on file     Forced sexual activity: Not on file   Other Topics Concern    Not on file   Social History Narrative    Not on file         ALLERGIES: Amoxicillin; Aspirin; Gabapentin; Levaquin [levofloxacin]; Lorazepam; Losartan; Lyrica [pregabalin]; Other medication; and Sertraline    Review of Systems   Constitutional: Negative for chills and fever. HENT: Positive for ear discharge. Negative for ear pain and sore throat. Eyes: Negative for visual disturbance. Respiratory: Negative for cough and shortness of breath. Cardiovascular: Negative for chest pain. Gastrointestinal: Negative for abdominal pain. Genitourinary: Negative for flank pain. Musculoskeletal: Negative for back pain. Skin: Negative for color change. Neurological: Negative for dizziness and headaches. Psychiatric/Behavioral: Negative for confusion. Vitals:    12/11/20 1847   BP: 133/72   Pulse: 71   Resp: 16   Temp: 98.1 °F (36.7 °C)   SpO2: 97%            Physical Exam  Vitals signs and nursing note reviewed. Constitutional:       General: She is not in acute distress. Appearance: Normal appearance. She is not ill-appearing. HENT:      Head: Normocephalic and atraumatic. Right Ear: Hearing, tympanic membrane, ear canal and external ear normal.      Left Ear: Hearing normal. No decreased hearing noted. Drainage (blood pooled within the external canal) present. No laceration, swelling or tenderness. No foreign body. Mouth/Throat:      Pharynx: Oropharynx is clear. Eyes:      Extraocular Movements: Extraocular movements intact. Conjunctiva/sclera: Conjunctivae normal.   Neck:      Musculoskeletal: No neck rigidity. Cardiovascular:      Rate and Rhythm: Normal rate and regular rhythm. Pulmonary:      Effort: Pulmonary effort is normal.      Breath sounds: Normal breath sounds. Abdominal:      Palpations: Abdomen is soft. Tenderness: There is no abdominal tenderness. Musculoskeletal: Normal range of motion. Skin:     General: Skin is warm and dry. Neurological:      General: No focal deficit present. Mental Status: She is alert and oriented to person, place, and time. Psychiatric:         Mood and Affect: Mood normal.          MDM  Number of Diagnoses or Management Options  Bleeding from left ear:   Diagnosis management comments: Patient is alert, well-appearing, afebrile, vitals stable. Onset of atraumatic left ear bleeding 3 hours prior to arrival.  She has visible blood pooled in the left external canal, not able to fully visualize the tympanic membrane secondary to blood. No tenderness, no foreign body, hearing is at patient's baseline. Right ear normal.  No additional findings on physical exam.  Plan for ENT consult.     CONSULT NOTE:  8:04 PM Emy Smith [place], [place] spoke with Jim Ashton NP , Consult for ENT. Discussed available diagnostic tests and clinical findings. She is in agreement with care plans as outlined. Recommends ED discharge, ofloxacin eardrops 4 drops twice daily through the weekend, and she will follow-up in the office on Monday. 8:14 PM  Patient has a documented allergy for Levaquin with rashes and hives, I spoke with Yu Thompson and pharmacy who is sure that there is no cross-reactivity between a systemic allergy with eardrop allergy and that patient should be safe with the ofloxacin eardrops. Patient informed of this. Amount and/or Complexity of Data Reviewed  Discuss the patient with other providers: yes      8:15 PM  Pt has been reevaluated. There are no new complaints, changes, or physical findings at this time. Medications have been reviewed w/ pt and/or family. Pt and/or family's questions have been answered. Pt and/or family expressed good understanding of the dx/tx/rx and is in agreement with plan of care. Pt instructed and agreed to f/u w/ ENT and to return to ED upon further deterioration. Pt is ready for discharge. Discussed patient with ED attending Cielo Byrnes MD who agrees with current management plan. IMPRESSION:  1. Bleeding from left ear        PLAN:  1. Current Discharge Medication List      START taking these medications    Details   ofloxacin (FLOXIN) 0.3 % otic solution Administer 4 Drops in left ear two (2) times a day for 3 days. Qty: 5 mL, Refills: 0           2.    Follow-up Information     Follow up With Specialties Details Why Contact Info    Christian Edwards MD Otolaryngology Go in 3 days  [address] [facility]  [address]  [address]  [phone]              Return to ED if worse          Procedures

## 2020-12-12 NOTE — DISCHARGE INSTRUCTIONS
Call your ENT office first thing Monday morning and schedule an appointment    Use eardrops, 4 drops twice daily    You can call the ENT office and then pressed 9 and will automatically call the on-call nurse practitioners phone who is aware of your visit in the ED tonight

## 2020-12-14 ENCOUNTER — PATIENT OUTREACH (OUTPATIENT)
Dept: CASE MANAGEMENT | Age: 85
End: 2020-12-14

## 2020-12-14 NOTE — PROGRESS NOTES
Patient contacted regarding recent discharge and COVID-19 risk. Discussed COVID-19 related testing which was not done at this time. Ambulatory Care Manager contacted the patient by telephone to perform post discharge assessment. Verified name and  with patient as identifiers. Patient has following risk factors of: heart failure, diabetes and ED visit 20. ACM reviewed discharge instructions, medical action plan and red flags related to discharge diagnosis. Reviewed and educated them on any new and changed medications related to discharge diagnosis. Patient denied questions or concerns regarding discharge instructions or medications. Patient reports starting medication prescribed from ED and has follow up appointment scheduled with ENT later today. Advance Care Planning:   Does patient have an Advance Directive: yes; reviewed and current     Patient verified healthcare decision makers as correctly listed in chart: Irena Limon (sister) [phone]    Education provided regarding infection prevention, and signs and symptoms of COVID-19 and when to seek medical attention with patient who verbalized understanding. Discussed exposure protocols and quarantine from [facility] you at higher risk for severe illness  and given an opportunity for questions and concerns. The patient was supplied the COVID-19 hotline [phone] and [facility] hotline [phone]. AC recommended patient follow up with PCP and provided AC contact information for future reference. From CDC: Are you at higher risk for severe illness?  Wash your hands often and avoid touching eyes, nose and mouth.  Avoid close contact (6 feet, which is about two arm lengths) with people who are sick.  Put distance between yourself and other people if COVID-19 is spreading in your community.  Clean and disinfect frequently touched surfaces.    Avoid all cruise travel and non-essential air travel.  Call your healthcare professional if you have concerns about COVID-19 and your underlying condition or if you are sick. For more information on steps you can take to protect yourself, see CDC's How to Protect Yourself      Patient/family/caregiver given information for GetWell Loop and agrees to enroll yes  Patient's preferred e-mail:  [email or web address]  Patient's preferred phone number: [phone]  Based on Loop alert triggers, patient will be contacted by nurse care manager for worsening symptoms. Pt will be further monitored by COVID Loop Team based on severity of symptoms and risk factors. Begin CCM at next outreach.      Jamila Arechiga, RN  Ambulatory Care Manager

## 2020-12-14 NOTE — ACP (ADVANCE CARE PLANNING)
Advance Care Planning:   Does patient have an Advance Directive: yes; reviewed and current     Patient verified healthcare decision makers as correctly listed in chart: Josue Silva (sister) [phone]  Dorene Daniel RN  Ambulatory Care Manager

## 2020-12-15 ENCOUNTER — TELEPHONE (OUTPATIENT)
Dept: INTERNAL MEDICINE CLINIC | Age: 85
End: 2020-12-15

## 2020-12-15 DIAGNOSIS — I10 ESSENTIAL HYPERTENSION, BENIGN: ICD-10-CM

## 2020-12-15 DIAGNOSIS — I10 ESSENTIAL HYPERTENSION, BENIGN: Primary | ICD-10-CM

## 2020-12-15 RX ORDER — METOPROLOL SUCCINATE 25 MG/1
25 TABLET, EXTENDED RELEASE ORAL DAILY
Qty: 30 TAB | Refills: 3 | Status: SHIPPED | OUTPATIENT
Start: 2020-12-15 | End: 2021-03-08 | Stop reason: SDUPTHER

## 2020-12-15 RX ORDER — METOPROLOL SUCCINATE 25 MG/1
25 TABLET, EXTENDED RELEASE ORAL DAILY
Qty: 30 TAB | Refills: 3 | Status: SHIPPED | OUTPATIENT
Start: 2020-12-15 | End: 2020-12-15 | Stop reason: SDUPTHER

## 2020-12-15 NOTE — TELEPHONE ENCOUNTER
----- Message from Lizzie Chamberlain MD sent at 12/15/2020  5:24 AM EST -----  Regarding: MD Alejandro Lundberg [facility] Team Three Pool           Call- I have received and reviewed BP readings. We need to make a slight adjustment. Start metoprolol XL 25 mg every day, I will send in . Schedule follow up for 3 weeks. Continue to monitor BP at home.

## 2020-12-15 NOTE — TELEPHONE ENCOUNTER
Ms. Jerrol Primrose called to give some information about medications Ms. Almendarez needs from the ER? She states she needs to speak with [facility] asa because this information is time sensitive.

## 2020-12-15 NOTE — TELEPHONE ENCOUNTER
Advised pt MD has received and reviewed BP readings. He needed to make a slight adjustment. Start metoprolol XL 25 mg every day. He sent in . Schedule follow up for 3 weeks - forward to [place] to schedule. Continue to monitor BP at home. Pt requested new Rx be sent  To Luis Felipe at [address] [place] [address] - will send.

## 2020-12-16 ENCOUNTER — TELEPHONE (OUTPATIENT)
Dept: INTERNAL MEDICINE CLINIC | Age: 85
End: 2020-12-16

## 2020-12-16 NOTE — TELEPHONE ENCOUNTER
Spoke with pt - she states she picked up Metoprolol 25 mg today. She read the information about it and she has some question for the doctor. 1- she read this med may make you drowsy. She states she doesn't need to take anything that makes her drowsy. 2- it said she must speak with her MD prior to drinking alcohol when taking this med. She drinks a glass of wine with dinner and does not want to give that up.  Advised her I will forward her concerns to MD.

## 2020-12-16 NOTE — TELEPHONE ENCOUNTER
Patient asking for a callback from [facility] regarding the new medication Dr. Geneva Esquivel prescribed for her yesterday. Has some questions about it.

## 2020-12-31 ENCOUNTER — TELEPHONE (OUTPATIENT)
Dept: INTERNAL MEDICINE CLINIC | Age: 85
End: 2020-12-31

## 2020-12-31 NOTE — TELEPHONE ENCOUNTER
Patient asked if Allison Cazares could ask Dr. Radha Sands whether she should get the covid vaccine since she has allergies.

## 2021-01-01 ENCOUNTER — PATIENT OUTREACH (OUTPATIENT)
Dept: CASE MANAGEMENT | Age: 86
End: 2021-01-01

## 2021-01-01 ENCOUNTER — TELEPHONE (OUTPATIENT)
Dept: INTERNAL MEDICINE CLINIC | Age: 86
End: 2021-01-01

## 2021-01-01 ENCOUNTER — APPOINTMENT (OUTPATIENT)
Dept: CT IMAGING | Age: 86
DRG: 644 | End: 2021-01-01
Attending: INTERNAL MEDICINE
Payer: MEDICARE

## 2021-01-01 ENCOUNTER — OFFICE VISIT (OUTPATIENT)
Dept: INTERNAL MEDICINE CLINIC | Age: 86
End: 2021-01-01
Payer: MEDICARE

## 2021-01-01 ENCOUNTER — HOSPITAL ENCOUNTER (INPATIENT)
Age: 86
LOS: 9 days | Discharge: NURSING, REHAB OR LONG-TERM CARE | DRG: 644 | End: 2021-12-29
Attending: EMERGENCY MEDICINE | Admitting: INTERNAL MEDICINE
Payer: MEDICARE

## 2021-01-01 ENCOUNTER — APPOINTMENT (OUTPATIENT)
Dept: GENERAL RADIOLOGY | Age: 86
DRG: 644 | End: 2021-01-01
Attending: INTERNAL MEDICINE
Payer: MEDICARE

## 2021-01-01 ENCOUNTER — TELEPHONE (OUTPATIENT)
Dept: CARDIOLOGY CLINIC | Age: 86
End: 2021-01-01

## 2021-01-01 ENCOUNTER — OFFICE VISIT (OUTPATIENT)
Dept: CARDIOLOGY CLINIC | Age: 86
End: 2021-01-01
Payer: MEDICARE

## 2021-01-01 ENCOUNTER — APPOINTMENT (OUTPATIENT)
Dept: VASCULAR SURGERY | Age: 86
DRG: 644 | End: 2021-01-01
Attending: INTERNAL MEDICINE
Payer: MEDICARE

## 2021-01-01 ENCOUNTER — APPOINTMENT (OUTPATIENT)
Dept: GENERAL RADIOLOGY | Age: 86
DRG: 644 | End: 2021-01-01
Attending: EMERGENCY MEDICINE
Payer: MEDICARE

## 2021-01-01 ENCOUNTER — NURSE TRIAGE (OUTPATIENT)
Dept: OTHER | Facility: CLINIC | Age: 86
End: 2021-01-01

## 2021-01-01 ENCOUNTER — ANCILLARY PROCEDURE (OUTPATIENT)
Dept: CARDIOLOGY CLINIC | Age: 86
End: 2021-01-01
Payer: MEDICARE

## 2021-01-01 VITALS
HEART RATE: 60 BPM | HEIGHT: 59 IN | DIASTOLIC BLOOD PRESSURE: 70 MMHG | BODY MASS INDEX: 35.48 KG/M2 | SYSTOLIC BLOOD PRESSURE: 130 MMHG | RESPIRATION RATE: 14 BRPM | OXYGEN SATURATION: 97 % | WEIGHT: 176 LBS

## 2021-01-01 VITALS
HEIGHT: 59 IN | TEMPERATURE: 97.8 F | RESPIRATION RATE: 16 BRPM | HEART RATE: 88 BPM | DIASTOLIC BLOOD PRESSURE: 73 MMHG | SYSTOLIC BLOOD PRESSURE: 169 MMHG | OXYGEN SATURATION: 95 % | BODY MASS INDEX: 36.89 KG/M2 | WEIGHT: 183 LBS

## 2021-01-01 VITALS
SYSTOLIC BLOOD PRESSURE: 130 MMHG | WEIGHT: 176 LBS | BODY MASS INDEX: 35.48 KG/M2 | HEIGHT: 59 IN | DIASTOLIC BLOOD PRESSURE: 70 MMHG

## 2021-01-01 VITALS
HEIGHT: 59 IN | OXYGEN SATURATION: 96 % | HEART RATE: 53 BPM | RESPIRATION RATE: 20 BRPM | WEIGHT: 178.2 LBS | BODY MASS INDEX: 35.92 KG/M2 | SYSTOLIC BLOOD PRESSURE: 144 MMHG | DIASTOLIC BLOOD PRESSURE: 55 MMHG | TEMPERATURE: 97.8 F

## 2021-01-01 VITALS
HEART RATE: 55 BPM | SYSTOLIC BLOOD PRESSURE: 120 MMHG | WEIGHT: 175 LBS | HEIGHT: 59 IN | RESPIRATION RATE: 16 BRPM | OXYGEN SATURATION: 98 % | DIASTOLIC BLOOD PRESSURE: 60 MMHG | BODY MASS INDEX: 35.28 KG/M2

## 2021-01-01 VITALS
WEIGHT: 197.31 LBS | DIASTOLIC BLOOD PRESSURE: 54 MMHG | SYSTOLIC BLOOD PRESSURE: 156 MMHG | OXYGEN SATURATION: 97 % | RESPIRATION RATE: 15 BRPM | TEMPERATURE: 97.5 F | BODY MASS INDEX: 39.85 KG/M2 | HEART RATE: 77 BPM

## 2021-01-01 VITALS
OXYGEN SATURATION: 98 % | BODY MASS INDEX: 36.16 KG/M2 | WEIGHT: 179.4 LBS | RESPIRATION RATE: 20 BRPM | DIASTOLIC BLOOD PRESSURE: 40 MMHG | HEIGHT: 59 IN | SYSTOLIC BLOOD PRESSURE: 109 MMHG | HEART RATE: 54 BPM | TEMPERATURE: 98 F

## 2021-01-01 VITALS
RESPIRATION RATE: 16 BRPM | SYSTOLIC BLOOD PRESSURE: 137 MMHG | HEART RATE: 51 BPM | OXYGEN SATURATION: 96 % | TEMPERATURE: 97.1 F | WEIGHT: 178 LBS | BODY MASS INDEX: 35.95 KG/M2 | DIASTOLIC BLOOD PRESSURE: 68 MMHG

## 2021-01-01 DIAGNOSIS — E11.40 TYPE 2 DIABETES, CONTROLLED, WITH NEUROPATHY (HCC): ICD-10-CM

## 2021-01-01 DIAGNOSIS — I48.20 CHRONIC ATRIAL FIBRILLATION (HCC): Primary | ICD-10-CM

## 2021-01-01 DIAGNOSIS — I34.0 MITRAL VALVE INSUFFICIENCY, UNSPECIFIED ETIOLOGY: ICD-10-CM

## 2021-01-01 DIAGNOSIS — I48.20 CHRONIC ATRIAL FIBRILLATION (HCC): ICD-10-CM

## 2021-01-01 DIAGNOSIS — I50.22 CHRONIC SYSTOLIC CONGESTIVE HEART FAILURE (HCC): ICD-10-CM

## 2021-01-01 DIAGNOSIS — J20.8 ACUTE BACTERIAL BRONCHITIS: ICD-10-CM

## 2021-01-01 DIAGNOSIS — I10 ESSENTIAL HYPERTENSION, BENIGN: ICD-10-CM

## 2021-01-01 DIAGNOSIS — R60.0 LOCALIZED EDEMA: ICD-10-CM

## 2021-01-01 DIAGNOSIS — R06.02 SOB (SHORTNESS OF BREATH) ON EXERTION: ICD-10-CM

## 2021-01-01 DIAGNOSIS — Z23 NEEDS FLU SHOT: ICD-10-CM

## 2021-01-01 DIAGNOSIS — E11.21 TYPE 2 DIABETES MELLITUS WITH NEPHROPATHY (HCC): ICD-10-CM

## 2021-01-01 DIAGNOSIS — I50.9 CONGESTIVE HEART FAILURE, UNSPECIFIED HF CHRONICITY, UNSPECIFIED HEART FAILURE TYPE (HCC): ICD-10-CM

## 2021-01-01 DIAGNOSIS — D63.8 ANEMIA, CHRONIC DISEASE: ICD-10-CM

## 2021-01-01 DIAGNOSIS — B96.89 ACUTE BACTERIAL BRONCHITIS: ICD-10-CM

## 2021-01-01 DIAGNOSIS — E87.1 HYPONATREMIA: Primary | ICD-10-CM

## 2021-01-01 DIAGNOSIS — M79.89 LEG SWELLING: ICD-10-CM

## 2021-01-01 DIAGNOSIS — I10 ESSENTIAL HYPERTENSION, BENIGN: Primary | ICD-10-CM

## 2021-01-01 DIAGNOSIS — E66.01 SEVERE OBESITY (BMI 35.0-39.9) WITH COMORBIDITY (HCC): ICD-10-CM

## 2021-01-01 DIAGNOSIS — S61.412A LACERATION OF LEFT HAND WITHOUT FOREIGN BODY, INITIAL ENCOUNTER: ICD-10-CM

## 2021-01-01 DIAGNOSIS — R60.0 BILATERAL LEG EDEMA: ICD-10-CM

## 2021-01-01 DIAGNOSIS — F41.9 ANXIETY: ICD-10-CM

## 2021-01-01 DIAGNOSIS — I50.9 OTHER CONGESTIVE HEART FAILURE (HCC): Primary | ICD-10-CM

## 2021-01-01 DIAGNOSIS — G47.34 NOCTURNAL HYPOXIA: Primary | ICD-10-CM

## 2021-01-01 LAB
ALBUMIN SERPL ELPH-MCNC: 3.3 G/DL (ref 2.9–4.4)
ALBUMIN SERPL-MCNC: 2.6 G/DL (ref 3.5–5)
ALBUMIN SERPL-MCNC: 2.7 G/DL (ref 3.5–5)
ALBUMIN SERPL-MCNC: 3.2 G/DL (ref 3.5–5)
ALBUMIN SERPL-MCNC: 3.8 G/DL (ref 3.5–5)
ALBUMIN/GLOB SERPL: 1 {RATIO} (ref 1.1–2.2)
ALBUMIN/GLOB SERPL: 1 {RATIO} (ref 1.1–2.2)
ALBUMIN/GLOB SERPL: 1.2 {RATIO} (ref 0.7–1.7)
ALP SERPL-CCNC: 145 U/L (ref 45–117)
ALP SERPL-CCNC: 170 U/L (ref 45–117)
ALPHA1 GLOB SERPL ELPH-MCNC: 0.3 G/DL (ref 0–0.4)
ALPHA2 GLOB SERPL ELPH-MCNC: 0.5 G/DL (ref 0.4–1)
ALT SERPL-CCNC: 40 U/L (ref 12–78)
ALT SERPL-CCNC: 42 U/L (ref 12–78)
ANION GAP SERPL CALC-SCNC: 10 MMOL/L (ref 5–15)
ANION GAP SERPL CALC-SCNC: 11 MMOL/L (ref 5–15)
ANION GAP SERPL CALC-SCNC: 12 MMOL/L (ref 5–15)
ANION GAP SERPL CALC-SCNC: 3 MMOL/L (ref 5–15)
ANION GAP SERPL CALC-SCNC: 3 MMOL/L (ref 5–15)
ANION GAP SERPL CALC-SCNC: 4 MMOL/L (ref 5–15)
ANION GAP SERPL CALC-SCNC: 5 MMOL/L (ref 5–15)
ANION GAP SERPL CALC-SCNC: 6 MMOL/L (ref 5–15)
ANION GAP SERPL CALC-SCNC: 7 MMOL/L (ref 5–15)
ANION GAP SERPL CALC-SCNC: 8 MMOL/L (ref 5–15)
ANION GAP SERPL CALC-SCNC: 9 MMOL/L (ref 5–15)
APPEARANCE UR: CLEAR
AST SERPL-CCNC: 62 U/L (ref 15–37)
AST SERPL-CCNC: 64 U/L (ref 15–37)
B PERT DNA SPEC QL NAA+PROBE: NOT DETECTED
B-GLOBULIN SERPL ELPH-MCNC: 0.9 G/DL (ref 0.7–1.3)
BACTERIA SPEC CULT: NORMAL
BACTERIA URNS QL MICRO: NEGATIVE /HPF
BASOPHILS # BLD: 0 K/UL (ref 0–0.1)
BASOPHILS NFR BLD: 0 % (ref 0–1)
BASOPHILS NFR BLD: 0 % (ref 0–1)
BASOPHILS NFR BLD: 1 % (ref 0–1)
BILIRUB SERPL-MCNC: 1.7 MG/DL (ref 0.2–1)
BILIRUB SERPL-MCNC: 1.8 MG/DL (ref 0.2–1)
BILIRUB UR QL: NEGATIVE
BNP SERPL-MCNC: 9031 PG/ML
BORDETELLA PARAPERTUSSIS PCR, BORPAR: NOT DETECTED
BUN SERPL-MCNC: 13 MG/DL (ref 6–20)
BUN SERPL-MCNC: 13 MG/DL (ref 6–20)
BUN SERPL-MCNC: 14 MG/DL (ref 6–20)
BUN SERPL-MCNC: 15 MG/DL (ref 6–20)
BUN SERPL-MCNC: 16 MG/DL (ref 6–20)
BUN SERPL-MCNC: 17 MG/DL (ref 6–20)
BUN SERPL-MCNC: 18 MG/DL (ref 6–20)
BUN SERPL-MCNC: 19 MG/DL (ref 6–20)
BUN SERPL-MCNC: 20 MG/DL (ref 6–20)
BUN SERPL-MCNC: 20 MG/DL (ref 6–20)
BUN SERPL-MCNC: 21 MG/DL (ref 6–20)
BUN SERPL-MCNC: 26 MG/DL (ref 6–20)
BUN SERPL-MCNC: 37 MG/DL (ref 6–20)
BUN/CREAT SERPL: 15 (ref 12–20)
BUN/CREAT SERPL: 16 (ref 12–20)
BUN/CREAT SERPL: 18 (ref 12–20)
BUN/CREAT SERPL: 18 (ref 12–20)
BUN/CREAT SERPL: 19 (ref 12–20)
BUN/CREAT SERPL: 20 (ref 12–20)
BUN/CREAT SERPL: 21 (ref 12–20)
BUN/CREAT SERPL: 22 (ref 12–20)
BUN/CREAT SERPL: 23 (ref 12–20)
BUN/CREAT SERPL: 24 (ref 12–20)
BUN/CREAT SERPL: 25 (ref 12–20)
BUN/CREAT SERPL: 26 (ref 12–20)
BUN/CREAT SERPL: 27 (ref 12–20)
BUN/CREAT SERPL: 28 (ref 12–20)
BUN/CREAT SERPL: 31 (ref 12–20)
BUN/CREAT SERPL: 33 (ref 12–20)
BUN/CREAT SERPL: 35 (ref 12–20)
C PNEUM DNA SPEC QL NAA+PROBE: NOT DETECTED
CALCIUM SERPL-MCNC: 7.7 MG/DL (ref 8.5–10.1)
CALCIUM SERPL-MCNC: 7.7 MG/DL (ref 8.5–10.1)
CALCIUM SERPL-MCNC: 7.9 MG/DL (ref 8.5–10.1)
CALCIUM SERPL-MCNC: 8 MG/DL (ref 8.5–10.1)
CALCIUM SERPL-MCNC: 8.1 MG/DL (ref 8.5–10.1)
CALCIUM SERPL-MCNC: 8.2 MG/DL (ref 8.5–10.1)
CALCIUM SERPL-MCNC: 8.3 MG/DL (ref 8.5–10.1)
CALCIUM SERPL-MCNC: 8.4 MG/DL (ref 8.5–10.1)
CALCIUM SERPL-MCNC: 8.4 MG/DL (ref 8.5–10.1)
CALCIUM SERPL-MCNC: 8.5 MG/DL (ref 8.5–10.1)
CALCIUM SERPL-MCNC: 8.6 MG/DL (ref 8.5–10.1)
CALCIUM SERPL-MCNC: 8.7 MG/DL (ref 8.5–10.1)
CALCIUM SERPL-MCNC: 8.7 MG/DL (ref 8.5–10.1)
CALCULATED R AXIS, ECG10: 95 DEGREES
CALCULATED T AXIS, ECG11: -15 DEGREES
CHLORIDE SERPL-SCNC: 101 MMOL/L (ref 97–108)
CHLORIDE SERPL-SCNC: 102 MMOL/L (ref 97–108)
CHLORIDE SERPL-SCNC: 102 MMOL/L (ref 97–108)
CHLORIDE SERPL-SCNC: 104 MMOL/L (ref 97–108)
CHLORIDE SERPL-SCNC: 74 MMOL/L (ref 97–108)
CHLORIDE SERPL-SCNC: 75 MMOL/L (ref 97–108)
CHLORIDE SERPL-SCNC: 77 MMOL/L (ref 97–108)
CHLORIDE SERPL-SCNC: 79 MMOL/L (ref 97–108)
CHLORIDE SERPL-SCNC: 79 MMOL/L (ref 97–108)
CHLORIDE SERPL-SCNC: 80 MMOL/L (ref 97–108)
CHLORIDE SERPL-SCNC: 81 MMOL/L (ref 97–108)
CHLORIDE SERPL-SCNC: 86 MMOL/L (ref 97–108)
CHLORIDE SERPL-SCNC: 87 MMOL/L (ref 97–108)
CHLORIDE SERPL-SCNC: 89 MMOL/L (ref 97–108)
CHLORIDE SERPL-SCNC: 92 MMOL/L (ref 97–108)
CHLORIDE SERPL-SCNC: 93 MMOL/L (ref 97–108)
CHLORIDE SERPL-SCNC: 95 MMOL/L (ref 97–108)
CHLORIDE SERPL-SCNC: 96 MMOL/L (ref 97–108)
CHLORIDE SERPL-SCNC: 98 MMOL/L (ref 97–108)
CHLORIDE SERPL-SCNC: 98 MMOL/L (ref 97–108)
CHLORIDE SERPL-SCNC: 99 MMOL/L (ref 97–108)
CHLORIDE UR-SCNC: 17 MMOL/L
CO2 SERPL-SCNC: 23 MMOL/L (ref 21–32)
CO2 SERPL-SCNC: 23 MMOL/L (ref 21–32)
CO2 SERPL-SCNC: 24 MMOL/L (ref 21–32)
CO2 SERPL-SCNC: 24 MMOL/L (ref 21–32)
CO2 SERPL-SCNC: 25 MMOL/L (ref 21–32)
CO2 SERPL-SCNC: 26 MMOL/L (ref 21–32)
CO2 SERPL-SCNC: 27 MMOL/L (ref 21–32)
CO2 SERPL-SCNC: 27 MMOL/L (ref 21–32)
CO2 SERPL-SCNC: 28 MMOL/L (ref 21–32)
CO2 SERPL-SCNC: 28 MMOL/L (ref 21–32)
CO2 SERPL-SCNC: 29 MMOL/L (ref 21–32)
CO2 SERPL-SCNC: 30 MMOL/L (ref 21–32)
CO2 SERPL-SCNC: 31 MMOL/L (ref 21–32)
CO2 SERPL-SCNC: 31 MMOL/L (ref 21–32)
CO2 SERPL-SCNC: 32 MMOL/L (ref 21–32)
COLOR UR: ABNORMAL
COMMENT, HOLDF: NORMAL
COMMENT, HOLDF: NORMAL
CORTIS AM PEAK SERPL-MCNC: 42.6 UG/DL (ref 4.3–22.45)
CREAT SERPL-MCNC: 0.57 MG/DL (ref 0.55–1.02)
CREAT SERPL-MCNC: 0.61 MG/DL (ref 0.55–1.02)
CREAT SERPL-MCNC: 0.62 MG/DL (ref 0.55–1.02)
CREAT SERPL-MCNC: 0.63 MG/DL (ref 0.55–1.02)
CREAT SERPL-MCNC: 0.64 MG/DL (ref 0.55–1.02)
CREAT SERPL-MCNC: 0.64 MG/DL (ref 0.55–1.02)
CREAT SERPL-MCNC: 0.66 MG/DL (ref 0.55–1.02)
CREAT SERPL-MCNC: 0.66 MG/DL (ref 0.55–1.02)
CREAT SERPL-MCNC: 0.68 MG/DL (ref 0.55–1.02)
CREAT SERPL-MCNC: 0.7 MG/DL (ref 0.55–1.02)
CREAT SERPL-MCNC: 0.71 MG/DL (ref 0.55–1.02)
CREAT SERPL-MCNC: 0.71 MG/DL (ref 0.55–1.02)
CREAT SERPL-MCNC: 0.72 MG/DL (ref 0.55–1.02)
CREAT SERPL-MCNC: 0.72 MG/DL (ref 0.55–1.02)
CREAT SERPL-MCNC: 0.73 MG/DL (ref 0.55–1.02)
CREAT SERPL-MCNC: 0.74 MG/DL (ref 0.55–1.02)
CREAT SERPL-MCNC: 0.75 MG/DL (ref 0.55–1.02)
CREAT SERPL-MCNC: 0.76 MG/DL (ref 0.55–1.02)
CREAT SERPL-MCNC: 0.76 MG/DL (ref 0.55–1.02)
CREAT SERPL-MCNC: 0.78 MG/DL (ref 0.55–1.02)
CREAT SERPL-MCNC: 0.81 MG/DL (ref 0.55–1.02)
CREAT SERPL-MCNC: 0.83 MG/DL (ref 0.55–1.02)
CREAT SERPL-MCNC: 0.85 MG/DL (ref 0.55–1.02)
CREAT SERPL-MCNC: 0.85 MG/DL (ref 0.55–1.02)
CREAT SERPL-MCNC: 0.89 MG/DL (ref 0.55–1.02)
CREAT SERPL-MCNC: 0.92 MG/DL (ref 0.55–1.02)
CREAT SERPL-MCNC: 1.18 MG/DL (ref 0.55–1.02)
CREAT SERPL-MCNC: 1.27 MG/DL (ref 0.55–1.02)
CREAT UR-MCNC: 120 MG/DL
DIAGNOSIS, 93000: NORMAL
DIFFERENTIAL METHOD BLD: ABNORMAL
ECHO AO ASC DIAM: 3.28 CM
ECHO AO ROOT DIAM: 2.68 CM
ECHO AV AREA PEAK VELOCITY: 0.92 CM2
ECHO AV AREA VTI: 1.07 CM2
ECHO AV AREA/BSA PEAK VELOCITY: 0.5 CM2/M2
ECHO AV AREA/BSA VTI: 0.6 CM2/M2
ECHO AV MEAN GRADIENT: 6.05 MMHG
ECHO AV PEAK GRADIENT: 11.93 MMHG
ECHO AV PEAK VELOCITY: 172.7 CM/S
ECHO AV VTI: 41.95 CM
ECHO IVC PROX: 2.3 CM
ECHO LA AREA 4C: 27.64 CM2
ECHO LA MAJOR AXIS: 4.76 CM
ECHO LA MINOR AXIS: 2.72 CM
ECHO LA VOL 2C: 113.48 ML (ref 22–52)
ECHO LA VOL 4C: 87.8 ML (ref 22–52)
ECHO LA VOL BP: 106.02 ML (ref 22–52)
ECHO LA VOL/BSA BIPLANE: 60.58 ML/M2 (ref 16–28)
ECHO LA VOLUME INDEX A2C: 64.85 ML/M2 (ref 16–28)
ECHO LA VOLUME INDEX A4C: 50.17 ML/M2 (ref 16–28)
ECHO LV E' LATERAL VELOCITY: 7.72 CM/S
ECHO LV E' SEPTAL VELOCITY: 5.15 CM/S
ECHO LV EDV A2C: 90.74 ML
ECHO LV EDV A4C: 61.53 ML
ECHO LV EDV BP: 78.51 ML (ref 56–104)
ECHO LV EDV INDEX A4C: 35.2 ML/M2
ECHO LV EDV INDEX BP: 44.9 ML/M2
ECHO LV EDV NDEX A2C: 51.9 ML/M2
ECHO LV EJECTION FRACTION A2C: 56 PERCENT
ECHO LV EJECTION FRACTION A4C: 70 PERCENT
ECHO LV EJECTION FRACTION BIPLANE: 64.4 PERCENT (ref 55–100)
ECHO LV ESV A2C: 39.95 ML
ECHO LV ESV A4C: 18.19 ML
ECHO LV ESV BP: 27.95 ML (ref 19–49)
ECHO LV ESV INDEX A2C: 22.8 ML/M2
ECHO LV ESV INDEX A4C: 10.4 ML/M2
ECHO LV ESV INDEX BP: 16 ML/M2
ECHO LV INTERNAL DIMENSION DIASTOLIC: 4.61 CM (ref 3.9–5.3)
ECHO LV INTERNAL DIMENSION SYSTOLIC: 2.96 CM
ECHO LV IVSD: 0.89 CM (ref 0.6–0.9)
ECHO LV MASS 2D: 135.2 G (ref 67–162)
ECHO LV MASS INDEX 2D: 77.2 G/M2 (ref 43–95)
ECHO LV POSTERIOR WALL DIASTOLIC: 0.88 CM (ref 0.6–0.9)
ECHO LVOT DIAM: 1.49 CM
ECHO LVOT PEAK GRADIENT: 3.39 MMHG
ECHO LVOT PEAK VELOCITY: 92.09 CM/S
ECHO LVOT SV: 45.1 ML
ECHO LVOT VTI: 25.98 CM
ECHO RA AREA 4C: 30.53 CM2
ECHO RV INTERNAL DIMENSION: 5.08 CM
ECHO RV TAPSE: 1.82 CM (ref 1.5–2)
ECHO TV REGURGITANT MAX VELOCITY: 358.4 CM/S
ECHO TV REGURGITANT PEAK GRADIENT: 51.38 MMHG
EOSINOPHIL # BLD: 0 K/UL (ref 0–0.4)
EOSINOPHIL # BLD: 0 K/UL (ref 0–0.4)
EOSINOPHIL # BLD: 0.1 K/UL (ref 0–0.4)
EOSINOPHIL NFR BLD: 0 % (ref 0–7)
EOSINOPHIL NFR BLD: 0 % (ref 0–7)
EOSINOPHIL NFR BLD: 1 % (ref 0–7)
EPITH CASTS URNS QL MICRO: ABNORMAL /LPF
ERYTHROCYTE [DISTWIDTH] IN BLOOD BY AUTOMATED COUNT: 14.4 % (ref 11.5–14.5)
ERYTHROCYTE [DISTWIDTH] IN BLOOD BY AUTOMATED COUNT: 15 % (ref 11.5–14.5)
ERYTHROCYTE [DISTWIDTH] IN BLOOD BY AUTOMATED COUNT: 16.3 % (ref 11.5–14.5)
ERYTHROCYTE [DISTWIDTH] IN BLOOD BY AUTOMATED COUNT: NORMAL % (ref 11.5–14.5)
EST. AVERAGE GLUCOSE BLD GHB EST-MCNC: 126 MG/DL
FLUAV H1 2009 PAND RNA SPEC QL NAA+PROBE: NOT DETECTED
FLUAV H1 RNA SPEC QL NAA+PROBE: NOT DETECTED
FLUAV H3 RNA SPEC QL NAA+PROBE: NOT DETECTED
FLUAV SUBTYP SPEC NAA+PROBE: NOT DETECTED
FLUBV RNA SPEC QL NAA+PROBE: NOT DETECTED
GAMMA GLOB SERPL ELPH-MCNC: 1 G/DL (ref 0.4–1.8)
GLOBULIN SER CALC-MCNC: 2.7 G/DL (ref 2.2–3.9)
GLOBULIN SER CALC-MCNC: 3.2 G/DL (ref 2–4)
GLOBULIN SER CALC-MCNC: 3.8 G/DL (ref 2–4)
GLUCOSE SERPL-MCNC: 103 MG/DL (ref 65–100)
GLUCOSE SERPL-MCNC: 105 MG/DL (ref 65–100)
GLUCOSE SERPL-MCNC: 108 MG/DL (ref 65–100)
GLUCOSE SERPL-MCNC: 115 MG/DL (ref 65–100)
GLUCOSE SERPL-MCNC: 118 MG/DL (ref 65–100)
GLUCOSE SERPL-MCNC: 119 MG/DL (ref 65–100)
GLUCOSE SERPL-MCNC: 120 MG/DL (ref 65–100)
GLUCOSE SERPL-MCNC: 120 MG/DL (ref 65–100)
GLUCOSE SERPL-MCNC: 122 MG/DL (ref 65–100)
GLUCOSE SERPL-MCNC: 123 MG/DL (ref 65–100)
GLUCOSE SERPL-MCNC: 123 MG/DL (ref 65–100)
GLUCOSE SERPL-MCNC: 131 MG/DL (ref 65–100)
GLUCOSE SERPL-MCNC: 132 MG/DL (ref 65–100)
GLUCOSE SERPL-MCNC: 136 MG/DL (ref 65–100)
GLUCOSE SERPL-MCNC: 136 MG/DL (ref 65–100)
GLUCOSE SERPL-MCNC: 137 MG/DL (ref 65–100)
GLUCOSE SERPL-MCNC: 140 MG/DL (ref 65–100)
GLUCOSE SERPL-MCNC: 141 MG/DL (ref 65–100)
GLUCOSE SERPL-MCNC: 145 MG/DL (ref 65–100)
GLUCOSE SERPL-MCNC: 149 MG/DL (ref 65–100)
GLUCOSE SERPL-MCNC: 154 MG/DL (ref 65–100)
GLUCOSE SERPL-MCNC: 155 MG/DL (ref 65–100)
GLUCOSE SERPL-MCNC: 160 MG/DL (ref 65–100)
GLUCOSE SERPL-MCNC: 162 MG/DL (ref 65–100)
GLUCOSE SERPL-MCNC: 164 MG/DL (ref 65–100)
GLUCOSE SERPL-MCNC: 177 MG/DL (ref 65–100)
GLUCOSE SERPL-MCNC: 180 MG/DL (ref 65–100)
GLUCOSE SERPL-MCNC: 190 MG/DL (ref 65–100)
GLUCOSE SERPL-MCNC: 251 MG/DL (ref 65–100)
GLUCOSE SERPL-MCNC: 75 MG/DL (ref 65–100)
GLUCOSE UR STRIP.AUTO-MCNC: NEGATIVE MG/DL
HADV DNA SPEC QL NAA+PROBE: NOT DETECTED
HBA1C MFR BLD: 6 % (ref 4–5.6)
HCOV 229E RNA SPEC QL NAA+PROBE: NOT DETECTED
HCOV HKU1 RNA SPEC QL NAA+PROBE: NOT DETECTED
HCOV NL63 RNA SPEC QL NAA+PROBE: NOT DETECTED
HCOV OC43 RNA SPEC QL NAA+PROBE: NOT DETECTED
HCT VFR BLD AUTO: 30.4 % (ref 35–47)
HCT VFR BLD AUTO: 32.2 % (ref 35–47)
HCT VFR BLD AUTO: 33.6 % (ref 35–47)
HCT VFR BLD AUTO: NORMAL % (ref 35–47)
HGB BLD-MCNC: 10.8 G/DL (ref 11.5–16)
HGB BLD-MCNC: 10.9 G/DL (ref 11.5–16)
HGB BLD-MCNC: 11.4 G/DL (ref 11.5–16)
HGB BLD-MCNC: NORMAL G/DL (ref 11.5–16)
HGB UR QL STRIP: ABNORMAL
HMPV RNA SPEC QL NAA+PROBE: NOT DETECTED
HPIV1 RNA SPEC QL NAA+PROBE: NOT DETECTED
HPIV2 RNA SPEC QL NAA+PROBE: NOT DETECTED
HPIV3 RNA SPEC QL NAA+PROBE: NOT DETECTED
HPIV4 RNA SPEC QL NAA+PROBE: NOT DETECTED
IMM GRANULOCYTES # BLD AUTO: 0 K/UL (ref 0–0.04)
IMM GRANULOCYTES # BLD AUTO: 0.1 K/UL (ref 0–0.04)
IMM GRANULOCYTES # BLD AUTO: 0.1 K/UL (ref 0–0.04)
IMM GRANULOCYTES NFR BLD AUTO: 0 % (ref 0–0.5)
IMM GRANULOCYTES NFR BLD AUTO: 1 % (ref 0–0.5)
IMM GRANULOCYTES NFR BLD AUTO: 1 % (ref 0–0.5)
IRON SATN MFR SERPL: 24 % (ref 20–50)
IRON SERPL-MCNC: 71 UG/DL (ref 35–150)
KETONES UR QL STRIP.AUTO: 15 MG/DL
LA VOL DISK BP: 99.23 ML (ref 22–52)
LACTATE SERPL-SCNC: 2 MMOL/L (ref 0.4–2)
LACTATE SERPL-SCNC: 2.2 MMOL/L (ref 0.4–2)
LEUKOCYTE ESTERASE UR QL STRIP.AUTO: ABNORMAL
LYMPHOCYTES # BLD: 0.6 K/UL (ref 0.8–3.5)
LYMPHOCYTES # BLD: 1.1 K/UL (ref 0.8–3.5)
LYMPHOCYTES # BLD: 1.2 K/UL (ref 0.8–3.5)
LYMPHOCYTES NFR BLD: 20 % (ref 12–49)
LYMPHOCYTES NFR BLD: 5 % (ref 12–49)
LYMPHOCYTES NFR BLD: 9 % (ref 12–49)
M PNEUMO DNA SPEC QL NAA+PROBE: NOT DETECTED
M PROTEIN SERPL ELPH-MCNC: NORMAL G/DL
MCH RBC QN AUTO: 29.5 PG (ref 26–34)
MCH RBC QN AUTO: 29.7 PG (ref 26–34)
MCH RBC QN AUTO: 30.5 PG (ref 26–34)
MCH RBC QN AUTO: NORMAL PG (ref 26–34)
MCHC RBC AUTO-ENTMCNC: 32.4 G/DL (ref 30–36.5)
MCHC RBC AUTO-ENTMCNC: 35.4 G/DL (ref 30–36.5)
MCHC RBC AUTO-ENTMCNC: 35.5 G/DL (ref 30–36.5)
MCHC RBC AUTO-ENTMCNC: NORMAL G/DL (ref 30–36.5)
MCV RBC AUTO: 83.1 FL (ref 80–99)
MCV RBC AUTO: 83.9 FL (ref 80–99)
MCV RBC AUTO: 94.1 FL (ref 80–99)
MCV RBC AUTO: NORMAL FL (ref 80–99)
MONOCYTES # BLD: 0.5 K/UL (ref 0–1)
MONOCYTES # BLD: 1 K/UL (ref 0–1)
MONOCYTES # BLD: 1.3 K/UL (ref 0–1)
MONOCYTES NFR BLD: 11 % (ref 5–13)
MONOCYTES NFR BLD: 8 % (ref 5–13)
MONOCYTES NFR BLD: 9 % (ref 5–13)
NEUTS SEG # BLD: 11 K/UL (ref 1.8–8)
NEUTS SEG # BLD: 4 K/UL (ref 1.8–8)
NEUTS SEG # BLD: 9.3 K/UL (ref 1.8–8)
NEUTS SEG NFR BLD: 69 % (ref 32–75)
NEUTS SEG NFR BLD: 79 % (ref 32–75)
NEUTS SEG NFR BLD: 86 % (ref 32–75)
NITRITE UR QL STRIP.AUTO: NEGATIVE
NRBC # BLD: 0 K/UL (ref 0–0.01)
NRBC # BLD: 0 K/UL (ref 0–0.01)
NRBC # BLD: 0.02 K/UL (ref 0–0.01)
NRBC # BLD: NORMAL K/UL (ref 0–0.01)
NRBC BLD-RTO: 0 PER 100 WBC
NRBC BLD-RTO: 0 PER 100 WBC
NRBC BLD-RTO: 0.2 PER 100 WBC
NRBC BLD-RTO: NORMAL PER 100 WBC
OSMOLALITY SERPL: 242 MOSM/KG H2O
OSMOLALITY UR: 436 MOSM/KG H2O
OSMOLALITY UR: 675 MOSM/KG H2O
OSMOLALITY UR: 724 MOSM/KG H2O
OTHER,OTHU: ABNORMAL
PH UR STRIP: 6 [PH] (ref 5–8)
PHOSPHATE SERPL-MCNC: 3.7 MG/DL (ref 2.6–4.7)
PHOSPHATE SERPL-MCNC: 4.2 MG/DL (ref 2.6–4.7)
PLATELET # BLD AUTO: 214 K/UL (ref 150–400)
PLATELET # BLD AUTO: 217 K/UL (ref 150–400)
PLATELET # BLD AUTO: 286 K/UL (ref 150–400)
PLATELET # BLD AUTO: NORMAL K/UL (ref 150–400)
PLATELET COMMENTS,PCOM: ABNORMAL
PMV BLD AUTO: 10.9 FL (ref 8.9–12.9)
PMV BLD AUTO: 9.3 FL (ref 8.9–12.9)
PMV BLD AUTO: 9.7 FL (ref 8.9–12.9)
PMV BLD AUTO: NORMAL FL (ref 8.9–12.9)
POTASSIUM SERPL-SCNC: 3.3 MMOL/L (ref 3.5–5.1)
POTASSIUM SERPL-SCNC: 3.3 MMOL/L (ref 3.5–5.1)
POTASSIUM SERPL-SCNC: 3.5 MMOL/L (ref 3.5–5.1)
POTASSIUM SERPL-SCNC: 3.5 MMOL/L (ref 3.5–5.1)
POTASSIUM SERPL-SCNC: 3.6 MMOL/L (ref 3.5–5.1)
POTASSIUM SERPL-SCNC: 3.7 MMOL/L (ref 3.5–5.1)
POTASSIUM SERPL-SCNC: 3.7 MMOL/L (ref 3.5–5.1)
POTASSIUM SERPL-SCNC: 3.8 MMOL/L (ref 3.5–5.1)
POTASSIUM SERPL-SCNC: 3.8 MMOL/L (ref 3.5–5.1)
POTASSIUM SERPL-SCNC: 3.9 MMOL/L (ref 3.5–5.1)
POTASSIUM SERPL-SCNC: 3.9 MMOL/L (ref 3.5–5.1)
POTASSIUM SERPL-SCNC: 4 MMOL/L (ref 3.5–5.1)
POTASSIUM SERPL-SCNC: 4.1 MMOL/L (ref 3.5–5.1)
POTASSIUM SERPL-SCNC: 4.2 MMOL/L (ref 3.5–5.1)
POTASSIUM SERPL-SCNC: 4.3 MMOL/L (ref 3.5–5.1)
POTASSIUM SERPL-SCNC: 4.4 MMOL/L (ref 3.5–5.1)
POTASSIUM SERPL-SCNC: 4.4 MMOL/L (ref 3.5–5.1)
POTASSIUM SERPL-SCNC: 4.5 MMOL/L (ref 3.5–5.1)
POTASSIUM SERPL-SCNC: 4.6 MMOL/L (ref 3.5–5.1)
POTASSIUM SERPL-SCNC: 4.8 MMOL/L (ref 3.5–5.1)
POTASSIUM UR-SCNC: 53 MMOL/L
PROCALCITONIN SERPL-MCNC: <0.05 NG/ML
PROT SERPL-MCNC: 6 G/DL (ref 6–8.5)
PROT SERPL-MCNC: 6.4 G/DL (ref 6.4–8.2)
PROT SERPL-MCNC: 7.6 G/DL (ref 6.4–8.2)
PROT UR STRIP-MCNC: 100 MG/DL
Q-T INTERVAL, ECG07: 526 MS
QRS DURATION, ECG06: 160 MS
QTC CALCULATION (BEZET), ECG08: 520 MS
RBC # BLD AUTO: 3.57 M/UL (ref 3.8–5.2)
RBC # BLD AUTO: 3.66 M/UL (ref 3.8–5.2)
RBC # BLD AUTO: 3.84 M/UL (ref 3.8–5.2)
RBC # BLD AUTO: NORMAL M/UL (ref 3.8–5.2)
RBC #/AREA URNS HPF: ABNORMAL /HPF (ref 0–5)
RBC MORPH BLD: ABNORMAL
RSV RNA SPEC QL NAA+PROBE: NOT DETECTED
RV+EV RNA SPEC QL NAA+PROBE: DETECTED
SAMPLES BEING HELD,HOLD: NORMAL
SAMPLES BEING HELD,HOLD: NORMAL
SARS-COV-2 PCR, COVPCR: NOT DETECTED
SARS-COV-2, COV2: NORMAL
SARS-COV-2, XPLCVT: NOT DETECTED
SERVICE CMNT-IMP: NORMAL
SODIUM SERPL-SCNC: 109 MMOL/L (ref 136–145)
SODIUM SERPL-SCNC: 109 MMOL/L (ref 136–145)
SODIUM SERPL-SCNC: 112 MMOL/L (ref 136–145)
SODIUM SERPL-SCNC: 113 MMOL/L (ref 136–145)
SODIUM SERPL-SCNC: 113 MMOL/L (ref 136–145)
SODIUM SERPL-SCNC: 114 MMOL/L (ref 136–145)
SODIUM SERPL-SCNC: 116 MMOL/L (ref 136–145)
SODIUM SERPL-SCNC: 118 MMOL/L (ref 136–145)
SODIUM SERPL-SCNC: 121 MMOL/L (ref 136–145)
SODIUM SERPL-SCNC: 121 MMOL/L (ref 136–145)
SODIUM SERPL-SCNC: 126 MMOL/L (ref 136–145)
SODIUM SERPL-SCNC: 127 MMOL/L (ref 136–145)
SODIUM SERPL-SCNC: 128 MMOL/L (ref 136–145)
SODIUM SERPL-SCNC: 129 MMOL/L (ref 136–145)
SODIUM SERPL-SCNC: 131 MMOL/L (ref 136–145)
SODIUM SERPL-SCNC: 132 MMOL/L (ref 136–145)
SODIUM SERPL-SCNC: 133 MMOL/L (ref 136–145)
SODIUM SERPL-SCNC: 133 MMOL/L (ref 136–145)
SODIUM SERPL-SCNC: 134 MMOL/L (ref 136–145)
SODIUM SERPL-SCNC: 137 MMOL/L (ref 136–145)
SODIUM SERPL-SCNC: 138 MMOL/L (ref 136–145)
SODIUM UR-SCNC: 12 MMOL/L
SODIUM UR-SCNC: 19 MMOL/L
SODIUM UR-SCNC: 24 MMOL/L
SOURCE, COVRS: NORMAL
SP GR UR REFRACTOMETRY: 1.02 (ref 1–1.03)
TIBC SERPL-MCNC: 302 UG/DL (ref 250–450)
TROPONIN-HIGH SENSITIVITY: 26 NG/L (ref 0–51)
TSH SERPL DL<=0.05 MIU/L-ACNC: 4.4 UIU/ML (ref 0.36–3.74)
UR CULT HOLD, URHOLD: NORMAL
UROBILINOGEN UR QL STRIP.AUTO: 1 EU/DL (ref 0.2–1)
VENTRICULAR RATE, ECG03: 59 BPM
WBC # BLD AUTO: 11.8 K/UL (ref 3.6–11)
WBC # BLD AUTO: 12.7 K/UL (ref 3.6–11)
WBC # BLD AUTO: 5.8 K/UL (ref 3.6–11)
WBC # BLD AUTO: NORMAL K/UL (ref 3.6–11)
WBC URNS QL MICRO: ABNORMAL /HPF (ref 0–4)

## 2021-01-01 PROCEDURE — 74011250636 HC RX REV CODE- 250/636: Performed by: INTERNAL MEDICINE

## 2021-01-01 PROCEDURE — 1090F PRES/ABSN URINE INCON ASSESS: CPT | Performed by: INTERNAL MEDICINE

## 2021-01-01 PROCEDURE — G8510 SCR DEP NEG, NO PLAN REQD: HCPCS | Performed by: INTERNAL MEDICINE

## 2021-01-01 PROCEDURE — G8427 DOCREV CUR MEDS BY ELIG CLIN: HCPCS | Performed by: INTERNAL MEDICINE

## 2021-01-01 PROCEDURE — 87040 BLOOD CULTURE FOR BACTERIA: CPT

## 2021-01-01 PROCEDURE — 77010033678 HC OXYGEN DAILY

## 2021-01-01 PROCEDURE — 80053 COMPREHEN METABOLIC PANEL: CPT

## 2021-01-01 PROCEDURE — 84300 ASSAY OF URINE SODIUM: CPT

## 2021-01-01 PROCEDURE — G8417 CALC BMI ABV UP PARAM F/U: HCPCS | Performed by: INTERNAL MEDICINE

## 2021-01-01 PROCEDURE — 80048 BASIC METABOLIC PNL TOTAL CA: CPT

## 2021-01-01 PROCEDURE — 65660000000 HC RM CCU STEPDOWN

## 2021-01-01 PROCEDURE — 74011250637 HC RX REV CODE- 250/637: Performed by: INTERNAL MEDICINE

## 2021-01-01 PROCEDURE — 74011250637 HC RX REV CODE- 250/637: Performed by: HOSPITALIST

## 2021-01-01 PROCEDURE — 74011250637 HC RX REV CODE- 250/637: Performed by: NURSE PRACTITIONER

## 2021-01-01 PROCEDURE — 1101F PT FALLS ASSESS-DOCD LE1/YR: CPT | Performed by: SPECIALIST

## 2021-01-01 PROCEDURE — 97535 SELF CARE MNGMENT TRAINING: CPT

## 2021-01-01 PROCEDURE — 82436 ASSAY OF URINE CHLORIDE: CPT

## 2021-01-01 PROCEDURE — 83935 ASSAY OF URINE OSMOLALITY: CPT

## 2021-01-01 PROCEDURE — 85025 COMPLETE CBC W/AUTO DIFF WBC: CPT

## 2021-01-01 PROCEDURE — 93306 TTE W/DOPPLER COMPLETE: CPT | Performed by: SPECIALIST

## 2021-01-01 PROCEDURE — 80069 RENAL FUNCTION PANEL: CPT

## 2021-01-01 PROCEDURE — 74011250637 HC RX REV CODE- 250/637: Performed by: EMERGENCY MEDICINE

## 2021-01-01 PROCEDURE — 90694 VACC AIIV4 NO PRSRV 0.5ML IM: CPT | Performed by: INTERNAL MEDICINE

## 2021-01-01 PROCEDURE — 77030040361 HC SLV COMPR DVT MDII -B

## 2021-01-01 PROCEDURE — 70450 CT HEAD/BRAIN W/O DYE: CPT

## 2021-01-01 PROCEDURE — U0005 INFEC AGEN DETEC AMPLI PROBE: HCPCS

## 2021-01-01 PROCEDURE — 99214 OFFICE O/P EST MOD 30 MIN: CPT | Performed by: SPECIALIST

## 2021-01-01 PROCEDURE — 82533 TOTAL CORTISOL: CPT

## 2021-01-01 PROCEDURE — 97530 THERAPEUTIC ACTIVITIES: CPT

## 2021-01-01 PROCEDURE — G8417 CALC BMI ABV UP PARAM F/U: HCPCS | Performed by: SPECIALIST

## 2021-01-01 PROCEDURE — G8510 SCR DEP NEG, NO PLAN REQD: HCPCS | Performed by: SPECIALIST

## 2021-01-01 PROCEDURE — 36415 COLL VENOUS BLD VENIPUNCTURE: CPT

## 2021-01-01 PROCEDURE — G8536 NO DOC ELDER MAL SCRN: HCPCS | Performed by: INTERNAL MEDICINE

## 2021-01-01 PROCEDURE — 99214 OFFICE O/P EST MOD 30 MIN: CPT | Performed by: INTERNAL MEDICINE

## 2021-01-01 PROCEDURE — 84165 PROTEIN E-PHORESIS SERUM: CPT

## 2021-01-01 PROCEDURE — 99213 OFFICE O/P EST LOW 20 MIN: CPT | Performed by: SPECIALIST

## 2021-01-01 PROCEDURE — 84145 PROCALCITONIN (PCT): CPT

## 2021-01-01 PROCEDURE — 74011250636 HC RX REV CODE- 250/636: Performed by: NURSE PRACTITIONER

## 2021-01-01 PROCEDURE — 84443 ASSAY THYROID STIM HORMONE: CPT

## 2021-01-01 PROCEDURE — 83605 ASSAY OF LACTIC ACID: CPT

## 2021-01-01 PROCEDURE — 84484 ASSAY OF TROPONIN QUANT: CPT

## 2021-01-01 PROCEDURE — 82570 ASSAY OF URINE CREATININE: CPT

## 2021-01-01 PROCEDURE — 1101F PT FALLS ASSESS-DOCD LE1/YR: CPT | Performed by: INTERNAL MEDICINE

## 2021-01-01 PROCEDURE — G8432 DEP SCR NOT DOC, RNG: HCPCS | Performed by: INTERNAL MEDICINE

## 2021-01-01 PROCEDURE — G0463 HOSPITAL OUTPT CLINIC VISIT: HCPCS | Performed by: INTERNAL MEDICINE

## 2021-01-01 PROCEDURE — G0463 HOSPITAL OUTPT CLINIC VISIT: HCPCS | Performed by: SPECIALIST

## 2021-01-01 PROCEDURE — 81001 URINALYSIS AUTO W/SCOPE: CPT

## 2021-01-01 PROCEDURE — 1090F PRES/ABSN URINE INCON ASSESS: CPT | Performed by: SPECIALIST

## 2021-01-01 PROCEDURE — 74011000250 HC RX REV CODE- 250: Performed by: EMERGENCY MEDICINE

## 2021-01-01 PROCEDURE — 97116 GAIT TRAINING THERAPY: CPT

## 2021-01-01 PROCEDURE — 0202U NFCT DS 22 TRGT SARS-COV-2: CPT

## 2021-01-01 PROCEDURE — 74011000258 HC RX REV CODE- 258: Performed by: INTERNAL MEDICINE

## 2021-01-01 PROCEDURE — 93005 ELECTROCARDIOGRAM TRACING: CPT

## 2021-01-01 PROCEDURE — 94664 DEMO&/EVAL PT USE INHALER: CPT

## 2021-01-01 PROCEDURE — 99285 EMERGENCY DEPT VISIT HI MDM: CPT

## 2021-01-01 PROCEDURE — 94760 N-INVAS EAR/PLS OXIMETRY 1: CPT

## 2021-01-01 PROCEDURE — 97165 OT EVAL LOW COMPLEX 30 MIN: CPT

## 2021-01-01 PROCEDURE — G8427 DOCREV CUR MEDS BY ELIG CLIN: HCPCS | Performed by: SPECIALIST

## 2021-01-01 PROCEDURE — G8536 NO DOC ELDER MAL SCRN: HCPCS | Performed by: SPECIALIST

## 2021-01-01 PROCEDURE — 2709999900 HC NON-CHARGEABLE SUPPLY

## 2021-01-01 PROCEDURE — 83930 ASSAY OF BLOOD OSMOLALITY: CPT

## 2021-01-01 PROCEDURE — 71045 X-RAY EXAM CHEST 1 VIEW: CPT

## 2021-01-01 PROCEDURE — 83880 ASSAY OF NATRIURETIC PEPTIDE: CPT

## 2021-01-01 PROCEDURE — 84133 ASSAY OF URINE POTASSIUM: CPT

## 2021-01-01 PROCEDURE — 93970 EXTREMITY STUDY: CPT

## 2021-01-01 PROCEDURE — 94640 AIRWAY INHALATION TREATMENT: CPT

## 2021-01-01 PROCEDURE — 97161 PT EVAL LOW COMPLEX 20 MIN: CPT

## 2021-01-01 RX ORDER — MICONAZOLE NITRATE 2 %
POWDER (GRAM) TOPICAL EVERY 12 HOURS
Status: DISCONTINUED | OUTPATIENT
Start: 2021-01-01 | End: 2021-01-01 | Stop reason: HOSPADM

## 2021-01-01 RX ORDER — SODIUM CHLORIDE TAB 1 GM 1 G
2 TAB MISCELLANEOUS
Status: DISCONTINUED | OUTPATIENT
Start: 2021-01-01 | End: 2021-01-01

## 2021-01-01 RX ORDER — 3% SODIUM CHLORIDE 3 G/100ML
30 INJECTION, SOLUTION INTRAVENOUS CONTINUOUS
Status: DISCONTINUED | OUTPATIENT
Start: 2021-01-01 | End: 2021-01-01 | Stop reason: SDUPTHER

## 2021-01-01 RX ORDER — SODIUM CHLORIDE 0.9 % (FLUSH) 0.9 %
5-40 SYRINGE (ML) INJECTION EVERY 8 HOURS
Status: DISCONTINUED | OUTPATIENT
Start: 2021-01-01 | End: 2021-01-01

## 2021-01-01 RX ORDER — POTASSIUM CHLORIDE 750 MG/1
TABLET, FILM COATED, EXTENDED RELEASE ORAL
Qty: 180 TABLET | Refills: 3 | Status: SHIPPED | OUTPATIENT
Start: 2021-01-01 | End: 2022-01-01

## 2021-01-01 RX ORDER — AMLODIPINE BESYLATE 2.5 MG/1
2.5 TABLET ORAL DAILY
Qty: 30 TABLET | Refills: 0 | Status: SHIPPED | OUTPATIENT
Start: 2021-01-01 | End: 2022-01-01

## 2021-01-01 RX ORDER — FUROSEMIDE 10 MG/ML
80 INJECTION INTRAMUSCULAR; INTRAVENOUS 2 TIMES DAILY
Status: DISCONTINUED | OUTPATIENT
Start: 2021-01-01 | End: 2021-01-01

## 2021-01-01 RX ORDER — DEXTROSE MONOHYDRATE 50 MG/ML
100 INJECTION, SOLUTION INTRAVENOUS CONTINUOUS
Status: DISCONTINUED | OUTPATIENT
Start: 2021-01-01 | End: 2021-01-01

## 2021-01-01 RX ORDER — DOCUSATE SODIUM 100 MG/1
100 CAPSULE, LIQUID FILLED ORAL DAILY
Status: DISCONTINUED | OUTPATIENT
Start: 2021-01-01 | End: 2021-01-01 | Stop reason: HOSPADM

## 2021-01-01 RX ORDER — ACETAMINOPHEN 650 MG/1
650 SUPPOSITORY RECTAL
Status: DISCONTINUED | OUTPATIENT
Start: 2021-01-01 | End: 2021-01-01 | Stop reason: HOSPADM

## 2021-01-01 RX ORDER — HEPARIN SODIUM 5000 [USP'U]/ML
5000 INJECTION, SOLUTION INTRAVENOUS; SUBCUTANEOUS EVERY 8 HOURS
Status: DISCONTINUED | OUTPATIENT
Start: 2021-01-01 | End: 2021-01-01

## 2021-01-01 RX ORDER — 3% SODIUM CHLORIDE 3 G/100ML
100 INJECTION, SOLUTION INTRAVENOUS CONTINUOUS
Status: DISPENSED | OUTPATIENT
Start: 2021-01-01 | End: 2021-01-01

## 2021-01-01 RX ORDER — POLYETHYLENE GLYCOL 3350 17 G/17G
17 POWDER, FOR SOLUTION ORAL DAILY PRN
Status: DISCONTINUED | OUTPATIENT
Start: 2021-01-01 | End: 2021-01-01 | Stop reason: SDUPTHER

## 2021-01-01 RX ORDER — TROSPIUM CHLORIDE 20 MG/1
20 TABLET, FILM COATED ORAL 2 TIMES DAILY
Refills: 3 | Status: CANCELLED | OUTPATIENT
Start: 2021-01-01

## 2021-01-01 RX ORDER — SODIUM CHLORIDE TAB 1 GM 1 G
1 TAB MISCELLANEOUS
Status: DISCONTINUED | OUTPATIENT
Start: 2021-01-01 | End: 2021-01-01 | Stop reason: HOSPADM

## 2021-01-01 RX ORDER — ASPIRIN 81 MG/1
81 TABLET ORAL DAILY
Status: DISCONTINUED | OUTPATIENT
Start: 2021-01-01 | End: 2021-01-01 | Stop reason: HOSPADM

## 2021-01-01 RX ORDER — SENNOSIDES 8.6 MG/1
1 TABLET ORAL DAILY
Status: DISCONTINUED | OUTPATIENT
Start: 2021-01-01 | End: 2021-01-01 | Stop reason: HOSPADM

## 2021-01-01 RX ORDER — FUROSEMIDE 10 MG/ML
80 INJECTION INTRAMUSCULAR; INTRAVENOUS ONCE
Status: DISCONTINUED | OUTPATIENT
Start: 2021-01-01 | End: 2021-01-01

## 2021-01-01 RX ORDER — POTASSIUM CHLORIDE 750 MG/1
40 TABLET, FILM COATED, EXTENDED RELEASE ORAL
Status: COMPLETED | OUTPATIENT
Start: 2021-01-01 | End: 2021-01-01

## 2021-01-01 RX ORDER — FLUTICASONE PROPIONATE 50 MCG
2 SPRAY, SUSPENSION (ML) NASAL DAILY PRN
Status: DISCONTINUED | OUTPATIENT
Start: 2021-01-01 | End: 2021-01-01 | Stop reason: HOSPADM

## 2021-01-01 RX ORDER — SODIUM CHLORIDE 0.9 % (FLUSH) 0.9 %
5-40 SYRINGE (ML) INJECTION AS NEEDED
Status: DISCONTINUED | OUTPATIENT
Start: 2021-01-01 | End: 2021-01-01

## 2021-01-01 RX ORDER — ACETAMINOPHEN 325 MG/1
650 TABLET ORAL
Status: DISCONTINUED | OUTPATIENT
Start: 2021-01-01 | End: 2021-01-01 | Stop reason: HOSPADM

## 2021-01-01 RX ORDER — BALSAM PERU/CASTOR OIL
OINTMENT (GRAM) TOPICAL EVERY 12 HOURS
Status: DISCONTINUED | OUTPATIENT
Start: 2021-01-01 | End: 2021-01-01 | Stop reason: HOSPADM

## 2021-01-01 RX ORDER — ACETAMINOPHEN 325 MG/1
650 TABLET ORAL
Status: COMPLETED | OUTPATIENT
Start: 2021-01-01 | End: 2021-01-01

## 2021-01-01 RX ORDER — ONDANSETRON 2 MG/ML
4 INJECTION INTRAMUSCULAR; INTRAVENOUS
Status: DISCONTINUED | OUTPATIENT
Start: 2021-01-01 | End: 2021-01-01 | Stop reason: HOSPADM

## 2021-01-01 RX ORDER — SODIUM CHLORIDE TAB 1 GM 1 G
1 TAB MISCELLANEOUS
Qty: 21 TABLET | Refills: 0 | Status: SHIPPED | OUTPATIENT
Start: 2021-01-01 | End: 2022-01-01

## 2021-01-01 RX ORDER — FUROSEMIDE 40 MG/1
TABLET ORAL
Qty: 30 TABLET | Refills: 0 | Status: SHIPPED | OUTPATIENT
Start: 2021-01-01 | End: 2021-01-01

## 2021-01-01 RX ORDER — SODIUM CHLORIDE 0.9 % (FLUSH) 0.9 %
5-40 SYRINGE (ML) INJECTION EVERY 8 HOURS
Status: DISCONTINUED | OUTPATIENT
Start: 2021-01-01 | End: 2021-01-01 | Stop reason: HOSPADM

## 2021-01-01 RX ORDER — ONDANSETRON 4 MG/1
4 TABLET, ORALLY DISINTEGRATING ORAL
Status: DISCONTINUED | OUTPATIENT
Start: 2021-01-01 | End: 2021-01-01 | Stop reason: HOSPADM

## 2021-01-01 RX ORDER — METOPROLOL SUCCINATE 25 MG/1
25 TABLET, EXTENDED RELEASE ORAL DAILY
Status: DISCONTINUED | OUTPATIENT
Start: 2021-01-01 | End: 2021-01-01

## 2021-01-01 RX ORDER — POLYETHYLENE GLYCOL 3350 17 G/17G
17 POWDER, FOR SOLUTION ORAL DAILY PRN
Status: DISCONTINUED | OUTPATIENT
Start: 2021-01-01 | End: 2021-01-01 | Stop reason: HOSPADM

## 2021-01-01 RX ORDER — ALBUTEROL SULFATE 0.83 MG/ML
5 SOLUTION RESPIRATORY (INHALATION)
Status: COMPLETED | OUTPATIENT
Start: 2021-01-01 | End: 2021-01-01

## 2021-01-01 RX ORDER — DEXTROMETHORPHAN POLISTIREX 30 MG/5ML
60 SUSPENSION ORAL
COMMUNITY
End: 2022-01-01

## 2021-01-01 RX ORDER — TOBRAMYCIN / DEXAMETHASONE 3; .5 MG/ML; MG/ML
1 SUSPENSION/ DROPS OPHTHALMIC 4 TIMES DAILY
COMMUNITY
End: 2022-01-01

## 2021-01-01 RX ORDER — FUROSEMIDE 40 MG/1
TABLET ORAL
Qty: 90 TABLET | Refills: 3 | Status: SHIPPED | OUTPATIENT
Start: 2021-01-01 | End: 2021-01-01 | Stop reason: SDUPTHER

## 2021-01-01 RX ORDER — FUROSEMIDE 10 MG/ML
80 INJECTION INTRAMUSCULAR; INTRAVENOUS ONCE
Status: COMPLETED | OUTPATIENT
Start: 2021-01-01 | End: 2021-01-01

## 2021-01-01 RX ORDER — AMLODIPINE BESYLATE 5 MG/1
2.5 TABLET ORAL DAILY
Status: DISCONTINUED | OUTPATIENT
Start: 2021-01-01 | End: 2021-01-01 | Stop reason: HOSPADM

## 2021-01-01 RX ORDER — CETIRIZINE HCL 10 MG
10 TABLET ORAL DAILY
Status: DISCONTINUED | OUTPATIENT
Start: 2021-01-01 | End: 2021-01-01 | Stop reason: HOSPADM

## 2021-01-01 RX ORDER — AZITHROMYCIN 250 MG/1
250 TABLET, FILM COATED ORAL SEE ADMIN INSTRUCTIONS
Qty: 6 TABLET | Refills: 0 | Status: SHIPPED | OUTPATIENT
Start: 2021-01-01 | End: 2021-01-01

## 2021-01-01 RX ORDER — 3% SODIUM CHLORIDE 3 G/100ML
30 INJECTION, SOLUTION INTRAVENOUS CONTINUOUS
Status: DISPENSED | OUTPATIENT
Start: 2021-01-01 | End: 2021-01-01

## 2021-01-01 RX ORDER — DEXTROSE MONOHYDRATE 50 MG/ML
250 INJECTION, SOLUTION INTRAVENOUS ONCE
Status: COMPLETED | OUTPATIENT
Start: 2021-01-01 | End: 2021-01-01

## 2021-01-01 RX ORDER — ADHESIVE BANDAGE
30 BANDAGE TOPICAL DAILY PRN
Status: DISCONTINUED | OUTPATIENT
Start: 2021-01-01 | End: 2021-01-01 | Stop reason: HOSPADM

## 2021-01-01 RX ORDER — SULFAMETHOXAZOLE AND TRIMETHOPRIM 400; 80 MG/1; MG/1
TABLET ORAL
COMMUNITY
Start: 2021-01-01 | End: 2021-01-01 | Stop reason: ALTCHOICE

## 2021-01-01 RX ORDER — SODIUM CHLORIDE 0.9 % (FLUSH) 0.9 %
5-40 SYRINGE (ML) INJECTION AS NEEDED
Status: DISCONTINUED | OUTPATIENT
Start: 2021-01-01 | End: 2021-01-01 | Stop reason: HOSPADM

## 2021-01-01 RX ADMIN — ASPIRIN 81 MG: 81 TABLET, COATED ORAL at 09:25

## 2021-01-01 RX ADMIN — MICONAZOLE NITRATE 2 % TOPICAL POWDER: at 09:25

## 2021-01-01 RX ADMIN — ASPIRIN 81 MG: 81 TABLET, COATED ORAL at 10:02

## 2021-01-01 RX ADMIN — MICONAZOLE NITRATE 2 % TOPICAL POWDER: at 21:24

## 2021-01-01 RX ADMIN — Medication: at 08:51

## 2021-01-01 RX ADMIN — Medication: at 22:31

## 2021-01-01 RX ADMIN — Medication 10 ML: at 06:23

## 2021-01-01 RX ADMIN — METOPROLOL SUCCINATE 25 MG: 25 TABLET, FILM COATED, EXTENDED RELEASE ORAL at 08:47

## 2021-01-01 RX ADMIN — Medication 10 ML: at 01:14

## 2021-01-01 RX ADMIN — Medication 2 G: at 12:30

## 2021-01-01 RX ADMIN — CETIRIZINE HYDROCHLORIDE 10 MG: 10 TABLET, FILM COATED ORAL at 09:42

## 2021-01-01 RX ADMIN — DOCUSATE SODIUM 100 MG: 100 CAPSULE ORAL at 11:56

## 2021-01-01 RX ADMIN — Medication 10 ML: at 05:21

## 2021-01-01 RX ADMIN — MICONAZOLE NITRATE 2 % TOPICAL POWDER: at 22:31

## 2021-01-01 RX ADMIN — MICONAZOLE NITRATE 2 % TOPICAL POWDER: at 10:03

## 2021-01-01 RX ADMIN — SODIUM CHLORIDE 30 ML/HR: 3 INJECTION, SOLUTION INTRAVENOUS at 06:20

## 2021-01-01 RX ADMIN — METOPROLOL SUCCINATE 25 MG: 25 TABLET, FILM COATED, EXTENDED RELEASE ORAL at 09:42

## 2021-01-01 RX ADMIN — MICONAZOLE NITRATE 2 % TOPICAL POWDER: at 21:00

## 2021-01-01 RX ADMIN — Medication: at 09:00

## 2021-01-01 RX ADMIN — Medication 5 ML: at 22:00

## 2021-01-01 RX ADMIN — Medication 10 ML: at 22:00

## 2021-01-01 RX ADMIN — Medication 15 MG: at 12:06

## 2021-01-01 RX ADMIN — Medication 10 ML: at 06:00

## 2021-01-01 RX ADMIN — Medication 10 ML: at 13:31

## 2021-01-01 RX ADMIN — Medication 1 G: at 12:00

## 2021-01-01 RX ADMIN — ACETAMINOPHEN 650 MG: 325 TABLET ORAL at 19:11

## 2021-01-01 RX ADMIN — MICONAZOLE NITRATE 2 % TOPICAL POWDER: at 21:38

## 2021-01-01 RX ADMIN — Medication 10 ML: at 06:04

## 2021-01-01 RX ADMIN — CETIRIZINE HYDROCHLORIDE 10 MG: 10 TABLET, FILM COATED ORAL at 12:30

## 2021-01-01 RX ADMIN — Medication 10 ML: at 15:21

## 2021-01-01 RX ADMIN — MICONAZOLE NITRATE 2 % TOPICAL POWDER: at 08:36

## 2021-01-01 RX ADMIN — Medication 1 G: at 08:15

## 2021-01-01 RX ADMIN — ASPIRIN 81 MG: 81 TABLET, COATED ORAL at 08:15

## 2021-01-01 RX ADMIN — Medication 10 ML: at 22:31

## 2021-01-01 RX ADMIN — Medication: at 21:00

## 2021-01-01 RX ADMIN — SENNOSIDES 8.6 MG: 8.6 TABLET, COATED ORAL at 10:02

## 2021-01-01 RX ADMIN — Medication 10 ML: at 05:10

## 2021-01-01 RX ADMIN — Medication: at 08:36

## 2021-01-01 RX ADMIN — Medication 1 G: at 17:35

## 2021-01-01 RX ADMIN — Medication 10 ML: at 14:00

## 2021-01-01 RX ADMIN — SENNOSIDES 8.6 MG: 8.6 TABLET, COATED ORAL at 09:00

## 2021-01-01 RX ADMIN — AMLODIPINE BESYLATE 2.5 MG: 5 TABLET ORAL at 09:00

## 2021-01-01 RX ADMIN — MICONAZOLE NITRATE 2 % TOPICAL POWDER: at 22:09

## 2021-01-01 RX ADMIN — MICONAZOLE NITRATE 2 % TOPICAL POWDER: at 09:00

## 2021-01-01 RX ADMIN — DOCUSATE SODIUM 100 MG: 100 CAPSULE ORAL at 10:03

## 2021-01-01 RX ADMIN — MICONAZOLE NITRATE 2 % TOPICAL POWDER: at 08:51

## 2021-01-01 RX ADMIN — Medication 2 G: at 12:00

## 2021-01-01 RX ADMIN — Medication 2 G: at 10:02

## 2021-01-01 RX ADMIN — DEXTROSE MONOHYDRATE 250 ML: 50 INJECTION, SOLUTION INTRAVENOUS at 14:52

## 2021-01-01 RX ADMIN — CETIRIZINE HYDROCHLORIDE 10 MG: 10 TABLET, FILM COATED ORAL at 08:15

## 2021-01-01 RX ADMIN — FUROSEMIDE 80 MG: 10 INJECTION, SOLUTION INTRAVENOUS at 05:29

## 2021-01-01 RX ADMIN — SENNOSIDES 8.6 MG: 8.6 TABLET, COATED ORAL at 11:56

## 2021-01-01 RX ADMIN — Medication: at 09:45

## 2021-01-01 RX ADMIN — ASPIRIN 81 MG: 81 TABLET, COATED ORAL at 08:34

## 2021-01-01 RX ADMIN — CETIRIZINE HYDROCHLORIDE 10 MG: 10 TABLET, FILM COATED ORAL at 09:25

## 2021-01-01 RX ADMIN — Medication 10 ML: at 05:38

## 2021-01-01 RX ADMIN — Medication: at 21:37

## 2021-01-01 RX ADMIN — Medication: at 09:25

## 2021-01-01 RX ADMIN — ASPIRIN 81 MG: 81 TABLET, COATED ORAL at 10:03

## 2021-01-01 RX ADMIN — Medication 2 G: at 14:37

## 2021-01-01 RX ADMIN — POLYETHYLENE GLYCOL 3350 17 G: 17 POWDER, FOR SOLUTION ORAL at 15:21

## 2021-01-01 RX ADMIN — Medication 2 G: at 18:29

## 2021-01-01 RX ADMIN — Medication 2 G: at 10:06

## 2021-01-01 RX ADMIN — Medication: at 22:08

## 2021-01-01 RX ADMIN — Medication 10 ML: at 18:22

## 2021-01-01 RX ADMIN — DEXTROSE MONOHYDRATE 100 ML/HR: 50 INJECTION, SOLUTION INTRAVENOUS at 14:30

## 2021-01-01 RX ADMIN — SENNOSIDES 8.6 MG: 8.6 TABLET, COATED ORAL at 08:14

## 2021-01-01 RX ADMIN — SODIUM CHLORIDE 100 ML/HR: 3 INJECTION, SOLUTION INTRAVENOUS at 03:42

## 2021-01-01 RX ADMIN — DOCUSATE SODIUM 100 MG: 100 CAPSULE ORAL at 09:25

## 2021-01-01 RX ADMIN — Medication 10 ML: at 21:25

## 2021-01-01 RX ADMIN — POTASSIUM CHLORIDE 40 MEQ: 750 TABLET, FILM COATED, EXTENDED RELEASE ORAL at 16:34

## 2021-01-01 RX ADMIN — Medication 10 ML: at 22:09

## 2021-01-01 RX ADMIN — Medication 10 ML: at 21:41

## 2021-01-01 RX ADMIN — Medication: at 10:03

## 2021-01-01 RX ADMIN — ASPIRIN 81 MG: 81 TABLET, COATED ORAL at 09:41

## 2021-01-01 RX ADMIN — DOCUSATE SODIUM 100 MG: 100 CAPSULE ORAL at 09:00

## 2021-01-01 RX ADMIN — CETIRIZINE HYDROCHLORIDE 10 MG: 10 TABLET, FILM COATED ORAL at 10:02

## 2021-01-01 RX ADMIN — ASPIRIN 81 MG: 81 TABLET, COATED ORAL at 08:47

## 2021-01-01 RX ADMIN — ASPIRIN 81 MG: 81 TABLET, COATED ORAL at 09:00

## 2021-01-01 RX ADMIN — MICONAZOLE NITRATE 2 % TOPICAL POWDER: at 20:28

## 2021-01-01 RX ADMIN — SODIUM CHLORIDE 30 ML/HR: 3 INJECTION, SOLUTION INTRAVENOUS at 15:27

## 2021-01-01 RX ADMIN — Medication: at 21:43

## 2021-01-01 RX ADMIN — SENNOSIDES 8.6 MG: 8.6 TABLET, COATED ORAL at 09:24

## 2021-01-01 RX ADMIN — ACETAMINOPHEN 650 MG: 325 TABLET ORAL at 01:13

## 2021-01-01 RX ADMIN — CETIRIZINE HYDROCHLORIDE 10 MG: 10 TABLET, FILM COATED ORAL at 10:03

## 2021-01-01 RX ADMIN — Medication 2 G: at 16:57

## 2021-01-01 RX ADMIN — ACETAMINOPHEN 650 MG: 325 TABLET ORAL at 12:10

## 2021-01-01 RX ADMIN — MAGNESIUM HYDROXIDE 30 ML: 400 SUSPENSION ORAL at 05:37

## 2021-01-01 RX ADMIN — Medication: at 21:24

## 2021-01-01 RX ADMIN — Medication 10 ML: at 21:38

## 2021-01-01 RX ADMIN — METOPROLOL SUCCINATE 25 MG: 25 TABLET, FILM COATED, EXTENDED RELEASE ORAL at 08:35

## 2021-01-01 RX ADMIN — DOCUSATE SODIUM 100 MG: 100 CAPSULE ORAL at 10:02

## 2021-01-01 RX ADMIN — AMLODIPINE BESYLATE 2.5 MG: 5 TABLET ORAL at 08:15

## 2021-01-01 RX ADMIN — Medication 15 MG: at 07:37

## 2021-01-01 RX ADMIN — Medication 2 G: at 17:00

## 2021-01-01 RX ADMIN — Medication 10 ML: at 16:57

## 2021-01-01 RX ADMIN — Medication: at 20:28

## 2021-01-01 RX ADMIN — POLYETHYLENE GLYCOL 3350 17 G: 17 POWDER, FOR SOLUTION ORAL at 10:32

## 2021-01-01 RX ADMIN — DOCUSATE SODIUM 100 MG: 100 CAPSULE ORAL at 08:15

## 2021-01-01 RX ADMIN — Medication 1 G: at 08:00

## 2021-01-01 RX ADMIN — DEXTROSE MONOHYDRATE 100 ML/HR: 50 INJECTION, SOLUTION INTRAVENOUS at 02:00

## 2021-01-01 RX ADMIN — DEXTROSE MONOHYDRATE 100 ML/HR: 50 INJECTION, SOLUTION INTRAVENOUS at 17:53

## 2021-01-01 RX ADMIN — ALBUTEROL SULFATE 5 MG: 2.5 SOLUTION RESPIRATORY (INHALATION) at 23:41

## 2021-01-01 RX ADMIN — CETIRIZINE HYDROCHLORIDE 10 MG: 10 TABLET, FILM COATED ORAL at 09:00

## 2021-01-01 RX ADMIN — Medication: at 08:15

## 2021-01-04 NOTE — TELEPHONE ENCOUNTER
Dr Shagufta Cabrera wanted to thank her for the yolande Poinsettia she sent him for [place]. Also she wanted to know whether she should get COVID vaccine? MD said her allergies don't preclude her getting the COVID vaccine. He does advise she get it when available.

## 2021-01-05 ENCOUNTER — OFFICE VISIT (OUTPATIENT)
Dept: INTERNAL MEDICINE CLINIC | Age: 86
End: 2021-01-05
Payer: MEDICARE

## 2021-01-05 VITALS
RESPIRATION RATE: 20 BRPM | HEIGHT: 59 IN | HEART RATE: 52 BPM | BODY MASS INDEX: 36.04 KG/M2 | WEIGHT: 178.8 LBS | DIASTOLIC BLOOD PRESSURE: 58 MMHG | TEMPERATURE: 97.6 F | SYSTOLIC BLOOD PRESSURE: 142 MMHG | OXYGEN SATURATION: 96 %

## 2021-01-05 DIAGNOSIS — I10 ESSENTIAL HYPERTENSION, BENIGN: Primary | ICD-10-CM

## 2021-01-05 PROCEDURE — G8427 DOCREV CUR MEDS BY ELIG CLIN: HCPCS | Performed by: INTERNAL MEDICINE

## 2021-01-05 PROCEDURE — G8536 NO DOC ELDER MAL SCRN: HCPCS | Performed by: INTERNAL MEDICINE

## 2021-01-05 PROCEDURE — G8510 SCR DEP NEG, NO PLAN REQD: HCPCS | Performed by: INTERNAL MEDICINE

## 2021-01-05 PROCEDURE — 1101F PT FALLS ASSESS-DOCD LE1/YR: CPT | Performed by: INTERNAL MEDICINE

## 2021-01-05 PROCEDURE — 99213 OFFICE O/P EST LOW 20 MIN: CPT | Performed by: INTERNAL MEDICINE

## 2021-01-05 PROCEDURE — G0463 HOSPITAL OUTPT CLINIC VISIT: HCPCS | Performed by: INTERNAL MEDICINE

## 2021-01-05 PROCEDURE — G8417 CALC BMI ABV UP PARAM F/U: HCPCS | Performed by: INTERNAL MEDICINE

## 2021-01-05 PROCEDURE — 1090F PRES/ABSN URINE INCON ASSESS: CPT | Performed by: INTERNAL MEDICINE

## 2021-01-05 NOTE — PROGRESS NOTES
HISTORY OF PRESENT ILLNESS  Jair Brown is a 80 y.o. female. Hypertension   The history is provided by the patient and caregiver (home readings higher than here, new to low dose metoprolol). This is a chronic problem. Associated symptoms include shortness of breath. Pertinent negatives include no chest pain and no dizziness. Review of Systems   Constitutional: Negative for chills and fever. Respiratory: Positive for shortness of breath. Chronic   Cardiovascular: Negative for chest pain. Musculoskeletal: Positive for joint pain. Neurological: Negative for dizziness. Physical Exam  Vitals signs and nursing note reviewed. Constitutional:       General: She is not in acute distress. Cardiovascular:      Rate and Rhythm: Bradycardia present. Rhythm irregular. Heart sounds: No murmur. No friction rub. No gallop. Pulmonary:      Effort: Pulmonary effort is normal.      Breath sounds: Normal breath sounds. Musculoskeletal:      Right lower le+ Edema present. Left lower le+ Edema present. ASSESSMENT and PLAN  Diagnoses and all orders for this visit:    1. Essential hypertension, benign    - Continue current regimen of prescription and / or OTC medications     2.  Other chronic problems- appt 3 wks

## 2021-01-25 ENCOUNTER — OFFICE VISIT (OUTPATIENT)
Dept: INTERNAL MEDICINE CLINIC | Age: 86
End: 2021-01-25
Payer: MEDICARE

## 2021-01-25 ENCOUNTER — HOSPITAL ENCOUNTER (OUTPATIENT)
Dept: GENERAL RADIOLOGY | Age: 86
Discharge: HOME | End: 2021-01-25
Attending: INTERNAL MEDICINE
Payer: MEDICARE

## 2021-01-25 VITALS
HEART RATE: 51 BPM | TEMPERATURE: 98.2 F | RESPIRATION RATE: 20 BRPM | SYSTOLIC BLOOD PRESSURE: 146 MMHG | BODY MASS INDEX: 36.97 KG/M2 | DIASTOLIC BLOOD PRESSURE: 67 MMHG | HEIGHT: 59 IN | WEIGHT: 183.4 LBS | OXYGEN SATURATION: 96 %

## 2021-01-25 DIAGNOSIS — I50.22 CHRONIC SYSTOLIC CONGESTIVE HEART FAILURE (HCC): ICD-10-CM

## 2021-01-25 DIAGNOSIS — E11.21 TYPE 2 DIABETES MELLITUS WITH NEPHROPATHY (HCC): ICD-10-CM

## 2021-01-25 DIAGNOSIS — I48.20 CHRONIC ATRIAL FIBRILLATION (HCC): ICD-10-CM

## 2021-01-25 DIAGNOSIS — I10 ESSENTIAL HYPERTENSION, BENIGN: Primary | ICD-10-CM

## 2021-01-25 DIAGNOSIS — E11.40 TYPE 2 DIABETES, CONTROLLED, WITH NEUROPATHY (HCC): ICD-10-CM

## 2021-01-25 PROCEDURE — 71046 X-RAY EXAM CHEST 2 VIEWS: CPT

## 2021-01-25 PROCEDURE — G8432 DEP SCR NOT DOC, RNG: HCPCS | Performed by: INTERNAL MEDICINE

## 2021-01-25 PROCEDURE — 1101F PT FALLS ASSESS-DOCD LE1/YR: CPT | Performed by: INTERNAL MEDICINE

## 2021-01-25 PROCEDURE — 1090F PRES/ABSN URINE INCON ASSESS: CPT | Performed by: INTERNAL MEDICINE

## 2021-01-25 PROCEDURE — G0463 HOSPITAL OUTPT CLINIC VISIT: HCPCS | Performed by: INTERNAL MEDICINE

## 2021-01-25 PROCEDURE — G8417 CALC BMI ABV UP PARAM F/U: HCPCS | Performed by: INTERNAL MEDICINE

## 2021-01-25 PROCEDURE — 99214 OFFICE O/P EST MOD 30 MIN: CPT | Performed by: INTERNAL MEDICINE

## 2021-01-25 PROCEDURE — G8536 NO DOC ELDER MAL SCRN: HCPCS | Performed by: INTERNAL MEDICINE

## 2021-01-25 PROCEDURE — G8427 DOCREV CUR MEDS BY ELIG CLIN: HCPCS | Performed by: INTERNAL MEDICINE

## 2021-01-25 RX ORDER — FUROSEMIDE 40 MG/1
TABLET ORAL
Qty: 1 TAB | Refills: 0
Start: 2021-01-25 | End: 2021-02-03

## 2021-01-25 NOTE — PROGRESS NOTES
HISTORY OF PRESENT ILLNESS  Cristina Almendarez is a 89 y.o. female.  HPI Assessment.    Cristina is seen today for follow up of hypertension and other chronic medical problems.   1. Hypertension. Fair readings. Her home monitor registers about 10 points systolic lower than the office monitor. She has generally higher than acceptable readings at home. I do not think we need super tight control but I would like to see at least in the neighborhood of 140 or the upper 130s more frequently. She is on a low dose of metoprolol and furosemide. She had previously been intolerant of carvedilol.   2. Congestive heart failure. This is diastolic in nature. She also has chronic atrial fibrillation. She has noted more shortness of breath and some recent weight gain. She does not feel her edema is much different. We decided on a short course (3 days) of increased furosemide. She sees cardiologist Dr. Grayson on 21. We will check a chest x-ray.   3. Diabetes, IFG. She is up to date with A1c and we will hold off on further testing.     I would like to see her back in about 10 days to see how she is doing.         Review of Systems   Constitutional: Negative for chills and fever.   HENT: Positive for congestion.    Respiratory: Positive for shortness of breath and wheezing.    Cardiovascular: Positive for leg swelling.       Physical Exam  Vitals signs and nursing note reviewed.   Constitutional:       General: She is not in acute distress.     Appearance: She is not ill-appearing.   Cardiovascular:      Rate and Rhythm: Bradycardia present. Rhythm irregularly irregular.   Pulmonary:      Effort: Pulmonary effort is normal.      Breath sounds: Normal breath sounds. No wheezing or rales.   Musculoskeletal:      Right lower le+ Edema present.      Left lower le+ Edema present.   Skin:     General: Skin is warm and dry.   Neurological:      Mental Status: She is alert.   Psychiatric:         Behavior: Behavior normal.  ASSESSMENT and PLAN  Diagnoses and all orders for this visit:    1. Essential hypertension, benign  -     furosemide (LASIX) 40 mg tablet; TAKE 2TABLET DAILY FOR 3 DAYS, THEN ONE every day    2. Type 2 diabetes, controlled, with neuropathy ([facility]- Follow off treatment     3. Chronic atrial fibrillation (HCC)  -     furosemide (LASIX) 40 mg tablet; TAKE 2TABLET DAILY FOR 3 DAYS, THEN ONE every day  - See cardiologist as directed. 4. Type 2 diabetes mellitus with nephropathy ([facility]    5. Chronic systolic congestive heart failure (HCC)  -     XR CHEST PA LAT;  Future  -     furosemide (LASIX) 40 mg tablet; TAKE 2TABLET DAILY FOR 3 DAYS, THEN ONE every day    Plan was reviewed with patient and family, understanding expressed

## 2021-01-28 ENCOUNTER — PATIENT OUTREACH (OUTPATIENT)
Dept: CASE MANAGEMENT | Age: 86
End: 2021-01-28

## 2021-01-28 NOTE — PROGRESS NOTES
Goals Addressed                 This Visit's Progress       Patient Stated     self management of HTN (pt-stated)   On track     1/28/21 Skills and education necessary to properly manage HTN  History: Patient seen in ED 12/11/20 for bleeding from left ear, prescribed ofloxacin drops and instructed to follow up the following week. Original follow up by ACM for COVID-19 education and ED follow up. Patient had last office visit with PCP 1/25/21 for HTN and CHF. Patient will have FU visit 2/3/21  Current level of understanding: Patient appears to have good understanding of health issues. Desired Outcome: Patient will be able to stabilize systolic BP in 512'C or upper 120's range. Plan: Encourage regular BP checks, take medications as prescribed, attend follow up appointments. Patient reports having a follow up with cardiology 1/29/21 and PCP FU 2/3/21. ACM supplied contact information and will complete general assessment at next outreach.   Regina Carroll RN  Ambulatory Care Manager

## 2021-01-29 ENCOUNTER — OFFICE VISIT (OUTPATIENT)
Dept: CARDIOLOGY CLINIC | Age: 86
End: 2021-01-29
Payer: MEDICARE

## 2021-01-29 ENCOUNTER — PATIENT OUTREACH (OUTPATIENT)
Dept: CASE MANAGEMENT | Age: 86
End: 2021-01-29

## 2021-01-29 VITALS
SYSTOLIC BLOOD PRESSURE: 130 MMHG | HEART RATE: 50 BPM | OXYGEN SATURATION: 95 % | WEIGHT: 178 LBS | RESPIRATION RATE: 16 BRPM | HEIGHT: 59 IN | DIASTOLIC BLOOD PRESSURE: 62 MMHG | BODY MASS INDEX: 35.88 KG/M2

## 2021-01-29 DIAGNOSIS — R06.02 SOB (SHORTNESS OF BREATH) ON EXERTION: ICD-10-CM

## 2021-01-29 DIAGNOSIS — R60.0 LOCALIZED EDEMA: ICD-10-CM

## 2021-01-29 DIAGNOSIS — I10 ESSENTIAL HYPERTENSION, BENIGN: ICD-10-CM

## 2021-01-29 DIAGNOSIS — I48.20 CHRONIC ATRIAL FIBRILLATION (HCC): Primary | ICD-10-CM

## 2021-01-29 PROCEDURE — 1090F PRES/ABSN URINE INCON ASSESS: CPT | Performed by: SPECIALIST

## 2021-01-29 PROCEDURE — G8536 NO DOC ELDER MAL SCRN: HCPCS | Performed by: SPECIALIST

## 2021-01-29 PROCEDURE — G8510 SCR DEP NEG, NO PLAN REQD: HCPCS | Performed by: SPECIALIST

## 2021-01-29 PROCEDURE — 1101F PT FALLS ASSESS-DOCD LE1/YR: CPT | Performed by: SPECIALIST

## 2021-01-29 PROCEDURE — 99213 OFFICE O/P EST LOW 20 MIN: CPT | Performed by: SPECIALIST

## 2021-01-29 PROCEDURE — G0463 HOSPITAL OUTPT CLINIC VISIT: HCPCS | Performed by: SPECIALIST

## 2021-01-29 PROCEDURE — G8427 DOCREV CUR MEDS BY ELIG CLIN: HCPCS | Performed by: SPECIALIST

## 2021-01-29 PROCEDURE — G8417 CALC BMI ABV UP PARAM F/U: HCPCS | Performed by: SPECIALIST

## 2021-01-29 NOTE — PROGRESS NOTES
Moncho Hernandez is a 80 y.o. female    Chief Complaint   Patient presents with    Irregular Heart Beat    Hypertension     1. Have you been to the ER, urgent care clinic since your last visit? Hospitalized since your last visit? No    2. Have you seen or consulted any other health care providers outside of the [address] [place] [address] since your last visit? Include any pap smears or colon screening.   No

## 2021-01-29 NOTE — PROGRESS NOTES
Goals Addressed                 This Visit's Progress       Patient Stated     self management of HTN (pt-stated)          1/29/21  returned call to patient regarding COVID-19 vaccination.  Medical Group scheduling hotline number [phone] reports not scheduling appointments at this time, but advises to check back frequently to see if scheduling opens up as new shipment of vaccine arrives. Also suggested connecting to [facility] and getting on their waiting list.   Bob Solo RN  Ambulatory Care Manager     1/28/21 Skills and education necessary to properly manage HTN  History: Patient seen in ED 12/11/20 for bleeding from left ear, prescribed ofloxacin drops and instructed to follow up the following week. Original follow up by ACM for COVID-19 education and ED follow up. Patient had last office visit with PCP 1/25/21 for HTN and CHF. Patient will have FU visit 2/3/21  Current level of understanding: Patient appears to have good understanding of health issues. Desired Outcome: Patient will be able to stabilize systolic BP in 297'G or upper 120's range. Plan: Encourage regular BP checks, take medications as prescribed, attend follow up appointments. Patient reports having a follow up with cardiology 1/29/21 and PCP FU 2/3/21. ACM supplied contact information and will complete general assessment at next outreach.   Bob Solo RN  Ambulatory Care Manager

## 2021-01-29 NOTE — PROGRESS NOTES
HISTORY OF PRESENT ILLNESS  Meena Virk is a 80 y.o. female     SUMMARY:   Problem List  Date Reviewed: 1/29/2021          Codes Class Noted    Severe obesity (BMI 35.0-39. 9) with comorbidity ([facility] ICD-10-CM: E66.01  ICD-9-CM: 278.01  4/25/2018        Type 2 diabetes mellitus with nephropathy ([facility] ICD-10-CM: E11.21  ICD-9-CM: 250.40, 583.81  12/19/2017        CHF (congestive heart failure) ([facility] ICD-10-CM: I50.9  ICD-9-CM: 428.0  12/6/2017        Anxiety ICD-10-CM: F41.9  ICD-9-CM: 300.00  10/2/2017        Allergic rhinitis ICD-10-CM: J30.9  ICD-9-CM: 477.9  4/11/2016        Chronic atrial fibrillation ([facility] ICD-10-CM: I48.20  ICD-9-CM: 427.31  4/8/2016        Advanced directives, counseling/discussion ICD-10-CM: Z71.89  ICD-9-CM: V65.49  3/15/2016        Type 2 diabetes, controlled, with neuropathy ([facility] ICD-10-CM: E11.40  ICD-9-CM: 250.60, 357.2  1/4/2016        Essential hypertension, benign ICD-10-CM: I10  ICD-9-CM: 401.1  6/4/2015        Edema ICD-10-CM: R60.9  ICD-9-CM: 782.3  11/12/2014    Overview Addendum 12/14/2017  8:24 AM by Pallavi Hedrick MD     4/16 echo normal lvef, lae, mild tr  12/17 echo normal lvef, elisha, mild to mod mr and tr, pa pressure 50mm             Headache(784.0) ICD-10-CM: R51  ICD-9-CM: 784.0  11/12/2014        Diverticulosis of colon (without mention of hemorrhage) ICD-10-CM: K57.30  ICD-9-CM: 562.10  11/12/2014        Constipation ICD-10-CM: K59.00  ICD-9-CM: 564.00  11/12/2014        Cyst of left kidney ICD-10-CM: N28.1  ICD-9-CM: 753.10  11/12/2014              Current Outpatient Medications on File Prior to Visit   Medication Sig    furosemide (LASIX) 40 mg tablet TAKE 2TABLET DAILY FOR 3 DAYS, THEN ONE every day    metoprolol succinate (TOPROL-XL) 25 mg XL tablet Take 1 Tab by mouth daily.  potassium chloride SR (KLOR-CON 10) 10 mEq tablet TAKE 2 TABLETS DAILY (NEW DIRECTIONS)    vit A/vit C/vit E/zinc/copper (PRESERVISION AREDS PO) Take  by mouth.     loratadine (Claritin) 10 mg tablet Take 10 mg by mouth daily.  glucose blood VI test strips (PRECISION XTRA TEST) strip USE TO TEST BLOOD SUGAR TWICE DAILY. Dx E11.21    glimepiride (AMARYL) 1 mg tablet TAKE 1 TABLET BY MOUTH EVERY DAY. TAKE ONLY IF SUGAR IS ABOVE 140 (Patient taking differently: TAKE 1 TABLET BY MOUTH EVERY DAY. TAKE ONLY IF SUGAR IS ABOVE 140 Patient reports taking as needed)    clotrimazole-betamethasone (LOTRISONE) topical cream Apply  to affected area two (2) times daily as needed for Skin Irritation.  mirabegron ER (MYRBETRIQ) 50 mg ER tablet Per urology (Patient taking differently: Take 50 mg by mouth daily. Per urology)   24 [facility] 72 mcg cap Take  by mouth.  ferrous sulfate 325 mg (65 mg iron) tablet TAKE 1 TABLET BY MOUTH DAILY WITH BREAKFAST    aspirin delayed-release 81 mg tablet Take 1 Tab by mouth daily. (Patient taking differently: Take 81 mg by mouth every other day.)    CHOLECALCIFEROL, VITAMIN D3, (VITAMIN D3 PO) Take  by mouth daily.  Lancets misc 1 Package by Does Not Apply route daily. Test glucose daily and as needed Dx type 2 dm E11.42    magnesium hydroxide (MILK OF MAGNESIA) 2,400 mg/10 mL susp suspension Take 10 mL by mouth nightly. No current facility-administered medications on file prior to visit. CARDIOLOGY STUDIES TO DATE:  4/16 echo normal lvef, lae, mild tr  12/17 echo normal lvef, elisha, mild to mod mr and tr, pa pressure 50mm    Chief Complaint   Patient presents with    Irregular Heart Beat    Hypertension     HPI :  Sometime before Christmas her blood pressure was running little high so her primary care added Toprol-XL 25 mg and that seems to have worked nicely. She is a little bradycardic and we had had that issue when she was on carvedilol in the past however that being said she does not seem to be symptomatic from that standpoint. She saw her primary care a couple days ago and was just generally feeling poorly.   A chest x-ray was obtained which did not show any fluid or effusions and her Lasix dose was doubled for 3 days and she actually lost some weight and she does feel generally better. According to her MAR, 40 mg of Lasix was prescribed, 2 tablets for 3 days and then 1 tablet until seen again in the office. Turns out she had just been doubling up on her 20 mg tablets and from this point on she will go back to 20 mg a day. She is not had much trouble with lower extremity edema recently. She still complains of frequent urination and I told her I do not think it is the diuretic because urinary frequency persist well into the late afternoon and evening when she takes her diuretic around 9:00 in the morning  CARDIAC ROS:   negative for chest pain, dyspnea, palpitations, syncope, orthopnea, paroxysmal nocturnal dyspnea, exertional chest pressure/discomfort, claudication    No family history on file. Past Medical History:   Diagnosis Date    Anxiety     Bleeding nose     Chronic atrial fibrillation (HCC)     Diabetes ([facility]     Hypertension        GENERAL ROS:  A comprehensive review of systems was negative except for that written in the HPI.     Visit Vitals  /62 (BP 1 Location: Right upper arm, BP Patient Position: Sitting)   Pulse (!) 50   Resp 16   Ht 4' 11\" (1.499 m)   Wt 178 lb (80.7 kg)   SpO2 95%   BMI 35.95 kg/m²       Wt Readings from Last 3 Encounters:   01/29/21 178 lb (80.7 kg)   01/25/21 183 lb 6.4 oz (83.2 kg)   01/05/21 178 lb 12.8 oz (81.1 kg)            BP Readings from Last 3 Encounters:   01/29/21 130/62   01/25/21 (!) 146/67   01/05/21 (!) 142/58       PHYSICAL EXAM  General appearance: alert, cooperative, no distress, appears stated age  Neurologic: Alert and oriented X 3  Neck: supple, symmetrical, trachea midline, no adenopathy, no carotid bruit and no JVD  Lungs: clear to auscultation bilaterally  Heart: regular rate and rhythm, S1, S2 normal, no S3 or S4, systolic murmur: early systolic 2/6, crescendo at 2nd right intercostal space  Extremities: edema tr    Lab Results   Component Value Date/Time    Cholesterol, total 146 03/21/2016 10:31 AM    Cholesterol, total 147 03/13/2015 02:06 PM    HDL Cholesterol 85 03/21/2016 10:31 AM    HDL Cholesterol 60 03/13/2015 02:06 PM    LDL, calculated 48 03/21/2016 10:31 AM    LDL, calculated 69 03/13/2015 02:06 PM    Triglyceride 64 03/21/2016 10:31 AM    Triglyceride 90 03/13/2015 02:06 PM     ASSESSMENT :      Overall I think she is quite stable from a cardiac perspective. We will have to be mindful of her bradycardia should that ever become an issue. I told her and her aide that we should consider her current weight her dry weight. If she goes up a pound or 2 and down a pound or 2 that is fine but if her weight goes up more than 2 pounds in 3 days she should double her Lasix until her weight goes back down. She needs no cardiac testing at this time. current treatment plan is effective, no change in therapy  lab results and schedule of future lab studies reviewed with patient  reviewed diet, exercise and weight control    Encounter Diagnoses   Name Primary?  Chronic atrial fibrillation (HCC) Yes    Essential hypertension, benign     SOB (shortness of breath) on exertion     Localized edema      No orders of the defined types were placed in this encounter. Follow-up and Dispositions    · Return in about 6 months (around 7/29/2021). Isak March MD  1/29/2021  Please note that this dictation was completed with [email or web address], the computer voice recognition software. Quite often unanticipated grammatical, syntax, homophones, and other interpretive errors are inadvertently transcribed by the computer software. Please disregard these errors. Please excuse any errors that have escaped final proofreading. Thank you.

## 2021-02-03 ENCOUNTER — OFFICE VISIT (OUTPATIENT)
Dept: INTERNAL MEDICINE CLINIC | Age: 86
End: 2021-02-03
Payer: MEDICARE

## 2021-02-03 VITALS
DIASTOLIC BLOOD PRESSURE: 49 MMHG | SYSTOLIC BLOOD PRESSURE: 137 MMHG | RESPIRATION RATE: 20 BRPM | WEIGHT: 179.8 LBS | BODY MASS INDEX: 36.25 KG/M2 | TEMPERATURE: 97.3 F | OXYGEN SATURATION: 98 % | HEART RATE: 51 BPM | HEIGHT: 59 IN

## 2021-02-03 DIAGNOSIS — I50.22 CHRONIC SYSTOLIC CONGESTIVE HEART FAILURE (HCC): ICD-10-CM

## 2021-02-03 DIAGNOSIS — I10 ESSENTIAL HYPERTENSION, BENIGN: ICD-10-CM

## 2021-02-03 DIAGNOSIS — I48.20 CHRONIC ATRIAL FIBRILLATION (HCC): ICD-10-CM

## 2021-02-03 PROCEDURE — 1101F PT FALLS ASSESS-DOCD LE1/YR: CPT | Performed by: INTERNAL MEDICINE

## 2021-02-03 PROCEDURE — G8427 DOCREV CUR MEDS BY ELIG CLIN: HCPCS | Performed by: INTERNAL MEDICINE

## 2021-02-03 PROCEDURE — G0463 HOSPITAL OUTPT CLINIC VISIT: HCPCS | Performed by: INTERNAL MEDICINE

## 2021-02-03 PROCEDURE — G8417 CALC BMI ABV UP PARAM F/U: HCPCS | Performed by: INTERNAL MEDICINE

## 2021-02-03 PROCEDURE — 99213 OFFICE O/P EST LOW 20 MIN: CPT | Performed by: INTERNAL MEDICINE

## 2021-02-03 PROCEDURE — G8536 NO DOC ELDER MAL SCRN: HCPCS | Performed by: INTERNAL MEDICINE

## 2021-02-03 PROCEDURE — G8432 DEP SCR NOT DOC, RNG: HCPCS | Performed by: INTERNAL MEDICINE

## 2021-02-03 PROCEDURE — 1090F PRES/ABSN URINE INCON ASSESS: CPT | Performed by: INTERNAL MEDICINE

## 2021-02-03 RX ORDER — FUROSEMIDE 40 MG/1
TABLET ORAL
Qty: 1 TAB | Refills: 0
Start: 2021-02-03 | End: 2021-01-01

## 2021-02-03 NOTE — PROGRESS NOTES
HISTORY OF PRESENT ILLNESS  Maria Luz Gray is a 80 y.o. female. Shortness of Breath  The history is provided by the patient and caregiver (see previous notes). This is a recurrent problem. The current episode started more than 1 week ago. The problem has been gradually improving. Associated symptoms include leg swelling. Pertinent negatives include no fever. Associated symptoms comments: Weight decreased 4 #. Treatments tried: increased Lasix x 3 d. Associated medical issues include heart failure. Review of Systems   Constitutional: Negative for fever. Respiratory: Positive for shortness of breath. Cardiovascular: Positive for leg swelling. Physical Exam  Vitals signs and nursing note reviewed. Constitutional:       General: She is not in acute distress. Cardiovascular:      Rate and Rhythm: Bradycardia present. Rhythm irregular. Heart sounds: Murmur present. Systolic murmur present with a grade of 2/6. No friction rub. No gallop. Comments: Baseline edema  Pulmonary:      Effort: Pulmonary effort is normal.      Breath sounds: Normal breath sounds. Musculoskeletal:      Right lower le+ Edema present. Left lower le+ Edema present. ASSESSMENT and PLAN  Diagnoses and all orders for this visit:    1. Chronic atrial fibrillation (HCC)  -     furosemide (LASIX) 40 mg tablet; Take one every day, if weight increases 4 pounds, TAKE 2TABLET DAILY FOR 3 DAYS    2. Essential hypertension, benign  -     furosemide (LASIX) 40 mg tablet; Take one every day, if weight increases 4 pounds, TAKE 2TABLET DAILY FOR 3 DAYS    3. Chronic systolic congestive heart failure (HCC)  -     furosemide (LASIX) 40 mg tablet;  Take one every day, if weight increases 4 pounds, TAKE 2TABLET DAILY FOR 3 DAYS    Plan was reviewed with patient and family, understanding expressed

## 2021-02-09 ENCOUNTER — TELEPHONE (OUTPATIENT)
Dept: INTERNAL MEDICINE CLINIC | Age: 86
End: 2021-02-09

## 2021-02-09 NOTE — TELEPHONE ENCOUNTER
Spoke with pt - she states she ate some kale with seasoning which was \"very salty\" on it last Friday 2/5/21. Saturday she felt \"shakey\" and had sob when walking. [place] she was having a reaction to either the kale or seasoning. I told her it sounded like the seasoning. She took 2 lasix pills. Asked her if she was experiencing swelling in hands and lower extremities? No she took pills just in case she did. Her BP have been fine. She was feeling anxious and thought she may be having a heart attack but was not having any chest pain. Today she is feeling a little better. She states still having sob. Did not notice this in our conversation - pt talking clearly but sounds anxious. Wanted to know if she needed to come in for appt?  Will forward to MD.

## 2021-02-09 NOTE — TELEPHONE ENCOUNTER
Spoke with pt - reassured her this may be anxiety but if not feeling better tomorrow she may schedule a visit. She states she will call if not better.

## 2021-02-23 ENCOUNTER — PATIENT OUTREACH (OUTPATIENT)
Dept: CASE MANAGEMENT | Age: 86
End: 2021-02-23

## 2021-02-23 NOTE — PROGRESS NOTES
Goals Addressed                 This Visit's Progress       Patient Stated     self management of HTN (pt-stated)        2/23/21  Patient reports:  - feeling good. BP checked regularly. Average range systolic 132-921.  - follow up with cardio completed 1/29/21 went well - FU in 6 mo. - seen by PCP 2/3/21. 1+ edema bilateral LE. Patient reports no edema this morning.  - patient has received one dose of COVID-19 vaccine and will have second dose 2/27/21   - Continue with current regimen. Consider resolving episode of care at next outreach. Whitney Perez RN  Ambulatory Care Manager    1/29/21  returned call to patient regarding COVID-19 vaccination.  Medical Group scheduling hotline number [phone] reports not scheduling appointments at this time, but advises to check back frequently to see if scheduling opens up as new shipment of vaccine arrives. Also suggested connecting to Department of [facility] and getting on their waiting list.   Whitney Perez RN  Ambulatory Care Manager     1/28/21 Skills and education necessary to properly manage HTN  History: Patient seen in ED 12/11/20 for bleeding from left ear, prescribed ofloxacin drops and instructed to follow up the following week. Original follow up by ACM for COVID-19 education and ED follow up. Patient had last office visit with PCP 1/25/21 for HTN and CHF. Patient will have FU visit 2/3/21  Current level of understanding: Patient appears to have good understanding of health issues. Desired Outcome: Patient will be able to stabilize systolic BP in 236'E or upper 120's range. Plan: Encourage regular BP checks, take medications as prescribed, attend follow up appointments. Patient reports having a follow up with cardiology 1/29/21 and PCP FU 2/3/21. ACM supplied contact information and will complete general assessment at next outreach.   Whitney Perez RN  Ambulatory Care Manager

## 2021-03-08 DIAGNOSIS — I10 ESSENTIAL HYPERTENSION, BENIGN: ICD-10-CM

## 2021-03-09 RX ORDER — METOPROLOL SUCCINATE 25 MG/1
25 TABLET, EXTENDED RELEASE ORAL DAILY
Qty: 90 TAB | Refills: 3 | Status: SHIPPED | COMMUNITY
Start: 2021-03-09 | End: 2022-01-01

## 2021-03-16 ENCOUNTER — PATIENT OUTREACH (OUTPATIENT)
Dept: CASE MANAGEMENT | Age: 86
End: 2021-03-16

## 2021-03-18 NOTE — PROGRESS NOTES
Patient has graduated from the Complex Case Management  program on 3/18/21. Patient/family has the ability to self-manage at this time Care management goals have been completed. No further Ambulatory Care Manager follow up scheduled. Goals Addressed                 This Visit's Progress       Patient Stated     COMPLETED: self management of HTN (pt-stated)        3/18/21  Patient reports:  - feeling tired. States \"old age, parts wearing out and can't get new ones of the same model\"  - ambulates with walker  - had a fall 2/27/21 while with niece who helped prevent injury by catching patient on the way down. Admits to couple of bruises, but did not think it was necessary to see doctor. - next office visit with PCP 4/14/21  - final dose of Pfizer COVID-19 vaccine administered 2/27/21. Patient states fall had nothing to do with vaccine and stated she did not have any side effects of vaccine. - ACM updated patient Care Gaps by recording COVID-19 doses  - denies LE edema, states left tends to swell more than right, but looks ok today. - BP has been really good lately. Reports today's reading as systolic in the 828'H with diastolic 53'I. - patient will graduate from [facility] and episode of care resolved. ACM supplied contact information should patient need assistance in the future. Irving Louis RN  Ambulatory Care Manager    2/23/21  Patient reports:  - feeling good. BP checked regularly. Average range systolic 903-040.  - follow up with cardio completed 1/29/21 went well - FU in 6 mo. - seen by PCP 2/3/21. 1+ edema bilateral LE. Patient reports no edema this morning.  - patient has received one dose of COVID-19 vaccine and will have second dose 2/27/21   - Continue with current regimen. Consider resolving episode of care at next outreach. Irving Louis RN  Ambulatory Care Manager    1/29/21  returned call to patient regarding COVID-19 vaccination.   Medical Group scheduling hotline number [phone] reports not scheduling appointments at this time, but advises to check back frequently to see if scheduling opens up as new shipment of vaccine arrives. Also suggested connecting to [facility] and getting on their waiting list.   Stefan Patino RN  Ambulatory Care Manager     1/28/21 Skills and education necessary to properly manage HTN  History: Patient seen in ED 12/11/20 for bleeding from left ear, prescribed ofloxacin drops and instructed to follow up the following week. Original follow up by ACM for COVID-19 education and ED follow up. Patient had last office visit with PCP 1/25/21 for HTN and CHF. Patient will have FU visit 2/3/21  Current level of understanding: Patient appears to have good understanding of health issues. Desired Outcome: Patient will be able to stabilize systolic BP in 843'N or upper 120's range. Plan: Encourage regular BP checks, take medications as prescribed, attend follow up appointments. Patient reports having a follow up with cardiology 1/29/21 and PCP FU 2/3/21. ACM supplied contact information and will complete general assessment at next outreach. Stefan Patino RN  Ambulatory Care Manager                Patient has Ambulatory Care Manager's contact information for any further questions, concerns, or needs.   Patients upcoming visits:    Future Appointments   Date Time Provider Tristan Beck   4/14/2021  1:00 PM Silvina Aranda MD WE[facility] AMB   7/30/2021  1:40 PM MD TK Davis AMB

## 2021-04-14 ENCOUNTER — OFFICE VISIT (OUTPATIENT)
Dept: INTERNAL MEDICINE CLINIC | Age: 86
End: 2021-04-14
Payer: MEDICARE

## 2021-04-14 VITALS
TEMPERATURE: 97.6 F | WEIGHT: 181 LBS | SYSTOLIC BLOOD PRESSURE: 143 MMHG | BODY MASS INDEX: 36.49 KG/M2 | OXYGEN SATURATION: 98 % | HEART RATE: 45 BPM | DIASTOLIC BLOOD PRESSURE: 50 MMHG | RESPIRATION RATE: 20 BRPM | HEIGHT: 59 IN

## 2021-04-14 DIAGNOSIS — R29.3 POSTURAL INSTABILITY: ICD-10-CM

## 2021-04-14 DIAGNOSIS — I10 ESSENTIAL HYPERTENSION, BENIGN: ICD-10-CM

## 2021-04-14 DIAGNOSIS — E11.21 TYPE 2 DIABETES MELLITUS WITH NEPHROPATHY (HCC): ICD-10-CM

## 2021-04-14 DIAGNOSIS — I48.20 CHRONIC ATRIAL FIBRILLATION (HCC): ICD-10-CM

## 2021-04-14 DIAGNOSIS — E66.01 SEVERE OBESITY (BMI 35.0-39.9) WITH COMORBIDITY (HCC): ICD-10-CM

## 2021-04-14 DIAGNOSIS — I50.22 CHRONIC SYSTOLIC CONGESTIVE HEART FAILURE (HCC): Primary | ICD-10-CM

## 2021-04-14 DIAGNOSIS — E11.40 TYPE 2 DIABETES, CONTROLLED, WITH NEUROPATHY (HCC): ICD-10-CM

## 2021-04-14 LAB
ANION GAP SERPL CALC-SCNC: 3 MMOL/L (ref 5–15)
BASOPHILS # BLD: 0 K/UL (ref 0–0.1)
BASOPHILS NFR BLD: 1 % (ref 0–1)
BUN SERPL-MCNC: 22 MG/DL (ref 6–20)
BUN/CREAT SERPL: 26 (ref 12–20)
CALCIUM SERPL-MCNC: 8.2 MG/DL (ref 8.5–10.1)
CHLORIDE SERPL-SCNC: 104 MMOL/L (ref 97–108)
CO2 SERPL-SCNC: 31 MMOL/L (ref 21–32)
CREAT SERPL-MCNC: 0.84 MG/DL (ref 0.55–1.02)
CREAT UR-MCNC: <13 MG/DL
DIFFERENTIAL METHOD BLD: ABNORMAL
EOSINOPHIL # BLD: 0 K/UL (ref 0–0.4)
EOSINOPHIL NFR BLD: 1 % (ref 0–7)
ERYTHROCYTE [DISTWIDTH] IN BLOOD BY AUTOMATED COUNT: 15.1 % (ref 11.5–14.5)
EST. AVERAGE GLUCOSE BLD GHB EST-MCNC: 123 MG/DL
GLUCOSE SERPL-MCNC: 95 MG/DL (ref 65–100)
HBA1C MFR BLD: 5.9 % (ref 4–5.6)
HCT VFR BLD AUTO: 34.1 % (ref 35–47)
HGB BLD-MCNC: 10.8 G/DL (ref 11.5–16)
IMM GRANULOCYTES # BLD AUTO: 0 K/UL (ref 0–0.04)
IMM GRANULOCYTES NFR BLD AUTO: 0 % (ref 0–0.5)
LYMPHOCYTES # BLD: 1.1 K/UL (ref 0.8–3.5)
LYMPHOCYTES NFR BLD: 23 % (ref 12–49)
MCH RBC QN AUTO: 30.3 PG (ref 26–34)
MCHC RBC AUTO-ENTMCNC: 31.7 G/DL (ref 30–36.5)
MCV RBC AUTO: 95.5 FL (ref 80–99)
MICROALBUMIN UR-MCNC: 0.97 MG/DL
MICROALBUMIN/CREAT UR-RTO: NORMAL MG/G (ref 0–30)
MONOCYTES # BLD: 0.5 K/UL (ref 0–1)
MONOCYTES NFR BLD: 10 % (ref 5–13)
NEUTS SEG # BLD: 3.2 K/UL (ref 1.8–8)
NEUTS SEG NFR BLD: 65 % (ref 32–75)
NRBC # BLD: 0 K/UL (ref 0–0.01)
NRBC BLD-RTO: 0 PER 100 WBC
PLATELET # BLD AUTO: 211 K/UL (ref 150–400)
PMV BLD AUTO: 11.3 FL (ref 8.9–12.9)
POTASSIUM SERPL-SCNC: 4.4 MMOL/L (ref 3.5–5.1)
RBC # BLD AUTO: 3.57 M/UL (ref 3.8–5.2)
SODIUM SERPL-SCNC: 138 MMOL/L (ref 136–145)
WBC # BLD AUTO: 4.9 K/UL (ref 3.6–11)

## 2021-04-14 PROCEDURE — G8536 NO DOC ELDER MAL SCRN: HCPCS | Performed by: INTERNAL MEDICINE

## 2021-04-14 PROCEDURE — G0463 HOSPITAL OUTPT CLINIC VISIT: HCPCS | Performed by: INTERNAL MEDICINE

## 2021-04-14 PROCEDURE — 1101F PT FALLS ASSESS-DOCD LE1/YR: CPT | Performed by: INTERNAL MEDICINE

## 2021-04-14 PROCEDURE — G8417 CALC BMI ABV UP PARAM F/U: HCPCS | Performed by: INTERNAL MEDICINE

## 2021-04-14 PROCEDURE — 99214 OFFICE O/P EST MOD 30 MIN: CPT | Performed by: INTERNAL MEDICINE

## 2021-04-14 PROCEDURE — G8427 DOCREV CUR MEDS BY ELIG CLIN: HCPCS | Performed by: INTERNAL MEDICINE

## 2021-04-14 PROCEDURE — 1090F PRES/ABSN URINE INCON ASSESS: CPT | Performed by: INTERNAL MEDICINE

## 2021-04-14 PROCEDURE — G8510 SCR DEP NEG, NO PLAN REQD: HCPCS | Performed by: INTERNAL MEDICINE

## 2021-04-14 NOTE — PROGRESS NOTES
HISTORY OF PRESENT ILLNESS  Evan Warner is a 80 y.o. female. JELANI Bryant is seen today accompanied by her aide Anuradha Gutierrez for follow up of hypertension and other medical problems. 1) Hypertension. Stable readings. Given her advanced age, I think she is okay on her current regimen. 2) Congestive heart failure. She has not required any extra Lasix. I would use today's weight as baseline about 175 at home. I encouraged her to check daily weights. 3) Overactive bladder. She follows up with urology. 4) Diabetes. This has been very mild. She rarely has to take her oral hypoglycemic agent. She fell on 21. She hit her bottom. She felt unsteady. She did not really injure anything but it has shaken her confidence. I suggested physical therapy and she agrees. She will utilize [facility] so she can get this service at home. Review of Systems   Constitutional: Negative for chills, fever and weight loss. Respiratory: Positive for shortness of breath. Cardiovascular: Negative for chest pain, palpitations, leg swelling and PND. Musculoskeletal: Positive for falls. Negative for back pain and myalgias. Neurological: Positive for tingling and sensory change. Negative for dizziness and focal weakness. Physical Exam  Vitals signs and nursing note reviewed. Constitutional:       General: She is not in acute distress. Cardiovascular:      Rate and Rhythm: Bradycardia present. Rhythm irregularly irregular. Heart sounds: No murmur. No friction rub. No gallop. Pulmonary:      Effort: Pulmonary effort is normal.      Breath sounds: Normal breath sounds. Musculoskeletal:      Right lower le+ Edema present. Left lower le+ Edema present. ASSESSMENT and PLAN  Diagnoses and all orders for this visit:    1. Chronic systolic congestive heart failure ([address]    2. Essential hypertension, benign  -     METABOLIC PANEL, BASIC; Future    3.  Type 2 diabetes, controlled, with neuropathy ([facility]  -     METABOLIC PANEL, BASIC; Future  -     HEMOGLOBIN A1C WITH EAG; Future  -     MICROALBUMIN, UR, RAND W/ MICROALB/CREAT RATIO; Future    4. Chronic atrial fibrillation (HCC)  -     CBC WITH AUTOMATED DIFF; Future    5. Severe obesity (BMI 35.0-39. 9) with comorbidity ([facility]    6. Type 2 diabetes mellitus with nephropathy ([facility]    7.  Postural instability  -     REFERRAL TO PHYSICAL THERAPY

## 2021-04-15 ENCOUNTER — TELEPHONE (OUTPATIENT)
Dept: INTERNAL MEDICINE CLINIC | Age: 86
End: 2021-04-15

## 2021-04-15 NOTE — TELEPHONE ENCOUNTER
Ervin with [facility] returned my call. Advised him MD wanted this pt to start home PT. He advised me to fax pt's demographics and referral order to [facility] (OUTPATIENT [facility]) [phone]. Once received they will call pt and get her started. Faxed - confirmation received.

## 2021-04-16 ENCOUNTER — TELEPHONE (OUTPATIENT)
Dept: INTERNAL MEDICINE CLINIC | Age: 86
End: 2021-04-16

## 2021-04-19 NOTE — PROGRESS NOTES
Call- Labs look great overall , diabetic control is excellent. She does have a mild anemia .  Will follow this , no intervention for now

## 2021-04-19 NOTE — PROGRESS NOTES
Advised pt her labs look great overall. Her diabetic control is excellent. She does have a mild anemia. MD will follow this - no intervention for now.

## 2021-06-15 NOTE — PROGRESS NOTES
Pt states she had a fall in her bathroom x4 weeks ago. She fell backward into the tub. She had someone there to help her. Did not have any injury nor go to ER for evaluation. Has notice tingling sensation in left arm intermittently for year but worsen lately.

## 2021-06-15 NOTE — PROGRESS NOTES
HISTORY OF PRESENT ILLNESS  Suman Porter is a 80 y.o. female. JELANI Way is seen today for follow up of CHF and other problem, accompanied by  Damaso Manning    1. Hypertension- increased readings, home average is acceptable for age. Will Continue current regimen of prescription and / or OTC medications     2. IFG- up to date, A1c has remained low. 3. CHF- clinically stable , extra Lasix used 3 x only in 2 mos      Review of Systems   Constitutional: Negative for weight loss. Respiratory: Negative. Cardiovascular: Negative for chest pain, palpitations, leg swelling and PND. Gastrointestinal:        Infrequent episodes of dysphagia   Musculoskeletal: Positive for falls. Negative for myalgias. In tub, no injury   Neurological: Negative for focal weakness. Physical Exam  Vitals and nursing note reviewed. Constitutional:       General: She is not in acute distress. Cardiovascular:      Rate and Rhythm: Normal rate and regular rhythm. Heart sounds: Murmur heard. Systolic murmur is present with a grade of 1/6. No friction rub. No gallop. Pulmonary:      Effort: Pulmonary effort is normal.      Breath sounds: Normal breath sounds. Musculoskeletal:      Right lower le+ Edema present. Left lower le+ Edema present. ASSESSMENT and PLAN  Diagnoses and all orders for this visit:    1. Essential hypertension, benign    2. Type 2 diabetes, controlled, with neuropathy ([address]    3. Chronic atrial fibrillation ([address]- Continue other medications in addition to current changes     4. Severe obesity (BMI 35.0-39. 9) with comorbidity ([address]    5. Chronic systolic congestive heart failure ([address]    6.  Type 2 diabetes mellitus with nephropathy ([address]

## 2021-06-16 NOTE — PROGRESS NOTES
Ambulatory Care Management Note    Date/Time:  6/16/2021 8:42 AM    This patient was received as a referral from [facility]. Ambulatory Care Manager outreached to patient today to offer care management services. Introduction to self and role of care manager provided. Patient accepted care management services at this time. Follow up call scheduled at this time. Patient has Ambulatory Care Manager's contact number for for any questions or concerns.    Hx: CHF, HTN, DM, recent fall (4 wks ago)

## 2021-06-24 NOTE — PROGRESS NOTES
Called patient who requested this writer call back later. Ambulatory Care Management Note    Date/Time:  6/24/2021 3:47 PM    This Ambulatory Care Manager (ACM) reviewed and updated the following screenings during this call; general assessment, disease specific assessment, self management assessment. Patient's challenges to self management identified:   medical condition      Medication Management:  good adherence and good understanding    Advance Care Planning:   Does patient have an Advance Directive:  Yes, will inquire if it is up to date in future call. Document states no hospice care. [employer], Referrals, and Durable Medical Equipment: walker, shower chair, walker, rollator and raise toilet seat. Health Maintenance Due   Topic Date Due    Eye Exam Retinal or Dilated  09/01/2019    Medicare Yearly Exam  03/08/2020     Health Maintenance reviewed - yes    Patient was asked to consider health care goals that they would like to focus on with this ACM. ACM will follow up with patient to discuss goals and establish care plan in the next 7-14 days. Goals      Patient will monitor for signs fluid overload (CHF)      06/24/21  Patient reports some LE edema. She believes its from all the moving around she does during the day. She is aware of the importance of low Na diet and tries to watch her diet. Encouraged <2000mg of Na daily and elevating legs during the day when she is resting. Patient has rx for furosemide prn. Weight is down  to 171.6 lbs from 174.6 lbs two days ago. States she has not taken in over a year. Patient will weigh daily, same time, same way and keep a record. ACM will follow up in 10-14 days. KG       Pt will monitor BP daily      06/24/21   Patient will monitor BP daily. Patient reports checking BP twice a day because morning BP tends to be higher ('s). This afternoon BP was 144/60.  When she gets up in the morning she sits on the side of the bed for 5 minutes because upon waking she \"just feels funny\". It resolves and she is able to get up and go into the bathroom. Educated patient on monitoring Na intake. Patient states she tries to watch her diet. Pt will continue to check BP 2x/day and keep a log. Patient will advise office of SBP >160. ACM will follow up in 10-14 days. KG. Patient is a retired classical hartmann. She lives alone with caregivers five times a week who assist with meals, cleaning, transportation and shopping. She care for herself on weekends. Her niece comes by on the weekends while she is visiting her mother who lives behind the patient. They will have lunch together or the niece will take her to niece's mother's home for dinner. Patient has a medic alert as she suffered a fall not long ago in the tub. She will bathe only when the caregiver is present in the home.     PCP/Specialist follow up:   Future Appointments   Date Time Provider Tristan Ebony   7/30/2021  1:40 PM MD TK Kilgore III BS AMB   8/10/2021  1:00 PM Roselyn Aranda MD WEIM BS AMB

## 2021-07-28 NOTE — PROGRESS NOTES
Goals      Patient will monitor for signs fluid overload (CHF)      07/28/21   Patient provided one weight: 173 lbs. Has not taken any furosemide since directed by [facility]. Swelling daily but improves overnight when legs elevated. Denies shortness of breath. Patient said she usually doesn't feel great first thing in the morning. She has been up and had breakfast. Aid will arrive at 11:00. She said she usually feels better in the afternoon. Pt agreed to provide numbers at next call. This writer will call patient in 7-10 days in the afternoon per her request. Radha Confer    06/24/21  Patient reports some LE edema. She believes its from all the moving around she does during the day. She is aware of the importance of low Na diet and tries to watch her diet. Encouraged <2000mg of Na daily and elevating legs during the day when she is resting. Patient has rx for furosemide prn. Weight is down  to 171.6 lbs from 174.6 lbs two days ago. States she has not taken in over a year. Patient will weigh daily, same time, same way and keep a record. ACM will follow up in 10-14 days. KG       Pt will monitor BP daily      07/28/21   Pt states her BP was \"136 over something. \" She reports moving slow in the morning and not feeling her best. This writer offered to call patient back in the afternoon. Patient agreeable. KG    06/24/21   Patient will monitor BP daily. Patient reports checking BP twice a day because morning BP tends to be higher ('s). This afternoon BP was 144/60. When she gets up in the morning she sits on the side of the bed for 5 minutes because upon waking she \"just feels funny\". It resolves and she is able to get up and go into the bathroom. Educated patient on monitoring Na intake. Patient states she tries to watch her diet. Pt will continue to check BP 2x/day and keep a log. Patient will advise office of SBP >160. ACM will follow up in 10-14 days. KG.

## 2021-07-30 NOTE — PROGRESS NOTES
HISTORY OF PRESENT ILLNESS  Ely Barrera is a 80 y.o. female     SUMMARY:   Problem List  Date Reviewed: 7/30/2021        Codes Class Noted    Severe obesity (BMI 35.0-39. 9) with comorbidity ([facility] ICD-10-CM: E66.01  ICD-9-CM: 278.01  4/25/2018        Type 2 diabetes mellitus with nephropathy ([facility] ICD-10-CM: E11.21  ICD-9-CM: 250.40, 583.81  12/19/2017        CHF (congestive heart failure) ([facility] ICD-10-CM: I50.9  ICD-9-CM: 428.0  12/6/2017        Anxiety ICD-10-CM: F41.9  ICD-9-CM: 300.00  10/2/2017        Allergic rhinitis ICD-10-CM: J30.9  ICD-9-CM: 477.9  4/11/2016        Chronic atrial fibrillation ([facility] ICD-10-CM: I48.20  ICD-9-CM: 427.31  4/8/2016        Advanced directives, counseling/discussion ICD-10-CM: Z71.89  ICD-9-CM: V65.49  3/15/2016        Type 2 diabetes, controlled, with neuropathy ([facility] ICD-10-CM: E11.40  ICD-9-CM: 250.60, 357.2  1/4/2016        Essential hypertension, benign ICD-10-CM: I10  ICD-9-CM: 401.1  6/4/2015        Edema ICD-10-CM: R60.9  ICD-9-CM: 782.3  11/12/2014    Overview Addendum 12/14/2017  8:24 AM by Media Camera, MD     4/16 echo normal lvef, lae, mild tr  12/17 echo normal lvef, elisha, mild to mod mr and tr, pa pressure 50mm             Headache(784.0) ICD-10-CM: R51  ICD-9-CM: 784.0  11/12/2014        Diverticulosis of colon (without mention of hemorrhage) ICD-10-CM: K57.30  ICD-9-CM: 562.10  11/12/2014        Constipation ICD-10-CM: K59.00  ICD-9-CM: 564.00  11/12/2014        Cyst of left kidney ICD-10-CM: N28.1  ICD-9-CM: 753.10  11/12/2014              Current Outpatient Medications on File Prior to Visit   Medication Sig    metoprolol succinate (TOPROL-XL) 25 mg XL tablet Take 1 Tab by mouth daily.     furosemide (LASIX) 40 mg tablet Take one every day, if weight increases 4 pounds, TAKE 2TABLET DAILY FOR 3 DAYS    potassium chloride SR (KLOR-CON 10) 10 mEq tablet TAKE 2 TABLETS DAILY (NEW DIRECTIONS)    vit A/vit C/vit E/zinc/copper (PRESERVISION AREDS PO) Take  by mouth two (2) times a day.  loratadine (Claritin) 10 mg tablet Take 10 mg by mouth daily.  glucose blood VI test strips (PRECISION XTRA TEST) strip USE TO TEST BLOOD SUGAR TWICE DAILY. Dx E11.21    clotrimazole-betamethasone (LOTRISONE) topical cream Apply  to affected area two (2) times daily as needed for Skin Irritation.  mirabegron ER (MYRBETRIQ) 50 mg ER tablet Per urology (Patient taking differently: Take 50 mg by mouth daily. Per urology)   [facility] linaclotide [facility]) 72 mcg cap Take  by mouth daily.  aspirin delayed-release 81 mg tablet Take 1 Tab by mouth daily. (Patient taking differently: Take 81 mg by mouth every other day.)    CHOLECALCIFEROL, VITAMIN D3, (VITAMIN D3 PO) Take  by mouth daily.  Lancets misc 1 Package by Does Not Apply route daily. Test glucose daily and as needed Dx type 2 dm E11.42    magnesium hydroxide (MILK OF MAGNESIA) 2,400 mg/10 mL susp suspension Take 10 mL by mouth nightly. No current facility-administered medications on file prior to visit. CARDIOLOGY STUDIES TO DATE:  4/16 echo normal lvef, lae, mild tr  12/17 echo normal lvef, elisha, mild to mod mr and tr, pa pressure 50mm    Chief Complaint   Patient presents with    Follow-up     HPI :  She continues to do remarkably well. She has chronic leg edema and about every other week she will take 3 extra Lasix for 3 days and her weight comes right back down to baseline. She has intermittent phlegm production and mild shortness of breath but nothing new or worrisome. She lost her balance and fell into the bathtub recently. Fortunately she was not hurt but it was quite a spectacle with her neighbors coming in and getting her up. CARDIAC ROS:   negative for chest pain, palpitations, syncope, orthopnea, paroxysmal nocturnal dyspnea, exertional chest pressure/discomfort, claudication    No family history on file.     Past Medical History:   Diagnosis Date    Anxiety     Bleeding nose     Chronic atrial fibrillation (HCC)     Diabetes ([facility]     Hypertension        GENERAL ROS:  A comprehensive review of systems was negative except for that written in the HPI. Visit Vitals  /60 (BP 1 Location: Left upper arm, BP Patient Position: Sitting, BP Cuff Size: Adult)   Pulse (!) 55   Resp 16   Ht 4' 11\" (1.499 m)   Wt 175 lb (79.4 kg)   SpO2 98%   BMI 35.35 kg/m²       Wt Readings from Last 3 Encounters:   07/30/21 175 lb (79.4 kg)   06/15/21 178 lb 3.2 oz (80.8 kg)   04/14/21 181 lb (82.1 kg)            BP Readings from Last 3 Encounters:   07/30/21 120/60   06/15/21 (!) 144/55   04/14/21 (!) 143/50       PHYSICAL EXAM  General appearance: alert, cooperative, no distress, appears stated age  Neurologic: Alert and oriented X 3  Neck: supple, symmetrical, trachea midline, no adenopathy, no carotid bruit and no JVD  Lungs: clear to auscultation bilaterally  Heart: irregularly irregular rhythm, S1, S2 normal, no S3 or S4  Extremities: edema tr, comp stockings in place    Lab Results   Component Value Date/Time    Cholesterol, total 146 03/21/2016 10:31 AM    Cholesterol, total 147 03/13/2015 02:06 PM    HDL Cholesterol 85 03/21/2016 10:31 AM    HDL Cholesterol 60 03/13/2015 02:06 PM    LDL, calculated 48 03/21/2016 10:31 AM    LDL, calculated 69 03/13/2015 02:06 PM    Triglyceride 64 03/21/2016 10:31 AM    Triglyceride 90 03/13/2015 02:06 PM     ASSESSMENT :      She is stable and essentially asymptomatic from a cardiac perspective on a reasonable medical regimen and needs no cardiac testing at this time. I am really glad she is not on oral anticoagulants. current treatment plan is effective, no change in therapy  lab results and schedule of future lab studies reviewed with patient  reviewed diet, exercise and weight control    Encounter Diagnoses   Name Primary?     Chronic atrial fibrillation (HCC) Yes    Essential hypertension, benign     Bilateral leg edema      No orders of the defined types were placed in this encounter. Follow-up and Dispositions    · Return in about 6 months (around 1/30/2022). Richard Serrano MD  7/30/2021  Please note that this dictation was completed with [employer], the computer voice recognition software. Quite often unanticipated grammatical, syntax, homophones, and other interpretive errors are inadvertently transcribed by the computer software. Please disregard these errors. Please excuse any errors that have escaped final proofreading. Thank you.

## 2021-08-09 NOTE — TELEPHONE ENCOUNTER
Norma Almendarez Ar (Self) [phone] (H)     PT has an appointment tomorrow 08/10/2021 with us at 1:00 P. M. She is also having an 'eye emergency' and was able to get in with her eye doctor in [place] at 10:50 A. M. tomorrow but is worried she will not make it here in time for her appointment. She says she will be coming and feels it is very important for someone her age to keep her appointments.

## 2021-08-10 NOTE — PROGRESS NOTES
HISTORY OF PRESENT ILLNESS  Merline Hoehn is a 80 y.o. female. HPI  Assessment: Torres Salas is in today for follow up of hypertension and other problems. She is accompanied by an aide today. 1) Hypertension. Stable on current prescription med regimen. We are not aiming for super tight control given her advanced age, risk of falling, etc.   2) Prediabetes. Due for A1c today to assure she does not need medication. 3) Anemia. This is mild and likely due to hemoglobinopathy, will recheck to assure stability. 4) Congestive heart failure in the setting of chronic atrial fibrillation. She follows up with cardiology as directed. She weighs herself faithfully and about stable, will follow. 5) Chronic nasal congestion and cough. I talked to her about a trial of Singulair. She declines this for now not wanting to take any more medication. Review of Systems   HENT: Positive for congestion and ear pain. Eyes: Positive for discharge and redness. Respiratory: Positive for cough and wheezing. Physical Exam  Vitals and nursing note reviewed. Neck:      Vascular: No carotid bruit. Cardiovascular:      Rate and Rhythm: Bradycardia present. Rhythm irregularly irregular. Heart sounds: No murmur heard. No friction rub. No gallop. Pulmonary:      Effort: Pulmonary effort is normal. No respiratory distress. Breath sounds: Normal breath sounds. Musculoskeletal:      Right lower le+ Edema present. Left lower le+ Edema present. ASSESSMENT and PLAN  Diagnoses and all orders for this visit:    1. Essential hypertension, benign  -     METABOLIC PANEL, BASIC; Future    2. Type 2 diabetes, controlled, with neuropathy ([address]  -     METABOLIC PANEL, BASIC; Future  -     HEMOGLOBIN A1C WITH EAG; Future    3. Chronic atrial fibrillation ([address]    4. Chronic systolic congestive heart failure ([address]    5. Type 2 diabetes mellitus with nephropathy ([address]    6.  Anemia, chronic disease  -     CBC WITH AUTOMATED DIFF; Future  -     IRON PROFILE;  Future

## 2021-09-07 NOTE — TELEPHONE ENCOUNTER
Lurdes Cornejo, Ms. Almendarez left a message this morning stating that she has run out of her heart medicine. She is anxious to get it refilled today so that she has it for tomorrow's dose.      She can be reached at: [phone]

## 2021-09-07 NOTE — TELEPHONE ENCOUNTER
Called pt to find out exactly what medication she needed filled. She wanted her Lasix filled. She has run out. Requesting to send to [facility] at [address] [place] [address] location for #30. Advised this has been done.

## 2021-09-07 NOTE — TELEPHONE ENCOUNTER
Requested Prescriptions     Signed Prescriptions Disp Refills    furosemide (LASIX) 40 mg tablet 90 Tablet 3     Sig: TAKE 1 TABLET DAILY     Authorizing Provider: Christa Judge     Ordering User: Karyn Lyle    Per Dr. Chelle Restrepo verbal orders

## 2021-09-14 NOTE — TELEPHONE ENCOUNTER
Spoke with pt - states she last night she was \"feeling terrible. \" When asking her what that means she said her arms and legs were achy and legs were swollen. She denies any chest pain at that time was a little sob. She said MD told her if her weight was 175 or greater she should take an extra Lasix 40 mg for next 3 days. Her weight yesterday was 176.6. She is worried that is too high. Her BP today was 135/44. She states she is feeling a little better today.  Will forward to MD.

## 2021-09-14 NOTE — TELEPHONE ENCOUNTER
Reason for call:  Patient says her medication is not working for congestive heart failure and her weight is going up.  She is doubling up her pills and she is struggling to ingest her medication as well as water    Is this a new problem: yes     Date of last appointment:  8/10/2021     Can we respond via [place]: no    Best call back number: [phone]

## 2021-10-06 NOTE — PROGRESS NOTES
Goals      Patient will monitor for signs fluid overload (CHF)      10/06/21  Patient lost to follow up. Called today. Patient has been feeling more fatigued these days. MCNAIR remains unchanged. LE edema resolves overnight. In September patient's weight was up to 176.6 lbs. Current regimen is 40 mg lasix daily and take an extra 40 mg x3 days if weight >175. Today's weight 171.2 lbs. Reports having to take extra lasix 4x since last OV. Patient will continue to weight daily and keep a log. Pt has appt 10/11 with [facility]. ACM will follow up in 10-14 days. KG    08/05/21   Follow up call to patient. Pt states LE edema improved. Still experiences edema but lately not as bad. No shortness of breath but + for fatigue. States she slept fine but today is not feeling as well, denies nausea or change in bowel movements. 07/28/21   Patient provided one weight: 173 lbs. Has not taken any furosemide since directed by [facility]. Swelling daily but improves overnight when legs elevated. Denies shortness of breath. Patient said she usually doesn't feel great first thing in the morning. She has been up and had breakfast. Aid will arrive at 11:00. She said she usually feels better in the afternoon. Pt agreed to provide numbers at next call. This writer will call patient in 7-10 days in the afternoon per her request. Lorne Epps    06/24/21  Patient reports some LE edema. She believes its from all the moving around she does during the day. She is aware of the importance of low Na diet and tries to watch her diet. Encouraged <2000mg of Na daily and elevating legs during the day when she is resting. Patient has rx for furosemide prn. Weight is down  to 171.6 lbs from 174.6 lbs two days ago. States she has not taken in over a year. Patient will weigh daily, same time, same way and keep a record. ACM will follow up in 10-14 days. KG       Pt will monitor BP daily      10/06/21  Patient lost to follow up.  Called today and patient reports she is feeling more fatigued these days. BP today 129/68  HR 44. LOV HR 54. Patient is scheduled to see [facility] 10/11/21. ACM will follow up 10-14 days. KG    08/05/21  Patient reports BP today   7/29: 155/62 (45)  7/30: 143/66 (52)  7/31:  143/62 (53)  8/01: 135/60 (48)  8/02: 137/52 (43)  8/03: 138/55 (48)  8/04: 129/54 (46)  8/05: 147/60 (37)  Reports feeling quite tired the last few days. She was out shopping yesterday which can be tiring and pt was up later than usual last night. She usually gets ready for bed around 8:30. Asked patient if she is able to check her pulse manually. Patient said she has before but today she couldn't. Her caregiver is at the store. Asked pt to have caregiver either manually check HR or recheck BP with HR upon returning to the home. Will call patient back today for reading. [id number]: called patient and left message requesting return call.    07/28/21   Pt states her BP was \"136 over something. \" She reports moving slow in the morning and not feeling her best. This writer offered to call patient back in the afternoon. Patient agreeable. KG    06/24/21   Patient will monitor BP daily. Patient reports checking BP twice a day because morning BP tends to be higher ('s). This afternoon BP was 144/60. When she gets up in the morning she sits on the side of the bed for 5 minutes because upon waking she \"just feels funny\". It resolves and she is able to get up and go into the bathroom. Educated patient on monitoring Na intake. Patient states she tries to watch her diet. Pt will continue to check BP 2x/day and keep a log. Patient will advise office of SBP >160. ACM will follow up in 10-14 days. KG.

## 2021-10-20 NOTE — PROGRESS NOTES
Goals      Patient will monitor for signs fluid overload (CHF)      10/20/21  Patient reports weight today 173.0 lbs. She has a very good understanding of when she needs to take an additional 40 mg of lasix. She reports having done so last week. Denies orthopnea. Positive for MCNAIR which remains unchanged, LE edema during the day but resolves overnight when she can elevate her legs. No swelling today. Pt will continue to monitor for signs of fluid overload and weigh daily. ACM will follow up in 10 days. KG    10/06/21  Patient lost to follow up. Called today. Patient has been feeling more fatigued these days. MCNAIR remains unchanged. LE edema resolves overnight. In September patient's weight was up to 176.6 lbs. Current regimen is 40 mg lasix daily and take an extra 40 mg x3 days if weight >175. Today's weight 171.2 lbs. Reports having to take extra lasix 4x since last OV. Patient will continue to weight daily and keep a log. Pt has appt 10/11 with [facility]. ACM will follow up in 10-14 days. KG    08/05/21   Follow up call to patient. Pt states LE edema improved. Still experiences edema but lately not as bad. No shortness of breath but + for fatigue. States she slept fine but today is not feeling as well, denies nausea or change in bowel movements. 07/28/21   Patient provided one weight: 173 lbs. Has not taken any furosemide since directed by [facility]. Swelling daily but improves overnight when legs elevated. Denies shortness of breath. Patient said she usually doesn't feel great first thing in the morning. She has been up and had breakfast. Aid will arrive at 11:00. She said she usually feels better in the afternoon. Pt agreed to provide numbers at next call. This writer will call patient in 7-10 days in the afternoon per her request. Rell Casas    06/24/21  Patient reports some LE edema. She believes its from all the moving around she does during the day. She is aware of the importance of low Na diet and tries to watch her diet. Encouraged <2000mg of Na daily and elevating legs during the day when she is resting. Patient has rx for furosemide prn. Weight is down  to 171.6 lbs from 174.6 lbs two days ago. States she has not taken in over a year. Patient will weigh daily, same time, same way and keep a record. ACM will follow up in 10-14 days. KG       Pt will monitor BP daily      10/20/21  Follow up call to patient. Pt reports she attended he nephew's wedding reception over the weekend (festivities last over several days) and still recovering. Fatigue remains constant. BP readings:   10/13  137/59  (HR 61)  10/14  132/55  (HR 49)  10/15  144/59  (HR 56)  10/16  144/61  (HR 50)  10/17  153/65  (HR 48)  10/18  146/64  (HR 44)  10/19  145/59  (HR 47)  10/20:  147/72  (HR 49)  Will advise ACS of readings. Pt only c/o fatigue. Denies h/a, dizziness/lightheadedness, falls. Pt will continue to monitor and keep log. Next office visit is scheduled for 10/25/21. ACM will follow up in 7-10 days. KG        10/06/21  Patient lost to follow up. Called today and patient reports she is feeling more fatigued these days. BP today 129/68  HR 44. LOV HR 54. Patient is scheduled to see [facility] 10/25/21. ACM will follow up 10-14 days. KG    08/05/21  Patient reports BP today   7/29: 155/62 (45)  7/30: 143/66 (52)  7/31:  143/62 (53)  8/01: 135/60 (48)  8/02: 137/52 (43)  8/03: 138/55 (48)  8/04: 129/54 (46)  8/05: 147/60 (37)  Reports feeling quite tired the last few days. She was out shopping yesterday which can be tiring and pt was up later than usual last night. She usually gets ready for bed around 8:30. Asked patient if she is able to check her pulse manually. Patient said she has before but today she couldn't. Her caregiver is at the store. Asked pt to have caregiver either manually check HR or recheck BP with HR upon returning to the home. Will call patient back today for reading.   [id number]: called patient and left message requesting return call.    07/28/21 Pt states her BP was \"136 over something. \" She reports moving slow in the morning and not feeling her best. This writer offered to call patient back in the afternoon. Patient agreeable. KG    06/24/21   Patient will monitor BP daily. Patient reports checking BP twice a day because morning BP tends to be higher ('s). This afternoon BP was 144/60. When she gets up in the morning she sits on the side of the bed for 5 minutes because upon waking she \"just feels funny\". It resolves and she is able to get up and go into the bathroom. Educated patient on monitoring Na intake. Patient states she tries to watch her diet. Pt will continue to check BP 2x/day and keep a log. Patient will advise office of SBP >160. ACM will follow up in 10-14 days. KG.

## 2021-10-25 NOTE — PROGRESS NOTES
HISTORY OF PRESENT ILLNESS  Imelda Stockton is a 80 y.o. female. HPI  Assessment: [facility] [place] [facility] is seen today accompanied by her aide Joe Kiser for follow up of congestive heart failure, hypertension and other problems. 1) CHF. There has been some worsening of symptoms. She has to take her extra three days of Lasix about every two weeks now. She is having some pitting edema. She wakes up at night short of breath and general malaise. I would like to rule out nocturnal hypoxia. She agrees to overnight pulse oximetry. I will check a BMP to rule out azotemia; as reviewed her creatinine had increased a bit on her last check two months ago. I also would like her to follow up with her cardiologist Dr. Ash Jernigan to see about possible medication adjustment. 2) Constipation. Stable on current med regimen. 3) IFG. Up to date. Fingerstick sugars are reviewed and seem acceptable. 4) Hypertension. Stable on current prescription med regimen. 5) Preventive care. Flu vaccine is due and administered. Review of Systems   Constitutional: Positive for malaise/fatigue. Negative for chills and fever. Respiratory: Positive for shortness of breath. Usually HS   Cardiovascular: Positive for leg swelling. Physical Exam  Vitals and nursing note reviewed. Constitutional:       General: She is not in acute distress. Cardiovascular:      Rate and Rhythm: Normal rate. Rhythm irregularly irregular. Heart sounds: Murmur heard. Systolic murmur is present with a grade of 2/6. No friction rub. No gallop. Pulmonary:      Effort: Pulmonary effort is normal.      Breath sounds: Normal breath sounds. Musculoskeletal:      Right lower le+ Edema present. Left lower le+ Edema present. ASSESSMENT and PLAN  Diagnoses and all orders for this visit:    1. Essential hypertension, benign    2. Chronic atrial fibrillation (HCC)  -     METABOLIC PANEL, BASIC; Future    3.  Chronic systolic congestive heart failure ([facility] [address]  -     METABOLIC PANEL, BASIC; Future  -     AMB SUPPLY ORDER    4. Type 2 diabetes, controlled, with neuropathy ([address]    5. Type 2 diabetes mellitus with nephropathy ([address]    6.  Needs flu shot  -     FLU (FLUAD QUAD INFLUENZA VACCINE,QUAD,ADJUVANTED)

## 2021-10-25 NOTE — PATIENT INSTRUCTIONS
Vaccine Information Statement    Influenza (Flu) Vaccine (Inactivated or Recombinant): What You Need to Know    Many vaccine information statements are available in [place] and other languages. See www.immunize.org/vis. Hojas de información sobre vacunas están disponibles en español y en muchos otros idiomas. Visite www.immunize.org/vis. 1. Why get vaccinated? Influenza vaccine can prevent influenza (flu). Flu is a contagious disease that spreads around the [facility] every year, usually between October and May. Anyone can get the flu, but it is more dangerous for some people. Infants and young children, people 72 years and older, pregnant people, and people with certain health conditions or a weakened immune system are at greatest risk of flu complications. Pneumonia, bronchitis, sinus infections, and ear infections are examples of flu-related complications. If you have a medical condition, such as heart disease, cancer, or diabetes, flu can make it worse. Flu can cause fever and chills, sore throat, muscle aches, fatigue, cough, headache, and runny or stuffy nose. Some people may have vomiting and diarrhea, though this is more common in children than adults. In an average year, thousands of people in the [facility] die from flu, and many more are hospitalized. Flu vaccine prevents millions of illnesses and flu-related visits to the doctor each year. 2. Influenza vaccines     CDC recommends everyone 6 months and older get vaccinated every flu season. Children 6 months through 6years of age may need 2 doses during a single flu season. Everyone else needs only 1 dose each flu season. It takes about 2 weeks for protection to develop after vaccination. There are many flu viruses, and they are always changing. Each year a new flu vaccine is made to protect against the influenza viruses believed to be likely to cause disease in the upcoming flu season.  Even when the vaccine doesnt exactly match these viruses, it may still provide some protection. Influenza vaccine does not cause flu. Influenza vaccine may be given at the same time as other vaccines. 3. Talk with your health care provider    Tell your vaccination provider if the person getting the vaccine:   Has had an allergic reaction after a previous dose of influenza vaccine, or has any severe, life-threatening allergies    Has ever had Guillain-Barré Syndrome (also called GBS)    In some cases, your health care provider may decide to postpone influenza vaccination until a future visit. Influenza vaccine can be administered at any time during pregnancy. People who are or will be pregnant during influenza season should receive inactivated influenza vaccine. People with minor illnesses, such as a cold, may be vaccinated. People who are moderately or severely ill should usually wait until they recover before getting influenza vaccine. Your health care provider can give you more information. 4. Risks of a vaccine reaction     Soreness, redness, and swelling where the shot is given, fever, muscle aches, and headache can happen after influenza vaccination.  There may be a very small increased risk of Guillain-Barré Syndrome (GBS) after inactivated influenza vaccine (the flu shot). Lynn Vizcaino children who get the flu shot along with pneumococcal vaccine (PCV13) and/or DTaP vaccine at the same time might be slightly more likely to have a seizure caused by fever. Tell your health care provider if a child who is getting flu vaccine has ever had a seizure. People sometimes faint after medical procedures, including vaccination. Tell your provider if you feel dizzy or have vision changes or ringing in the ears. As with any medicine, there is a very remote chance of a vaccine causing a severe allergic reaction, other serious injury, or death. 5. What if there is a serious problem?     An allergic reaction could occur after the vaccinated person leaves the clinic. If you see signs of a severe allergic reaction (hives, swelling of the face and throat, difficulty breathing, a fast heartbeat, dizziness, or weakness), call 9-1-1 and get the person to the nearest hospital.    For other signs that concern you, call your health care provider. Adverse reactions should be reported to the Vaccine Adverse Event Reporting System (VAERS). Your health care provider will usually file this report, or you can do it yourself. Visit the VAERS website at www.vaers. [facility].gov or call 1-796.988.9809. VAERS is only for reporting reactions, and VAERS staff members do not give medical advice. 6. The National Vaccine Injury Compensation Program    The [facility] Vaccine Injury Compensation Program (VICP) is a federal program that was created to compensate people who may have been injured by certain vaccines. Claims regarding alleged injury or death due to vaccination have a time limit for filing, which may be as short as two years. Visit the VICP website at www.[facility].gov/vaccinecompensation or call 5-327.241.8387 to learn about the program and about filing a claim. 7. How can I learn more?  Ask your health care provider.  Call your local or state health department.  Visit the website of the Food and Drug Administration (FDA) for vaccine package inserts and additional information at www.fda.gov/vaccines-blood-biologics/vaccines.  Contact the Centers for Disease Control and Prevention (CDC):  - Call 7-830.193.4197 (1-800-CDC-INFO) or  - Visit CDCs influenza website at www.cdc.gov/flu. Vaccine Information Statement   Inactivated Influenza Vaccine   8/6/2021  42 DEBRA [place] [id number]   Department of Health and Human Services  Centers for Disease Control and Prevention    Office Use Only

## 2021-10-26 NOTE — TELEPHONE ENCOUNTER
Spoke with Calleen Kussmaul with [facility]. Wanted to see what they need for pt to have over night pulse oximetry done. She advised me to fax order for with or without O2 and CPAP, demographics and insurance information. She gave me fax number for [facility] [phone]. Faxed as requested - confirmation received.

## 2021-10-27 NOTE — PROGRESS NOTES
Call- the kidney function is a slight bit worse, probably from her needing more furosemide.  We'll follow

## 2021-10-27 NOTE — PROGRESS NOTES
Advised pt her kidney function is a slight bit worse - probably from her needing more furosemide. MD will follow.

## 2021-11-02 NOTE — PROGRESS NOTES
HISTORY OF PRESENT ILLNESS  Chelle Del Real is a 80 y.o. female     SUMMARY:   Problem List  Date Reviewed: 11/2/2021        Codes Class Noted    Severe obesity (BMI 35.0-39. 9) with comorbidity ([facility] ICD-10-CM: E66.01  ICD-9-CM: 278.01  4/25/2018        Type 2 diabetes mellitus with nephropathy ([facility] ICD-10-CM: E11.21  ICD-9-CM: 250.40, 583.81  12/19/2017        CHF (congestive heart failure) ([facility] ICD-10-CM: I50.9  ICD-9-CM: 428.0  12/6/2017        Anxiety ICD-10-CM: F41.9  ICD-9-CM: 300.00  10/2/2017        Allergic rhinitis ICD-10-CM: J30.9  ICD-9-CM: 477.9  4/11/2016        Chronic atrial fibrillation ([facility] ICD-10-CM: I48.20  ICD-9-CM: 427.31  4/8/2016        Advanced directives, counseling/discussion ICD-10-CM: Z71.89  ICD-9-CM: V65.49  3/15/2016        Type 2 diabetes, controlled, with neuropathy ([place] [address].) ICD-10-CM: E11.40  ICD-9-CM: 250.60, 357.2  1/4/2016        Essential hypertension, benign ICD-10-CM: I10  ICD-9-CM: 401.1  6/4/2015        Edema ICD-10-CM: R60.9  ICD-9-CM: 782.3  11/12/2014    Overview Addendum 12/14/2017  8:24 AM by Edmundo Torres MD     4/16 echo normal lvef, lae, mild tr  12/17 echo normal lvef, elisha, mild to mod mr and tr, pa pressure 50mm             Headache(784.0) ICD-10-CM: R51  ICD-9-CM: 784.0  11/12/2014        Diverticulosis of colon (without mention of hemorrhage) ICD-10-CM: K57.30  ICD-9-CM: 562.10  11/12/2014        Constipation ICD-10-CM: K59.00  ICD-9-CM: 564.00  11/12/2014        Cyst of left kidney ICD-10-CM: N28.1  ICD-9-CM: 753.10  11/12/2014              Current Outpatient Medications on File Prior to Visit   Medication Sig    furosemide (LASIX) 40 mg tablet TAKE 1 TABLET BY MOUTH EVERY DAY    tobramycin-dexamethasone (Tobradex ST) drps ophthalmix suspension Administer 1 Drop to both eyes four (4) times daily.  metoprolol succinate (TOPROL-XL) 25 mg XL tablet Take 1 Tab by mouth daily.     potassium chloride SR (KLOR-CON 10) 10 mEq tablet TAKE 2 TABLETS DAILY (NEW DIRECTIONS)    vit A/vit C/vit E/zinc/copper (PRESERVISION AREDS PO) Take  by mouth two (2) times a day.  loratadine (Claritin) 10 mg tablet Take 10 mg by mouth daily.  glucose blood VI test strips (PRECISION XTRA TEST) strip USE TO TEST BLOOD SUGAR TWICE DAILY. Dx E11.21    mirabegron ER (MYRBETRIQ) 50 mg ER tablet Per urology (Patient taking differently: Take 50 mg by mouth daily. Per urology)   24 [facility] linaclotide [facility]) 72 mcg cap Take  by mouth daily.  aspirin delayed-release 81 mg tablet Take 1 Tab by mouth daily. (Patient taking differently: Take 81 mg by mouth every other day.)    CHOLECALCIFEROL, VITAMIN D3, (VITAMIN D3 PO) Take  by mouth daily.  Lancets misc 1 Package by Does Not Apply route daily. Test glucose daily and as needed Dx type 2 dm E11.42    magnesium hydroxide (MILK OF MAGNESIA) 2,400 mg/10 mL susp suspension Take 10 mL by mouth nightly. No current facility-administered medications on file prior to visit. CARDIOLOGY STUDIES TO DATE:  4/16 echo normal lvef, lae, mild tr  12/17 echo normal lvef, elisha, mild to mod mr and tr, pa pressure 50mm    Chief Complaint   Patient presents with    Follow-up     HPI :  She recently saw her primary care and reported that she been having to take her extra 3 times a week Lasix dose every week for the last couple months rather than every other week. Turns out that she is involved with a wedding and all the associated affairs with that in the last month and that may have tipped things over a bit as well. She has chronic mild dyspnea on exertion and lower extremity edema and she has not been wearing her compression hose lately. Recent blood work was remarkable for slight elevation in her baseline creatinine. CARDIAC ROS:   negative for chest pain, palpitations, syncope, orthopnea, paroxysmal nocturnal dyspnea, exertional chest pressure/discomfort, claudication    No family history on file.     Past Medical History:   Diagnosis Date  Anxiety     Bleeding nose     Chronic atrial fibrillation (HCC)     Diabetes (HCC)     Hypertension        GENERAL ROS:  A comprehensive review of systems was negative except for that written in the HPI. Visit Vitals  /70 (BP 1 Location: Left arm, BP Patient Position: Sitting, BP Cuff Size: Adult)   Pulse 60   Resp 14   Ht 4' 11\" (1.499 m)   Wt 176 lb (79.8 kg)   SpO2 97%   BMI 35.55 kg/m²       Wt Readings from Last 3 Encounters:   11/02/21 176 lb (79.8 kg)   10/25/21 178 lb (80.7 kg)   08/10/21 179 lb 6.4 oz (81.4 kg)            BP Readings from Last 3 Encounters:   11/02/21 130/70   10/25/21 137/68   08/10/21 (!) 109/40       PHYSICAL EXAM  General appearance: alert, cooperative, no distress, appears stated age  Neurologic: Alert and oriented X 3  Neck: supple, symmetrical, trachea midline, no adenopathy, no carotid bruit and no JVD  Lungs: clear to auscultation bilaterally  Heart: irregularly irregular rhythm, S1, S2 normal, no S3 or S4, systolic murmur: early systolic 2/6, blowing at apex  Extremities: edema 1+    Lab Results   Component Value Date/Time    Cholesterol, total 146 03/21/2016 10:31 AM    Cholesterol, total 147 03/13/2015 02:06 PM    HDL Cholesterol 85 03/21/2016 10:31 AM    HDL Cholesterol 60 03/13/2015 02:06 PM    LDL, calculated 48 03/21/2016 10:31 AM    LDL, calculated 69 03/13/2015 02:06 PM    Triglyceride 64 03/21/2016 10:31 AM    Triglyceride 90 03/13/2015 02:06 PM     ASSESSMENT :      Her weight is actually down a couple of pounds from what it was when we met in August which is good and she says that she has been more careful about her salt intake since she met with primary care and that is probably helping as well. I think it is perfectly fine for her to take the extra Lasix every week with her current creatinine since she has been doing this for at least a month or more and things have not changed dramatically.   She is agreeable to getting an echocardiogram which I think could be helpful to make sure that her mitral regurgitation has not got significantly worse or that her LV function has declined. Her primary care also was going to do overnight oximetry on her which I think is an excellent idea as well. I want her to get her caretakers to put her compression hose back on  current treatment plan is effective, no change in therapy  lab results and schedule of future lab studies reviewed with patient  reviewed diet, exercise and weight control    Encounter Diagnoses   Name Primary?  Chronic atrial fibrillation (HCC) Yes    Essential hypertension, benign     SOB (shortness of breath) on exertion     Localized edema     Anxiety      No orders of the defined types were placed in this encounter. Follow-up and Dispositions    · Return in about 3 months (around 2/2/2022). Popeye Hoover MD  11/2/2021  Please note that this dictation was completed with [employer], the computer voice recognition software. Quite often unanticipated grammatical, syntax, homophones, and other interpretive errors are inadvertently transcribed by the computer software. Please disregard these errors. Please excuse any errors that have escaped final proofreading. Thank you.

## 2021-11-04 NOTE — PROGRESS NOTES
Goals      Patient will monitor for signs fluid overload (CHF)      11/04/21  Patient attended follow up with both PCP and cardiology who recommended resuming compression stockings. Patient reports she and caregiver are getting ready to leave the house and they plan to  new compression stockings. Reports mild swelling every day that resolves overnight. Pt states she is getting ready for an appointment. ACM will follow up in 10-14 days. KG    10/20/21  Patient reports weight today 173.0 lbs. She has a very good understanding of when she needs to take an additional 40 mg of lasix. She reports having done so last week. Denies orthopnea. Positive for MCNAIR which remains unchanged, LE edema during the day but resolves overnight when she can elevate her legs. No swelling today. Pt will continue to monitor for signs of fluid overload and weigh daily. ACM will follow up in 10 days. KG    10/06/21  Patient lost to follow up. Called today. Patient has been feeling more fatigued these days. MCNAIR remains unchanged. LE edema resolves overnight. In September patient's weight was up to 176.6 lbs. Current regimen is 40 mg lasix daily and take an extra 40 mg x3 days if weight >175. Today's weight 171.2 lbs. Reports having to take extra lasix 4x since last OV. Patient will continue to weight daily and keep a log. Pt has appt 10/11 with [facility]. ACM will follow up in 10-14 days. KG    08/05/21   Follow up call to patient. Pt states LE edema improved. Still experiences edema but lately not as bad. No shortness of breath but + for fatigue. States she slept fine but today is not feeling as well, denies nausea or change in bowel movements. 07/28/21   Patient provided one weight: 173 lbs. Has not taken any furosemide since directed by ACS. Swelling daily but improves overnight when legs elevated. Denies shortness of breath. Patient said she usually doesn't feel great first thing in the morning.  She has been up and had breakfast. Aid will arrive at 11:00. She said she usually feels better in the afternoon. Pt agreed to provide numbers at next call. This writer will call patient in 7-10 days in the afternoon per her request. Joy Turpin    06/24/21  Patient reports some LE edema. She believes its from all the moving around she does during the day. She is aware of the importance of low Na diet and tries to watch her diet. Encouraged <2000mg of Na daily and elevating legs during the day when she is resting. Patient has rx for furosemide prn. Weight is down  to 171.6 lbs from 174.6 lbs two days ago. States she has not taken in over a year. Patient will weigh daily, same time, same way and keep a record. ACM will follow up in 10-14 days. KG       Pt will monitor BP daily      11/04/21  BP at /25/21: 137/68 and cardiology  11/2/21: 130/70. Unable to get BP readings for the last 2 weeks due to patient getting ready to go to an appointment. ACM will follow up in 10-14 days. KG    10/20/21  Follow up call to patient. Pt reports she attended he nephew's wedding reception over the weekend (festivities last over several days) and still recovering. Fatigue remains constant. BP readings:   10/13  137/59  (HR 61)  10/14  132/55  (HR 49)  10/15  144/59  (HR 56)  10/16  144/61  (HR 50)  10/17  153/65  (HR 48)  10/18  146/64  (HR 44)  10/19  145/59  (HR 47)  10/20:  147/72  (HR 49)  Will advise ACS of readings. Pt only c/o fatigue. Denies h/a, dizziness/lightheadedness, falls. Pt will continue to monitor and keep log. Next office visit is scheduled for 10/25/21. ACM will follow up in 7-10 days. KG      10/06/21  Patient lost to follow up. Called today and patient reports she is feeling more fatigued these days. BP today 129/68  HR 44. LOV HR 54. Patient is scheduled to see ACS 10/25/21. ACM will follow up 10-14 days.  KG    08/05/21  Patient reports BP today   7/29: 155/62 (45)  7/30: 143/66 (52)  7/31:  143/62 (53)  8/01: 135/60 (48)  8/02: 137/52 (43)  8/03: 138/55 (48)  8/04: 129/54 (46)  8/05: 147/60 (37)  Reports feeling quite tired the last few days. She was out shopping yesterday which can be tiring and pt was up later than usual last night. She usually gets ready for bed around 8:30. Asked patient if she is able to check her pulse manually. Patient said she has before but today she couldn't. Her caregiver is at the store. Asked pt to have caregiver either manually check HR or recheck BP with HR upon returning to the home. Will call patient back today for reading. [id number]: called patient and left message requesting return call.    07/28/21   Pt states her BP was \"136 over something. \" She reports moving slow in the morning and not feeling her best. This writer offered to call patient back in the afternoon. Patient agreeable. KG    06/24/21   Patient will monitor BP daily. Patient reports checking BP twice a day because morning BP tends to be higher ('s). This afternoon BP was 144/60. When she gets up in the morning she sits on the side of the bed for 5 minutes because upon waking she \"just feels funny\". It resolves and she is able to get up and go into the bathroom. Educated patient on monitoring Na intake. Patient states she tries to watch her diet. Pt will continue to check BP 2x/day and keep a log. Patient will advise office of SBP >160. ACM will follow up in 10-14 days. KG.

## 2021-11-22 NOTE — PROGRESS NOTES
Challenges to be reviewed by the provider   Additional needs identified to be addressed with provider: yes  Pt reports having choking episodes when drinking liquids about once a day. She states experiencing this for 5-6 months but has never thought to discuss with you. Arthur Borges Discussed with [facility] who recommended video swallow test.   Called patient and discussed the reason for the test and what it would show. Patient stated she would like to think about it, \"it's really not that bad. \" Patient agreed for this writer to call her back in 7-10 days to discuss further. Advised ACS of the above. Goals      Patient will monitor for signs fluid overload (CHF)      11/22/21   Patient reports weight up to 177.4 lbs at home. Cardiology weighed patient today and she is 176 lbs. Patient takes 40 mg furosemide daily but when her weight is >175 pt will take an extra 40 mg for three days. Patient was unsure if she took the extra dose Sunday but did take today. Encouraged patient to look at book to see if she has 1 or 2 more days of extra furosemide to take. Patient verbalized understanding. Patient plans to go to her [place] for Thanksgiving. Reinforced watching salt intake. Patient aware and admits it is difficult at the holidays. Not drinking enough H20. Discussed suggestions to add flavor. Pt will continue to weigh daily and monitor for overload. ACM will follow up in 10-14 days. KG      11/04/21  Patient attended follow up with both PCP and cardiology who recommended resuming compression stockings. Patient reports she and caregiver are getting ready to leave the house and they plan to  new compression stockings. Reports mild swelling every day that resolves overnight. Pt states she is getting ready for an appointment. ACM will follow up in 10-14 days. KG    10/20/21  Patient reports weight today 173.0 lbs. She has a very good understanding of when she needs to take an additional 40 mg of lasix.  She reports having done so last week. Denies orthopnea. Positive for MCNAIR which remains unchanged, LE edema during the day but resolves overnight when she can elevate her legs. No swelling today. Pt will continue to monitor for signs of fluid overload and weigh daily. ACM will follow up in 10 days. KG    10/06/21  Patient lost to follow up. Called today. Patient has been feeling more fatigued these days. MCNAIR remains unchanged. LE edema resolves overnight. In September patient's weight was up to 176.6 lbs. Current regimen is 40 mg lasix daily and take an extra 40 mg x3 days if weight >175. Today's weight 171.2 lbs. Reports having to take extra lasix 4x since last OV. Patient will continue to weight daily and keep a log. Pt has appt 10/11 with [facility]. ACM will follow up in 10-14 days. KG    08/05/21   Follow up call to patient. Pt states LE edema improved. Still experiences edema but lately not as bad. No shortness of breath but + for fatigue. States she slept fine but today is not feeling as well, denies nausea or change in bowel movements. 07/28/21   Patient provided one weight: 173 lbs. Has not taken any furosemide since directed by ACS. Swelling daily but improves overnight when legs elevated. Denies shortness of breath. Patient said she usually doesn't feel great first thing in the morning. She has been up and had breakfast. Aid will arrive at 11:00. She said she usually feels better in the afternoon. Pt agreed to provide numbers at next call. This writer will call patient in 7-10 days in the afternoon per her request. Konrad Blancas    06/24/21  Patient reports some LE edema. She believes its from all the moving around she does during the day. She is aware of the importance of low Na diet and tries to watch her diet. Encouraged <2000mg of Na daily and elevating legs during the day when she is resting. Patient has rx for furosemide prn. Weight is down  to 171.6 lbs from 174.6 lbs two days ago. States she has not taken in over a year.  Patient will weigh daily, same time, same way and keep a record. ACM will follow up in 10-14 days. KG       Pt will monitor BP daily      11/22/21  Follow up call to patient. She could not provide readings today, there are in her book in the other room. She did have a test today with cardiology and BP was 130/70. Will follow up with patient in 10-14 days for BP readings. KG      11/04/21  BP at /25/21: 137/68 and cardiology  11/2/21: 130/70. Unable to get BP readings for the last 2 weeks due to patient getting ready to go to an appointment. ACM will follow up in 10-14 days. KG    10/20/21  Follow up call to patient. Pt reports she attended he nephew's wedding reception over the weekend (festivities last over several days) and still recovering. Fatigue remains constant. BP readings:   10/13  137/59  (HR 61)  10/14  132/55  (HR 49)  10/15  144/59  (HR 56)  10/16  144/61  (HR 50)  10/17  153/65  (HR 48)  10/18  146/64  (HR 44)  10/19  145/59  (HR 47)  10/20:  147/72  (HR 49)  Will advise ACS of readings. Pt only c/o fatigue. Denies h/a, dizziness/lightheadedness, falls. Pt will continue to monitor and keep log. Next office visit is scheduled for 10/25/21. ACM will follow up in 7-10 days. KG      10/06/21  Patient lost to follow up. Called today and patient reports she is feeling more fatigued these days. BP today 129/68  HR 44. LOV HR 54. Patient is scheduled to see ACS 10/25/21. ACM will follow up 10-14 days. KG    08/05/21  Patient reports BP today   7/29: 155/62 (45)  7/30: 143/66 (52)  7/31:  143/62 (53)  8/01: 135/60 (48)  8/02: 137/52 (43)  8/03: 138/55 (48)  8/04: 129/54 (46)  8/05: 147/60 (37)  Reports feeling quite tired the last few days. She was out shopping yesterday which can be tiring and pt was up later than usual last night. She usually gets ready for bed around 8:30. Asked patient if she is able to check her pulse manually. Patient said she has before but today she couldn't. Her caregiver is at the store. Asked pt to have caregiver either manually check HR or recheck BP with HR upon returning to the home. Will call patient back today for reading. [id number]: called patient and left message requesting return call.    07/28/21   Pt states her BP was \"136 over something. \" She reports moving slow in the morning and not feeling her best. This writer offered to call patient back in the afternoon. Patient agreeable. KG    06/24/21   Patient will monitor BP daily. Patient reports checking BP twice a day because morning BP tends to be higher ('s). This afternoon BP was 144/60. When she gets up in the morning she sits on the side of the bed for 5 minutes because upon waking she \"just feels funny\". It resolves and she is able to get up and go into the bathroom. Educated patient on monitoring Na intake. Patient states she tries to watch her diet. Pt will continue to check BP 2x/day and keep a log. Patient will advise office of SBP >160. ACM will follow up in 10-14 days. KG.

## 2021-11-30 NOTE — TELEPHONE ENCOUNTER
Pt called. Verified patient's identity with two identifiers. Notified her of echo results and Dr. Parveen Dale message. Patient stated she has not had overnight oximetry yet. I told her I would message her PCP with these results and Dr. Parveen Dale message. Advised she should contact them in the next few days if they do not contact her to discuss overnight oximetry order. Patient verbalized understanding and denied further questions or concerns.

## 2021-11-30 NOTE — TELEPHONE ENCOUNTER
----- Message from Roseanna Sorto MD sent at 11/30/2021 10:38 AM EST -----  Heart muscle is strong. One valve is leaking and the pressures inside the lungs are elevated. Most likely cause of this is low oxygen levels, especially at night. Primary care was going to check overnight oximetry which is an excellent idea, but dont think that has happended.  Will you check it out? thanks

## 2021-11-30 NOTE — PROGRESS NOTES
Josh Haile 55 - spoke with Aleksandar. Advised him we faxed order for over night oximetry back on 10/26/21. Pt has not had done. He advised me they do not have any order for pt. Re-faxed order. Advised MD of this. Will call back to check on order.

## 2021-11-30 NOTE — PROGRESS NOTES
Heart muscle is strong. One valve is leaking and the pressures inside the lungs are elevated. Most likely cause of this is low oxygen levels, especially at night. Primary care was going to check overnight oximetry which is an excellent idea, but dont think that has happended.  Will you check it out? thanks

## 2021-12-01 NOTE — PROGRESS NOTES
Spoke with Arline Piedra with [id number] [facility] to see if they received pt's order I sent yesterday. She did but it needed to have \"on room air\" added to order and faxed back. Gil and MD signed. Faxed back to [phone]. Confirmation received.

## 2021-12-06 NOTE — TELEPHONE ENCOUNTER
Spoke with staff at nursing facility, pt injured hand going in to bathroom this morning- 2 inch laceration on top of hand that is still bleeding some. Advised she needs to be evaluated asap by urgent care. Hand is currently wrapped - not excessive bleeding but bleeding has not stopped. Patient verbalized understanding.

## 2021-12-07 NOTE — TELEPHONE ENCOUNTER
Spoke with pt yesterday when call was transferred to me. She was calling back from an older message. I had already spoken with her. She denied further questions or concerns.

## 2021-12-09 NOTE — TELEPHONE ENCOUNTER
Called pt to let her know her over night oximetry showsshe needs to have oxygen at night. Md has ordered her to have [email or web address] at night while she sleeps. She needs an appt (face to face) for O2 order. Scheduled her for 12/16/21at 12 pm.    Also checked on pt to see if she spoke with anyone with [employer]? She states they did call. They were not sure if they were going to be able to see her today or tomorrow. She had told them she may just see her doctor tomorrow. I advised her he was out of office until Monday 12/13/21. She should have them see her. I would call them to make sure they were still to see her. [id number] Jeannie Najera - spoke with [facility] [place]. Asked if they had pt on to be seen today? She advised me pt was on there list to be seen today sometime after 1 pm today.

## 2021-12-09 NOTE — TELEPHONE ENCOUNTER
Troy Almendarez (Self) [phone] (H)     Patient hurt her hand on Monday and has 12 stitches. She also got a tetanus shot. Skin around ankle is blood red about 2-3 inches of skin.  Painful

## 2021-12-09 NOTE — TELEPHONE ENCOUNTER
Chief Complaint   Patient presents with    Ankle Problem     Received incoming call from Dr. Lashonda Ward patient for triage. Patient states her ankle is blood red with mild swelling and warmth - she noticed this yesterday AM and thought with her history of DM she should call for appointment. Advised PCP out of office at this time. She denies pain, she is able to bear weight. Reviewed with Dr. Jenni Foutnain and would like DH to go to home to assess. This nurse has submitted request for appointment via [facility] online portal and advised patient I will have her nurse Jorge Albright confirm that they can come out to home today. Red flags reviewed with patient and she verbalized understanding.

## 2021-12-13 NOTE — TELEPHONE ENCOUNTER
Leonel Ramirez, please call patient this morning concerning her not feeling well all weekend and now, is congested. Also, her legs are swollen and the medication is not working. Asked if you would please call her as soon as possible.

## 2021-12-13 NOTE — PROGRESS NOTES
Called patient to discuss her decision regarding swallow function test. Patient previously stated she wanted to think about it and that it really is not that bad. Today patient has c/o congestion, cough and an increase in MCNAIR. Cough is not productive but patient states she has drainage that she can not cough up. Denies fever. B/L leg swelling and weight is 177 lbs today. She take lasix 40 mg daily and when her weight is >175 lbs she will take 80 mg for 3 days. Reports taking 80 mg of lasix Th/F/S (12/9, 12/10, 12/11) with little improvement. Pt states a caregiver had a runny nose and tickle in her throat last week. [facility] on her legs and she was told it was cellulitis. Patient was already taking bactrim for an injury on her hand from last Monday (she received 12 stitches and a tetnus shot). Provider told her that should be enough for the cellulitis as well. Last dose will be tomorrow. Discussed with ACS, pt will be assessed at appt 12/16/21 in the meantime will treat some of the symptoms: Delsym for cough, saline nasal spray in both nostrils. Water to help thin secretions and elevate legs as much as she can tolerate. Should breathing become more labored patient to call the office. Patient verbalized understanding. Patient is not using compression stockings due to cellulitis on legs.

## 2021-12-13 NOTE — TELEPHONE ENCOUNTER
Received call from [address] at [facility] with Red Flag Complaint. Brief description of triage: patient states has CHF and intermittent leg swelling. Her ankles are slightly swollen bilaterally and her weight is at 177 (said that if above 175 cardiology told her to double her furosemide). Has some wheeze but no SOB. Triage indicates for patient to be seen within 3 days. Patient currently has appointment on 12/16/21 but wants to speak with PCP office, called [facility]  and they said that she has already called in today. Nurse is at lunch, but they will have her call the patient back as soon as she comes back. Care advice provided, patient verbalizes understanding; denies any other questions or concerns; instructed to call back for any new or worsening symptoms. Attention Provider: Thank you for allowing me to participate in the care of your patient. The patient was connected to triage in response to information provided to the [facility]. Please do not respond through this encounter as the response is not directed to a shared pool. Reason for Disposition   MILD swelling of both ankles (i.e., pedal edema) AND new onset or worsening    Answer Assessment - Initial Assessment Questions  1. ONSET: \"When did the swelling start? \" (e.g., minutes, hours, days)      Off & On - getting worse lately    2. LOCATION: \"What part of the leg is swollen? \"  \"Are both legs swollen or just one leg? \"      Bilateral ankles    3. SEVERITY: \"How bad is the swelling? \" (e.g., localized; mild, moderate, severe)   - Localized - small area of swelling localized to one leg   - MILD pedal edema - swelling limited to foot and ankle, pitting edema < 1/4 inch (6 mm) deep, rest and elevation eliminate most or all swelling   - MODERATE edema - swelling of lower leg to knee, pitting edema > 1/4 inch (6 mm) deep, rest and elevation only partially reduce swelling   - SEVERE edema - swelling extends above knee, facial or hand swelling present       Up to ankles    4. REDNESS: \"Does the swelling look red or infected? \"      Yes, but were bright red before    5. PAIN: \"Is the swelling painful to touch? \" If so, ask: \"How painful is it? \"   (Scale 1-10; mild, moderate or severe)      NO    6. FEVER: \"Do you have a fever? \" If so, ask: \"What is it, how was it measured, and when did it start? \"       No    7. CAUSE: \"What do you think is causing the leg swelling? \"      CHF - takes furosemide    8. MEDICAL HISTORY: \"Do you have a history of heart failure, kidney disease, liver failure, or cancer? \"      Yes CHF    9. RECURRENT SYMPTOM: \"Have you had leg swelling before? \" If so, ask: \"When was the last time? \" \"What happened that time? \"      Yes, often, CHF    10. OTHER SYMPTOMS: \"Do you have any other symptoms? \" (e.g., chest pain, difficulty breathing)        Wheezing, blood pressure has been higher than normal 134/69    11. PREGNANCY: \"Is there any chance you are pregnant? \" \"When was your last menstrual period? \"        NA    Protocols used: LEG SWELLING AND EDEMA-ADULT-[place]

## 2021-12-16 NOTE — PROGRESS NOTES
HISTORY OF PRESENT ILLNESS  Dwight Moy is a 80 y.o. female. HPI  Assessment: Meredith Chavez is seen today for follow up of the new diagnosis of nocturnal hypoxia. She is accompanied by her aide. She has other concerns as well. 1) Nocturnal hypoxia. This was confirmed on overnight pulse oximetry measurements. She was < 90% for a significant period of time. They ordered home oxygen. This represents the face-to-face visit for ordering home oxygen. I am prescribing the oxygen for health benefit and she accepts the prescription and understands the importance of continued use of the oxygen at night. 2) URI. This has been present for seven days. She has some cough with wheezing. 3) Hand laceration. She went to [facility]. This occurred about eleven days ago. She was given sulfa antibiotic and ten sutures were placed. These were easily removed, she tolerated it well. 4) Cellulitis. This was diagnosed by [facility]. She had already been on sulfa antibiotic for the laceration and no change in therapy was made. She is improving. 5) CHF. Weight has been up more. She has been taking extra Lasix. I suspect there has been some slight decompensation due to her other problems and she will get better with treatment of the above. Review of Systems   Constitutional: Negative for chills and fever. Respiratory: Positive for cough and wheezing. Cardiovascular: Positive for leg swelling. Physical Exam  Vitals and nursing note reviewed. Constitutional:       General: She is not in acute distress. Cardiovascular:      Rate and Rhythm: Normal rate and regular rhythm. Heart sounds: No murmur heard. No friction rub. No gallop. Pulmonary:      Effort: Pulmonary effort is normal.      Breath sounds: Normal breath sounds. Skin:               ASSESSMENT and PLAN  Diagnoses and all orders for this visit:    1. Nocturnal hypoxia    2.  Acute bacterial bronchitis  -     azithromycin (ZITHROMAX) 250 mg tablet; Take 1 Tablet by mouth See Admin Instructions for 5 days.     3. Laceration of left hand without foreign body, initial encounter  -     REMOVAL OF SUTURES

## 2021-12-16 NOTE — PROGRESS NOTES
Verified name and birth date for privacy precautions. Chart reviewed in preparation for today's visit.      Chief Complaint   Patient presents with    Hypertension    Suture Removal     left hand 10 [facility] Maintenance Due   Topic    Eye Exam Retinal or Dilated     Medicare Yearly Exam     Foot Exam Q1          Wt Readings from Last 3 Encounters:   12/16/21 183 lb (83 kg)   11/22/21 176 lb (79.8 kg)   11/02/21 176 lb (79.8 kg)     Temp Readings from Last 3 Encounters:   12/16/21 97.8 °F (36.6 °C) (Temporal)   10/25/21 97.1 °F (36.2 °C) (Temporal)   08/10/21 98 °F (36.7 °C) (Temporal)     BP Readings from Last 3 Encounters:   12/16/21 (!) 169/73   11/22/21 130/70   11/02/21 130/70     Pulse Readings from Last 3 Encounters:   12/16/21 88   11/02/21 60   10/25/21 (!) 51         Learning Assessment:  :     Learning Assessment 12/14/2017 8/24/2017 1/4/2016 1/21/2015   PRIMARY LEARNER Patient Patient Patient Patient   HIGHEST LEVEL OF EDUCATION - PRIMARY LEARNER  - - - 2 YEARS OF COLLEGE   BARRIERS PRIMARY LEARNER - - VISUAL NONE   CO-LEARNER CAREGIVER - - No No   PRIMARY LANGUAGE ENGLISH ENGLISH ENGLISH ENGLISH   LEARNER PREFERENCE PRIMARY DEMONSTRATION DEMONSTRATION DEMONSTRATION DEMONSTRATION     - VIDEOS - VIDEOS   ANSWERED BY Patient patient self patient   RELATIONSHIP SELF SELF SELF SELF       Depression Screening:  :     3 most recent PHQ Screens 12/16/2021   Little interest or pleasure in doing things Not at all   Feeling down, depressed, irritable, or hopeless Nearly every day   Total Score PHQ 2 3   Trouble falling or staying asleep, or sleeping too much -   Feeling tired or having little energy -   Poor appetite, weight loss, or overeating -   Feeling bad about yourself - or that you are a failure or have let yourself or your family down -   Trouble concentrating on things such as school, work, reading, or watching TV -   Moving or speaking so slowly that other people could have noticed; or the opposite being so fidgety that others notice -   Thoughts of being better off dead, or hurting yourself in some way -   PHQ 9 Score -   How difficult have these problems made it for you to do your work, take care of your home and get along with others -       Fall Risk Assessment:  :     Fall Risk Assessment, last 12 mths 12/16/2021   Able to walk? No   Fall in past 12 months? -   Do you feel unsteady? -   Are you worried about falling -   Is the gait abnormal? -   Number of falls in past 12 months -   Fall with injury? -       Abuse Screening:  :     Abuse Screening Questionnaire 12/16/2021 6/25/2019 3/8/2019 1/21/2015   Do you ever feel afraid of your partner? N N N N   Are you in a relationship with someone who physically or mentally threatens you? N N N N   Is it safe for you to go home?  Barbara Mukherjee

## 2021-12-18 NOTE — TELEPHONE ENCOUNTER
ON CALL NOTE:   Pt son Elton Albrecht calls to report his mom complaining of burning all over, wheezing and cough and SOB and abdominal cramping. She was seen 2 days ago and started on zpak for bronchitis. Medical hx,  Medications & allergies reviewed. reviewed with the son that Ping Blanco needs to be evaluated and to go to urgent care for evaluation or ED.

## 2021-12-20 PROBLEM — E87.1 HYPONATREMIA: Status: ACTIVE | Noted: 2021-01-01

## 2021-12-20 NOTE — TELEPHONE ENCOUNTER
Noted pt is admitted to hospital but see there is a call from her. Called left message for her to call back.

## 2021-12-20 NOTE — PROGRESS NOTES
[facility]   [address] [facility], [address] [place] [address]  Phone: [phone]   [phone]    [email or web address]     Nephrology Progress Note    Patient Name : Laura Bee      : 1922     MRN : [id number]  Date of Admission : 2021  Date of Servive : 21    CC: Follow up for Hyponatremia        Assessment and Plan   Severe Hyponatremia :  - acute   - etiology : SIADH compounded by lasix use and possibly worsening R hear failure   - hold off further doses of Lasix   - repeat Na now   - if Na < 112, another dose of 3% saline   - FR 1000 cc  - CT head to r/o central process causing SIADH  - check UA to r/o UTI  - check TSH and Cortisol   - serial labs , frequency to be determined after next labs   - ok to transfer out of ICU if Na correcting at appropriate rate     Pulm HTN   Severe TR, Mild MR  JOSE  Chronic A fib     Left Hand injury        Interval History:  Seen and examined. She diuresed 400 cc since receiving a dose of lasix   Repeat labs being drawn   Remains confused   She could not give me hx surrounding her admission, recent left hand injury or leg / foot redness   Out pt records from PCP and cardiology reviewed   Her lasix dose has been doubled in last 2 weeks     Review of Systems: Review of systems not obtained due to patient factors.     Current Medications:   Current Facility-Administered Medications   Medication Dose Route Frequency    sodium chloride (NS) flush 5-40 mL  5-40 mL IntraVENous Q8H    sodium chloride (NS) flush 5-40 mL  5-40 mL IntraVENous Q8H    sodium chloride (NS) flush 5-40 mL  5-40 mL IntraVENous PRN    acetaminophen (TYLENOL) tablet 650 mg  650 mg Oral Q6H PRN    Or    acetaminophen (TYLENOL) suppository 650 mg  650 mg Rectal Q6H PRN    polyethylene glycol (MIRALAX) packet 17 g  17 g Oral DAILY PRN    ondansetron (ZOFRAN ODT) tablet 4 mg  4 mg Oral Q8H PRN    Or    ondansetron (ZOFRAN) injection 4 mg  4 mg IntraVENous Q6H PRN    [Held by provider] furosemide (LASIX) injection 80 mg  80 mg IntraVENous BID    sodium chloride (NS) flush 5-40 mL  5-40 mL IntraVENous Q8H    sodium chloride (NS) flush 5-40 mL  5-40 mL IntraVENous PRN      Allergies   Allergen Reactions    Amoxicillin Other (comments)     \"makes me feel like I\"m going to faint\"    Aspirin Palpitations     Per patient, no allergy    Gabapentin Drowsiness     * pt states this med makes her feel very drowsy , gives her a drunk feeling.  Levaquin [Levofloxacin] Hives and Rash    Lorazepam Other (comments)     Feeling faint.  Losartan Other (comments)     Tongue swelling      Lyrica [Pregabalin] Rash    Other Medication Other (comments)     Feeling faint, does not decrease pain.  Sertraline Other (comments)     Feeling faint. Objective:  Vitals:    Vitals:    12/20/21 0330 12/20/21 0401 12/20/21 0526 12/20/21 0600   BP: (!) 162/105 (!) 175/75 (!) 175/67 (!) 134/56   Pulse: 66 65 63 (!) 57   Resp: 13 14 14 11   Temp:   98.1 °F (36.7 °C)    SpO2: 97%  93% 99%   Weight:         Intake and Output:  No intake/output data recorded. 12/18 1901 - 12/20 0700  In: -   Out: 160 [Urine:160]    Physical Examination:    General: confused  Neck:  Supple, no mass  Resp:  Mild wheezing   CV:  RRR,  systolic murmur +  GI:  Soft, NT, + BS, no HS megaly  Neurologic:  Confused   Ext                   B/L leg edema   :  Cifuentes +      []    High complexity decision making was performed  []    Patient is at high-risk of decompensation with multiple organ involvement    Lab Data Personally Reviewed: I have reviewed all the pertinent labs, microbiology data and radiology studies during assessment.     Recent Labs     12/20/21  0022 12/19/21  2328   * 109*   K 4.0 4.4   CL 75* 74*   CO2 24 23   * 132*   BUN 16 16   CREA 0.72 0.78   CA 8.5 8.6   ALB  --  3.8   ALT  --  42     Recent Labs     12/19/21  2328   WBC 12.7*   HGB 11.4*   HCT 32.2*    No results found for: SDES  No results found for: CULT  Recent Results (from the past 24 hour(s))   EKG, 12 LEAD, INITIAL    Collection Time: 12/19/21 11:10 PM   Result Value Ref Range    Ventricular Rate 59 BPM    QRS Duration 160 ms    Q-T Interval 526 ms    QTC Calculation (Bezet) 520 ms    Calculated R Axis 95 degrees    Calculated T Axis -15 degrees    Diagnosis       Atrial fibrillation with slow ventricular response  Right bundle branch block  Abnormal ECG  When compared with ECG of 06-DEC-2017 16:49,  Right bundle branch block is now present     RESPIRATORY VIRUS PANEL W/COVID-19, PCR    Collection Time: 12/19/21 11:23 PM    Specimen: Nasopharyngeal   Result Value Ref Range    Adenovirus Not detected NOTD      Coronavirus 229E Not detected NOTD      Coronavirus HKU1 Not detected NOTD      Coronavirus CVNL63 Not detected NOTD      Coronavirus OC43 Not detected NOTD      SARS-CoV-2, PCR Not detected NOTD      Metapneumovirus Not detected NOTD      Rhinovirus and Enterovirus Detected (A) NOTD      Influenza A Not detected NOTD      Influenza A, subtype H1 Not detected NOTD      Influenza A, subtype H3 Not detected NOTD      INFLUENZA A H1N1 PCR Not detected NOTD      Influenza B Not detected NOTD      Parainfluenza 1 Not detected NOTD      Parainfluenza 2 Not detected NOTD      Parainfluenza 3 Not detected NOTD      Parainfluenza virus 4 Not detected NOTD      RSV by PCR Not detected NOTD      B. parapertussis, PCR Not detected NOTD      Bordetella pertussis - PCR Not detected NOTD      Chlamydophila pneumoniae DNA, QL, PCR Not detected NOTD      Mycoplasma pneumoniae DNA, QL, PCR Not detected NOTD     CBC WITH AUTOMATED DIFF    Collection Time: 12/19/21 11:28 PM   Result Value Ref Range    WBC 12.7 (H) 3.6 - 11.0 K/uL    RBC 3.84 3.80 - 5.20 M/uL    HGB 11.4 (L) 11.5 - 16.0 g/dL    HCT 32.2 (L) 35.0 - 47.0 %    MCV 83.9 80.0 - 99.0 FL    MCH 29.7 26.0 - 34.0 PG    MCHC 35.4 30.0 - 36.5 g/dL    RDW 15.0 (H) 11.5 - 14.5 %    PLATELET 096 812 - 775 K/uL    MPV 9.7 8.9 - 12.9 FL    NRBC 0.2 (H) 0  WBC    ABSOLUTE NRBC 0.02 (H) 0.00 - 0.01 K/uL    NEUTROPHILS 86 (H) 32 - 75 %    LYMPHOCYTES 5 (L) 12 - 49 %    MONOCYTES 8 5 - 13 %    EOSINOPHILS 0 0 - 7 %    BASOPHILS 0 0 - 1 %    IMMATURE GRANULOCYTES 1 (H) 0.0 - 0.5 %    ABS. NEUTROPHILS 11.0 (H) 1.8 - 8.0 K/UL    ABS. LYMPHOCYTES 0.6 (L) 0.8 - 3.5 K/UL    ABS. MONOCYTES 1.0 0.0 - 1.0 K/UL    ABS. EOSINOPHILS 0.0 0.0 - 0.4 K/UL    ABS. BASOPHILS 0.0 0.0 - 0.1 K/UL    ABS. IMM. GRANS. 0.1 (H) 0.00 - 0.04 K/UL    DF SMEAR SCANNED      PLATELET COMMENTS Large Platelets      RBC COMMENTS ANISOCYTOSIS  1+       METABOLIC PANEL, COMPREHENSIVE    Collection Time: 12/19/21 11:28 PM   Result Value Ref Range    Sodium 109 (LL) 136 - 145 mmol/L    Potassium 4.4 3.5 - 5.1 mmol/L    Chloride 74 (L) 97 - 108 mmol/L    CO2 23 21 - 32 mmol/L    Anion gap 12 5 - 15 mmol/L    Glucose 132 (H) 65 - 100 mg/dL    BUN 16 6 - 20 MG/DL    Creatinine 0.78 0.55 - 1.02 MG/DL    BUN/Creatinine ratio 21 (H) 12 - 20      GFR est AA >60 >60 ml/min/1.73m2    GFR est non-AA >60 >60 ml/min/1.73m2    Calcium 8.6 8.5 - 10.1 MG/DL    Bilirubin, total 1.8 (H) 0.2 - 1.0 MG/DL    ALT (SGPT) 42 12 - 78 U/L    AST (SGOT) 62 (H) 15 - 37 U/L    Alk.  phosphatase 170 (H) 45 - 117 U/L    Protein, total 7.6 6.4 - 8.2 g/dL    Albumin 3.8 3.5 - 5.0 g/dL    Globulin 3.8 2.0 - 4.0 g/dL    A-G Ratio 1.0 (L) 1.1 - 2.2     TROPONIN-HIGH SENSITIVITY    Collection Time: 12/19/21 11:28 PM   Result Value Ref Range    Troponin-High Sensitivity 26 0 - 51 ng/L   NT-PRO BNP    Collection Time: 12/19/21 11:28 PM   Result Value Ref Range    NT pro-BNP 9,031 (H) <450 PG/ML   SAMPLES BEING HELD    Collection Time: 12/19/21 11:28 PM   Result Value Ref Range    SAMPLES BEING HELD 11red 1blue     COMMENT        Add-on orders for these samples will be processed based on acceptable specimen integrity and analyte stability, which may vary by analyte. PROCALCITONIN    Collection Time: 12/19/21 11:28 PM   Result Value Ref Range    Procalcitonin <0.05 ng/mL   LACTIC ACID    Collection Time: 12/19/21 11:39 PM   Result Value Ref Range    Lactic acid 2.2 (HH) 0.4 - 2.0 MMOL/L   METABOLIC PANEL, BASIC    Collection Time: 12/20/21 12:22 AM   Result Value Ref Range    Sodium 109 (LL) 136 - 145 mmol/L    Potassium 4.0 3.5 - 5.1 mmol/L    Chloride 75 (L) 97 - 108 mmol/L    CO2 24 21 - 32 mmol/L    Anion gap 10 5 - 15 mmol/L    Glucose 122 (H) 65 - 100 mg/dL    BUN 16 6 - 20 MG/DL    Creatinine 0.72 0.55 - 1.02 MG/DL    BUN/Creatinine ratio 22 (H) 12 - 20      GFR est AA >60 >60 ml/min/1.73m2    GFR est non-AA >60 >60 ml/min/1.73m2    Calcium 8.5 8.5 - 10.1 MG/DL   LACTIC ACID    Collection Time: 12/20/21 12:31 AM   Result Value Ref Range    Lactic acid 2.0 0.4 - 2.0 MMOL/L   OSMOLALITY, SERUM/PLASMA    Collection Time: 12/20/21  2:15 AM   Result Value Ref Range    Osmolality, serum/plasma 242 mOsm/kg H2O   OSMOLALITY, UR    Collection Time: 12/20/21  3:08 AM   Result Value Ref Range    Osmolality,urine 724 MOSM/kg H2O   SODIUM, UR, RANDOM    Collection Time: 12/20/21  3:08 AM   Result Value Ref Range    Sodium,urine random 12 MMOL/L   POTASSIUM, UR, RANDOM    Collection Time: 12/20/21  3:08 AM   Result Value Ref Range    Potassium urine, random 53 MMOL/L   CHLORIDE, URINE RANDOM    Collection Time: 12/20/21  3:08 AM   Result Value Ref Range    Chloride,urine random 17 MMOL/L   CREATININE, UR, RANDOM    Collection Time: 12/20/21  3:08 AM   Result Value Ref Range    Creatinine, urine 120.00 mg/dL   URINE CULTURE HOLD SAMPLE    Collection Time: 12/20/21  3:08 AM    Specimen: Serum   Result Value Ref Range    Urine culture hold        Urine on hold in Microbiology dept for 2 days. If unpreserved urine is submitted, it cannot be used for addtional testing after 24 hours, recollection will be required.    SODIUM, UR, RANDOM    Collection Time: 12/20/21  5:36 AM   Result Value Ref Range    Sodium,urine random 19 MMOL/L   OSMOLALITY, UR    Collection Time: 12/20/21  5:36 AM   Result Value Ref Range    Osmolality,urine 675 MOSM/kg H2O           I have reviewed the flowsheets. Chart and Pertinent Notes have been reviewed. No change in PMH ,family and social history from Consult note.       Oksana Aaron [facility]

## 2021-12-20 NOTE — PROGRESS NOTES
[facility]    ICU TEAM Progress Note    Name: Ignacia Queen   : 1922   MRN: [id number]   Date: 2021           ICU Assessment     1. Hyponatremia             ICU Comprehensive Plan of Care: This is a 51-year-old female with past medical history of pulmonary hypertension, atrial fibrillation, hypertension, DM was admitted to the ICU overnight with symptomatic hyponatremia given mental status changes. Her serum sodium was noted to be 109. She was given hypertonic saline and admitted to the ICU for further work-up. General: The patient is awake and alert to place, time but not person. This has improved since admission to the ICU. Renal: Euvolemic hyponatremia. Urine osmolarity of more than 600 is consistent with SIADH but urine sodium was only 17. Nephrology recommendations are greatly appreciated. Serial BMPs as ordered. Another dose of 3% saline. Disposition: The patient will be transferred out to the medicine floor for further care. Subjective:   Progress Note: 2021      Reason for ICU Admission: Somatic hyponatremia    HPI: Above    Overnight Events:   2021      POD:  * No surgery found *    S/P:       Active Problem List:     Problem List  Date Reviewed: 2021          Codes Class    Hyponatremia ICD-10-CM: E87.1  ICD-9-CM: 276.1         Severe obesity (BMI 35.0-39. 9) with comorbidity (HCC) ICD-10-CM: E66.01  ICD-9-CM: 278.01         Type 2 diabetes mellitus with nephropathy ([facility] ICD-10-CM: E11.21  ICD-9-CM: 250.40, 583.81         CHF (congestive heart failure) (HCC) ICD-10-CM: I50.9  ICD-9-CM: 428.0         Anxiety ICD-10-CM: F41.9  ICD-9-CM: 300.00         Allergic rhinitis ICD-10-CM: J30.9  ICD-9-CM: 477.9         Chronic atrial fibrillation (HCC) ICD-10-CM: I48.20  ICD-9-CM: 427.31         Advanced directives, counseling/discussion ICD-10-CM: Z71.89  ICD-9-CM: V65.49         Type 2 diabetes, controlled, with neuropathy ([facility] ICD-10-CM: E11.40  ICD-9-CM: 250.60, 357.2         Essential hypertension, benign ICD-10-CM: I10  ICD-9-CM: 401.1         Edema ICD-10-CM: R60.9  ICD-9-CM: 782.3     Overview Addendum 12/14/2017  8:24 AM by Yuni Naylor MD     4/16 echo normal lvef, lae, mild tr  12/17 echo normal lvef, elisha, mild to mod mr and tr, pa pressure 50mm             Headache(784.0) ICD-10-CM: R51  ICD-9-CM: 784.0         Diverticulosis of colon (without mention of hemorrhage) ICD-10-CM: K57.30  ICD-9-CM: 562.10         Constipation ICD-10-CM: K59.00  ICD-9-CM: 564.00         Cyst of left kidney ICD-10-CM: N28.1  ICD-9-CM: 753.10               Past Medical History:      has a past medical history of Anxiety, Bleeding nose, Chronic atrial fibrillation ([facility] Diabetes ([facility] and Hypertension. Past Surgical History:      has a past surgical history that includes hx cholecystectomy; hx tonsillectomy; hx heent; hx tympanostomy (07/11/2016); hx heent (07/2016); hx cataract removal; and hx tympanostomy. Home Medications:     Prior to Admission medications    Medication Sig Start Date End Date Taking? Authorizing Provider   fluticasone propionate (FLONASE NA) by Nasal route daily. Provider, Historical   dextromethorphan (Delsym) 30 mg/5 mL liquid Take 60 mg by mouth two (2) times daily as needed for Cough. Provider, Historical   azithromycin (ZITHROMAX) 250 mg tablet Take 1 Tablet by mouth See Admin Instructions for 5 days. Patient not taking: Reported on 12/20/2021 12/16/21 12/21/21  Ty Yarbrough MD   potassium chloride SR (KLOR-CON 10) 10 mEq tablet TAKE 2 TABLETS DAILY (NEW DIRECTIONS) 11/29/21   Kristina Aranda MD   furosemide (LASIX) 40 mg tablet TAKE 1 TABLET BY MOUTH EVERY DAY  Patient taking differently: Bid if weight is over 175lb 9/29/21   Kristina Aranda MD   tobramycin-dexamethasone (Tobradex ST) drps ophthalmix suspension Administer 1 Drop to both eyes four (4) times daily.   Patient not taking: Reported on 12/16/2021    Provider, Historical   metoprolol succinate (TOPROL-XL) 25 mg XL tablet Take 1 Tab by mouth daily. 3/9/21   Ricardo Aranda MD   vit A/vit C/vit E/zinc/copper (PRESERVISION AREDS PO) Take  by mouth two (2) times a day. Provider, Historical   loratadine (Claritin) 10 mg tablet Take 10 mg by mouth daily. Provider, Historical   glucose blood VI test strips (PRECISION XTRA TEST) strip USE TO TEST BLOOD SUGAR TWICE DAILY. Dx E11.21 2/19/20   Alfred Black MD   mirabegron ER (MYRBETRIQ) 50 mg ER tablet Per urology  Patient taking differently: Take 50 mg by mouth daily. Per urology 10/25/18   Alfred Black MD   linaclotide Lito Lard) 72 mcg cap Take  by mouth daily. Provider, Historical   aspirin delayed-release 81 mg tablet Take 1 Tab by mouth daily. Patient taking differently: Take 81 mg by mouth every other day. 12/13/17   Ricardo Aranda MD   CHOLECALCIFEROL, VITAMIN D3, (VITAMIN D3 PO) Take  by mouth daily. Provider, Historical   Lancets misc 1 Package by Does Not Apply route daily. Test glucose daily and as needed Dx type 2 dm E11.42 4/19/16   Ricardo Aranda MD   magnesium hydroxide (MILK OF MAGNESIA) 2,400 mg/10 mL susp suspension Take 10 mL by mouth nightly. 1/21/15   Ricardo Aarnda MD       Allergies/Social/Family History: Allergies   Allergen Reactions    Amoxicillin Other (comments)     \"makes me feel like I\"m going to faint\"    Aspirin Palpitations     Per patient, no allergy    Gabapentin Drowsiness     * pt states this med makes her feel very drowsy , gives her a drunk feeling.  Levaquin [Levofloxacin] Hives and Rash    Lorazepam Other (comments)     Feeling faint.  Losartan Other (comments)     Tongue swelling      Lyrica [Pregabalin] Rash    Other Medication Other (comments)     Feeling faint, does not decrease pain.  Sertraline Other (comments)     Feeling faint.       Social History     Tobacco Use    Smoking status: Former Smoker     Packs/day: 1.00     Years: 10.00     Pack years: 10.00     Quit date: 10/12/1993     Years since quittin.2    Smokeless tobacco: Never Used   Substance Use Topics    Alcohol use: Yes     Comment: 1 glass of wine weekly      No family history on file. Review of Systems:     A comprehensive review of systems was negative except for that written in the HPI. Objective:   Vital Signs:  Visit Vitals  BP (!) 151/58   Pulse 74   Temp 97.9 °F (36.6 °C)   Resp 16   Wt 87.3 kg (192 lb 7.4 oz)   SpO2 96%   BMI 38.87 kg/m²    O2 Flow Rate (L/min): 2 l/min O2 Device: Nasal cannula Temp (24hrs), Av.8 °F (36.6 °C), Min:97.5 °F (36.4 °C), Max:98.1 °F (36.7 °C)           Intake/Output:     Intake/Output Summary (Last 24 hours) at 2021 1628  Last data filed at 2021 1000  Gross per 24 hour   Intake    Output 2060 ml   Net -2060 ml       Physical Exam:    General appearance: Alert and in no acute distress  Lungs: clear to auscultation bilaterally  Heart: regular rate and rhythm, S1, S2 normal, no murmur, click, rub or gallop  Extremities: extremities normal, atraumatic, Erythematous left lower extremity    LABS AND  DATA: Personally reviewed  Recent Labs     21  2328   WBC 12.7*   HGB 11.4*   HCT 32.2*        Recent Labs     21  1514 21  1103   * 113*   K 3.3* 3.3*   CL 77* 79*   CO2 25 24   BUN 15 15   CREA 0.75 0.64   * 105*   CA 8.0* 8.0*     Recent Labs     21  1514 21  2328   * 170*   TP 6.4 7.6   ALB 3.2* 3.8   GLOB 3.2 3.8     No results for input(s): INR, PTP, APTT, INREXT in the last 72 hours. No results for input(s): PHI, PCO2I, PO2I, FIO2I in the last 72 hours. No results for input(s): CPK, CKMB, TROIQ, BNPP in the last 72 hours.     Hemodynamics:   PAP:   CO:     Wedge:   CI:     CVP:    SVR:       PVR:       Ventilator Settings:  Mode Rate Tidal Volume Pressure FiO2 PEEP                    Peak airway pressure: Minute ventilation:          MEDS: Reviewed    Chest X-Ray:  CXR Results  (Last 48 hours)               12/19/21 2332  XR CHEST PORT Final result    Impression:  No acute process. Narrative:  INDICATION: sob       EXAM:  AP CHEST RADIOGRAPH       COMPARISON: January 25, 2021       FINDINGS:       AP portable view of the chest demonstrates a normal cardiomediastinal   silhouette. There is no edema, effusion, consolidation, or pneumothorax. The   osseous structures are unremarkable. Multidisciplinary Rounds Completed: Yes    ABCDEF Bundle/Checklist Completed:  Yes    SPECIAL EQUIPMENT  None    DISPOSITION  Transfer to non-ICU bed    CRITICAL CARE CONSULTANT NOTE  I had a face to face encounter with the patient, reviewed and interpreted patient data including clinical events, labs, images, vital signs, I/O's, and examined patient. I have discussed the case and the plan and management of the patient's care with the consulting services, the bedside nurses and the respiratory therapist.      NOTE OF PERSONAL INVOLVEMENT IN CARE   This patient has a high probability of imminent, clinically significant deterioration, which requires the highest level of preparedness to intervene urgently. I participated in the decision-making and personally managed or directed the management of the following life and organ supporting interventions that required my frequent assessment to treat or prevent imminent deterioration. I personally spent 30 minutes of  time. This is time spent at this critically ill patient's bedside actively involved in patient care as well as the coordination of care. This does not include any procedural time which has been billed separately.     Maria Del Carmen Griggs DO  Staff Intensivist/Anesthesiologist  [facility]  12/20/2021

## 2021-12-20 NOTE — TELEPHONE ENCOUNTER
----- Message from Yasmine Aragon sent at 12/18/2021  8:21 AM EST -----  Subject: Message to Provider    QUESTIONS  Information for Provider? Patient insisted on talking to a physician today   regarding symptoms from a medication. Transferred to [address]  ---------------------------------------------------------------------------  --------------  [address]  What is the best way for the office to contact you? OK to leave message on   voicemail  Preferred Call Back Phone Number? [phone]  ---------------------------------------------------------------------------  --------------  SCRIPT ANSWERS  Relationship to Patient?  Self

## 2021-12-20 NOTE — ROUTINE PROCESS
Emergency Room Outgoing Transfer Nursing Note      Verbal and/or Written report given to Rocio RN by Richard Burden RN on Chelle Del Real a 80 y.o. female who was admitted on 12/19/2021 10:53 PM and being transferred for routine progression of care. Report consisted of the following information SBAR, Kardex, MAR and Recent Results and the information was reviewed with the receiving nurse.             Code Status: Full Code      Chief Complaint: Shortness of Breath      Admit Diagnosis: Hyponatremia [E87.1]      Admitting Provider: Krista Fritz MD      Surgery: * No surgery found *       Infections: No current active infections      Allergies: Amoxicillin, Aspirin, Gabapentin, Levaquin [levofloxacin], Lorazepam, Losartan, Lyrica [pregabalin], Other medication, and Sertraline      Current diet: ADULT DIET Regular      Lines:   Peripheral IV 12/20/21 Left Antecubital (Active)   Site Assessment Clean, dry, & intact 12/20/21 0429   Phlebitis Assessment 0 12/20/21 0429   Infiltration Assessment 0 12/20/21 0429   Dressing Status Clean, dry, & intact 12/20/21 0429   Dressing Type Transparent 12/20/21 0429                Vital Signs:     Patient Vitals for the past 12 hrs:   Temp Pulse Resp BP SpO2   12/20/21 0401  65 14 (!) 175/75    12/20/21 0330  66 13 (!) 162/105 97 %   12/20/21 0200  64 18 (!) 188/86 90 %   12/20/21 0130  76 20 (!) 150/95    12/20/21 0000  61 13 (!) 176/149 96 %   12/19/21 2341     93 %   12/19/21 2258 97.6 °F (36.4 °C) 62 16 (!) 190/81 97 %           Intake & Output:   No intake or output data in the 24 hours ending 12/20/21 0436       Laboratory Results:     Recent Results (from the past 12 hour(s))   EKG, 12 LEAD, INITIAL    Collection Time: 12/19/21 11:10 PM   Result Value Ref Range    Ventricular Rate 59 BPM    QRS Duration 160 ms    Q-T Interval 526 ms    QTC Calculation (Bezet) 520 ms    Calculated R Axis 95 degrees    Calculated T Axis -15 degrees    Diagnosis Atrial fibrillation with slow ventricular response  Right bundle branch block  Abnormal ECG  When compared with ECG of 06-DEC-2017 16:49,  Right bundle branch block is now present     RESPIRATORY VIRUS PANEL W/COVID-19, PCR    Collection Time: 12/19/21 11:23 PM    Specimen: Nasopharyngeal   Result Value Ref Range    Adenovirus Not detected NOTD      Coronavirus 229E Not detected NOTD      Coronavirus HKU1 Not detected NOTD      Coronavirus CVNL63 Not detected NOTD      Coronavirus OC43 Not detected NOTD      SARS-CoV-2, PCR Not detected NOTD      Metapneumovirus Not detected NOTD      Rhinovirus and Enterovirus Detected (A) NOTD      Influenza A Not detected NOTD      Influenza A, subtype H1 Not detected NOTD      Influenza A, subtype H3 Not detected NOTD      INFLUENZA A H1N1 PCR Not detected NOTD      Influenza B Not detected NOTD      Parainfluenza 1 Not detected NOTD      Parainfluenza 2 Not detected NOTD      Parainfluenza 3 Not detected NOTD      Parainfluenza virus 4 Not detected NOTD      RSV by PCR Not detected NOTD      B. parapertussis, PCR Not detected NOTD      Bordetella pertussis - PCR Not detected NOTD      Chlamydophila pneumoniae DNA, QL, PCR Not detected NOTD      Mycoplasma pneumoniae DNA, QL, PCR Not detected NOTD     CBC WITH AUTOMATED DIFF    Collection Time: 12/19/21 11:28 PM   Result Value Ref Range    WBC 12.7 (H) 3.6 - 11.0 K/uL    RBC 3.84 3.80 - 5.20 M/uL    HGB 11.4 (L) 11.5 - 16.0 g/dL    HCT 32.2 (L) 35.0 - 47.0 %    MCV 83.9 80.0 - 99.0 FL    MCH 29.7 26.0 - 34.0 PG    MCHC 35.4 30.0 - 36.5 g/dL    RDW 15.0 (H) 11.5 - 14.5 %    PLATELET 404 857 - 453 K/uL    MPV 9.7 8.9 - 12.9 FL    NRBC 0.2 (H) 0  WBC    ABSOLUTE NRBC 0.02 (H) 0.00 - 0.01 K/uL    NEUTROPHILS 86 (H) 32 - 75 %    LYMPHOCYTES 5 (L) 12 - 49 %    MONOCYTES 8 5 - 13 %    EOSINOPHILS 0 0 - 7 %    BASOPHILS 0 0 - 1 %    IMMATURE GRANULOCYTES 1 (H) 0.0 - 0.5 %    ABS. NEUTROPHILS 11.0 (H) 1.8 - 8.0 K/UL    ABS. LYMPHOCYTES 0.6 (L) 0.8 - 3.5 K/UL    ABS. MONOCYTES 1.0 0.0 - 1.0 K/UL    ABS. EOSINOPHILS 0.0 0.0 - 0.4 K/UL    ABS. BASOPHILS 0.0 0.0 - 0.1 K/UL    ABS. IMM. GRANS. 0.1 (H) 0.00 - 0.04 K/UL    DF SMEAR SCANNED      PLATELET COMMENTS Large Platelets      RBC COMMENTS ANISOCYTOSIS  1+       METABOLIC PANEL, COMPREHENSIVE    Collection Time: 12/19/21 11:28 PM   Result Value Ref Range    Sodium 109 (LL) 136 - 145 mmol/L    Potassium 4.4 3.5 - 5.1 mmol/L    Chloride 74 (L) 97 - 108 mmol/L    CO2 23 21 - 32 mmol/L    Anion gap 12 5 - 15 mmol/L    Glucose 132 (H) 65 - 100 mg/dL    BUN 16 6 - 20 MG/DL    Creatinine 0.78 0.55 - 1.02 MG/DL    BUN/Creatinine ratio 21 (H) 12 - 20      GFR est AA >60 >60 ml/min/1.73m2    GFR est non-AA >60 >60 ml/min/1.73m2    Calcium 8.6 8.5 - 10.1 MG/DL    Bilirubin, total 1.8 (H) 0.2 - 1.0 MG/DL    ALT (SGPT) 42 12 - 78 U/L    AST (SGOT) 62 (H) 15 - 37 U/L    Alk. phosphatase 170 (H) 45 - 117 U/L    Protein, total 7.6 6.4 - 8.2 g/dL    Albumin 3.8 3.5 - 5.0 g/dL    Globulin 3.8 2.0 - 4.0 g/dL    A-G Ratio 1.0 (L) 1.1 - 2.2     TROPONIN-HIGH SENSITIVITY    Collection Time: 12/19/21 11:28 PM   Result Value Ref Range    Troponin-High Sensitivity 26 0 - 51 ng/L   NT-PRO BNP    Collection Time: 12/19/21 11:28 PM   Result Value Ref Range    NT pro-BNP 9,031 (H) <450 PG/ML   SAMPLES BEING HELD    Collection Time: 12/19/21 11:28 PM   Result Value Ref Range    SAMPLES BEING HELD 11red 1blue     COMMENT        Add-on orders for these samples will be processed based on acceptable specimen integrity and analyte stability, which may vary by analyte.    PROCALCITONIN    Collection Time: 12/19/21 11:28 PM   Result Value Ref Range    Procalcitonin <0.05 ng/mL   LACTIC ACID    Collection Time: 12/19/21 11:39 PM   Result Value Ref Range    Lactic acid 2.2 (HH) 0.4 - 2.0 MMOL/L   METABOLIC PANEL, BASIC    Collection Time: 12/20/21 12:22 AM   Result Value Ref Range    Sodium 109 (LL) 136 - 145 mmol/L Potassium 4.0 3.5 - 5.1 mmol/L    Chloride 75 (L) 97 - 108 mmol/L    CO2 24 21 - 32 mmol/L    Anion gap 10 5 - 15 mmol/L    Glucose 122 (H) 65 - 100 mg/dL    BUN 16 6 - 20 MG/DL    Creatinine 0.72 0.55 - 1.02 MG/DL    BUN/Creatinine ratio 22 (H) 12 - 20      GFR est AA >60 >60 ml/min/1.73m2    GFR est non-AA >60 >60 ml/min/1.73m2    Calcium 8.5 8.5 - 10.1 MG/DL   LACTIC ACID    Collection Time: 12/20/21 12:31 AM   Result Value Ref Range    Lactic acid 2.0 0.4 - 2.0 MMOL/L   OSMOLALITY, SERUM/PLASMA    Collection Time: 12/20/21  2:15 AM   Result Value Ref Range    Osmolality, serum/plasma 242 mOsm/kg H2O   OSMOLALITY, UR    Collection Time: 12/20/21  3:08 AM   Result Value Ref Range    Osmolality,urine 724 MOSM/kg H2O   SODIUM, UR, RANDOM    Collection Time: 12/20/21  3:08 AM   Result Value Ref Range    Sodium,urine random 12 MMOL/L   POTASSIUM, UR, RANDOM    Collection Time: 12/20/21  3:08 AM   Result Value Ref Range    Potassium urine, random 53 MMOL/L   CHLORIDE, URINE RANDOM    Collection Time: 12/20/21  3:08 AM   Result Value Ref Range    Chloride,urine random 17 MMOL/L   CREATININE, UR, RANDOM    Collection Time: 12/20/21  3:08 AM   Result Value Ref Range    Creatinine, urine 120.00 mg/dL   URINE CULTURE HOLD SAMPLE    Collection Time: 12/20/21  3:08 AM    Specimen: Serum   Result Value Ref Range    Urine culture hold        Urine on hold in Microbiology dept for 2 days. If unpreserved urine is submitted, it cannot be used for addtional testing after 24 hours, recollection will be required. Patient transported with : Registered Nurse     Opportunity for questions and clarifications were provided.          Omar Cordero RN, CLOTILDE, BSN, VIA [facility]     12/20/2021, 4:36 AM

## 2021-12-20 NOTE — ED TRIAGE NOTES
Brought by great-nephew. 1 week worsening SOB and wheezing. Vomiting beginning tonight. Bedded in room 17 with MD Santoyo at bedside.

## 2021-12-20 NOTE — H&P
[facility]    ICU TEAM H&P    Name: Mary Ellen Kowalski   : 1922   MRN: [id number]   Date: 2021      Assessment:     ICU Problems:    1. Hypervolemic Hyponatremia secondary to right heart failure  2. Pulmonary hypertension  3. Atrial fibrillation  4. Hypertension  5. Diabetes Mellitus    Imaging:  CT Results  (Last 48 hours)    None            ICU Comprehensive Plan of Care:     Plans for this Shift:     1. Admit to ICU  2. Diurese with 80 mg lasix IV  3. Trend BMP every 4 hours  4. Nephrology has been consulted, recommends hypertonic saline infusion   5. Urine lytes, TSH, Cortisol level  6. Urine and serum osmo  7. Continue home antihypertensives  8. SBP Goal of: > 90 mmHg  9. MAP Goal of: > 65 mmHg  10. None - For above SBP/MAP goals  11. IVFs: 3% NS per nephrology recommendation  12. Transfusion Trigger (Hgb): <7 g/dL  13. Respiratory Goals:  a. Incentive spirometry  14. Pulmonary toilet: Duo-Nebs   15. SpO2 Goal: > 92%  16. Keep K>4; Mg>2   17. PT/OT: PT consulted and on board and OT consulted and on board   18. Goals of Care Discussion with family Yes   23. Plan of Care/Code Status: Full Code  20. Appreciate Consultants Input   21. Discussed Care Plan with Bedside RN  22. Documentation of Current Medications  23.  Rest of Plan Below:    F - Feeding:  Yes   A - Analgesia: Acetaminophen  S - Sedation: N/A  T - DVT Prophylaxis: SCD's or Sequential Compression Device   H - Head of Bed: > 30 Degrees  U - Ulcer Prophylaxis: Not at this time   G - Glycemic Control: Insulin  S - Spontaneous Breathing Trial: Yes  B - Bowel Regimen: MiraLax  I - Indwelling Catheter:   Tubes: None  Lines: Peripheral IV  Drains: None  D - De-escalation of Antibiotics: Ceftriaxone    Subjective:   Progress Note: 2021      Reason for ICU Admission: Hyponatremia     HPI: This is a 26-year-old female with known past medical history of HTn, DM, anxiety, A-fib, ex-smoker, who presented to the ED with chief complaint of cough, upper respiratory symptoms and increasing confusion. She has had upper respiratory symptoms for the past few weeks and was seen by her PCP and has been on two round of antibiotics, most recently doxycyline. Upon arrival to the ED she was noted to have sodium level of 109. Nephrology evaluated the patient in the ED and believes she should receive hypertonic saline for her hyponatremia. This require close monitoring so the patient was accepted by the ICU team. On assesment she is alert and oriented to person, place, time, and event. She states she was an  and traveled the world. She states she does not have a favorite place from her travels as she was not allowed to eat much while she traveled. Mental status appears appropriate given her age and situation. She is able to tell me she is in [employer]. She endorses cough but otherwise states she does not feel unwell. She was noted to have some wheezing, she received a duoneb in the ED, BNP is >9000, Na 109. POD:  * No surgery found *    S/P:       Active Problem List:     Problem List  Date Reviewed: 12/16/2021          Codes Class    Hyponatremia ICD-10-CM: E87.1  ICD-9-CM: 276.1         Severe obesity (BMI 35.0-39. 9) with comorbidity (HCC) ICD-10-CM: E66.01  ICD-9-CM: 278.01         Type 2 diabetes mellitus with nephropathy ([facility] ICD-10-CM: E11.21  ICD-9-CM: 250.40, 583.81         CHF (congestive heart failure) ([facility] ICD-10-CM: I50.9  ICD-9-CM: 428.0         Anxiety ICD-10-CM: F41.9  ICD-9-CM: 300.00         Allergic rhinitis ICD-10-CM: J30.9  ICD-9-CM: 477.9         Chronic atrial fibrillation (HCC) ICD-10-CM: I48.20  ICD-9-CM: 427.31         Advanced directives, counseling/discussion ICD-10-CM: Z71.89  ICD-9-CM: V65.49         Type 2 diabetes, controlled, with neuropathy ([facility] ICD-10-CM: E11.40  ICD-9-CM: 250.60, 357.2         Essential hypertension, benign ICD-10-CM: I10  ICD-9-CM: 401.1         Edema ICD-10-CM: R60.9  ICD-9-CM: 782.3     Overview Addendum 12/14/2017  8:24 AM by Marcia Vitale MD     4/16 echo normal lvef, lae, mild tr  12/17 echo normal lvef, elisha, mild to mod mr and tr, pa pressure 50mm             Headache(784.0) ICD-10-CM: R51  ICD-9-CM: 784.0         Diverticulosis of colon (without mention of hemorrhage) ICD-10-CM: K57.30  ICD-9-CM: 562.10         Constipation ICD-10-CM: K59.00  ICD-9-CM: 564.00         Cyst of left kidney ICD-10-CM: N28.1  ICD-9-CM: 753.10               Past Medical History:      has a past medical history of Anxiety, Bleeding nose, Chronic atrial fibrillation ([facility] Diabetes ([facility] and Hypertension. Past Surgical History:      has a past surgical history that includes hx cholecystectomy; hx tonsillectomy; hx heent; hx tympanostomy (07/11/2016); hx heent (07/2016); hx cataract removal; and hx tympanostomy. Home Medications:     Prior to Admission medications    Medication Sig Start Date End Date Taking? Authorizing Provider   fluticasone propionate (FLONASE NA) by Nasal route daily. Provider, Historical   dextromethorphan (Delsym) 30 mg/5 mL liquid Take 60 mg by mouth two (2) times daily as needed for Cough. Provider, Historical   azithromycin (ZITHROMAX) 250 mg tablet Take 1 Tablet by mouth See Admin Instructions for 5 days. Patient not taking: Reported on 12/20/2021 12/16/21 12/21/21  Estrella Escalante MD   potassium chloride SR (KLOR-CON 10) 10 mEq tablet TAKE 2 TABLETS DAILY (NEW DIRECTIONS) 11/29/21   Sussy Aranda MD   furosemide (LASIX) 40 mg tablet TAKE 1 TABLET BY MOUTH EVERY DAY  Patient taking differently: Bid if weight is over 175lb 9/29/21   Sussy Aranda MD   tobramycin-dexamethasone (Tobradex ST) drps ophthalmix suspension Administer 1 Drop to both eyes four (4) times daily. Patient not taking: Reported on 12/16/2021    Provider, Historical   metoprolol succinate (TOPROL-XL) 25 mg XL tablet Take 1 Tab by mouth daily.  3/9/21   Estrella Escalante MD vit A/vit C/vit E/zinc/copper (PRESERVISION AREDS PO) Take  by mouth two (2) times a day. Provider, Historical   loratadine (Claritin) 10 mg tablet Take 10 mg by mouth daily. Provider, Historical   glucose blood VI test strips (PRECISION XTRA TEST) strip USE TO TEST BLOOD SUGAR TWICE DAILY. Dx E11.21 20   Dario Mcdonnell MD   mirabegron ER (MYRBETRIQ) 50 mg ER tablet Per urology  Patient taking differently: Take 50 mg by mouth daily. Per urology 10/25/18   Dario Mcdonnell MD   linaclotide Lavone Mcallister) 72 mcg cap Take  by mouth daily. Provider, Historical   aspirin delayed-release 81 mg tablet Take 1 Tab by mouth daily. Patient taking differently: Take 81 mg by mouth every other day. 17   Liliana Aranda MD   CHOLECALCIFEROL, VITAMIN D3, (VITAMIN D3 PO) Take  by mouth daily. Provider, Historical   Lancets misc 1 Package by Does Not Apply route daily. Test glucose daily and as needed Dx type 2 dm E11.42 16   Liliana Aranda MD   magnesium hydroxide (MILK OF MAGNESIA) 2,400 mg/10 mL susp suspension Take 10 mL by mouth nightly. 1/21/15   Liliana Aranda MD       Allergies/Social/Family History: Allergies   Allergen Reactions    Amoxicillin Other (comments)     \"makes me feel like I\"m going to faint\"    Aspirin Palpitations     Per patient, no allergy    Gabapentin Drowsiness     * pt states this med makes her feel very drowsy , gives her a drunk feeling.  Levaquin [Levofloxacin] Hives and Rash    Lorazepam Other (comments)     Feeling faint.  Losartan Other (comments)     Tongue swelling      Lyrica [Pregabalin] Rash    Other Medication Other (comments)     Feeling faint, does not decrease pain.  Sertraline Other (comments)     Feeling faint.       Social History     Tobacco Use    Smoking status: Former Smoker     Packs/day: 1.00     Years: 10.00     Pack years: 10.00     Quit date: 10/12/1993     Years since quittin.2    Smokeless tobacco: Never Used   Substance Use Topics    Alcohol use: Yes     Comment: 1 glass of wine weekly      No family history on file. Review of Systems:     Pertinent items are noted in HPI. Objective:   Vital Signs:  Visit Vitals  BP (!) 162/105   Pulse 66   Temp 97.6 °F (36.4 °C)   Resp 13   Wt 87.3 kg (192 lb 7.4 oz)   SpO2 97%   BMI 38.87 kg/m²      O2 Device: None (Room air) Temp (24hrs), Av.6 °F (36.4 °C), Min:97.6 °F (36.4 °C), Max:97.6 °F (36.4 °C)           Intake/Output:   No intake or output data in the 24 hours ending 21 0407    Physical Exam:    General:  Alert, cooperative, well noursished, well developed, appears stated age   Eyes:  Sclera anicteric. Pupils equally round and reactive to light. Mouth/Throat: Mucous membranes normal, oral pharynx clear   Neck: Supple   Lungs:   Wheezing noted bilaterally    CV:  irregular rate and rhythm,no murmur, click, rub or gallop   Abdomen:   Soft, non-tender. bowel sounds normal. non-distended   Extremities: No cyanosis, 3+ pitting BLE edema   Skin: Skin color, texture, turgor normal. no acute rash or lesions, old laceration noted to dorsal surface of left hand   Lymph nodes: Cervical and supraclavicular normal   Musculoskeletal: No swelling or deformity, second digit on each toe with significant arthritis and contracture   Lines/Devices:  Intact, no erythema, drainage or tenderness   Psych: Alert and oriented, normal mood affect given the setting       LABS AND  DATA: Personally reviewed  Recent Labs     21  2328   WBC 12.7*   HGB 11.4*   HCT 32.2*        Recent Labs     21  0022 21  2328   * 109*   K 4.0 4.4   CL 75* 74*   CO2 24 23   BUN 16 16   CREA 0.72 0.78   * 132*   CA 8.5 8.6     Recent Labs     21  2328   *   TP 7.6   ALB 3.8   GLOB 3.8     No results for input(s): INR, PTP, APTT, INREXT in the last 72 hours. No results for input(s): PHI, PCO2I, PO2I, FIO2I in the last 72 hours.   No results for input(s): CPK, CKMB, TROIQ, BNPP in the last 72 hours. Hemodynamics:   PAP:   CO:     Wedge:   CI:     CVP:    SVR:       PVR:       Ventilator Settings:  Mode Rate Tidal Volume Pressure FiO2 PEEP                    Peak airway pressure:      Minute ventilation:          MEDS: Reviewed    Chest X-Ray:  CXR Results  (Last 48 hours)               12/19/21 2332  XR CHEST PORT Final result    Impression:  No acute process. Narrative:  INDICATION: sob       EXAM:  AP CHEST RADIOGRAPH       COMPARISON: January 25, 2021       FINDINGS:       AP portable view of the chest demonstrates a normal cardiomediastinal   silhouette. There is no edema, effusion, consolidation, or pneumothorax. The   osseous structures are unremarkable. ECHO:      Multidisciplinary Rounds Completed:  Pending    ABCDEF Bundle/Checklist Completed:  Yes    SPECIAL EQUIPMENT  None    DISPOSITION  Stay in ICU    CRITICAL CARE CONSULTANT NOTE  I had a face to face encounter with the patient, reviewed and interpreted patient data including clinical events, labs, images, vital signs, I/O's, and examined patient. I have discussed the case and the plan and management of the patient's care with the consulting services, the bedside nurses and the respiratory therapist.      NOTE OF PERSONAL INVOLVEMENT IN CARE   This patient has a high probability of imminent, clinically significant deterioration, which requires the highest level of preparedness to intervene urgently. I participated in the decision-making and personally managed or directed the management of the following life and organ supporting interventions that required my frequent assessment to treat or prevent imminent deterioration. I personally spent 45 minutes of critical care time. This is time spent at this critically ill patient's bedside actively involved in patient care as well as the coordination of care and discussions with the patient's family.   This does not include any procedural time which has been billed separately.       Dominique Byrd [facility]     Critical Care Medicine  [facility]

## 2021-12-20 NOTE — ED TRIAGE NOTES
Emergency Room Nursing Note        Patient Name: Myke Hodges      : 1922             MRN: [id number]      Chief Complaint:  Shortness of Breath      Admit Diagnosis: No admission diagnoses are documented for this encounter. Admitting Provider: No admitting provider for patient encounter. Surgery: * No surgery found *           Patient arrived in the ED brought by nephew due to complaints of shortness of breath.          Lines:        Signed by: Rachel Lomeli RN, CLOTILDE, BSN, [facility]IA [facility]                                              2021 at 11:07 PM

## 2021-12-20 NOTE — PROGRESS NOTES
Reviewed chart for transitions of care, and discussed in rounds. CM met with patient at bedside to explain role and offer support. Patient is alert and oriented x4, and confirmed demographics. Baseline:   ADLs/IDALS: Needs assistance  Previous Home Health: unable to recall the name of the agency, not currently receiving services  Previous SNF/IPR:None  ER Contact:   Viridiana Blake [phone]    Patient lives in a single level house with 8 steps to enter. Patient needs assistance. Patient's DME includes a raised toilet seat, walker, cane rollator and a transport chair. . Patient's preferred pharmacy is [facility] for [facility] term prescriptions. Transportation TBD  Reason for Admission:  Hyponatremia                     RUR Score:     13%                Plan for utilizing home health:    TBD      PCP: First and Last name:  Radha Moreno MD     Name of Practice: [facility]   Are you a current patient: Yes/No: yes   Approximate date of last visit: 12/16/21   Can you participate in a virtual visit with your PCP: No                    Current Advanced Directive/Advance Care Plan: Full Code      Healthcare Decision Maker:   Click here to complete [employer] including selection of the Healthcare Decision Maker Relationship (ie \"Primary\")                             Transition of Care Plan:      Care manager met with patient to introduce self and explain role. Patient lives in her own home and has multiple care givers. Per patient she has a caregiver form 8 am - 9 am that administers her morning medications then another care giver comes from 11 am - 4 pm 4 days a week and then someone from 5:30 pm - 7:30 pm who fixes her meals and administers her evening medications. In between she has additional caregivers who fill in. CM confirmed her demographic information. Will follow for transitions of care.    Marcella Fishman RN,Care Management  Care Management Interventions  PCP Verified by CM: Yes (Dr Patrice Morejon )  [address] [place] [place]: No  Discharge Durable Medical Equipment: No  Physical Therapy Consult: No  Occupational Therapy Consult: No  Speech Therapy Consult: No  Support Systems: Other Family Member(s) (Sister Brittney Lara [phone])  Confirm Follow Up Transport: Other (see comment) (Care givers to transport)            Medicare pt has received, reviewed, and signed 1 st  IM letter informing them of their right to appeal the discharge. Signed copy has been placed on pt bedside chart.

## 2021-12-20 NOTE — PROGRESS NOTES
Pt to ICU bed 10 from ED. Bathed. Cifuentes placed per Kel Gibson, Miki and new IV started. Pt becoming more confused and agitated; she is still oriented but she is pulling at lines and attempting to get out of bed. Sitter at bedside.

## 2021-12-20 NOTE — WOUND CARE
Wound Care Note:     New consult placed by nurse request for wound on left hand and excoriation in groin area    Chart shows:  Admitted for hyponatremia   Past Medical History:   Diagnosis Date    Anxiety     Bleeding nose     [facility] ([facility] [address]     Diabetes ([facility] [address]     Hypertension      WBC = 12.7 on 12/19/21  Admitted from home    Assessment:   Patient is alert and talking, unable to understand a small portion of what patient is saying, incontinent with moderate assistance needed in repositioning. Bed: In Touch [place]  Patient has a Cifuentes. Diet: Adult diet regular; 1000 ml  Patient reports no pain. Bilateral heels skin intact with blanchable erythema. 1. POA left hand skin tear measures 0.5 cm x 3.5 cm, it is a curved linear line, well approximated, no drainage, wound edges are open, johnny-wound intact. Xeroform gauze and Optifoam Gentle applied. 2.  POA maculopapular erythematous rash with satellite lesions consistent with yeast to bilateral groin, gluteal cleft, bilateral buttocks near gluteal cleft and sacrum. Right groin with raised area, appears to be \"wart like\" appearance approximately 3 cm x 1 cm. Antifungal powder to be ordered. Spoke with Dr. Beatriz Lennox, wound care orders obtained. Patient repositioned on right side. Heels offloaded on blankets. Recommendations:    Left hand- Every other day cleanse with normal saline, wipe wound bed clean and dry, apply a piece of Xeroform gauze that has been folded in half and cover. Bilateral groin, gluteal cleft, bilateral buttocks near gluteal cleft and sacrum- Every 12 hours gently cleanse with soap and water, blot dry, sprinkle with antifungal powder and gently rub into skin. Bilateral heels- Every 12 hours liberally apply Venelex ointment. Skin Care & Pressure Prevention:  Minimize layers of linen/pads under patient to optimize support surface. Turn/reposition approximately every 2 hours and offload heels.   Manage incontinence / promote continence   Nourishing Skin Cream to dry skin, minimize use of briefs when able    Discussed above plan with patient & Arben Leiva RN    Transition of Care: Plan to follow as needed while admitted to hospital.    Omar Singh" [id number] [facility], BSN, RN, [facility], [facility].  office [phone]  pager [phone] or call  to page

## 2021-12-20 NOTE — CONSULTS
NEPHROLOGY CONSULT NOTE     Patient: Sujatha Mena MRN: [id number]  PCP: Stephen Bruce MD   :     1922  Age:   80 y.o. Sex:  female      Referring physician: Odilon Galindo MD  Reason for consultation: 80 y.o. female with No admission diagnoses are documented for this encounter. complicated by JOYCE   Admission Date: 2021 10:53 PM  LOS: 0 days     DISCUSSION / PLAN :   Acute hyponatremia  - Suspect hypervolemic hyponatremia  - Start 3% hypertonic saline 100cc/hr times one hour, after completion of bolus, give one dose of 80 mg furosemide IV then continue BID, recheck sodium level in 2-3 hours   - Goal sodium increase no more than 6 meq/l in the next 24 hours  - urine chemistries, lytes ordered  - serum osmo pending  - TSH and cortisol in the am  - serial sodiums every 4 hours to monitor for overcorrection    Hypertension  - per primary team     DM  CHF    Plan d/w nephrology attending Dr. Miguel Angel Leonard       Subjective:   HPI: Sujatha Mena is a 80 y.o.  female who has a PMHx significant for hypertension, diabetes, anxiety, chronic Afib who presented to the ED with c/c of worsening shortness of breath. Patient is noted to have recent upper respiratory symptoms for a few weeks, and was treated outpatient with abx. COVID negative. Patients wheezing has progressively worsened with noted confusion by the family which prompted visit to ED. She was found to be hyponatremic in the ED.   Nephrology was consulted for assistance in the management of hyponatremia  - On arrival to ED patient has a normal renal function with a serum sodium of 109  - Patient unable to provide good history, able to answer some questions appropriately  - Most information obtained from chart review  - No offending medications per review, has not had any recent surgeries, reports no pain, endorses n/v today  - Does have history of depression/anxiety, former smoker    - Patient does have significant wheezing with 2+ BLLE pitting edema and decrease in urinary output  - cxr was unremarkable, BNP >9000   - Echo done 11/2021 showed EF 60-65% with pulmonary hypertension 66 mmHg, severe tricuspid regurg, and mild mitral regurg. Past Medical Hx:   Past Medical History:   Diagnosis Date    Anxiety     Bleeding nose     Chronic atrial fibrillation (HCC)     Diabetes ([facility] [address]     Hypertension         Past Surgical Hx:     Past Surgical History:   Procedure Laterality Date    HX CATARACT REMOVAL      HX CHOLECYSTECTOMY      HX HEENT      myringotomy    HX HEENT  07/2016    basal cell removal -forehead    HX TONSILLECTOMY      childhood    HX TYMPANOSTOMY  07/11/2016    left ear    HX TYMPANOSTOMY      bilateral       Medications:  Prior to Admission medications    Medication Sig Start Date End Date Taking? Authorizing Provider   fluticasone propionate (FLONASE NA) by Nasal route daily. Provider, Historical   dextromethorphan (Delsym) 30 mg/5 mL liquid Take 60 mg by mouth two (2) times daily as needed for Cough. Provider, Historical   azithromycin (ZITHROMAX) 250 mg tablet Take 1 Tablet by mouth See Admin Instructions for 5 days. Patient not taking: Reported on 12/20/2021 12/16/21 12/21/21  Estrella Escalante MD   potassium chloride SR (KLOR-CON 10) 10 mEq tablet TAKE 2 TABLETS DAILY (NEW DIRECTIONS) 11/29/21   Sussy Aranda MD   furosemide (LASIX) 40 mg tablet TAKE 1 TABLET BY MOUTH EVERY DAY  Patient taking differently: Bid if weight is over 175lb 9/29/21   Sussy Aranda MD   tobramycin-dexamethasone (Tobradex ST) drps ophthalmix suspension Administer 1 Drop to both eyes four (4) times daily. Patient not taking: Reported on 12/16/2021    Provider, Historical   metoprolol succinate (TOPROL-XL) 25 mg XL tablet Take 1 Tab by mouth daily. 3/9/21   Sussy Aranda MD   vit A/vit C/vit E/zinc/copper (PRESERVISION AREDS PO) Take  by mouth two (2) times a day.     Provider, Historical   loratadine (Claritin) 10 mg tablet Take 10 mg by mouth daily. Provider, Historical   glucose blood VI test strips (PRECISION XTRA TEST) strip USE TO TEST BLOOD SUGAR TWICE DAILY. Dx E11.21 2/19/20   Melburn Felty, MD   mirabegron ER (MYRBETRIQ) 50 mg ER tablet Per urology  Patient taking differently: Take 50 mg by mouth daily. Per urology 10/25/18   Melburn Felty, MD   linaclotide Umer Lacks) 72 mcg cap Take  by mouth daily. Provider, Historical   aspirin delayed-release 81 mg tablet Take 1 Tab by mouth daily. Patient taking differently: Take 81 mg by mouth every other day. 12/13/17   Juan Pablo Aranda MD   CHOLECALCIFEROL, VITAMIN D3, (VITAMIN D3 PO) Take  by mouth daily. Provider, Historical   Lancets misc 1 Package by Does Not Apply route daily. Test glucose daily and as needed Dx type 2 dm E11.42 4/19/16   Juan Pablo Aranda MD   magnesium hydroxide (MILK OF MAGNESIA) 2,400 mg/10 mL susp suspension Take 10 mL by mouth nightly. 1/21/15   Melburn Felty, MD       Allergies   Allergen Reactions    Amoxicillin Other (comments)     \"makes me feel like I\"m going to faint\"    Aspirin Palpitations     Per patient, no allergy    Gabapentin Drowsiness     * pt states this med makes her feel very drowsy , gives her a drunk feeling.  Levaquin [Levofloxacin] Hives and Rash    Lorazepam Other (comments)     Feeling faint.  Losartan Other (comments)     Tongue swelling      Lyrica [Pregabalin] Rash    Other Medication Other (comments)     Feeling faint, does not decrease pain.  Sertraline Other (comments)     Feeling faint. Social Hx:  reports that she quit smoking about 28 years ago. She has a 10.00 pack-year smoking history. She has never used smokeless tobacco. She reports current alcohol use. She reports that she does not use drugs. No family history on file. Review of Systems:  A twelve point review of system was performed today.  Pertinent positives and negatives are mentioned in the HPI. The reminder of the ROS is negative and noncontributory. Objective:    Vitals:    Vitals:    12/19/21 2341 12/20/21 0000 12/20/21 0130 12/20/21 0200   BP:  (!) 176/149 (!) 150/95 (!) 188/86   Pulse:  61 76 64   Resp:  13 20 18   Temp:       SpO2: 93% 96%  90%   Weight:         I&O's:  No intake/output data recorded. Visit Vitals  BP (!) 188/86   Pulse 64   Temp 97.6 °F (36.4 °C)   Resp 18   Wt 87.3 kg (192 lb 7.4 oz)   SpO2 90%   BMI 38.87 kg/m²       Physical Exam:  General:Alert  HEENT: Eyes are PERRL  Neck: Supple  Lungs : Wheezing  CVS: No rub, 2+ LE edema  Abdomen: Soft  Skin: No rash or lesions.   Neurologic: confused      Laboratory Results:    Lab Results   Component Value Date    BUN 16 12/20/2021     (LL) 12/20/2021    K 4.0 12/20/2021    CL 75 (L) 12/20/2021    CO2 24 12/20/2021       Lab Results   Component Value Date    BUN 16 12/20/2021    BUN 16 12/19/2021    BUN 26 (H) 10/25/2021    BUN 37 (H) 08/10/2021    BUN 22 (H) 04/14/2021    K 4.0 12/20/2021    K 4.4 12/19/2021    K 4.5 10/25/2021    K 4.6 08/10/2021    K 4.4 04/14/2021       Lab Results   Component Value Date    WBC 12.7 (H) 12/19/2021    RBC 3.84 12/19/2021    HGB 11.4 (L) 12/19/2021    HCT 32.2 (L) 12/19/2021    MCV 83.9 12/19/2021    MCH 29.7 12/19/2021    RDW 15.0 (H) 12/19/2021     12/19/2021       No results found for: PTH, PHOS    Urine dipstick:   Lab Results   Component Value Date/Time    Color Yellow 02/19/2020 02:00 PM    Appearance Clear 02/19/2020 02:00 PM    Specific gravity 1.006 12/06/2017 04:56 PM    pH (UA) 6.0 02/19/2020 02:00 PM    Protein NEGATIVE  12/06/2017 04:56 PM    Glucose NEGATIVE  12/06/2017 04:56 PM    Ketone Negative 02/19/2020 02:00 PM    Bilirubin Negative 02/19/2020 02:00 PM    Urobilinogen 0.2 12/06/2017 04:56 PM    Nitrites Negative 02/19/2020 02:00 PM    Leukocyte Esterase Negative 02/19/2020 02:00 PM    Epithelial cells FEW 12/06/2017 04:56 PM    Bacteria Few 05/22/2019 04:21 PM    WBC 11-30 (A) 05/22/2019 04:21 PM    RBC 0-2 05/22/2019 04:21 PM       I have reviewed the following: All pertinent labs, microbiology data, radiology imaging for my assessment     ECG- Rev: Yes / No  Xray/CT/US/MRI REV: Yes/ No    Care Plan discussed with: Primary RN, patient and ED physician      Chart reviewed. Medications list Personally Reviewed   [x]      Yes     []               No      Thank you for allowing us to participate in the care of this patient. We will follow patient. Please dont hesitate to call with any questions    Maribell Topete NP  12/20/2021    [facility]  [address], [address]  Phone - [phone]  Fax - [phone]  [email or web address]. [employer]

## 2021-12-20 NOTE — ED PROVIDER NOTES
HPI     77-year-old female with a history of chronic A. fib, diabetes, hypertension, presents the emergency department with shortness of breath. Patient has been sick for several weeks with upper respiratory symptoms. She started with worsening wheezing few days ago and was started on Zithromax. Tonight her niece was over and felt like she seemed more short of breath so they brought her to the ER. Patient denies chest pain. She did start vomiting today. Denies a fever. She has also had increase in weight gain and her doctor increased her Lasix. She does not feel like she is urinating more. She has been vaccinated and boosters for Covid. There is no known Covid exposure. She had a negative home Covid test today. Patient also has a history of right heart failure and pulmonary hypertension by echo. Per family, she was also confused for the first time this evening. SHe did not finish the Zpack due to intolerance. Was switched to doxy - has had 3 doses. No fever. Has had increasing MCNAIR today as well. No underlying lung disease, never wheezed before. Had cold symptoms that improved, then got worse. Past Medical History:   Diagnosis Date    Anxiety     Bleeding nose     Chronic atrial fibrillation (HCC)     Diabetes ([facility] [address]     Hypertension        Past Surgical History:   Procedure Laterality Date    HX CATARACT REMOVAL      HX CHOLECYSTECTOMY      HX HEENT      myringotomy    HX HEENT  07/2016    basal cell removal -forehead    HX TONSILLECTOMY      childhood    HX TYMPANOSTOMY  07/11/2016    left ear    HX TYMPANOSTOMY      bilateral         No family history on file.     Social History     Socioeconomic History    Marital status:      Spouse name: Not on file    Number of children: Not on file    Years of education: Not on file    Highest education level: Not on file   Occupational History    Not on file   Tobacco Use    Smoking status: Former Smoker     Packs/day: 1.00 Years: 10.00     Pack years: 10.00     Quit date: 10/12/1993     Years since quittin.2    Smokeless tobacco: Never Used   Vaping Use    Vaping Use: Never used   Substance and Sexual Activity    Alcohol use: Yes     Comment: 1 glass of wine weekly    Drug use: No    Sexual activity: Not Currently   Other Topics Concern    Not on file   Social History Narrative    Not on file     Social Determinants of Health     Financial Resource Strain: Low Risk     Difficulty of Paying Living Expenses: Not hard at all   Food Insecurity: No Food Insecurity    Worried About Running Out of Food in the Last Year: Never true    Vamshi of Food in the Last Year: Never true   Transportation Needs: No Transportation Needs    Lack of Transportation (Medical): No    Lack of Transportation (Non-Medical): No   Physical Activity: Inactive    Days of Exercise per Week: 0 days    Minutes of Exercise per Session: 0 min   Stress:     Feeling of Stress : Not on file   Social Connections:     Frequency of Communication with Friends and Family: Not on file    Frequency of Social Gatherings with Friends and Family: Not on file    Attends Sikh Services: Not on file    Active Member of Clubs or Organizations: Not on file    Attends Club or Organization Meetings: Not on file    Marital Status: Not on file   Intimate Partner Violence:     Fear of Current or Ex-Partner: Not on file    Emotionally Abused: Not on file    Physically Abused: Not on file    Sexually Abused: Not on file   Housing Stability: Unknown    Unable to Pay for Housing in the Last Year: No    Number of [place] in the Last Year: Not on file    Unstable Housing in the Last Year: No         ALLERGIES: Amoxicillin, Aspirin, Gabapentin, Levaquin [levofloxacin], Lorazepam, Losartan, Lyrica [pregabalin], Other medication, and Sertraline    Review of Systems   Constitutional: Negative for fever. HENT: Positive for congestion.     Eyes: Negative for visual disturbance. Respiratory: Positive for cough, shortness of breath and wheezing. Negative for chest tightness. Cardiovascular: Positive for leg swelling. Negative for chest pain. Gastrointestinal: Positive for nausea and vomiting. Negative for abdominal pain. Endocrine: Negative for polyuria. Genitourinary: Negative for dysuria. Musculoskeletal: Negative for gait problem. Skin: Negative for rash. Neurological: Negative for headaches. Psychiatric/Behavioral: Negative for dysphoric mood. Vitals:    12/19/21 2258   BP: (!) 190/81   Pulse: 62   Resp: 16   Temp: 97.6 °F (36.4 °C)   SpO2: 97%   Weight: 87.3 kg (192 lb 7.4 oz)            Physical Exam  Constitutional:       General: She is not in acute distress. Appearance: She is well-developed. HENT:      Head: Normocephalic and atraumatic. Mouth/Throat:      Pharynx: No oropharyngeal exudate. Eyes:      General: No scleral icterus. Right eye: No discharge. Left eye: No discharge. Pupils: Pupils are equal, round, and reactive to light. Neck:      Vascular: No JVD. Cardiovascular:      Rate and Rhythm: Normal rate and regular rhythm. Heart sounds: Normal heart sounds. No murmur heard. Pulmonary:      Effort: Pulmonary effort is normal. No respiratory distress. Breath sounds: No stridor. Wheezing present. No rales. Chest:      Chest wall: No tenderness. Abdominal:      General: Bowel sounds are normal. There is no distension. Palpations: Abdomen is soft. There is no mass. Tenderness: There is no abdominal tenderness. There is no guarding or rebound. Musculoskeletal:         General: Normal range of motion. Cervical back: Normal range of motion and neck supple. Right lower leg: Edema present. Left lower leg: Edema present. Skin:     General: Skin is warm and dry. Capillary Refill: Capillary refill takes less than 2 seconds. Findings: No rash. Neurological:      Mental Status: She is oriented to person, place, and time. Psychiatric:         Behavior: Behavior normal.         Thought Content: Thought content normal.         Judgment: Judgment normal.          MDM     45 minutes of critical care time  Procedures      ED EKG interpretation:  Rhythm: atrial fib; and irregular. Rate (approx.): 59; Axis: left axis deviation; P wave: ; QRS interval: prolonged; ST/T wave: non-specific changes;  RBBB This EKG was interpreted by Leodan Pang MD,ED Provider. nap- Naturally Attached Parents Consult for Admission  1:12 AM    ED Room Number: ER17/17  Patient Name and age: Cristina Almendarez 80 y.o.  female  Working Diagnosis:   1. Hyponatremia    2. Congestive heart failure, unspecified HF chronicity, unspecified heart failure type ([address]        COVID-19 Suspicion:  no  Sepsis present:  no  Reassessment needed: no  Code Status:  Full Code  Readmission: no  Isolation Requirements:  no  Recommended Level of Care:  ICU  Department:[facility] Adult ED - 21   Other:  80year old female with history of atrial fib, htn presents iwht SOB/wheezing and nausea/vomiting. She has been sick for weeks with URI symptoms, started wheezing recently and put on antibiotics. Currently on Doxy. Also increased po lasix due to weight gain. Recent echo showed dilated R ventricle, Tricuspid regurg and pulmonary hypertension. Work up shows normal CXR, WBC 14K with lactate of 2.2, repeat 2.0. Sodium is 109- verified. She did seem more confused then baseline per family but she is pretty with it here. Vitals stable. Spoke with ROBYN Holland for nephrology. She will come evaluate the patient for consideration of 3% saline. Hospitalist is aware. Nephrology does want to start 3%, will send to unit.  Perfect serve message sent

## 2021-12-20 NOTE — PROGRESS NOTES
UA, CT head and LE dopplers -- all negative   Cortisol and TSH normal   Na correcting at desired rate   Ordered 3% saline at 30 cc/ hr x4 hrs =120 ml   Recheck labs post infusion   Hold further doses of lasix   Recheck urine chemistries today and tomorrow     [facility]  Office :[phone]  Fax: [phone]

## 2021-12-21 NOTE — PROGRESS NOTES
Physician Progress Note      Cleopatra Barkley  [facility] #:                  [id number]  :                       1922  ADMIT DATE:       2021 10:53 PM  DISCH DATE:  RESPONDING  PROVIDER #:        Pili Jack MD          QUERY TEXT:    Pt admitted with hyponatremia secondary to right heart failure per the History and Physical. NT pro-BNP 9,031. If possible, please document in progress notes and discharge summary further specificity regarding the type and acuity of CHF:    The medical record reflects the following:  Risk Factors: right heart failure  Clinical Indicators:  echo  21  Result status: Final result  The underlying rhythm is atrial fibrillation with a ventricular response of 45-55 bpm.  LV: Estimated LVEF is 60 - 65%. Biplane method used to measure ejection fraction. Normal cavity size and systolic function (ejection fraction normal). Mild concentric hypertrophy. Wall motion: normal.  LA: Severely dilated left atrium. Left Atrium volume index is 61 mL/m2. RV: Moderately dilated right ventricle. RA: Severely dilated right atrium. AV: Aortic valve is thickened with adequate excursion. There is no significant aortic stenosis by Doppler. MV: Mitral valve thickening. Moderate mitral annular calcification. Mild mitral valve regurgitation is present. TV: Severe tricuspid valve regurgitation is present. IVC: Severely elevated central venous pressure (15 mmHg); IVC diameter is larger than 21 mm and collapses less than 50% with respiration. PA: Moderate to severe pulmonary hypertension. Pulmonary arterial systolic pressure is 66 mmHg. H/P  1. Hypervolemic Hyponatremia secondary to right heart failure    2021 23:28  NT pro-BNP: 9,031 (H)      Treatment: lasix IV    Thank you,  Shante Martínez RN, CDI, [facility], [facility]  Certified Clinical   [phone]  Options provided:  -- Acute on Chronic Diastolic CHF/HFpEF  -- Acute on Chronic Systolic and Diastolic CHF  -- Acute Diastolic CHF/HFpEF  -- Acute Systolic and Diastolic CHF  -- Chronic Diastolic CHF/HFpEF  -- Chronic Systolic and Diastolic CHF  -- Other - I will add my own diagnosis  -- Disagree - Not applicable / Not valid  -- Disagree - Clinically unable to determine / Unknown  -- Refer to Clinical Documentation Reviewer    PROVIDER RESPONSE TEXT:    This patient has chronic diastolic CHF/HFpEF.     Query created by: Yue Britt on 12/20/2021 11:16 AM      Electronically signed by:  Marissa Barrett MD 12/21/2021 1:58 PM

## 2021-12-21 NOTE — PROGRESS NOTES
[facility]   [address] [facility], [id number] [place] [address]  Phone: [phone]   [phone]    [email or web address]     Nephrology Progress Note    Patient Name : Kishor Rock      : 1922     MRN : [id number]  Date of Admission : 2021  Date of Servive : 21    CC: Follow up for Hyponatremia        Assessment and Plan   Severe Hyponatremia :  - Multifactorial.  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 MS slowly improving. She is still confused   Getting 3% saline now  D/w RN about repeat labs  Review of Systems: Review of systems not obtained due to patient factors.     Current Medications:   Current Facility-Administered Medications   Medication Dose Route Frequency    sodium chloride (NS) flush 5-40 mL  5-40 mL IntraVENous Q8H    sodium chloride (NS) flush 5-40 mL  5-40 mL IntraVENous PRN    3% sodium chloride (*HIGH ALERT*CONCENTRATED IV*) infusion  30 mL/hr IntraVENous CONTINUOUS    acetaminophen (TYLENOL) tablet 650 mg  650 mg Oral Q6H PRN    Or    acetaminophen (TYLENOL) suppository 650 mg  650 mg Rectal Q6H PRN    polyethylene glycol (MIRALAX) packet 17 g  17 g Oral DAILY PRN    ondansetron (ZOFRAN ODT) tablet 4 mg  4 mg Oral Q8H PRN    Or    ondansetron (ZOFRAN) injection 4 mg  4 mg IntraVENous Q6H PRN    [Held by provider] furosemide (LASIX) injection 80 mg  80 mg IntraVENous BID    balsam peru-castor oiL (VENELEX) ointment   Topical Q12H    miconazole (MICOTIN) 2 % powder   Topical Q12H    metoprolol succinate (TOPROL-XL) XL tablet 25 mg  25 mg Oral DAILY    aspirin delayed-release tablet 81 mg  81 mg Oral DAILY      Allergies   Allergen Reactions    Amoxicillin Other (comments)     \"makes me feel like I\"m going to faint\"    Aspirin Palpitations     Per patient, no allergy    Gabapentin Drowsiness     * pt states this med makes her feel very drowsy , gives her a drunk feeling.  Levaquin [Levofloxacin] Hives and Rash    Lorazepam Other (comments)     Feeling faint.  Losartan Other (comments)     Tongue swelling      Lyrica [Pregabalin] Rash    Other Medication Other (comments)     Feeling faint, does not decrease pain.  Sertraline Other (comments)     Feeling faint. Objective:  Vitals:    Vitals:    12/21/21 0400 12/21/21 0600 12/21/21 0611 12/21/21 0835   BP:  (!) 161/64  (!) 168/65   Pulse: 62 (!) 59 60 72   Resp:  16     Temp:  97.8 °F (36.6 °C)     SpO2:  95%     Weight:         Intake and Output:  No intake/output data recorded. 12/19 1901 - 12/21 0700  In: 114.5 [I.V.:114.5]  Out: 5053 [Urine:3145]    Physical Examination:    General: confused  Neck:  Supple, no mass  Resp:  Mild wheezing   CV:  RRR,  systolic murmur +  GI:  Soft, NT, + BS, no HS megaly  Neurologic:  Confused   Ext                   B/L leg edema   :  Cifuentes +      []    High complexity decision making was performed  []    Patient is at high-risk of decompensation with multiple organ involvement    Lab Data Personally Reviewed: I have reviewed all the pertinent labs, microbiology data and radiology studies during assessment. Recent Labs     12/21/21  0340 12/20/21  2055 12/20/21  1514 12/20/21  1103 12/20/21  0703 12/20/21  0022 12/19/21  2328   * 114* 112* 113* 113*   < > 109*   K 3.6 4.8 3.3* 3.3* 4.1   < > 4.4   CL 80* 80* 77* 79* 79*   < > 74*   CO2 26 26 25 24 23   < > 23   GLU 75 120* 149* 105* 119*   < > 132*   BUN 15 17 15 15 17   < > 16   CREA 0.62 0.76 0.75 0.64 0.70   < > 0.78   CA 7.7* 8.1* 8.0* 8.0* 8.2*   < > 8.6   ALB  --   --  3.2*  --   --   --  3.8   ALT  --   --  40  --   --   --  42    < > = values in this interval not displayed.      Recent Labs 12/21/21  0340 12/21/21  0235 12/19/21  2328   WBC 11.8* PLEASE DISREGARD RESULTS 12.7*   HGB 10.8* PLEASE DISREGARD RESULTS 11.4*   HCT 30.4* PLEASE DISREGARD RESULTS 32.2*    PLEASE DISREGARD RESULTS 286     No results found for: SDES  Lab Results   Component Value Date/Time    Culture result: NO GROWTH AFTER 22 HOURS 12/20/2021 12:30 AM     Recent Results (from the past 24 hour(s))   METABOLIC PANEL, BASIC    Collection Time: 12/20/21 11:03 AM   Result Value Ref Range    Sodium 113 (LL) 136 - 145 mmol/L    Potassium 3.3 (L) 3.5 - 5.1 mmol/L    Chloride 79 (L) 97 - 108 mmol/L    CO2 24 21 - 32 mmol/L    Anion gap 10 5 - 15 mmol/L    Glucose 105 (H) 65 - 100 mg/dL    BUN 15 6 - 20 MG/DL    Creatinine 0.64 0.55 - 1.02 MG/DL    BUN/Creatinine ratio 23 (H) 12 - 20      GFR est AA >60 >60 ml/min/1.73m2    GFR est non-AA >60 >60 ml/min/1.73m2    Calcium 8.0 (L) 8.5 - 01.7 MG/DL   METABOLIC PANEL, COMPREHENSIVE    Collection Time: 12/20/21  3:14 PM   Result Value Ref Range    Sodium 112 (LL) 136 - 145 mmol/L    Potassium 3.3 (L) 3.5 - 5.1 mmol/L    Chloride 77 (L) 97 - 108 mmol/L    CO2 25 21 - 32 mmol/L    Anion gap 10 5 - 15 mmol/L    Glucose 149 (H) 65 - 100 mg/dL    BUN 15 6 - 20 MG/DL    Creatinine 0.75 0.55 - 1.02 MG/DL    BUN/Creatinine ratio 20 12 - 20      GFR est AA >60 >60 ml/min/1.73m2    GFR est non-AA >60 >60 ml/min/1.73m2    Calcium 8.0 (L) 8.5 - 10.1 MG/DL    Bilirubin, total 1.7 (H) 0.2 - 1.0 MG/DL    ALT (SGPT) 40 12 - 78 U/L    AST (SGOT) 64 (H) 15 - 37 U/L    Alk.  phosphatase 145 (H) 45 - 117 U/L    Protein, total 6.4 6.4 - 8.2 g/dL    Albumin 3.2 (L) 3.5 - 5.0 g/dL    Globulin 3.2 2.0 - 4.0 g/dL    A-G Ratio 1.0 (L) 1.1 - 2.2     METABOLIC PANEL, BASIC    Collection Time: 12/20/21  8:55 PM   Result Value Ref Range    Sodium 114 (LL) 136 - 145 mmol/L    Potassium 4.8 3.5 - 5.1 mmol/L    Chloride 80 (L) 97 - 108 mmol/L    CO2 26 21 - 32 mmol/L    Anion gap 8 5 - 15 mmol/L    Glucose 120 (H) 65 - 100 mg/dL    BUN 17 6 - 20 MG/DL    Creatinine 0.76 0.55 - 1.02 MG/DL    BUN/Creatinine ratio 22 (H) 12 - 20      GFR est AA >60 >60 ml/min/1.73m2    GFR est non-AA >60 >60 ml/min/1.73m2    Calcium 8.1 (L) 8.5 - 10.1 MG/DL   CBC W/O DIFF    Collection Time: 12/21/21  2:35 AM   Result Value Ref Range    WBC PLEASE DISREGARD RESULTS 3.6 - 11.0 K/uL    RBC PLEASE DISREGARD RESULTS 3.80 - 5.20 M/uL    HGB PLEASE DISREGARD RESULTS 11.5 - 16.0 g/dL    HCT PLEASE DISREGARD RESULTS 35.0 - 47.0 %    MCV Cannot be calculated 80.0 - 99.0 FL    MCH Cannot be calculated 26.0 - 34.0 PG    MCHC Cannot be calculated 30.0 - 36.5 g/dL    RDW PLEASE DISREGARD RESULTS 11.5 - 14.5 %    PLATELET PLEASE DISREGARD RESULTS 150 - 400 K/uL    MPV PLEASE DISREGARD RESULTS 8.9 - 12.9 FL    NRBC PLEASE DISREGARD RESULTS 0  WBC    ABSOLUTE NRBC PLEASE DISREGARD RESULTS 0.00 - 0.01 K/uL   CBC WITH AUTOMATED DIFF    Collection Time: 12/21/21  3:40 AM   Result Value Ref Range    WBC 11.8 (H) 3.6 - 11.0 K/uL    RBC 3.66 (L) 3.80 - 5.20 M/uL    HGB 10.8 (L) 11.5 - 16.0 g/dL    HCT 30.4 (L) 35.0 - 47.0 %    MCV 83.1 80.0 - 99.0 FL    MCH 29.5 26.0 - 34.0 PG    MCHC 35.5 30.0 - 36.5 g/dL    RDW 14.4 11.5 - 14.5 %    PLATELET 575 053 - 407 K/uL    MPV 9.3 8.9 - 12.9 FL    NRBC 0.0 0  WBC    ABSOLUTE NRBC 0.00 0.00 - 0.01 K/uL    NEUTROPHILS 79 (H) 32 - 75 %    LYMPHOCYTES 9 (L) 12 - 49 %    MONOCYTES 11 5 - 13 %    EOSINOPHILS 0 0 - 7 %    BASOPHILS 0 0 - 1 %    IMMATURE GRANULOCYTES 1 (H) 0.0 - 0.5 %    ABS. NEUTROPHILS 9.3 (H) 1.8 - 8.0 K/UL    ABS. LYMPHOCYTES 1.1 0.8 - 3.5 K/UL    ABS. MONOCYTES 1.3 (H) 0.0 - 1.0 K/UL    ABS. EOSINOPHILS 0.0 0.0 - 0.4 K/UL    ABS. BASOPHILS 0.0 0.0 - 0.1 K/UL    ABS. IMM.  GRANS. 0.1 (H) 0.00 - 0.04 K/UL    DF AUTOMATED     METABOLIC PANEL, BASIC    Collection Time: 12/21/21  3:40 AM   Result Value Ref Range    Sodium 114 (LL) 136 - 145 mmol/L    Potassium 3.6 3.5 - 5.1 mmol/L    Chloride 80 (L) 97 - 108 mmol/L    CO2 26 21 - 32 mmol/L    Anion gap 8 5 - 15 mmol/L    Glucose 75 65 - 100 mg/dL    BUN 15 6 - 20 MG/DL    Creatinine 0.62 0.55 - 1.02 MG/DL    BUN/Creatinine ratio 24 (H) 12 - 20      GFR est AA >60 >60 ml/min/1.73m2    GFR est non-AA >60 >60 ml/min/1.73m2    Calcium 7.7 (L) 8.5 - 10.1 MG/DL   SODIUM, UR, RANDOM    Collection Time: 12/21/21  6:34 AM   Result Value Ref Range    Sodium,urine random 24 MMOL/L   OSMOLALITY, UR    Collection Time: 12/21/21  6:34 AM   Result Value Ref Range    Osmolality,urine 436 MOSM/kg H2O           I have reviewed the flowsheets. Chart and Pertinent Notes have been reviewed. No change in PMH ,family and social history from Consult note.       Oksana Aaron [facility]

## 2021-12-21 NOTE — PROGRESS NOTES
Rounded on Gnostic[facility] patients and provided Anointing of the Sick at request of patient.     Hilario Barroso

## 2021-12-21 NOTE — PROGRESS NOTES
1955: Bedside and Verbal shift change report given to aRcheal Medrano RN (oncoming nurse) by Rojelio Gomes RN (offgoing nurse). Report included the following information SBAR, Kardex, Intake/Output, MAR, Recent Results, Med Rec Status, Cardiac Rhythm A-fib and Dual Neuro Assessment. Problem: Falls - Risk of  Goal: *Absence of Falls  Description: Document Zita Cotter Fall Risk and appropriate interventions in the flowsheet.   Outcome: Progressing Towards Goal  Note: Fall Risk Interventions:       Mentation Interventions: Adequate sleep, hydration, pain control,Bed/chair exit alarm,Door open when patient unattended,Familiar objects from home,Increase mobility,More frequent rounding,Reorient patient,Room close to nurse's station,Update white board         Elimination Interventions: Bed/chair exit alarm,Call light in reach,Patient to call for help with toileting needs,Stay With Me (per policy)              Problem: Pain  Goal: *Control of Pain  Outcome: Progressing Towards Goal

## 2021-12-21 NOTE — PROGRESS NOTES
Problem: Self Care Deficits Care Plan (Adult)  Goal: *Acute Goals and Plan of Care (Insert Text)  Description: FUNCTIONAL STATUS PRIOR TO ADMISSION: Patient was supervision using a rollator for functional mobility, transport wheelchair for community mobility with caregiver assist. She was largely was supervision for basic ADLs with occasional assist for distal lower body dressing, managing her own finances but assist from caregivers for other instrumental ADLs. HOME SUPPORT: The patient lived alone with caregivers present most of the day to provide assistance, alone some hours of the day. Occupational Therapy Goals  Initiated 12/21/2021  1. Patient will perform grooming EOB with minimal assistance/contact guard assist within 7 day(s). 2.  Patient will perform bathing with moderate assistance & AE PRN within 7 day(s). 3.  Patient will perform upper body dressing with minimal assistance/contact guard assist within 7 day(s). 4.  Patient will perform lower body dressing with maximal assistance and AE PRN within 7 day(s). 5.  Patient will perform toilet transfers to/from [facility] with maximal assistance within 7 day(s). 6.  Patient will perform all aspects of toileting with maximal assistance within 7 day(s). 7.  Patient will participate in upper extremity therapeutic exercise/activities with minimal assistance/contact guard assist for 5 minutes within 7 day(s). 8.  Patient will utilize energy conservation techniques during functional activities with verbal and visual cues within 7 day(s).     Outcome: Not Met     OCCUPATIONAL THERAPY EVALUATION  Patient: Imelda Stockton (27 y.o. female)  Date: 12/21/2021  Primary Diagnosis: Hyponatremia [E87.1]        Precautions: Fall    ASSESSMENT  Based on the objective data described below, the patient presents with decreased cognition, balance, BLE>BUE ROM (R>L), strength, coordination, activity tolerance, safety awareness, attention to task, and cardiopulmonary endurance s/p admission for hyponatremia. At baseline, she is largely SPV for mobility with rollator, ADLs, and some IADLs, lives alone but has caregivers present for SPV and IADL/transportation assist. She now presents far from her baseline, requiring increased time for processing and complex command following, significant assist with Max A x2 for bed mobility and standing attempt with poor standing tolerance (<1 minute). Strong posterior lean, unable to self-correct, reporting dizziness throughout, BP stable. She is Total A for lower body ADLs d/t decreased AROM & functional reach, RUE noted to be weaker than LUE with assessment. Mod cues required for redirection to task and complex command following with fair carryover, returned to supine for safety. Given her PLOF, recommend d/c to SNF as she is far from her baseline, would require 2 assist for all (yet limited) OOB actvitiy. Current Level of Function Impacting Discharge (ADLs/self-care): Mod-Total A for ADLs, Max A x2 for bed and limited OOB mobility with RW    Functional Outcome Measure: The patient scored Total: 5/100 on the Barthel Index outcome measure which is indicative of being totally dependent in basic self-care. Other factors to consider for discharge: fall risk, assist of 2, PLOF, PMH     Patient will benefit from skilled therapy intervention to address the above noted impairments. PLAN :  Recommendations and Planned Interventions: self care training, functional mobility training, therapeutic exercise, balance training, visual/perceptual training, therapeutic activities, cognitive retraining, endurance activities, neuromuscular re-education, patient education, home safety training, and family training/education    Frequency/Duration: Patient will be followed by occupational therapy 5 times a week to address goals.     Recommendation for discharge: (in order for the patient to meet his/her long term goals)  Therapy up to 5 days/week in SNF setting    This discharge recommendation:  Has been made in collaboration with the attending provider and/or case management    IF patient discharges home will need the following DME: To be determined (TBD)         SUBJECTIVE:   Patient stated Oh I don't think I can stand up by myself.     OBJECTIVE DATA SUMMARY:   HISTORY:   Past Medical History:   Diagnosis Date    Anxiety     Bleeding nose     Chronic atrial fibrillation ([facility] [address]     Diabetes ([facility] [address]     Hypertension      Past Surgical History:   Procedure Laterality Date    HX CATARACT REMOVAL      HX CHOLECYSTECTOMY      HX HEENT      myringotomy    HX HEENT  07/2016    basal cell removal -forehead    HX TONSILLECTOMY      childhood    HX TYMPANOSTOMY  07/11/2016    left ear    HX TYMPANOSTOMY      bilateral       Expanded or extensive additional review of patient history:     Home Situation  Home Environment: Private residence  # Steps to Enter: 8  Wheelchair Ramp: Yes  One/Two Story Residence: One story  Support Systems: Other Family Member(s) (Sister Desiree Taylor [phone])  Current DME Used/Available at Home: Raised toilet seat,Walker, rolling,Walker, rollator,Cane, straight (transport chair)  Tub or Shower Type: Shower    Hand dominance: Right    EXAMINATION OF PERFORMANCE DEFICITS:  Cognitive/Behavioral Status:  Neurologic State: Alert  Orientation Level: Oriented to person;Oriented to place;Oriented to time;Disoriented to situation (intermittent confusion)  Cognition: Decreased attention/concentration;Decreased command following; Impaired decision making;Poor safety awareness  Perception: Appears intact  Perseveration: No perseveration noted  Safety/Judgement: Decreased awareness of environment;Decreased awareness of need for assistance;Decreased awareness of need for safety;Decreased insight into deficits    Skin: Appears intact, some scattered bruising    Edema: BLE swelling, ice packs donned to feet    Hearing:       Vision/Perceptual: Tracking: Able to track stimulus in all quadrants w/o difficulty                                Range of Motion:  AROM: Generally decreased, functional (RUE moreso decreased)                         Strength:  Strength: Generally decreased, functional (RUE moreso decreased)                Coordination:  Coordination: Generally decreased, functional  Fine Motor Skills-Upper: Left Intact; Right Intact    Gross Motor Skills-Upper: Left Impaired;Right Impaired    Tone & Sensation:  Tone: Normal  Sensation: Impaired (neuropathy in BLEs foot>ankle, some hand numb @ night)                      Balance:  Sitting: Impaired  Sitting - Static: Fair (occasional)  Sitting - Dynamic: Fair (occasional)  Standing: Impaired; With support (RW)  Standing - Static: Poor;Constant support    Functional Mobility and Transfers for ADLs:  Bed Mobility:  Supine to Sit: Maximum assistance;Assist x2  Sit to Supine: Maximum assistance;Assist x2  Scooting: Total assistance;Assist x2    Transfers:  Sit to Stand: Maximum assistance;Assist x2 (RW)  Stand to Sit: Maximum assistance;Assist x2  Toilet Transfer : Total assistance (infer for OOB mobility)  Assistive Device : Gait Belt;Walker, rolling    ADL Assessment:  Feeding: Minimum assistance    Oral Facial Hygiene/Grooming: Moderate assistance    Bathing: Maximum assistance    Upper Body Dressing: Moderate assistance    Lower Body Dressing: Total assistance    Toileting: Total assistance                ADL Intervention and task modifications:                           Lower Body Dressing Assistance  Dressing Assistance: Total assistance(dependent)  Socks: Total assistance (dependent)  Leg Crossed Method Used: No  Position Performed: Seated edge of bed;Supine  Cues: Physical assistance;Visual cues provided; Tactile cues provided;Verbal cues provided    Toileting  Toileting Assistance:  Total assistance(dependent) (brief)    Cognitive Retraining  Safety/Judgement: Decreased awareness of environment;Decreased awareness of need for assistance;Decreased awareness of need for safety;Decreased insight into deficits    Functional Measure:    Barthel Index:  Bathin  Bladder: 0  Bowels: 0  Groomin  Dressin  Feedin  Mobility: 0  Stairs: 0  Toilet Use: 0  Transfer (Bed to Chair and Back): 0  Total: 5/100      The Barthel ADL Index: Guidelines  1. The index should be used as a record of what a patient does, not as a record of what a patient could do. 2. The main aim is to establish degree of independence from any help, physical or verbal, however minor and for whatever reason. 3. The need for supervision renders the patient not independent. 4. A patient's performance should be established using the best available evidence. Asking the patient, friends/relatives and nurses are the usual sources, but direct observation and common sense are also important. However direct testing is not needed. 5. Usually the patient's performance over the preceding 24-48 hours is important, but occasionally longer periods will be relevant. 6. Middle categories imply that the patient supplies over 50 per cent of the effort. 7. Use of aids to be independent is allowed. Score Interpretation (from [address] [address])    Independent   60-79 Minimally independent   40-59 Partially dependent   20-39 Very dependent   <20 Totally dependent     -Yun Pereyra., Barthel, D.W. (1965). Functional evaluation: the Barthel Index. [facility] ([address] (1997). The Barthel activities of daily living index: self-reporting versus actual performance in the old (> or = 75 years). Journal of [address] [place] [address] 45(7), [facility], .[employer], Rony Sanders., Katie Ng. (1999). Measuring the change in disability after inpatient rehabilitation; comparison of the responsiveness of the Barthel Index and Functional [place] Measure.  Journal of Neurology, Neurosurgery, and Psychiatry, 664), 819-287. KATHY Juarez, ONELIA Whitlock, & Donna Bond M.A. (2004) Assessment of post-stroke quality of life in cost-effectiveness studies: The usefulness of the Barthel Index and the EuroQoL-5D. Quality of Life Research, 15, 028-54     Occupational Therapy Evaluation Charge Determination   History Examination Decision-Making   LOW Complexity : Brief history review  HIGH Complexity : 5 or more performance deficits relating to physical, cognitive , or psychosocial skils that result in activity limitations and / or participation restrictions MEDIUM Complexity : Patient may present with comorbidities that affect occupational performnce. Miniml to moderate modification of tasks or assistance (eg, physical or verbal ) with assesment(s) is necessary to enable patient to complete evaluation       Based on the above components, the patient evaluation is determined to be of the following complexity level: LOW   Pain Rating:  None reported    Activity Tolerance:   Poor and observed SOB with activity    After treatment patient left in no apparent distress:    Supine in bed, Call bell within reach, Bed / chair alarm activated, and Side rails x 3    COMMUNICATION/EDUCATION:   The patients plan of care was discussed with: Physical therapist and Registered nurse. Home safety education was provided and the patient/caregiver indicated understanding., Patient/family have participated as able in goal setting and plan of care. , and Patient/family agree to work toward stated goals and plan of care. This patients plan of care is appropriate for delegation to [facility].     Thank you for this referral.  Tammie Biggs, OTD, OTR/L  Time Calculation: 27 mins

## 2021-12-21 NOTE — PROGRESS NOTES
Verbal shift change report given to Rocio (oncoming nurse) by Chuy Stokes (offgoing nurse). Report included the following information SBAR, Kardex, Intake/Output, MAR, Accordion and Recent Results. 24 hour urine started collecting at 2100. Critical result given to Marie Desir with nephrology. No orders received at this time. Next BMP due at 3am. Report given to [facility] RN. Pt transferred to 669. Glasses at bedside.

## 2021-12-21 NOTE — PROGRESS NOTES
Transition of Care Plan: TBD-pending therapy/medical recommendations. Patient lives alone, but has several caregivers in her home throughout the day. RUR: 14% low    PCP F/U: Linda Covarrubias MD    Disposition: TBD    Transportation: TBD    Main Contact: Deborah Gaitan [phone]    1146: CM following for PT/OT recommendations regarding discharge disposition.      Sonny Zacarias RN, CRM

## 2021-12-21 NOTE — PROGRESS NOTES
Problem: Falls - Risk of  Goal: *Absence of Falls  Description: Document Leno Candelario Fall Risk and appropriate interventions in the flowsheet. Outcome: Progressing Towards Goal  Note: Fall Risk Interventions:       Mentation Interventions: Adequate sleep, hydration, pain control,Bed/chair exit alarm,Door open when patient unattended,Familiar objects from home,Increase mobility,More frequent rounding,Reorient patient,Room close to nurse's station,Update white board         Elimination Interventions: Bed/chair exit alarm,Call light in reach,Patient to call for help with toileting needs,Stay With Me (per policy)              Problem: Pressure Injury - Risk of  Goal: *Prevention of pressure injury  Description: Document Isaias Scale and appropriate interventions in the flowsheet.   Outcome: Progressing Towards Goal  Note: Pressure Injury Interventions:  Sensory Interventions: Assess changes in LOC,Assess need for specialty bed,Avoid rigorous massage over bony prominences,Check visual cues for pain,Discuss PT/OT consult with provider,Float heels,Maintain/enhance activity level,Minimize linen layers,Pressure redistribution bed/mattress (bed type)    Moisture Interventions: Absorbent underpads,Internal/External urinary devices,Minimize layers    Activity Interventions: Assess need for specialty bed,Increase time out of bed,Pressure redistribution bed/mattress(bed type),PT/OT evaluation    Mobility Interventions: Assess need for specialty bed,HOB 30 degrees or less,Pressure redistribution bed/mattress (bed type),PT/OT evaluation    Nutrition Interventions: Document food/fluid/supplement intake    Friction and Shear Interventions: Apply protective barrier, creams and emollients,Feet elevated on foot rest,HOB 30 degrees or less,Lift sheet,Minimize layers,Transferring/repositioning devices

## 2021-12-21 NOTE — PROGRESS NOTES
Hospitalist Progress Note    NAME: Hiren Romano   :  1922   MRN:  [id number]     This is a 44-year-old female with past medical history of pulmonary hypertension, atrial fibrillation, hypertension, DM was admitted to the ICU overnight with symptomatic hyponatremia given mental status changes. Her serum sodium was noted to be 109. She was given hypertonic saline and admitted to the ICU for further work-up. She received hypertonic saline in ICU with improving sodium     Assessment / Plan:  Severe hyponatremia likely multifactorial  -Sodium is improving at expected rate. Continue hypertonic saline. Check serial sodium level. Nephrology is following  -Continue fluid restriction at 1000 mL  -CT head is normal.  UA is normal    Atrial fibrillation  Hypertension  Diabetes mellitus type 2  Pulmonary hypertension  -Resume home metoprolol, aspirin  -Consult pharmacy for medication reconciliation  -Check hemoglobin A1c      Body mass index is 38.87 kg/m². Code status: Full  Prophylaxis: Lovenox  Recommended Disposition: Home w/Family     Subjective:     Chief Complaint / Reason for Physician Visit  Alert, awake, oriented x2  Does not report any shortness of breath, chest pain, cough or phlegm  Family member present at bedside and discussed plan    Review of Systems:  Symptom Y/N Comments  Symptom Y/N Comments   Fever/Chills    Chest Pain     Poor Appetite    Edema     Cough    Abdominal Pain     Sputum    Joint Pain     SOB/MCNAIR    Pruritis/Rash     Nausea/vomit    Tolerating PT/OT     Diarrhea    Tolerating Diet     Constipation    Other       Could NOT obtain due to:      Objective:     VITALS:   Last 24hrs VS reviewed since prior progress note.  Most recent are:  Patient Vitals for the past 24 hrs:   Temp Pulse Resp BP SpO2   21 2300  65 15 (!) 163/71 95 %   21 2200  70 16 (!) 164/68 96 %   21 2100  65 13 (!) 157/59 95 %   21 97.9 °F (36.6 °C) 64 11 (!) 158/72 96 % 12/20/21 1900  65 13 (!) 159/71 96 %   12/20/21 1800  70 18 (!) 127/103 97 %   12/20/21 1700  79 15 (!) 180/65 95 %   12/20/21 1600 98 °F (36.7 °C) 61 20 (!) 128/54 96 %   12/20/21 1500  68 26 (!) 140/62    12/20/21 1400  74 16 (!) 151/58 96 %   12/20/21 1300  78 17 (!) 157/67 91 %   12/20/21 1200 97.9 °F (36.6 °C) 67 13 (!) 162/52 96 %   12/20/21 1100  63 20 (!) 144/114 98 %   12/20/21 1000    (!) 160/66    12/20/21 0900  68 15 (!) 181/64 96 %   12/20/21 0800 97.5 °F (36.4 °C) 64 13 (!) 160/69 98 %   12/20/21 0700  80 15 (!) 124/94 100 %   12/20/21 0600  (!) 57 11 (!) 134/56 99 %   12/20/21 0526 98.1 °F (36.7 °C) 63 14 (!) 175/67 93 %   12/20/21 0401  65 14 (!) 175/75    12/20/21 0330  66 13 (!) 162/105 97 %   12/20/21 0200  64 18 (!) 188/86 90 %   12/20/21 0130  76 20 (!) 150/95    12/20/21 0000  61 13 (!) 176/149 96 %   12/19/21 2341     93 %       Intake/Output Summary (Last 24 hours) at 12/20/2021 2322  Last data filed at 12/20/2021 2200  Gross per 24 hour   Intake 114.5 ml   Output 3120 ml   Net -3005.5 ml        PHYSICAL EXAM:  General: WD, WN. Alert, cooperative, no acute distress    EENT:  EOMI. Anicteric sclerae. MMM  Resp:  CTA bilaterally, no wheezing or rales. No accessory muscle use  CV:  Regular  rhythm,  No edema  GI:  Soft, Non distended, Non tender.  +Bowel sounds  Neurologic:  Alert and oriented X 3, normal speech,   Psych:   Good insight. Not anxious nor agitated  Skin:  No rashes.   No jaundice    Reviewed most current lab test results and cultures  YES  Reviewed most current radiology test results   YES  Review and summation of old records today    NO  Reviewed patient's current orders and MAR    YES  PMH/SH reviewed - no change compared to H&P          Current Facility-Administered Medications:     acetaminophen (TYLENOL) tablet 650 mg, 650 mg, Oral, Q6H PRN, 650 mg at 12/20/21 1911 **OR** acetaminophen (TYLENOL) suppository 650 mg, 650 mg, Rectal, Q6H PRN, Mamadou Gottlieb, Juanshabnam Smith, NP    polyethylene glycol (MIRALAX) packet 17 g, 17 g, Oral, DAILY PRN, Ted Marrero NP    ondansetron (ZOFRAN ODT) tablet 4 mg, 4 mg, Oral, Q8H PRN **OR** ondansetron (ZOFRAN) injection 4 mg, 4 mg, IntraVENous, Q6H PRN, Ted Marrero NP    [Held by provider] furosemide (LASIX) injection 80 mg, 80 mg, IntraVENous, BID, Albino KobeROBYN    balsam peru-castor oiL (VENELEX) ointment, , Topical, Q12H, Alisia Bridges MD, Given at 12/20/21 2028    miconazole (MICOTIN) 2 % powder, , Topical, Q12H, Alisia Bridges MD, Given at 12/20/21 2028  ________________________________________________________________________  Care Plan discussed with:    Comments   Patient     Family      RN     Care Manager     Consultant                        Multidiciplinary team rounds were held today with , nursing, pharmacist and clinical coordinator. Patient's plan of care was discussed; medications were reviewed and discharge planning was addressed. ________________________________________________________________________  Total NON critical care TIME:  35    Minutes    Total CRITICAL CARE TIME Spent:   Minutes non procedure based      Comments   >50% of visit spent in counseling and coordination of care     ________________________________________________________________________  Deacon Murphy MD     Procedures: see electronic medical records for all procedures/Xrays and details which were not copied into this note but were reviewed prior to creation of Plan. LABS:  I reviewed today's most current labs and imaging studies.   Pertinent labs include:  Recent Labs     12/19/21  2328   WBC 12.7*   HGB 11.4*   HCT 32.2*        Recent Labs     12/20/21  2055 12/20/21  1514 12/20/21  1103 12/20/21  0022 12/19/21  2328   * 112* 113*   < > 109*   K 4.8 3.3* 3.3*   < > 4.4   CL 80* 77* 79*   < > 74*   CO2 26 25 24   < > 23   * 149* 105*   < > 132*   BUN 17 15 15   < > 16   CREA 0.76 0.75 0.64 < > 0.78   CA 8.1* 8.0* 8.0*   < > 8.6   ALB  --  3.2*  --   --  3.8   TBILI  --  1.7*  --   --  1.8*   ALT  --  40  --   --  42    < > = values in this interval not displayed.        Signed: Sofía Garrison MD

## 2021-12-21 NOTE — PROGRESS NOTES
Nephrology Night Cover  6p-6a    Patient repeat sodium at 2030 last night 114 up from 112, no new orders at that time  Patient repeat sodium at 0330 this am still 114  Administer 3% hypertonic saline 30cc/hr times 4 hours, then recheck BMP after completion   Spoke with Pharmacy regarding order     d/w primary RN    [facility]

## 2021-12-21 NOTE — PROGRESS NOTES
Hospitalist Progress Note    NAME: Cassie Wise   :  1922   MRN:  [id number]     This is a 70-year-old female with past medical history of pulmonary hypertension, atrial fibrillation, hypertension, DM was admitted to the ICU overnight with symptomatic hyponatremia given mental status changes. Her serum sodium was noted to be 109. She was given hypertonic saline and admitted to the ICU for further work-up. She received hypertonic saline in ICU with improving sodium     Assessment / Plan:  Severe hyponatremia likely multifactorial  -Sodium is improving at expected rate. -Nephrology is following  -Continue fluid restriction at 1000 mL  -CT head is normal.  UA is normal  -  after 3% saline, d/w Renal, will give Tolvaptan with STAT q4h BMP, Renal to be notified after each results, d/w RN     Atrial fibrillation  Hypertension  Diabetes mellitus type 2  Pulmonary hypertension  -Resume home metoprolol, aspirin  -Consult pharmacy for medication reconciliation      Body mass index is 39.67 kg/m². Code status: Full  Prophylaxis: Lovenox  Recommended Disposition: Home w/Family     Subjective:     Chief Complaint / Reason for Physician Visit  Alert, awake, oriented x2  Family at bedside  States feels fair   No n/v  Na after 3% saline is 114, d/w , will give Tolvaptan at this point with q4h STAT BMPs, d/w RN     Review of Systems:  Symptom Y/N Comments  Symptom Y/N Comments   Fever/Chills    Chest Pain     Poor Appetite    Edema     Cough    Abdominal Pain     Sputum    Joint Pain     SOB/MCNAIR    Pruritis/Rash     Nausea/vomit    Tolerating PT/OT     Diarrhea    Tolerating Diet     Constipation    Other       Could NOT obtain due to:      Objective:     VITALS:   Last 24hrs VS reviewed since prior progress note.  Most recent are:  Patient Vitals for the past 24 hrs:   Temp Pulse Resp BP SpO2   21 1349 97.5 °F (36.4 °C) (!) 52 19 (!) 162/64 98 %   21 1200  63      21 1017 97.7 °F (36.5 °C) 65 14 (!) 149/57 98 %   12/21/21 1000  63      12/21/21 0835  72  (!) 168/65    12/21/21 0611  60      12/21/21 0600 97.8 °F (36.6 °C) (!) 59 16 (!) 161/64 95 %   12/21/21 0400  62      12/21/21 0200 98 °F (36.7 °C) 64 24 102/87 97 %   12/21/21 0015 97.4 °F (36.3 °C) 62 14 (!) 147/65 96 %   12/20/21 2300  65 15 (!) 163/71 95 %   12/20/21 2200  70 16 (!) 164/68 96 %   12/20/21 2100  65 13 (!) 157/59 95 %   12/20/21 2000 97.9 °F (36.6 °C) 64 11 (!) 158/72 96 %   12/20/21 1900  65 13 (!) 159/71 96 %   12/20/21 1800  70 18 (!) 127/103 97 %   12/20/21 1700  79 15 (!) 180/65 95 %   12/20/21 1600 98 °F (36.7 °C) 61 20 (!) 128/54 96 %   12/20/21 1500  68 26 (!) 140/62    12/20/21 1400  74 16 (!) 151/58 96 %       Intake/Output Summary (Last 24 hours) at 12/21/2021 1357  Last data filed at 12/21/2021 1331  Gross per 24 hour   Intake 354.5 ml   Output 1085 ml   Net -730.5 ml        PHYSICAL EXAM:  General: WD, WN. Alert, cooperative, no acute distress    EENT:  EOMI. Anicteric sclerae. MMM  Resp:  CTA bilaterally, no wheezing or rales. No accessory muscle use  CV:  Regular  rhythm,  No edema  GI:  Soft, Non distended, Non tender.  +Bowel sounds  Neurologic:  Alert and oriented X 3, normal speech,   Psych:   Good insight. Not anxious nor agitated  Skin:  No rashes.   No jaundice    Reviewed most current lab test results and cultures  YES  Reviewed most current radiology test results   YES  Review and summation of old records today    NO  Reviewed patient's current orders and MAR    YES  PMH/SH reviewed - no change compared to H&P          Current Facility-Administered Medications:     sodium chloride (NS) flush 5-40 mL, 5-40 mL, IntraVENous, Q8H, Julia Holland NP, 10 mL at 12/21/21 1331    sodium chloride (NS) flush 5-40 mL, 5-40 mL, IntraVENous, PRN, Jayson Lee NP    acetaminophen (TYLENOL) tablet 650 mg, 650 mg, Oral, Q6H PRN, 650 mg at 12/21/21 1210 **OR** acetaminophen (TYLENOL) suppository 650 mg, 650 mg, Rectal, Q6H PRN, Ned Wynne, NP    polyethylene glycol (MIRALAX) packet 17 g, 17 g, Oral, DAILY PRN, Ned Wynne, NP    ondansetron (ZOFRAN ODT) tablet 4 mg, 4 mg, Oral, Q8H PRN **OR** ondansetron (ZOFRAN) injection 4 mg, 4 mg, IntraVENous, Q6H PRN, Ned Wynne, NP    balsam peru-castor oiL (VENELEX) ointment, , Topical, Q12H, Judy Berry MD, Given at 12/21/21 0836    miconazole (MICOTIN) 2 % powder, , Topical, Q12H, Judy Berry MD, Given at 12/21/21 0836    metoprolol succinate (TOPROL-XL) XL tablet 25 mg, 25 mg, Oral, DAILY, Brandon Gagnon MD, 25 mg at 12/21/21 2616    aspirin delayed-release tablet 81 mg, 81 mg, Oral, DAILY, Brandon Gagnon MD, 81 mg at 12/21/21 8565  ________________________________________________________________________  Care Plan discussed with:    Comments   Patient X    Family  X    RN X    Care Manager X    Consultant  X                      Multidiciplinary team rounds were held today with , nursing, pharmacist and clinical coordinator. Patient's plan of care was discussed; medications were reviewed and discharge planning was addressed. ________________________________________________________________________  Total NON critical care TIME:  30    Minutes    Total CRITICAL CARE TIME Spent:   Minutes non procedure based      Comments   >50% of visit spent in counseling and coordination of care     ________________________________________________________________________  Daany Banks MD     Procedures: see electronic medical records for all procedures/Xrays and details which were not copied into this note but were reviewed prior to creation of Plan. LABS:  I reviewed today's most current labs and imaging studies.   Pertinent labs include:  Recent Labs     12/21/21  0340 12/21/21  0235 12/19/21  2328   WBC 11.8* PLEASE DISREGARD RESULTS 12.7*   HGB 10.8* PLEASE DISREGARD RESULTS 11.4*   HCT 30.4* PLEASE DISREGARD RESULTS 32.2*    PLEASE DISREGARD RESULTS 286     Recent Labs     12/21/21  1029 12/21/21  0340 12/20/21  2055 12/20/21  1514 12/20/21  1514 12/20/21  0022 12/19/21  2328   * 114* 114*   < > 112*   < > 109*   K 3.9 3.6 4.8   < > 3.3*   < > 4.4   CL 80* 80* 80*   < > 77*   < > 74*   CO2 27 26 26   < > 25   < > 23   * 75 120*   < > 149*   < > 132*   BUN 14 15 17   < > 15   < > 16   CREA 0.71 0.62 0.76   < > 0.75   < > 0.78   CA 8.1* 7.7* 8.1*   < > 8.0*   < > 8.6   ALB  --   --   --   --  3.2*  --  3.8   TBILI  --   --   --   --  1.7*  --  1.8*   ALT  --   --   --   --  40  --  42    < > = values in this interval not displayed.        Signed: Aicha Burton MD

## 2021-12-21 NOTE — PROGRESS NOTES
Problem: Mobility Impaired (Adult and Pediatric)  Goal: *Acute Goals and Plan of Care (Insert Text)  Description: FUNCTIONAL STATUS PRIOR TO ADMISSION: Patient reports a decline in functional independence in the last 2-4 weeks. Originally modified independent with rollator and utilized a transport chair for community. HOME SUPPORT PRIOR TO ADMISSION: The patient lived alone with caregivers present most of the day to provide assistance, alone some hours of the day. Physical Therapy Goals  Initiated 12/21/2021  1. Patient will move from supine to sit and sit to supine  in bed with moderate assistance  within 7 day(s). 2.  Patient will transfer from bed to chair and chair to bed with moderate assistance  using the least restrictive device within 7 day(s). 3.  Patient will perform sit to stand with moderate assistance  within 7 day(s). 4.  Patient will ambulate with minimal assistance  for 50 feet with the least restrictive device within 7 day(s). Outcome: Progressing Towards Goal   PHYSICAL THERAPY EVALUATION  Patient: Chelle Del Real (62 y.o. female)  Date: 12/21/2021  Primary Diagnosis: Hyponatremia [E87.1]        Precautions:   Fall    ASSESSMENT  Based on the objective data described below, the patient presents with impaired initiation of activity, intermittent confusion, decreased strength, balance, and activity tolerance following admission for hyponatremia. The patient has caregivers in the home for portions of the day but was modified independent for mobility with a rollator until her recent decline. During evaluation, she required assist to initiate all mobility with additional time. Transfer to EOB required maximum assist x2 and total assist to scoot to EOB. Sitting balance was fair requiring close guard to prevent posterior LOB. Completed a sit to stand requiring maximum assist x2 and limited by the patient's posteriorly shifted COG.  Attempted to side step to [facility] but patient c/o dizziness and fatigued prior to making any progress. BP stable throughout with position changes. The patient requires high levels of assist for all functional mobility at present. Recommend discharge to a rehab setting when medically stable. Current Level of Function Impacting Discharge (mobility/balance): maximum assist x2 supine to sit          Patient will benefit from skilled therapy intervention to address the above noted impairments. PLAN :  Recommendations and Planned Interventions: bed mobility training, transfer training, gait training, therapeutic exercises, neuromuscular re-education, and therapeutic activities      Frequency/Duration: Patient will be followed by physical therapy:  5 times a week to address goals. Recommendation for discharge: (in order for the patient to meet his/her long term goals)  Therapy up to 5 days/week in SNF setting    This discharge recommendation:  Has been made in collaboration with the attending provider and/or case management    IF patient discharges home will need the following DME: to be determined (TBD)         SUBJECTIVE:   Patient stated I can't do it on my own. I haven't stood in days! \"    OBJECTIVE DATA SUMMARY:   HISTORY:    Past Medical History:   Diagnosis Date    Anxiety     Bleeding nose     Chronic atrial fibrillation ([facility] [address]     Diabetes ([facility] [address]     Hypertension      Past Surgical History:   Procedure Laterality Date    HX CATARACT REMOVAL      HX CHOLECYSTECTOMY      HX HEENT      myringotomy    HX HEENT  07/2016    basal cell removal -forehead    HX TONSILLECTOMY      childhood    HX TYMPANOSTOMY  07/11/2016    left ear    HX TYMPANOSTOMY      bilateral       Personal factors and/or comorbidities impacting plan of care:     Home Situation  Home Environment: Private residence  # Steps to Enter: 8  Wheelchair Ramp: Yes  One/Two Story Residence: One story  Support Systems: Other Family Member(s) (Sister Mira Poole [phone])  Current DME Used/Available at Home: Raised toilet seat,Walker, rolling,Walker, rollator,Cane, straight (transport chair)  Tub or Shower Type: Shower    EXAMINATION/PRESENTATION/DECISION MAKING:   Critical Behavior:  Neurologic State: Alert  Orientation Level: Oriented to person,Oriented to place,Oriented to time,Disoriented to situation (intermittent confusion)  Cognition: Decreased attention/concentration,Decreased command following,Impaired decision making,Poor safety awareness  Safety/Judgement: Decreased awareness of environment,Decreased awareness of need for assistance,Decreased awareness of need for safety,Decreased insight into deficits  Hearing:     Skin:    Edema:   Range Of Motion:  AROM: Generally decreased, functional (RUE moreso decreased)                       Strength:    Strength: Generally decreased, functional (RUE moreso decreased)                    Tone & Sensation:   Tone: Normal              Sensation: Impaired (neuropathy in BLEs foot>ankle, some hand numb @ night)               Coordination:  Coordination: Generally decreased, functional  Vision:   Tracking: Able to track stimulus in all quadrants w/o difficulty  Functional Mobility:  Bed Mobility:     Supine to Sit: Maximum assistance;Assist x2  Sit to Supine: Maximum assistance;Assist x2  Scooting: Total assistance;Assist x2  Transfers:  Sit to Stand: Maximum assistance;Assist x2 (RW)  Stand to Sit: Maximum assistance;Assist x2                       Balance:   Sitting: Impaired  Sitting - Static: Fair (occasional)  Sitting - Dynamic: Fair (occasional)  Standing: Impaired; With support (RW)  Standing - Static: Poor;Constant support     Physical Therapy Evaluation Charge Determination   History Examination Presentation Decision-Making   MEDIUM  Complexity : 1-2 comorbidities / personal factors will impact the outcome/ POC  MEDIUM Complexity : 3 Standardized tests and measures addressing body structure, function, activity limitation and / or participation in recreation  LOW Complexity : Stable, uncomplicated  LOW Complexity : FOTO score of       Based on the above components, the patient evaluation is determined to be of the following complexity level: LOW     Pain Rating:      Activity Tolerance:   Fair    After treatment patient left in no apparent distress:   Supine in bed and Call bell within reach    COMMUNICATION/EDUCATION:   The patients plan of care was discussed with: Registered nurse. Fall prevention education was provided and the patient/caregiver indicated understanding., Patient/family have participated as able in goal setting and plan of care. , and Patient/family agree to work toward stated goals and plan of care.     Thank you for this referral.  Mynor Sewell, PT, DPT   Time Calculation: 28 mins

## 2021-12-22 NOTE — PROGRESS NOTES
[facility]   [address] [facility], [address] [place] [address]  Phone: [phone]   [phone]    [email or web address]     Nephrology Progress Note    Patient Name : Dragan Yañez      : 1922     MRN : [id number]  Date of Admission : 2021  Date of Servive : 21    CC: Follow up for Hyponatremia        Assessment and Plan   Severe Hyponatremia :  - Multifactorial.  SIADH at baseline exacerbated by use of Bactrim, diuretics   -Sodium at 121 after tolvaptan  -1000 cc fluid restriction for now  -Repeat labs at noon  -Goal sodium 125-127 by tomorrow a.m.  -Okay to remove Cifuentes catheter  -Serial labs    Pulm HTN   Severe TR, Mild MR  JOSE  Chronic A fib     Left Hand injury        Interval History:  She seems to be getting better every day. Mental status is improving. Sodium was 121 and stayed stable with D5W overnight. She has a Cifuentes catheter  Review of Systems: Review of systems not obtained due to patient factors.     Current Medications:   Current Facility-Administered Medications   Medication Dose Route Frequency    fluticasone propionate (FLONASE) 50 mcg/actuation nasal spray 2 Spray  2 Spray Both Nostrils DAILY PRN    cetirizine (ZYRTEC) tablet 10 mg  10 mg Oral DAILY    sodium chloride (NS) flush 5-40 mL  5-40 mL IntraVENous Q8H    sodium chloride (NS) flush 5-40 mL  5-40 mL IntraVENous PRN    acetaminophen (TYLENOL) tablet 650 mg  650 mg Oral Q6H PRN    Or    acetaminophen (TYLENOL) suppository 650 mg  650 mg Rectal Q6H PRN    polyethylene glycol (MIRALAX) packet 17 g  17 g Oral DAILY PRN    ondansetron (ZOFRAN ODT) tablet 4 mg  4 mg Oral Q8H PRN    Or    ondansetron (ZOFRAN) injection 4 mg  4 mg IntraVENous Q6H PRN    balsam peru-castor oiL (VENELEX) ointment   Topical Q12H    miconazole (MICOTIN) 2 % powder   Topical Q12H    metoprolol succinate (TOPROL-XL) XL tablet 25 mg  25 mg Oral DAILY    aspirin delayed-release tablet 81 mg  81 mg Oral DAILY      Allergies   Allergen Reactions    Amoxicillin Other (comments)     \"makes me feel like I\"m going to faint\"    Aspirin Palpitations     Per patient, no allergy    Gabapentin Drowsiness     * pt states this med makes her feel very drowsy , gives her a drunk feeling.  Levaquin [Levofloxacin] Hives and Rash    Lorazepam Other (comments)     Feeling faint.  Losartan Other (comments)     Tongue swelling      Lyrica [Pregabalin] Rash    Other Medication Other (comments)     Feeling faint, does not decrease pain.  Sertraline Other (comments)     Feeling faint. Objective:  Vitals:    Vitals:    12/22/21 0600 12/22/21 0847 12/22/21 1003 12/22/21 1006   BP: (!) 147/63 (!) 160/73  (!) 145/53   Pulse: 62 69 (!) 59 60   Resp: 14   13   Temp: 97.9 °F (36.6 °C)   97.6 °F (36.4 °C)   SpO2: 99%   97%   Weight:         Intake and Output:  12/22 0701 - 12/22 1900  In: -   Out: 1600 [Urine:1600]  12/20 1901 - 12/22 0700  In: 585.5 [P.O.:540; I.V.:45.5]  Out: 5485 [Urine:5485]    Physical Examination:    General: confused  Neck:  Supple, no mass  Resp:  Mild wheezing   CV:  RRR,  systolic murmur +  GI:  Soft, NT, + BS, no HS megaly  Neurologic:  Confused   Ext                   B/L leg edema   :  Cifuentes +      []    High complexity decision making was performed  []    Patient is at high-risk of decompensation with multiple organ involvement    Lab Data Personally Reviewed: I have reviewed all the pertinent labs, microbiology data and radiology studies during assessment.     Recent Labs     12/22/21  0425 12/22/21  0024 12/21/21  2031 12/21/21  1645 12/21/21  1029 12/20/21  2055 12/20/21  1514 12/20/21  0022 12/19/21  2328   * 121* 118* 116* 114*   < > 112*   < > 109*   K 3.5 3.8 3.9 4.0 3.9   < > 3.3*   < > 4.4   CL 89* 87* 86* 81* 80*   < > 77*   < > 74*   CO2 28 26 27 26 27   < > 25   < > 23   * 118* 137* 154* 120*   < > 149*   < > 132*   BUN 14 14 15 15 14   < > 15   < > 16   CREA 0. 70 0.63 0.66 0.71 0.71   < > 0.75   < > 0.78   CA 8.4* 7.9* 8.4* 8.1* 8.1*   < > 8.0*   < > 8.6   ALB  --   --   --   --   --   --  3.2*  --  3.8   ALT  --   --   --   --   --   --  40  --  42    < > = values in this interval not displayed.      Recent Labs     12/21/21  0340 12/21/21  0235 12/19/21  2328   WBC 11.8* PLEASE DISREGARD RESULTS 12.7*   HGB 10.8* PLEASE DISREGARD RESULTS 11.4*   HCT 30.4* PLEASE DISREGARD RESULTS 32.2*    PLEASE DISREGARD RESULTS 286     No results found for: SDES  Lab Results   Component Value Date/Time    Culture result: NO GROWTH 2 DAYS 12/20/2021 12:30 AM     Recent Results (from the past 24 hour(s))   METABOLIC PANEL, BASIC    Collection Time: 12/21/21  4:45 PM   Result Value Ref Range    Sodium 116 (LL) 136 - 145 mmol/L    Potassium 4.0 3.5 - 5.1 mmol/L    Chloride 81 (L) 97 - 108 mmol/L    CO2 26 21 - 32 mmol/L    Anion gap 9 5 - 15 mmol/L    Glucose 154 (H) 65 - 100 mg/dL    BUN 15 6 - 20 MG/DL    Creatinine 0.71 0.55 - 1.02 MG/DL    BUN/Creatinine ratio 21 (H) 12 - 20      GFR est AA >60 >60 ml/min/1.73m2    GFR est non-AA >60 >60 ml/min/1.73m2    Calcium 8.1 (L) 8.5 - 23.4 MG/DL   METABOLIC PANEL, BASIC    Collection Time: 12/21/21  8:31 PM   Result Value Ref Range    Sodium 118 (LL) 136 - 145 mmol/L    Potassium 3.9 3.5 - 5.1 mmol/L    Chloride 86 (L) 97 - 108 mmol/L    CO2 27 21 - 32 mmol/L    Anion gap 5 5 - 15 mmol/L    Glucose 137 (H) 65 - 100 mg/dL    BUN 15 6 - 20 MG/DL    Creatinine 0.66 0.55 - 1.02 MG/DL    BUN/Creatinine ratio 23 (H) 12 - 20      GFR est AA >60 >60 ml/min/1.73m2    GFR est non-AA >60 >60 ml/min/1.73m2    Calcium 8.4 (L) 8.5 - 69.7 MG/DL   METABOLIC PANEL, BASIC    Collection Time: 12/22/21 12:24 AM   Result Value Ref Range    Sodium 121 (L) 136 - 145 mmol/L    Potassium 3.8 3.5 - 5.1 mmol/L    Chloride 87 (L) 97 - 108 mmol/L    CO2 26 21 - 32 mmol/L    Anion gap 8 5 - 15 mmol/L    Glucose 118 (H) 65 - 100 mg/dL    BUN 14 6 - 20 MG/DL Creatinine 0.63 0.55 - 1.02 MG/DL    BUN/Creatinine ratio 22 (H) 12 - 20      GFR est AA >60 >60 ml/min/1.73m2    GFR est non-AA >60 >60 ml/min/1.73m2    Calcium 7.9 (L) 8.5 - 92.8 MG/DL   METABOLIC PANEL, BASIC    Collection Time: 12/22/21  4:25 AM   Result Value Ref Range    Sodium 121 (L) 136 - 145 mmol/L    Potassium 3.5 3.5 - 5.1 mmol/L    Chloride 89 (L) 97 - 108 mmol/L    CO2 28 21 - 32 mmol/L    Anion gap 4 (L) 5 - 15 mmol/L    Glucose 123 (H) 65 - 100 mg/dL    BUN 14 6 - 20 MG/DL    Creatinine 0.70 0.55 - 1.02 MG/DL    BUN/Creatinine ratio 20 12 - 20      GFR est AA >60 >60 ml/min/1.73m2    GFR est non-AA >60 >60 ml/min/1.73m2    Calcium 8.4 (L) 8.5 - 10.1 MG/DL           I have reviewed the flowsheets. Chart and Pertinent Notes have been reviewed. No change in PMH ,family and social history from Consult note.       Oksana Devries [facility]

## 2021-12-22 NOTE — PROGRESS NOTES
Transition of Care Plan: Therapy recommending SNF-need choices    RUR: 14% low    PCP F/U: Parveen Amador MD    Disposition: Goal: SNF    Transportation: BLS    Main Contact: Chon Thakkar [phone], [phone] (mobile)    [phone]: Telephone call to sister Raissa García. Left message to return call. Caregivers in the room with patient. Patient asleep. Spoke with them to let them know that the plan is SNF. States that they will reach out to patient's niece, Kyree Landa and that she will give this CM a call regarding discharge planning. SNF list left in room along with CHF bundle form which was explained. 1240: Spoke with sister Raissa García. Agreeable to SNF. Choices will be emailed to  at [email or web address]  Will continue to follow. 1545: Sister received list. States she will let this CM know choices upon reviewing with family. Alvaro Whitman RN, CRM    Patient/family seen: yes  Informed patient/family of BPCI-A Bundle Program. Also advised of potential outreach by Care Transitions Team.  Bundle Payment Care Improvement Beneficiary Letter Delivered to Beneficiary or Representative:yes.  Date BCPI -A was given:12/22/2021

## 2021-12-22 NOTE — PROGRESS NOTES
Problem: Falls - Risk of  Goal: *Absence of Falls  Description: Document Leno Candelario Fall Risk and appropriate interventions in the flowsheet.   Outcome: Progressing Towards Goal  Note: Fall Risk Interventions:       Mentation Interventions: Adequate sleep, hydration, pain control,Bed/chair exit alarm,Door open when patient unattended,Increase mobility,More frequent rounding,Reorient patient,Room close to nurse's station,Update white board    Medication Interventions: Assess postural VS orthostatic hypotension,Patient to call before getting OOB,Bed/chair exit alarm,Teach patient to arise slowly    Elimination Interventions: Bed/chair exit alarm,Call light in reach,Patient to call for help with toileting needs,Stay With Me (per policy)              Problem: Pain  Goal: *Control of Pain  Outcome: Progressing Towards Goal

## 2021-12-22 NOTE — PROGRESS NOTES
Hospitalist Progress Note    NAME: Suman Foreman   :  1922   MRN:  [id number]     This is a 80-year-old female with past medical history of pulmonary hypertension, atrial fibrillation, hypertension, DM was admitted to the ICU overnight with symptomatic hyponatremia given mental status changes. Her serum sodium was noted to be 109. She was given hypertonic saline and admitted to the ICU for further work-up. She received hypertonic saline in ICU with improving sodium     Assessment / Plan:  Severe hyponatremia likely multifactorial  -Sodium is improving     -Nephrology is following  -Continue fluid restriction at 1000 mL  -CT head is normal.  UA is normal  - :  after 3% saline, d/w Renal, will give Tolvaptan with STAT q4h BMP, Renal to be notified after each results, d/w RN   - Na 121, renal managing     Atrial fibrillation  Hypertension  Diabetes mellitus type 2  Pulmonary hypertension  -Resume home metoprolol, aspirin  -Consult pharmacy for medication reconciliation      Body mass index is 38.43 kg/m². Code status: Full  Prophylaxis: Lovenox  Recommended Disposition: Home w/Family     Subjective:     Chief Complaint / Reason for Physician Visit  Alert, awake, oriented x2  Family at bedside  Patient states she feels better today   No nausea/vomiting  Appetite improved     Review of Systems:  Symptom Y/N Comments  Symptom Y/N Comments   Fever/Chills    Chest Pain     Poor Appetite    Edema     Cough    Abdominal Pain     Sputum    Joint Pain     SOB/MCNAIR    Pruritis/Rash     Nausea/vomit    Tolerating PT/OT     Diarrhea    Tolerating Diet     Constipation    Other       Could NOT obtain due to:      Objective:     VITALS:   Last 24hrs VS reviewed since prior progress note.  Most recent are:  Patient Vitals for the past 24 hrs:   Temp Pulse Resp BP SpO2   21 0847  69  (!) 160/73    21 0600 97.9 °F (36.6 °C) 62 14 (!) 147/63 99 %   21 0558  70      21 0356  (!) [address]   12/22/21 0200 98 °F (36.7 °C) (!) 53 13 (!) 180/64 96 %   12/22/21 0154  (!) 49      12/21/21 2201  65      12/21/21 2200 98 °F (36.7 °C) (!) 59 14 (!) 154/65 98 %   12/21/21 2007  (!) 44      12/21/21 1811  65      12/21/21 1802 98.4 °F (36.9 °C) 68 21 (!) 149/73 96 %   12/21/21 1610  60      12/21/21 1400  76      12/21/21 1349 97.5 °F (36.4 °C) (!) 52 19 (!) 162/64 98 %   12/21/21 1200  63      12/21/21 1017 97.7 °F (36.5 °C) 65 14 (!) 149/57 98 %   12/21/21 1000  63          Intake/Output Summary (Last 24 hours) at 12/22/2021 0957  Last data filed at 12/22/2021 0845  Gross per 24 hour   Intake 540 ml   Output 5700 ml   Net -5160 ml        PHYSICAL EXAM:  General: WD, WN. Alert, cooperative, no acute distress    EENT:  EOMI. Anicteric sclerae. MMM  Resp:  CTA bilaterally, no wheezing or rales. No accessory muscle use  CV:  Regular  rhythm,  No edema  GI:  Soft, Non distended, Non tender.  +Bowel sounds  Neurologic:  Alert and oriented X 3, normal speech,   Psych:   Good insight. Not anxious nor agitated  Skin:  No rashes.   No jaundice    Reviewed most current lab test results and cultures  YES  Reviewed most current radiology test results   YES  Review and summation of old records today    NO  Reviewed patient's current orders and MAR    YES  PMH/SH reviewed - no change compared to H&P          Current Facility-Administered Medications:     fluticasone propionate (FLONASE) 50 mcg/actuation nasal spray 2 Spray, 2 Spray, Both Nostrils, DAILY PRN, Shawn Isabel MD    cetirizine (ZYRTEC) tablet 10 mg, 10 mg, Oral, DAILY, Shawn Isabel MD    sodium chloride (NS) flush 5-40 mL, 5-40 mL, IntraVENous, Q8H, Julia Holland NP, 10 mL at 12/22/21 0538    sodium chloride (NS) flush 5-40 mL, 5-40 mL, IntraVENous, PRN, Albino ROBYN Bullock    acetaminophen (TYLENOL) tablet 650 mg, 650 mg, Oral, Q6H PRN, 650 mg at 12/21/21 1210 **OR** acetaminophen (TYLENOL) suppository 650 mg, 650 mg, Rectal, Q6H PRN, Ned Wynne, NP    polyethylene glycol (MIRALAX) packet 17 g, 17 g, Oral, DAILY PRN, Ned Wynne, NP    ondansetron (ZOFRAN ODT) tablet 4 mg, 4 mg, Oral, Q8H PRN **OR** ondansetron (ZOFRAN) injection 4 mg, 4 mg, IntraVENous, Q6H PRN, Ned Wynne, NP    balsam peru-castor oiL (VENELEX) ointment, , Topical, Q12H, Judy Berry MD, Given at 12/22/21 0851    miconazole (MICOTIN) 2 % powder, , Topical, Q12H, Judy Berry MD, Given at 12/22/21 0851    metoprolol succinate (TOPROL-XL) XL tablet 25 mg, 25 mg, Oral, DAILY, Brandon Gagnon MD, 25 mg at 12/22/21 9554    aspirin delayed-release tablet 81 mg, 81 mg, Oral, DAILY, Brandon Gagnon MD, 81 mg at 12/22/21 7565  ________________________________________________________________________  Care Plan discussed with:    Comments   Patient X    Family  X    RN X    Care Manager X    Consultant  X                      Multidiciplinary team rounds were held today with , nursing, pharmacist and clinical coordinator. Patient's plan of care was discussed; medications were reviewed and discharge planning was addressed. ________________________________________________________________________  Total NON critical care TIME:  30    Minutes    Total CRITICAL CARE TIME Spent:   Minutes non procedure based      Comments   >50% of visit spent in counseling and coordination of care     ________________________________________________________________________  Danay Banks MD     Procedures: see electronic medical records for all procedures/Xrays and details which were not copied into this note but were reviewed prior to creation of Plan. LABS:  I reviewed today's most current labs and imaging studies.   Pertinent labs include:  Recent Labs     12/21/21  0340 12/21/21  0235 12/19/21  2328   WBC 11.8* PLEASE DISREGARD RESULTS 12.7*   HGB 10.8* PLEASE DISREGARD RESULTS 11.4*   HCT 30.4* PLEASE DISREGARD RESULTS 32.2*    PLEASE DISREGARD RESULTS 286     Recent Labs     12/22/21  0425 12/22/21  0024 12/21/21 2031 12/20/21 2055 12/20/21  1514 12/20/21  0022 12/19/21  2328   * 121* 118*   < > 112*   < > 109*   K 3.5 3.8 3.9   < > 3.3*   < > 4.4   CL 89* 87* 86*   < > 77*   < > 74*   CO2 28 26 27   < > 25   < > 23   * 118* 137*   < > 149*   < > 132*   BUN 14 14 15   < > 15   < > 16   CREA 0.70 0.63 0.66   < > 0.75   < > 0.78   CA 8.4* 7.9* 8.4*   < > 8.0*   < > 8.6   ALB  --   --   --   --  3.2*  --  3.8   TBILI  --   --   --   --  1.7*  --  1.8*   ALT  --   --   --   --  40  --  42    < > = values in this interval not displayed.        Signed: Tim Armstrong MD

## 2021-12-22 NOTE — PROGRESS NOTES
Chelle Del Real     12/18/1922       [id number]    Date of service:  12/22/21      Overnight Nephrology Cross Cover     Paged by RN regarding sodium level  - Trend: 12/21 0340am 114-> 2031pm 118-> 12/22 0024am 121  - Overcorrected goal of 120mmol/L  - Will start D5W infusion, continue serial BMPs     Annalee Tripathi FNP-C, PA-C    [facility]  [address] [address]  Phone: [phone]     [phone]   [facility]  [address]  [facility], [address]  Phone: [phone]     [phone]   [facility]  [address] [place], [address] [facility]  [place], [address] [place] [address]  Phone: [phone]     [phone]   [address] [place] [address],[address] [facility], [address] [place] [address]  Phone: [phone]     [phone]

## 2021-12-22 NOTE — PROGRESS NOTES
stopped D5W  Continue 1000 cc FR  Repeat labs at [facility]         [facility]  Office :[phone]  Fax: [phone]

## 2021-12-23 NOTE — PROGRESS NOTES
Problem: Mobility Impaired (Adult and Pediatric)  Goal: *Acute Goals and Plan of Care (Insert Text)  Description: FUNCTIONAL STATUS PRIOR TO ADMISSION: Patient reports a decline in functional independence in the last 2-4 weeks. Originally modified independent with rollator. HOME SUPPORT PRIOR TO ADMISSION: The patient lived alone with caregivers present most of the day to provide assistance, alone some hours of the day. Physical Therapy Goals  Initiated 12/21/2021  1. Patient will move from supine to sit and sit to supine  in bed with moderate assistance  within 7 day(s). 2.  Patient will transfer from bed to chair and chair to bed with moderate assistance  using the least restrictive device within 7 day(s). 3.  Patient will perform sit to stand with moderate assistance  within 7 day(s). 4.  Patient will ambulate with minimal assistance  for 50 feet with the least restrictive device within 7 day(s). Outcome: Progressing Towards Goal     PHYSICAL THERAPY TREATMENT  Patient: Frank Mcintyre (16 y.o. female)  Date: 12/23/2021  Diagnosis: Hyponatremia [E87.1] <principal problem not specified>       Precautions: Fall  Chart, physical therapy assessment, plan of care and goals were reviewed. ASSESSMENT  Patient continues with skilled PT services and is progressing towards goals - remains most limited by generalized weakness, impaired balance, and pain  leading to increased falls risk and dependecny from baseline level. Received pt supine in bed with family present - required max encouragement and positive reinforcement for participation this date however required significantly less physical assist for mobility as compared to last session. She was able to mobilize to EOB with increased time and mod A. Progressed to completing sit<>stads x2 reps to RW with mod A x2 (pull to stand on RW) and use of fwd momentum.  Once up, she was able to march in place and then take several steps over to recliner - no buckle observed but quick to fatigue. Remained up in recliner at end of session, NAD. Strongly recommend OOB to chair and to [facility] daily with RN staff assist to reduce risk of further decline. Continue to recommend SNF rehab once medically cleared. Current Level of Function Impacting Discharge (mobility/balance): mod A x2 with RW    Other factors to consider for discharge: below baseline; high falls risk          PLAN :  Patient continues to benefit from skilled intervention to address the above impairments. Continue treatment per established plan of care. to address goals. Recommendation for discharge: (in order for the patient to meet his/her long term goals)  Therapy up to 5 days/week in SNF setting    This discharge recommendation:  A follow-up discussion with the attending provider and/or case management is planned    IF patient discharges home will need the following DME: to be determined (TBD)       SUBJECTIVE:   Patient stated DONT touch by my ankles!!.    OBJECTIVE DATA SUMMARY:   Critical Behavior:  Neurologic State: Alert,Confused,Eyes open spontaneously  Orientation Level: Disoriented X4  Cognition: Follows commands  Safety/Judgement: Decreased awareness of environment,Decreased awareness of need for assistance,Decreased awareness of need for safety,Decreased insight into deficits  Functional Mobility Training:  Bed Mobility:     Supine to Sit: Additional time; Moderate assistance;Bed Modified  Scooting: Minimum assistance     Transfers:  Sit to Stand: Assist x2; Moderate assistance  Stand to Sit: Assist x2; Moderate assistance  Bed to Chair: Assist x2; Moderate assistance        Balance:  Sitting: Impaired; Without support  Sitting - Static: Good (unsupported)  Sitting - Dynamic: Fair (occasional)  Standing: Impaired; With support  Standing - Static: Fair;Constant support  Standing - Dynamic : Fair;Constant support    Activity Tolerance:   requires rest breaks    After treatment patient left in no apparent distress:   Sitting in chair, Call bell within reach, Bed / chair alarm activated, and Caregiver / family present    COMMUNICATION/COLLABORATION:   The patients plan of care was discussed with: Occupational therapist and Registered nurse.      Alin Mclaughlin PT, DPT   Time Calculation: 29 mins

## 2021-12-23 NOTE — PROGRESS NOTES
Problem: Self Care Deficits Care Plan (Adult)  Goal: *Acute Goals and Plan of Care (Insert Text)  Description: FUNCTIONAL STATUS PRIOR TO ADMISSION: Patient was supervision using a rollator for functional mobility, transport wheelchair for community mobility with caregiver assist. She was largely was supervision for basic ADLs with occasional assist for distal lower body dressing, managing her own finances but assist from caregivers for other instrumental ADLs. HOME SUPPORT: The patient lived alone with caregivers present most of the day to provide assistance, alone some hours of the day. Occupational Therapy Goals  Initiated 12/21/2021  1. Patient will perform grooming EOB with minimal assistance/contact guard assist within 7 day(s). 2.  Patient will perform bathing with moderate assistance & AE PRN within 7 day(s). 3.  Patient will perform upper body dressing with minimal assistance/contact guard assist within 7 day(s). 4.  Patient will perform lower body dressing with maximal assistance and AE PRN within 7 day(s). 5.  Patient will perform toilet transfers to/from [facility] with maximal assistance within 7 day(s). 6.  Patient will perform all aspects of toileting with maximal assistance within 7 day(s). 7.  Patient will participate in upper extremity therapeutic exercise/activities with minimal assistance/contact guard assist for 5 minutes within 7 day(s). 8.  Patient will utilize energy conservation techniques during functional activities with verbal and visual cues within 7 day(s). Outcome: Progressing Towards Goal   OCCUPATIONAL THERAPY TREATMENT  Patient: Ghada Dorantes (67 y.o. female)  Date: 12/23/2021  Diagnosis: Hyponatremia [E87.1] <principal problem not specified>       Precautions: Fall  Chart, occupational therapy assessment, plan of care, and goals were reviewed. ASSESSMENT  Patient continues with skilled OT services and is progressing towards goals.  Patient semi supine in bed upon OT/PT arrival and initially not agreeable to session but eventually agrees with encouragement and education of role of therapy in hospital. Patient's niece present throughout session and supportive. Patient moderate assist with increased time sup -> sit and min A scooting, patient with overall improvements in mobility this session with decreased posterior lean in standing, mod A x2 sit <> stand. Patient then able to demonstrate stepping in place so recliner set up and patient mod A x2 transfer bed to chair. Patient participated in grooming once sitting in chair to wash face and brush hair, total A to don socks prior to mobility. Patient encouraged to sit up in chair for at least 30 minutes before transferring back to bed and patient and family agreeable. Patient would benefit from skilled OT services during admission to improve independence with self care and functional mobility/transfers. Recommend discharge to SNF at this time. Current Level of Function Impacting Discharge (ADLs): mod A x2 for out of bed mobility this day, total A lower extremity activities of daily living, min to mod A upper extremity ADL    Other factors to consider for discharge: time since onset, severity of deficits, prior level of function, family support         PLAN :  Patient continues to benefit from skilled intervention to address the above impairments. Continue treatment per established plan of care to address goals.     Recommend with staff: up to chair for meals, assist with ADLs PRN, BSC for toileting    Recommendation for discharge: (in order for the patient to meet his/her long term goals)  Therapy up to 5 days/week in SNF setting    This discharge recommendation:  Has been made in collaboration with the attending provider and/or case management    IF patient discharges home will need the following DME: TBD, at minimum will need 24/7 caregiver       SUBJECTIVE:   Patient stated Yannick Bravo was really hoping to sleep some this afternoon.     OBJECTIVE DATA SUMMARY:   Cognitive/Behavioral Status:  Neurologic State: Alert;Irritable  Orientation Level: Oriented to person  Cognition: Follows commands             Functional Mobility and Transfers for ADLs:  Bed Mobility:  Supine to Sit: Additional time; Moderate assistance;Bed Modified  Scooting: Minimum assistance    Transfers:  Sit to Stand: Assist x2; Moderate assistance     Bed to Chair: Assist x2; Moderate assistance    Balance:  Sitting: Impaired; Without support  Sitting - Static: Good (unsupported)  Sitting - Dynamic: Fair (occasional)  Standing: Impaired; With support  Standing - Static: Fair;Constant support  Standing - Dynamic : Fair;Constant support    ADL Intervention:       Grooming  Position Performed: Seated in chair  Washing Face: Set-up  Brushing/Combing Hair: Moderate assistance  Cues: Verbal cues provided                   Lower Body Dressing Assistance  Socks: Total assistance (dependent)  Leg Crossed Method Used: No  Position Performed: Seated edge of bed  Cues: Physical assistance; Don              Pain:  Patient reports pain in marc ankles, did not rate. Niece reports it is from cellulitis    Activity Tolerance:   Fair, requires rest breaks, and patient reports dizziness at edge of bed but VSS    After treatment patient left in no apparent distress:   Sitting in chair, Call bell within reach, Bed / chair alarm activated, and Caregiver / family present    COMMUNICATION/COLLABORATION:   The patients plan of care was discussed with: Physical therapist and Registered nurse.      SHAR Toribio/L  Time Calculation: 27 mins

## 2021-12-23 NOTE — PROGRESS NOTES
Transition of Care Plan: Therapy recommending SNF-referrals pending     RUR: 14% low     PCP F/U: Winter Edwards MD     Disposition: Goal: SNF     Transportation: S     Main Contact: Anh Brito [phone], [phone] (mobile)     1116: Left message for Rukhsana Herrera to return call regarding SNF list that was sent to her yesterday. 1326: Sister, Cara Byrnes provided choice. Would like referrals sent to [facility], [facility], and [facility] in that order of preference. Referrals sent. Will continue to follow.        Edwardo Levy RN, CRM

## 2021-12-23 NOTE — PROGRESS NOTES
Bedside and Verbal shift change report given to Kendal Mueller RN (oncoming nurse) by Asia Garza RN (offgoing nurse). Report included the following information SBAR, Kardex, Intake/Output, MAR, Recent Results, Cardiac Rhythm Afib; BBB, Alarm Parameters , Quality Measures and Dual Neuro Assessment.

## 2021-12-23 NOTE — PROGRESS NOTES
[facility]   [address] [facility], [address] [place] [address]  Phone: [phone]   [phone]    [email or web address]     Nephrology Progress Note    Patient Name : Ramana Cardona      : 1922     MRN : [id number]  Date of Admission : 2021  Date of Servive : 21    CC: Follow up for Hyponatremia        Assessment and Plan   Severe Hyponatremia :  - Multifactorial.  SIADH at baseline exacerbated by use of Bactrim, diuretics   - Appropriate rate of correction w/ 3% Saline, followed by Tolvaptan and D5W  - Na at goal   - stopped D5W  - ordered CXR for b/l rales although she is net 9L negative since admission   - PT/OT consult   - consider woods removal     Bradycardia : overnight down to 30 s  Chronic A fib  -consider cardiology consult     Pulm HTN   Severe TR, Mild MR  JOSE         Interval History:  She thinks she is not ready for d/c   Did not see PT/OT yesterday. Did not ambulate   HR overnight down to 30s. Currently at 46s  Na at 128      Review of Systems: Review of systems not obtained due to patient factors.     Current Medications:   Current Facility-Administered Medications   Medication Dose Route Frequency    fluticasone propionate (FLONASE) 50 mcg/actuation nasal spray 2 Spray  2 Spray Both Nostrils DAILY PRN    cetirizine (ZYRTEC) tablet 10 mg  10 mg Oral DAILY    sodium chloride (NS) flush 5-40 mL  5-40 mL IntraVENous Q8H    sodium chloride (NS) flush 5-40 mL  5-40 mL IntraVENous PRN    acetaminophen (TYLENOL) tablet 650 mg  650 mg Oral Q6H PRN    Or    acetaminophen (TYLENOL) suppository 650 mg  650 mg Rectal Q6H PRN    polyethylene glycol (MIRALAX) packet 17 g  17 g Oral DAILY PRN    ondansetron (ZOFRAN ODT) tablet 4 mg  4 mg Oral Q8H PRN    Or    ondansetron (ZOFRAN) injection 4 mg  4 mg IntraVENous Q6H PRN    balsam peru-castor oiL (VENELEX) ointment   Topical Q12H    miconazole (MICOTIN) 2 % powder   Topical Q12H    metoprolol succinate (TOPROL-XL) XL tablet 25 mg  25 mg Oral DAILY    aspirin delayed-release tablet 81 mg  81 mg Oral DAILY      Allergies   Allergen Reactions    Amoxicillin Other (comments)     \"makes me feel like I\"m going to faint\"    Aspirin Palpitations     Per patient, no allergy    Gabapentin Drowsiness     * pt states this med makes her feel very drowsy , gives her a drunk feeling.  Levaquin [Levofloxacin] Hives and Rash    Lorazepam Other (comments)     Feeling faint.  Losartan Other (comments)     Tongue swelling      Lyrica [Pregabalin] Rash    Other Medication Other (comments)     Feeling faint, does not decrease pain.  Sertraline Other (comments)     Feeling faint. Objective:  Vitals:    Vitals:    12/22/21 1815 12/22/21 2200 12/23/21 0200 12/23/21 0600   BP: (!) 148/56 (!) 151/59 136/66 (!) 124/37   Pulse: 61 (!) 58 (!) 58 (!) 43   Resp: 21 13 20 20   Temp: 98.4 °F (36.9 °C) 98.2 °F (36.8 °C) 98.2 °F (36.8 °C) 98 °F (36.7 °C)   SpO2: 98% 97% 98% 97%   Weight:   85.3 kg (188 lb 0.8 oz)      Intake and Output:  12/23 0701 - 12/23 1900  In: -   Out: 450 [Urine:450]  12/21 1901 - 12/23 0700  In: 1870 [P.O.:870; I.V.:1000]  Out: 6625 [Urine:6625]    Physical Examination:    General: Confused- improving   Neck:  Supple, no mass  Resp:  B/l rales   CV:  RRR,  systolic murmur +  GI:  Soft, NT, + BS, no HS megaly  Neurologic:  Confused   Ext                   B/L leg edema   :  Cifuentes +      []    High complexity decision making was performed  []    Patient is at high-risk of decompensation with multiple organ involvement    Lab Data Personally Reviewed: I have reviewed all the pertinent labs, microbiology data and radiology studies during assessment.     Recent Labs     12/23/21  0527 12/22/21  2153 12/22/21  1228 12/22/21  0425 12/22/21  0024 12/20/21 2055 12/20/21  1514   * 127* 127* 121* 121*   < > 112*   K 3.7 3.7 3.5 3.5 3.8   < > 3.3*   CL 93* 93* 92* 89* 87*   < > 77*   CO2 29 29 32 28 26   < > 25   * 164* 136* 123* 118*   < > 149*   BUN 14 15 13 14 14   < > 15   CREA 0.72 0.85 0.89 0.70 0.63   < > 0.75   CA 8.3* 8.7 8.6 8.4* 7.9*   < > 8.0*   ALB  --   --   --   --   --   --  3.2*   ALT  --   --   --   --   --   --  40    < > = values in this interval not displayed.      Recent Labs     12/21/21  0340 12/21/21  0235   WBC 11.8* PLEASE DISREGARD RESULTS   HGB 10.8* PLEASE DISREGARD RESULTS   HCT 30.4* PLEASE DISREGARD RESULTS    PLEASE DISREGARD RESULTS     No results found for: SDES  Lab Results   Component Value Date/Time    Culture result: NO GROWTH 2 DAYS 12/20/2021 12:30 AM     Recent Results (from the past 24 hour(s))   METABOLIC PANEL, BASIC    Collection Time: 12/22/21 12:28 PM   Result Value Ref Range    Sodium 127 (L) 136 - 145 mmol/L    Potassium 3.5 3.5 - 5.1 mmol/L    Chloride 92 (L) 97 - 108 mmol/L    CO2 32 21 - 32 mmol/L    Anion gap 3 (L) 5 - 15 mmol/L    Glucose 136 (H) 65 - 100 mg/dL    BUN 13 6 - 20 MG/DL    Creatinine 0.89 0.55 - 1.02 MG/DL    BUN/Creatinine ratio 15 12 - 20      GFR est AA >60 >60 ml/min/1.73m2    GFR est non-AA 58 (L) >60 ml/min/1.73m2    Calcium 8.6 8.5 - 02.4 MG/DL   METABOLIC PANEL, BASIC    Collection Time: 12/22/21  9:53 PM   Result Value Ref Range    Sodium 127 (L) 136 - 145 mmol/L    Potassium 3.7 3.5 - 5.1 mmol/L    Chloride 93 (L) 97 - 108 mmol/L    CO2 29 21 - 32 mmol/L    Anion gap 5 5 - 15 mmol/L    Glucose 164 (H) 65 - 100 mg/dL    BUN 15 6 - 20 MG/DL    Creatinine 0.85 0.55 - 1.02 MG/DL    BUN/Creatinine ratio 18 12 - 20      GFR est AA >60 >60 ml/min/1.73m2    GFR est non-AA >60 >60 ml/min/1.73m2    Calcium 8.7 8.5 - 07.7 MG/DL   METABOLIC PANEL, BASIC    Collection Time: 12/23/21  5:27 AM   Result Value Ref Range    Sodium 128 (L) 136 - 145 mmol/L    Potassium 3.7 3.5 - 5.1 mmol/L    Chloride 93 (L) 97 - 108 mmol/L    CO2 29 21 - 32 mmol/L    Anion gap 6 5 - 15 mmol/L    Glucose 155 (H) 65 - 100 mg/dL    BUN 14 6 - 20 MG/DL Creatinine 0.72 0.55 - 1.02 MG/DL    BUN/Creatinine ratio 19 12 - 20      GFR est AA >60 >60 ml/min/1.73m2    GFR est non-AA >60 >60 ml/min/1.73m2    Calcium 8.3 (L) 8.5 - 10.1 MG/DL           I have reviewed the flowsheets. Chart and Pertinent Notes have been reviewed. No change in PMH ,family and social history from Consult note.       Oksana Guillen [facility]

## 2021-12-23 NOTE — PROGRESS NOTES
Problem: Falls - Risk of  Goal: *Absence of Falls  Description: Document Purvi Blood Fall Risk and appropriate interventions in the flowsheet.   Outcome: Progressing Towards Goal  Note: Fall Risk Interventions:       Mentation Interventions: Adequate sleep, hydration, pain control,Bed/chair exit alarm,Door open when patient unattended,Eyeglasses and hearing aids,Family/sitter at [employer] frequent rounding,Reorient patient,Room close to nurse's station,Update white board    Medication Interventions: Assess postural VS orthostatic hypotension,Patient to call before getting OOB,Teach patient to arise slowly,Bed/chair exit alarm    Elimination Interventions: Bed/chair exit alarm,Call light in reach,Stay With Me (per policy)              Problem: Pain  Goal: *Control of Pain  Outcome: Progressing Towards Goal

## 2021-12-24 NOTE — PROGRESS NOTES
Problem: Falls - Risk of  Goal: *Absence of Falls  Description: Document Kishore Bradshaw Fall Risk and appropriate interventions in the flowsheet. Outcome: Progressing Towards Goal  Note: Fall Risk Interventions:       Mentation Interventions: Adequate sleep, hydration, pain control,Bed/chair exit alarm,Door open when patient unattended,Increase mobility,More frequent rounding,Reorient patient    Medication Interventions: Assess postural VS orthostatic hypotension,Bed/chair exit alarm,Patient to call before getting OOB,Evaluate medications/consider consulting pharmacy,Teach patient to arise slowly    Elimination Interventions: Call light in reach,Patient to call for help with toileting needs,Toileting schedule/hourly rounds              Problem: Patient Education: Go to Patient Education Activity  Goal: Patient/Family Education  Outcome: Progressing Towards Goal     Problem: Pressure Injury - Risk of  Goal: *Prevention of pressure injury  Description: Document Isaias Scale and appropriate interventions in the flowsheet.   Outcome: Progressing Towards Goal  Note: Pressure Injury Interventions:  Sensory Interventions: Assess changes in LOC,Discuss PT/OT consult with provider,Keep linens dry and wrinkle-free,Minimize linen layers    Moisture Interventions: Absorbent underpads,Limit adult briefs,Internal/External urinary devices,Minimize layers    Activity Interventions: Increase time out of bed,Pressure redistribution bed/mattress(bed type),PT/OT evaluation    Mobility Interventions: HOB 30 degrees or less,PT/OT evaluation    Nutrition Interventions: Document food/fluid/supplement intake    Friction and Shear Interventions: HOB 30 degrees or less,Minimize layers                Problem: Patient Education: Go to Patient Education Activity  Goal: Patient/Family Education  Outcome: Progressing Towards Goal     Problem: Pain  Goal: *Control of Pain  Outcome: Progressing Towards Goal     Problem: Patient Education: Go to Patient Education Activity  Goal: Patient/Family Education  Outcome: Progressing Towards Goal     Problem: Patient Education: Go to Patient Education Activity  Goal: Patient/Family Education  Outcome: Progressing Towards Goal     Problem: Patient Education: Go to Patient Education Activity  Goal: Patient/Family Education  Outcome: Progressing Towards Goal     Problem: Patient Education: Go to Patient Education Activity  Goal: Patient/Family Education  Outcome: Progressing Towards Goal     Problem: Heart Failure: Discharge Outcomes  Goal: *Demonstrates ability to perform prescribed activity without shortness of breath or discomfort  Outcome: Progressing Towards Goal  Goal: *Left ventricular function assessment completed prior to or during stay, or planned for post-discharge  Outcome: Progressing Towards Goal  Goal: *ACEI prescribed if LVEF less than 40% and no contraindications or ARB prescribed  Outcome: Progressing Towards Goal  Goal: *Verbalizes understanding and describes prescribed diet  Outcome: Progressing Towards Goal  Goal: *Verbalizes understanding/describes prescribed medications  Outcome: Progressing Towards Goal  Goal: *Describes available resources and support systems  Description: (eg: Home Health, Palliative Care, Advanced Medical Directive)  Outcome: Progressing Towards Goal  Goal: *Describes smoking cessation resources  Outcome: Progressing Towards Goal  Goal: *Understands and describes signs and symptoms to report to providers(Stroke Metric)  Outcome: Progressing Towards Goal  Goal: *Describes/verbalizes understanding of follow-up/return appt  Description: (eg: to physicians, diabetes treatment coordinator, and other resources  Outcome: Progressing Towards Goal  Goal: *Describes importance of continuing daily weights and changes to report to physician  Outcome: Progressing Towards Goal

## 2021-12-24 NOTE — PROGRESS NOTES
[facility]   [address] [facility], [address] [place] [address]  Phone: [phone]   [phone]    [email or web address]     Nephrology Progress Note    Patient Name : Cassie Wise      : 1922     MRN : [id number]  Date of Admission : 2021  Date of Servive : 21    CC: Follow up for Hyponatremia        Assessment and Plan   Severe Hyponatremia :  - Multifactorial.  SIADH at baseline exacerbated by use of Bactrim, diuretics   - Appropriate rate of correction w/ 3% Saline, followed by Tolvaptan and D5W  -Ordered another dose of tolvaptan  -Serial labs  -Will be okay for discharge tomorrow from renal standpoint    Bradycardia   Chronic A fib  Pulm HTN   Severe TR, Mild MR  JOSE         Interval History:  Seen and examined earlier today. She did not ambulate yesterday. Sodium improved to one twenty-nine and then trended down to one twenty-seven. Denies any new symptoms. Heart rate remains intermittently low      Review of Systems: Review of systems not obtained due to patient factors.     Current Medications:   Current Facility-Administered Medications   Medication Dose Route Frequency    fluticasone propionate (FLONASE) 50 mcg/actuation nasal spray 2 Spray  2 Spray Both Nostrils DAILY PRN    cetirizine (ZYRTEC) tablet 10 mg  10 mg Oral DAILY    sodium chloride (NS) flush 5-40 mL  5-40 mL IntraVENous Q8H    sodium chloride (NS) flush 5-40 mL  5-40 mL IntraVENous PRN    acetaminophen (TYLENOL) tablet 650 mg  650 mg Oral Q6H PRN    Or    acetaminophen (TYLENOL) suppository 650 mg  650 mg Rectal Q6H PRN    polyethylene glycol (MIRALAX) packet 17 g  17 g Oral DAILY PRN    ondansetron (ZOFRAN ODT) tablet 4 mg  4 mg Oral Q8H PRN    Or    ondansetron (ZOFRAN) injection 4 mg  4 mg IntraVENous Q6H PRN    balsam peru-castor oiL (VENELEX) ointment   Topical Q12H    miconazole (MICOTIN) 2 % powder   Topical Q12H    aspirin delayed-release tablet 81 mg  81 mg Oral DAILY      Allergies   Allergen Reactions    Amoxicillin Other (comments)     \"makes me feel like I\"m going to faint\"    Aspirin Palpitations     Per patient, no allergy    Gabapentin Drowsiness     * pt states this med makes her feel very drowsy , gives her a drunk feeling.  Levaquin [Levofloxacin] Hives and Rash    Lorazepam Other (comments)     Feeling faint.  Losartan Other (comments)     Tongue swelling      Lyrica [Pregabalin] Rash    Other Medication Other (comments)     Feeling faint, does not decrease pain.  Sertraline Other (comments)     Feeling faint. Objective:  Vitals:    Vitals:    12/24/21 0200 12/24/21 0600 12/24/21 1000 12/24/21 1013   BP: (!) 152/70 130/71  (!) 120/48   Pulse: (!) 50 (!) 49 67 66   Resp: 15 16  20   Temp: 98 °F (36.7 °C) 97.3 °F (36.3 °C)  97 °F (36.1 °C)   SpO2: 98%   97%   Weight: 84.9 kg (187 lb 1.6 oz)        Intake and Output:  No intake/output data recorded. 12/22 1901 - 12/24 0700  In: 1360 [P.O.:1360]  Out: 4510 [Urine:2475]    Physical Examination:    General: Confused- improving   Neck:  Supple, no mass  Resp:  B/l rales   CV:  RRR,  systolic murmur +  GI:  Soft, NT, + BS, no HS megaly  Neurologic:  Confused   Ext                   B/L leg edema   :  Cifuentes +      []    High complexity decision making was performed  []    Patient is at high-risk of decompensation with multiple organ involvement    Lab Data Personally Reviewed: I have reviewed all the pertinent labs, microbiology data and radiology studies during assessment. Recent Labs     12/24/21  0253 12/23/21  2017 12/23/21  1346 12/23/21  0527 12/22/21  2153   * 127* 129* 128* 127*   K 4.3 4.2 3.8 3.7 3.7   CL 95* 93* 93* 93* 93*   CO2 26 29 31 29 29   * 177* 162* 155* 164*   BUN 19 17 13 14 15   CREA 0.70 0.92 0.83 0.72 0.85   CA 7.7* 8.5 8.3* 8.3* 8.7     No results for input(s): WBC, HGB, HCT, PLT, HGBEXT, HCTEXT, PLTEXT, HGBEXT, HCTEXT, PLTEXT in the last 72 hours.   No results found for: [facility]  Lab Results   Component Value Date/Time    Culture result: NO GROWTH 4 DAYS 12/20/2021 12:30 AM     Recent Results (from the past 24 hour(s))   METABOLIC PANEL, BASIC    Collection Time: 12/23/21  1:46 PM   Result Value Ref Range    Sodium 129 (L) 136 - 145 mmol/L    Potassium 3.8 3.5 - 5.1 mmol/L    Chloride 93 (L) 97 - 108 mmol/L    CO2 31 21 - 32 mmol/L    Anion gap 5 5 - 15 mmol/L    Glucose 162 (H) 65 - 100 mg/dL    BUN 13 6 - 20 MG/DL    Creatinine 0.83 0.55 - 1.02 MG/DL    BUN/Creatinine ratio 16 12 - 20      GFR est AA >60 >60 ml/min/1.73m2    GFR est non-AA >60 >60 ml/min/1.73m2    Calcium 8.3 (L) 8.5 - 30.6 MG/DL   METABOLIC PANEL, BASIC    Collection Time: 12/23/21  8:17 PM   Result Value Ref Range    Sodium 127 (L) 136 - 145 mmol/L    Potassium 4.2 3.5 - 5.1 mmol/L    Chloride 93 (L) 97 - 108 mmol/L    CO2 29 21 - 32 mmol/L    Anion gap 5 5 - 15 mmol/L    Glucose 177 (H) 65 - 100 mg/dL    BUN 17 6 - 20 MG/DL    Creatinine 0.92 0.55 - 1.02 MG/DL    BUN/Creatinine ratio 18 12 - 20      GFR est AA >60 >60 ml/min/1.73m2    GFR est non-AA 56 (L) >60 ml/min/1.73m2    Calcium 8.5 8.5 - 55.2 MG/DL   METABOLIC PANEL, BASIC    Collection Time: 12/24/21  2:53 AM   Result Value Ref Range    Sodium 126 (L) 136 - 145 mmol/L    Potassium 4.3 3.5 - 5.1 mmol/L    Chloride 95 (L) 97 - 108 mmol/L    CO2 26 21 - 32 mmol/L    Anion gap 5 5 - 15 mmol/L    Glucose 140 (H) 65 - 100 mg/dL    BUN 19 6 - 20 MG/DL    Creatinine 0.70 0.55 - 1.02 MG/DL    BUN/Creatinine ratio 27 (H) 12 - 20      GFR est AA >60 >60 ml/min/1.73m2    GFR est non-AA >60 >60 ml/min/1.73m2    Calcium 7.7 (L) 8.5 - 10.1 MG/DL           I have reviewed the flowsheets. Chart and Pertinent Notes have been reviewed. No change in PMH ,family and social history from Consult note.       Oksana Villalobos [facility]

## 2021-12-24 NOTE — PROGRESS NOTES
1930: Bedside and Verbal shift change report given to ALLYSON Davis (oncoming nurse) by ALLYSON Mcmullen (offgoing nurse). Report included the following information SBAR, Kardex, Intake/Output, MAR, Recent Results and Cardiac Rhythm A Fib.

## 2021-12-24 NOTE — PROGRESS NOTES
Hospitalist Progress Note    NAME: Jonathan Mitchell   :  1922   MRN:  [id number]     \"This is a 75-year-old female with past medical history of pulmonary hypertension, atrial fibrillation, hypertension, DM was admitted to the ICU overnight with symptomatic hyponatremia given mental status changes. Her serum sodium was noted to be 109. She was given hypertonic saline and admitted to the ICU for further work-up. She received hypertonic saline in ICU with improving sodium\"    2021. This note is for 2020, as placing a note was missed on this patient. Saw patient patient this morning, awake alert and oriented, pleasant and cooperative with physical examination, sodium is still not optimized, patient is pending transfer to rehab.     Assessment / Plan:  Hyponatremia   Presented with severe hyponatremia. 2021 presented with sodium level of 114, was placed on 3% saline and transferred to ICU. 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 Multifactorial sodium slowly trending up, not optimized. Nephrology on board, recommendations noted. Serial labs, seizure precautions. Atrial fibrillation  Patient of chronic persistent atrial fibrillation, not anticoagulated. Rate controlled. Continue telemetry, as needed IV beta-blockers. Hypertension  Euvolemic. Normotensive. Continue current meds, adjust meds as needed. Diabetes mellitus type 2  Not sure if patient has diabetes as her hemoglobin A1c is 6.0 and there is no antidiabetic medications on her home regimen. Continue Accu-Chek and hypoglycemia protocol. Pulmonary hypertension  Continue home meds, outpatient PCP and pulmonology follow-up. Body mass index is 37.79 kg/m².     Code status: Full  Prophylaxis: Lovenox  Recommended Disposition: SNF     Subjective:     Chief Complaint / Reason for Physician Visit  Alert, awake, oriented x2  Family at bedside  Patient states she feels better today   No nausea/vomiting  Appetite improved     Review of Systems:  Symptom Y/N Comments  Symptom Y/N Comments   Fever/Chills    Chest Pain     Poor Appetite    Edema     Cough    Abdominal Pain     Sputum    Joint Pain     SOB/MCNAIR    Pruritis/Rash     Nausea/vomit    Tolerating PT/OT     Diarrhea    Tolerating Diet     Constipation    Other       Could NOT obtain due to:      Objective:     VITALS:   Last 24hrs VS reviewed since prior progress note. Most recent are:  Patient Vitals for the past 24 hrs:   Temp Pulse Resp BP SpO2   12/24/21 1409 96.9 °F (36.1 °C) 70 18 (!) 132/49 97 %   12/24/21 1400  66      12/24/21 1200  63      12/24/21 1013 97 °F (36.1 °C) 66 20 (!) 120/48 97 %   12/24/21 1000  67      12/24/21 0800     97 %   12/24/21 0600 97.3 °F (36.3 °C) (!) 49 16 130/71    12/24/21 0200 98 °F (36.7 °C) (!) 50 15 (!) 152/70 98 %   12/23/21 2200 98.2 °F (36.8 °C) (!) 54 14 (!) 127/51 98 %   12/23/21 1800  62      12/23/21 1753 98.6 °F (37 °C) (!) 55 17 (!) 136/50 96 %       Intake/Output Summary (Last 24 hours) at 12/24/2021 1700  Last data filed at 12/24/2021 1528  Gross per 24 hour   Intake 1260 ml   Output 125 ml   Net 1135 ml        PHYSICAL EXAM:  General: WD, WN. Alert, cooperative, no acute distress    EENT:  EOMI. Anicteric sclerae. MMM  Resp:  CTA bilaterally, no wheezing or rales. No accessory muscle use  CV:  Regular  rhythm,  No edema  GI:  Soft, Non distended, Non tender.  +Bowel sounds  Neurologic:  Alert and oriented X 3, normal speech,   Psych:   Good insight. Not anxious nor agitated  Skin:  No rashes.   No jaundice    Reviewed most current lab test results and cultures  YES  Reviewed most current radiology test results   YES  Review and summation of old records today    NO  Reviewed patient's current orders and MAR    YES  PMH/SH reviewed - no change compared to H&P          Current Facility-Administered Medications:     senna (SENOKOT) tablet 8.6 mg, 1 Tablet, Oral, DAILY, Xiomara Hughes MD, 8.6 mg at 12/24/21 1156    docusate sodium (COLACE) capsule 100 mg, 100 mg, Oral, DAILY, Shannan Clancy MD, 100 mg at 12/24/21 1156    polyethylene glycol (MIRALAX) packet 17 g, 17 g, Oral, DAILY PRN, Shannan Granados MD, 17 g at 12/24/21 1521    fluticasone propionate (FLONASE) 50 mcg/actuation nasal spray 2 Spray, 2 Spray, Both Nostrils, DAILY PRN, Margaret Isabel MD    cetirizine (ZYRTEC) tablet 10 mg, 10 mg, Oral, DAILY, Shawn Isabel MD, 10 mg at 12/24/21 0925    sodium chloride (NS) flush 5-40 mL, 5-40 mL, IntraVENous, Q8H, [place] , NP, 10 mL at 12/24/21 1521    sodium chloride (NS) flush 5-40 mL, 5-40 mL, IntraVENous, PRN, [place] , NP    acetaminophen (TYLENOL) tablet 650 mg, 650 mg, Oral, Q6H PRN, 650 mg at 12/21/21 1210 **OR** acetaminophen (TYLENOL) suppository 650 mg, 650 mg, Rectal, Q6H PRN, Allison Kowalski NP    ondansetron (ZOFRAN ODT) tablet 4 mg, 4 mg, Oral, Q8H PRN **OR** ondansetron (ZOFRAN) injection 4 mg, 4 mg, IntraVENous, Q6H PRN, Allison Kowalski NP    balsam peru-castor oiL (VENELEX) ointment, , Topical, Q12H, Ava Nicole MD, Given at 12/24/21 0925    miconazole (MICOTIN) 2 % powder, , Topical, Q12H, Ava Nicole MD, Given at 12/24/21 9046    aspirin delayed-release tablet 81 mg, 81 mg, Oral, DAILY, Catherine Thomas MD, 81 mg at 12/24/21 0973  ________________________________________________________________________  Care Plan discussed with:    Comments   Patient X    Family  X    RN X    Care Manager X    Consultant  X                      Multidiciplinary team rounds were held today with , nursing, pharmacist and clinical coordinator. Patient's plan of care was discussed; medications were reviewed and discharge planning was addressed.      ________________________________________________________________________  Total NON critical care TIME:  30    Minutes    Total CRITICAL CARE TIME Spent:   Minutes non procedure based      Comments   >50% of visit spent in counseling and coordination of care     ________________________________________________________________________  Vanna Nicolas MD     Procedures: see electronic medical records for all procedures/Xrays and details which were not copied into this note but were reviewed prior to creation of Plan. LABS:  I reviewed today's most current labs and imaging studies. Pertinent labs include:  No results for input(s): WBC, HGB, HCT, PLT, HGBEXT, HCTEXT, PLTEXT, HGBEXT, HCTEXT, PLTEXT in the last 72 hours.   Recent Labs     12/24/21  0253 12/23/21 2017 12/23/21  1346   * 127* 129*   K 4.3 4.2 3.8   CL 95* 93* 93*   CO2 26 29 31   * 177* 162*   BUN 19 17 13   CREA 0.70 0.92 0.83   CA 7.7* 8.5 8.3*       Signed: Vanna Nicolas MD

## 2021-12-24 NOTE — PROGRESS NOTES
Bedside and Verbal shift change report given to ALLYSON Davis (oncoming nurse) by Shanta Holley RN (offgoing nurse). Report included the following information SBAR, Kardex, ED Summary, MAR, Recent Results, Cardiac Rhythm AFib; BBB, Alarm Parameters , Quality Measures and Dual Neuro Assessment.

## 2021-12-24 NOTE — PROGRESS NOTES
Problem: Falls - Risk of  Goal: *Absence of Falls  Description: Document [place] Fall Risk and appropriate interventions in the flowsheet. 12/23/2021 2131 by Arturo Bernstein  Outcome: Progressing Towards Goal  Note: Fall Risk Interventions:       Mentation Interventions: Adequate sleep, hydration, pain control    Medication Interventions: Bed/chair exit alarm    Elimination Interventions: Bed/chair exit alarm           12/23/2021 2130 by Vijay FRIAS  Outcome: Progressing Towards Goal  Note: Fall Risk Interventions:       Mentation Interventions: Adequate sleep, hydration, pain control    Medication Interventions: Bed/chair exit alarm    Elimination Interventions: Bed/chair exit alarm              Problem: Pressure Injury - Risk of  Goal: *Prevention of pressure injury  Description: Document Isaias Scale and appropriate interventions in the flowsheet.   Outcome: Progressing Towards Goal  Note: Pressure Injury Interventions:  Sensory Interventions: Assess changes in LOC    Moisture Interventions: Absorbent underpads    Activity Interventions: Assess need for specialty bed    Mobility Interventions: Assess need for specialty bed    Nutrition Interventions: Document food/fluid/supplement intake    Friction and Shear Interventions: Apply protective barrier, creams and emollients,HOB 30 degrees or less,Minimize layers                Problem: Patient Education: Go to Patient Education Activity  Goal: Patient/Family Education  Outcome: Progressing Towards Goal

## 2021-12-25 NOTE — PROGRESS NOTES
[facility]   [address] [facility], [id number] [place] [address]  Phone: [phone]   [phone]    [email or web address]     Nephrology Progress Note    Patient Name : Leticia Kerr      : 1922     MRN : [id number]  Date of Admission : 2021  Date of Servive : 21    CC: Follow up for Hyponatremia        Assessment and Plan   Severe Hyponatremia :  - Multifactorial.  SIADH at baseline exacerbated by use of Bactrim, diuretics   -Sodium at goal.  -Suggest fluid restriction 1200 cc/day. -Start salt tablets 2 g 3 times daily with meals  -Labs can be done daily  --Will be okay for discharge tomorrow from renal standpoint    Bradycardia   Chronic A fib  Pulm HTN   Severe TR, Mild MR  JOSE         Interval History:  Seen and examined. Family at bedside. Sodium has remained stable. Review of Systems: Review of systems not obtained due to patient factors.     Current Medications:   Current Facility-Administered Medications   Medication Dose Route Frequency    magnesium hydroxide (MILK OF MAGNESIA) 400 mg/5 mL oral suspension 30 mL  30 mL Oral DAILY PRN    senna (SENOKOT) tablet 8.6 mg  1 Tablet Oral DAILY    docusate sodium (COLACE) capsule 100 mg  100 mg Oral DAILY    polyethylene glycol (MIRALAX) packet 17 g  17 g Oral DAILY PRN    fluticasone propionate (FLONASE) 50 mcg/actuation nasal spray 2 Spray  2 Spray Both Nostrils DAILY PRN    cetirizine (ZYRTEC) tablet 10 mg  10 mg Oral DAILY    sodium chloride (NS) flush 5-40 mL  5-40 mL IntraVENous Q8H    sodium chloride (NS) flush 5-40 mL  5-40 mL IntraVENous PRN    acetaminophen (TYLENOL) tablet 650 mg  650 mg Oral Q6H PRN    Or    acetaminophen (TYLENOL) suppository 650 mg  650 mg Rectal Q6H PRN    ondansetron (ZOFRAN ODT) tablet 4 mg  4 mg Oral Q8H PRN    Or    ondansetron (ZOFRAN) injection 4 mg  4 mg IntraVENous Q6H PRN    balsam peru-castor oiL (VENELEX) ointment   Topical Q12H    miconazole (MICOTIN) 2 % powder   Topical Q12H    aspirin delayed-release tablet 81 mg  81 mg Oral DAILY      Allergies   Allergen Reactions    Amoxicillin Other (comments)     \"makes me feel like I\"m going to faint\"    Aspirin Palpitations     Per patient, no allergy    Gabapentin Drowsiness     * pt states this med makes her feel very drowsy , gives her a drunk feeling.  Levaquin [Levofloxacin] Hives and Rash    Lorazepam Other (comments)     Feeling faint.  Losartan Other (comments)     Tongue swelling      Lyrica [Pregabalin] Rash    Other Medication Other (comments)     Feeling faint, does not decrease pain.  Sertraline Other (comments)     Feeling faint. Objective:  Vitals:    Vitals:    12/25/21 0600 12/25/21 1015 12/25/21 1031 12/25/21 1200   BP: (!) 157/65 (!) 140/61     Pulse: 66 78 62 79   Resp: 18 13     Temp: 97.8 °F (36.6 °C) 97.9 °F (36.6 °C)     SpO2: 97%      Weight:         Intake and Output:  No intake/output data recorded. 12/23 1901 - 12/25 0700  In: 0 [P.O.:1310]  Out: 56 [Urine:675]    Physical Examination:    General: Confused- improving   Neck:  Supple, no mass  Resp:  B/l rales   CV:  RRR,  systolic murmur +  GI:  Soft, NT, + BS, no HS megaly  Neurologic:  Confused   Ext                   B/L leg edema   :  Cifuentes +      []    High complexity decision making was performed  []    Patient is at high-risk of decompensation with multiple organ involvement    Lab Data Personally Reviewed: I have reviewed all the pertinent labs, microbiology data and radiology studies during assessment.     Recent Labs     12/25/21  0520 12/24/21  2336 12/24/21  0253 12/23/21  2017 12/23/21  1346   * 132* 126* 127* 129*   K 4.2 4.2 4.3 4.2 3.8   CL 98 96* 95* 93* 93*   CO2 29 30 26 29 31   * 180* 140* 177* 162*   BUN 15 18 19 17 13   CREA 0.66 0.76 0.70 0.92 0.83   CA 8.3* 8.5 7.7* 8.5 8.3*     No results for input(s): WBC, HGB, HCT, PLT, HGBEXT, HCTEXT, PLTEXT, HGBEXT, HCTEXT, PLTEXT in the last 72 hours. No results found for: SDES  Lab Results   Component Value Date/Time    Culture result: NO GROWTH 5 DAYS 12/20/2021 12:30 AM     Recent Results (from the past 24 hour(s))   METABOLIC PANEL, BASIC    Collection Time: 12/24/21 11:36 PM   Result Value Ref Range    Sodium 132 (L) 136 - 145 mmol/L    Potassium 4.2 3.5 - 5.1 mmol/L    Chloride 96 (L) 97 - 108 mmol/L    CO2 30 21 - 32 mmol/L    Anion gap 6 5 - 15 mmol/L    Glucose 180 (H) 65 - 100 mg/dL    BUN 18 6 - 20 MG/DL    Creatinine 0.76 0.55 - 1.02 MG/DL    BUN/Creatinine ratio 24 (H) 12 - 20      GFR est AA >60 >60 ml/min/1.73m2    GFR est non-AA >60 >60 ml/min/1.73m2    Calcium 8.5 8.5 - 95.3 MG/DL   METABOLIC PANEL, BASIC    Collection Time: 12/25/21  5:20 AM   Result Value Ref Range    Sodium 132 (L) 136 - 145 mmol/L    Potassium 4.2 3.5 - 5.1 mmol/L    Chloride 98 97 - 108 mmol/L    CO2 29 21 - 32 mmol/L    Anion gap 5 5 - 15 mmol/L    Glucose 108 (H) 65 - 100 mg/dL    BUN 15 6 - 20 MG/DL    Creatinine 0.66 0.55 - 1.02 MG/DL    BUN/Creatinine ratio 23 (H) 12 - 20      GFR est AA >60 >60 ml/min/1.73m2    GFR est non-AA >60 >60 ml/min/1.73m2    Calcium 8.3 (L) 8.5 - 10.1 MG/DL           I have reviewed the flowsheets. Chart and Pertinent Notes have been reviewed. No change in PMH ,family and social history from Consult note.       Oksana Tony [facility]

## 2021-12-25 NOTE — PROGRESS NOTES
Hospitalist Progress Note    NAME: Dragan Yañez   :  1922   MRN:  [id number]     \"This is a 66-year-old female with past medical history of pulmonary hypertension, atrial fibrillation, hypertension, DM was admitted to the ICU overnight with symptomatic hyponatremia given mental status changes. Her serum sodium was noted to be 109. She was given hypertonic saline and admitted to the ICU for further work-up. She received hypertonic saline in ICU with improving sodium\"    2021. This note is for 2020, as placing a note was missed on this patient. Saw patient patient this morning, awake alert and oriented, pleasant and cooperative with physical examination, sodium is still not optimized, patient is pending transfer to rehab.    2021. No acute events overnight. Saw patient this morning, awake alert and oriented, complaining of being constipated otherwise denies any fever, chills, nausea, vomiting. 2021. Saw patient this morning, awake alert and oriented, pleasant and cooperative with physical examination, stating that she has had 4 bowel movement since yesterday and she is very happy about it, denies any fever, chills, nausea, vomiting, patient is p.o. tolerant and her sodium is trending up.      Assessment / Plan:  Hyponatremia   Presented with severe hyponatremia. 2021 presented with sodium level of 114, was placed on 3% saline and transferred to ICU. 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 Multifactorial sodium slowly trending up, not optimized. Nephrology on board, recommendations noted. Serial labs, seizure precautions. Atrial fibrillation  Patient has history of chronic persistent atrial fibrillation, not anticoagulated. Rate controlled. Continue telemetry, as needed IV beta-blockers. Hypertension  Euvolemic. Normotensive. Continue current meds, adjust meds as needed.     Diabetes mellitus type 2  Not sure if patient has diabetes as her hemoglobin A1c is 6.0 and there is no antidiabetic medications on her home regimen. Continue Accu-Chek and hypoglycemia protocol. Pulmonary hypertension  Continue home meds, outpatient PCP and pulmonology follow-up. Obesity  BMI 37.7. Diet and lifestyle modification recommended. Code status: Full  Prophylaxis: Lovenox  Recommended Disposition: SNF     Subjective:     Chief Complaint / Reason for Physician Visit  Alert, awake, oriented x2  Family at bedside  Patient states she feels better today   No nausea/vomiting  Appetite improved     Review of Systems:  Symptom Y/N Comments  Symptom Y/N Comments   Fever/Chills    Chest Pain     Poor Appetite    Edema     Cough    Abdominal Pain     Sputum    Joint Pain     SOB/MCNAIR    Pruritis/Rash     Nausea/vomit    Tolerating PT/OT     Diarrhea    Tolerating Diet     Constipation    Other       Could NOT obtain due to:      Objective:     VITALS:   Last 24hrs VS reviewed since prior progress note. Most recent are:  Patient Vitals for the past 24 hrs:   Temp Pulse Resp BP SpO2   12/25/21 1031  62      12/25/21 1015 97.9 °F (36.6 °C) 78 13 (!) 140/61    12/25/21 0600 97.8 °F (36.6 °C) 66 18 (!) 157/65 97 %   12/25/21 0400  70      12/25/21 0200  63      12/25/21 0155 97.8 °F (36.6 °C) 69 13 (!) 157/57 98 %   12/24/21 2200  71      12/24/21 2154 97.5 °F (36.4 °C) 74 19 (!) 172/65 97 %   12/24/21 2000  78      12/24/21 1815 97.1 °F (36.2 °C) 75 18 (!) 170/57    12/24/21 1800  72      12/24/21 1409 96.9 °F (36.1 °C) 70 18 (!) 132/49 97 %   12/24/21 1400  66      12/24/21 1200  63          Intake/Output Summary (Last 24 hours) at 12/25/2021 1146  Last data filed at 12/25/2021 0553  Gross per 24 hour   Intake 650 ml   Output 550 ml   Net 100 ml        PHYSICAL EXAM:  General: WD, WN. Alert, cooperative, no acute distress    EENT:  EOMI. Anicteric sclerae. MMM  Resp:  CTA bilaterally, no wheezing or rales. No accessory muscle use  CV:  Regular  rhythm,  No edema  GI:  Soft, Non distended, Non tender.  +Bowel sounds  Neurologic:  Alert and oriented X 3, normal speech,   Psych:   Good insight. Not anxious nor agitated  Skin:  No rashes.   No jaundice    Reviewed most current lab test results and cultures  YES  Reviewed most current radiology test results   YES  Review and summation of old records today    NO  Reviewed patient's current orders and MAR    YES  PMH/SH reviewed - no change compared to H&P          Current Facility-Administered Medications:     magnesium hydroxide (MILK OF MAGNESIA) 400 mg/5 mL oral suspension 30 mL, 30 mL, Oral, DAILY PRN, Shannan Nieves MD    senna (SENOKOT) tablet 8.6 mg, 1 Tablet, Oral, DAILY, Shannan Clancy MD, 8.6 mg at 12/25/21 5529    docusate sodium (COLACE) capsule 100 mg, 100 mg, Oral, DAILY, Shannan Clancy MD, 100 mg at 12/25/21 3677    polyethylene glycol (MIRALAX) packet 17 g, 17 g, Oral, DAILY PRN, Shannan Nieves MD, 17 g at 12/24/21 1521    fluticasone propionate (FLONASE) 50 mcg/actuation nasal spray 2 Spray, 2 Spray, Both Nostrils, DAILY PRN, Shawn Isabel MD    cetirizine (ZYRTEC) tablet 10 mg, 10 mg, Oral, DAILY, Shawn Isabel MD, 10 mg at 12/25/21 0925    sodium chloride (NS) flush 5-40 mL, 5-40 mL, IntraVENous, Q8H, Josafat Holland NP, 10 mL at 12/25/21 0510    sodium chloride (NS) flush 5-40 mL, 5-40 mL, IntraVENous, PRN, Ashley Woods NP    acetaminophen (TYLENOL) tablet 650 mg, 650 mg, Oral, Q6H PRN, 650 mg at 12/21/21 1210 **OR** acetaminophen (TYLENOL) suppository 650 mg, 650 mg, Rectal, Q6H PRN, Kyree Melgar NP    ondansetron (ZOFRAN ODT) tablet 4 mg, 4 mg, Oral, Q8H PRN **OR** ondansetron (ZOFRAN) injection 4 mg, 4 mg, IntraVENous, Q6H PRN, Kyree Melgar NP    balsam peru-castor oiL (VENELEX) ointment, , Topical, Q12H, Ruth Richardson MD, Given at 12/25/21 0925    miconazole (MICOTIN) 2 % powder, , Topical, Q12H, Ruth Richardson MD, Given at 12/25/21 8511    aspirin delayed-release tablet 81 mg, 81 mg, Oral, DAILY, Geetha Perez MD, 81 mg at 12/25/21 7929  ________________________________________________________________________  Care Plan discussed with:    Comments   Patient X    Family  X    RN X    Care Manager X    Consultant  X                      Multidiciplinary team rounds were held today with , nursing, pharmacist and clinical coordinator. Patient's plan of care was discussed; medications were reviewed and discharge planning was addressed. ________________________________________________________________________  Total NON critical care TIME:  30    Minutes    Total CRITICAL CARE TIME Spent:   Minutes non procedure based      Comments   >50% of visit spent in counseling and coordination of care     ________________________________________________________________________  Hilliard Goodpasture, MD     Procedures: see electronic medical records for all procedures/Xrays and details which were not copied into this note but were reviewed prior to creation of Plan. LABS:  I reviewed today's most current labs and imaging studies. Pertinent labs include:  No results for input(s): WBC, HGB, HCT, PLT, HGBEXT, HCTEXT, PLTEXT, HGBEXT, HCTEXT, PLTEXT in the last 72 hours.   Recent Labs     12/25/21  0520 12/24/21  2336 12/24/21  0253   * 132* 126*   K 4.2 4.2 4.3   CL 98 96* 95*   CO2 29 30 26   * 180* 140*   BUN 15 18 19   CREA 0.66 0.76 0.70   CA 8.3* 8.5 7.7*       Signed: Hilliard Goodpasture, MD

## 2021-12-25 NOTE — PROGRESS NOTES
Anabell.Clause - per MD order, called renal MD on call with metabolic panel results. Sodium was 132. Per NP on call Socorro Turner, MD on call does not need to be contacted if sodium is between 129-135.   3207 - metabolic panel results came in from 0520 labs. Sodium was 132, remaining the same as previous lab. Per previous NP directions, will not contact renal MD on call as sodium is between 129-135.

## 2021-12-25 NOTE — PROGRESS NOTES
Bedside and Verbal shift change report given to 2600 Lula Marshall (oncoming nurse) by Victorina Martinez RN (offgoing nurse). Report included the following information SBAR, Kardex, Intake/Output, MAR and Recent Results.

## 2021-12-25 NOTE — PROGRESS NOTES
Problem: Falls - Risk of  Goal: *Absence of Falls  Description: Document Alexa Starch Fall Risk and appropriate interventions in the flowsheet. Outcome: Progressing Towards Goal  Note: Fall Risk Interventions:       Mentation Interventions: Adequate sleep, hydration, pain control    Medication Interventions: Patient to call before getting OOB,Teach patient to arise slowly,Evaluate medications/consider consulting pharmacy    Elimination Interventions: Call light in reach,Patient to call for help with toileting needs,Toileting schedule/hourly rounds              Problem: Patient Education: Go to Patient Education Activity  Goal: Patient/Family Education  Outcome: Progressing Towards Goal     Problem: Pressure Injury - Risk of  Goal: *Prevention of pressure injury  Description: Document Isaias Scale and appropriate interventions in the flowsheet.   Outcome: Progressing Towards Goal  Note: Pressure Injury Interventions:  Sensory Interventions: Assess changes in LOC    Moisture Interventions: Absorbent underpads    Activity Interventions: Increase time out of bed,Pressure redistribution bed/mattress(bed type),PT/OT evaluation    Mobility Interventions: HOB 30 degrees or less,Pressure redistribution bed/mattress (bed type),PT/OT evaluation    Nutrition Interventions: Document food/fluid/supplement intake    Friction and Shear Interventions: HOB 30 degrees or less,Minimize layers                Problem: Patient Education: Go to Patient Education Activity  Goal: Patient/Family Education  Outcome: Progressing Towards Goal     Problem: Pain  Goal: *Control of Pain  Outcome: Progressing Towards Goal  Goal: *PALLIATIVE CARE:  Alleviation of Pain  Outcome: Progressing Towards Goal     Problem: Patient Education: Go to Patient Education Activity  Goal: Patient/Family Education  Outcome: Progressing Towards Goal     Problem: Patient Education: Go to Patient Education Activity  Goal: Patient/Family Education  Outcome: Progressing Towards Goal     Problem: Patient Education: Go to Patient Education Activity  Goal: Patient/Family Education  Outcome: Progressing Towards Goal     Problem: Patient Education: Go to Patient Education Activity  Goal: Patient/Family Education  Outcome: Progressing Towards Goal     Problem: Heart Failure: Day 1  Goal: Off Pathway (Use only if patient is Off Pathway)  Outcome: Progressing Towards Goal  Goal: Activity/Safety  Outcome: Progressing Towards Goal  Goal: Consults, if ordered  Outcome: Progressing Towards Goal  Goal: Diagnostic Test/Procedures  Outcome: Progressing Towards Goal  Goal: Nutrition/Diet  Outcome: Progressing Towards Goal  Goal: Discharge Planning  Outcome: Progressing Towards Goal  Goal: Medications  Outcome: Progressing Towards Goal  Goal: Respiratory  Outcome: Progressing Towards Goal  Goal: Treatments/Interventions/Procedures  Outcome: Progressing Towards Goal  Goal: Psychosocial  Outcome: Progressing Towards Goal  Goal: *Oxygen saturation within defined limits  Outcome: Progressing Towards Goal  Goal: *Hemodynamically stable  Outcome: Progressing Towards Goal  Goal: *Optimal pain control at patient's stated goal  Outcome: Progressing Towards Goal  Goal: *Anxiety reduced or absent  Outcome: Progressing Towards Goal     Problem: Heart Failure: Day 2  Goal: Off Pathway (Use only if patient is Off Pathway)  Outcome: Progressing Towards Goal  Goal: Activity/Safety  Outcome: Progressing Towards Goal  Goal: Consults, if ordered  Outcome: Progressing Towards Goal  Goal: Diagnostic Test/Procedures  Outcome: Progressing Towards Goal  Goal: Nutrition/Diet  Outcome: Progressing Towards Goal  Goal: Discharge Planning  Outcome: Progressing Towards Goal  Goal: Medications  Outcome: Progressing Towards Goal  Goal: Respiratory  Outcome: Progressing Towards Goal  Goal: Treatments/Interventions/Procedures  Outcome: Progressing Towards Goal  Goal: Psychosocial  Outcome: Progressing Towards Goal  Goal: *Oxygen saturation within defined limits  Outcome: Progressing Towards Goal  Goal: *Hemodynamically stable  Outcome: Progressing Towards Goal  Goal: *Optimal pain control at patient's stated goal  Outcome: Progressing Towards Goal  Goal: *Anxiety reduced or absent  Outcome: Progressing Towards Goal  Goal: *Demonstrates progressive activity  Outcome: Progressing Towards Goal     Problem: Heart Failure: Day 3  Goal: Off Pathway (Use only if patient is Off Pathway)  Outcome: Progressing Towards Goal  Goal: Activity/Safety  Outcome: Progressing Towards Goal  Goal: Diagnostic Test/Procedures  Outcome: Progressing Towards Goal  Goal: Nutrition/Diet  Outcome: Progressing Towards Goal  Goal: Discharge Planning  Outcome: Progressing Towards Goal  Goal: Medications  Outcome: Progressing Towards Goal  Goal: Respiratory  Outcome: Progressing Towards Goal  Goal: Treatments/Interventions/Procedures  Outcome: Progressing Towards Goal  Goal: Psychosocial  Outcome: Progressing Towards Goal  Goal: *Oxygen saturation within defined limits  Outcome: Progressing Towards Goal  Goal: *Hemodynamically stable  Outcome: Progressing Towards Goal  Goal: *Optimal pain control at patient's stated goal  Outcome: Progressing Towards Goal  Goal: *Anxiety reduced or absent  Outcome: Progressing Towards Goal  Goal: *Demonstrates progressive activity  Outcome: Progressing Towards Goal     Problem: Heart Failure: Day 4  Goal: Off Pathway (Use only if patient is Off Pathway)  Outcome: Progressing Towards Goal  Goal: Activity/Safety  Outcome: Progressing Towards Goal  Goal: Diagnostic Test/Procedures  Outcome: Progressing Towards Goal  Goal: Nutrition/Diet  Outcome: Progressing Towards Goal  Goal: Discharge Planning  Outcome: Progressing Towards Goal  Goal: Medications  Outcome: Progressing Towards Goal  Goal: Respiratory  Outcome: Progressing Towards Goal  Goal: Treatments/Interventions/Procedures  Outcome: Progressing Towards Goal  Goal: Psychosocial  Outcome: Progressing Towards Goal  Goal: *Oxygen saturation within defined limits  Outcome: Progressing Towards Goal  Goal: *Hemodynamically stable  Outcome: Progressing Towards Goal  Goal: *Optimal pain control at patient's stated goal  Outcome: Progressing Towards Goal  Goal: *Anxiety reduced or absent  Outcome: Progressing Towards Goal  Goal: *Demonstrates progressive activity  Outcome: Progressing Towards Goal     Problem: Heart Failure: Day 5  Goal: Off Pathway (Use only if patient is Off Pathway)  Outcome: Progressing Towards Goal  Goal: Activity/Safety  Outcome: Progressing Towards Goal  Goal: Diagnostic Test/Procedures  Outcome: Progressing Towards Goal  Goal: Nutrition/Diet  Outcome: Progressing Towards Goal  Goal: Discharge Planning  Outcome: Progressing Towards Goal  Goal: Medications  Outcome: Progressing Towards Goal  Goal: Respiratory  Outcome: Progressing Towards Goal  Goal: Treatments/Interventions/Procedures  Outcome: Progressing Towards Goal  Goal: Psychosocial  Outcome: Progressing Towards Goal     Problem: Heart Failure: Discharge Outcomes  Goal: *Demonstrates ability to perform prescribed activity without shortness of breath or discomfort  Outcome: Progressing Towards Goal  Goal: *Left ventricular function assessment completed prior to or during stay, or planned for post-discharge  Outcome: Progressing Towards Goal  Goal: *ACEI prescribed if LVEF less than 40% and no contraindications or ARB prescribed  Outcome: Progressing Towards Goal  Goal: *Verbalizes understanding and describes prescribed diet  Outcome: Progressing Towards Goal  Goal: *Verbalizes understanding/describes prescribed medications  Outcome: Progressing Towards Goal  Goal: *Describes available resources and support systems  Description: (eg: Home Health, Palliative Care, Advanced Medical Directive)  Outcome: Progressing Towards Goal  Goal: *Describes smoking cessation resources  Outcome: Progressing Towards Goal  Goal: *Understands and describes signs and symptoms to report to providers(Stroke Metric)  Outcome: Progressing Towards Goal  Goal: *Describes/verbalizes understanding of follow-up/return appt  Description: (eg: to physicians, diabetes treatment coordinator, and other resources  Outcome: Progressing Towards Goal  Goal: *Describes importance of continuing daily weights and changes to report to physician  Outcome: Progressing Towards Goal

## 2021-12-25 NOTE — PROGRESS NOTES
Hospitalist Progress Note    NAME: Rajinder Walker   :  1922   MRN:  [id number]     \"This is a 22-year-old female with past medical history of pulmonary hypertension, atrial fibrillation, hypertension, DM was admitted to the ICU overnight with symptomatic hyponatremia given mental status changes. Her serum sodium was noted to be 109. She was given hypertonic saline and admitted to the ICU for further work-up. She received hypertonic saline in ICU with improving sodium\"    2021. This note is for 2020, as placing a note was missed on this patient. Saw patient patient this morning, awake alert and oriented, pleasant and cooperative with physical examination, sodium is still not optimized, patient is pending transfer to rehab.    2021. No acute events overnight. Saw patient this morning, awake alert and oriented, complaining of being constipated otherwise denies any fever, chills, nausea, vomiting.     Assessment / Plan:  Hyponatremia   Presented with severe hyponatremia. 2021 presented with sodium level of 114, was placed on 3% saline and transferred to ICU. 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 Multifactorial sodium slowly trending up, not optimized. Nephrology on board, recommendations noted. Serial labs, seizure precautions. Atrial fibrillation  Patient of chronic persistent atrial fibrillation, not anticoagulated. Rate controlled. Continue telemetry, as needed IV beta-blockers. Hypertension  Euvolemic. Normotensive. Continue current meds, adjust meds as needed. Diabetes mellitus type 2  Not sure if patient has diabetes as her hemoglobin A1c is 6.0 and there is no antidiabetic medications on her home regimen. Continue Accu-Chek and hypoglycemia protocol. Pulmonary hypertension  Continue home meds, outpatient PCP and pulmonology follow-up. Body mass index is 37.79 kg/m².     Code status: Full  Prophylaxis: Lovenox  Recommended Disposition: SNF     Subjective:     Chief Complaint / Reason for Physician Visit  Alert, awake, oriented x2  Family at bedside  Patient states she feels better today   No nausea/vomiting  Appetite improved     Review of Systems:  Symptom Y/N Comments  Symptom Y/N Comments   Fever/Chills    Chest Pain     Poor Appetite    Edema     Cough    Abdominal Pain     Sputum    Joint Pain     SOB/MCNAIR    Pruritis/Rash     Nausea/vomit    Tolerating PT/OT     Diarrhea    Tolerating Diet     Constipation    Other       Could NOT obtain due to:      Objective:     VITALS:   Last 24hrs VS reviewed since prior progress note. Most recent are:  Patient Vitals for the past 24 hrs:   Temp Pulse Resp BP SpO2   12/25/21 1031  62      12/25/21 1015 97.9 °F (36.6 °C) 78 13 (!) 140/61    12/25/21 0600 97.8 °F (36.6 °C) 66 18 (!) 157/65 97 %   12/25/21 0400  70      12/25/21 0200  63      12/25/21 0155 97.8 °F (36.6 °C) 69 13 (!) 157/57 98 %   12/24/21 2200  71      12/24/21 2154 97.5 °F (36.4 °C) 74 19 (!) 172/65 97 %   12/24/21 2000  78      12/24/21 1815 97.1 °F (36.2 °C) 75 18 (!) 170/57    12/24/21 1800  72      12/24/21 1409 96.9 °F (36.1 °C) 70 18 (!) 132/49 97 %   12/24/21 1400  66      12/24/21 1200  63          Intake/Output Summary (Last 24 hours) at 12/25/2021 1145  Last data filed at 12/25/2021 0553  Gross per 24 hour   Intake 650 ml   Output 550 ml   Net 100 ml        PHYSICAL EXAM:  General: WD, WN. Alert, cooperative, no acute distress    EENT:  EOMI. Anicteric sclerae. MMM  Resp:  CTA bilaterally, no wheezing or rales. No accessory muscle use  CV:  Regular  rhythm,  No edema  GI:  Soft, Non distended, Non tender.  +Bowel sounds  Neurologic:  Alert and oriented X 3, normal speech,   Psych:   Good insight. Not anxious nor agitated  Skin:  No rashes.   No jaundice    Reviewed most current lab test results and cultures  YES  Reviewed most current radiology test results YES  Review and summation of old records today    NO  Reviewed patient's current orders and MAR    YES  PMH/SH reviewed - no change compared to H&P          Current Facility-Administered Medications:     magnesium hydroxide (MILK OF MAGNESIA) 400 mg/5 mL oral suspension 30 mL, 30 mL, Oral, DAILY PRN, Shannan Francois MD    senna (SENOKOT) tablet 8.6 mg, 1 Tablet, Oral, DAILY, Shannan Clancy MD, 8.6 mg at 12/25/21 0961    docusate sodium (COLACE) capsule 100 mg, 100 mg, Oral, DAILY, Shannan Clancy MD, 100 mg at 12/25/21 0991    polyethylene glycol (MIRALAX) packet 17 g, 17 g, Oral, DAILY PRN, Shannan Francois MD, 17 g at 12/24/21 1521    fluticasone propionate (FLONASE) 50 mcg/actuation nasal spray 2 Spray, 2 Spray, Both Nostrils, DAILY PRN, Shawn Isabel MD    cetirizine (ZYRTEC) tablet 10 mg, 10 mg, Oral, DAILY, Shawn Isabel MD, 10 mg at 12/25/21 0925    sodium chloride (NS) flush 5-40 mL, 5-40 mL, IntraVENous, Q8H, Rene Byrnes NP, 10 mL at 12/25/21 0510    sodium chloride (NS) flush 5-40 mL, 5-40 mL, IntraVENous, PRN, El Jewel, NP    acetaminophen (TYLENOL) tablet 650 mg, 650 mg, Oral, Q6H PRN, 650 mg at 12/21/21 1210 **OR** acetaminophen (TYLENOL) suppository 650 mg, 650 mg, Rectal, Q6H PRN, Delmar Pang, NP    ondansetron (ZOFRAN ODT) tablet 4 mg, 4 mg, Oral, Q8H PRN **OR** ondansetron (ZOFRAN) injection 4 mg, 4 mg, IntraVENous, Q6H PRN, Delmar Pang, NP    balsam peru-castor oiL (VENELEX) ointment, , Topical, Q12H, Haresh Gutierrez MD, Given at 12/25/21 0925    miconazole (MICOTIN) 2 % powder, , Topical, Q12H, Haresh Gutierrez MD, Given at 12/25/21 6302    aspirin delayed-release tablet 81 mg, 81 mg, Oral, DAILY, Joanne Tobin MD, 81 mg at 12/25/21 9459  ________________________________________________________________________  Care Plan discussed with:    Comments   Patient X    Family  X    RN X    Care Manager X    Consultant  X                      Multidiciplinary team rounds were held today with , nursing, pharmacist and clinical coordinator. Patient's plan of care was discussed; medications were reviewed and discharge planning was addressed. ________________________________________________________________________  Total NON critical care TIME:  30    Minutes    Total CRITICAL CARE TIME Spent:   Minutes non procedure based      Comments   >50% of visit spent in counseling and coordination of care     ________________________________________________________________________  Dirk Ochoa MD     Procedures: see electronic medical records for all procedures/Xrays and details which were not copied into this note but were reviewed prior to creation of Plan. LABS:  I reviewed today's most current labs and imaging studies. Pertinent labs include:  No results for input(s): WBC, HGB, HCT, PLT, HGBEXT, HCTEXT, PLTEXT, HGBEXT, HCTEXT, PLTEXT in the last 72 hours.   Recent Labs     12/25/21  0520 12/24/21  2336 12/24/21  0253   * 132* 126*   K 4.2 4.2 4.3   CL 98 96* 95*   CO2 29 30 26   * 180* 140*   BUN 15 18 19   CREA 0.66 0.76 0.70   CA 8.3* 8.5 7.7*       Signed: Dirk Ochoa MD

## 2021-12-25 NOTE — PROGRESS NOTES
Problem: Falls - Risk of  Goal: *Absence of Falls  Description: Document Bard Laura Fall Risk and appropriate interventions in the flowsheet. Outcome: Progressing Towards Goal  Note: Fall Risk Interventions:  Mobility Interventions: Communicate number of staff needed for ambulation/transfer,OT consult for ADLs,Patient to call before getting OOB,PT Consult for mobility concerns,PT Consult for assist device competence,Strengthening exercises (ROM-active/passive),Utilize walker, cane, or other assistive device    Mentation Interventions: Bed/chair exit alarm,Door open when patient unattended,Increase mobility,More frequent rounding,Reorient patient    Medication Interventions: Assess postural VS orthostatic hypotension,Evaluate medications/consider consulting pharmacy,Patient to call before getting OOB,Teach patient to arise slowly,Utilize gait belt for transfers/ambulation    Elimination Interventions: Call light in reach,Patient to call for help with toileting needs,Stay With Me (per policy)              Problem: Patient Education: Go to Patient Education Activity  Goal: Patient/Family Education  Outcome: Progressing Towards Goal     Problem: Pressure Injury - Risk of  Goal: *Prevention of pressure injury  Description: Document Isaias Scale and appropriate interventions in the flowsheet.   Outcome: Progressing Towards Goal  Note: Pressure Injury Interventions:  Sensory Interventions: Assess changes in LOC,Discuss PT/OT consult with provider,Float heels,Keep linens dry and wrinkle-free,Minimize linen layers,Pad between skin to skin    Moisture Interventions: Absorbent underpads,Internal/External urinary devices,Limit adult briefs    Activity Interventions: Increase time out of bed,Pressure redistribution bed/mattress(bed type),PT/OT evaluation    Mobility Interventions: HOB 30 degrees or less,Pressure redistribution bed/mattress (bed type),PT/OT evaluation    Nutrition Interventions: Document food/fluid/supplement intake    Friction and Shear Interventions: Minimize layers,HOB 30 degrees or less                Problem: Patient Education: Go to Patient Education Activity  Goal: Patient/Family Education  Outcome: Progressing Towards Goal     Problem: Pain  Goal: *Control of Pain  Outcome: Progressing Towards Goal     Problem: Patient Education: Go to Patient Education Activity  Goal: Patient/Family Education  Outcome: Progressing Towards Goal     Problem: Patient Education: Go to Patient Education Activity  Goal: Patient/Family Education  Outcome: Progressing Towards Goal     Problem: Patient Education: Go to Patient Education Activity  Goal: Patient/Family Education  Outcome: Progressing Towards Goal     Problem: Patient Education: Go to Patient Education Activity  Goal: Patient/Family Education  Outcome: Progressing Towards Goal

## 2021-12-25 NOTE — PROGRESS NOTES
Bedside and Verbal shift change report given to Katia Stone 15 (oncoming nurse) by Cyrus Friedman (offgoing nurse). Report included the following information Kardex, MAR, Recent Results and Cardiac Rhythm atrial fibrillation.

## 2021-12-25 NOTE — PROGRESS NOTES
1245: Pt's sodium was 131. MD does not need to be contacted if sodium is between 129-135.  1845: Pt's sodium was 131.

## 2021-12-26 NOTE — PROGRESS NOTES
Bedside and Verbal shift change report given to 2600 Lula Marshall (oncoming nurse) by Braulio Hart RN (offgoing nurse). Report included the following information SBAR, Kardex, Intake/Output, MAR and Recent Results.

## 2021-12-26 NOTE — PROGRESS NOTES
Bedside and Verbal shift change report given to Angie (oncoming nurse) by Claribel Garcia (offgoing nurse). Report included the following information Kardex, MAR, Recent Results and Cardiac Rhythm atrial fibrillation.

## 2021-12-26 NOTE — PROGRESS NOTES
Hospitalist Progress Note    NAME: Erick Bass   :  1922   MRN:  [id number]     \"This is a 43-year-old female with past medical history of pulmonary hypertension, atrial fibrillation, hypertension, DM was admitted to the ICU overnight with symptomatic hyponatremia given mental status changes. Her serum sodium was noted to be 109. She was given hypertonic saline and admitted to the ICU for further work-up. She received hypertonic saline in ICU with improving sodium\"    2021. This note is for 2020, as placing a note was missed on this patient. Saw patient patient this morning, awake alert and oriented, pleasant and cooperative with physical examination, sodium is still not optimized, patient is pending transfer to rehab.    2021. No acute events overnight. Saw patient this morning, awake alert and oriented, complaining of being constipated otherwise denies any fever, chills, nausea, vomiting. 2021. Saw patient this morning, awake alert and oriented, pleasant and cooperative with physical examination, stating that she has had 4 bowel movement since yesterday and she is very happy about it, denies any fever, chills, nausea, vomiting, patient is p.o. tolerant and her sodium is trending up.     2021. Saw patient this morning, comfortably sleeping, easily arousable, pleasant and cooperative with physical examination, denies any fever, chills, nausea, vomiting. Patient is pending transfer to rehab.     Assessment / Plan:  Hyponatremia   Improving. Not optimized. Presented with severe hyponatremia. 2021 presented with sodium level of 114, was placed on 3% saline and transferred to ICU. Continue fluid restriction and sodium low-dose also planned. Nephrology currently has signed off, reconsult if needed. Outpatient PCP and nephrology follow-up. 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   Multifactorial sodium slowly trending up, not optimized. Nephrology on board, recommendations noted. Serial labs, seizure precautions. Atrial fibrillation  Patient has history of chronic persistent atrial fibrillation, not anticoagulated. Rate controlled. Continue telemetry, as needed IV beta-blockers. Hypertension  Euvolemic. Normotensive. Continue current meds, adjust meds as needed. Diabetes mellitus type 2  Not sure if patient has diabetes as her hemoglobin A1c is 6.0 and there is no antidiabetic medications on her home regimen. Continue Accu-Chek and hypoglycemia protocol. Pulmonary hypertension  Continue home meds, outpatient PCP and pulmonology follow-up. Obesity  BMI 37.7. Diet and lifestyle modification recommended. Code status: Full  Prophylaxis: Lovenox  Recommended Disposition: SNF     Subjective:     Chief Complaint / Reason for Physician Visit  Alert, awake, oriented x2  Family at bedside  Patient states she feels better today   No nausea/vomiting  Appetite improved     Review of Systems:  Symptom Y/N Comments  Symptom Y/N Comments   Fever/Chills    Chest Pain     Poor Appetite    Edema     Cough    Abdominal Pain     Sputum    Joint Pain     SOB/MCNAIR    Pruritis/Rash     Nausea/vomit    Tolerating PT/OT     Diarrhea    Tolerating Diet     Constipation    Other       Could NOT obtain due to:      Objective:     VITALS:   Last 24hrs VS reviewed since prior progress note.  Most recent are:  Patient Vitals for the past 24 hrs:   Temp Pulse Resp BP SpO2   12/26/21 1415 98.2 °F (36.8 °C) 74 17 (!) 131/49 97 %   12/26/21 1400  89      12/26/21 1200  79      12/26/21 1015 97.9 °F (36.6 °C) 69 15 (!) 130/46 97 %   12/26/21 1000  68      12/26/21 0612  60      12/26/21 0408  68      12/26/21 0209  66      12/26/21 0157 97.5 °F (36.4 °C) 95 21 136/70 98 %   12/25/21 2215  69      12/25/21 2200 97.5 °F (36.4 °C) 72 20 102/64 98 %   12/25/21 2041  80      12/25/21 1806  75    12/25/21 1748 98.1 °F (36.7 °C) 82 24 (!) 149/49        Intake/Output Summary (Last 24 hours) at 12/26/2021 1517  Last data filed at 12/26/2021 1315  Gross per 24 hour   Intake 1250 ml   Output 200 ml   Net 1050 ml        PHYSICAL EXAM:  General: WD, WN. Alert, cooperative, no acute distress    EENT:  EOMI. Anicteric sclerae. MMM  Resp:  CTA bilaterally, no wheezing or rales. No accessory muscle use  CV:  Regular  rhythm,  No edema  GI:  Soft, Non distended, Non tender.  +Bowel sounds  Neurologic:  Alert and oriented X 3, normal speech,   Psych:   Good insight. Not anxious nor agitated  Skin:  No rashes.   No jaundice    Reviewed most current lab test results and cultures  YES  Reviewed most current radiology test results   YES  Review and summation of old records today    NO  Reviewed patient's current orders and MAR    YES  PMH/SH reviewed - no change compared to H&P          Current Facility-Administered Medications:     magnesium hydroxide (MILK OF MAGNESIA) 400 mg/5 mL oral suspension 30 mL, 30 mL, Oral, DAILY PRN, Shannan Clancy MD    sodium chloride tablet 2 g, 2 g, Oral, TID WITH MEALS, Keshav Cuellar MD, 2 g at 12/26/21 1230    senna (SENOKOT) tablet 8.6 mg, 1 Tablet, Oral, DAILY, Shannan Clancy MD, 8.6 mg at 12/26/21 1002    docusate sodium (COLACE) capsule 100 mg, 100 mg, Oral, DAILY, Shannan Clancy MD, 100 mg at 12/26/21 1002    polyethylene glycol (MIRALAX) packet 17 g, 17 g, Oral, DAILY PRN, Shannan Clancy MD, 17 g at 12/24/21 1521    fluticasone propionate (FLONASE) 50 mcg/actuation nasal spray 2 Spray, 2 Spray, Both Nostrils, DAILY PRN, Shawn Isabel MD    cetirizine (ZYRTEC) tablet 10 mg, 10 mg, Oral, DAILY, Shawn Isabel MD, 10 mg at 12/26/21 1002    sodium chloride (NS) flush 5-40 mL, 5-40 mL, IntraVENous, Q8H, Julia Holland NP, 10 mL at 12/26/21 0521    sodium chloride (NS) flush 5-40 mL, 5-40 mL, IntraVENous, PRN, Blade Lang NP    acetaminophen (TYLENOL) tablet 650 mg, 650 mg, Oral, Q6H PRN, 650 mg at 12/21/21 1210 **OR** acetaminophen (TYLENOL) suppository 650 mg, 650 mg, Rectal, Q6H PRN, Antonio Madrigal NP    ondansetron (ZOFRAN ODT) tablet 4 mg, 4 mg, Oral, Q8H PRN **OR** ondansetron (ZOFRAN) injection 4 mg, 4 mg, IntraVENous, Q6H PRN, Antonio Madrigal NP    balsam peru-castor oiL (VENELEX) ointment, , Topical, Q12H, Richard Godinez MD, Given at 12/26/21 1003    miconazole (MICOTIN) 2 % powder, , Topical, Q12H, Richard Godinez MD, Given at 12/26/21 1003    aspirin delayed-release tablet 81 mg, 81 mg, Oral, DAILY, Emiliana Garcia MD, 81 mg at 12/26/21 1002  ________________________________________________________________________  Care Plan discussed with:    Comments   Patient X    Family  X    RN X    Care Manager X    Consultant  X                      Multidiciplinary team rounds were held today with , nursing, pharmacist and clinical coordinator. Patient's plan of care was discussed; medications were reviewed and discharge planning was addressed. ________________________________________________________________________  Total NON critical care TIME:  30    Minutes    Total CRITICAL CARE TIME Spent:   Minutes non procedure based      Comments   >50% of visit spent in counseling and coordination of care     ________________________________________________________________________  Kelsie Cruz MD     Procedures: see electronic medical records for all procedures/Xrays and details which were not copied into this note but were reviewed prior to creation of Plan. LABS:  I reviewed today's most current labs and imaging studies. Pertinent labs include:  No results for input(s): WBC, HGB, HCT, PLT, HGBEXT, HCTEXT, PLTEXT, HGBEXT, HCTEXT, PLTEXT in the last 72 hours.   Recent Labs     12/26/21  1107 12/26/21  0521 12/25/21  2316   * 132* 132*   K 4.6 4.2 4.4   CL 99 99 98   CO2 31 29 29   * 103* 160*   BUN 16 16 18   CREA 0.73 0.64 0.68 CA 8.0* 8.1* 8.0*       Signed: Jeff Romero MD

## 2021-12-26 NOTE — PROGRESS NOTES
0000 - Pt's 5341 metabolic panel came back and sodium was 132. Per notes, MD does not need to be contacted if sodium is between 129-135.     0710 - Pt's 2951 metabolic panel came back and sodium was stable at 132.

## 2021-12-26 NOTE — PROGRESS NOTES
[facility]   [id number] [facility], [address] [place] [address]  Phone: [phone]   [phone]    [email or web address]     Nephrology Progress Note    Patient Name : Bridger Darby      : 1922     MRN : [id number]  Date of Admission : 2021  Date of Servive : 21    CC: Follow up for Hyponatremia        Assessment and Plan   Severe Hyponatremia :  - Multifactorial.  SIADH at baseline exacerbated by use of Bactrim, diuretics   - Na at goal after 3% saline, tolvaptan x2 doses  - Continue with FW  restriction 1200 cc/day. - Continue with salt tablets 2 g 3 times daily with meals  -Daily labs. -Okay for DC from renal standpoint and follow-up with us in 2 weeks    Bradycardia   Chronic A fib  Pulm HTN   Severe TR, Mild MR  JOSE         Interval History:  Seen and examined. Resting comfortably. Sodium remained stable at 132. Review of Systems: A comprehensive review of systems was negative except for that written in the HPI.     Current Medications:   Current Facility-Administered Medications   Medication Dose Route Frequency    magnesium hydroxide (MILK OF MAGNESIA) 400 mg/5 mL oral suspension 30 mL  30 mL Oral DAILY PRN    sodium chloride tablet 2 g  2 g Oral TID WITH MEALS    senna (SENOKOT) tablet 8.6 mg  1 Tablet Oral DAILY    docusate sodium (COLACE) capsule 100 mg  100 mg Oral DAILY    polyethylene glycol (MIRALAX) packet 17 g  17 g Oral DAILY PRN    fluticasone propionate (FLONASE) 50 mcg/actuation nasal spray 2 Spray  2 Spray Both Nostrils DAILY PRN    cetirizine (ZYRTEC) tablet 10 mg  10 mg Oral DAILY    sodium chloride (NS) flush 5-40 mL  5-40 mL IntraVENous Q8H    sodium chloride (NS) flush 5-40 mL  5-40 mL IntraVENous PRN    acetaminophen (TYLENOL) tablet 650 mg  650 mg Oral Q6H PRN    Or    acetaminophen (TYLENOL) suppository 650 mg  650 mg Rectal Q6H PRN    ondansetron (ZOFRAN ODT) tablet 4 mg  4 mg Oral Q8H PRN    Or    ondansetron (ZOFRAN) injection 4 mg  4 mg IntraVENous Q6H PRN    balsam peru-castor oiL (VENELEX) ointment   Topical Q12H    miconazole (MICOTIN) 2 % powder   Topical Q12H    aspirin delayed-release tablet 81 mg  81 mg Oral DAILY      Allergies   Allergen Reactions    Amoxicillin Other (comments)     \"makes me feel like I\"m going to faint\"    Aspirin Palpitations     Per patient, no allergy    Gabapentin Drowsiness     * pt states this med makes her feel very drowsy , gives her a drunk feeling.  Levaquin [Levofloxacin] Hives and Rash    Lorazepam Other (comments)     Feeling faint.  Losartan Other (comments)     Tongue swelling      Lyrica [Pregabalin] Rash    Other Medication Other (comments)     Feeling faint, does not decrease pain.  Sertraline Other (comments)     Feeling faint. Objective:  Vitals:    Vitals:    12/26/21 0157 12/26/21 0209 12/26/21 0408 12/26/21 0612   BP: 136/70      Pulse: 95 66 68 60   Resp: 21      Temp: 97.5 °F (36.4 °C)      SpO2: 98%      Weight:         Intake and Output:  12/26 0701 - 12/26 1900  In: 50 [P.O.:50]  Out: -   12/24 1901 - 12/26 0700  In: 1250 [P.O.:1250]  Out: 200 [Urine:200]    Physical Examination:    General: Confused- improving   Neck:  Supple, no mass  Resp:  B/l rales   CV:  RRR,  systolic murmur +  GI:  Soft, NT, + BS, no HS megaly  Neurologic:  Confused   Ext                   B/L leg edema   :  Cifuentes +      []    High complexity decision making was performed  []    Patient is at high-risk of decompensation with multiple organ involvement    Lab Data Personally Reviewed: I have reviewed all the pertinent labs, microbiology data and radiology studies during assessment.     Recent Labs     12/26/21  0521 12/25/21  2316 12/25/21  1715 12/25/21  1245 12/25/21  0520   * 132* 131* 131* 132*   K 4.2 4.4 4.3 4.3 4.2   CL 99 98 96* 96* 98   CO2 29 29 30 30 29   * 160* 251* 190* 108*   BUN 16 18 18 16 15   CREA 0.64 0.68 0.85 0.81 0.66 CA 8.1* 8.0* 8.6 8.7 8.3*     No results for input(s): WBC, HGB, HCT, PLT, HGBEXT, HCTEXT, PLTEXT, HGBEXT, HCTEXT, PLTEXT in the last 72 hours.   No results found for: SDES  Lab Results   Component Value Date/Time    Culture result: NO GROWTH 5 DAYS 12/20/2021 12:30 AM     Recent Results (from the past 24 hour(s))   METABOLIC PANEL, BASIC    Collection Time: 12/25/21 12:45 PM   Result Value Ref Range    Sodium 131 (L) 136 - 145 mmol/L    Potassium 4.3 3.5 - 5.1 mmol/L    Chloride 96 (L) 97 - 108 mmol/L    CO2 30 21 - 32 mmol/L    Anion gap 5 5 - 15 mmol/L    Glucose 190 (H) 65 - 100 mg/dL    BUN 16 6 - 20 MG/DL    Creatinine 0.81 0.55 - 1.02 MG/DL    BUN/Creatinine ratio 20 12 - 20      GFR est AA >60 >60 ml/min/1.73m2    GFR est non-AA >60 >60 ml/min/1.73m2    Calcium 8.7 8.5 - 92.0 MG/DL   METABOLIC PANEL, BASIC    Collection Time: 12/25/21  5:15 PM   Result Value Ref Range    Sodium 131 (L) 136 - 145 mmol/L    Potassium 4.3 3.5 - 5.1 mmol/L    Chloride 96 (L) 97 - 108 mmol/L    CO2 30 21 - 32 mmol/L    Anion gap 5 5 - 15 mmol/L    Glucose 251 (H) 65 - 100 mg/dL    BUN 18 6 - 20 MG/DL    Creatinine 0.85 0.55 - 1.02 MG/DL    BUN/Creatinine ratio 21 (H) 12 - 20      GFR est AA >60 >60 ml/min/1.73m2    GFR est non-AA >60 >60 ml/min/1.73m2    Calcium 8.6 8.5 - 47.9 MG/DL   METABOLIC PANEL, BASIC    Collection Time: 12/25/21 11:16 PM   Result Value Ref Range    Sodium 132 (L) 136 - 145 mmol/L    Potassium 4.4 3.5 - 5.1 mmol/L    Chloride 98 97 - 108 mmol/L    CO2 29 21 - 32 mmol/L    Anion gap 5 5 - 15 mmol/L    Glucose 160 (H) 65 - 100 mg/dL    BUN 18 6 - 20 MG/DL    Creatinine 0.68 0.55 - 1.02 MG/DL    BUN/Creatinine ratio 26 (H) 12 - 20      GFR est AA >60 >60 ml/min/1.73m2    GFR est non-AA >60 >60 ml/min/1.73m2    Calcium 8.0 (L) 8.5 - 03.3 MG/DL   METABOLIC PANEL, BASIC    Collection Time: 12/26/21  5:21 AM   Result Value Ref Range    Sodium 132 (L) 136 - 145 mmol/L    Potassium 4.2 3.5 - 5.1 mmol/L    Chloride 99 97 - 108 mmol/L    CO2 29 21 - 32 mmol/L    Anion gap 4 (L) 5 - 15 mmol/L    Glucose 103 (H) 65 - 100 mg/dL    BUN 16 6 - 20 MG/DL    Creatinine 0.64 0.55 - 1.02 MG/DL    BUN/Creatinine ratio 25 (H) 12 - 20      GFR est AA >60 >60 ml/min/1.73m2    GFR est non-AA >60 >60 ml/min/1.73m2    Calcium 8.1 (L) 8.5 - 10.1 MG/DL           I have reviewed the flowsheets. Chart and Pertinent Notes have been reviewed. No change in PMH ,family and social history from Consult note.       Oksana Lemos [facility]

## 2021-12-27 NOTE — PROGRESS NOTES
Transition of Care Plan: Therapy recommending SNF[facility]     RUR: 14% low     PCP F/U: Linda Covarrubias MD     Disposition: Goal: SNF     Transportation: S     Main Contact: Deborah Gaitan [phone], [phone] (mobile)     9625: telephone call to sister Samantha Noel regarding patient's disposition. Have let her know that [facility] has accepted. Did have some concerns regarding this facility. States she is on the hwy to come to the hospital and would call this CM back when she is at a rest stop. 1131: Left message for [facility] to return this CM's call. [address] [facility] can accept, but they do not have private rooms. [phone]: Spoke with sister Diana Alexander. She does not want [facility] due to not having a private room. Have asked this CM to send a referral to [facility]. They denied. Would still like to go to [facility] if possible. This CM has attempted to call [facility]. Unable to reach. Will keep trying. 1351: Have asked MD to put in COVID rapid on patient. 1622: Spoke with Tu Restrepo at [facility]. Let her know that patient's family was told that she would be able to have a single room. Tu Restrepo states she will have to confirm with [facility]. Will reach back out to this CM. Let family know that patient is medically ready and two facilities have accepted patient-[facility] and [facility]. Will not keep patient for a single bed to open up. Family verbalized understanding.      Sonny Zacarias, RN, CRM

## 2021-12-27 NOTE — PROGRESS NOTES
Problem: Falls - Risk of  Goal: *Absence of Falls  Description: Document Rodriguemon Soy Fall Risk and appropriate interventions in the flowsheet. Outcome: Progressing Towards Goal  Note: Fall Risk Interventions:  Mobility Interventions: Communicate number of staff needed for ambulation/transfer    Mentation Interventions: Adequate sleep, hydration, pain control    Medication Interventions: Bed/chair exit alarm    Elimination Interventions: Call light in reach              Problem: Pressure Injury - Risk of  Goal: *Prevention of pressure injury  Description: Document Isaias Scale and appropriate interventions in the flowsheet.   Outcome: Progressing Towards Goal  Note: Pressure Injury Interventions:  Sensory Interventions: Assess changes in LOC    Moisture Interventions: Absorbent underpads    Activity Interventions: Increase time out of bed    Mobility Interventions: Pressure redistribution bed/mattress (bed type)    Nutrition Interventions: Document food/fluid/supplement intake    Friction and Shear Interventions: HOB 30 degrees or less,Feet elevated on foot rest                Problem: Pain  Goal: *Control of Pain  Outcome: Progressing Towards Goal     Problem: Patient Education: Go to Patient Education Activity  Goal: Patient/Family Education  Outcome: Progressing Towards Goal

## 2021-12-27 NOTE — PROGRESS NOTES
I prayed with Levar Su and celebrated with her there [address] [email or web address]. I also gave her Communion.   Father Marilee Murillo

## 2021-12-27 NOTE — PROGRESS NOTES
Bedside and Verbal shift change report given to Marybeth Rubio RN (oncoming nurse) by Hugo Park RN (offgoing nurse). Report included the following information SBAR, Kardex, ED Summary, Intake/Output, MAR, Recent Results, Cardiac Rhythm Afib, BBB, Alarm Parameters , Quality Measures and Dual Neuro Assessment.

## 2021-12-27 NOTE — PROGRESS NOTES
Problem: Self Care Deficits Care Plan (Adult)  Goal: *Acute Goals and Plan of Care (Insert Text)  Description: FUNCTIONAL STATUS PRIOR TO ADMISSION: Patient was supervision using a rollator for functional mobility, transport wheelchair for community mobility with caregiver assist. She was largely was supervision for basic ADLs with occasional assist for distal lower body dressing, managing her own finances but assist from caregivers for other instrumental ADLs. HOME SUPPORT: The patient lived alone with caregivers present most of the day to provide assistance, alone some hours of the day. Occupational Therapy Goals  Initiated 12/21/2021  1. Patient will perform grooming EOB with minimal assistance/contact guard assist within 7 day(s). 2.  Patient will perform bathing with moderate assistance & AE PRN within 7 day(s). 3.  Patient will perform upper body dressing with minimal assistance/contact guard assist within 7 day(s). 4.  Patient will perform lower body dressing with maximal assistance and AE PRN within 7 day(s). 5.  Patient will perform toilet transfers to/from [facility] with maximal assistance within 7 day(s). 6.  Patient will perform all aspects of toileting with maximal assistance within 7 day(s). 7.  Patient will participate in upper extremity therapeutic exercise/activities with minimal assistance/contact guard assist for 5 minutes within 7 day(s). 8.  Patient will utilize energy conservation techniques during functional activities with verbal and visual cues within 7 day(s). Outcome: Progressing Towards Goal    OCCUPATIONAL THERAPY TREATMENT  Patient: Alejandro Live (89 y.o. female)  Date: 12/27/2021  Diagnosis: Hyponatremia [E87.1] <principal problem not specified>       Precautions: Fall  Chart, occupational therapy assessment, plan of care, and goals were reviewed. ASSESSMENT  Patient continues with skilled OT services and is progressing towards goals.   Patient was agreeable and eager to mobilize OOB today. She c/o diffuse pain and weakness, which she contributes to reduced mobility since admission. She remains limited by general weakness, limited functional endurance, impaired sitting + standing balance, and impaired reach for LB ADLs. Mobilized OOB to chair after performing BUE/ BLE AROM exercises seated EOB, which helped to alleviate joint pain. She remains significantly below functional baseline. Recommend SNF at d/c. (If patient d/c home instead, she would benefit from [facility] and would require x1-2 assist for stand-pivot transfers and minimum to total assistance for ADLs). Current Level of Function Impacting Discharge (ADLs): Required total assistance to don socks, moderate assistance for supine-sit, sit-stand, and pivot to chair using RW. Infer overall minimum to total assistance ADLs. PLAN :  Patient continues to benefit from skilled intervention to address the above impairments. Continue treatment per established plan of care to address goals. Recommendation for discharge: (in order for the patient to meet his/her long term goals)  Recommend SNF at d/c. (If patient d/c home instead, she would benefit from [facility] and would require x1-2 assist for stand-pivot transfers and minimum to total assistance for ADLs). This discharge recommendation:  Has been made in collaboration with the attending provider and/or case management         SUBJECTIVE:   Patient stated I hurt all over.     OBJECTIVE DATA SUMMARY:   Cognitive/Behavioral Status:  Neurologic State: Alert  Orientation Level: Oriented X4  Cognition: Follows commands             Functional Mobility and Transfers for ADLs:  Bed Mobility:  Supine to Sit: Moderate assistance;Bed Modified    Transfers:  Sit to Stand:  Moderate assistance     Bed to Chair: Moderate assistance (using RW)    Balance:  Sitting: Impaired  Sitting - Static: Good (unsupported)  Sitting - Dynamic: Fair (occasional)  Standing: Impaired; With support  Standing - Static: Fair;Constant support  Standing - Dynamic : Constant support; Fair    ADL Intervention:     LB dressing: total assistance to don socks       Pain:  Patient c/o diffuse pain related to joint stiffness/ immobility, improving with mobilization     Activity Tolerance:   Fair    After treatment patient left in no apparent distress:   Sitting in chair, Call bell within reach, Bed / chair alarm activated, and Caregiver / family present    COMMUNICATION/COLLABORATION:   The patients plan of care was discussed with: Physical therapist and Registered nurse.      Domenico Yuan OT  Time Calculation: 26 mins

## 2021-12-27 NOTE — PROGRESS NOTES
Problem: Falls - Risk of  Goal: *Absence of Falls  Description: Document Xiomy Vasquesthal Fall Risk and appropriate interventions in the flowsheet. Outcome: Progressing Towards Goal  Note: Fall Risk Interventions:  Mobility Interventions: Communicate number of staff needed for ambulation/transfer,Patient to call before getting OOB,PT Consult for mobility concerns    Mentation Interventions: Adequate sleep, hydration, pain control    Medication Interventions: Patient to call before getting OOB,Teach patient to arise slowly    Elimination Interventions: Call light in reach,Patient to call for help with toileting needs,Toileting schedule/hourly rounds     Problem: Pressure Injury - Risk of  Goal: *Prevention of pressure injury  Description: Document Isaias Scale and appropriate interventions in the flowsheet. Outcome: Progressing Towards Goal  Note: Pressure Injury Interventions:  Sensory Interventions: Assess changes in LOC,Avoid rigorous massage over bony prominences,Keep linens dry and wrinkle-free,Minimize linen layers,Monitor skin under medical devices    Moisture Interventions: Absorbent underpads,Check for incontinence Q2 hours and as needed,Internal/External urinary devices,Minimize layers    Activity Interventions: Increase time out of bed,Pressure redistribution bed/mattress(bed type)    Mobility Interventions: Pressure redistribution bed/mattress (bed type),PT/OT evaluation,Turn and reposition approx.  every two hours(pillow and wedges)    Nutrition Interventions: Document food/fluid/supplement intake    Friction and Shear Interventions: Lift sheet,Minimize layers  Problem: Patient Education: Go to Patient Education Activity  Goal: Patient/Family Education  Outcome: Progressing Towards Goal

## 2021-12-27 NOTE — PROGRESS NOTES
Physical Therapy - defer  Chart reviewed, RN cleared for activity though notes pt just returned to bed. Will check back tomorrow.

## 2021-12-27 NOTE — PROGRESS NOTES
[facility]   [address] [facility], [id number] [place] [address]  Phone: [phone]   [phone]    [email or web address]     Nephrology Progress Note    Patient Name : Hiren Romano      : 1922     MRN : [id number]  Date of Admission : 2021  Date of Servive : 21    CC: Follow up for Hyponatremia        Assessment and Plan   Severe Hyponatremia :  - Multifactorial.  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 Resting comfortably. Na stable at 131. Remains confused,  Unable to obtain accurate ROS      Review of Systems: A comprehensive review of systems was negative except for that written in the HPI.     Current Medications:   Current Facility-Administered Medications   Medication Dose Route Frequency    magnesium hydroxide (MILK OF MAGNESIA) 400 mg/5 mL oral suspension 30 mL  30 mL Oral DAILY PRN    sodium chloride tablet 2 g  2 g Oral TID WITH MEALS    senna (SENOKOT) tablet 8.6 mg  1 Tablet Oral DAILY    docusate sodium (COLACE) capsule 100 mg  100 mg Oral DAILY    polyethylene glycol (MIRALAX) packet 17 g  17 g Oral DAILY PRN    fluticasone propionate (FLONASE) 50 mcg/actuation nasal spray 2 Spray  2 Spray Both Nostrils DAILY PRN    cetirizine (ZYRTEC) tablet 10 mg  10 mg Oral DAILY    sodium chloride (NS) flush 5-40 mL  5-40 mL IntraVENous Q8H    sodium chloride (NS) flush 5-40 mL  5-40 mL IntraVENous PRN    acetaminophen (TYLENOL) tablet 650 mg  650 mg Oral Q6H PRN    Or    acetaminophen (TYLENOL) suppository 650 mg  650 mg Rectal Q6H PRN    ondansetron (ZOFRAN ODT) tablet 4 mg  4 mg Oral Q8H PRN    Or    ondansetron (ZOFRAN) injection 4 mg  4 mg IntraVENous Q6H PRN    balsam peru-castor oiL (VENELEX) ointment   Topical Q12H    miconazole (MICOTIN) 2 % powder   Topical Q12H    aspirin delayed-release tablet 81 mg  81 mg Oral DAILY      Allergies   Allergen Reactions    Amoxicillin Other (comments)     \"makes me feel like I\"m going to faint\"    Aspirin Palpitations     Per patient, no allergy    Gabapentin Drowsiness     * pt states this med makes her feel very drowsy , gives her a drunk feeling.  Levaquin [Levofloxacin] Hives and Rash    Lorazepam Other (comments)     Feeling faint.  Losartan Other (comments)     Tongue swelling      Lyrica [Pregabalin] Rash    Other Medication Other (comments)     Feeling faint, does not decrease pain.  Sertraline Other (comments)     Feeling faint. Objective:  Vitals:    Vitals:    12/26/21 2200 12/27/21 0200 12/27/21 0400 12/27/21 0600   BP: (!) 144/60 (!) 129/52  (!) 136/43   Pulse: 74 61 66 71   Resp: 19 16  17   Temp: 97.9 °F (36.6 °C) 97.9 °F (36.6 °C)  97.7 °F (36.5 °C)   SpO2:       Weight:  87.7 kg (193 lb 5.5 oz)       Intake and Output:  No intake/output data recorded. 12/25 1901 - 12/27 0700  In: 1370 [P.O.:1370]  Out: -     Physical Examination:    General: Confused, no distress  Neck:  Supple, no mass  Resp:   lungs mostly clear  CV:  RRR,  systolic murmur +  GI:  Soft, NT, + BS, no HS megaly  Neurologic:  Confused   Ext                   B/L leg edema       []    High complexity decision making was performed  []    Patient is at high-risk of decompensation with multiple organ involvement    Lab Data Personally Reviewed: I have reviewed all the pertinent labs, microbiology data and radiology studies during assessment.     Recent Labs     12/27/21  0108 12/26/21  1107 12/26/21  0521 12/25/21  2316 12/25/21  1715   * 133* 132* 132* 131*   K 4.6 4.6 4.2 4.4 4.3   CL 99 99 99 98 96*   CO2 28 31 29 29 30   * 136* 103* 160* 251*   BUN 21* 16 16 18 18   CREA 0.74 0.73 0.64 0.68 0.85   CA 8.2* 8.0* 8.1* 8.0* 8.6 PHOS 4.2  --   --   --   --    ALB 2.7*  --   --   --   --      No results for input(s): WBC, HGB, HCT, PLT, HGBEXT, HCTEXT, PLTEXT, HGBEXT, HCTEXT, PLTEXT in the last 72 hours. No results found for: SDES  Lab Results   Component Value Date/Time    Culture result: NO GROWTH 5 DAYS 12/20/2021 12:30 AM     Recent Results (from the past 24 hour(s))   METABOLIC PANEL, BASIC    Collection Time: 12/26/21 11:07 AM   Result Value Ref Range    Sodium 133 (L) 136 - 145 mmol/L    Potassium 4.6 3.5 - 5.1 mmol/L    Chloride 99 97 - 108 mmol/L    CO2 31 21 - 32 mmol/L    Anion gap 3 (L) 5 - 15 mmol/L    Glucose 136 (H) 65 - 100 mg/dL    BUN 16 6 - 20 MG/DL    Creatinine 0.73 0.55 - 1.02 MG/DL    BUN/Creatinine ratio 22 (H) 12 - 20      GFR est AA >60 >60 ml/min/1.73m2    GFR est non-AA >60 >60 ml/min/1.73m2    Calcium 8.0 (L) 8.5 - 10.1 MG/DL   RENAL FUNCTION PANEL    Collection Time: 12/27/21  1:08 AM   Result Value Ref Range    Sodium 131 (L) 136 - 145 mmol/L    Potassium 4.6 3.5 - 5.1 mmol/L    Chloride 99 97 - 108 mmol/L    CO2 28 21 - 32 mmol/L    Anion gap 4 (L) 5 - 15 mmol/L    Glucose 145 (H) 65 - 100 mg/dL    BUN 21 (H) 6 - 20 MG/DL    Creatinine 0.74 0.55 - 1.02 MG/DL    BUN/Creatinine ratio 28 (H) 12 - 20      GFR est AA >60 >60 ml/min/1.73m2    GFR est non-AA >60 >60 ml/min/1.73m2    Calcium 8.2 (L) 8.5 - 10.1 MG/DL    Phosphorus 4.2 2.6 - 4.7 MG/DL    Albumin 2.7 (L) 3.5 - 5.0 g/dL   SAMPLES BEING HELD    Collection Time: 12/27/21  1:08 AM   Result Value Ref Range    SAMPLES BEING HELD 1LAV     COMMENT        Add-on orders for these samples will be processed based on acceptable specimen integrity and analyte stability, which may vary by analyte. I have reviewed the flowsheets. Chart and Pertinent Notes have been reviewed. No change in PMH ,family and social history from Consult note.       Kat Bray MD  [facility]

## 2021-12-27 NOTE — PROGRESS NOTES
Hospitalist Progress Note    NAME: Vin Rankin   :  1922   MRN:  [id number]     \"This is a 77-year-old female with past medical history of pulmonary hypertension, atrial fibrillation, hypertension, DM was admitted to the ICU overnight with symptomatic hyponatremia given mental status changes. Her serum sodium was noted to be 109. She was given hypertonic saline and admitted to the ICU for further work-up. She received hypertonic saline in ICU with improving sodium\"    2021. This note is for 2020, as placing a note was missed on this patient. Saw patient patient this morning, awake alert and oriented, pleasant and cooperative with physical examination, sodium is still not optimized, patient is pending transfer to rehab.    2021. No acute events overnight. Saw patient this morning, awake alert and oriented, complaining of being constipated otherwise denies any fever, chills, nausea, vomiting. 2021. Saw patient this morning, awake alert and oriented, pleasant and cooperative with physical examination, stating that she has had 4 bowel movement since yesterday and she is very happy about it, denies any fever, chills, nausea, vomiting, patient is p.o. tolerant and her sodium is trending up.     2021. Saw patient this morning, comfortably sleeping, easily arousable, pleasant and cooperative with physical examination, denies any fever, chills, nausea, vomiting. Patient is pending transfer to rehab.    2021. Saw patient this morning, awake alert and oriented, no apparent distress, pleasant and cooperative with physical examination. Denies any fever, chills, nausea, vomiting. Has not had a bowel for the last 24 hours. Patient sodium is slowly improving. Patient is pending transfer to rehab.     Assessment / Plan:  Hyponatremia   Improving. Not optimized. Presented with severe hyponatremia.     2021 presented with sodium level of 114, was placed on 3% saline and transferred to ICU. Continue fluid restriction and sodium low-dose also planned. Nephrology currently has signed off, reconsult if needed. Outpatient PCP and nephrology follow-up. 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 Multifactorial sodium slowly trending up, not optimized. Nephrology on board, recommendations noted. Serial labs, seizure precautions. Atrial fibrillation  Patient has history of chronic persistent atrial fibrillation, not anticoagulated. Rate controlled. Continue telemetry, as needed IV beta-blockers. Hypertension  Euvolemic. Normotensive. Continue current meds, adjust meds as needed. Diabetes mellitus type 2  Not sure if patient has diabetes as her hemoglobin A1c is 6.0 and there is no antidiabetic medications on her home regimen. Continue Accu-Chek and hypoglycemia protocol. Pulmonary hypertension  Continue home meds, outpatient PCP and pulmonology follow-up. Obesity  BMI 37.7. Diet and lifestyle modification recommended. Code status: Full  Prophylaxis: Lovenox  Recommended Disposition: SNF     Subjective:     Chief Complaint / Reason for Physician Visit  Alert, awake, oriented x2  Family at bedside  Patient states she feels better today   No nausea/vomiting  Appetite improved     Review of Systems:  Symptom Y/N Comments  Symptom Y/N Comments   Fever/Chills    Chest Pain     Poor Appetite    Edema     Cough    Abdominal Pain     Sputum    Joint Pain     SOB/MCNAIR    Pruritis/Rash     Nausea/vomit    Tolerating PT/OT     Diarrhea    Tolerating Diet     Constipation    Other       Could NOT obtain due to:      Objective:     VITALS:   Last 24hrs VS reviewed since prior progress note.  Most recent are:  Patient Vitals for the past 24 hrs:   Temp Pulse Resp BP SpO2   12/27/21 1434 98.2 °F (36.8 °C) 86 22 (!) 151/77 99 %   12/27/21 1014 97.5 °F (36.4 °C) 73 17 (!) 153/54 98 %   12/27/21 0900     97 %   12/27/21 0600 97.7 °F (36.5 °C) 71 17 (!) 136/43    12/27/21 0400  66      12/27/21 0200 97.9 °F (36.6 °C) 61 16 (!) 129/52    12/26/21 2200 97.9 °F (36.6 °C) 74 19 (!) 144/60    12/26/21 2000  83      12/26/21 1815 98.5 °F (36.9 °C) 80 19 (!) 139/51 96 %   12/26/21 1800  92          Intake/Output Summary (Last 24 hours) at 12/27/2021 1446  Last data filed at 12/27/2021 1410  Gross per 24 hour   Intake 480 ml   Output 1200 ml   Net -720 ml        PHYSICAL EXAM:  General: WD, WN. Alert, cooperative, no acute distress    EENT:  EOMI. Anicteric sclerae. MMM  Resp:  CTA bilaterally, no wheezing or rales. No accessory muscle use  CV:  Regular  rhythm,  No edema  GI:  Soft, Non distended, Non tender.  +Bowel sounds  Neurologic:  Alert and oriented X 3, normal speech,   Psych:   Good insight. Not anxious nor agitated  Skin:  No rashes.   No jaundice    Reviewed most current lab test results and cultures  YES  Reviewed most current radiology test results   YES  Review and summation of old records today    NO  Reviewed patient's current orders and MAR    YES  PMH/SH reviewed - no change compared to H&P          Current Facility-Administered Medications:     magnesium hydroxide (MILK OF MAGNESIA) 400 mg/5 mL oral suspension 30 mL, 30 mL, Oral, DAILY PRN, Shannan Clancy MD    sodium chloride tablet 2 g, 2 g, Oral, TID WITH MEALS, Keshav Cuellar MD, 2 g at 12/27/21 1200    senna (SENOKOT) tablet 8.6 mg, 1 Tablet, Oral, DAILY, Shannan Clancy MD, 8.6 mg at 12/27/21 1002    docusate sodium (COLACE) capsule 100 mg, 100 mg, Oral, DAILY, Shannan Clancy MD, 100 mg at 12/27/21 1003    polyethylene glycol (MIRALAX) packet 17 g, 17 g, Oral, DAILY PRN, Shannan Clancy MD, 17 g at 12/24/21 1521    fluticasone propionate (FLONASE) 50 mcg/actuation nasal spray 2 Spray, 2 Spray, Both Nostrils, DAILY PRN, Shawn Isabel MD    cetirizine (ZYRTEC) tablet 10 mg, 10 mg, Oral, DAILY, Shawn Isabel MD, 10 mg at 12/27/21 1003    sodium chloride (NS) flush 5-40 mL, 5-40 mL, IntraVENous, Q8H, Julia Holland NP, 10 mL at 12/27/21 1400    sodium chloride (NS) flush 5-40 mL, 5-40 mL, IntraVENous, PRN, Bacilio Chahal, ROBYN    acetaminophen (TYLENOL) tablet 650 mg, 650 mg, Oral, Q6H PRN, 650 mg at 12/21/21 1210 **OR** acetaminophen (TYLENOL) suppository 650 mg, 650 mg, Rectal, Q6H PRN, Kylah Trujillo, NP    ondansetron (ZOFRAN ODT) tablet 4 mg, 4 mg, Oral, Q8H PRN **OR** ondansetron (ZOFRAN) injection 4 mg, 4 mg, IntraVENous, Q6H PRN, Kylah Trujillo, NP    balsam peru-castor oiL (VENELEX) ointment, , Topical, Q12H, Thong Trujillo MD, Given at 12/27/21 0900    miconazole (MICOTIN) 2 % powder, , Topical, Q12H, Thong Trujillo MD, Given at 12/27/21 0900    aspirin delayed-release tablet 81 mg, 81 mg, Oral, DAILY, Kanchan Lazcano MD, 81 mg at 12/27/21 1003  ________________________________________________________________________  Care Plan discussed with:    Comments   Patient X    Family  X    RN X    Care Manager X    Consultant  X                      Multidiciplinary team rounds were held today with , nursing, pharmacist and clinical coordinator. Patient's plan of care was discussed; medications were reviewed and discharge planning was addressed. ________________________________________________________________________  Total NON critical care TIME:  30    Minutes    Total CRITICAL CARE TIME Spent:   Minutes non procedure based      Comments   >50% of visit spent in counseling and coordination of care     ________________________________________________________________________  Pressley Cowden, MD     Procedures: see electronic medical records for all procedures/Xrays and details which were not copied into this note but were reviewed prior to creation of Plan. LABS:  I reviewed today's most current labs and imaging studies.   Pertinent labs include:  No results for input(s): WBC, HGB, HCT, PLT, HGBEXT, HCTEXT, PLTEXT, HGBEXT, HCTEXT, PLTEXT in the last 72 hours.   Recent Labs     12/27/21  0108 12/26/21  1107 12/26/21  0521   * 133* 132*   K 4.6 4.6 4.2   CL 99 99 99   CO2 28 31 29   * 136* 103*   BUN 21* 16 16   CREA 0.74 0.73 0.64   CA 8.2* 8.0* 8.1*   PHOS 4.2  --   --    ALB 2.7*  --   --        Signed: Mary Lozano MD

## 2021-12-28 NOTE — PROGRESS NOTES
TRANSITION OF CARE  RUR--13%  Disposition--To [facility] SNF   Transport--BLS with [facility] scheduled for Wednesday 12/29/21 at 11:00AM. Completed PCS was placed on chart. Patient's primary family contact: sister Demetrio Garcia    CM follow up. After extensive search, CM was unable to find SNF with a private room in this area. CM gave update to sister Winter Andrade who agreed to proceed with placement with a semi-private room. Patient and sister selected [facility] SNF. CM noted that [facility] had accepted patient for admission in [employer]. CM spoke with Danay Smith, Admissions Director  [facility], who confirmed acceptance and said that bed will be available Wednesday 12/29/21.   CM gave update to patient, sister Winter Andrade, and the hospitalist.

## 2021-12-28 NOTE — PROGRESS NOTES
Problem: Self Care Deficits Care Plan (Adult)  Goal: *Acute Goals and Plan of Care (Insert Text)  Description: FUNCTIONAL STATUS PRIOR TO ADMISSION: Patient was supervision using a rollator for functional mobility, transport wheelchair for community mobility with caregiver assist. She was largely was supervision for basic ADLs with occasional assist for distal lower body dressing, managing her own finances but assist from caregivers for other instrumental ADLs. HOME SUPPORT: The patient lived alone with caregivers present most of the day to provide assistance, alone some hours of the day. Occupational Therapy Goals  Updated 12/28/2021  1. Patient will perform grooming standing at sink with contact guard assistance within 7 day(s). 2.  Patient will perform bathing with moderate assistance & AE PRN within 7 day(s). 3.  Patient will perform upper body dressing with set-up within 7 day(s). 4.  Patient will perform lower body dressing with moderate assistance and AE PRN within 7 day(s). 5.  Patient will perform toilet transfers to/from [facility] with minimal assistance within 7 day(s). 6.  Patient will perform all aspects of toileting with moderate assistance within 7 day(s). 7.  Patient will participate in upper extremity therapeutic exercise/activities with minimal assistance/contact guard assist for 5 minutes within 7 day(s). 8.  Patient will utilize energy conservation techniques during functional activities with verbal and visual cues within 7 day(s). Initiated 12/21/2021  1. Patient will perform grooming EOB with minimal assistance/contact guard assist within 7 day(s). 2.  Patient will perform bathing with moderate assistance & AE PRN within 7 day(s). 3.  Patient will perform upper body dressing with minimal assistance/contact guard assist within 7 day(s). 4.  Patient will perform lower body dressing with maximal assistance and AE PRN within 7 day(s).   5.  Patient will perform toilet transfers to/from [facility] with maximal assistance within 7 day(s). 6.  Patient will perform all aspects of toileting with maximal assistance within 7 day(s). 7.  Patient will participate in upper extremity therapeutic exercise/activities with minimal assistance/contact guard assist for 5 minutes within 7 day(s). 8.  Patient will utilize energy conservation techniques during functional activities with verbal and visual cues within 7 day(s). Outcome: Progressing Towards Goal      OCCUPATIONAL THERAPY TREATMENT/WEEKLY RE-ASSESSMENT  Patient: Mary Ellen Kowalski (67 y.o. female)  Date: 12/28/2021  Diagnosis: Hyponatremia [E87.1] <principal problem not specified>       Precautions: Fall  Chart, occupational therapy assessment, plan of care, and goals were reviewed. ASSESSMENT  Patient continues with skilled OT services and is progressing towards goals. Patient was somewhat self-limiting but responded well to encouragement. She remains limited by general weakness, limited functional endurance, impaired sitting + standing balance, and impaired reach for LB ADLs. Progressed to ambulate ~5' to and from bathroom using RW for toileting using standard toilet and was agreeable to remain OOB in chair at conclusion of session. Continue to recommend SNF at d/c. (If patient d/c home instead, she would benefit from [facility] and would require x1 assist for mobility and ADLs). Current Level of Function Impacting Discharge (ADLs): Required total assistance to don socks, moderate assistance for supine-sit and sit-stand, minimum assistance for ambulating ~5' to and from bathroom, and total assistance for bowel hygiene. Infer overall minimum to total assistance ADLs. PLAN :  Goals have been updated based on progression since last assessment. Patient continues to benefit from skilled intervention to address the above impairments. Continue to follow patient 5 times a week to address goals.     Recommendation for discharge: (in order for the patient to meet his/her long term goals)  Therapy up to 5 days/week in SNF setting    This discharge recommendation:  Has been made in collaboration with the attending provider and/or case management         SUBJECTIVE:   Patient stated I don't want to hit the floor.     OBJECTIVE DATA SUMMARY:   Cognitive/Behavioral Status:  Neurologic State: Alert  Orientation Level: Oriented X4  Cognition: Follows commands; Appropriate for age attention/concentration             Functional Mobility and Transfers for ADLs:  Bed Mobility:  Supine to Sit: Moderate assistance; Additional time    Transfers:  Sit to Stand: Moderate assistance          Balance:  Sitting: Impaired  Sitting - Static: Good (unsupported)  Sitting - Dynamic: Fair (occasional)  Standing: Impaired; With support  Standing - Static: Fair  Standing - Dynamic : Fair    ADL Intervention:               Toileting  Bowel Hygiene: Total assistance (dependent) (for bowel hygiene, standing with RW)  Clothing Management: Total assistance (dependent) (for brief change)       Pain:  Patient did not report pain    Activity Tolerance:   VSS, good    After treatment patient left in no apparent distress:   Sitting in chair, Call bell within reach, and Bed / chair alarm activated    COMMUNICATION/COLLABORATION:   The patients plan of care was discussed with: Physical therapist and Registered nurse.      Sarah Quezada OT  Time Calculation: 38 mins

## 2021-12-28 NOTE — PROGRESS NOTES
[facility]   [id number] [facility], [address] [place] [address]  Phone: [phone]   [phone]    [email or web address]     Nephrology Progress Note    Patient Name : Mary Ellen Kowalski      : 1922     MRN : [id number]  Date of Admission : 2021  Date of Servive : 21    CC: Follow up for Hyponatremia        Assessment and Plan   Severe Hyponatremia :  - Multifactorial.  SIADH at baseline exacerbated by use of Bactrim, diuretics   - required 3% saline, tolvaptan x2 doses this admission  - Na stable  - Continue with FW  restriction 1200 cc/day + salt tabs  - Daily labs while here    Bradycardia   Chronic A fib  Pulm HTN   Severe TR, Mild MR  JOSE         Interval History:  Seen and examined. Resting comfortably. Na stable at 133. Remains confused,  Unable to obtain accurate ROS      Review of Systems: A comprehensive review of systems was negative except for that written in the HPI.     Current Medications:   Current Facility-Administered Medications   Medication Dose Route Frequency    sodium chloride tablet 1 g  1 g Oral TID WITH MEALS    amLODIPine (NORVASC) tablet 2.5 mg  2.5 mg Oral DAILY    magnesium hydroxide (MILK OF MAGNESIA) 400 mg/5 mL oral suspension 30 mL  30 mL Oral DAILY PRN    senna (SENOKOT) tablet 8.6 mg  1 Tablet Oral DAILY    docusate sodium (COLACE) capsule 100 mg  100 mg Oral DAILY    polyethylene glycol (MIRALAX) packet 17 g  17 g Oral DAILY PRN    fluticasone propionate (FLONASE) 50 mcg/actuation nasal spray 2 Spray  2 Spray Both Nostrils DAILY PRN    cetirizine (ZYRTEC) tablet 10 mg  10 mg Oral DAILY    sodium chloride (NS) flush 5-40 mL  5-40 mL IntraVENous Q8H    sodium chloride (NS) flush 5-40 mL  5-40 mL IntraVENous PRN    acetaminophen (TYLENOL) tablet 650 mg  650 mg Oral Q6H PRN    Or    acetaminophen (TYLENOL) suppository 650 mg  650 mg Rectal Q6H PRN    ondansetron (ZOFRAN ODT) tablet 4 mg  4 mg Oral Q8H PRN    Or  ondansetron (ZOFRAN) injection 4 mg  4 mg IntraVENous Q6H PRN    balsam peru-castor oiL (VENELEX) ointment   Topical Q12H    miconazole (MICOTIN) 2 % powder   Topical Q12H    aspirin delayed-release tablet 81 mg  81 mg Oral DAILY      Allergies   Allergen Reactions    Amoxicillin Other (comments)     \"makes me feel like I\"m going to faint\"    Aspirin Palpitations     Per patient, no allergy    Gabapentin Drowsiness     * pt states this med makes her feel very drowsy , gives her a drunk feeling.  Levaquin [Levofloxacin] Hives and Rash    Lorazepam Other (comments)     Feeling faint.  Losartan Other (comments)     Tongue swelling      Lyrica [Pregabalin] Rash    Other Medication Other (comments)     Feeling faint, does not decrease pain.  Sertraline Other (comments)     Feeling faint. Objective:  Vitals:    Vitals:    12/28/21 0200 12/28/21 0400 12/28/21 0557 12/28/21 0600   BP: (!) 121/33  (!) 165/51    Pulse: 67 66 69 69   Resp: 16  16    Temp: 98 °F (36.7 °C)  97.8 °F (36.6 °C)    SpO2: 99%  99%    Weight:   88.1 kg (194 lb 3.6 oz)      Intake and Output:  No intake/output data recorded. 12/26 1901 - 12/28 0700  In: 240 [P.O.:240]  Out: 1950 [Urine:1950]    Physical Examination:    General: Confused, no distress  Neck:  Supple, no mass  Resp:   lungs mostly clear  CV:  RRR,  systolic murmur +  GI:  Soft, NT, + BS, no HS megaly  Neurologic:  Confused   Ext                   B/L leg edema       []    High complexity decision making was performed  []    Patient is at high-risk of decompensation with multiple organ involvement    Lab Data Personally Reviewed: I have reviewed all the pertinent labs, microbiology data and radiology studies during assessment.     Recent Labs     12/28/21  0314 12/27/21  0108 12/26/21  1107 12/26/21  0521 12/25/21  2316   * 131* 133* 132* 132*   K 4.0 4.6 4.6 4.2 4.4    99 99 99 98   CO2 26 28 31 29 29   * 145* 136* 103* 160*   BUN 20 21* 16 16 18   CREA 0.57 0.74 0.73 0.64 0.68   CA 8.2* 8.2* 8.0* 8.1* 8.0*   PHOS  --  4.2  --   --   --    ALB  --  2.7*  --   --   --      No results for input(s): WBC, HGB, HCT, PLT, HGBEXT, HCTEXT, PLTEXT, HGBEXT, HCTEXT, PLTEXT in the last 72 hours. No results found for: SDES  Lab Results   Component Value Date/Time    Culture result: NO GROWTH 5 DAYS 12/20/2021 12:30 AM     Recent Results (from the past 24 hour(s))   SARS-COV-2    Collection Time: 12/27/21  5:59 PM   Result Value Ref Range    SARS-CoV-2 Please find results under separate order     METABOLIC PANEL, BASIC    Collection Time: 12/28/21  3:14 AM   Result Value Ref Range    Sodium 133 (L) 136 - 145 mmol/L    Potassium 4.0 3.5 - 5.1 mmol/L    Chloride 101 97 - 108 mmol/L    CO2 26 21 - 32 mmol/L    Anion gap 6 5 - 15 mmol/L    Glucose 123 (H) 65 - 100 mg/dL    BUN 20 6 - 20 MG/DL    Creatinine 0.57 0.55 - 1.02 MG/DL    BUN/Creatinine ratio 35 (H) 12 - 20      GFR est AA >60 >60 ml/min/1.73m2    GFR est non-AA >60 >60 ml/min/1.73m2    Calcium 8.2 (L) 8.5 - 10.1 MG/DL           I have reviewed the flowsheets. Chart and Pertinent Notes have been reviewed. No change in PMH ,family and social history from Consult note.       Ernie Bland MD  [facility]

## 2021-12-28 NOTE — PROGRESS NOTES
Problem: Mobility Impaired (Adult and Pediatric)  Goal: *Acute Goals and Plan of Care (Insert Text)  Description: FUNCTIONAL STATUS PRIOR TO ADMISSION: Patient reports a decline in functional independence in the last 2-4 weeks. Originally modified independent with rollator. HOME SUPPORT PRIOR TO ADMISSION: The patient lived alone with caregivers present most of the day to provide assistance, alone some hours of the day. Physical Therapy Goals  Reassessed 12/28/2021  1. Patient will move from supine to sit and sit to supine  in bed with min assistance  within 7 day(s). 2.  Patient will transfer from bed to chair and chair to bed with min assistance  using the least restrictive device within 7 day(s). 3.  Patient will perform sit to stand with min assistance  within 7 day(s). 4.  Patient will ambulate with minimal assistance for 20 feet with the least restrictive device within 7 day(s). Initiated 12/21/2021  1. Patient will move from supine to sit and sit to supine  in bed with moderate assistance  within 7 day(s). 2.  Patient will transfer from bed to chair and chair to bed with moderate assistance  using the least restrictive device within 7 day(s). 3.  Patient will perform sit to stand with moderate assistance  within 7 day(s). 4.  Patient will ambulate with minimal assistance  for 50 feet with the least restrictive device within 7 day(s). Outcome: Progressing Towards Goal   PHYSICAL THERAPY TREATMENT: WEEKLY REASSESSMENT  Patient: Petey Moura (16 y.o. female)  Date: 12/28/2021  Primary Diagnosis: Hyponatremia [E87.1]        Precautions:   Fall      ASSESSMENT  Patient continues with skilled PT services and is progressing towards goals. Pt received supine in bed and agreeable to therapy. Pt tolerated session well, but continues to be limited by generalized weakness, decreased tolerance to activity, impaired balance and gait.  Pt tolerated progressive mobility this date and able to ambulate a short distance with RW from bed to chair with min A and additional 5 ft x 2 with seated break on the toilet. Pt required mod A for sit<>stand transfers with RW with emphasis on proper hand placement and anterior weight shifting/momentum to assist. Pt remained seated in the chair with all needs met. Will benefit from SNF placement upon discharge to continue therapy efforts. .     Patient's progression toward goals since last assessment: 3/4 goals met. New goals updated    Current Level of Function Impacting Discharge (mobility/balance): mod A supine to sit, mod A sit<>stand with RW, gait training 5 ft with min A         PLAN :  Goals have been updated based on progression since last assessment. Patient continues to benefit from skilled intervention to address the above impairments. Recommendations and Planned Interventions: bed mobility training, transfer training, gait training, therapeutic exercises, neuromuscular re-education, patient and family training/education, and therapeutic activities      Frequency/Duration: Patient will be followed by physical therapy:  5 times a week to address goals. Recommendation for discharge: (in order for the patient to meet his/her long term goals)  Therapy up to 5 days/week in SNF setting    This discharge recommendation:  Has been made in collaboration with the attending provider and/or case management    IF patient discharges home will need the following DME: none         SUBJECTIVE:   Patient stated I'm so weak.     OBJECTIVE DATA SUMMARY:   HISTORY:    Past Medical History:   Diagnosis Date    Anxiety     Bleeding nose     Chronic atrial fibrillation ([address]     [address] [address]     Hypertension      Past Surgical History:   Procedure Laterality Date    HX CATARACT REMOVAL      HX CHOLECYSTECTOMY      HX HEENT      myringotomy    HX HEENT  07/2016    basal cell removal -forehead    HX TONSILLECTOMY      childhood    HX TYMPANOSTOMY  07/11/2016    left ear    HX TYMPANOSTOMY      bilateral       Personal factors and/or comorbidities impacting plan of care:     Home Situation  Home Environment: Private residence  # Steps to Enter: 8  Wheelchair Ramp: Yes  One/Two Story Residence: One story  Support Systems: Other Family Member(s) (Sister Burt Chavez [phone])  Current DME Used/Available at Home: Raised toilet seat,Walker, rolling,Walker, rollator,Cane, straight (transport chair)  Tub or Shower Type: Shower    EXAMINATION/PRESENTATION/DECISION MAKING:   Critical Behavior:  Neurologic State: Alert  Orientation Level: Oriented X4  Cognition: Follows commands,Appropriate for age attention/concentration  Safety/Judgement: Decreased awareness of environment,Decreased awareness of need for assistance,Decreased awareness of need for safety,Decreased insight into deficits    Range Of Motion:  AROM: Generally decreased, functional                       Strength:    Strength: Generally decreased, functional       Functional Mobility:  Bed Mobility:     Supine to Sit: Moderate assistance; Additional time        Transfers:  Sit to Stand: Moderate assistance  Stand to Sit: Moderate assistance                       Balance:   Sitting: Impaired  Sitting - Static: Good (unsupported)  Sitting - Dynamic: Fair (occasional)  Standing: Impaired; With support  Standing - Static: Fair  Standing - Dynamic : Fair  Ambulation/Gait Training:  Distance (ft): 5 Feet (ft) (x 2)  Assistive Device: Gait belt;Walker, rolling  Ambulation - Level of Assistance: Minimal assistance        Gait Abnormalities: Decreased step clearance;Trunk sway increased; Shuffling gait; Step to gait        Base of Support: Widened     Speed/Aliyah: Pace decreased (<100 feet/min); Shuffled  Step Length: Right shortened;Left shortened            Pain Rating:  Pt denied pain    Activity Tolerance:   Good and Fair    After treatment patient left in no apparent distress:   Sitting in chair, Call bell within reach, Bed / chair alarm activated, and Side rails x 3    COMMUNICATION/EDUCATION:   The patients plan of care was discussed with: Occupational therapist and Registered nurse. Fall prevention education was provided and the patient/caregiver indicated understanding., Patient/family have participated as able in goal setting and plan of care. , and Patient/family agree to work toward stated goals and plan of care.     Thank you for this referral.  Glen Guzman, PT, DPT   Time Calculation: 38 mins

## 2021-12-28 NOTE — PROGRESS NOTES
Problem: Falls - Risk of  Goal: *Absence of Falls  Description: Document Ainsley Silva Fall Risk and appropriate interventions in the flowsheet. Outcome: Progressing Towards Goal  Note: Fall Risk Interventions:  Mobility Interventions: Communicate number of staff needed for ambulation/transfer,Bed/chair exit alarm,Patient to call before getting OOB,Strengthening exercises (ROM-active/passive)    Mentation Interventions: Bed/chair exit alarm    Medication Interventions: Bed/chair exit alarm,Patient to call before getting OOB    Elimination Interventions: Bed/chair exit alarm,Call light in reach              Problem: Patient Education: Go to Patient Education Activity  Goal: Patient/Family Education  Outcome: Progressing Towards Goal     Problem: Pressure Injury - Risk of  Goal: *Prevention of pressure injury  Description: Document Isaias Scale and appropriate interventions in the flowsheet.   Outcome: Progressing Towards Goal  Note: Pressure Injury Interventions:  Sensory Interventions: Assess changes in LOC    Moisture Interventions: Absorbent underpads    Activity Interventions: Increase time out of bed,PT/OT evaluation    Mobility Interventions: PT/OT evaluation    Nutrition Interventions: Document food/fluid/supplement intake    Friction and Shear Interventions: Apply protective barrier, creams and emollients,Feet elevated on foot rest                Problem: Patient Education: Go to Patient Education Activity  Goal: Patient/Family Education  Outcome: Progressing Towards Goal     Problem: Pain  Goal: *Control of Pain  Outcome: Progressing Towards Goal  Goal: *PALLIATIVE CARE:  Alleviation of Pain  Outcome: Progressing Towards Goal     Problem: Patient Education: Go to Patient Education Activity  Goal: Patient/Family Education  Outcome: Progressing Towards Goal     Problem: Patient Education: Go to Patient Education Activity  Goal: Patient/Family Education  Outcome: Progressing Towards Goal     Problem: Patient Education: Go to Patient Education Activity  Goal: Patient/Family Education  Outcome: Progressing Towards Goal     Problem: Patient Education: Go to Patient Education Activity  Goal: Patient/Family Education  Outcome: Progressing Towards Goal     Problem: Heart Failure: Day 1  Goal: Off Pathway (Use only if patient is Off Pathway)  Outcome: Progressing Towards Goal  Goal: Activity/Safety  Outcome: Progressing Towards Goal  Goal: Consults, if ordered  Outcome: Progressing Towards Goal  Goal: Diagnostic Test/Procedures  Outcome: Progressing Towards Goal  Goal: Nutrition/Diet  Outcome: Progressing Towards Goal  Goal: Discharge Planning  Outcome: Progressing Towards Goal  Goal: Medications  Outcome: Progressing Towards Goal  Goal: Respiratory  Outcome: Progressing Towards Goal  Goal: Treatments/Interventions/Procedures  Outcome: Progressing Towards Goal  Goal: Psychosocial  Outcome: Progressing Towards Goal  Goal: *Oxygen saturation within defined limits  Outcome: Progressing Towards Goal  Goal: *Hemodynamically stable  Outcome: Progressing Towards Goal  Goal: *Optimal pain control at patient's stated goal  Outcome: Progressing Towards Goal  Goal: *Anxiety reduced or absent  Outcome: Progressing Towards Goal     Problem: Heart Failure: Day 2  Goal: Off Pathway (Use only if patient is Off Pathway)  Outcome: Progressing Towards Goal  Goal: Activity/Safety  Outcome: Progressing Towards Goal  Goal: Consults, if ordered  Outcome: Progressing Towards Goal  Goal: Diagnostic Test/Procedures  Outcome: Progressing Towards Goal  Goal: Nutrition/Diet  Outcome: Progressing Towards Goal  Goal: Discharge Planning  Outcome: Progressing Towards Goal  Goal: Medications  Outcome: Progressing Towards Goal  Goal: Respiratory  Outcome: Progressing Towards Goal  Goal: Treatments/Interventions/Procedures  Outcome: Progressing Towards Goal  Goal: Psychosocial  Outcome: Progressing Towards Goal  Goal: *Oxygen saturation within defined limits  Outcome: Progressing Towards Goal  Goal: *Hemodynamically stable  Outcome: Progressing Towards Goal  Goal: *Optimal pain control at patient's stated goal  Outcome: Progressing Towards Goal  Goal: *Anxiety reduced or absent  Outcome: Progressing Towards Goal  Goal: *Demonstrates progressive activity  Outcome: Progressing Towards Goal     Problem: Heart Failure: Day 3  Goal: Off Pathway (Use only if patient is Off Pathway)  Outcome: Progressing Towards Goal  Goal: Activity/Safety  Outcome: Progressing Towards Goal  Goal: Diagnostic Test/Procedures  Outcome: Progressing Towards Goal  Goal: Nutrition/Diet  Outcome: Progressing Towards Goal  Goal: Discharge Planning  Outcome: Progressing Towards Goal  Goal: Medications  Outcome: Progressing Towards Goal  Goal: Respiratory  Outcome: Progressing Towards Goal  Goal: Treatments/Interventions/Procedures  Outcome: Progressing Towards Goal  Goal: Psychosocial  Outcome: Progressing Towards Goal  Goal: *Oxygen saturation within defined limits  Outcome: Progressing Towards Goal  Goal: *Hemodynamically stable  Outcome: Progressing Towards Goal  Goal: *Optimal pain control at patient's stated goal  Outcome: Progressing Towards Goal  Goal: *Anxiety reduced or absent  Outcome: Progressing Towards Goal  Goal: *Demonstrates progressive activity  Outcome: Progressing Towards Goal     Problem: Heart Failure: Day 4  Goal: Off Pathway (Use only if patient is Off Pathway)  Outcome: Progressing Towards Goal  Goal: Activity/Safety  Outcome: Progressing Towards Goal  Goal: Diagnostic Test/Procedures  Outcome: Progressing Towards Goal  Goal: Nutrition/Diet  Outcome: Progressing Towards Goal  Goal: Discharge Planning  Outcome: Progressing Towards Goal  Goal: Medications  Outcome: Progressing Towards Goal  Goal: Respiratory  Outcome: Progressing Towards Goal  Goal: Treatments/Interventions/Procedures  Outcome: Progressing Towards Goal  Goal: Psychosocial  Outcome: Progressing Towards Goal  Goal: *Oxygen saturation within defined limits  Outcome: Progressing Towards Goal  Goal: *Hemodynamically stable  Outcome: Progressing Towards Goal  Goal: *Optimal pain control at patient's stated goal  Outcome: Progressing Towards Goal  Goal: *Anxiety reduced or absent  Outcome: Progressing Towards Goal  Goal: *Demonstrates progressive activity  Outcome: Progressing Towards Goal     Problem: Heart Failure: Day 5  Goal: Off Pathway (Use only if patient is Off Pathway)  Outcome: Progressing Towards Goal  Goal: Activity/Safety  Outcome: Progressing Towards Goal  Goal: Diagnostic Test/Procedures  Outcome: Progressing Towards Goal  Goal: Nutrition/Diet  Outcome: Progressing Towards Goal  Goal: Discharge Planning  Outcome: Progressing Towards Goal  Goal: Medications  Outcome: Progressing Towards Goal  Goal: Respiratory  Outcome: Progressing Towards Goal  Goal: Treatments/Interventions/Procedures  Outcome: Progressing Towards Goal  Goal: Psychosocial  Outcome: Progressing Towards Goal     Problem: Heart Failure: Discharge Outcomes  Goal: *Demonstrates ability to perform prescribed activity without shortness of breath or discomfort  Outcome: Progressing Towards Goal  Goal: *Left ventricular function assessment completed prior to or during stay, or planned for post-discharge  Outcome: Progressing Towards Goal  Goal: *ACEI prescribed if LVEF less than 40% and no contraindications or ARB prescribed  Outcome: Progressing Towards Goal  Goal: *Verbalizes understanding and describes prescribed diet  Outcome: Progressing Towards Goal  Goal: *Verbalizes understanding/describes prescribed medications  Outcome: Progressing Towards Goal  Goal: *Describes available resources and support systems  Description: (eg: Home Health, Palliative Care, Advanced Medical Directive)  Outcome: Progressing Towards Goal  Goal: *Describes smoking cessation resources  Outcome: Progressing Towards Goal  Goal: *Understands and describes signs and symptoms to report to providers(Stroke Metric)  Outcome: Progressing Towards Goal  Goal: *Describes/verbalizes understanding of follow-up/return appt  Description: (eg: to physicians, diabetes treatment coordinator, and other resources  Outcome: Progressing Towards Goal  Goal: *Describes importance of continuing daily weights and changes to report to physician  Outcome: Progressing Towards Goal

## 2021-12-28 NOTE — PROGRESS NOTES
Bedside and Verbal shift change report given to [employer] (oncoming nurse) by Bassem Eagle (offgoing nurse). Report included the following information Kardex, MAR, Recent Results and Cardiac Rhythm atrial fibrillation.

## 2021-12-28 NOTE — PROGRESS NOTES
Rounded on [employer] patients and provided Anointing of the Sick at request of patient.     Hilario Barroso

## 2021-12-28 NOTE — PROGRESS NOTES
Hospitalist Progress Note    NAME: Erick Bass   :  1922   MRN:  [id number]     \"This is a 27-year-old female with past medical history of pulmonary hypertension, atrial fibrillation, hypertension, DM was admitted to the ICU overnight with symptomatic hyponatremia given mental status changes. Her serum sodium was noted to be 109. She was given hypertonic saline and admitted to the ICU for further work-up. She received hypertonic saline in ICU with improving sodium\"    2021. This note is for 2020, as placing a note was missed on this patient. Saw patient patient this morning, awake alert and oriented, pleasant and cooperative with physical examination, sodium is still not optimized, patient is pending transfer to rehab.    2021. No acute events overnight. Saw patient this morning, awake alert and oriented, complaining of being constipated otherwise denies any fever, chills, nausea, vomiting. 2021. Saw patient this morning, awake alert and oriented, pleasant and cooperative with physical examination, stating that she has had 4 bowel movement since yesterday and she is very happy about it, denies any fever, chills, nausea, vomiting, patient is p.o. tolerant and her sodium is trending up.     2021. Saw patient this morning, comfortably sleeping, easily arousable, pleasant and cooperative with physical examination, denies any fever, chills, nausea, vomiting. Patient is pending transfer to rehab.    2021. Saw patient this morning, awake alert and oriented, no apparent distress, pleasant and cooperative with physical examination. Denies any fever, chills, nausea, vomiting. Has not had a bowel for the last 24 hours. Patient sodium is slowly improving. Patient is pending transfer to rehab.    2021. No acute events overnight.   Saw patient this morning, stating that she is feeling well, she is p.o. tolerant, has not had a bowel movement, denies any fever, chills, nausea, vomiting. Patient is pending transfer to rehab.     Assessment / Plan:  Hyponatremia   Improving. Not optimized. Presented with severe hyponatremia. 12/21/2021 presented with sodium level of 114, was placed on 3% saline and transferred to ICU. Continue fluid restriction and sodium low-dose also planned. Nephrology currently has signed off, reconsult if needed. Outpatient PCP and nephrology follow-up. 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 Multifactorial sodium slowly trending up, not optimized. Nephrology on board, recommendations noted. Serial labs, seizure precautions. Atrial fibrillation  Patient has history of chronic persistent atrial fibrillation, not anticoagulated. Rate controlled. Continue telemetry, as needed IV beta-blockers. Hypertension  Euvolemic. Normotensive. Continue current meds, adjust meds as needed. Diabetes mellitus type 2  Not sure if patient has diabetes as her hemoglobin A1c is 6.0 and there is no antidiabetic medications on her home regimen. Continue Accu-Chek and hypoglycemia protocol. Pulmonary hypertension  Continue home meds, outpatient PCP and pulmonology follow-up. Obesity  BMI 37.7. Diet and lifestyle modification recommended.     Code status: Full  Prophylaxis: Lovenox  Recommended Disposition: SNF     Subjective:     Chief Complaint / Reason for Physician Visit  Alert, awake, oriented x2  Family at bedside  Patient states she feels better today   No nausea/vomiting  Appetite improved     Review of Systems:  Symptom Y/N Comments  Symptom Y/N Comments   Fever/Chills    Chest Pain     Poor Appetite    Edema     Cough    Abdominal Pain     Sputum    Joint Pain     SOB/MCNAIR    Pruritis/Rash     Nausea/vomit    Tolerating PT/OT     Diarrhea    Tolerating Diet     Constipation    Other       Could NOT obtain due to:      Objective:     VITALS:   Last 24hrs VS reviewed since prior progress note. Most recent are:  Patient Vitals for the past 24 hrs:   Temp Pulse Resp BP SpO2   12/28/21 1200  69      12/28/21 1009 98.2 °F (36.8 °C) 65 15 (!) 135/42 98 %   12/28/21 1000  68      12/28/21 0959 97.9 °F (36.6 °C) 72 18 (!) 131/56 100 %   12/28/21 0900  67  (!) 135/42    12/28/21 0845     99 %   12/28/21 0800  67      12/28/21 0600  69      12/28/21 0557 97.8 °F (36.6 °C) 69 16 (!) 165/51 99 %   12/28/21 0400  66      12/28/21 0200 98 °F (36.7 °C) 67 16 (!) 121/33 99 %   12/27/21 2206  76      12/27/21 2200 98 °F (36.7 °C) 75 16 (!) 146/50 99 %   12/27/21 2000  78      12/27/21 1800  78      12/27/21 1745 98.2 °F (36.8 °C) 86 21 (!) 157/49 99 %       Intake/Output Summary (Last 24 hours) at 12/28/2021 1437  Last data filed at 12/28/2021 1009  Gross per 24 hour   Intake 720 ml   Output 950 ml   Net -230 ml        PHYSICAL EXAM:  General: WD, WN. Alert, cooperative, no acute distress    EENT:  EOMI. Anicteric sclerae. MMM  Resp:  CTA bilaterally, no wheezing or rales. No accessory muscle use  CV:  Regular  rhythm,  No edema  GI:  Soft, Non distended, Non tender.  +Bowel sounds  Neurologic:  Alert and oriented X 3, normal speech,   Psych:   Good insight. Not anxious nor agitated  Skin:  No rashes.   No jaundice    Reviewed most current lab test results and cultures  YES  Reviewed most current radiology test results   YES  Review and summation of old records today    NO  Reviewed patient's current orders and MAR    YES  PMH/SH reviewed - no change compared to H&P          Current Facility-Administered Medications:     sodium chloride tablet 1 g, 1 g, Oral, TID WITH MEALS, Shannan Clancy MD, 1 g at 12/28/21 1200    amLODIPine (NORVASC) tablet 2.5 mg, 2.5 mg, Oral, DAILY, Shannan Clancy MD, 2.5 mg at 12/28/21 0900    magnesium hydroxide (MILK OF MAGNESIA) 400 mg/5 mL oral suspension 30 mL, 30 mL, Oral, DAILY PRN, Shannan Clancy MD    senna (SENOKOT) tablet 8.6 mg, 1 Tablet, Oral, DAILY, Shannan Clancy MD, 8.6 mg at 12/28/21 0900    docusate sodium (COLACE) capsule 100 mg, 100 mg, Oral, DAILY, Shannan Clancy MD, 100 mg at 12/28/21 0900    polyethylene glycol (MIRALAX) packet 17 g, 17 g, Oral, DAILY PRN, Shannan Rubalcava MD, 17 g at 12/28/21 1032    fluticasone propionate (FLONASE) 50 mcg/actuation nasal spray 2 Spray, 2 Spray, Both Nostrils, DAILY PRN, Jerzy Isabel MD    cetirizine (ZYRTEC) tablet 10 mg, 10 mg, Oral, DAILY, Shawn Isabel MD, 10 mg at 12/28/21 0900    sodium chloride (NS) flush 5-40 mL, 5-40 mL, IntraVENous, Q8H, Julia Holland NP, 10 mL at 12/28/21 1400    sodium chloride (NS) flush 5-40 mL, 5-40 mL, IntraVENous, PRN, Angela Tobin NP    acetaminophen (TYLENOL) tablet 650 mg, 650 mg, Oral, Q6H PRN, 650 mg at 12/21/21 1210 **OR** acetaminophen (TYLENOL) suppository 650 mg, 650 mg, Rectal, Q6H PRN, Ely Shanks NP    ondansetron (ZOFRAN ODT) tablet 4 mg, 4 mg, Oral, Q8H PRN **OR** ondansetron (ZOFRAN) injection 4 mg, 4 mg, IntraVENous, Q6H PRN, Ely Shanks NP    balsam peru-castor oiL (VENELEX) ointment, , Topical, Q12H, Eleazar Caslte MD, Given at 12/28/21 0900    miconazole (MICOTIN) 2 % powder, , Topical, Q12H, Eleazar Castle MD, Given at 12/28/21 0900    aspirin delayed-release tablet 81 mg, 81 mg, Oral, DAILY, Piedad Perea MD, 81 mg at 12/28/21 0900  ________________________________________________________________________  Care Plan discussed with:    Comments   Patient X    Family  X    RN X    Care Manager X    Consultant  X                      Multidiciplinary team rounds were held today with , nursing, pharmacist and clinical coordinator. Patient's plan of care was discussed; medications were reviewed and discharge planning was addressed.      ________________________________________________________________________  Total NON critical care TIME:  30    Minutes    Total CRITICAL CARE TIME Spent:   Minutes non procedure based      Comments   >50% of visit spent in counseling and coordination of care     ________________________________________________________________________  Rose Mary Weiss MD     Procedures: see electronic medical records for all procedures/Xrays and details which were not copied into this note but were reviewed prior to creation of Plan. LABS:  I reviewed today's most current labs and imaging studies. Pertinent labs include:  No results for input(s): WBC, HGB, HCT, PLT, HGBEXT, HCTEXT, PLTEXT, HGBEXT, HCTEXT, PLTEXT in the last 72 hours.   Recent Labs     12/28/21  0314 12/27/21  0108 12/26/21  1107   * 131* 133*   K 4.0 4.6 4.6    99 99   CO2 26 28 31   * 145* 136*   BUN 20 21* 16   CREA 0.57 0.74 0.73   CA 8.2* 8.2* 8.0*   PHOS  --  4.2  --    ALB  --  2.7*  --        Signed: Rose Mary Weiss MD

## 2021-12-28 NOTE — WOUND CARE
Wound Care Note:     Follow-up visit for left hand and buttocks, groin, sacrum    Chart shows:  Admitted for hyponatremia   Past Medical History:   Diagnosis Date    Anxiety     Bleeding nose     [facility]     Diabetes ([facility]     Hypertension      WBC = 11.8 on 12/21/21  Admitted from home    Assessment:   Patient is A&O x 4, communicative, incontinent with moderate assistance needed in repositioning. Bed: Total Care  Patient wearing briefs for incontinence. Diet: Adult diet regular; 1200 ml  Patient reports no pain. Bilateral buttocks and sacral skin intact and without erythema. Bilateral heels/feet with blanchable erythema (patient up in chair). 1. POA left hand wound looks good, central portion still open, remaining wound resolved, wound approximally 0.4 cm x 1.5 cm x 0.1 cm, wound bed is pink, moist, wound edges are open. Xeroform gauze and Optifoam Gentle applied. 2.  POA maculopapular erythematous rash with satellite lesions consistent with yeast to bilateral groin, gluteal cleft, bilateral buttocks and sacrum is almost resolved, still located in gluteal cleft. Continue with antifungal powder. Patient up in chair. Recommendations:    Continue with current wound care. Left hand- Every other day cleanse with normal saline, wipe wound bed clean and dry, apply a piece of Xeroform gauze that has been folded in half and cover.     Bilateral groin, gluteal cleft, bilateral buttocks near gluteal cleft and sacrum- Every 12 hours gently cleanse with soap and water, blot dry, sprinkle with antifungal powder and gently rub into skin.     Bilateral heels- Every 12 hours liberally apply Venelex ointment. Skin Care & Pressure Prevention:  Minimize layers of linen/pads under patient to optimize support surface. Turn/reposition approximately every 2 hours and offload heels.   Manage incontinence / promote continence   Nourishing Skin Cream to dry skin, minimize use of briefs when able    Discussed above plan with patient & Christy Wade RN    Transition of Care: Plan to follow as needed while admitted to hospital.    MARCIE Hassan, RN, [facility], [facility].  office [phone]  pager [phone] or call  to page

## 2021-12-29 NOTE — DISCHARGE SUMMARY
.     Discharge Summary       PATIENT ID: Sujatha Mena  MRN: [id number]   YOB: 1922    DATE OF ADMISSION: 12/19/2021 10:53 PM    DATE OF DISCHARGE: 12/29/2021  PRIMARY CARE PROVIDER: Stephen Bruce MD     ATTENDING PHYSICIAN: Jeff Romero MD  DISCHARGING PROVIDER: Jeff Romero MD    To contact this individual call [phone] and ask the  to page. If unavailable ask to be transferred the Adult Hospitalist Department. CONSULTATIONS: IP CONSULT TO NEPHROLOGY    PROCEDURES/SURGERIES: * No surgery found *    ADMITTING DIAGNOSES & HOSPITAL COURSE:   \"This is a 60-year-old female with past medical history of pulmonary hypertension, atrial fibrillation, hypertension, DM was admitted to the ICU overnight with symptomatic hyponatremia given mental status changes.  Her serum sodium was noted to be 109.  She was given hypertonic saline and admitted to the ICU for further work-up. She received hypertonic saline in ICU with improving sodium\"    DISCHARGE DIAGNOSES / PLAN:      Hyponatremia   Resolved. 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 Presented with severe hyponatremia. 12/21/2021 presented with sodium level of 114, was placed on 3% saline and transferred to ICU. Continue supplemental sodium, encourage p.o. intake. DC Lasix and HCTZ, resume once appropriate. Outpatient PCP and nephrology follow-up.       Atrial fibrillation  Patient has history of chronic persistent atrial fibrillation, not anticoagulated. Rate controlled. Resume home meds upon discharge. Outpatient PCP and cardiology follow-up    Hypertension  Euvolemic. Normotensive. Continue current meds, adjust meds as needed.     Diabetes mellitus type 2  Not sure if patient has diabetes as her hemoglobin A1c is 6.0 and there is no antidiabetic medications on her home regimen.     Continue Accu-Chek and hypoglycemia protocol.      Pulmonary hypertension  Continue home meds, outpatient PCP and pulmonology follow-up.       Obesity  BMI 37.7. Diet and lifestyle modification recommended. PENDING TEST RESULTS:   At the time of discharge the following test results are still pending: None    FOLLOW UP APPOINTMENTS:    Follow-up Information     Follow up With Specialties Details Why [address]  In 3 days Referred for SNF rehab. [address] [address] Guilherme Lozoya [facility]    Ele Ballesteros MD Internal Medicine In 3 days  [address] [facility]      Ce Bassett MD Nephrology In 1 week  [facility]  [phone]             ADDITIONAL CARE RECOMMENDATIONS: PCP, cardiology and nephrology follow-up. DIET: Diabetic Diet  Oral Nutritional Supplements: Boost Glucose ControlOnce daily    ACTIVITY: Activity as tolerated      DISCHARGE MEDICATIONS:  Current Discharge Medication List      START taking these medications    Details   amLODIPine (NORVASC) 2.5 mg tablet Take 1 Tablet by mouth daily for 30 days. Qty: 30 Tablet, Refills: 0  Start date: 12/30/2021, End date: 1/29/2022      sodium chloride 1 gram tablet Take 1 Tablet by mouth three (3) times daily (with meals) for 7 days. Qty: 21 Tablet, Refills: 0  Start date: 12/29/2021, End date: 1/5/2022         CONTINUE these medications which have NOT CHANGED    Details   fluticasone propionate (FLONASE NA) by Nasal route daily. dextromethorphan (Delsym) 30 mg/5 mL liquid Take 60 mg by mouth two (2) times daily as needed for Cough. potassium chloride SR (KLOR-CON 10) 10 mEq tablet TAKE 2 TABLETS DAILY (NEW DIRECTIONS)  Qty: 180 Tablet, Refills: 3    Associated Diagnoses: SOB (shortness of breath) on exertion; Leg swelling      tobramycin-dexamethasone (Tobradex ST) drps ophthalmix suspension Administer 1 Drop to both eyes four (4) times daily.       metoprolol succinate (TOPROL-XL) 25 mg XL tablet Take 1 Tab by mouth daily. Qty: 90 Tab, Refills: 3    Associated Diagnoses: Essential hypertension, benign      vit A/vit C/vit E/zinc/copper (PRESERVISION AREDS PO) Take  by mouth two (2) times a day. loratadine (Claritin) 10 mg tablet Take 10 mg by mouth daily. glucose blood VI test strips (PRECISION XTRA TEST) strip USE TO TEST BLOOD SUGAR TWICE DAILY. Dx E11.21  Qty: 200 Strip, Refills: 11    Associated Diagnoses: Type 2 diabetes, controlled, with neuropathy (HCC)      mirabegron ER (MYRBETRIQ) 50 mg ER tablet Per urology  Qty: 90 Tab, Refills: 3    Associated Diagnoses: OAB (overactive bladder)      linaclotide (LINZESS) 72 mcg cap Take  by mouth daily. aspirin delayed-release 81 mg tablet Take 1 Tab by mouth daily. Qty: 30 Tab, Refills: 11    Associated Diagnoses: Chronic atrial fibrillation (HCC)      CHOLECALCIFEROL, VITAMIN D3, (VITAMIN D3 PO) Take  by mouth daily. Lancets misc 1 Package by Does Not Apply route daily. Test glucose daily and as needed Dx type 2 dm E11.42  Qty: 1 Package, Refills: 6      magnesium hydroxide (MILK OF MAGNESIA) 2,400 mg/10 mL susp suspension Take 10 mL by mouth nightly. Qty: 1 Bottle, Refills: 11    Associated Diagnoses: Unspecified constipation         STOP taking these medications       azithromycin (ZITHROMAX) 250 mg tablet Comments:   Reason for Stopping:         furosemide (LASIX) 40 mg tablet Comments:   Reason for Stopping:                 NOTIFY YOUR PHYSICIAN FOR ANY OF THE FOLLOWING:   Fever over 101 degrees for 24 hours. Chest pain, shortness of breath, fever, chills, nausea, vomiting, diarrhea, change in mentation, falling, weakness, bleeding. Severe pain or pain not relieved by medications. Or, any other signs or symptoms that you may have questions about.     DISPOSITION:    Home With:   OT  PT  HH  RN       Long term SNF/Inpatient Rehab    Independent/assisted living    Hospice    Other:       PATIENT CONDITION AT DISCHARGE:     Functional status    Poor     Deconditioned     Independent      Cognition     Lucid     Forgetful     Dementia      Catheters/lines (plus indication)    Cifuentes     PICC     PEG     None      Code status     Full code     DNR      PHYSICAL EXAMINATION AT DISCHARGE:   Refer to Progress Note while inpatient      CHRONIC MEDICAL DIAGNOSES:  Problem List as of 12/29/2021 Date Reviewed: 12/16/2021          Codes Class Noted - Resolved    Hyponatremia ICD-10-CM: E87.1  ICD-9-CM: 276.1  12/20/2021 - Present        Severe obesity (BMI 35.0-39. 9) with comorbidity ([facility] ICD-10-CM: E66.01  ICD-9-CM: 278.01  4/25/2018 - Present        Type 2 diabetes mellitus with nephropathy ([facility] ICD-10-CM: E11.21  ICD-9-CM: 250.40, 583.81  12/19/2017 - Present        CHF (congestive heart failure) ([facility] ICD-10-CM: I50.9  ICD-9-CM: 428.0  12/6/2017 - Present        Anxiety ICD-10-CM: F41.9  ICD-9-CM: 300.00  10/2/2017 - Present        Allergic rhinitis ICD-10-CM: J30.9  ICD-9-CM: 477.9  4/11/2016 - Present        Chronic atrial fibrillation ([facility] ICD-10-CM: I48.20  ICD-9-CM: 427.31  4/8/2016 - Present        Advanced directives, counseling/discussion ICD-10-CM: Z71.89  ICD-9-CM: V65.49  3/15/2016 - Present        Type 2 diabetes, controlled, with neuropathy ([facility] ICD-10-CM: E11.40  ICD-9-CM: 250.60, 357.2  1/4/2016 - Present        Essential hypertension, benign ICD-10-CM: I10  ICD-9-CM: 401.1  6/4/2015 - Present        Edema ICD-10-CM: R60.9  ICD-9-CM: 782.3  11/12/2014 - Present    Overview Addendum 12/14/2017  8:24 AM by Alfonzo Story MD     4/16 echo normal lvef, lae, mild tr  12/17 echo normal lvef, elisha, mild to mod mr and tr, pa pressure 50mm             Headache(784.0) ICD-10-CM: R51  ICD-9-CM: 784.0  11/12/2014 - Present        Diverticulosis of colon (without mention of hemorrhage) ICD-10-CM: K57.30  ICD-9-CM: 562.10  11/12/2014 - Present        Constipation ICD-10-CM: K59.00  ICD-9-CM: 564.00  11/12/2014 - Present        Cyst of left kidney ICD-10-CM: N28.1  ICD-9-CM: 753.10  11/12/2014 - Present        RESOLVED: Bleeding nose ICD-10-CM: R04.0  ICD-9-CM: 784. 7  Unknown - 9/18/2018        RESOLVED: Diabetes mellitus type 2, controlled, without complications ([facility] [id number]: E11.9  ICD-9-CM: 250.00  1/4/2016 - 3/15/2016        RESOLVED: Elevated blood pressure reading without diagnosis of hypertension ICD-10-CM: R03.0  ICD-9-CM: 796.2  1/21/2015 - 9/1/2015        RESOLVED: Type II or unspecified type diabetes mellitus with unspecified complication, not stated as uncontrolled ICD-10-CM: E11.8  ICD-9-CM: 250.90  11/12/2014 - 11/2/2015              Greater than 35 minutes were spent with the patient on counseling and coordination of care    Signed:   Vanna Nicolas MD  12/29/2021  10:41 AM

## 2021-12-29 NOTE — PROGRESS NOTES
TRANSITION OF CARE  RUR--13%  Disposition--To [facility] SNF   Transport--BLS with [facility] scheduled for Wednesday 12/29/21 at 1:00PM. Completed PCS was placed on chart.     Patient's primary family contact: sister Antelmo Belcher    CM follow up. CM spoke with Pio Salomondonaldo [facility], who confirmed that patient is accepted and that bed is available today with facility request to change  time to 1:00PM. Nurse Report to be called to [phone]. CM sent referral via [employer] to  [address] [facility]) (Phone [phone]) for BLS transport, and referral was accepted  for a  1:00PM . CM completed PCS and placed it on chart. CM gave verbal update to staff nurse. CM gave patient second Medicare IM Letter which informed patient of the right to appeal the discharge. Patient signed the original which was given to the patient and a copy was placed on the chart. Transition of Care Plan to SNF/Rehab    SNF/Rehab Transition:  Patient has been accepted to [facility] and meets criteria for admission. Patient will transported by [facility] and expected to leave at 1:00PM.    Communication to Patient/Family:  Met with patient and sister (identified care giver) and they are agreeable to the transition plan. Communication to SNF/Rehab:  Bedside RN has been notified to update the transition plan to the facility and call report (phone number [phone]). Discharge information has been updated on the AVS.       Nursing Please include all hard scripts for controlled substances, med rec and dc summary, and AVS in packet.      Reviewed and confirmed with facility can manage the patient care needs for the following:     [place] with (X) only those applicable:    Medication:  [x]  Medications will be available at the facility  []  IV Antibiotics  []  Controlled Substance - hard copy to be sent with patient   []  Weekly Labs   Documents:  [x] Hard RX  [x] MAR  [x] Kardex  [x] AVS  []Transfer Summary  []Discharge   Equipment:  []  CPAP/BiPAP  []  Wound Vacuum  []  Cifuentes or Urinary Device  []  PICC/Central Line  []  Nebulizer  []  Ventilator   Treatment:  []Isolation (for MRSA, VRE, etc.)  []Surgical Drain Management  []Tracheostomy Care  []Dressing Changes  []Dialysis with transportation and chair time   []PEG Care  []Oxygen  []Daily Weights for Heart Failure   Dietary:  []Any diet limitations  []Tube Feedings   []Total Parenteral Management (TPN)   Eligible for Medicaid Long Term Services and Supports  Yes:  [] Eligible for medical assistance or will become eligible within 180 days and UAI completed. [] Provider/Patient and/or support system has requested screening. [] UAI copy provided to patient or responsible party. [] UAI unavailable at discharge will send once processed to SNF provider. [] UAI unavailable at discharged mailed to patient  No:   [x] Private pay and is not financially eligible for Medicaid within the next 180 days. [] Reside out-of-state.   [] A residents of a state owned/operated facility that is licensed  by [facility] or [facility]  [] Enrollment in [facility] hospice services  []  Medical [address]  [] Patient /Family declines to have screening completed or provide financial information for screening     Financial Resources:  Medicaid    [] Initiated and application pending   [] Full coverage     Advanced Care Plan:  []Surrogate Decision Maker of Care  []POA  []Communicated Code Status (DDNR\", \"Full\")    Other

## 2021-12-29 NOTE — PROGRESS NOTES
Bedside and Verbal shift change report given to Jessie Horowitz (oncoming nurse) by Aidan Currie (offgoing nurse). Report included the following information Kardex, MAR, Recent Results and Cardiac Rhythm atrial fibrillation.

## 2021-12-29 NOTE — PROGRESS NOTES
[facility]   [address] [facility], [address] [place] [address]  Phone: [phone]   [phone]    [email or web address]     Nephrology Progress Note    Patient Name : Vin Rankin      : 1922     MRN : [id number]  Date of Admission : 2021  Date of Servive : 21    CC: Follow up for Hyponatremia        Assessment and Plan   Severe Hyponatremia :  - Multifactorial.  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 Resting comfortably. Awaiting transfer back to SNF today. Na stable at 134. Remains confused,  Unable to obtain accurate ROS      Review of Systems: A comprehensive review of systems was negative except for that written in the HPI.     Current Medications:   Current Facility-Administered Medications   Medication Dose Route Frequency    sodium chloride tablet 1 g  1 g Oral TID WITH MEALS    amLODIPine (NORVASC) tablet 2.5 mg  2.5 mg Oral DAILY    magnesium hydroxide (MILK OF MAGNESIA) 400 mg/5 mL oral suspension 30 mL  30 mL Oral DAILY PRN    senna (SENOKOT) tablet 8.6 mg  1 Tablet Oral DAILY    docusate sodium (COLACE) capsule 100 mg  100 mg Oral DAILY    polyethylene glycol (MIRALAX) packet 17 g  17 g Oral DAILY PRN    fluticasone propionate (FLONASE) 50 mcg/actuation nasal spray 2 Spray  2 Spray Both Nostrils DAILY PRN    cetirizine (ZYRTEC) tablet 10 mg  10 mg Oral DAILY    sodium chloride (NS) flush 5-40 mL  5-40 mL IntraVENous Q8H    sodium chloride (NS) flush 5-40 mL  5-40 mL IntraVENous PRN    acetaminophen (TYLENOL) tablet 650 mg  650 mg Oral Q6H PRN    Or    acetaminophen (TYLENOL) suppository 650 mg  650 mg Rectal Q6H PRN  ondansetron (ZOFRAN ODT) tablet 4 mg  4 mg Oral Q8H PRN    Or    ondansetron (ZOFRAN) injection 4 mg  4 mg IntraVENous Q6H PRN    balsam peru-castor oiL (VENELEX) ointment   Topical Q12H    miconazole (MICOTIN) 2 % powder   Topical Q12H    aspirin delayed-release tablet 81 mg  81 mg Oral DAILY      Allergies   Allergen Reactions    Amoxicillin Other (comments)     \"makes me feel like I\"m going to faint\"    Aspirin Palpitations     Per patient, no allergy    Gabapentin Drowsiness     * pt states this med makes her feel very drowsy , gives her a drunk feeling.  Levaquin [Levofloxacin] Hives and Rash    Lorazepam Other (comments)     Feeling faint.  Losartan Other (comments)     Tongue swelling      Lyrica [Pregabalin] Rash    Other Medication Other (comments)     Feeling faint, does not decrease pain.  Sertraline Other (comments)     Feeling faint. Objective:  Vitals:    Vitals:    12/29/21 0216 12/29/21 0354 12/29/21 0552 12/29/21 0556   BP:   (!) 134/47    Pulse: 83 68 69 80   Resp:   17    Temp:   97.5 °F (36.4 °C)    SpO2:       Weight:         Intake and Output:  No intake/output data recorded. 12/27 1901 - 12/29 0700  In: 5 [P.O.:720]  Out: 900 [Urine:900]    Physical Examination:    General: Confused, no distress  Neck:  Supple, no mass  Resp:   lungs mostly clear  CV:  RRR,  systolic murmur +  GI:  Soft, NT, + BS, no HS megaly  Neurologic:  Confused   Ext                   B/L leg edema       []    High complexity decision making was performed  []    Patient is at high-risk of decompensation with multiple organ involvement    Lab Data Personally Reviewed: I have reviewed all the pertinent labs, microbiology data and radiology studies during assessment.     Recent Labs     12/29/21  0528 12/28/21  0314 12/27/21  0108 12/26/21  1107   * 133* 131* 133*   K 4.2 4.0 4.6 4.6    101 99 99   CO2 26 26 28 31   * 123* 145* 136*   BUN 20 20 21* 16   CREA 0.61 0.57 0.74 0. 73   CA 8.6 8.2* 8.2* 8.0*   PHOS 3.7  --  4.2  --    ALB 2.6*  --  2.7*  --      No results for input(s): WBC, HGB, HCT, PLT, HGBEXT, HCTEXT, PLTEXT, HGBEXT, HCTEXT, PLTEXT in the last 72 hours. No results found for: SDES  Lab Results   Component Value Date/Time    Culture result: NO GROWTH 5 DAYS 12/20/2021 12:30 AM     Recent Results (from the past 24 hour(s))   RENAL FUNCTION PANEL    Collection Time: 12/29/21  5:28 AM   Result Value Ref Range    Sodium 134 (L) 136 - 145 mmol/L    Potassium 4.2 3.5 - 5.1 mmol/L    Chloride 102 97 - 108 mmol/L    CO2 26 21 - 32 mmol/L    Anion gap 6 5 - 15 mmol/L    Glucose 131 (H) 65 - 100 mg/dL    BUN 20 6 - 20 MG/DL    Creatinine 0.61 0.55 - 1.02 MG/DL    BUN/Creatinine ratio 33 (H) 12 - 20      GFR est AA >60 >60 ml/min/1.73m2    GFR est non-AA >60 >60 ml/min/1.73m2    Calcium 8.6 8.5 - 10.1 MG/DL    Phosphorus 3.7 2.6 - 4.7 MG/DL    Albumin 2.6 (L) 3.5 - 5.0 g/dL           I have reviewed the flowsheets. Chart and Pertinent Notes have been reviewed. No change in PMH ,family and social history from Consult note.       Caridad Jones MD  [facility]

## 2021-12-30 NOTE — PROGRESS NOTES
Transition of care outreach postponed for 14 days due to patient's discharge to SNF.   D/C [facility] 12/29/21 f/u 14d

## 2022-01-01 ENCOUNTER — APPOINTMENT (OUTPATIENT)
Dept: CT IMAGING | Age: 87
DRG: 644 | End: 2022-01-01
Attending: EMERGENCY MEDICINE
Payer: MEDICARE

## 2022-01-01 ENCOUNTER — APPOINTMENT (OUTPATIENT)
Dept: ULTRASOUND IMAGING | Age: 87
DRG: 682 | End: 2022-01-01
Attending: STUDENT IN AN ORGANIZED HEALTH CARE EDUCATION/TRAINING PROGRAM
Payer: MEDICARE

## 2022-01-01 ENCOUNTER — APPOINTMENT (OUTPATIENT)
Dept: NON INVASIVE DIAGNOSTICS | Age: 87
DRG: 682 | End: 2022-01-01
Attending: STUDENT IN AN ORGANIZED HEALTH CARE EDUCATION/TRAINING PROGRAM
Payer: MEDICARE

## 2022-01-01 ENCOUNTER — PATIENT OUTREACH (OUTPATIENT)
Dept: CASE MANAGEMENT | Age: 87
End: 2022-01-01

## 2022-01-01 ENCOUNTER — TELEPHONE (OUTPATIENT)
Dept: INTERNAL MEDICINE CLINIC | Age: 87
End: 2022-01-01

## 2022-01-01 ENCOUNTER — APPOINTMENT (OUTPATIENT)
Dept: GENERAL RADIOLOGY | Age: 87
DRG: 644 | End: 2022-01-01
Attending: ANESTHESIOLOGY
Payer: MEDICARE

## 2022-01-01 ENCOUNTER — HOSPITAL ENCOUNTER (INPATIENT)
Age: 87
LOS: 8 days | Discharge: NURSING, REHAB OR LONG-TERM CARE | DRG: 644 | End: 2022-02-26
Attending: EMERGENCY MEDICINE | Admitting: FAMILY MEDICINE
Payer: MEDICARE

## 2022-01-01 ENCOUNTER — OFFICE VISIT (OUTPATIENT)
Dept: INTERNAL MEDICINE CLINIC | Age: 87
End: 2022-01-01
Payer: MEDICARE

## 2022-01-01 ENCOUNTER — HOSPITAL ENCOUNTER (EMERGENCY)
Age: 87
Discharge: NURSING, REHAB OR LONG-TERM CARE | End: 2022-04-01
Attending: EMERGENCY MEDICINE | Admitting: EMERGENCY MEDICINE
Payer: MEDICARE

## 2022-01-01 ENCOUNTER — APPOINTMENT (OUTPATIENT)
Dept: GENERAL RADIOLOGY | Age: 87
End: 2022-01-01
Attending: EMERGENCY MEDICINE
Payer: MEDICARE

## 2022-01-01 ENCOUNTER — HOSPITAL ENCOUNTER (INPATIENT)
Age: 87
LOS: 1 days | DRG: 682 | End: 2022-04-18
Attending: EMERGENCY MEDICINE | Admitting: STUDENT IN AN ORGANIZED HEALTH CARE EDUCATION/TRAINING PROGRAM
Payer: MEDICARE

## 2022-01-01 ENCOUNTER — APPOINTMENT (OUTPATIENT)
Dept: GENERAL RADIOLOGY | Age: 87
DRG: 682 | End: 2022-01-01
Attending: EMERGENCY MEDICINE
Payer: MEDICARE

## 2022-01-01 ENCOUNTER — HOSPITAL ENCOUNTER (EMERGENCY)
Age: 87
Discharge: HOME | End: 2022-04-08
Attending: EMERGENCY MEDICINE
Payer: MEDICARE

## 2022-01-01 ENCOUNTER — APPOINTMENT (OUTPATIENT)
Dept: GENERAL RADIOLOGY | Age: 87
DRG: 644 | End: 2022-01-01
Attending: EMERGENCY MEDICINE
Payer: MEDICARE

## 2022-01-01 VITALS
TEMPERATURE: 97.7 F | OXYGEN SATURATION: 97 % | HEART RATE: 72 BPM | RESPIRATION RATE: 20 BRPM | BODY MASS INDEX: 29.23 KG/M2 | HEIGHT: 63 IN | WEIGHT: 165 LBS | DIASTOLIC BLOOD PRESSURE: 50 MMHG | SYSTOLIC BLOOD PRESSURE: 115 MMHG

## 2022-01-01 VITALS
BODY MASS INDEX: 36.11 KG/M2 | HEART RATE: 55 BPM | SYSTOLIC BLOOD PRESSURE: 135 MMHG | TEMPERATURE: 97.5 F | WEIGHT: 203.8 LBS | DIASTOLIC BLOOD PRESSURE: 74 MMHG | OXYGEN SATURATION: 96 % | HEIGHT: 63 IN | RESPIRATION RATE: 24 BRPM

## 2022-01-01 VITALS
DIASTOLIC BLOOD PRESSURE: 78 MMHG | BODY MASS INDEX: 32.44 KG/M2 | HEIGHT: 59 IN | OXYGEN SATURATION: 99 % | SYSTOLIC BLOOD PRESSURE: 142 MMHG | WEIGHT: 160.94 LBS | HEART RATE: 50 BPM | TEMPERATURE: 98.6 F | RESPIRATION RATE: 16 BRPM

## 2022-01-01 VITALS
WEIGHT: 228 LBS | TEMPERATURE: 96.3 F | HEIGHT: 63 IN | RESPIRATION RATE: 22 BRPM | SYSTOLIC BLOOD PRESSURE: 95 MMHG | HEART RATE: 75 BPM | OXYGEN SATURATION: 99 % | BODY MASS INDEX: 40.4 KG/M2 | DIASTOLIC BLOOD PRESSURE: 41 MMHG

## 2022-01-01 VITALS
WEIGHT: 165.34 LBS | TEMPERATURE: 98.4 F | SYSTOLIC BLOOD PRESSURE: 131 MMHG | OXYGEN SATURATION: 95 % | RESPIRATION RATE: 15 BRPM | HEIGHT: 59 IN | DIASTOLIC BLOOD PRESSURE: 93 MMHG | HEART RATE: 80 BPM | BODY MASS INDEX: 33.33 KG/M2

## 2022-01-01 DIAGNOSIS — E87.1 HYPONATREMIA: Primary | ICD-10-CM

## 2022-01-01 DIAGNOSIS — E66.01 SEVERE OBESITY (BMI 35.0-39.9) WITH COMORBIDITY (HCC): ICD-10-CM

## 2022-01-01 DIAGNOSIS — R06.02 SOB (SHORTNESS OF BREATH): ICD-10-CM

## 2022-01-01 DIAGNOSIS — I27.20 PULMONARY HYPERTENSION (HCC): ICD-10-CM

## 2022-01-01 DIAGNOSIS — R52 PAIN: Primary | ICD-10-CM

## 2022-01-01 DIAGNOSIS — Z91.81 AT HIGH RISK FOR INJURY RELATED TO FALL: Primary | ICD-10-CM

## 2022-01-01 DIAGNOSIS — I10 ESSENTIAL HYPERTENSION, BENIGN: ICD-10-CM

## 2022-01-01 DIAGNOSIS — F41.9 SEVERE ANXIETY: ICD-10-CM

## 2022-01-01 DIAGNOSIS — I50.9 CONGESTIVE HEART FAILURE, UNSPECIFIED HF CHRONICITY, UNSPECIFIED HEART FAILURE TYPE (HCC): ICD-10-CM

## 2022-01-01 DIAGNOSIS — M79.89 LEG SWELLING: Primary | ICD-10-CM

## 2022-01-01 DIAGNOSIS — N17.9 AKI (ACUTE KIDNEY INJURY) (HCC): Primary | ICD-10-CM

## 2022-01-01 DIAGNOSIS — E87.5 ACUTE HYPERKALEMIA: ICD-10-CM

## 2022-01-01 DIAGNOSIS — R50.9 FEVER, UNSPECIFIED FEVER CAUSE: ICD-10-CM

## 2022-01-01 DIAGNOSIS — R06.02 SOB (SHORTNESS OF BREATH): Primary | ICD-10-CM

## 2022-01-01 DIAGNOSIS — I48.20 CHRONIC ATRIAL FIBRILLATION (HCC): ICD-10-CM

## 2022-01-01 DIAGNOSIS — R41.82 ALTERED MENTAL STATUS, UNSPECIFIED ALTERED MENTAL STATUS TYPE: ICD-10-CM

## 2022-01-01 DIAGNOSIS — E11.40 TYPE 2 DIABETES, CONTROLLED, WITH NEUROPATHY (HCC): ICD-10-CM

## 2022-01-01 DIAGNOSIS — R41.0 CONFUSION: ICD-10-CM

## 2022-01-01 LAB
ALBUMIN SERPL-MCNC: 2.7 G/DL (ref 3.5–5)
ALBUMIN SERPL-MCNC: 2.7 G/DL (ref 3.5–5)
ALBUMIN SERPL-MCNC: 2.8 G/DL (ref 3.5–5)
ALBUMIN SERPL-MCNC: 2.8 G/DL (ref 3.5–5)
ALBUMIN SERPL-MCNC: 2.9 G/DL (ref 3.5–5)
ALBUMIN SERPL-MCNC: 2.9 G/DL (ref 3.5–5)
ALBUMIN SERPL-MCNC: 3 G/DL (ref 3.5–5)
ALBUMIN SERPL-MCNC: 3 G/DL (ref 3.5–5)
ALBUMIN SERPL-MCNC: 3.1 G/DL (ref 3.5–5)
ALBUMIN SERPL-MCNC: 3.3 G/DL (ref 3.5–5)
ALBUMIN SERPL-MCNC: 3.4 G/DL (ref 3.5–5)
ALBUMIN SERPL-MCNC: 3.4 G/DL (ref 3.5–5)
ALBUMIN SERPL-MCNC: 3.6 G/DL (ref 3.5–5)
ALBUMIN/GLOB SERPL: 0.7 {RATIO} (ref 1.1–2.2)
ALBUMIN/GLOB SERPL: 0.8 {RATIO} (ref 1.1–2.2)
ALBUMIN/GLOB SERPL: 0.8 {RATIO} (ref 1.1–2.2)
ALBUMIN/GLOB SERPL: 0.9 {RATIO} (ref 1.1–2.2)
ALP SERPL-CCNC: 117 U/L (ref 45–117)
ALP SERPL-CCNC: 139 U/L (ref 45–117)
ALP SERPL-CCNC: 140 U/L (ref 45–117)
ALP SERPL-CCNC: 158 U/L (ref 45–117)
ALP SERPL-CCNC: 163 U/L (ref 45–117)
ALP SERPL-CCNC: 184 U/L (ref 45–117)
ALT SERPL-CCNC: 32 U/L (ref 12–78)
ALT SERPL-CCNC: 37 U/L (ref 12–78)
ALT SERPL-CCNC: 37 U/L (ref 12–78)
ALT SERPL-CCNC: 40 U/L (ref 12–78)
ALT SERPL-CCNC: 41 U/L (ref 12–78)
ALT SERPL-CCNC: 45 U/L (ref 12–78)
ANION GAP SERPL CALC-SCNC: 1 MMOL/L (ref 5–15)
ANION GAP SERPL CALC-SCNC: 10 MMOL/L (ref 5–15)
ANION GAP SERPL CALC-SCNC: 2 MMOL/L (ref 5–15)
ANION GAP SERPL CALC-SCNC: 2 MMOL/L (ref 5–15)
ANION GAP SERPL CALC-SCNC: 3 MMOL/L (ref 5–15)
ANION GAP SERPL CALC-SCNC: 4 MMOL/L (ref 5–15)
ANION GAP SERPL CALC-SCNC: 5 MMOL/L (ref 5–15)
ANION GAP SERPL CALC-SCNC: 6 MMOL/L (ref 5–15)
ANION GAP SERPL CALC-SCNC: 7 MMOL/L (ref 5–15)
ANION GAP SERPL CALC-SCNC: 7 MMOL/L (ref 5–15)
ANION GAP SERPL CALC-SCNC: 8 MMOL/L (ref 5–15)
APPEARANCE UR: ABNORMAL
APPEARANCE UR: CLEAR
APPEARANCE UR: CLEAR
AST SERPL-CCNC: 24 U/L (ref 15–37)
AST SERPL-CCNC: 25 U/L (ref 15–37)
AST SERPL-CCNC: 28 U/L (ref 15–37)
AST SERPL-CCNC: 30 U/L (ref 15–37)
AST SERPL-CCNC: 30 U/L (ref 15–37)
AST SERPL-CCNC: 37 U/L (ref 15–37)
ATRIAL RATE: 31 BPM
ATRIAL RATE: 68 BPM
BACTERIA URNS QL MICRO: ABNORMAL /HPF
BACTERIA URNS QL MICRO: NEGATIVE /HPF
BACTERIA URNS QL MICRO: NEGATIVE /HPF
BASOPHILS # BLD: 0 K/UL (ref 0–0.1)
BASOPHILS # BLD: 0.1 K/UL (ref 0–0.1)
BASOPHILS NFR BLD: 0 % (ref 0–1)
BASOPHILS NFR BLD: 1 % (ref 0–1)
BASOPHILS NFR BLD: 1 % (ref 0–1)
BILIRUB SERPL-MCNC: 0.4 MG/DL (ref 0.2–1)
BILIRUB SERPL-MCNC: 0.5 MG/DL (ref 0.2–1)
BILIRUB SERPL-MCNC: 0.6 MG/DL (ref 0.2–1)
BILIRUB SERPL-MCNC: 1.2 MG/DL (ref 0.2–1)
BILIRUB UR QL: NEGATIVE
BNP SERPL-MCNC: 1948 PG/ML
BNP SERPL-MCNC: 2242 PG/ML
BNP SERPL-MCNC: 6165 PG/ML
BNP SERPL-MCNC: 7362 PG/ML
BUN SERPL-MCNC: 10 MG/DL (ref 6–20)
BUN SERPL-MCNC: 10 MG/DL (ref 6–20)
BUN SERPL-MCNC: 11 MG/DL (ref 6–20)
BUN SERPL-MCNC: 11 MG/DL (ref 6–20)
BUN SERPL-MCNC: 13 MG/DL (ref 6–20)
BUN SERPL-MCNC: 14 MG/DL (ref 6–20)
BUN SERPL-MCNC: 15 MG/DL (ref 6–20)
BUN SERPL-MCNC: 16 MG/DL (ref 6–20)
BUN SERPL-MCNC: 17 MG/DL (ref 6–20)
BUN SERPL-MCNC: 18 MG/DL (ref 6–20)
BUN SERPL-MCNC: 27 MG/DL (ref 6–20)
BUN SERPL-MCNC: 36 MG/DL (ref 6–20)
BUN SERPL-MCNC: 71 MG/DL (ref 6–20)
BUN SERPL-MCNC: 73 MG/DL (ref 6–20)
BUN/CREAT SERPL: 18 (ref 12–20)
BUN/CREAT SERPL: 21 (ref 12–20)
BUN/CREAT SERPL: 21 (ref 12–20)
BUN/CREAT SERPL: 22 (ref 12–20)
BUN/CREAT SERPL: 23 (ref 12–20)
BUN/CREAT SERPL: 24 (ref 12–20)
BUN/CREAT SERPL: 25 (ref 12–20)
BUN/CREAT SERPL: 26 (ref 12–20)
BUN/CREAT SERPL: 27 (ref 12–20)
BUN/CREAT SERPL: 28 (ref 12–20)
BUN/CREAT SERPL: 29 (ref 12–20)
BUN/CREAT SERPL: 30 (ref 12–20)
BUN/CREAT SERPL: 30 (ref 12–20)
BUN/CREAT SERPL: 31 (ref 12–20)
BUN/CREAT SERPL: 31 (ref 12–20)
CALCIUM SERPL-MCNC: 7.7 MG/DL (ref 8.5–10.1)
CALCIUM SERPL-MCNC: 7.8 MG/DL (ref 8.5–10.1)
CALCIUM SERPL-MCNC: 7.8 MG/DL (ref 8.5–10.1)
CALCIUM SERPL-MCNC: 8.2 MG/DL (ref 8.5–10.1)
CALCIUM SERPL-MCNC: 8.3 MG/DL (ref 8.5–10.1)
CALCIUM SERPL-MCNC: 8.3 MG/DL (ref 8.5–10.1)
CALCIUM SERPL-MCNC: 8.4 MG/DL (ref 8.5–10.1)
CALCIUM SERPL-MCNC: 8.5 MG/DL (ref 8.5–10.1)
CALCIUM SERPL-MCNC: 8.6 MG/DL (ref 8.5–10.1)
CALCIUM SERPL-MCNC: 8.7 MG/DL (ref 8.5–10.1)
CALCIUM SERPL-MCNC: 8.8 MG/DL (ref 8.5–10.1)
CALCIUM SERPL-MCNC: 8.8 MG/DL (ref 8.5–10.1)
CALCIUM SERPL-MCNC: 8.9 MG/DL (ref 8.5–10.1)
CALCIUM SERPL-MCNC: 9 MG/DL (ref 8.5–10.1)
CALCIUM SERPL-MCNC: 9.1 MG/DL (ref 8.5–10.1)
CALCULATED R AXIS, ECG10: 100 DEGREES
CALCULATED R AXIS, ECG10: 23 DEGREES
CALCULATED R AXIS, ECG10: 49 DEGREES
CALCULATED R AXIS, ECG10: 76 DEGREES
CALCULATED R AXIS, ECG10: 77 DEGREES
CALCULATED T AXIS, ECG11: -15 DEGREES
CALCULATED T AXIS, ECG11: -19 DEGREES
CALCULATED T AXIS, ECG11: -21 DEGREES
CALCULATED T AXIS, ECG11: 9 DEGREES
CHLORIDE SERPL-SCNC: 100 MMOL/L (ref 97–108)
CHLORIDE SERPL-SCNC: 101 MMOL/L (ref 97–108)
CHLORIDE SERPL-SCNC: 102 MMOL/L (ref 97–108)
CHLORIDE SERPL-SCNC: 106 MMOL/L (ref 97–108)
CHLORIDE SERPL-SCNC: 107 MMOL/L (ref 97–108)
CHLORIDE SERPL-SCNC: 108 MMOL/L (ref 97–108)
CHLORIDE SERPL-SCNC: 109 MMOL/L (ref 97–108)
CHLORIDE SERPL-SCNC: 79 MMOL/L (ref 97–108)
CHLORIDE SERPL-SCNC: 79 MMOL/L (ref 97–108)
CHLORIDE SERPL-SCNC: 80 MMOL/L (ref 97–108)
CHLORIDE SERPL-SCNC: 82 MMOL/L (ref 97–108)
CHLORIDE SERPL-SCNC: 84 MMOL/L (ref 97–108)
CHLORIDE SERPL-SCNC: 84 MMOL/L (ref 97–108)
CHLORIDE SERPL-SCNC: 85 MMOL/L (ref 97–108)
CHLORIDE SERPL-SCNC: 88 MMOL/L (ref 97–108)
CHLORIDE SERPL-SCNC: 89 MMOL/L (ref 97–108)
CHLORIDE SERPL-SCNC: 91 MMOL/L (ref 97–108)
CHLORIDE SERPL-SCNC: 92 MMOL/L (ref 97–108)
CHLORIDE SERPL-SCNC: 96 MMOL/L (ref 97–108)
CHLORIDE SERPL-SCNC: 97 MMOL/L (ref 97–108)
CHLORIDE SERPL-SCNC: 97 MMOL/L (ref 97–108)
CHLORIDE SERPL-SCNC: 98 MMOL/L (ref 97–108)
CO2 SERPL-SCNC: 22 MMOL/L (ref 21–32)
CO2 SERPL-SCNC: 22 MMOL/L (ref 21–32)
CO2 SERPL-SCNC: 23 MMOL/L (ref 21–32)
CO2 SERPL-SCNC: 24 MMOL/L (ref 21–32)
CO2 SERPL-SCNC: 25 MMOL/L (ref 21–32)
CO2 SERPL-SCNC: 26 MMOL/L (ref 21–32)
CO2 SERPL-SCNC: 27 MMOL/L (ref 21–32)
CO2 SERPL-SCNC: 28 MMOL/L (ref 21–32)
CO2 SERPL-SCNC: 29 MMOL/L (ref 21–32)
CO2 SERPL-SCNC: 31 MMOL/L (ref 21–32)
CO2 SERPL-SCNC: 32 MMOL/L (ref 21–32)
COLOR UR: ABNORMAL
COLOR UR: ABNORMAL
COLOR UR: NORMAL
COMMENT, HOLDF: NORMAL
COVID-19 RAPID TEST, COVR: NOT DETECTED
COVID-19 RAPID TEST, COVR: NOT DETECTED
CREAT SERPL-MCNC: 0.52 MG/DL (ref 0.55–1.02)
CREAT SERPL-MCNC: 0.53 MG/DL (ref 0.55–1.02)
CREAT SERPL-MCNC: 0.55 MG/DL (ref 0.55–1.02)
CREAT SERPL-MCNC: 0.55 MG/DL (ref 0.55–1.02)
CREAT SERPL-MCNC: 0.56 MG/DL (ref 0.55–1.02)
CREAT SERPL-MCNC: 0.57 MG/DL (ref 0.55–1.02)
CREAT SERPL-MCNC: 0.59 MG/DL (ref 0.55–1.02)
CREAT SERPL-MCNC: 0.6 MG/DL (ref 0.55–1.02)
CREAT SERPL-MCNC: 0.61 MG/DL (ref 0.55–1.02)
CREAT SERPL-MCNC: 0.61 MG/DL (ref 0.55–1.02)
CREAT SERPL-MCNC: 0.63 MG/DL (ref 0.55–1.02)
CREAT SERPL-MCNC: 0.64 MG/DL (ref 0.55–1.02)
CREAT SERPL-MCNC: 0.65 MG/DL (ref 0.55–1.02)
CREAT SERPL-MCNC: 0.66 MG/DL (ref 0.55–1.02)
CREAT SERPL-MCNC: 0.67 MG/DL (ref 0.55–1.02)
CREAT SERPL-MCNC: 0.68 MG/DL (ref 0.55–1.02)
CREAT SERPL-MCNC: 0.7 MG/DL (ref 0.55–1.02)
CREAT SERPL-MCNC: 0.7 MG/DL (ref 0.55–1.02)
CREAT SERPL-MCNC: 0.71 MG/DL (ref 0.55–1.02)
CREAT SERPL-MCNC: 0.97 MG/DL (ref 0.55–1.02)
CREAT SERPL-MCNC: 1.27 MG/DL (ref 0.55–1.02)
CREAT SERPL-MCNC: 2.36 MG/DL (ref 0.55–1.02)
CREAT SERPL-MCNC: 2.4 MG/DL (ref 0.55–1.02)
CREAT UR-MCNC: 229 MG/DL
DIAGNOSIS, 93000: NORMAL
DIFFERENTIAL METHOD BLD: ABNORMAL
ECHO AO ROOT DIAM: 2.9 CM
ECHO AO ROOT INDEX: 1.42 CM/M2
ECHO AV AREA PEAK VELOCITY: 1.1 CM2
ECHO AV AREA/BSA PEAK VELOCITY: 0.5 CM2/M2
ECHO AV PEAK GRADIENT: 15 MMHG
ECHO AV PEAK VELOCITY: 2 M/S
ECHO AV VELOCITY RATIO: 0.5
ECHO LA DIAMETER INDEX: 2.84 CM/M2
ECHO LA DIAMETER: 5.8 CM
ECHO LA TO AORTIC ROOT RATIO: 2
ECHO LV E' LATERAL VELOCITY: 9 CM/S
ECHO LV E' SEPTAL VELOCITY: 6 CM/S
ECHO LV FRACTIONAL SHORTENING: 34 % (ref 28–44)
ECHO LV INTERNAL DIMENSION DIASTOLE INDEX: 2.01 CM/M2
ECHO LV INTERNAL DIMENSION DIASTOLIC: 4.1 CM (ref 3.9–5.3)
ECHO LV INTERNAL DIMENSION SYSTOLIC INDEX: 1.32 CM/M2
ECHO LV INTERNAL DIMENSION SYSTOLIC: 2.7 CM
ECHO LV IVSD: 1.2 CM (ref 0.6–0.9)
ECHO LV MASS 2D: 151.3 G (ref 67–162)
ECHO LV MASS INDEX 2D: 74.2 G/M2 (ref 43–95)
ECHO LV POSTERIOR WALL DIASTOLIC: 1 CM (ref 0.6–0.9)
ECHO LV RELATIVE WALL THICKNESS RATIO: 0.49
ECHO LVOT AREA: 2.3 CM2
ECHO LVOT DIAM: 1.7 CM
ECHO LVOT PEAK GRADIENT: 4 MMHG
ECHO LVOT PEAK VELOCITY: 1 M/S
ECHO MV E VELOCITY: 1.02 M/S
ECHO MV E/E' LATERAL: 11.33
ECHO MV E/E' RATIO (AVERAGED): 14.17
ECHO MV E/E' SEPTAL: 17
ECHO PV MAX VELOCITY: 0.4 M/S
ECHO PV PEAK GRADIENT: 1 MMHG
ECHO RV FREE WALL PEAK S': 8 CM/S
ECHO RV INTERNAL DIMENSION: 6.6 CM
ECHO RV TAPSE: 1.3 CM (ref 1.7–?)
ECHO TV REGURGITANT MAX VELOCITY: 2.48 M/S
ECHO TV REGURGITANT PEAK GRADIENT: 25 MMHG
EOSINOPHIL # BLD: 0 K/UL (ref 0–0.4)
EOSINOPHIL # BLD: 0.1 K/UL (ref 0–0.4)
EOSINOPHIL NFR BLD: 0 % (ref 0–7)
EOSINOPHIL NFR BLD: 1 % (ref 0–7)
EPITH CASTS URNS QL MICRO: ABNORMAL /LPF
EPITH CASTS URNS QL MICRO: ABNORMAL /LPF
EPITH CASTS URNS QL MICRO: NORMAL /LPF
ERYTHROCYTE [DISTWIDTH] IN BLOOD BY AUTOMATED COUNT: 14.9 % (ref 11.5–14.5)
ERYTHROCYTE [DISTWIDTH] IN BLOOD BY AUTOMATED COUNT: 14.9 % (ref 11.5–14.5)
ERYTHROCYTE [DISTWIDTH] IN BLOOD BY AUTOMATED COUNT: 15 % (ref 11.5–14.5)
ERYTHROCYTE [DISTWIDTH] IN BLOOD BY AUTOMATED COUNT: 15.3 % (ref 11.5–14.5)
ERYTHROCYTE [DISTWIDTH] IN BLOOD BY AUTOMATED COUNT: 15.5 % (ref 11.5–14.5)
ERYTHROCYTE [DISTWIDTH] IN BLOOD BY AUTOMATED COUNT: 16.9 % (ref 11.5–14.5)
ERYTHROCYTE [DISTWIDTH] IN BLOOD BY AUTOMATED COUNT: 17.2 % (ref 11.5–14.5)
EST. AVERAGE GLUCOSE BLD GHB EST-MCNC: 128 MG/DL
GLOBULIN SER CALC-MCNC: 3.1 G/DL (ref 2–4)
GLOBULIN SER CALC-MCNC: 3.8 G/DL (ref 2–4)
GLOBULIN SER CALC-MCNC: 3.9 G/DL (ref 2–4)
GLOBULIN SER CALC-MCNC: 4 G/DL (ref 2–4)
GLOBULIN SER CALC-MCNC: 4.2 G/DL (ref 2–4)
GLOBULIN SER CALC-MCNC: 4.3 G/DL (ref 2–4)
GLUCOSE BLD STRIP.AUTO-MCNC: 101 MG/DL (ref 65–117)
GLUCOSE BLD STRIP.AUTO-MCNC: 113 MG/DL (ref 65–117)
GLUCOSE BLD STRIP.AUTO-MCNC: 114 MG/DL (ref 65–117)
GLUCOSE BLD STRIP.AUTO-MCNC: 116 MG/DL (ref 65–117)
GLUCOSE BLD STRIP.AUTO-MCNC: 119 MG/DL (ref 65–117)
GLUCOSE BLD STRIP.AUTO-MCNC: 126 MG/DL (ref 65–117)
GLUCOSE BLD STRIP.AUTO-MCNC: 128 MG/DL (ref 65–117)
GLUCOSE BLD STRIP.AUTO-MCNC: 128 MG/DL (ref 65–117)
GLUCOSE BLD STRIP.AUTO-MCNC: 133 MG/DL (ref 65–117)
GLUCOSE BLD STRIP.AUTO-MCNC: 135 MG/DL (ref 65–117)
GLUCOSE BLD STRIP.AUTO-MCNC: 140 MG/DL (ref 65–117)
GLUCOSE BLD STRIP.AUTO-MCNC: 141 MG/DL (ref 65–117)
GLUCOSE BLD STRIP.AUTO-MCNC: 141 MG/DL (ref 65–117)
GLUCOSE BLD STRIP.AUTO-MCNC: 146 MG/DL (ref 65–117)
GLUCOSE BLD STRIP.AUTO-MCNC: 152 MG/DL (ref 65–117)
GLUCOSE BLD STRIP.AUTO-MCNC: 152 MG/DL (ref 65–117)
GLUCOSE BLD STRIP.AUTO-MCNC: 156 MG/DL (ref 65–117)
GLUCOSE BLD STRIP.AUTO-MCNC: 165 MG/DL (ref 65–117)
GLUCOSE BLD STRIP.AUTO-MCNC: 168 MG/DL (ref 65–117)
GLUCOSE BLD STRIP.AUTO-MCNC: 174 MG/DL (ref 65–117)
GLUCOSE BLD STRIP.AUTO-MCNC: 177 MG/DL (ref 65–117)
GLUCOSE BLD STRIP.AUTO-MCNC: 178 MG/DL (ref 65–117)
GLUCOSE BLD STRIP.AUTO-MCNC: 178 MG/DL (ref 65–117)
GLUCOSE BLD STRIP.AUTO-MCNC: 187 MG/DL (ref 65–117)
GLUCOSE BLD STRIP.AUTO-MCNC: 193 MG/DL (ref 65–117)
GLUCOSE BLD STRIP.AUTO-MCNC: 205 MG/DL (ref 65–117)
GLUCOSE BLD STRIP.AUTO-MCNC: 205 MG/DL (ref 65–117)
GLUCOSE BLD STRIP.AUTO-MCNC: 206 MG/DL (ref 65–117)
GLUCOSE BLD STRIP.AUTO-MCNC: 206 MG/DL (ref 65–117)
GLUCOSE BLD STRIP.AUTO-MCNC: 210 MG/DL (ref 65–117)
GLUCOSE BLD STRIP.AUTO-MCNC: 58 MG/DL (ref 65–117)
GLUCOSE BLD STRIP.AUTO-MCNC: 70 MG/DL (ref 65–117)
GLUCOSE BLD STRIP.AUTO-MCNC: 80 MG/DL (ref 65–117)
GLUCOSE BLD STRIP.AUTO-MCNC: 93 MG/DL (ref 65–117)
GLUCOSE BLD STRIP.AUTO-MCNC: 94 MG/DL (ref 65–117)
GLUCOSE BLD STRIP.AUTO-MCNC: 96 MG/DL (ref 65–117)
GLUCOSE BLD STRIP.AUTO-MCNC: 96 MG/DL (ref 65–117)
GLUCOSE SERPL-MCNC: 102 MG/DL (ref 65–100)
GLUCOSE SERPL-MCNC: 104 MG/DL (ref 65–100)
GLUCOSE SERPL-MCNC: 105 MG/DL (ref 65–100)
GLUCOSE SERPL-MCNC: 106 MG/DL (ref 65–100)
GLUCOSE SERPL-MCNC: 106 MG/DL (ref 65–100)
GLUCOSE SERPL-MCNC: 111 MG/DL (ref 65–100)
GLUCOSE SERPL-MCNC: 119 MG/DL (ref 65–100)
GLUCOSE SERPL-MCNC: 119 MG/DL (ref 65–100)
GLUCOSE SERPL-MCNC: 128 MG/DL (ref 65–100)
GLUCOSE SERPL-MCNC: 129 MG/DL (ref 65–100)
GLUCOSE SERPL-MCNC: 133 MG/DL (ref 65–100)
GLUCOSE SERPL-MCNC: 136 MG/DL (ref 65–100)
GLUCOSE SERPL-MCNC: 148 MG/DL (ref 65–100)
GLUCOSE SERPL-MCNC: 155 MG/DL (ref 65–100)
GLUCOSE SERPL-MCNC: 162 MG/DL (ref 65–100)
GLUCOSE SERPL-MCNC: 166 MG/DL (ref 65–100)
GLUCOSE SERPL-MCNC: 174 MG/DL (ref 65–100)
GLUCOSE SERPL-MCNC: 193 MG/DL (ref 65–100)
GLUCOSE SERPL-MCNC: 84 MG/DL (ref 65–100)
GLUCOSE SERPL-MCNC: 89 MG/DL (ref 65–100)
GLUCOSE SERPL-MCNC: 93 MG/DL (ref 65–100)
GLUCOSE SERPL-MCNC: 96 MG/DL (ref 65–100)
GLUCOSE SERPL-MCNC: 97 MG/DL (ref 65–100)
GLUCOSE UR STRIP.AUTO-MCNC: NEGATIVE MG/DL
HBA1C MFR BLD: 6.1 % (ref 4–5.6)
HCT VFR BLD AUTO: 27.2 % (ref 35–47)
HCT VFR BLD AUTO: 28.5 % (ref 35–47)
HCT VFR BLD AUTO: 28.6 % (ref 35–47)
HCT VFR BLD AUTO: 29.2 % (ref 35–47)
HCT VFR BLD AUTO: 29.3 % (ref 35–47)
HCT VFR BLD AUTO: 29.4 % (ref 35–47)
HCT VFR BLD AUTO: 29.7 % (ref 35–47)
HCT VFR BLD AUTO: 29.9 % (ref 35–47)
HCT VFR BLD AUTO: 30.1 % (ref 35–47)
HGB BLD-MCNC: 10 G/DL (ref 11.5–16)
HGB BLD-MCNC: 10.4 G/DL (ref 11.5–16)
HGB BLD-MCNC: 10.5 G/DL (ref 11.5–16)
HGB BLD-MCNC: 10.6 G/DL (ref 11.5–16)
HGB BLD-MCNC: 8.6 G/DL (ref 11.5–16)
HGB BLD-MCNC: 9.1 G/DL (ref 11.5–16)
HGB BLD-MCNC: 9.1 G/DL (ref 11.5–16)
HGB BLD-MCNC: 9.2 G/DL (ref 11.5–16)
HGB BLD-MCNC: 9.9 G/DL (ref 11.5–16)
HGB UR QL STRIP: ABNORMAL
HGB UR QL STRIP: NEGATIVE
HGB UR QL STRIP: NEGATIVE
HYALINE CASTS URNS QL MICRO: ABNORMAL /LPF (ref 0–5)
HYALINE CASTS URNS QL MICRO: NORMAL /LPF (ref 0–5)
IMM GRANULOCYTES # BLD AUTO: 0 K/UL (ref 0–0.04)
IMM GRANULOCYTES # BLD AUTO: 0.1 K/UL (ref 0–0.04)
IMM GRANULOCYTES NFR BLD AUTO: 0 % (ref 0–0.5)
IMM GRANULOCYTES NFR BLD AUTO: 0 % (ref 0–0.5)
IMM GRANULOCYTES NFR BLD AUTO: 1 % (ref 0–0.5)
KETONES UR QL STRIP.AUTO: 40 MG/DL
KETONES UR QL STRIP.AUTO: ABNORMAL MG/DL
KETONES UR QL STRIP.AUTO: NEGATIVE MG/DL
LACTATE SERPL-SCNC: 1.2 MMOL/L (ref 0.4–2)
LACTATE SERPL-SCNC: 1.8 MMOL/L (ref 0.4–2)
LEUKOCYTE ESTERASE UR QL STRIP.AUTO: ABNORMAL
LEUKOCYTE ESTERASE UR QL STRIP.AUTO: ABNORMAL
LEUKOCYTE ESTERASE UR QL STRIP.AUTO: NEGATIVE
LYMPHOCYTES # BLD: 0.5 K/UL (ref 0.8–3.5)
LYMPHOCYTES # BLD: 0.8 K/UL (ref 0.8–3.5)
LYMPHOCYTES # BLD: 0.8 K/UL (ref 0.8–3.5)
LYMPHOCYTES # BLD: 0.9 K/UL (ref 0.8–3.5)
LYMPHOCYTES # BLD: 1.2 K/UL (ref 0.8–3.5)
LYMPHOCYTES NFR BLD: 11 % (ref 12–49)
LYMPHOCYTES NFR BLD: 12 % (ref 12–49)
LYMPHOCYTES NFR BLD: 13 % (ref 12–49)
LYMPHOCYTES NFR BLD: 7 % (ref 12–49)
LYMPHOCYTES NFR BLD: 7 % (ref 12–49)
LYMPHOCYTES NFR BLD: 8 % (ref 12–49)
LYMPHOCYTES NFR BLD: 9 % (ref 12–49)
LYMPHOCYTES NFR BLD: 9 % (ref 12–49)
MAGNESIUM SERPL-MCNC: 1.8 MG/DL (ref 1.6–2.4)
MAGNESIUM SERPL-MCNC: 2.2 MG/DL (ref 1.6–2.4)
MCH RBC QN AUTO: 27.8 PG (ref 26–34)
MCH RBC QN AUTO: 28.5 PG (ref 26–34)
MCH RBC QN AUTO: 28.5 PG (ref 26–34)
MCH RBC QN AUTO: 29.2 PG (ref 26–34)
MCH RBC QN AUTO: 29.5 PG (ref 26–34)
MCH RBC QN AUTO: 29.6 PG (ref 26–34)
MCH RBC QN AUTO: 29.7 PG (ref 26–34)
MCH RBC QN AUTO: 29.9 PG (ref 26–34)
MCH RBC QN AUTO: 30 PG (ref 26–34)
MCHC RBC AUTO-ENTMCNC: 30.4 G/DL (ref 30–36.5)
MCHC RBC AUTO-ENTMCNC: 31.2 G/DL (ref 30–36.5)
MCHC RBC AUTO-ENTMCNC: 31.4 G/DL (ref 30–36.5)
MCHC RBC AUTO-ENTMCNC: 31.6 G/DL (ref 30–36.5)
MCHC RBC AUTO-ENTMCNC: 34.6 G/DL (ref 30–36.5)
MCHC RBC AUTO-ENTMCNC: 34.9 G/DL (ref 30–36.5)
MCHC RBC AUTO-ENTMCNC: 35 G/DL (ref 30–36.5)
MCHC RBC AUTO-ENTMCNC: 35.1 G/DL (ref 30–36.5)
MCHC RBC AUTO-ENTMCNC: 36.1 G/DL (ref 30–36.5)
MCV RBC AUTO: 83.3 FL (ref 80–99)
MCV RBC AUTO: 84.1 FL (ref 80–99)
MCV RBC AUTO: 85 FL (ref 80–99)
MCV RBC AUTO: 85.1 FL (ref 80–99)
MCV RBC AUTO: 85.6 FL (ref 80–99)
MCV RBC AUTO: 90.1 FL (ref 80–99)
MCV RBC AUTO: 91.4 FL (ref 80–99)
MCV RBC AUTO: 91.5 FL (ref 80–99)
MCV RBC AUTO: 93 FL (ref 80–99)
MONOCYTES # BLD: 0.7 K/UL (ref 0–1)
MONOCYTES # BLD: 0.7 K/UL (ref 0–1)
MONOCYTES # BLD: 0.8 K/UL (ref 0–1)
MONOCYTES # BLD: 0.9 K/UL (ref 0–1)
MONOCYTES # BLD: 1 K/UL (ref 0–1)
MONOCYTES # BLD: 1.1 K/UL (ref 0–1)
MONOCYTES # BLD: 1.1 K/UL (ref 0–1)
MONOCYTES # BLD: 1.2 K/UL (ref 0–1)
MONOCYTES NFR BLD: 10 % (ref 5–13)
MONOCYTES NFR BLD: 11 % (ref 5–13)
MONOCYTES NFR BLD: 13 % (ref 5–13)
MONOCYTES NFR BLD: 13 % (ref 5–13)
MONOCYTES NFR BLD: 8 % (ref 5–13)
MONOCYTES NFR BLD: 9 % (ref 5–13)
NEUTS SEG # BLD: 10.3 K/UL (ref 1.8–8)
NEUTS SEG # BLD: 10.8 K/UL (ref 1.8–8)
NEUTS SEG # BLD: 4.1 K/UL (ref 1.8–8)
NEUTS SEG # BLD: 4.3 K/UL (ref 1.8–8)
NEUTS SEG # BLD: 6 K/UL (ref 1.8–8)
NEUTS SEG # BLD: 7.7 K/UL (ref 1.8–8)
NEUTS SEG # BLD: 8.4 K/UL (ref 1.8–8)
NEUTS SEG # BLD: 8.7 K/UL (ref 1.8–8)
NEUTS SEG NFR BLD: 72 % (ref 32–75)
NEUTS SEG NFR BLD: 75 % (ref 32–75)
NEUTS SEG NFR BLD: 77 % (ref 32–75)
NEUTS SEG NFR BLD: 77 % (ref 32–75)
NEUTS SEG NFR BLD: 79 % (ref 32–75)
NEUTS SEG NFR BLD: 82 % (ref 32–75)
NEUTS SEG NFR BLD: 83 % (ref 32–75)
NEUTS SEG NFR BLD: 84 % (ref 32–75)
NITRITE UR QL STRIP.AUTO: NEGATIVE
NRBC # BLD: 0 K/UL (ref 0–0.01)
NRBC # BLD: 0.02 K/UL (ref 0–0.01)
NRBC # BLD: 0.03 K/UL (ref 0–0.01)
NRBC # BLD: 0.04 K/UL (ref 0–0.01)
NRBC BLD-RTO: 0 PER 100 WBC
NRBC BLD-RTO: 0.3 PER 100 WBC
NRBC BLD-RTO: 0.6 PER 100 WBC
NRBC BLD-RTO: 0.7 PER 100 WBC
OSMOLALITY SERPL: 242 MOSM/KG H2O
OSMOLALITY UR: 636 MOSM/KG H2O
OSMOLALITY UR: 636 MOSM/KG H2O
PH UR STRIP: 5 [PH] (ref 5–8)
PH UR STRIP: 5 [PH] (ref 5–8)
PH UR STRIP: 7 [PH] (ref 5–8)
PHOSPHATE SERPL-MCNC: 3.1 MG/DL (ref 2.6–4.7)
PHOSPHATE SERPL-MCNC: 3.1 MG/DL (ref 2.6–4.7)
PHOSPHATE SERPL-MCNC: 3.3 MG/DL (ref 2.6–4.7)
PHOSPHATE SERPL-MCNC: 3.4 MG/DL (ref 2.6–4.7)
PHOSPHATE SERPL-MCNC: 3.5 MG/DL (ref 2.6–4.7)
PHOSPHATE SERPL-MCNC: 4.1 MG/DL (ref 2.6–4.7)
PHOSPHATE SERPL-MCNC: 4.2 MG/DL (ref 2.6–4.7)
PLATELET # BLD AUTO: 210 K/UL (ref 150–400)
PLATELET # BLD AUTO: 212 K/UL (ref 150–400)
PLATELET # BLD AUTO: 240 K/UL (ref 150–400)
PLATELET # BLD AUTO: 246 K/UL (ref 150–400)
PLATELET # BLD AUTO: 264 K/UL (ref 150–400)
PLATELET # BLD AUTO: 301 K/UL (ref 150–400)
PLATELET # BLD AUTO: 304 K/UL (ref 150–400)
PLATELET # BLD AUTO: 308 K/UL (ref 150–400)
PLATELET # BLD AUTO: 320 K/UL (ref 150–400)
PMV BLD AUTO: 10.3 FL (ref 8.9–12.9)
PMV BLD AUTO: 10.4 FL (ref 8.9–12.9)
PMV BLD AUTO: 10.6 FL (ref 8.9–12.9)
PMV BLD AUTO: 8.9 FL (ref 8.9–12.9)
PMV BLD AUTO: 8.9 FL (ref 8.9–12.9)
PMV BLD AUTO: 9.1 FL (ref 8.9–12.9)
PMV BLD AUTO: 9.2 FL (ref 8.9–12.9)
PMV BLD AUTO: 9.2 FL (ref 8.9–12.9)
PMV BLD AUTO: 9.8 FL (ref 8.9–12.9)
POTASSIUM SERPL-SCNC: 4 MMOL/L (ref 3.5–5.1)
POTASSIUM SERPL-SCNC: 4.1 MMOL/L (ref 3.5–5.1)
POTASSIUM SERPL-SCNC: 4.1 MMOL/L (ref 3.5–5.1)
POTASSIUM SERPL-SCNC: 4.2 MMOL/L (ref 3.5–5.1)
POTASSIUM SERPL-SCNC: 4.3 MMOL/L (ref 3.5–5.1)
POTASSIUM SERPL-SCNC: 4.4 MMOL/L (ref 3.5–5.1)
POTASSIUM SERPL-SCNC: 4.5 MMOL/L (ref 3.5–5.1)
POTASSIUM SERPL-SCNC: 4.6 MMOL/L (ref 3.5–5.1)
POTASSIUM SERPL-SCNC: 4.6 MMOL/L (ref 3.5–5.1)
POTASSIUM SERPL-SCNC: 4.7 MMOL/L (ref 3.5–5.1)
POTASSIUM SERPL-SCNC: 4.8 MMOL/L (ref 3.5–5.1)
POTASSIUM SERPL-SCNC: 4.9 MMOL/L (ref 3.5–5.1)
POTASSIUM SERPL-SCNC: 5 MMOL/L (ref 3.5–5.1)
POTASSIUM SERPL-SCNC: 5.3 MMOL/L (ref 3.5–5.1)
POTASSIUM SERPL-SCNC: 5.5 MMOL/L (ref 3.5–5.1)
POTASSIUM SERPL-SCNC: 5.6 MMOL/L (ref 3.5–5.1)
PROT SERPL-MCNC: 5.9 G/DL (ref 6.4–8.2)
PROT SERPL-MCNC: 7.2 G/DL (ref 6.4–8.2)
PROT SERPL-MCNC: 7.3 G/DL (ref 6.4–8.2)
PROT SERPL-MCNC: 7.4 G/DL (ref 6.4–8.2)
PROT SERPL-MCNC: 7.4 G/DL (ref 6.4–8.2)
PROT SERPL-MCNC: 7.6 G/DL (ref 6.4–8.2)
PROT UR STRIP-MCNC: 100 MG/DL
PROT UR STRIP-MCNC: 300 MG/DL
PROT UR STRIP-MCNC: NEGATIVE MG/DL
Q-T INTERVAL, ECG07: 472 MS
Q-T INTERVAL, ECG07: 476 MS
Q-T INTERVAL, ECG07: 484 MS
Q-T INTERVAL, ECG07: 502 MS
Q-T INTERVAL, ECG07: 576 MS
QRS DURATION, ECG06: 140 MS
QRS DURATION, ECG06: 142 MS
QRS DURATION, ECG06: 142 MS
QRS DURATION, ECG06: 150 MS
QRS DURATION, ECG06: 154 MS
QTC CALCULATION (BEZET), ECG08: 426 MS
QTC CALCULATION (BEZET), ECG08: 491 MS
QTC CALCULATION (BEZET), ECG08: 496 MS
QTC CALCULATION (BEZET), ECG08: 503 MS
QTC CALCULATION (BEZET), ECG08: 519 MS
RBC # BLD AUTO: 3.02 M/UL (ref 3.8–5.2)
RBC # BLD AUTO: 3.15 M/UL (ref 3.8–5.2)
RBC # BLD AUTO: 3.19 M/UL (ref 3.8–5.2)
RBC # BLD AUTO: 3.27 M/UL (ref 3.8–5.2)
RBC # BLD AUTO: 3.34 M/UL (ref 3.8–5.2)
RBC # BLD AUTO: 3.35 M/UL (ref 3.8–5.2)
RBC # BLD AUTO: 3.53 M/UL (ref 3.8–5.2)
RBC # BLD AUTO: 3.53 M/UL (ref 3.8–5.2)
RBC # BLD AUTO: 3.54 M/UL (ref 3.8–5.2)
RBC #/AREA URNS HPF: ABNORMAL /HPF (ref 0–5)
RBC #/AREA URNS HPF: ABNORMAL /HPF (ref 0–5)
RBC #/AREA URNS HPF: NORMAL /HPF (ref 0–5)
RBC MORPH BLD: ABNORMAL
SAMPLES BEING HELD,HOLD: NORMAL
SERVICE CMNT-IMP: ABNORMAL
SERVICE CMNT-IMP: NORMAL
SODIUM SERPL-SCNC: 112 MMOL/L (ref 136–145)
SODIUM SERPL-SCNC: 112 MMOL/L (ref 136–145)
SODIUM SERPL-SCNC: 114 MMOL/L (ref 136–145)
SODIUM SERPL-SCNC: 114 MMOL/L (ref 136–145)
SODIUM SERPL-SCNC: 116 MMOL/L (ref 136–145)
SODIUM SERPL-SCNC: 116 MMOL/L (ref 136–145)
SODIUM SERPL-SCNC: 117 MMOL/L (ref 136–145)
SODIUM SERPL-SCNC: 119 MMOL/L (ref 136–145)
SODIUM SERPL-SCNC: 119 MMOL/L (ref 136–145)
SODIUM SERPL-SCNC: 120 MMOL/L (ref 136–145)
SODIUM SERPL-SCNC: 121 MMOL/L (ref 136–145)
SODIUM SERPL-SCNC: 121 MMOL/L (ref 136–145)
SODIUM SERPL-SCNC: 122 MMOL/L (ref 136–145)
SODIUM SERPL-SCNC: 123 MMOL/L (ref 136–145)
SODIUM SERPL-SCNC: 127 MMOL/L (ref 136–145)
SODIUM SERPL-SCNC: 127 MMOL/L (ref 136–145)
SODIUM SERPL-SCNC: 130 MMOL/L (ref 136–145)
SODIUM SERPL-SCNC: 131 MMOL/L (ref 136–145)
SODIUM SERPL-SCNC: 132 MMOL/L (ref 136–145)
SODIUM SERPL-SCNC: 137 MMOL/L (ref 136–145)
SODIUM SERPL-SCNC: 138 MMOL/L (ref 136–145)
SODIUM UR-SCNC: 10 MMOL/L
SODIUM UR-SCNC: 78 MMOL/L
SODIUM UR-SCNC: 81 MMOL/L
SOURCE, COVRS: NORMAL
SOURCE, COVRS: NORMAL
SP GR UR REFRACTOMETRY: 1.01 (ref 1–1.03)
SP GR UR REFRACTOMETRY: 1.02 (ref 1–1.03)
SP GR UR REFRACTOMETRY: 1.02 (ref 1–1.03)
TROPONIN-HIGH SENSITIVITY: 14 NG/L (ref 0–51)
TROPONIN-HIGH SENSITIVITY: 17 NG/L (ref 0–51)
TROPONIN-HIGH SENSITIVITY: 19 NG/L (ref 0–51)
TROPONIN-HIGH SENSITIVITY: 21 NG/L (ref 0–51)
TSH SERPL DL<=0.05 MIU/L-ACNC: 3.05 UIU/ML (ref 0.36–3.74)
UR CULT HOLD, URHOLD: NORMAL
UROBILINOGEN UR QL STRIP.AUTO: 0.2 EU/DL (ref 0.2–1)
UROBILINOGEN UR QL STRIP.AUTO: 1 EU/DL (ref 0.2–1)
UROBILINOGEN UR QL STRIP.AUTO: 1 EU/DL (ref 0.2–1)
VENTRICULAR RATE, ECG03: 33 BPM
VENTRICULAR RATE, ECG03: 59 BPM
VENTRICULAR RATE, ECG03: 64 BPM
VENTRICULAR RATE, ECG03: 65 BPM
VENTRICULAR RATE, ECG03: 73 BPM
WBC # BLD AUTO: 10.2 K/UL (ref 3.6–11)
WBC # BLD AUTO: 10.5 K/UL (ref 3.6–11)
WBC # BLD AUTO: 10.6 K/UL (ref 3.6–11)
WBC # BLD AUTO: 12.4 K/UL (ref 3.6–11)
WBC # BLD AUTO: 12.9 K/UL (ref 3.6–11)
WBC # BLD AUTO: 5.4 K/UL (ref 3.6–11)
WBC # BLD AUTO: 5.4 K/UL (ref 3.6–11)
WBC # BLD AUTO: 6.1 K/UL (ref 3.6–11)
WBC # BLD AUTO: 7.8 K/UL (ref 3.6–11)
WBC URNS QL MICRO: ABNORMAL /HPF (ref 0–4)
WBC URNS QL MICRO: ABNORMAL /HPF (ref 0–4)
WBC URNS QL MICRO: NORMAL /HPF (ref 0–4)

## 2022-01-01 PROCEDURE — 93005 ELECTROCARDIOGRAM TRACING: CPT

## 2022-01-01 PROCEDURE — 1090F PRES/ABSN URINE INCON ASSESS: CPT | Performed by: INTERNAL MEDICINE

## 2022-01-01 PROCEDURE — 87086 URINE CULTURE/COLONY COUNT: CPT

## 2022-01-01 PROCEDURE — 74011250636 HC RX REV CODE- 250/636: Performed by: FAMILY MEDICINE

## 2022-01-01 PROCEDURE — 74011250636 HC RX REV CODE- 250/636: Performed by: INTERNAL MEDICINE

## 2022-01-01 PROCEDURE — 85027 COMPLETE CBC AUTOMATED: CPT

## 2022-01-01 PROCEDURE — 82962 GLUCOSE BLOOD TEST: CPT

## 2022-01-01 PROCEDURE — 80053 COMPREHEN METABOLIC PANEL: CPT

## 2022-01-01 PROCEDURE — 84295 ASSAY OF SERUM SODIUM: CPT

## 2022-01-01 PROCEDURE — 36415 COLL VENOUS BLD VENIPUNCTURE: CPT

## 2022-01-01 PROCEDURE — 74011250637 HC RX REV CODE- 250/637: Performed by: HOSPITALIST

## 2022-01-01 PROCEDURE — 71045 X-RAY EXAM CHEST 1 VIEW: CPT

## 2022-01-01 PROCEDURE — 80048 BASIC METABOLIC PNL TOTAL CA: CPT

## 2022-01-01 PROCEDURE — 97165 OT EVAL LOW COMPLEX 30 MIN: CPT

## 2022-01-01 PROCEDURE — 74011636637 HC RX REV CODE- 636/637: Performed by: FAMILY MEDICINE

## 2022-01-01 PROCEDURE — 65660000000 HC RM CCU STEPDOWN

## 2022-01-01 PROCEDURE — 36592 COLLECT BLOOD FROM PICC: CPT

## 2022-01-01 PROCEDURE — 74011000250 HC RX REV CODE- 250: Performed by: INTERNAL MEDICINE

## 2022-01-01 PROCEDURE — 77030038269 HC DRN EXT URIN PURWCK BARD -A

## 2022-01-01 PROCEDURE — 80069 RENAL FUNCTION PANEL: CPT

## 2022-01-01 PROCEDURE — 74011000250 HC RX REV CODE- 250: Performed by: FAMILY MEDICINE

## 2022-01-01 PROCEDURE — 87635 SARS-COV-2 COVID-19 AMP PRB: CPT

## 2022-01-01 PROCEDURE — 74011000250 HC RX REV CODE- 250: Performed by: HOSPITALIST

## 2022-01-01 PROCEDURE — 74011250637 HC RX REV CODE- 250/637: Performed by: INTERNAL MEDICINE

## 2022-01-01 PROCEDURE — 1101F PT FALLS ASSESS-DOCD LE1/YR: CPT | Performed by: INTERNAL MEDICINE

## 2022-01-01 PROCEDURE — 93306 TTE W/DOPPLER COMPLETE: CPT | Performed by: SPECIALIST

## 2022-01-01 PROCEDURE — 84300 ASSAY OF URINE SODIUM: CPT

## 2022-01-01 PROCEDURE — 51701 INSERT BLADDER CATHETER: CPT

## 2022-01-01 PROCEDURE — G8510 SCR DEP NEG, NO PLAN REQD: HCPCS | Performed by: INTERNAL MEDICINE

## 2022-01-01 PROCEDURE — 84484 ASSAY OF TROPONIN QUANT: CPT

## 2022-01-01 PROCEDURE — P9045 ALBUMIN (HUMAN), 5%, 250 ML: HCPCS | Performed by: INTERNAL MEDICINE

## 2022-01-01 PROCEDURE — 85025 COMPLETE CBC W/AUTO DIFF WBC: CPT

## 2022-01-01 PROCEDURE — 83036 HEMOGLOBIN GLYCOSYLATED A1C: CPT

## 2022-01-01 PROCEDURE — 83935 ASSAY OF URINE OSMOLALITY: CPT

## 2022-01-01 PROCEDURE — 74011250637 HC RX REV CODE- 250/637: Performed by: FAMILY MEDICINE

## 2022-01-01 PROCEDURE — 81001 URINALYSIS AUTO W/SCOPE: CPT

## 2022-01-01 PROCEDURE — 97530 THERAPEUTIC ACTIVITIES: CPT

## 2022-01-01 PROCEDURE — 83880 ASSAY OF NATRIURETIC PEPTIDE: CPT

## 2022-01-01 PROCEDURE — G8427 DOCREV CUR MEDS BY ELIG CLIN: HCPCS | Performed by: INTERNAL MEDICINE

## 2022-01-01 PROCEDURE — 97535 SELF CARE MNGMENT TRAINING: CPT

## 2022-01-01 PROCEDURE — G8417 CALC BMI ABV UP PARAM F/U: HCPCS | Performed by: INTERNAL MEDICINE

## 2022-01-01 PROCEDURE — 70450 CT HEAD/BRAIN W/O DYE: CPT

## 2022-01-01 PROCEDURE — 84443 ASSAY THYROID STIM HORMONE: CPT

## 2022-01-01 PROCEDURE — 99285 EMERGENCY DEPT VISIT HI MDM: CPT

## 2022-01-01 PROCEDURE — 36556 INSERT NON-TUNNEL CV CATH: CPT | Performed by: ANESTHESIOLOGY

## 2022-01-01 PROCEDURE — 74011000250 HC RX REV CODE- 250

## 2022-01-01 PROCEDURE — 83735 ASSAY OF MAGNESIUM: CPT

## 2022-01-01 PROCEDURE — 76770 US EXAM ABDO BACK WALL COMP: CPT

## 2022-01-01 PROCEDURE — 83605 ASSAY OF LACTIC ACID: CPT

## 2022-01-01 PROCEDURE — G0463 HOSPITAL OUTPT CLINIC VISIT: HCPCS | Performed by: INTERNAL MEDICINE

## 2022-01-01 PROCEDURE — 74011250636 HC RX REV CODE- 250/636: Performed by: STUDENT IN AN ORGANIZED HEALTH CARE EDUCATION/TRAINING PROGRAM

## 2022-01-01 PROCEDURE — 74011000250 HC RX REV CODE- 250: Performed by: EMERGENCY MEDICINE

## 2022-01-01 PROCEDURE — 99215 OFFICE O/P EST HI 40 MIN: CPT | Performed by: INTERNAL MEDICINE

## 2022-01-01 PROCEDURE — G8536 NO DOC ELDER MAL SCRN: HCPCS | Performed by: INTERNAL MEDICINE

## 2022-01-01 PROCEDURE — 65270000046 HC RM TELEMETRY

## 2022-01-01 PROCEDURE — 74011250637 HC RX REV CODE- 250/637: Performed by: STUDENT IN AN ORGANIZED HEALTH CARE EDUCATION/TRAINING PROGRAM

## 2022-01-01 PROCEDURE — 83930 ASSAY OF BLOOD OSMOLALITY: CPT

## 2022-01-01 PROCEDURE — 05HN33Z INSERTION OF INFUSION DEVICE INTO LEFT INTERNAL JUGULAR VEIN, PERCUTANEOUS APPROACH: ICD-10-PCS | Performed by: ANESTHESIOLOGY

## 2022-01-01 PROCEDURE — 93306 TTE W/DOPPLER COMPLETE: CPT

## 2022-01-01 PROCEDURE — 82570 ASSAY OF URINE CREATININE: CPT

## 2022-01-01 PROCEDURE — 71046 X-RAY EXAM CHEST 2 VIEWS: CPT

## 2022-01-01 PROCEDURE — 65270000029 HC RM PRIVATE

## 2022-01-01 PROCEDURE — 74011250636 HC RX REV CODE- 250/636: Performed by: HOSPITALIST

## 2022-01-01 PROCEDURE — 74011636637 HC RX REV CODE- 636/637: Performed by: STUDENT IN AN ORGANIZED HEALTH CARE EDUCATION/TRAINING PROGRAM

## 2022-01-01 PROCEDURE — 97162 PT EVAL MOD COMPLEX 30 MIN: CPT

## 2022-01-01 PROCEDURE — 74011000250 HC RX REV CODE- 250: Performed by: STUDENT IN AN ORGANIZED HEALTH CARE EDUCATION/TRAINING PROGRAM

## 2022-01-01 RX ORDER — METOPROLOL SUCCINATE 25 MG/1
25 TABLET, EXTENDED RELEASE ORAL DAILY
Status: DISCONTINUED | OUTPATIENT
Start: 2022-01-01 | End: 2022-01-01

## 2022-01-01 RX ORDER — DEXTROSE MONOHYDRATE 100 MG/ML
INJECTION, SOLUTION INTRAVENOUS
Status: DISPENSED
Start: 2022-01-01 | End: 2022-01-01

## 2022-01-01 RX ORDER — ACETAMINOPHEN 325 MG/1
650 TABLET ORAL
Status: DISCONTINUED | OUTPATIENT
Start: 2022-01-01 | End: 2022-01-01 | Stop reason: HOSPADM

## 2022-01-01 RX ORDER — CETIRIZINE HCL 10 MG
10 TABLET ORAL DAILY
Status: DISCONTINUED | OUTPATIENT
Start: 2022-01-01 | End: 2022-04-19 | Stop reason: HOSPADM

## 2022-01-01 RX ORDER — 3% SODIUM CHLORIDE 3 G/100ML
25 INJECTION, SOLUTION INTRAVENOUS CONTINUOUS
Status: DISPENSED | OUTPATIENT
Start: 2022-01-01 | End: 2022-01-01

## 2022-01-01 RX ORDER — HYDRALAZINE HYDROCHLORIDE 20 MG/ML
20 INJECTION INTRAMUSCULAR; INTRAVENOUS
Status: DISCONTINUED | OUTPATIENT
Start: 2022-01-01 | End: 2022-01-01 | Stop reason: HOSPADM

## 2022-01-01 RX ORDER — LISINOPRIL 10 MG/1
10 TABLET ORAL DAILY
Status: DISCONTINUED | OUTPATIENT
Start: 2022-01-01 | End: 2022-04-19 | Stop reason: HOSPADM

## 2022-01-01 RX ORDER — 3% SODIUM CHLORIDE 3 G/100ML
60 INJECTION, SOLUTION INTRAVENOUS CONTINUOUS
Status: DISCONTINUED | OUTPATIENT
Start: 2022-01-01 | End: 2022-01-01

## 2022-01-01 RX ORDER — GUAIFENESIN 100 MG/5ML
100 SOLUTION ORAL
COMMUNITY

## 2022-01-01 RX ORDER — 3% SODIUM CHLORIDE 3 G/100ML
40 INJECTION, SOLUTION INTRAVENOUS CONTINUOUS
Status: DISPENSED | OUTPATIENT
Start: 2022-01-01 | End: 2022-01-01

## 2022-01-01 RX ORDER — TOLVAPTAN 30 MG/1
30 TABLET ORAL ONCE
Status: COMPLETED | OUTPATIENT
Start: 2022-01-01 | End: 2022-01-01

## 2022-01-01 RX ORDER — SODIUM CHLORIDE 0.9 % (FLUSH) 0.9 %
5-40 SYRINGE (ML) INJECTION AS NEEDED
Status: DISCONTINUED | OUTPATIENT
Start: 2022-01-01 | End: 2022-01-01 | Stop reason: HOSPADM

## 2022-01-01 RX ORDER — FLUTICASONE PROPIONATE 50 MCG
1 SPRAY, SUSPENSION (ML) NASAL DAILY
Status: DISCONTINUED | OUTPATIENT
Start: 2022-04-19 | End: 2022-04-19 | Stop reason: HOSPADM

## 2022-01-01 RX ORDER — TOLVAPTAN 30 MG/1
30 TABLET ORAL DAILY
Status: DISCONTINUED | OUTPATIENT
Start: 2022-01-01 | End: 2022-04-19 | Stop reason: HOSPADM

## 2022-01-01 RX ORDER — FUROSEMIDE 10 MG/ML
20 INJECTION INTRAMUSCULAR; INTRAVENOUS ONCE
Status: COMPLETED | OUTPATIENT
Start: 2022-01-01 | End: 2022-01-01

## 2022-01-01 RX ORDER — ASPIRIN 81 MG/1
81 TABLET ORAL DAILY
Status: DISCONTINUED | OUTPATIENT
Start: 2022-01-01 | End: 2022-01-01

## 2022-01-01 RX ORDER — HYDROMORPHONE HYDROCHLORIDE 1 MG/ML
0.5 INJECTION, SOLUTION INTRAMUSCULAR; INTRAVENOUS; SUBCUTANEOUS ONCE
Status: ACTIVE | OUTPATIENT
Start: 2022-01-01 | End: 2022-01-01

## 2022-01-01 RX ORDER — DULOXETIN HYDROCHLORIDE 30 MG/1
30 CAPSULE, DELAYED RELEASE ORAL DAILY
COMMUNITY
End: 2022-01-01

## 2022-01-01 RX ORDER — ACETAMINOPHEN 325 MG/1
650 TABLET ORAL
Status: DISCONTINUED | OUTPATIENT
Start: 2022-01-01 | End: 2022-04-19 | Stop reason: HOSPADM

## 2022-01-01 RX ORDER — METOPROLOL SUCCINATE 25 MG/1
25 TABLET, EXTENDED RELEASE ORAL DAILY
Status: DISCONTINUED | OUTPATIENT
Start: 2022-01-01 | End: 2022-01-01 | Stop reason: HOSPADM

## 2022-01-01 RX ORDER — INSULIN LISPRO 100 [IU]/ML
INJECTION, SOLUTION INTRAVENOUS; SUBCUTANEOUS
Status: DISCONTINUED | OUTPATIENT
Start: 2022-01-01 | End: 2022-01-01 | Stop reason: HOSPADM

## 2022-01-01 RX ORDER — BACITRACIN 500 UNIT/G
PACKET (EA) TOPICAL
Status: COMPLETED
Start: 2022-01-01 | End: 2022-01-01

## 2022-01-01 RX ORDER — METFORMIN HYDROCHLORIDE 500 MG/1
500 TABLET ORAL 2 TIMES DAILY WITH MEALS
COMMUNITY

## 2022-01-01 RX ORDER — POLYETHYLENE GLYCOL 400 AND PROPYLENE GLYCOL 4; 3 MG/ML; MG/ML
1 GEL OPHTHALMIC
COMMUNITY

## 2022-01-01 RX ORDER — MAGNESIUM SULFATE 100 %
4 CRYSTALS MISCELLANEOUS AS NEEDED
Status: DISCONTINUED | OUTPATIENT
Start: 2022-01-01 | End: 2022-01-01 | Stop reason: HOSPADM

## 2022-01-01 RX ORDER — ACETAMINOPHEN 325 MG/1
650 TABLET ORAL
Status: DISCONTINUED | OUTPATIENT
Start: 2022-01-01 | End: 2022-01-01 | Stop reason: SDUPTHER

## 2022-01-01 RX ORDER — ALBUMIN HUMAN 50 G/1000ML
25 SOLUTION INTRAVENOUS ONCE
Status: COMPLETED | OUTPATIENT
Start: 2022-01-01 | End: 2022-01-01

## 2022-01-01 RX ORDER — GUAIFENESIN 100 MG/5ML
100 SOLUTION ORAL
Status: DISCONTINUED | OUTPATIENT
Start: 2022-01-01 | End: 2022-04-19 | Stop reason: HOSPADM

## 2022-01-01 RX ORDER — AMLODIPINE BESYLATE 5 MG/1
5 TABLET ORAL DAILY
Qty: 30 TABLET | Refills: 0 | Status: SHIPPED | OUTPATIENT
Start: 2022-01-01 | End: 2022-01-01

## 2022-01-01 RX ORDER — NEOMYCIN SULFATE, POLYMYXIN B SULFATE AND DEXAMETHASONE 3.5; 10000; 1 MG/ML; [USP'U]/ML; MG/ML
1 SUSPENSION/ DROPS OPHTHALMIC 4 TIMES DAILY
Status: DISCONTINUED | OUTPATIENT
Start: 2022-01-01 | End: 2022-01-01 | Stop reason: HOSPADM

## 2022-01-01 RX ORDER — IPRATROPIUM BROMIDE AND ALBUTEROL SULFATE 2.5; .5 MG/3ML; MG/3ML
3 SOLUTION RESPIRATORY (INHALATION)
Status: DISCONTINUED | OUTPATIENT
Start: 2022-01-01 | End: 2022-04-19 | Stop reason: HOSPADM

## 2022-01-01 RX ORDER — FUROSEMIDE 20 MG/1
20 TABLET ORAL DAILY
Status: DISCONTINUED | OUTPATIENT
Start: 2022-01-01 | End: 2022-04-19 | Stop reason: HOSPADM

## 2022-01-01 RX ORDER — 3% SODIUM CHLORIDE 3 G/100ML
30 INJECTION, SOLUTION INTRAVENOUS CONTINUOUS
Status: DISPENSED | OUTPATIENT
Start: 2022-01-01 | End: 2022-01-01

## 2022-01-01 RX ORDER — IPRATROPIUM BROMIDE AND ALBUTEROL SULFATE 2.5; .5 MG/3ML; MG/3ML
3 SOLUTION RESPIRATORY (INHALATION)
Status: DISCONTINUED | OUTPATIENT
Start: 2022-01-01 | End: 2022-01-01

## 2022-01-01 RX ORDER — AMLODIPINE BESYLATE 5 MG/1
5 TABLET ORAL DAILY
Status: DISCONTINUED | OUTPATIENT
Start: 2022-01-01 | End: 2022-01-01 | Stop reason: HOSPADM

## 2022-01-01 RX ORDER — LISINOPRIL 10 MG/1
10 TABLET ORAL DAILY
COMMUNITY

## 2022-01-01 RX ORDER — ALBUTEROL SULFATE 0.83 MG/ML
2.5 SOLUTION RESPIRATORY (INHALATION)
Status: DISCONTINUED | OUTPATIENT
Start: 2022-01-01 | End: 2022-04-19 | Stop reason: HOSPADM

## 2022-01-01 RX ORDER — SODIUM CHLORIDE 0.9 % (FLUSH) 0.9 %
5-40 SYRINGE (ML) INJECTION EVERY 8 HOURS
Status: DISCONTINUED | OUTPATIENT
Start: 2022-01-01 | End: 2022-01-01 | Stop reason: HOSPADM

## 2022-01-01 RX ORDER — TOLVAPTAN 30 MG/1
30 TABLET ORAL
Qty: 12 TABLET | Refills: 0 | Status: SHIPPED
Start: 2022-01-01 | End: 2022-01-01

## 2022-01-01 RX ORDER — FUROSEMIDE 10 MG/ML
60 INJECTION INTRAMUSCULAR; INTRAVENOUS ONCE
Status: COMPLETED | OUTPATIENT
Start: 2022-01-01 | End: 2022-01-01

## 2022-01-01 RX ORDER — SULFAMETHOXAZOLE AND TRIMETHOPRIM 400; 80 MG/1; MG/1
1 TABLET ORAL 2 TIMES DAILY
COMMUNITY
Start: 2022-01-01 | End: 2022-01-01

## 2022-01-01 RX ORDER — DICLOFENAC SODIUM 10 MG/G
GEL TOPICAL 4 TIMES DAILY
COMMUNITY

## 2022-01-01 RX ORDER — TOLVAPTAN 30 MG/1
30 TABLET ORAL DAILY
COMMUNITY

## 2022-01-01 RX ORDER — DIPHENHYDRAMINE HYDROCHLORIDE 50 MG/ML
25 INJECTION, SOLUTION INTRAMUSCULAR; INTRAVENOUS ONCE
Status: COMPLETED | OUTPATIENT
Start: 2022-01-01 | End: 2022-01-01

## 2022-01-01 RX ORDER — ACETAMINOPHEN 325 MG/1
650 TABLET ORAL
COMMUNITY

## 2022-01-01 RX ORDER — HEPARIN SODIUM 5000 [USP'U]/ML
5000 INJECTION, SOLUTION INTRAVENOUS; SUBCUTANEOUS EVERY 8 HOURS
Status: DISCONTINUED | OUTPATIENT
Start: 2022-01-01 | End: 2022-01-01 | Stop reason: HOSPADM

## 2022-01-01 RX ORDER — FUROSEMIDE 40 MG/1
40 TABLET ORAL DAILY
COMMUNITY
End: 2022-01-01

## 2022-01-01 RX ORDER — FUROSEMIDE 20 MG/1
20 TABLET ORAL DAILY
COMMUNITY

## 2022-01-01 RX ADMIN — SODIUM CHLORIDE, PRESERVATIVE FREE 10 ML: 5 INJECTION INTRAVENOUS at 21:30

## 2022-01-01 RX ADMIN — NEOMYCIN SULFATE, POLYMYXIN B SULFATE AND DEXAMETHASONE 1 DROP: 3.5; 10000; 1 SUSPENSION/ DROPS OPHTHALMIC at 21:29

## 2022-01-01 RX ADMIN — HEPARIN SODIUM 5000 UNITS: 5000 INJECTION INTRAVENOUS; SUBCUTANEOUS at 06:51

## 2022-01-01 RX ADMIN — SODIUM CHLORIDE, PRESERVATIVE FREE 10 ML: 5 INJECTION INTRAVENOUS at 23:00

## 2022-01-01 RX ADMIN — SODIUM CHLORIDE 25 ML/HR: 3 INJECTION, SOLUTION INTRAVENOUS at 14:51

## 2022-01-01 RX ADMIN — TOLVAPTAN 30 MG: 30 TABLET ORAL at 09:36

## 2022-01-01 RX ADMIN — HEPARIN SODIUM 5000 UNITS: 5000 INJECTION INTRAVENOUS; SUBCUTANEOUS at 06:29

## 2022-01-01 RX ADMIN — Medication 15 MG: at 13:04

## 2022-01-01 RX ADMIN — Medication 2 UNITS: at 12:10

## 2022-01-01 RX ADMIN — AMLODIPINE BESYLATE 5 MG: 5 TABLET ORAL at 08:35

## 2022-01-01 RX ADMIN — SODIUM CHLORIDE, PRESERVATIVE FREE 10 ML: 5 INJECTION INTRAVENOUS at 14:36

## 2022-01-01 RX ADMIN — SODIUM CHLORIDE, PRESERVATIVE FREE 10 ML: 5 INJECTION INTRAVENOUS at 05:22

## 2022-01-01 RX ADMIN — HEPARIN SODIUM 5000 UNITS: 5000 INJECTION INTRAVENOUS; SUBCUTANEOUS at 06:59

## 2022-01-01 RX ADMIN — HEPARIN SODIUM 5000 UNITS: 5000 INJECTION INTRAVENOUS; SUBCUTANEOUS at 21:12

## 2022-01-01 RX ADMIN — SODIUM CHLORIDE, PRESERVATIVE FREE 10 ML: 5 INJECTION INTRAVENOUS at 13:06

## 2022-01-01 RX ADMIN — SODIUM CHLORIDE 60 ML/HR: 3 INJECTION, SOLUTION INTRAVENOUS at 15:26

## 2022-01-01 RX ADMIN — HEPARIN SODIUM 5000 UNITS: 5000 INJECTION INTRAVENOUS; SUBCUTANEOUS at 06:52

## 2022-01-01 RX ADMIN — SODIUM CHLORIDE 40 ML/HR: 3 INJECTION, SOLUTION INTRAVENOUS at 08:30

## 2022-01-01 RX ADMIN — SODIUM CHLORIDE, PRESERVATIVE FREE 10 ML: 5 INJECTION INTRAVENOUS at 22:43

## 2022-01-01 RX ADMIN — HEPARIN SODIUM 5000 UNITS: 5000 INJECTION INTRAVENOUS; SUBCUTANEOUS at 22:43

## 2022-01-01 RX ADMIN — SODIUM CHLORIDE, PRESERVATIVE FREE 10 ML: 5 INJECTION INTRAVENOUS at 06:59

## 2022-01-01 RX ADMIN — METOPROLOL SUCCINATE 25 MG: 25 TABLET, EXTENDED RELEASE ORAL at 08:35

## 2022-01-01 RX ADMIN — Medication 2 UNITS: at 16:24

## 2022-01-01 RX ADMIN — NEOMYCIN SULFATE, POLYMYXIN B SULFATE AND DEXAMETHASONE 1 DROP: 3.5; 10000; 1 SUSPENSION/ DROPS OPHTHALMIC at 21:08

## 2022-01-01 RX ADMIN — SODIUM CHLORIDE, PRESERVATIVE FREE 10 ML: 5 INJECTION INTRAVENOUS at 06:54

## 2022-01-01 RX ADMIN — DEXTROSE 250 ML: 10 SOLUTION INTRAVENOUS at 02:38

## 2022-01-01 RX ADMIN — AMLODIPINE BESYLATE 5 MG: 5 TABLET ORAL at 09:13

## 2022-01-01 RX ADMIN — HEPARIN SODIUM 5000 UNITS: 5000 INJECTION INTRAVENOUS; SUBCUTANEOUS at 05:22

## 2022-01-01 RX ADMIN — Medication 1 UNITS: at 22:43

## 2022-01-01 RX ADMIN — HEPARIN SODIUM 5000 UNITS: 5000 INJECTION INTRAVENOUS; SUBCUTANEOUS at 21:01

## 2022-01-01 RX ADMIN — SODIUM CHLORIDE, PRESERVATIVE FREE 10 ML: 5 INJECTION INTRAVENOUS at 05:55

## 2022-01-01 RX ADMIN — Medication 15 MG: at 11:05

## 2022-01-01 RX ADMIN — HEPARIN SODIUM 5000 UNITS: 5000 INJECTION INTRAVENOUS; SUBCUTANEOUS at 14:32

## 2022-01-01 RX ADMIN — SODIUM CHLORIDE, PRESERVATIVE FREE 10 ML: 5 INJECTION INTRAVENOUS at 05:04

## 2022-01-01 RX ADMIN — SODIUM CHLORIDE, PRESERVATIVE FREE 10 ML: 5 INJECTION INTRAVENOUS at 07:22

## 2022-01-01 RX ADMIN — ACETAMINOPHEN 650 MG: 325 TABLET ORAL at 06:43

## 2022-01-01 RX ADMIN — NEOMYCIN SULFATE, POLYMYXIN B SULFATE AND DEXAMETHASONE 1 DROP: 3.5; 10000; 1 SUSPENSION/ DROPS OPHTHALMIC at 18:00

## 2022-01-01 RX ADMIN — SODIUM CHLORIDE, PRESERVATIVE FREE 10 ML: 5 INJECTION INTRAVENOUS at 13:17

## 2022-01-01 RX ADMIN — AMLODIPINE BESYLATE 5 MG: 5 TABLET ORAL at 18:21

## 2022-01-01 RX ADMIN — HEPARIN SODIUM 5000 UNITS: 5000 INJECTION INTRAVENOUS; SUBCUTANEOUS at 05:05

## 2022-01-01 RX ADMIN — NEOMYCIN SULFATE, POLYMYXIN B SULFATE AND DEXAMETHASONE 1 DROP: 3.5; 10000; 1 SUSPENSION/ DROPS OPHTHALMIC at 12:10

## 2022-01-01 RX ADMIN — ACETAMINOPHEN 650 MG: 325 TABLET ORAL at 22:26

## 2022-01-01 RX ADMIN — TOLVAPTAN 30 MG: 30 TABLET ORAL at 09:39

## 2022-01-01 RX ADMIN — HEPARIN SODIUM 5000 UNITS: 5000 INJECTION INTRAVENOUS; SUBCUTANEOUS at 21:29

## 2022-01-01 RX ADMIN — NEOMYCIN SULFATE, POLYMYXIN B SULFATE AND DEXAMETHASONE 1 DROP: 3.5; 10000; 1 SUSPENSION/ DROPS OPHTHALMIC at 08:55

## 2022-01-01 RX ADMIN — Medication 2 UNITS: at 18:25

## 2022-01-01 RX ADMIN — HYDRALAZINE HYDROCHLORIDE 20 MG: 20 INJECTION INTRAMUSCULAR; INTRAVENOUS at 10:08

## 2022-01-01 RX ADMIN — SODIUM CHLORIDE, PRESERVATIVE FREE 10 ML: 5 INJECTION INTRAVENOUS at 06:29

## 2022-01-01 RX ADMIN — HEPARIN SODIUM 5000 UNITS: 5000 INJECTION INTRAVENOUS; SUBCUTANEOUS at 13:16

## 2022-01-01 RX ADMIN — Medication 2 UNITS: at 17:29

## 2022-01-01 RX ADMIN — HEPARIN SODIUM 5000 UNITS: 5000 INJECTION INTRAVENOUS; SUBCUTANEOUS at 14:35

## 2022-01-01 RX ADMIN — NEOMYCIN SULFATE, POLYMYXIN B SULFATE AND DEXAMETHASONE 1 DROP: 3.5; 10000; 1 SUSPENSION/ DROPS OPHTHALMIC at 17:49

## 2022-01-01 RX ADMIN — NEOMYCIN SULFATE, POLYMYXIN B SULFATE AND DEXAMETHASONE 1 DROP: 3.5; 10000; 1 SUSPENSION/ DROPS OPHTHALMIC at 08:39

## 2022-01-01 RX ADMIN — SODIUM CHLORIDE, PRESERVATIVE FREE 10 ML: 5 INJECTION INTRAVENOUS at 14:35

## 2022-01-01 RX ADMIN — SODIUM CHLORIDE, PRESERVATIVE FREE 10 ML: 5 INJECTION INTRAVENOUS at 14:00

## 2022-01-01 RX ADMIN — HYDRALAZINE HYDROCHLORIDE 20 MG: 20 INJECTION INTRAMUSCULAR; INTRAVENOUS at 18:38

## 2022-01-01 RX ADMIN — ALBUMIN (HUMAN) 25 G: 12.5 INJECTION, SOLUTION INTRAVENOUS at 14:07

## 2022-01-01 RX ADMIN — ACETAMINOPHEN 650 MG: 325 TABLET ORAL at 21:01

## 2022-01-01 RX ADMIN — SODIUM CHLORIDE 25 ML/HR: 3 INJECTION, SOLUTION INTRAVENOUS at 09:00

## 2022-01-01 RX ADMIN — SODIUM CHLORIDE, PRESERVATIVE FREE 10 ML: 5 INJECTION INTRAVENOUS at 06:53

## 2022-01-01 RX ADMIN — Medication 10 UNITS: at 01:20

## 2022-01-01 RX ADMIN — SODIUM CHLORIDE 30 ML/HR: 3 INJECTION, SOLUTION INTRAVENOUS at 18:16

## 2022-01-01 RX ADMIN — ACETAMINOPHEN 650 MG: 325 TABLET ORAL at 02:52

## 2022-01-01 RX ADMIN — NEOMYCIN SULFATE, POLYMYXIN B SULFATE AND DEXAMETHASONE 1 DROP: 3.5; 10000; 1 SUSPENSION/ DROPS OPHTHALMIC at 21:12

## 2022-01-01 RX ADMIN — NEOMYCIN SULFATE, POLYMYXIN B SULFATE AND DEXAMETHASONE 1 DROP: 3.5; 10000; 1 SUSPENSION/ DROPS OPHTHALMIC at 18:13

## 2022-01-01 RX ADMIN — SODIUM CHLORIDE, PRESERVATIVE FREE 10 ML: 5 INJECTION INTRAVENOUS at 00:00

## 2022-01-01 RX ADMIN — HYDRALAZINE HYDROCHLORIDE 20 MG: 20 INJECTION INTRAMUSCULAR; INTRAVENOUS at 06:43

## 2022-01-01 RX ADMIN — NEOMYCIN SULFATE, POLYMYXIN B SULFATE AND DEXAMETHASONE 1 DROP: 3.5; 10000; 1 SUSPENSION/ DROPS OPHTHALMIC at 17:05

## 2022-01-01 RX ADMIN — DEXTROSE MONOHYDRATE 250 ML: 10 INJECTION, SOLUTION INTRAVENOUS at 00:37

## 2022-01-01 RX ADMIN — METOPROLOL SUCCINATE 25 MG: 25 TABLET, EXTENDED RELEASE ORAL at 09:00

## 2022-01-01 RX ADMIN — HEPARIN SODIUM 5000 UNITS: 5000 INJECTION INTRAVENOUS; SUBCUTANEOUS at 14:05

## 2022-01-01 RX ADMIN — METOPROLOL SUCCINATE 25 MG: 25 TABLET, EXTENDED RELEASE ORAL at 13:53

## 2022-01-01 RX ADMIN — SODIUM CHLORIDE, PRESERVATIVE FREE 10 ML: 5 INJECTION INTRAVENOUS at 13:20

## 2022-01-01 RX ADMIN — AZITHROMYCIN MONOHYDRATE 500 MG: 500 INJECTION, POWDER, LYOPHILIZED, FOR SOLUTION INTRAVENOUS at 10:50

## 2022-01-01 RX ADMIN — SODIUM ZIRCONIUM CYCLOSILICATE 15 G: 10 POWDER, FOR SUSPENSION ORAL at 00:43

## 2022-01-01 RX ADMIN — AMLODIPINE BESYLATE 5 MG: 5 TABLET ORAL at 08:30

## 2022-01-01 RX ADMIN — SODIUM CHLORIDE, PRESERVATIVE FREE 10 ML: 5 INJECTION INTRAVENOUS at 23:07

## 2022-01-01 RX ADMIN — SODIUM CHLORIDE, PRESERVATIVE FREE 10 ML: 5 INJECTION INTRAVENOUS at 21:07

## 2022-01-01 RX ADMIN — HEPARIN SODIUM 5000 UNITS: 5000 INJECTION INTRAVENOUS; SUBCUTANEOUS at 22:09

## 2022-01-01 RX ADMIN — CEFTRIAXONE SODIUM 1 G: 1 INJECTION, POWDER, FOR SOLUTION INTRAMUSCULAR; INTRAVENOUS at 09:13

## 2022-01-01 RX ADMIN — NEOMYCIN SULFATE, POLYMYXIN B SULFATE AND DEXAMETHASONE 1 DROP: 3.5; 10000; 1 SUSPENSION/ DROPS OPHTHALMIC at 13:52

## 2022-01-01 RX ADMIN — DIPHENHYDRAMINE HYDROCHLORIDE 25 MG: 50 INJECTION INTRAMUSCULAR; INTRAVENOUS at 02:41

## 2022-01-01 RX ADMIN — SODIUM CHLORIDE, PRESERVATIVE FREE 10 ML: 5 INJECTION INTRAVENOUS at 15:08

## 2022-01-01 RX ADMIN — NEOMYCIN SULFATE, POLYMYXIN B SULFATE AND DEXAMETHASONE 1 DROP: 3.5; 10000; 1 SUSPENSION/ DROPS OPHTHALMIC at 22:54

## 2022-01-01 RX ADMIN — HEPARIN SODIUM 5000 UNITS: 5000 INJECTION INTRAVENOUS; SUBCUTANEOUS at 21:06

## 2022-01-01 RX ADMIN — HEPARIN SODIUM 5000 UNITS: 5000 INJECTION INTRAVENOUS; SUBCUTANEOUS at 13:52

## 2022-01-01 RX ADMIN — SODIUM CHLORIDE, PRESERVATIVE FREE 10 ML: 5 INJECTION INTRAVENOUS at 21:12

## 2022-01-01 RX ADMIN — TOLVAPTAN 30 MG: 30 TABLET ORAL at 08:31

## 2022-01-01 RX ADMIN — NEOMYCIN SULFATE, POLYMYXIN B SULFATE AND DEXAMETHASONE 1 DROP: 3.5; 10000; 1 SUSPENSION/ DROPS OPHTHALMIC at 18:25

## 2022-01-01 RX ADMIN — HEPARIN SODIUM 5000 UNITS: 5000 INJECTION INTRAVENOUS; SUBCUTANEOUS at 14:31

## 2022-01-01 RX ADMIN — HEPARIN SODIUM 5000 UNITS: 5000 INJECTION INTRAVENOUS; SUBCUTANEOUS at 13:01

## 2022-01-01 RX ADMIN — AMLODIPINE BESYLATE 5 MG: 5 TABLET ORAL at 08:54

## 2022-01-01 RX ADMIN — HEPARIN SODIUM 5000 UNITS: 5000 INJECTION INTRAVENOUS; SUBCUTANEOUS at 06:43

## 2022-01-01 RX ADMIN — NEOMYCIN SULFATE, POLYMYXIN B SULFATE AND DEXAMETHASONE 1 DROP: 3.5; 10000; 1 SUSPENSION/ DROPS OPHTHALMIC at 09:16

## 2022-01-01 RX ADMIN — NEOMYCIN SULFATE, POLYMYXIN B SULFATE AND DEXAMETHASONE 1 DROP: 3.5; 10000; 1 SUSPENSION/ DROPS OPHTHALMIC at 13:57

## 2022-01-01 RX ADMIN — SODIUM CHLORIDE, PRESERVATIVE FREE 20 MG: 5 INJECTION INTRAVENOUS at 00:34

## 2022-01-01 RX ADMIN — NEOMYCIN SULFATE, POLYMYXIN B SULFATE AND DEXAMETHASONE 1 DROP: 3.5; 10000; 1 SUSPENSION/ DROPS OPHTHALMIC at 09:39

## 2022-01-01 RX ADMIN — NEOMYCIN SULFATE, POLYMYXIN B SULFATE AND DEXAMETHASONE 1 DROP: 3.5; 10000; 1 SUSPENSION/ DROPS OPHTHALMIC at 12:49

## 2022-01-01 RX ADMIN — NEOMYCIN SULFATE, POLYMYXIN B SULFATE AND DEXAMETHASONE 1 DROP: 3.5; 10000; 1 SUSPENSION/ DROPS OPHTHALMIC at 12:59

## 2022-01-01 RX ADMIN — BACITRACIN 1 PACKET: 500 OINTMENT TOPICAL at 16:43

## 2022-01-01 RX ADMIN — METOPROLOL SUCCINATE 25 MG: 25 TABLET, EXTENDED RELEASE ORAL at 08:30

## 2022-01-01 RX ADMIN — NEOMYCIN SULFATE, POLYMYXIN B SULFATE AND DEXAMETHASONE 1 DROP: 3.5; 10000; 1 SUSPENSION/ DROPS OPHTHALMIC at 14:31

## 2022-01-01 RX ADMIN — HEPARIN SODIUM 5000 UNITS: 5000 INJECTION INTRAVENOUS; SUBCUTANEOUS at 22:54

## 2022-01-01 RX ADMIN — METOPROLOL SUCCINATE 25 MG: 25 TABLET, EXTENDED RELEASE ORAL at 08:31

## 2022-01-01 RX ADMIN — Medication 10 ML: at 16:29

## 2022-01-01 RX ADMIN — NEOMYCIN SULFATE, POLYMYXIN B SULFATE AND DEXAMETHASONE 1 DROP: 3.5; 10000; 1 SUSPENSION/ DROPS OPHTHALMIC at 21:30

## 2022-01-01 RX ADMIN — FUROSEMIDE 60 MG: 10 INJECTION, SOLUTION INTRAVENOUS at 02:43

## 2022-01-01 RX ADMIN — FUROSEMIDE 20 MG: 10 INJECTION, SOLUTION INTRAMUSCULAR; INTRAVENOUS at 11:57

## 2022-01-13 NOTE — PROGRESS NOTES
Transition of care outreach postponed for 14 days due to patient's discharge to SNF.   D/C to [place] 12/29/21 still admitted f/u 14d

## 2022-01-20 NOTE — TELEPHONE ENCOUNTER
Pt has been at [facility] since being discharged from hospital. Called her to see how she was doing? Pt is very unhappy there - she states her care is poor. She would like to see about being moved to a different facility. She feels she is weakening. Advised MD of my call with pt. He asked me to forward message to Elza to see if there was anything we could do to help her? Will forward.

## 2022-01-21 NOTE — PROGRESS NOTES
At request of ACS, attempted to reach patient at [facility] to discuss her progress. Phone just rang and then disconnected. Will attempt to reach patient again. Will attempt to reach patient again.

## 2022-01-24 NOTE — PROGRESS NOTES
Patient current in [facility] for PT. She is in room 123B. Called today and phone just rang. Will attempt to reach patient again. Reached patient who expressed her unhappiness with the facility she is currently. She would like to transfer to another facility. Patient states she sister and niece have been looking around but have not found availability. Ms. Debbie Paula states today she was told she may be discharged but will need 24 hour care. Reports she can not walk at this time. Discussed possibility of private pay caregivers should she be discharged and need that level of care. Patient has not spoken with SW at [facility] about her concerns. This writer called Ne Zavala,  at [facility] and [place] requesting return call.

## 2022-01-31 NOTE — TELEPHONE ENCOUNTER
Spoke with pt - she has appt on 2/14/22 with MD. She is still in [facility]. Wanted to know if she should cancel appt now. Advised her to hold on until few days before appt just in case she is discharged home from facility. She will need to be seen once discharged. Pt states she is very unhappy there. Since her hospital stay she has declined terribly. She was able to walk by herself - now can not. Uses a walker but still needs someone with her. Her hearing and vision has declined. She is depressed by all the changes in her life.  Will forward to MD.

## 2022-01-31 NOTE — TELEPHONE ENCOUNTER
Reason for call:  Pt is requesting a call back regarding her appt with dr Danielle Gumzan in feb.   She says that she is in a facility and would like to duscuss when she may be getting out    Is this a new problem: yes     Date of last appointment:  12/16/2021     Can we respond via [employer]: no    Best call back number:   Jason Harper (Self) [phone] (H)

## 2022-02-07 NOTE — PROGRESS NOTES
Called and left 2nd message for PALLAVI Guerrier at [facility]. Called main line to [facility] and staff provided patient's room # [phone]. Called and phone just rang. Will attempt to reach patient again.

## 2022-02-10 NOTE — PROGRESS NOTES
Patient has graduated from the Complex Case Management  program on 2/10/22. Patient is currently in SNF, unknown discharge date. Patient has Ambulatory Care Manager's contact information for any further questions, concerns, or needs.   Patients upcoming visits:    Future Appointments   Date Time Provider Tristan Beck   3/3/2022 11:30 AM MD DRE Mckeon AMB

## 2022-02-14 NOTE — TELEPHONE ENCOUNTER
Reason for call: The pt is in the [address] and was given a new medication and she said she is not sure what it was.  She said it almost killed her to call her but she said she does not no the number there but you may have it    Is this a new problem: yes     Date of last appointment:  12/16/2021     Can we respond via [employer]: no    Best call back number:

## 2022-02-15 NOTE — TELEPHONE ENCOUNTER
Spoke with pt - states she is \"under a lot of pressure there at rehab. \" She feels sick all over, no energy no appetite and shaky all over. I asked her about new medication she mentioned in message. She states she did not know what it was. She was not in room when they brought it in. Does not know what it is for. She feels if this continues she will end up in hospital with her BP elevated. Tried to call nurses station 3 times to talk with someone caring for pt - no answer. Wanted to see if pt was started on new medication and if they would check on pt and check her BP.  Will forward to MD.

## 2022-02-18 NOTE — CONSULTS
[facility]   [address] [facility], [address] [facility]  Phone: [phone]   [phone] NOTE     Patient: Anne Brantley MRN: [id number]  PCP: Suman Trujillo MD   :     1922  Age:   80 y.o. Sex:  female      Referring physician: Arthur Hagen MD  Reason for consultation: 80 y.o. female with Hyponatremia [E87.1]   Admission Date: 2022  8:46 AM  LOS: 0 days      ASSESSMENT and PLAN :   Recurrent Hyponatremia   - Known to have SIADH   - Exacerbation this time is likely from UTI, Cymbalta and Lasix use >> one dose of Bactrim   - One dose of Bactrim could have further contributed to worsening of already low Na   - 3% saline - 100 cc now. Avoiding Ponce Protocol   - Goal Na : not to exceed 121 by tomorrow am   - Q6 hr labs and urine chems pending     Chronic A fib  Pulm HTN   Severe TR, Mild MR  JOSE    Care Plan discussed with:  Pt and ER staff        Thank you for consulting [facility] in the care of your patient. Subjective:   HPI: Anne Brantley is a 80 y.o.   female who has been admitted to the hospital for worsening MS and found to have Na of 113 and we were asked to see her   She is known to us from prior admission in Dec w/ similar problem that was caused by Bactrim and diuretics   She was started on Cymbalta on , Lasix on 2/15 and Bactrim on    She is confused and this is not her baseline    Past Medical Hx:   Past Medical History:   Diagnosis Date    Anxiety     Bleeding nose     Chronic atrial fibrillation ([address]     Diabetes ([address]     Hypertension         Past Surgical Hx:     Past Surgical History:   Procedure Laterality Date    HX CATARACT REMOVAL      HX CHOLECYSTECTOMY      HX HEENT      myringotomy    HX HEENT  2016    basal cell removal -forehead    HX TONSILLECTOMY      childhood    HX TYMPANOSTOMY  2016    left ear    HX TYMPANOSTOMY      bilateral Allergies   Allergen Reactions    Amoxicillin Other (comments)     \"makes me feel like I\"m going to faint\"    Aspirin Palpitations     Per patient, no allergy    Gabapentin Drowsiness     * pt states this med makes her feel very drowsy , gives her a drunk feeling.  Levaquin [Levofloxacin] Hives and Rash    Lorazepam Other (comments)     Feeling faint.  Losartan Other (comments)     Tongue swelling      Lyrica [Pregabalin] Rash    Other Medication Other (comments)     Feeling faint, does not decrease pain.  Sertraline Other (comments)     Feeling faint. Social Hx:  reports that she quit smoking about 28 years ago. She has a 10.00 pack-year smoking history. She has never used smokeless tobacco. She reports current alcohol use. She reports that she does not use drugs. No family history on file. Review of Systems:  Unable to perform due to AMS      Objective:    Vitals:    Vitals:    02/18/22 1000 02/18/22 1200 02/18/22 1300 02/18/22 1400   BP: (!) 183/100 (!) 169/97 (!) 166/88 (!) 167/89   Pulse: 65 68 71 67   Resp: 15 14 13 16   Temp:       SpO2: 99% 95% 95% 96%   Weight:       Height:         I&O's:  No intake/output data recorded.   Visit Vitals  BP (!) 167/89   Pulse 67   Temp 97.9 °F (36.6 °C)   Resp 16   Ht 4' 11\" (1.499 m)   Wt 81.2 kg (179 lb)   SpO2 96%   BMI 36.15 kg/m²       Physical Exam:  General:  Appears stated age  [de-identified]: PERRL,  No Pallor , No Icterus  Neck: Supple,no mass palpable  Lungs : CTA  CVS: RRR, S1 S2 normal, systolic murmur   Abdomen: Soft, NT, BS +  Extremities: trace Edema  Skin: hyperemia of feet   MS: arthritis   Neurologic: confused   Psych:  Unable to assess    Laboratory Results:    Recent Labs     02/18/22  0900   *   K 4.1   CL 79*   CO2 23   *   BUN 11   CREA 0.60   CA 8.7   MG 1.8   ALB 3.3*   ALT 45     Recent Labs     02/18/22  0900   WBC 12.4*   HGB 10.6*   HCT 29.4*        No results found for: SDES  Lab Results Component Value Date/Time    Culture result: NO GROWTH 5 DAYS 12/20/2021 12:30 AM     Recent Results (from the past 24 hour(s))   EKG, 12 LEAD, INITIAL    Collection Time: 02/18/22  8:54 AM   Result Value Ref Range    Ventricular Rate 73 BPM    QRS Duration 154 ms    Q-T Interval 472 ms    QTC Calculation (Bezet) 519 ms    Calculated R Axis 77 degrees    Calculated T Axis -15 degrees    Diagnosis       Atrial fibrillation with a competing junctional pacemaker  Right bundle branch block  Abnormal ECG  When compared with ECG of 19-DEC-2021 23:10,  Nonspecific T wave abnormality now evident in Lateral leads     SAMPLES BEING HELD    Collection Time: 02/18/22  9:00 AM   Result Value Ref Range    SAMPLES BEING HELD 1red 1blu     COMMENT        Add-on orders for these samples will be processed based on acceptable specimen integrity and analyte stability, which may vary by analyte. CBC WITH AUTOMATED DIFF    Collection Time: 02/18/22  9:00 AM   Result Value Ref Range    WBC 12.4 (H) 3.6 - 11.0 K/uL    RBC 3.53 (L) 3.80 - 5.20 M/uL    HGB 10.6 (L) 11.5 - 16.0 g/dL    HCT 29.4 (L) 35.0 - 47.0 %    MCV 83.3 80.0 - 99.0 FL    MCH 30.0 26.0 - 34.0 PG    MCHC 36.1 30.0 - 36.5 g/dL    RDW 15.3 (H) 11.5 - 14.5 %    PLATELET 413 897 - 366 K/uL    MPV 9.2 8.9 - 12.9 FL    NRBC 0.0 0  WBC    ABSOLUTE NRBC 0.00 0.00 - 0.01 K/uL    NEUTROPHILS 84 (H) 32 - 75 %    LYMPHOCYTES 7 (L) 12 - 49 %    MONOCYTES 8 5 - 13 %    EOSINOPHILS 0 0 - 7 %    BASOPHILS 0 0 - 1 %    IMMATURE GRANULOCYTES 1 (H) 0.0 - 0.5 %    ABS. NEUTROPHILS 10.3 (H) 1.8 - 8.0 K/UL    ABS. LYMPHOCYTES 0.9 0.8 - 3.5 K/UL    ABS. MONOCYTES 1.0 0.0 - 1.0 K/UL    ABS. EOSINOPHILS 0.0 0.0 - 0.4 K/UL    ABS. BASOPHILS 0.0 0.0 - 0.1 K/UL    ABS. IMM.  GRANS. 0.1 (H) 0.00 - 0.04 K/UL    DF AUTOMATED     METABOLIC PANEL, COMPREHENSIVE    Collection Time: 02/18/22  9:00 AM   Result Value Ref Range    Sodium 112 (LL) 136 - 145 mmol/L    Potassium 4.1 3.5 - 5.1 mmol/L Chloride 79 (L) 97 - 108 mmol/L    CO2 23 21 - 32 mmol/L    Anion gap 10 5 - 15 mmol/L    Glucose 105 (H) 65 - 100 mg/dL    BUN 11 6 - 20 MG/DL    Creatinine 0.60 0.55 - 1.02 MG/DL    BUN/Creatinine ratio 18 12 - 20      GFR est AA >60 >60 ml/min/1.73m2    GFR est non-AA >60 >60 ml/min/1.73m2    Calcium 8.7 8.5 - 10.1 MG/DL    Bilirubin, total 1.2 (H) 0.2 - 1.0 MG/DL    ALT (SGPT) 45 12 - 78 U/L    AST (SGOT) 37 15 - 37 U/L    Alk. phosphatase 140 (H) 45 - 117 U/L    Protein, total 7.2 6.4 - 8.2 g/dL    Albumin 3.3 (L) 3.5 - 5.0 g/dL    Globulin 3.9 2.0 - 4.0 g/dL    A-G Ratio 0.8 (L) 1.1 - 2.2     MAGNESIUM    Collection Time: 02/18/22  9:00 AM   Result Value Ref Range    Magnesium 1.8 1.6 - 2.4 mg/dL   OSMOLALITY, SERUM/PLASMA    Collection Time: 02/18/22  9:00 AM   Result Value Ref Range    Osmolality, serum/plasma 242 mOsm/kg H2O   NT-PRO BNP    Collection Time: 02/18/22  9:00 AM   Result Value Ref Range    NT pro-BNP 7,362 (H) <450 PG/ML   TROPONIN-HIGH SENSITIVITY    Collection Time: 02/18/22  9:00 AM   Result Value Ref Range    Troponin-High Sensitivity 21 0 - 51 ng/L   URINALYSIS W/MICROSCOPIC    Collection Time: 02/18/22  9:11 AM   Result Value Ref Range    Color YELLOW/STRAW      Appearance CLEAR CLEAR      Specific gravity 1.022 1.003 - 1.030      pH (UA) 7.0 5.0 - 8.0      Protein 300 (A) NEG mg/dL    Glucose Negative NEG mg/dL    Ketone 40 (A) NEG mg/dL    Bilirubin Negative NEG      Blood Negative NEG      Urobilinogen 1.0 0.2 - 1.0 EU/dL    Nitrites Negative NEG      Leukocyte Esterase TRACE (A) NEG      WBC 0-4 0 - 4 /hpf    RBC 0-5 0 - 5 /hpf    Epithelial cells FEW FEW /lpf    Bacteria Negative NEG /hpf   URINE CULTURE HOLD SAMPLE    Collection Time: 02/18/22  9:11 AM    Specimen: Serum; Urine   Result Value Ref Range    Urine culture hold        Urine on hold in Microbiology dept for 2 days.   If unpreserved urine is submitted, it cannot be used for addtional testing after 24 hours, recollection will be required. OSMOLALITY, UR    Collection Time: 02/18/22  9:11 AM   Result Value Ref Range    Osmolality,urine 636 MOSM/kg H2O   SODIUM, UR, RANDOM    Collection Time: 02/18/22  9:11 AM   Result Value Ref Range    Sodium,urine random 81 MMOL/L   COVID-19 RAPID TEST    Collection Time: 02/18/22  9:18 AM   Result Value Ref Range    Specimen source Nasopharyngeal      COVID-19 rapid test Not detected NOTD           Urine dipstick:   Lab Results   Component Value Date/Time    Color YELLOW/STRAW 02/18/2022 09:11 AM    Appearance CLEAR 02/18/2022 09:11 AM    Specific gravity 1.022 02/18/2022 09:11 AM    pH (UA) 7.0 02/18/2022 09:11 AM    Protein 300 (A) 02/18/2022 09:11 AM    Glucose Negative 02/18/2022 09:11 AM    Ketone 40 (A) 02/18/2022 09:11 AM    Bilirubin Negative 02/18/2022 09:11 AM    Urobilinogen 1.0 02/18/2022 09:11 AM    Nitrites Negative 02/18/2022 09:11 AM    Leukocyte Esterase TRACE (A) 02/18/2022 09:11 AM    Epithelial cells FEW 02/18/2022 09:11 AM    Bacteria Negative 02/18/2022 09:11 AM    WBC 0-4 02/18/2022 09:11 AM    RBC 0-5 02/18/2022 09:11 AM       I have reviewed the following: All pertinent labs, microbiology data, radiology imaging for my assessment     Medications list Personally Reviewed   [x]      Yes     []               No       Medications:  Prior to Admission medications    Medication Sig Start Date End Date Taking? Authorizing Provider   furosemide (LASIX) 40 mg tablet Take 40 mg by mouth daily. Yes Provider, Historical   trimethoprim-sulfamethoxazole (Bactrim)  mg per tablet Take 1 Tablet by mouth two (2) times a day. For UTI x5 days 2/17/22 2/22/22 Yes Provider, Historical   DULoxetine (CYMBALTA) 30 mg capsule Take 30 mg by mouth daily. Yes Provider, Historical   mirabegron ER (MYRBETRIQ) 50 mg ER tablet Take 50 mg by mouth daily.    Yes Provider, Historical   tobramycin-dexamethasone (Tobradex ST) drps ophthalmix suspension Administer 1 Drop to both eyes four (4) times daily. Yes Provider, Historical   fluticasone propionate (FLONASE NA) 1 Spray by Nasal route daily. 1 spary alternating nostrils one time a day    Provider, Historical   dextromethorphan (Delsym) 30 mg/5 mL liquid Take 60 mg by mouth two (2) times daily as needed for Cough. Provider, Historical   potassium chloride SR (KLOR-CON 10) 10 mEq tablet TAKE 2 TABLETS DAILY (NEW DIRECTIONS) 11/29/21   Ingris Aranda MD   metoprolol succinate (TOPROL-XL) 25 mg XL tablet Take 1 Tab by mouth daily. 3/9/21   Ingris Aranda MD   vit A/vit C/vit E/zinc/copper (PRESERVISION AREDS PO) Take  by mouth two (2) times a day. Provider, Historical   loratadine (Claritin) 10 mg tablet Take 10 mg by mouth daily. Provider, Historical   glucose blood VI test strips (PRECISION XTRA TEST) strip USE TO TEST BLOOD SUGAR TWICE DAILY. Dx E11.21 2/19/20   Sriram Dorantes MD   cholecalciferol, vitamin D3, (Vitamin D3) 50 mcg (2,000 unit) tab Take  by mouth daily. Provider, Historical   Lancets misc 1 Package by Does Not Apply route daily. Test glucose daily and as needed Dx type 2 dm E11.42 4/19/16   Sriram Dorantes MD        Thank you for allowing us to participate in the care of this patient. We will follow patient. Please dont hesitate to call with any questions    Lee Esquivel MD  [facility]   [address] [facility] [address]  Phone - [phone]   Fax - [phone]  [email or web address]

## 2022-02-18 NOTE — ED PROVIDER NOTES
HPI   [de-identified] 5year-old female with a past medical history of atrial fibrillation not on anticoagulants, diabetes, hypertension, and a previous admission to the ICU for hyponatremia requiring 3% hypertonic saline he presents to the emergency department due to concerns for hyponatremia. History is obtained from EMS as no family is currently at bedside. Patient found to have a sodium of 115. Her previous admission for hyponatremia was felt to be due to diuretics and Bactrim use as well as SIADH. Her med list indicates she has been started on Bactrim and this may have been for treatment of UTI. Patient says she generally feels unwell but has no other complaints. EMS reports staff at [facility] stated her mental status is at her baseline. Daughter is reportedly on the way to the emergency department. Past Medical History:   Diagnosis Date    Anxiety     Bleeding nose     Chronic atrial fibrillation (HCC)     Diabetes ([facility] [address]     Hypertension        Past Surgical History:   Procedure Laterality Date    HX CATARACT REMOVAL      HX CHOLECYSTECTOMY      HX HEENT      myringotomy    HX HEENT  2016    basal cell removal -forehead    HX TONSILLECTOMY      childhood    HX TYMPANOSTOMY  2016    left ear    HX TYMPANOSTOMY      bilateral         No family history on file.     Social History     Socioeconomic History    Marital status:      Spouse name: Not on file    Number of children: Not on file    Years of education: Not on file    Highest education level: Not on file   Occupational History    Not on file   Tobacco Use    Smoking status: Former Smoker     Packs/day: 1.00     Years: 10.00     Pack years: 10.00     Quit date: 10/12/1993     Years since quittin.3    Smokeless tobacco: Never Used   Vaping Use    Vaping Use: Never used   Substance and Sexual Activity    Alcohol use: Yes     Comment: 1 glass of wine weekly    Drug use: No    Sexual activity: Not Currently Other Topics Concern    Not on file   Social History Narrative    Not on file     Social Determinants of Health     Financial Resource Strain: Low Risk     Difficulty of Paying Living Expenses: Not hard at all   Food Insecurity: No Food Insecurity    Worried About Running Out of Food in the Last Year: Never true    [address] in the Last Year: Never true   Transportation Needs: No Transportation Needs    Lack of Transportation (Medical): No    Lack of Transportation (Non-Medical): No   Physical Activity: Inactive    Days of Exercise per Week: 0 days    Minutes of Exercise per Session: 0 min   Stress:     Feeling of Stress : Not on file   Social Connections:     Frequency of Communication with Friends and Family: Not on file    Frequency of Social Gatherings with Friends and Family: Not on file    Attends Alevism Services: Not on file    Active Member of Clubs or Organizations: Not on file    Attends Club or Organization Meetings: Not on file    Marital Status: Not on file   Intimate Partner Violence:     Fear of Current or Ex-Partner: Not on file    Emotionally Abused: Not on file    Physically Abused: Not on file    Sexually Abused: Not on file   Housing Stability: Unknown    Unable to Pay for Housing in the Last Year: No    Number of [place] in the Last Year: Not on file    Unstable Housing in the Last Year: No         ALLERGIES: Amoxicillin, Aspirin, Gabapentin, Levaquin [levofloxacin], Lorazepam, Losartan, Lyrica [pregabalin], Other medication, and Sertraline    Review of Systems   Unable to perform a complete review of systems secondary to the patient's age and inability to provide an accurate history.     Vitals:    02/18/22 0857   Weight: 81.2 kg (179 lb)   Height: 4' 11\" (1.499 m)            Physical Exam  Constitutional:       Comments: Elderly woman resting comfortably no acute distress   HENT:      Head:      Comments: No appreciable signs of head trauma Mouth/Throat:      Comments: Moist mucous membranes  Eyes:      Comments: Extraocular movements intact. Pupils 2 mm and reactive to light bilaterally   Neck:      Comments: No JVD. Trachea  Cardiovascular:      Comments: Irregularly irregular rhythm. 3 out of 6 systolic ejection murmur heard best at the left lower sternal border radiating to the axilla  Pulmonary:      Effort: Pulmonary effort is normal. No respiratory distress. Breath sounds: Normal breath sounds. No wheezing or rales. Abdominal:      General: There is no distension. Palpations: Abdomen is soft. Tenderness: There is no abdominal tenderness. Musculoskeletal:         General: No deformity. Comments: 1+ lower extremity edema bilaterally   Skin:     General: Skin is warm and dry. Neurological:      Comments: Awake and alert. Oriented to person, birthdate, and place. Moves all extremities equally. No facial droop. GCS 15. MDM   35-year-old female who presents with above chief complaint. Vitals are stable. She is nontoxic. She appears slightly hypervolemic with lower extremity edema. She does not appear dehydrated. EKG shows atrial fibrillation with a rate of 73 and QTC of 519. She has some T wave inversions in the anterior precordial leads as well as the inferior leads. No ST elevations to suggest STEMI. Labs are going to be sent I will order urinalysis as well as a urine and serum osmolality. COVID-19 swab will be ordered as the patient lives at a nursing facility. Will speak to daughter to get more clarification on the patient's presentation today. She will likely need admission.     Critical Care  Performed by: Spencer Cheek MD  Authorized by: Spencer Cheek MD     Critical care provider statement:     Critical care time (minutes):  35    Critical care was necessary to treat or prevent imminent or life-threatening deterioration of the following conditions:  Metabolic crisis    Critical care was time spent personally by me on the following activities:  Blood draw for specimens, development of treatment plan with patient or surrogate, discussions with consultants, evaluation of patient's response to treatment, examination of patient, obtaining history from patient or surrogate, ordering and performing treatments and interventions, ordering and review of laboratory studies, ordering and review of radiographic studies, pulse oximetry, re-evaluation of patient's condition and review of old charts    I assumed direction of critical care for this patient from another provider in my specialty: no          Perfect Serve Consult for Admission  1:05 PM    ED Room Number: ER19/19  Patient Name and age: Cristina Almendarez 80 y.o.  female  Working Diagnosis:   1. Hyponatremia    2. Altered mental status, unspecified altered mental status type        COVID-19 Suspicion:  no  Sepsis present:  no  Reassessment needed: yes  Code Status:  Full Code; advanced directives she says she would not want to be on life support for a prolonged period of time but would be okay with resuscitation for a short amount of time. Readmission: yes  Isolation Requirements:  no  Recommended Level of Care:  step down  Department:  [facility] Adult ED - 21       Other: Work-up seems to indicate SIADH as a cause for her hyponatremia. In her nephew who is at bedside states that she was in fact recently put on Bactrim for UTI. Urine looks okay here. Nephrology will be seeing the patient and ordering 3% hypertonic saline, but does not feel she needs to go to the ICU.

## 2022-02-18 NOTE — ED TRIAGE NOTES
Pt arrives with EMS from [facility] for c/o low sodium of 115. Admitted in December for hyponatremia with bactrim use. Pt started on bactrim earlier this week.

## 2022-02-18 NOTE — ROUTINE PROCESS
TRANSFER - OUT REPORT:    Verbal report given to 6S RN(name) on Cristina Almendarez  being transferred to [facility](unit) for routine progression of care       Report consisted of patients Situation, Background, Assessment and   Recommendations(SBAR). Information from the following report(s) SBAR, ED Summary, Intake/Output, MAR and Recent Results was reviewed with the receiving nurse. Lines:   Peripheral IV 02/18/22 Left Antecubital (Active)   Site Assessment Clean, dry, & intact 02/18/22 0900   Dressing Status Clean, dry, & intact 02/18/22 0900   Dressing Type Transparent 02/18/22 0900        Opportunity for questions and clarification was provided.       Patient transported with:   Registered Nurse

## 2022-02-18 NOTE — H&P
[address] [facility] Adult  Hospitalist Group  History and Physical    Date of Service:  2/18/2022  Primary Care Provider: Fedeirco Mondragon MD  Source of information: The patient    Chief Complaint: Abnormal Lab Results      History of Presenting Illness:   Alejandro Palma is a 80 y.o. female who presents with abnormal labs    Patient is a resident of [facility], history was primarily obtained from patient as well as patient's niece,    Patient has history of hyponatremia, apparently recently patient was diagnosed with UTI, probably was recently started on Bactrim, yesterday had blood work done and was found to have a sodium of 115 and was sent to [facility] for further management and evaluation. Patient was recently admitted to the hospital with hyponatremia in December 2021, at that point of time patient may have been started on Bactrim too for UTI. Patient currently is resting in bed, appears to be comfortable, niece is at bedside, admits to global weakness but denies any other complaints or problems with          REVIEW OF SYSTEMS:  Pertinent items are noted in the History of Present Illness. Past Medical History:   Diagnosis Date    Anxiety     Bleeding nose     Chronic atrial fibrillation (HCC)     Diabetes ([facility] [address]     Hypertension       Past Surgical History:   Procedure Laterality Date    HX CATARACT REMOVAL      HX CHOLECYSTECTOMY      HX HEENT      myringotomy    HX HEENT  07/2016    basal cell removal -forehead    HX TONSILLECTOMY      childhood    HX TYMPANOSTOMY  07/11/2016    left ear    HX TYMPANOSTOMY      bilateral     Prior to Admission medications    Medication Sig Start Date End Date Taking? Authorizing Provider   fluticasone propionate (FLONASE NA) by Nasal route daily. Provider, Historical   dextromethorphan (Delsym) 30 mg/5 mL liquid Take 60 mg by mouth two (2) times daily as needed for Cough.     Provider, Historical   potassium chloride SR (KLOR-CON 10) 10 mEq tablet TAKE 2 TABLETS DAILY (NEW DIRECTIONS) 11/29/21   Matias Aranda MD   tobramycin-dexamethasone (Tobradex ST) drps ophthalmix suspension Administer 1 Drop to both eyes four (4) times daily. Patient not taking: Reported on 12/16/2021    Provider, Historical   metoprolol succinate (TOPROL-XL) 25 mg XL tablet Take 1 Tab by mouth daily. 3/9/21   Matias Aranda MD   vit A/vit C/vit E/zinc/copper (PRESERVISION AREDS PO) Take  by mouth two (2) times a day. Provider, Historical   loratadine (Claritin) 10 mg tablet Take 10 mg by mouth daily. Provider, Historical   glucose blood VI test strips (PRECISION XTRA TEST) strip USE TO TEST BLOOD SUGAR TWICE DAILY. Dx E11.21 2/19/20   Mekhi Laurent MD   mirabegron ER (MYRBETRIQ) 50 mg ER tablet Per urology  Patient taking differently: Take 50 mg by mouth daily. Per urology 10/25/18   Mekhi Laurent MD   linaclotide [place]) 72 mcg cap Take  by mouth daily. Provider, Historical   aspirin delayed-release 81 mg tablet Take 1 Tab by mouth daily. Patient taking differently: Take 81 mg by mouth every other day. 12/13/17   Matias Aranda MD   CHOLECALCIFEROL, VITAMIN D3, (VITAMIN D3 PO) Take  by mouth daily. Provider, Historical   Lancets misc 1 Package by Does Not Apply route daily. Test glucose daily and as needed Dx type 2 dm E11.42 4/19/16   Matias Aranda MD   magnesium hydroxide (MILK OF MAGNESIA) 2,400 mg/10 mL susp suspension Take 10 mL by mouth nightly. 1/21/15   Mekhi Laurent MD     Allergies   Allergen Reactions    Amoxicillin Other (comments)     \"makes me feel like I\"m going to faint\"    Aspirin Palpitations     Per patient, no allergy    Gabapentin Drowsiness     * pt states this med makes her feel very drowsy , gives her a drunk feeling.  Levaquin [Levofloxacin] Hives and Rash    Lorazepam Other (comments)     Feeling faint.     Losartan Other (comments)     Tongue swelling  Lyrica [Pregabalin] Rash    Other Medication Other (comments)     Feeling faint, does not decrease pain.  Sertraline Other (comments)     Feeling faint. No family history on file. Social History:  reports that she quit smoking about 28 years ago. She has a 10.00 pack-year smoking history. She has never used smokeless tobacco. She reports current alcohol use. She reports that she does not use drugs. Family and social history were personally reviewed, all pertinent and relevant details are outlined as above. Objective:     Visit Vitals  BP (!) 167/89   Pulse 67   Temp 97.9 °F (36.6 °C)   Resp 16   Ht 4' 11\" (1.499 m)   Wt 81.2 kg (179 lb)   SpO2 96%   BMI 36.15 kg/m²           PHYSICAL EXAM:   General: Alert, awake, NAD  HEENT: PEERL, moist mucus membranes  Neck: Supple,  Chest: Decreased basal breath sounds  CVS: RRR, S1 S2 heard, no murmurs/rubs/gallops  Abd: Soft, non-tender, non-distended, +bowel sounds   Ext: No clubbing, no cyanosis, no edema  Neuro/Psych: Pleasant mood and affect, no focal neurological deficit      Data Review: All diagnostic labs and studies have been reviewed. Abnormal Labs Reviewed   CBC WITH AUTOMATED DIFF - Abnormal; Notable for the following components:       Result Value    WBC 12.4 (*)     RBC 3.53 (*)     HGB 10.6 (*)     HCT 29.4 (*)     RDW 15.3 (*)     NEUTROPHILS 84 (*)     LYMPHOCYTES 7 (*)     IMMATURE GRANULOCYTES 1 (*)     ABS. NEUTROPHILS 10.3 (*)     ABS. IMM. GRANS. 0.1 (*)     All other components within normal limits   METABOLIC PANEL, COMPREHENSIVE - Abnormal; Notable for the following components:    Sodium 112 (*)     Chloride 79 (*)     Glucose 105 (*)     Bilirubin, total 1.2 (*)     Alk.  phosphatase 140 (*)     Albumin 3.3 (*)     A-G Ratio 0.8 (*)     All other components within normal limits   URINALYSIS W/MICROSCOPIC - Abnormal; Notable for the following components:    Protein 300 (*)     Ketone 40 (*)     Leukocyte Esterase TRACE (*) All other components within normal limits   NT-PRO BNP - Abnormal; Notable for the following components:    NT pro-BNP 7,362 (*)     All other components within normal limits       All Micro Results     Procedure Component Value Units Date/Time    COVID-19 RAPID TEST [[id number]] Collected: 02/18/22 3528    Order Status: Completed Specimen: Nasopharyngeal Updated: 02/18/22 1013     Specimen source Nasopharyngeal        COVID-19 rapid test Not detected        Comment: Rapid Abbott ID Now       Rapid NAAT:  The specimen is NEGATIVE for SARS-CoV-2, the novel coronavirus associated with COVID-19. Negative results should be treated as presumptive and, if inconsistent with clinical signs and symptoms or necessary for patient management, should be tested with an alternative molecular assay. Negative results do not preclude SARS-CoV-2 infection and should not be used as the sole basis for patient management decisions. This test has been authorized by the FDA under an Emergency Use Authorization (EUA) for use by authorized laboratories. Fact sheet for Healthcare Providers: [employer]dSling[employer]co.nz  Fact sheet for Patients: [employer]ling[employer]nz       Methodology: Isothermal Nucleic Acid Amplification         URINE CULTURE HOLD SAMPLE [[id number]] Collected: 02/18/22 0911    Order Status: Completed Specimen: Urine from Serum Updated: 02/18/22 0920     Urine culture hold       Urine on hold in Microbiology dept for 2 days. If unpreserved urine is submitted, it cannot be used for addtional testing after 24 hours, recollection will be required. IMAGING:   CT HEAD WO CONT   Final Result   No change or acute abnormality            XR CHEST PA LAT   Final Result   1.  Mild interstitial edema              ECG/ECHO:    Results for orders placed or performed during the hospital encounter of 02/18/22   EKG, 12 LEAD, INITIAL   Result Value Ref Range    Ventricular Rate 73 BPM    QRS Duration 154 ms    Q-T Interval 472 ms    QTC Calculation (Bezet) 519 ms    Calculated R Axis 77 degrees    Calculated T Axis -15 degrees    Diagnosis       Atrial fibrillation with a competing junctional pacemaker  Right bundle branch block  Abnormal ECG  When compared with ECG of 19-DEC-2021 23:10,  Nonspecific T wave abnormality now evident in Lateral leads          Assessment:   Given the patient's current clinical presentation, there is a high level of concern for decompensation if discharged from the emergency department. Complex decision making was performed, which includes reviewing the patient's available past medical records, laboratory results, and imaging studies. Severe hyponatremia  Atrial fibrillation  Diabetes mellitus type 2  Hypertension  Anemia    Plan:   Patient will be admitted on a stepdown unit, patient with severe hyponatremia, appreciate nephrology input, start patient on 3% saline, neurovascular checks, BMP every 4 hours, urine osmolality serum osmolality, FENa, supportive care and close monitoring, restrict free water intake and continue to monitor  Rate controlled, continue monitor  Sliding-scale insulin, Accu-Cheks, diet control, close monitoring  Optimize blood pressure control  Monitor        DIET: ADULT DIET Regular; 1500 ml  DIET ONE TIME MESSAGE   ISOLATION PRECAUTIONS: There are currently no Active Isolations  CODE STATUS: Full Code   DVT PROPHYLAXIS: Heparin  FUNCTIONAL STATUS PRIOR TO HOSPITALIZATION: Ambulatory and capable of self-care but relies on assistive devices (rolling walker/cane). EARLY MOBILITY ASSESSMENT: Recommend an assessment from physical therapy and/or occupational therapy  ANTICIPATED DISCHARGE: 24-48 hours. CRITICAL CARE WAS PERFORMED FOR THIS ENCOUNTER: YES. I had a face to face encounter with the patient, reviewed and interpreted patient data including clinical events, labs, images, vital signs, I/O's, and examined patient.   I have discussed the case and the plan and management of the patient's care with the consulting services, the bedside nurses and necessary ancillary providers. This patient has a high probability of imminent, clinically significant deterioration, which requires the highest level of preparedness to intervene urgently. I participated in the decision-making and personally managed or directed the management of the following life and organ supporting interventions that required my frequent assessment to treat or prevent imminent deterioration. I personally spent 47 minutes of critical care time. This is time spent at this critically ill patient's bedside actively involved in patient care as well as the coordination of care and discussions with the patient's family. This does not include any procedural time which has been billed separately. Signed By: Allen Caldera MD     February 18, 2022         Please note that this dictation may have been completed with Dragon, the computer voice recognition software. Quite often unanticipated grammatical, syntax, homophones, and other interpretive errors are inadvertently transcribed by the computer software. Please disregard these errors. Please excuse any errors that have escaped final proofreading.

## 2022-02-18 NOTE — PROGRESS NOTES
Admission Medication Reconciliation:    Information obtained from:  [address]:  NO    Comments/Recommendations: Updated PTA meds. 1)  patient arrived with medlist from facility - this was transcribed as current meds. RxQuery with most recent fills from January. 2)  Medication changes (since last review): Added  - duloxetine (started 22)  - furosemide   - Bactrim    Removed  - Linzess  - aspirin    3)  patient admitted with recurrent hyponatremia with a Na of 112 on presentation. Newly noted medications can contribute to this: furosemide, duloxetine, Bactrim though the Bactrim was just started on 22 per medlist. Nephrologist aware. ¹RxQuery pharmacy benefit data reflects medications filled and processed through the patient's insurance, however   this data does NOT capture whether the medication was picked up or is currently being taken by the patient. Allergies:  Amoxicillin, Aspirin, Gabapentin, Levaquin [levofloxacin], Lorazepam, Losartan, Lyrica [pregabalin], Other medication, and Sertraline    Significant PMH/Disease States:   Past Medical History:   Diagnosis Date    Anxiety     Bleeding nose     Chronic atrial fibrillation ([facility] [address]     Diabetes ([facility] [address]     Hypertension      Chief Complaint for this Admission:    Chief Complaint   Patient presents with    Abnormal Lab Results     Prior to Admission Medications:   Prior to Admission Medications   Prescriptions Last Dose Informant Taking? DULoxetine (CYMBALTA) 30 mg capsule   Yes   Sig: Take 30 mg by mouth daily. Lancets misc   No   Si Package by Does Not Apply route daily. Test glucose daily and as needed Dx type 2 dm E11.42   cholecalciferol, vitamin D3, (Vitamin D3) 50 mcg (2,000 unit) tab   No   Sig: Take  by mouth daily. dextromethorphan (Delsym) 30 mg/5 mL liquid   No   Sig: Take 60 mg by mouth two (2) times daily as needed for Cough.    fluticasone propionate (FLONASE NA)   No Si [place] by Nasal route daily. 1 spary alternating nostrils one time a day   furosemide (LASIX) 40 mg tablet   Yes   Sig: Take 40 mg by mouth daily. glucose blood VI test strips (PRECISION XTRA TEST) strip   No   Sig: USE TO TEST BLOOD SUGAR TWICE DAILY. Dx E11.21   loratadine (Claritin) 10 mg tablet   No   Sig: Take 10 mg by mouth daily. metoprolol succinate (TOPROL-XL) 25 mg XL tablet   No   Sig: Take 1 Tab by mouth daily. mirabegron ER (MYRBETRIQ) 50 mg ER tablet   Yes   Sig: Take 50 mg by mouth daily. potassium chloride SR (KLOR-CON 10) 10 mEq tablet   No   Sig: TAKE 2 TABLETS DAILY (NEW DIRECTIONS)   tobramycin-dexamethasone (Tobradex ST) drps ophthalmix suspension 2022 at Unknown time  Yes   Sig: Administer 1 Drop to both eyes four (4) times daily. trimethoprim-sulfamethoxazole (Bactrim)  mg per tablet   Yes   Sig: Take 1 Tablet by mouth two (2) times a day. For UTI x5 days   vit A/vit C/vit E/zinc/copper (PRESERVISION AREDS PO)   No   Sig: Take  by mouth two (2) times a day. Facility-Administered Medications: None     Please contact the main inpatient pharmacy with any questions or concerns at [phone] and we will direct you to the clinical pharmacist covering this patient's care while in-house.    Ruben Restrepo, PHARMD

## 2022-02-18 NOTE — PROGRESS NOTES
Problem: Pressure Injury - Risk of  Goal: *Prevention of pressure injury  Description: Document Isaias Scale and appropriate interventions in the flowsheet.   Outcome: Progressing Towards Goal  Note: Pressure Injury Interventions:  Sensory Interventions: Assess changes in LOC,Discuss PT/OT consult with provider    Moisture Interventions: Absorbent underpads,Check for incontinence Q2 hours and as needed    Activity Interventions: PT/OT evaluation,Pressure redistribution bed/mattress(bed type)    Mobility Interventions: HOB 30 degrees or less,Pressure redistribution bed/mattress (bed type),PT/OT evaluation    Nutrition Interventions: Document food/fluid/supplement intake                     Problem: Patient Education: Go to Patient Education Activity  Goal: Patient/Family Education  Outcome: Progressing Towards Goal

## 2022-02-18 NOTE — PROGRESS NOTES
Reason for Admission:   Hyponatremia                 RUR Score: 34%      PCP: First and Last name:  John Elder MD     Name of Practice: [facility]   Are you a current patient: Yes/No: Yes   Approximate date of last visit: 12/16/22   Can you do a virtual visit with your PCP:              Resources/supports as identified by patient/family:  Supportive Sister and Nephews                 Top Challenges facing patient (as identified by patient/family and CM): Finances/Medication cost?  -                  Transportation? -              Support system or lack thereof? Patient has support                  Living arrangements? Currently receiving SNF care at [facility]. Family has communicated w/ [place] for admission next week however patient will need to be medically stable for LOC. Self-care/ADLs/Cognition? Requires assistance w/ ADLs          Current Advanced Directive/Advance Care Plan:  Prior / AMD on file      Healthcare Decision Maker:   Click here to complete [address] including selection of the Healthcare Decision Maker Relationship (ie \"Primary\")      Primary Decision MakerMitzi Shanel Sister - [phone] / Secondary SOUTHA Iban [facility] [phone]    Payor Source Payor: Evan Ohara / Plan: [place] / Product Type: Medicare /                             Plan for utilizing home health:                     Transition of Care Plan:   Call received from Aubrie Murillo RN / Patient Outreach/ [facility] wanting alternative [facility]. Patient admitted for hyponatremia. EMR reviewed. CM attempted to meet w/patient (currently sleeping). Craig Roberts (sister) [phone] (mobile) at bedside - informed she is from [place] and patient's niece in the waiting room. Requesting medical updates and CM informed of Hospitalist consulted and introduced Sister to Dr. Kimberly Mullen.  Ms. Roberta Mariella states patient was to be admitted to [facility] next week in residential living and facility also has AL. Has spoken w/ SW and informed patient would need to be stabilized. Sister states noted change in personality having UTI.  has been in communication during planning process w/ [facility](outcome pending). Case Management will follow for transitions of care needs.

## 2022-02-18 NOTE — PROGRESS NOTES
Chart reviewed   Ordered 100 cc of 3% saline   Full consult note to follow later         [facility]  Office :[phone]  Fax: [phone]

## 2022-02-19 NOTE — PROGRESS NOTES
Problem: Pressure Injury - Risk of  Goal: *Prevention of pressure injury  Description: Document Isaias Scale and appropriate interventions in the flowsheet. Outcome: Progressing Towards Goal  Note: Pressure Injury Interventions:  Sensory Interventions: Assess changes in LOC,Discuss PT/OT consult with provider    Moisture Interventions: Absorbent underpads,Check for incontinence Q2 hours and as needed    Activity Interventions: Pressure redistribution bed/mattress(bed type),PT/OT evaluation    Mobility Interventions: HOB 30 degrees or less,Pressure redistribution bed/mattress (bed type),PT/OT evaluation    Nutrition Interventions: Document food/fluid/supplement intake                     Problem: Patient Education: Go to Patient Education Activity  Goal: Patient/Family Education  Outcome: Progressing Towards Goal     Problem: Falls - Risk of  Goal: *Absence of Falls  Description: Document Polo Fall Risk and appropriate interventions in the flowsheet.   Outcome: Progressing Towards Goal  Note: Fall Risk Interventions:  Mobility Interventions: Patient to call before getting OOB,Bed/chair exit alarm    Mentation Interventions: Bed/chair exit alarm,Door open when patient unattended    Medication Interventions: Bed/chair exit alarm,Patient to call before getting OOB    Elimination Interventions: Bed/chair exit alarm,Call light in reach,Patient to call for help with toileting needs              Problem: Patient Education: Go to Patient Education Activity  Goal: Patient/Family Education  Outcome: Progressing Towards Goal

## 2022-02-19 NOTE — PROGRESS NOTES
Dr. Sydney Anderson at bedside with patient in PACU inserting central line catheter with ultrasound, RN staff.

## 2022-02-19 NOTE — PROGRESS NOTES
Central Line Procedure Note  Under ultrasound guidence    Indication:volume and cvp and pa monitoring    Risks, benefits, alternatives explained and patient agrees to proceed. Patient positioned in Trendelenburg. 7-Step Sterility Protocol followed. (cap, mask sterile gown, sterile gloves, large sterile sheet, hand hygiene, 2% chlorhexidine for cutaneous antisepsis)  5 mL 1% Lidocaine placed at insertion site. Left internal jugular cannulated x 1 attempt(s) utilizing the Seldinger technique. And  Live ultrasound  Guidance was used to visualise the vein. Venous cannulation confirmed with column drop test.    Catheter secured.  & Biopatch applied. Sterile Tegaderm placed. CXR pending.

## 2022-02-19 NOTE — PROGRESS NOTES
[facility]   [address] [facility], [id number] [place] [address]  Phone: [phone]   [phone]    [email or web address]     Nephrology Progress Note    Patient Name : Kassy Macdonald      : 1922     MRN : [id number]  Date of Admission : 2022  Date of Servive : 22    CC:  Follow up for Hyponatremia        Assessment and Plan   Recurrent Hyponatremia   - 2/2 SIADH. Urine chemistries consistent with that  - Exacerbation this time is likely from cymbalta and Lasix use +/- one dose of Bactrim   - Ordered another round of 3% saline.  - Goal Na : not to exceed 122 by tomorrow am   - Q6 hr labs   -Discussed with nursing staff about calling in chemistry results to us.     Chronic A fib  Pulm HTN   Severe TR, Mild MR  JOSE     Care Plan discussed with:  Pt     Interval History:  Seen and examined. Overnight was agitated. She was agitated about fluid restriction. Last serum sodium was 114. She received 100 cc of 3% saline so far. She expressed her frustration with refractory hyponatremia    Review of Systems: Review of systems not obtained due to patient factors.     Current Medications:   Current Facility-Administered Medications   Medication Dose Route Frequency    3% sodium chloride (*HIGH ALERT*CONCENTRATED IV*) infusion  25 mL/hr IntraVENous CONTINUOUS    sodium chloride (NS) flush 5-40 mL  5-40 mL IntraVENous Q8H    sodium chloride (NS) flush 5-40 mL  5-40 mL IntraVENous PRN    metoprolol succinate (TOPROL-XL) XL tablet 25 mg  25 mg Oral DAILY    sodium chloride (NS) flush 5-40 mL  5-40 mL IntraVENous Q8H    sodium chloride (NS) flush 5-40 mL  5-40 mL IntraVENous PRN    acetaminophen (TYLENOL) tablet 650 mg  650 mg Oral Q4H PRN    heparin (porcine) injection 5,000 Units  5,000 Units SubCUTAneous Q8H    hydrALAZINE (APRESOLINE) 20 mg/mL injection 20 mg  20 mg IntraVENous Q6H PRN    glucose chewable tablet 16 g  4 Tablet Oral PRN    glucagon (GLUCAGEN) injection 1 mg  1 mg IntraMUSCular PRN    insulin lispro (HUMALOG) injection   SubCUTAneous AC&HS      Allergies   Allergen Reactions    Amoxicillin Other (comments)     \"makes me feel like I\"m going to faint\"    Aspirin Palpitations     Per patient, no allergy    Gabapentin Drowsiness     * pt states this med makes her feel very drowsy , gives her a drunk feeling.  Levaquin [Levofloxacin] Hives and Rash    Lorazepam Other (comments)     Feeling faint.  Losartan Other (comments)     Tongue swelling      Lyrica [Pregabalin] Rash    Other Medication Other (comments)     Feeling faint, does not decrease pain.  Sertraline Other (comments)     Feeling faint. Objective:  Vitals:    Vitals:    02/18/22 2200 02/19/22 0200 02/19/22 0400 02/19/22 0600   BP: (!) 178/82 (!) 198/74  113/85   Pulse: 75 69 61 61   Resp: 20 14  13   Temp: 96.9 °F (36.1 °C) 97.4 °F (36.3 °C)  97.8 °F (36.6 °C)   SpO2: 95% 95%  95%   Weight:  83.1 kg (183 lb 3.2 oz)     Height:         Intake and Output:  No intake/output data recorded. No intake/output data recorded. Physical Examination:  General:   Agitated  HEENT: PERRL,  No Pallor , No Icterus  Neck: Supple,no mass palpable  Lungs : CTA  CVS: RRR, S1 S2 normal, systolic murmur   Abdomen: Soft, NT, BS +  Extremities: trace Edema  Skin: hyperemia of feet   MS: arthritis   Neurologic: confused   Psych:   Somewhat agitated       []    High complexity decision making was performed  []    Patient is at high-risk of decompensation with multiple organ involvement    Lab Data Personally Reviewed: I have reviewed all the pertinent labs, microbiology data and radiology studies during assessment.     Recent Labs     02/19/22  0325 02/18/22  1457 02/18/22  0900   * 112* 112*   K 4.3 4.1 4.1   CL 80* 79* 79*   CO2 27 26 23   GLU 89 93 105*   BUN 10 10 11   CREA 0.55 0.57 0.60   CA 8.4* 8.6 8.7   MG  --   --  1.8   ALB  --   --  3.3*   ALT  --   --  45     Recent Labs 02/19/22  0325 02/18/22  0900   WBC 10.6 12.4*   HGB 10.5* 10.6*   HCT 30.1* 29.4*    308     No results found for: SDES  Lab Results   Component Value Date/Time    Culture result: NO GROWTH 5 DAYS 12/20/2021 12:30 AM     Recent Results (from the past 24 hour(s))   EKG, 12 LEAD, INITIAL    Collection Time: 02/18/22  8:54 AM   Result Value Ref Range    Ventricular Rate 73 BPM    QRS Duration 154 ms    Q-T Interval 472 ms    QTC Calculation (Bezet) 519 ms    Calculated R Axis 77 degrees    Calculated T Axis -15 degrees    Diagnosis       Atrial fibrillation  Right bundle branch block  Nonspecific ST and T wave abnormality  Abnormal ECG  When compared with ECG of 19-DEC-2021 23:10,  Nonspecific T wave abnormality now evident in Lateral leads  Confirmed by Roberto Rosales M.D., Ara Beasley ([id number]) on 2/18/2022 4:36:25 PM     SAMPLES BEING HELD    Collection Time: 02/18/22  9:00 AM   Result Value Ref Range    SAMPLES BEING HELD 1red 1blu     COMMENT        Add-on orders for these samples will be processed based on acceptable specimen integrity and analyte stability, which may vary by analyte. CBC WITH AUTOMATED DIFF    Collection Time: 02/18/22  9:00 AM   Result Value Ref Range    WBC 12.4 (H) 3.6 - 11.0 K/uL    RBC 3.53 (L) 3.80 - 5.20 M/uL    HGB 10.6 (L) 11.5 - 16.0 g/dL    HCT 29.4 (L) 35.0 - 47.0 %    MCV 83.3 80.0 - 99.0 FL    MCH 30.0 26.0 - 34.0 PG    MCHC 36.1 30.0 - 36.5 g/dL    RDW 15.3 (H) 11.5 - 14.5 %    PLATELET 330 830 - 098 K/uL    MPV 9.2 8.9 - 12.9 FL    NRBC 0.0 0  WBC    ABSOLUTE NRBC 0.00 0.00 - 0.01 K/uL    NEUTROPHILS 84 (H) 32 - 75 %    LYMPHOCYTES 7 (L) 12 - 49 %    MONOCYTES 8 5 - 13 %    EOSINOPHILS 0 0 - 7 %    BASOPHILS 0 0 - 1 %    IMMATURE GRANULOCYTES 1 (H) 0.0 - 0.5 %    ABS. NEUTROPHILS 10.3 (H) 1.8 - 8.0 K/UL    ABS. LYMPHOCYTES 0.9 0.8 - 3.5 K/UL    ABS. MONOCYTES 1.0 0.0 - 1.0 K/UL    ABS. EOSINOPHILS 0.0 0.0 - 0.4 K/UL    ABS. BASOPHILS 0.0 0.0 - 0.1 K/UL    ABS. IMM. GRANS. 0.1 (H) 0.00 - 0.04 K/UL    DF AUTOMATED     METABOLIC PANEL, COMPREHENSIVE    Collection Time: 02/18/22  9:00 AM   Result Value Ref Range    Sodium 112 (LL) 136 - 145 mmol/L    Potassium 4.1 3.5 - 5.1 mmol/L    Chloride 79 (L) 97 - 108 mmol/L    CO2 23 21 - 32 mmol/L    Anion gap 10 5 - 15 mmol/L    Glucose 105 (H) 65 - 100 mg/dL    BUN 11 6 - 20 MG/DL    Creatinine 0.60 0.55 - 1.02 MG/DL    BUN/Creatinine ratio 18 12 - 20      GFR est AA >60 >60 ml/min/1.73m2    GFR est non-AA >60 >60 ml/min/1.73m2    Calcium 8.7 8.5 - 10.1 MG/DL    Bilirubin, total 1.2 (H) 0.2 - 1.0 MG/DL    ALT (SGPT) 45 12 - 78 U/L    AST (SGOT) 37 15 - 37 U/L    Alk.  phosphatase 140 (H) 45 - 117 U/L    Protein, total 7.2 6.4 - 8.2 g/dL    Albumin 3.3 (L) 3.5 - 5.0 g/dL    Globulin 3.9 2.0 - 4.0 g/dL    A-G Ratio 0.8 (L) 1.1 - 2.2     MAGNESIUM    Collection Time: 02/18/22  9:00 AM   Result Value Ref Range    Magnesium 1.8 1.6 - 2.4 mg/dL   OSMOLALITY, SERUM/PLASMA    Collection Time: 02/18/22  9:00 AM   Result Value Ref Range    Osmolality, serum/plasma 242 mOsm/kg H2O   NT-PRO BNP    Collection Time: 02/18/22  9:00 AM   Result Value Ref Range    NT pro-BNP 7,362 (H) <450 PG/ML   TROPONIN-HIGH SENSITIVITY    Collection Time: 02/18/22  9:00 AM   Result Value Ref Range    Troponin-High Sensitivity 21 0 - 51 ng/L   HEMOGLOBIN A1C WITH EAG    Collection Time: 02/18/22  9:00 AM   Result Value Ref Range    Hemoglobin A1c 6.1 (H) 4.0 - 5.6 %    Est. average glucose 128 mg/dL   URINALYSIS W/MICROSCOPIC    Collection Time: 02/18/22  9:11 AM   Result Value Ref Range    Color YELLOW/STRAW      Appearance CLEAR CLEAR      Specific gravity 1.022 1.003 - 1.030      pH (UA) 7.0 5.0 - 8.0      Protein 300 (A) NEG mg/dL    Glucose Negative NEG mg/dL    Ketone 40 (A) NEG mg/dL    Bilirubin Negative NEG      Blood Negative NEG      Urobilinogen 1.0 0.2 - 1.0 EU/dL    Nitrites Negative NEG      Leukocyte Esterase TRACE (A) NEG      WBC 0-4 0 - 4 /hpf    RBC 0-5 0 - 5 /hpf    Epithelial cells FEW FEW /lpf    Bacteria Negative NEG /hpf   URINE CULTURE HOLD SAMPLE    Collection Time: 02/18/22  9:11 AM    Specimen: Serum; Urine   Result Value Ref Range    Urine culture hold        Urine on hold in Microbiology dept for 2 days. If unpreserved urine is submitted, it cannot be used for addtional testing after 24 hours, recollection will be required.    OSMOLALITY, UR    Collection Time: 02/18/22  9:11 AM   Result Value Ref Range    Osmolality,urine 636 MOSM/kg H2O   SODIUM, UR, RANDOM    Collection Time: 02/18/22  9:11 AM   Result Value Ref Range    Sodium,urine random 81 MMOL/L   OSMOLALITY, UR    Collection Time: 02/18/22  9:11 AM   Result Value Ref Range    Osmolality,urine 636 MOSM/kg H2O   SODIUM, UR, RANDOM    Collection Time: 02/18/22  9:11 AM   Result Value Ref Range    Sodium,urine random 78 MMOL/L   COVID-19 RAPID TEST    Collection Time: 02/18/22  9:18 AM   Result Value Ref Range    Specimen source Nasopharyngeal      COVID-19 rapid test Not detected NOTD     METABOLIC PANEL, BASIC    Collection Time: 02/18/22  2:57 PM   Result Value Ref Range    Sodium 112 (LL) 136 - 145 mmol/L    Potassium 4.1 3.5 - 5.1 mmol/L    Chloride 79 (L) 97 - 108 mmol/L    CO2 26 21 - 32 mmol/L    Anion gap 7 5 - 15 mmol/L    Glucose 93 65 - 100 mg/dL    BUN 10 6 - 20 MG/DL    Creatinine 0.57 0.55 - 1.02 MG/DL    BUN/Creatinine ratio 18 12 - 20      GFR est AA >60 >60 ml/min/1.73m2    GFR est non-AA >60 >60 ml/min/1.73m2    Calcium 8.6 8.5 - 10.1 MG/DL   TSH 3RD GENERATION    Collection Time: 02/18/22  2:57 PM   Result Value Ref Range    TSH 3.05 0.36 - 3.74 uIU/mL   GLUCOSE, POC    Collection Time: 02/18/22  4:32 PM   Result Value Ref Range    Glucose (POC) 93 65 - 117 mg/dL    Performed by Dayana Lyons    CBC WITH AUTOMATED DIFF    Collection Time: 02/19/22  3:25 AM   Result Value Ref Range    WBC 10.6 3.6 - 11.0 K/uL    RBC 3.54 (L) 3.80 - 5.20 M/uL    HGB 10.5 (L) 11.5 - 16.0 g/dL    HCT 30.1 (L) 35.0 - 47.0 %    MCV 85.0 80.0 - 99.0 FL    MCH 29.7 26.0 - 34.0 PG    MCHC 34.9 30.0 - 36.5 g/dL    RDW 14.9 (H) 11.5 - 14.5 %    PLATELET 917 010 - 895 K/uL    MPV 9.2 8.9 - 12.9 FL    NRBC 0.0 0  WBC    ABSOLUTE NRBC 0.00 0.00 - 0.01 K/uL    NEUTROPHILS 82 (H) 32 - 75 %    LYMPHOCYTES 8 (L) 12 - 49 %    MONOCYTES 9 5 - 13 %    EOSINOPHILS 0 0 - 7 %    BASOPHILS 0 0 - 1 %    IMMATURE GRANULOCYTES 1 (H) 0.0 - 0.5 %    ABS. NEUTROPHILS 8.7 (H) 1.8 - 8.0 K/UL    ABS. LYMPHOCYTES 0.9 0.8 - 3.5 K/UL    ABS. MONOCYTES 0.9 0.0 - 1.0 K/UL    ABS. EOSINOPHILS 0.0 0.0 - 0.4 K/UL    ABS. BASOPHILS 0.0 0.0 - 0.1 K/UL    ABS. IMM. GRANS. 0.1 (H) 0.00 - 0.04 K/UL    DF AUTOMATED     METABOLIC PANEL, BASIC    Collection Time: 02/19/22  3:25 AM   Result Value Ref Range    Sodium 114 (LL) 136 - 145 mmol/L    Potassium 4.3 3.5 - 5.1 mmol/L    Chloride 80 (L) 97 - 108 mmol/L    CO2 27 21 - 32 mmol/L    Anion gap 7 5 - 15 mmol/L    Glucose 89 65 - 100 mg/dL    BUN 10 6 - 20 MG/DL    Creatinine 0.55 0.55 - 1.02 MG/DL    BUN/Creatinine ratio 18 12 - 20      GFR est AA >60 >60 ml/min/1.73m2    GFR est non-AA >60 >60 ml/min/1.73m2    Calcium 8.4 (L) 8.5 - 10.1 MG/DL   GLUCOSE, POC    Collection Time: 02/19/22  3:32 AM   Result Value Ref Range    Glucose (POC) 101 65 - 117 mg/dL    Performed by Nitin Holt (Trvlr)            I have reviewed the flowsheets. Chart and Pertinent Notes have been reviewed. No change in PMH ,family and social history from Consult note.       Mario Schmidt, [facility]

## 2022-02-19 NOTE — PROGRESS NOTES
Bedside shift change report given to [address] [place] [address] (oncoming nurse) by Mynor Gregorio RN (offgoing nurse). Report included the following information SBAR, Kardex, ED Summary, OR Summary, Procedure Summary, Intake/Output, MAR, Cardiac Rhythm A fib, Alarm Parameters  and Dual Neuro Assessment.

## 2022-02-19 NOTE — PROGRESS NOTES
[address] [facility]                                                                                          Hospitalist Progress Note  Baron Jerome MD  Answering service: [phone] from in house phone        Date of Service:  2022  NAME:  Elvin Chopra  :  1922  MRN:  [id number]      Admission Summary:   Per H and P\"80 y.o. female who presents with abnormal labs.   Patient is a resident of [facility], history was primarily obtained from patient as well as patient's niece,     Patient has history of hyponatremia, apparently recently patient was diagnosed with UTI, probably was recently started on Bactrim, yesterday had blood work done and was found to have a sodium of 115 and was sent to [facility] for further management and evaluation. Patient was recently admitted to the hospital with hyponatremia in 2021, at that point of time patient may have been started on Bactrim too for UTI. Patient currently is resting in bed, appears to be comfortable, niece is at bedside, admits to global weakness but denies any other complaints or problems\". Interval history / Subjective:   Patient seen and examined    She states that everything hurts-abdomen,back,chest,legs.        Assessment & Plan:     #Severe hyponatremia:SIADH    -attributed likely due to recent meds -cymbalta,lasix,bactrim  -nephrology following-she is receiving 3 % NS ,q 6 hr labs  Seizure precautions    # Chronic atrial fib: rate controlled with metoprolol, not on AC prior to admission    # HTN-BP controlled with current regimen    # DM: glucose controlled     # Severe tricuspid regurgitation   Moderate to severe pulmonary HTN         Code status: full  Prophylaxis: heparin  Care Plan discussed with: patient,nurse  Anticipated Disposition: back to [facility] Problems  Date Reviewed: 2021          Codes Class Noted POA    Hyponatremia ICD-10-CM: E87.1  ICD-9-CM: 276.1  12/20/2021 Unknown                Review of Systems:   A comprehensive review of systems was negative except for that written in the HPI. Vital Signs:    Last 24hrs VS reviewed since prior progress note. Most recent are:  Visit Vitals  BP (!) 176/69   Pulse 70   Temp 97.6 °F (36.4 °C)   Resp 16   Ht 4' 11\" (1.499 m)   Wt 83.1 kg (183 lb 3.2 oz)   SpO2 95%   BMI 37.00 kg/m²       No intake or output data in the 24 hours ending 02/19/22 1452     Physical Examination:     I had a face to face encounter with this patient and independently examined them on 2/19/2022 as outlined below:          Constitutional:  anxious   ENT:  Oral mucosa moist,EOMI,anicteric sclera   Resp:  decreased breath sounds at bases, No wheezing. No accessory muscle use. CV:  Irregular rhythm, normal rate, systolic murmur+    GI:  Soft, non distended, non tender. normoactive bowel sounds,    Musculoskeletal:  1+ LE edmea    Neurologic:  hard of hearing,moves all extremities, globallly weak,alert,oriented  X 2            Data Review:    Review and/or order of clinical lab test  Review and/or order of tests in the radiology section of CPT  Review and/or order of tests in the medicine section of CPT      Labs:     Recent Labs     02/19/22  0325 02/18/22  0900   WBC 10.6 12.4*   HGB 10.5* 10.6*   HCT 30.1* 29.4*    308     Recent Labs     02/19/22  1205 02/19/22  0325 02/18/22  1457 02/18/22  0900 02/18/22  0900   * 114* 112*   < > 112*   K 4.4 4.3 4.1   < > 4.1   CL 82* 80* 79*   < > 79*   CO2 24 27 26   < > 23   BUN 11 10 10   < > 11   CREA 0.53* 0.55 0.57   < > 0.60   * 89 93   < > 105*   CA 8.6 8.4* 8.6   < > 8.7   MG  --   --   --   --  1.8    < > = values in this interval not displayed. Recent Labs     02/18/22  0900   ALT 45   *   TBILI 1.2*   TP 7.2   ALB 3.3*   GLOB 3.9     No results for input(s): INR, PTP, APTT, INREXT in the last 72 hours.    No results for input(s): FE, TIBC, PSAT, FERR in the last 72 hours. No results found for: FOL, RBCF   No results for input(s): PH, PCO2, PO2 in the last 72 hours. No results for input(s): CPK, CKNDX, TROIQ in the last 72 hours.     No lab exists for component: CPKMB  Lab Results   Component Value Date/Time    Cholesterol, total 146 03/21/2016 10:31 AM    HDL Cholesterol 85 03/21/2016 10:31 AM    LDL, calculated 48 03/21/2016 10:31 AM    Triglyceride 64 03/21/2016 10:31 AM     Lab Results   Component Value Date/Time    Glucose (POC) 135 (H) 02/19/2022 11:54 AM    Glucose (POC) 101 02/19/2022 03:32 AM    Glucose (POC) 93 02/18/2022 04:32 PM    Glucose (POC) 150 (H) 12/11/2017 11:49 AM    Glucose (POC) 81 12/11/2017 06:10 AM     Lab Results   Component Value Date/Time    Color YELLOW/STRAW 02/18/2022 09:11 AM    Appearance CLEAR 02/18/2022 09:11 AM    Specific gravity 1.022 02/18/2022 09:11 AM    pH (UA) 7.0 02/18/2022 09:11 AM    Protein 300 (A) 02/18/2022 09:11 AM    Glucose Negative 02/18/2022 09:11 AM    Ketone 40 (A) 02/18/2022 09:11 AM    Bilirubin Negative 02/18/2022 09:11 AM    Urobilinogen 1.0 02/18/2022 09:11 AM    Nitrites Negative 02/18/2022 09:11 AM    Leukocyte Esterase TRACE (A) 02/18/2022 09:11 AM    Epithelial cells FEW 02/18/2022 09:11 AM    Bacteria Negative 02/18/2022 09:11 AM    WBC 0-4 02/18/2022 09:11 AM    RBC 0-5 02/18/2022 09:11 AM         Medications Reviewed:     Current Facility-Administered Medications   Medication Dose Route Frequency    3% sodium chloride (*HIGH ALERT*CONCENTRATED IV*) infusion  30 mL/hr IntraVENous CONTINUOUS    sodium chloride (NS) flush 5-40 mL  5-40 mL IntraVENous Q8H    sodium chloride (NS) flush 5-40 mL  5-40 mL IntraVENous PRN    metoprolol succinate (TOPROL-XL) XL tablet 25 mg  25 mg Oral DAILY    sodium chloride (NS) flush 5-40 mL  5-40 mL IntraVENous Q8H    sodium chloride (NS) flush 5-40 mL  5-40 mL IntraVENous PRN    acetaminophen (TYLENOL) tablet 650 mg  650 mg Oral Q4H PRN    heparin (porcine) injection 5,000 Units  5,000 Units SubCUTAneous Q8H    hydrALAZINE (APRESOLINE) 20 mg/mL injection 20 mg  20 mg IntraVENous Q6H PRN    glucose chewable tablet 16 g  4 Tablet Oral PRN    glucagon (GLUCAGEN) injection 1 mg  1 mg IntraMUSCular PRN    insulin lispro (HUMALOG) injection   SubCUTAneous AC&HS     ______________________________________________________________________  EXPECTED LENGTH OF STAY: - - -  ACTUAL LENGTH OF STAY:          1                 Juan Borden MD

## 2022-02-20 NOTE — PROGRESS NOTES
[facility]   [address] [facility], [id number] [place] [address]  Phone: [phone]   [phone]    [email or web address]     Nephrology Progress Note    Patient Name : Blossom Valentine      : 1922     MRN : [id number]  Date of Admission : 2022  Date of Servive : 22    CC:  Follow up for Hyponatremia        Assessment and Plan   Recurrent Hyponatremia   - 2/2 SIADH. Urine chemistries consistent with that  - Exacerbation this time is likely from cymbalta and Lasix use +/- one dose of Bactrim   - Ordered another round of 3% saline.  - Goal Na : not to exceed 124 by tomorrow am   - Q6 hr labs   -Discussed with nursing staff about calling in chemistry results to us.     Chronic A fib  Pulm HTN   Severe TR, Mild MR  JOSE     Care Plan discussed with:  Pt     Interval History:  Seen and examined. She did not get her serum sodium checked as frequently as required. She has a central line. Most recent serum sodium is 116. She is lethargic. No other new symptoms. Review of Systems: Review of systems not obtained due to patient factors.     Current Medications:   Current Facility-Administered Medications   Medication Dose Route Frequency    3% sodium chloride (*HIGH ALERT*CONCENTRATED IV*) infusion  40 mL/hr IntraVENous CONTINUOUS    amLODIPine (NORVASC) tablet 5 mg  5 mg Oral DAILY    sodium chloride (NS) flush 5-40 mL  5-40 mL IntraVENous Q8H    sodium chloride (NS) flush 5-40 mL  5-40 mL IntraVENous PRN    metoprolol succinate (TOPROL-XL) XL tablet 25 mg  25 mg Oral DAILY    sodium chloride (NS) flush 5-40 mL  5-40 mL IntraVENous Q8H    sodium chloride (NS) flush 5-40 mL  5-40 mL IntraVENous PRN    acetaminophen (TYLENOL) tablet 650 mg  650 mg Oral Q4H PRN    heparin (porcine) injection 5,000 Units  5,000 Units SubCUTAneous Q8H    hydrALAZINE (APRESOLINE) 20 mg/mL injection 20 mg  20 mg IntraVENous Q6H PRN    glucose chewable tablet 16 g  4 Tablet Oral PRN    glucagon (GLUCAGEN) injection 1 mg  1 mg IntraMUSCular PRN    insulin lispro (HUMALOG) injection   SubCUTAneous AC&HS      Allergies   Allergen Reactions    Losartan Angioedema     Tongue swelling      Amoxicillin Other (comments)     \"makes me feel like I\"m going to faint\"    Gabapentin Drowsiness     * pt states this med makes her feel very drowsy , gives her a drunk feeling.  Levaquin [Levofloxacin] Hives and Rash    Lorazepam Other (comments)     Feeling faint.  Lyrica [Pregabalin] Rash    Sertraline Other (comments)     Feeling faint. Objective:  Vitals:    Vitals:    02/20/22 0157 02/20/22 0354 02/20/22 0552 02/20/22 0830   BP: (!) 124/50  (!) 150/53 (!) 129/51   Pulse: 73 63 (!) 57 64   Resp: 18  14    Temp: 98 °F (36.7 °C)  97.9 °F (36.6 °C)    SpO2: 98%      Weight: 76.2 kg (167 lb 15.9 oz)      Height:         Intake and Output:  No intake/output data recorded. 02/18 1901 - 02/20 0700  In: 750 [P.O.:750]  Out: -     Physical Examination:  General:   Agitated  HEENT: PERRL,  No Pallor , No Icterus  Neck: Supple,no mass palpable  Lungs : CTA  CVS: RRR, S1 S2 normal, systolic murmur   Abdomen: Soft, NT, BS +  Extremities: trace Edema  Skin: hyperemia of feet   MS: arthritis   Neurologic: confused   Psych:   Somewhat agitated       []    High complexity decision making was performed  []    Patient is at high-risk of decompensation with multiple organ involvement    Lab Data Personally Reviewed: I have reviewed all the pertinent labs, microbiology data and radiology studies during assessment.     Recent Labs     02/20/22  0702 02/20/22  0020 02/19/22  1205 02/19/22  0325 02/18/22  1457 02/18/22  0900 02/18/22  0900   * 116* 114* 114* 112*   < > 112*   K 4.2 4.2 4.4 4.3 4.1   < > 4.1   CL 84* 84* 82* 80* 79*   < > 79*   CO2 24 26 24 27 26   < > 23   * 111* 119* 89 93   < > 105*   BUN 13 14 11 10 10   < > 11   CREA 0.55 0.52* 0.53* 0.55 0.57   < > 0.60   CA 8.6 8.2* 8.6 8.4* 8.6   < > 8.7   MG  --   --   --   --   --   --  1.8   ALB  --   --   --   --   --   --  3.3*   ALT  --   --   --   --   --   --  45    < > = values in this interval not displayed.      Recent Labs     02/20/22  0020 02/19/22  0325 02/18/22  0900   WBC 12.9* 10.6 12.4*   HGB 10.4* 10.5* 10.6*   HCT 29.7* 30.1* 29.4*    301 308     No results found for: SDES  Lab Results   Component Value Date/Time    Culture result: NO GROWTH 5 DAYS 12/20/2021 12:30 AM     Recent Results (from the past 24 hour(s))   GLUCOSE, POC    Collection Time: 02/19/22 11:54 AM   Result Value Ref Range    Glucose (POC) 135 (H) 65 - 117 mg/dL    Performed by Darcy Lambert (NARESH)    METABOLIC PANEL, BASIC    Collection Time: 02/19/22 12:05 PM   Result Value Ref Range    Sodium 114 (LL) 136 - 145 mmol/L    Potassium 4.4 3.5 - 5.1 mmol/L    Chloride 82 (L) 97 - 108 mmol/L    CO2 24 21 - 32 mmol/L    Anion gap 8 5 - 15 mmol/L    Glucose 119 (H) 65 - 100 mg/dL    BUN 11 6 - 20 MG/DL    Creatinine 0.53 (L) 0.55 - 1.02 MG/DL    BUN/Creatinine ratio 21 (H) 12 - 20      GFR est AA >60 >60 ml/min/1.73m2    GFR est non-AA >60 >60 ml/min/1.73m2    Calcium 8.6 8.5 - 10.1 MG/DL   GLUCOSE, POC    Collection Time: 02/19/22  4:36 PM   Result Value Ref Range    Glucose (POC) 141 (H) 65 - 117 mg/dL    Performed by Rosa Ellis    GLUCOSE, POC    Collection Time: 02/19/22  8:59 PM   Result Value Ref Range    Glucose (POC) 126 (H) 65 - 117 mg/dL    Performed by Herminia Simpson    METABOLIC PANEL, BASIC    Collection Time: 02/20/22 12:20 AM   Result Value Ref Range    Sodium 116 (LL) 136 - 145 mmol/L    Potassium 4.2 3.5 - 5.1 mmol/L    Chloride 84 (L) 97 - 108 mmol/L    CO2 26 21 - 32 mmol/L    Anion gap 6 5 - 15 mmol/L    Glucose 111 (H) 65 - 100 mg/dL    BUN 14 6 - 20 MG/DL    Creatinine 0.52 (L) 0.55 - 1.02 MG/DL    BUN/Creatinine ratio 27 (H) 12 - 20      GFR est AA >60 >60 ml/min/1.73m2    GFR est non-AA >60 >60 ml/min/1.73m2    Calcium 8.2 (L) 8.5 - 10.1 MG/DL   CBC WITH AUTOMATED DIFF    Collection Time: 02/20/22 12:20 AM   Result Value Ref Range    WBC 12.9 (H) 3.6 - 11.0 K/uL    RBC 3.53 (L) 3.80 - 5.20 M/uL    HGB 10.4 (L) 11.5 - 16.0 g/dL    HCT 29.7 (L) 35.0 - 47.0 %    MCV 84.1 80.0 - 99.0 FL    MCH 29.5 26.0 - 34.0 PG    MCHC 35.0 30.0 - 36.5 g/dL    RDW 15.0 (H) 11.5 - 14.5 %    PLATELET 125 993 - 707 K/uL    MPV 8.9 8.9 - 12.9 FL    NRBC 0.0 0  WBC    ABSOLUTE NRBC 0.00 0.00 - 0.01 K/uL    NEUTROPHILS 83 (H) 32 - 75 %    LYMPHOCYTES 7 (L) 12 - 49 %    MONOCYTES 9 5 - 13 %    EOSINOPHILS 0 0 - 7 %    BASOPHILS 0 0 - 1 %    IMMATURE GRANULOCYTES 1 (H) 0.0 - 0.5 %    ABS. NEUTROPHILS 10.8 (H) 1.8 - 8.0 K/UL    ABS. LYMPHOCYTES 0.9 0.8 - 3.5 K/UL    ABS. MONOCYTES 1.1 (H) 0.0 - 1.0 K/UL    ABS. EOSINOPHILS 0.0 0.0 - 0.4 K/UL    ABS. BASOPHILS 0.0 0.0 - 0.1 K/UL    ABS. IMM. GRANS. 0.1 (H) 0.00 - 0.04 K/UL    DF AUTOMATED     METABOLIC PANEL, BASIC    Collection Time: 02/20/22  7:02 AM   Result Value Ref Range    Sodium 116 (LL) 136 - 145 mmol/L    Potassium 4.2 3.5 - 5.1 mmol/L    Chloride 84 (L) 97 - 108 mmol/L    CO2 24 21 - 32 mmol/L    Anion gap 8 5 - 15 mmol/L    Glucose 104 (H) 65 - 100 mg/dL    BUN 13 6 - 20 MG/DL    Creatinine 0.55 0.55 - 1.02 MG/DL    BUN/Creatinine ratio 24 (H) 12 - 20      GFR est AA >60 >60 ml/min/1.73m2    GFR est non-AA >60 >60 ml/min/1.73m2    Calcium 8.6 8.5 - 10.1 MG/DL   GLUCOSE, POC    Collection Time: 02/20/22  8:27 AM   Result Value Ref Range    Glucose (POC) 119 (H) 65 - 117 mg/dL    Performed by Destin Bolden (NARESH)            I have reviewed the flowsheets. Chart and Pertinent Notes have been reviewed. No change in PMH ,family and social history from Consult note.       Benson Chapa, [facility]

## 2022-02-20 NOTE — PROGRESS NOTES
Problem: Pressure Injury - Risk of  Goal: *Prevention of pressure injury  Description: Document Isaias Scale and appropriate interventions in the flowsheet. Outcome: Progressing Towards Goal  Note: Pressure Injury Interventions:  Sensory Interventions: Assess changes in LOC,Discuss PT/OT consult with provider    Moisture Interventions: Absorbent underpads,Check for incontinence Q2 hours and as needed    Activity Interventions: PT/OT evaluation,Pressure redistribution bed/mattress(bed type)    Mobility Interventions: HOB 30 degrees or less,Pressure redistribution bed/mattress (bed type),PT/OT evaluation    Nutrition Interventions: Document food/fluid/supplement intake    Friction and Shear Interventions: HOB 30 degrees or less                Problem: Patient Education: Go to Patient Education Activity  Goal: Patient/Family Education  Outcome: Progressing Towards Goal     Problem: Falls - Risk of  Goal: *Absence of Falls  Description: Document Polo Fall Risk and appropriate interventions in the flowsheet.   Outcome: Progressing Towards Goal  Note: Fall Risk Interventions:  Mobility Interventions: Bed/chair exit alarm,Patient to call before getting OOB    Mentation Interventions: Bed/chair exit alarm,Door open when patient unattended    Medication Interventions: Bed/chair exit alarm,Patient to call before getting OOB    Elimination Interventions: Call light in reach,Bed/chair exit alarm,Patient to call for help with toileting needs              Problem: Patient Education: Go to Patient Education Activity  Goal: Patient/Family Education  Outcome: Progressing Towards Goal

## 2022-02-20 NOTE — PROGRESS NOTES
[address] [facility]  Hospitalist Group                                                                                          Hospitalist Progress Note  Kacie Dale MD  Answering service: [phone] OR [phone] from in house phone        Date of Service:  2022  NAME:  Oumou Garcia  :  1922  MRN:  [id number]      Admission Summary:   Per H and P\"80 y.o. female who presents with abnormal labs.   Patient is a resident of [facility], history was primarily obtained from patient as well as patient's niece,     Patient has history of hyponatremia, apparently recently patient was diagnosed with UTI, probably was recently started on Bactrim, yesterday had blood work done and was found to have a sodium of 115 and was sent to [address] [place] [address] for further management and evaluation. Patient was recently admitted to the hospital with hyponatremia in 2021, at that point of time patient may have been started on Bactrim too for UTI. Patient currently is resting in bed, appears to be comfortable, niece is at bedside, admits to global weakness but denies any other complaints or problems\". Interval history / Subjective:   Patient seen and examined. She has hearing impairment    Had central line yesterday  Nephew at bedside,states that he felt she is more alert and talking today compared to yesterday-could remember about her fluid restriction ,salt etc.    She denied any pain today     Assessment & Plan:     #Severe hyponatremia:SIADH    -attributed likely due to recent meds -cymbalta,lasix,bactrim  -nephrology following-she is receiving 3 % NS again today  Sodium 116 today      # Chronic atrial fib: rate controlled with metoprolol, not on AC prior to admission    # HTN-BP controlled with current regimen    # DM: glucose controlled     # Severe tricuspid regurgitation   Moderate to severe pulmonary HTN         Code status: full  Prophylaxis: heparin  Care Plan discussed with: patient,nurse  Anticipated Disposition: back to [facility]  Date Reviewed: 12/16/2021          Codes Class Noted POA    Hyponatremia ICD-10-CM: E87.1  ICD-9-CM: 276.1  12/20/2021 Unknown                Review of Systems:   A comprehensive review of systems was negative except for that written in the HPI. Vital Signs:    Last 24hrs VS reviewed since prior progress note. Most recent are:  Visit Vitals  BP (!) 129/51   Pulse (!) 59   Temp 97.9 °F (36.6 °C)   Resp 14   Ht 4' 11\" (1.499 m)   Wt 76.2 kg (167 lb 15.9 oz)   SpO2 98%   BMI 33.93 kg/m²         Intake/Output Summary (Last 24 hours) at 2/20/2022 1158  Last data filed at 2/20/2022 0700  Gross per 24 hour   Intake 750 ml   Output    Net 750 ml        Physical Examination:     I had a face to face encounter with this patient and independently examined them on 2/20/2022 as outlined below:          Constitutional:  appears comfortable   ENT:  Oral mucosa moist,EOMI,anicteric sclera   Resp:  decreased breath sounds at bases, No wheezing. No accessory muscle use. CV:  Irregular rhythm, normal rate, systolic murmur+    GI:  Soft, non distended, obese,non tender.  normoactive bowel sounds,    Musculoskeletal:  1+ LE edema,some erythema feet    Neurologic:  hard of hearing,moves all extremities, globallly weak,alert,oriented  X 2            Data Review:    Review and/or order of clinical lab test  Review and/or order of tests in the radiology section of CPT  Review and/or order of tests in the medicine section of CPT      Labs:     Recent Labs     02/20/22  0020 02/19/22  0325   WBC 12.9* 10.6   HGB 10.4* 10.5*   HCT 29.7* 30.1*    301     Recent Labs     02/20/22  0702 02/20/22  0020 02/19/22  1205 02/18/22  1457 02/18/22  0900   * 116* 114*   < > 112*   K 4.2 4.2 4.4   < > 4.1   CL 84* 84* 82*   < > 79*   CO2 24 26 24   < > 23   BUN 13 14 11   < > 11   CREA 0.55 0.52* 0.53*   < > 0.60   * 111* 119*   < > 105*   CA 8.6 8.2* 8.6   < > 8.7   MG  --   --   --   --  1.8    < > = values in this interval not displayed. Recent Labs     02/18/22  0900   ALT 45   *   TBILI 1.2*   TP 7.2   ALB 3.3*   GLOB 3.9     No results for input(s): INR, PTP, APTT, INREXT, INREXT in the last 72 hours. No results for input(s): FE, TIBC, PSAT, FERR in the last 72 hours. No results found for: FOL, RBCF   No results for input(s): PH, PCO2, PO2 in the last 72 hours. No results for input(s): CPK, CKNDX, TROIQ in the last 72 hours.     No lab exists for component: CPKMB  Lab Results   Component Value Date/Time    Cholesterol, total 146 03/21/2016 10:31 AM    HDL Cholesterol 85 03/21/2016 10:31 AM    LDL, calculated 48 03/21/2016 10:31 AM    Triglyceride 64 03/21/2016 10:31 AM     Lab Results   Component Value Date/Time    Glucose (POC) 119 (H) 02/20/2022 08:27 AM    Glucose (POC) 126 (H) 02/19/2022 08:59 PM    Glucose (POC) 141 (H) 02/19/2022 04:36 PM    Glucose (POC) 135 (H) 02/19/2022 11:54 AM    Glucose (POC) 101 02/19/2022 03:32 AM     Lab Results   Component Value Date/Time    Color YELLOW/STRAW 02/18/2022 09:11 AM    Appearance CLEAR 02/18/2022 09:11 AM    Specific gravity 1.022 02/18/2022 09:11 AM    pH (UA) 7.0 02/18/2022 09:11 AM    Protein 300 (A) 02/18/2022 09:11 AM    Glucose Negative 02/18/2022 09:11 AM    Ketone 40 (A) 02/18/2022 09:11 AM    Bilirubin Negative 02/18/2022 09:11 AM    Urobilinogen 1.0 02/18/2022 09:11 AM    Nitrites Negative 02/18/2022 09:11 AM    Leukocyte Esterase TRACE (A) 02/18/2022 09:11 AM    Epithelial cells FEW 02/18/2022 09:11 AM    Bacteria Negative 02/18/2022 09:11 AM    WBC 0-4 02/18/2022 09:11 AM    RBC 0-5 02/18/2022 09:11 AM         Medications Reviewed:     Current Facility-Administered Medications   Medication Dose Route Frequency    3% sodium chloride (*HIGH ALERT*CONCENTRATED IV*) infusion  40 mL/hr IntraVENous CONTINUOUS    amLODIPine (NORVASC) tablet 5 mg  5 mg Oral DAILY    sodium chloride (NS) flush 5-40 mL  5-40 mL IntraVENous Q8H    sodium chloride (NS) flush 5-40 mL  5-40 mL IntraVENous PRN    metoprolol succinate (TOPROL-XL) XL tablet 25 mg  25 mg Oral DAILY    sodium chloride (NS) flush 5-40 mL  5-40 mL IntraVENous Q8H    sodium chloride (NS) flush 5-40 mL  5-40 mL IntraVENous PRN    acetaminophen (TYLENOL) tablet 650 mg  650 mg Oral Q4H PRN    heparin (porcine) injection 5,000 Units  5,000 Units SubCUTAneous Q8H    hydrALAZINE (APRESOLINE) 20 mg/mL injection 20 mg  20 mg IntraVENous Q6H PRN    glucose chewable tablet 16 g  4 Tablet Oral PRN    glucagon (GLUCAGEN) injection 1 mg  1 mg IntraMUSCular PRN    insulin lispro (HUMALOG) injection   SubCUTAneous AC&HS     ______________________________________________________________________  EXPECTED LENGTH OF STAY: - - -  ACTUAL LENGTH OF STAY:          2                 Isaura Gilmore MD

## 2022-02-20 NOTE — PROGRESS NOTES
Bedside shift change report given to Trista Stokes RN (oncoming nurse) by  Avtar Hodgson RN (offgoing nurse). Report included the following information SBAR, Cardiac Rhythm a fib and Dual Neuro Assessment.

## 2022-02-20 NOTE — PROGRESS NOTES
Bedside shift change report given to [facility] (oncoming nurse) by Marya Hess RN (offgoing nurse). Report included the following information SBAR, Kardex, ED Summary, OR Summary, Procedure Summary, Intake/Output, MAR, Cardiac Rhythm NSR to sinus arythmia, Alarm Parameters  and Dual Neuro Assessment.

## 2022-02-21 NOTE — PROGRESS NOTES
Bedside shift change report given to [employer] (oncoming nurse) by Patrick Rendon RN (offgoing nurse). Report included the following information SBAR, Kardex, ED Summary, OR Summary, Procedure Summary, Intake/Output, MAR, Cardiac Rhythm A Fib, Alarm Parameters  and Dual Neuro Assessment.

## 2022-02-21 NOTE — PROGRESS NOTES
Problem: Self Care Deficits Care Plan (Adult)  Goal: *Acute Goals and Plan of Care (Insert Text)  Description: FUNCTIONAL STATUS PRIOR TO ADMISSION: Patient admitted from SNF, reports she uses a rollator for mobility, assist for all ADLs/IADLs but unclear as to level of assist.      HOME SUPPORT: Admitted from SNF    Occupational Therapy Goals  Initiated 2/21/2022  1. Patient will perform grooming EOB with minimal assistance/contact guard assist within 7 day(s). 2.  Patient will perform anterior neck to thigh bathing with moderate assistance  within 7 day(s). 3.  Patient will perform toilet transfers to/from [facility] with moderate assistance x2 within 7 day(s). 4.  Patient will perform lower body dressing with maximal assistance within 7 day(s). 5.  Patient will perform all aspects of toileting with maximal assistance within 7 day(s). 6.  Patient will participate in upper extremity therapeutic exercise/activities with minimal assistance/contact guard assist for 10 minutes within 7 day(s). 7.  Patient will utilize energy conservation techniques during functional activities with verbal and visual cues within 7 day(s). Outcome: Not Met     OCCUPATIONAL THERAPY EVALUATION  Patient: Marisel Bledsoe (2 y.o. female)  Date: 2/21/2022  Primary Diagnosis: Hyponatremia [E87.1]  Procedure(s) (LRB):  INFUSION CATHETER INSERTION (N/A) 2 Days Post-Op   Precautions: Fall    ASSESSMENT  Based on the objective data described below, the patient presents with decreased balance, strength, coordination, cognition, AROM, activity tolerance, safety awareness, and distal lower body access s/p admission for hyponatremia. Patient is a limited historian, hard of hearing, admitted from SNF where she received assist for all ADLs/IADLs and mobility, reports rollator use. She now requires Mod-Max A x2 for limited OOB mobility with marc hand held assist Mod-Total A for basic ADLs, Total A for IADLs.  Poor standing balance noted with L and posterior lean, only able to briefly sidestep up the bed with quick fatigue, unable to reach towards feet to don socks. Current Level of Function Impacting Discharge (ADLs/self-care): Mod-Total A for ADLs, Mod-Max A x2 for limited OOB mobility    Functional Outcome Measure: The patient scored Total: 10/100 on the Barthel Index outcome measure which is indicative of being totally dependent in basic self-care. Other factors to consider for discharge: fall risk, assist of 2, PMH, PLOF     Patient will benefit from skilled therapy intervention to address the above noted impairments. PLAN :  Recommendations and Planned Interventions: self care training, functional mobility training, therapeutic exercise, balance training, visual/perceptual training, therapeutic activities, cognitive retraining, endurance activities, neuromuscular re-education, patient education, home safety training, and family training/education    Frequency/Duration: Patient will be followed by occupational therapy 3 times a week to address goals. Recommendation for discharge: (in order for the patient to meet his/her long term goals)  Therapy up to 5 days/week in SNF setting vs LTC    This discharge recommendation:  Has been made in collaboration with the attending provider and/or case management    IF patient discharges home will need the following DME: To be determined (TBD)         SUBJECTIVE:   Patient stated I'm so concerned, will I make it out alive? I hope so.     OBJECTIVE DATA SUMMARY:   HISTORY:   Past Medical History:   Diagnosis Date    Anxiety     Bleeding nose     Chronic atrial fibrillation ([facility] [address]     Diabetes ([facility] [address]     Hypertension      Past Surgical History:   Procedure Laterality Date    HX CATARACT REMOVAL      HX CHOLECYSTECTOMY      HX HEENT      myringotomy    HX HEENT  07/2016    basal cell removal -forehead    HX TONSILLECTOMY      childhood    HX TYMPANOSTOMY  07/11/2016    left ear    HX TYMPANOSTOMY bilateral       Expanded or extensive additional review of patient history:     Home Situation  Home Environment: [address] [place] [address] Name: Elly Hurley  One/Two Story Residence: One story  Living Alone: No  Support Systems: Other Family Member(s),Skilled Nursing Facility  Patient Expects to be Discharged to[de-identified] Skilled nursing facility  Current DME Used/Available at Home: Walker, rolling    Hand dominance: Right    EXAMINATION OF PERFORMANCE DEFICITS:  Cognitive/Behavioral Status:  Neurologic State: Alert  Orientation Level: Oriented to person;Oriented to place; Disoriented to situation;Disoriented to time  Cognition: Decreased attention/concentration; Follows commands; Impaired decision making;Memory loss;Poor safety awareness  Perception: Appears intact  Perseveration: No perseveration noted  Safety/Judgement: Awareness of environment;Decreased awareness of need for assistance;Decreased awareness of need for safety;Decreased insight into deficits    Skin: Appears intact    Edema: None    Hearing:       Vision/Perceptual:    Tracking: Able to track stimulus in all quadrants w/o difficulty                           Corrective Lenses: Reading glasses    Range of Motion:  AROM: Generally decreased, functional                         Strength:  Strength: Generally decreased, functional                Coordination:  Coordination: Generally decreased, functional  Fine Motor Skills-Upper: Left Intact; Right Intact    Gross Motor Skills-Upper: Left Impaired;Right Impaired      Balance:  Sitting: Impaired (leans to the left)  Sitting - Static: Fair (occasional)  Sitting - Dynamic: Poor (constant support)  Standing: Impaired; With support  Standing - Static: Constant support;Poor  Standing - Dynamic : Constant support;Poor    Functional Mobility and Transfers for ADLs:  Bed Mobility:  Supine to Sit: Moderate assistance;Assist x1;Assist x2  Sit to Supine: Moderate assistance;Assist x2  Scooting:  Moderate assistance    Transfers:  Sit to Stand: Moderate assistance;Maximum assistance;Assist x2  Stand to Sit: Moderate assistance;Maximum assistance;Assist x2  Toilet Transfer : Maximum assistance;Assist x2 (infer to [facility] per mobility)  Assistive Device : Gait Belt    ADL Assessment:  Feeding: Minimum assistance    Oral Facial Hygiene/Grooming: Moderate assistance    Bathing: Maximum assistance    Upper Body Dressing: Moderate assistance    Lower Body Dressing: Total assistance    Toileting: Total assistance                ADL Intervention and task modifications:    Lower Body Dressing Assistance  Dressing Assistance: Total assistance(dependent)  Socks: Total assistance (dependent)  Leg Crossed Method Used: No  Position Performed: Long sitting on bed  Cues: Physical assistance; Tactile cues provided;Verbal cues provided;Visual cues provided    Toileting  Toileting Assistance: Total assistance(dependent)  Bladder Hygiene: Total assistance (dependent)    Cognitive Retraining  Safety/Judgement: Awareness of environment;Decreased awareness of need for assistance;Decreased awareness of need for safety;Decreased insight into deficits    Functional Measure:    Barthel Index:  Bathin  Bladder: 0  Bowels: 5  Groomin  Dressin  Feedin  Mobility: 0  Stairs: 0  Toilet Use: 0  Transfer (Bed to Chair and Back): 0  Total: 10/100      The Barthel ADL Index: Guidelines  1. The index should be used as a record of what a patient does, not as a record of what a patient could do. 2. The main aim is to establish degree of independence from any help, physical or verbal, however minor and for whatever reason. 3. The need for supervision renders the patient not independent. 4. A patient's performance should be established using the best available evidence. Asking the patient, friends/relatives and nurses are the usual sources, but direct observation and common sense are also important. However direct testing is not needed.   5. Usually the patient's performance over the preceding 24-48 hours is important, but occasionally longer periods will be relevant. 6. Middle categories imply that the patient supplies over 50 per cent of the effort. 7. Use of aids to be independent is allowed. Score Interpretation (from [facility])    Independent   60-79 Minimally independent   40-59 Partially dependent   20-39 Very dependent   <20 Totally dependent     -Yun Pereyra., BarthelTOY. (1965). Functional evaluation: the Barthel Index. [address] ([address] (1997). The Barthel activities of daily living index: self-reporting versus actual performance in the old (> or = 75 years). Journal of [address] [place] [address] 45(7), [facility], CHRISTIANNE, [place]., Isabel Vo. (1999). Measuring the change in disability after inpatient rehabilitation; comparison of the responsiveness of the Barthel Index and Functional [place] Measure. Journal of Neurology, Neurosurgery, and Psychiatry, 66(4), 128-877. Carmelina Cochran, N.J.A, ONELIA Whitlock, & Kerry Valentin MCandyA. (2004) Assessment of post-stroke quality of life in cost-effectiveness studies: The usefulness of the Barthel Index and the EuroQoL-5D. Quality of Life Research, 15, 595-55     Occupational Therapy Evaluation Charge Determination   History Examination Decision-Making   LOW Complexity : Brief history review  MEDIUM Complexity : 3-5 performance deficits relating to physical, cognitive , or psychosocial skils that result in activity limitations and / or participation restrictions MEDIUM Complexity : Patient may present with comorbidities that affect occupational performnce.  Miniml to moderate modification of tasks or assistance (eg, physical or verbal ) with assesment(s) is necessary to enable patient to complete evaluation       Based on the above components, the patient evaluation is determined to be of the following complexity level: LOW   Pain Rating:  None reported    Activity Tolerance:   Fair    After treatment patient left in no apparent distress:    Supine in bed, Call bell within reach, Bed / chair alarm activated, and Side rails x 3    COMMUNICATION/EDUCATION:   The patients plan of care was discussed with: Physical therapist and Registered nurse. Home safety education was provided and the patient/caregiver indicated understanding., Patient/family have participated as able in goal setting and plan of care. , and Patient/family agree to work toward stated goals and plan of care. This patients plan of care is appropriate for delegation to [facility].     Thank you for this referral.  LEVI Lombardo, OTR/L  Time Calculation: 17 mins

## 2022-02-21 NOTE — PROGRESS NOTES
Bedside shift change report given to Dilshad Simpson RN (oncoming nurse) by Stephanie Campbell RN (offgoing nurse). Report included the following information SBAR, Cardiac Rhythm Sinus Unice Clas and Dual Neuro Assessment.

## 2022-02-21 NOTE — PROGRESS NOTES
[address] visit attempted. Mrs. Almendarez was sound asleep. She is [employer]. Prayer for spiritual communion offered for her well-being.     ISIAH Pitt, RN, ACSW, LCSW   Page:  [phone]

## 2022-02-21 NOTE — PROGRESS NOTES
Problem: Mobility Impaired (Adult and Pediatric)  Goal: *Acute Goals and Plan of Care (Insert Text)  Description: FUNCTIONAL STATUS PRIOR TO ADMISSION: Patient was admitted from SNF. Pt confused at time of evaluation and unable to provide reliable prior level of function. Reported using a walker    [address]: Pt admitted from SNF    Physical Therapy Goals  Initiated 2/21/2022  1. Patient will move from supine to sit and sit to supine  in bed with minimal assistance/contact guard assist within 7 day(s). 2.  Patient will transfer from bed to chair and chair to bed with moderate assistance  using the least restrictive device within 7 day(s). 3.  Patient will perform sit to stand with moderate assistance  within 7 day(s). 4.  Patient will ambulate with moderate assistance  for 10 feet with the least restrictive device within 7 day(s). Outcome: Progressing Towards Goal   PHYSICAL THERAPY EVALUATION  Patient: Charmaine Ellis (19 y.o. female)  Date: 2/21/2022  Primary Diagnosis: Hyponatremia [E87.1]  Procedure(s) (LRB):  INFUSION CATHETER INSERTION (N/A) 2 Days Post-Op   Precautions:   Fall      ASSESSMENT  Based on the objective data described below, the patient presents with generalized weakness, decreased functional mobility, impaired seated and standing balance, impaired gait, confusion s/p admission from SNF on 2/18 with hyponatremia. Pt received supine in bed and agreeable to therapy. Pt oriented to self and hospital. Pt tolerated evaluation fairly well. Pt completed supine to sit with mod A x 2. Noted left lateral leaning in seated and standing position. Pt required mod-max A x 2 sit<>stands from the bed and noted heavy leaning to the L and posteriorly. Pt required max A x 2 side stepping with max A to facilitate weight shifting. Pt assisted back to supine position with all needs met. Recommend SNF placement upon discharge; may benefit from consideration of LTC. .    Current Level of Function Impacting Discharge (mobility/balance): mod A x 2 supine to sit, mod-max A x 2 sit<>stand, max A X 2 side stepping    Functional Outcome Measure: The patient scored Total Score: 2/28 on the Tinetti outcome measure which is indicative of high fall risk. Other factors to consider for discharge: admitted from SNF, 2 person assistance     Patient will benefit from skilled therapy intervention to address the above noted impairments. PLAN :  Recommendations and Planned Interventions: bed mobility training, transfer training, gait training, therapeutic exercises, neuromuscular re-education, patient and family training/education, and therapeutic activities      Frequency/Duration: Patient will be followed by physical therapy:  3 times a week to address goals. Recommendation for discharge: (in order for the patient to meet his/her long term goals)  Therapy up to 5 days/week in SNF setting. Consider LTC    This discharge recommendation:  Has been made in collaboration with the attending provider and/or case management    IF patient discharges home will need the following DME: to be determined (TBD)         SUBJECTIVE:   Patient stated Ria Barry we finished here? Grady Taylor    OBJECTIVE DATA SUMMARY:   HISTORY:    Past Medical History:   Diagnosis Date    Anxiety     Bleeding nose     Chronic atrial fibrillation ([address]     Diabetes ([facility] [address]     Hypertension      Past Surgical History:   Procedure Laterality Date    HX CATARACT REMOVAL      HX CHOLECYSTECTOMY      HX HEENT      myringotomy    HX HEENT  07/2016    basal cell removal -forehead    HX TONSILLECTOMY      childhood    HX TYMPANOSTOMY  07/11/2016    left ear    HX TYMPANOSTOMY      bilateral       Personal factors and/or comorbidities impacting plan of care:     Home Situation  Home Environment: [address] [place] [address] Name: Latricia  One/Two Story Residence: One story  Living Alone: No  Support Systems: Other Family Member(s),Skilled Nursing Facility  Patient Expects to be Discharged to[de-identified] Skilled nursing facility  Current DME Used/Available at Home: Walker, rolling    EXAMINATION/PRESENTATION/DECISION MAKING:   Critical Behavior:  Neurologic State: Alert  Orientation Level: Oriented to person,Oriented to place,Oriented to situation,Disoriented to time  Cognition: Decreased attention/concentration     Hearing:     Skin:    Edema:   Range Of Motion:  AROM: Generally decreased, functional                       Strength:    Strength: Generally decreased, functional                    Tone & Sensation:                                  Coordination:     Vision:      Functional Mobility:  Bed Mobility:     Supine to Sit: Moderate assistance;Assist x1;Assist x2  Sit to Supine: Moderate assistance;Assist x2  Scooting: Moderate assistance  Transfers:  Sit to Stand: Moderate assistance;Maximum assistance;Assist x2  Stand to Sit: Moderate assistance;Maximum assistance;Assist x2                       Balance:   Sitting: Impaired (leans to the left)  Sitting - Static: Fair (occasional)  Sitting - Dynamic: Poor (constant support)  Standing: Impaired; With support  Standing - Static: Constant support;Poor  Standing - Dynamic : Constant support;Poor  Ambulation/Gait Training:  Distance (ft): 2 Feet (ft) (side step along EOB)  Assistive Device: Gait belt (bilateral HHA)  Ambulation - Level of Assistance: Maximum assistance;Assist x2        Gait Abnormalities: Decreased step clearance;Trunk sway increased        Base of Support: Center of gravity altered (leans posterior and to the L)     Speed/Aliyah: Shuffled      Functional Measure:  Tinetti test:    Sitting Balance: 0  Arises: 0  Attempts to Rise: 0  Immediate Standing Balance: 0  Standing Balance: 0  Nudged: 0  Eyes Closed: 0  Turn 360 Degrees - Continuous/Discontinuous: 0  Turn 360 Degrees - Steady/Unsteady: 0  Sitting Down: 0  Balance Score: 0 Balance total score  Indication of Gait: 0  R Step Length/Height: 0  L Step Length/Height: 0  R Foot Clearance: 1  L Foot Clearance: 1  Step Symmetry: 0  Step Continuity: 0  Path: 0  Trunk: 0  Walking Time: 0  Gait Score: 2 Gait total score  Total Score: 2/28 Overall total score         Tinetti Tool Score Risk of Falls  <19 = High Fall Risk  19-24 = Moderate Fall Risk  25-28 = Low Fall Risk  Tinetti ME. Performance-Oriented Assessment of Mobility Problems in Elderly Patients. [facility] 66; [id number]. (Scoring Description: PT Bulletin Feb. 10, 1993)    Older adults: Donnell Valenzuela et al, 2009; n = 1000 [facility] elderly evaluated with ABC, MIGDALIA, ADL, and IADL)  · Mean MIGDALIA score for males aged 69-68 years = 26.21(3.40)  · Mean MIGDALIA score for females age 69-68 years = 25.16(4.30)  · Mean MIGDALIA score for males over 80 years = 23.29(6.02)  · Mean MIGDALIA score for females over 80 years = 17.20(8.32)        Based on the above components, the patient evaluation is determined to be of the following complexity level: MEDIUM    Pain Rating:  Pt denied pain    Activity Tolerance:   Fair      After treatment patient left in no apparent distress:   Supine in bed, Call bell within reach, Bed / chair alarm activated, and Side rails x 3    COMMUNICATION/EDUCATION:   The patients plan of care was discussed with: Occupational therapist and Registered nurse. Fall prevention education was provided and the patient/caregiver indicated understanding., Patient/family have participated as able in goal setting and plan of care., Patient/family agree to work toward stated goals and plan of care. , and Patient is unable to participate in goal setting and plan of care.     Thank you for this referral.  Sandip Everett, PT, DPT   Time Calculation: 16 mins

## 2022-02-21 NOTE — PROGRESS NOTES
SLP Contact Note    Consult placed as pt asking for easy to chew diet. This is more than fine with SLP. There are no signs of clinical intolerance of PO (no changes in WBC or signs of aspiration on chest imaging). Pt almost certainly with a degree of presbyphagia given age and therefore easy to chew diet is reasonable. Pt already placed on easy to chew diet. Will sign off.         Thank you,  SAMIA ClayEd, [id number] [facility]  Speech-Language Pathologist

## 2022-02-21 NOTE — PROGRESS NOTES
Transition of Care Plan: SNF ([address] has accepted back)    RUR: 13% low    PCP F/U: Priscila Sy MD    Disposition: SNF    Transportation: BLS    Main ContactStecamilo Frank - [phone]     1333: Noted previous CM note that patient came from [facility] and [address] [place] [address]. Family was working on getting patient admitted to [address], however, therapy team is recommending SNF. [facility] able to take back. Left message for sister Felix Alexandra to return this CM call.      Scott Choi RN, CRM

## 2022-02-21 NOTE — PROGRESS NOTES
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 Urine chemistries consistent with that  - Exacerbation this time is likely from cymbalta and Lasix use +/- one dose of Bactrim   - s/p 3% saline and Na at 120   - One dose of Tolvaptan   - Goal Na : not to exceed 128 by tomorrow am   - Q8HR  Labs    HTN   - holding Norvasc due to Low BP      Chronic A fib  Pulm HTN   Severe TR, Mild MR  JOSE     Care Plan discussed with:  Pt     Interval History:  Seen and examined. MS slightly better. No agitated. Na at 120. No other new symptoms. Review of Systems: Review of systems not obtained due to patient factors. Current Medications:   Current Facility-Administered Medications   Medication Dose Route Frequency    tolvaptan (SAMSCA) tablet (0.5 X 30 MG) 15 mg  15 mg Oral ONCE    . PHARMACY TO SUBSTITUTE PER PROTOCOL (Reordered from: tobramycin-dexamethasone (Tobradex ST) drps ophthalmix suspension)    Per Protocol    [Held by provider] amLODIPine (NORVASC) tablet 5 mg  5 mg Oral DAILY    sodium chloride (NS) flush 5-40 mL  5-40 mL IntraVENous Q8H    sodium chloride (NS) flush 5-40 mL  5-40 mL IntraVENous PRN    [Held by provider] metoprolol succinate (TOPROL-XL) XL tablet 25 mg  25 mg Oral DAILY    sodium chloride (NS) flush 5-40 mL  5-40 mL IntraVENous Q8H    sodium chloride (NS) flush 5-40 mL  5-40 mL IntraVENous PRN    acetaminophen (TYLENOL) tablet 650 mg  650 mg Oral Q4H PRN    heparin (porcine) injection 5,000 Units  5,000 Units SubCUTAneous Q8H    hydrALAZINE (APRESOLINE) 20 mg/mL injection 20 mg  20 mg IntraVENous Q6H PRN    glucose chewable tablet 16 g  4 Tablet Oral PRN    glucagon (GLUCAGEN) injection 1 mg  1 mg IntraMUSCular PRN    insulin lispro (HUMALOG) injection   SubCUTAneous AC&HS      Allergies   Allergen Reactions    Losartan Angioedema     Tongue swelling      Amoxicillin Other (comments)     \"makes me feel like I\"m going to faint\"    Gabapentin Drowsiness     * pt states this med makes her feel very drowsy , gives her a drunk feeling.  Levaquin [Levofloxacin] Hives and Rash    Lorazepam Other (comments)     Feeling faint.  Lyrica [Pregabalin] Rash    Sertraline Other (comments)     Feeling faint. Objective:  Vitals:    Vitals:    02/21/22 0220 02/21/22 0356 02/21/22 0554 02/21/22 0556   BP: (!) 138/55   107/62   Pulse: (!) 49 (!) 45 (!) 50 (!) 53   Resp: 14   14   Temp: 97.8 °F (36.6 °C)   97.7 °F (36.5 °C)   SpO2: 98%   98%   Weight:       Height:         Intake and Output:  No intake/output data recorded. 02/19 1901 - 02/21 0700  In: 800 [P.O.:800]  Out: 300 [Urine:300]    Physical Examination:  General:  calm and no distress  HEENT: PERRL,  No Pallor , No Icterus  Neck: Supple,no mass palpable  Lungs : CTA  CVS: RRR, S1 S2 normal, systolic murmur   Abdomen: Soft, NT, BS +  Extremities: trace Edema  Skin: hyperemia of feet   MS: arthritis    Neurologic: confused          []    High complexity decision making was performed  []    Patient is at high-risk of decompensation with multiple organ involvement    Lab Data Personally Reviewed: I have reviewed all the pertinent labs, microbiology data and radiology studies during assessment.     Recent Labs     02/21/22  0253 02/20/22  1856 02/20/22  1321 02/20/22  0702 02/20/22  0020 02/18/22  1457 02/18/22  0900   * 121* 117* 116* 116*   < > 112*   K 4.0 4.2 4.6 4.2 4.2   < > 4.1   CL 89* 89* 85* 84* 84*   < > 79*   CO2 26 28 24 24 26   < > 23   GLU 84 193* 148* 104* 111*   < > 105*   BUN 18 18 14 13 14   < > 11   CREA 0.59 0.70 0.63 0.55 0.52*   < > 0.60   CA 8. 3* 7.8* 8.4* 8.6 8.2*   < > 8.7   MG  --   --   --   --   --   --  1.8   ALB  --   --   --   --   --   --  3.3*   ALT  --   --   --   --   --   --  45    < > = values in this interval not displayed.      Recent Labs     02/21/22  0253 02/20/22  0020 02/19/22  0325 02/18/22  0900   WBC 10.5 12.9* 10.6 12.4*   HGB 10.0* 10.4* 10.5* 10.6*   HCT 28.5* 29.7* 30.1* 29.4*    320 301 308     No results found for: SDES  Lab Results   Component Value Date/Time    Culture result: NO GROWTH 5 DAYS 12/20/2021 12:30 AM     Recent Results (from the past 24 hour(s))   GLUCOSE, POC    Collection Time: 02/20/22  8:27 AM   Result Value Ref Range    Glucose (POC) 119 (H) 65 - 117 mg/dL    Performed by Chato Vo (CON)    GLUCOSE, POC    Collection Time: 02/20/22 12:12 PM   Result Value Ref Range    Glucose (POC) 141 (H) 65 - 117 mg/dL    Performed by Chato Vo (CON)    METABOLIC PANEL, BASIC    Collection Time: 02/20/22  1:21 PM   Result Value Ref Range    Sodium 117 (LL) 136 - 145 mmol/L    Potassium 4.6 3.5 - 5.1 mmol/L    Chloride 85 (L) 97 - 108 mmol/L    CO2 24 21 - 32 mmol/L    Anion gap 8 5 - 15 mmol/L    Glucose 148 (H) 65 - 100 mg/dL    BUN 14 6 - 20 MG/DL    Creatinine 0.63 0.55 - 1.02 MG/DL    BUN/Creatinine ratio 22 (H) 12 - 20      GFR est AA >60 >60 ml/min/1.73m2    GFR est non-AA >60 >60 ml/min/1.73m2    Calcium 8.4 (L) 8.5 - 10.1 MG/DL   GLUCOSE, POC    Collection Time: 02/20/22  4:27 PM   Result Value Ref Range    Glucose (POC) 146 (H) 65 - 117 mg/dL    Performed by Dayana Lyons    METABOLIC PANEL, BASIC    Collection Time: 02/20/22  6:56 PM   Result Value Ref Range    Sodium 121 (L) 136 - 145 mmol/L    Potassium 4.2 3.5 - 5.1 mmol/L    Chloride 89 (L) 97 - 108 mmol/L    CO2 28 21 - 32 mmol/L    Anion gap 4 (L) 5 - 15 mmol/L    Glucose 193 (H) 65 - 100 mg/dL    BUN 18 6 - 20 MG/DL    Creatinine 0.70 0.55 - 1.02 MG/DL    BUN/Creatinine ratio 26 (H) 12 - 20      GFR est AA >60 >60 ml/min/1.73m2    GFR est non-AA >60 >60 ml/min/1.73m2    Calcium 7.8 (L) 8.5 - 10.1 MG/DL   GLUCOSE, POC    Collection Time: 02/20/22 10:12 PM   Result Value Ref Range    Glucose (POC) 210 (H) 65 - 117 mg/dL    Performed by Ritesh Land    CBC WITH AUTOMATED DIFF    Collection Time: 02/21/22  2:53 AM   Result Value Ref Range    WBC 10.5 3.6 - 11.0 K/uL    RBC 3.35 (L) 3.80 - 5.20 M/uL    HGB 10.0 (L) 11.5 - 16.0 g/dL    HCT 28.5 (L) 35.0 - 47.0 %    MCV 85.1 80.0 - 99.0 FL    MCH 29.9 26.0 - 34.0 PG    MCHC 35.1 30.0 - 36.5 g/dL    RDW 14.9 (H) 11.5 - 14.5 %    PLATELET 742 240 - 764 K/uL    MPV 9.1 8.9 - 12.9 FL    NRBC 0.0 0  WBC    ABSOLUTE NRBC 0.00 0.00 - 0.01 K/uL    NEUTROPHILS 79 (H) 32 - 75 %    LYMPHOCYTES 9 (L) 12 - 49 %    MONOCYTES 10 5 - 13 %    EOSINOPHILS 1 0 - 7 %    BASOPHILS 0 0 - 1 %    IMMATURE GRANULOCYTES 1 (H) 0.0 - 0.5 %    ABS. NEUTROPHILS 8.4 (H) 1.8 - 8.0 K/UL    ABS. LYMPHOCYTES 0.9 0.8 - 3.5 K/UL    ABS. MONOCYTES 1.1 (H) 0.0 - 1.0 K/UL    ABS. EOSINOPHILS 0.1 0.0 - 0.4 K/UL    ABS. BASOPHILS 0.0 0.0 - 0.1 K/UL    ABS. IMM. GRANS. 0.1 (H) 0.00 - 0.04 K/UL    DF AUTOMATED     METABOLIC PANEL, BASIC    Collection Time: 02/21/22  2:53 AM   Result Value Ref Range    Sodium 120 (L) 136 - 145 mmol/L    Potassium 4.0 3.5 - 5.1 mmol/L    Chloride 89 (L) 97 - 108 mmol/L    CO2 26 21 - 32 mmol/L    Anion gap 5 5 - 15 mmol/L    Glucose 84 65 - 100 mg/dL    BUN 18 6 - 20 MG/DL    Creatinine 0.59 0.55 - 1.02 MG/DL    BUN/Creatinine ratio 31 (H) 12 - 20      GFR est AA >60 >60 ml/min/1.73m2    GFR est non-AA >60 >60 ml/min/1.73m2    Calcium 8.3 (L) 8.5 - 10.1 MG/DL   GLUCOSE, POC    Collection Time: 02/21/22  7:47 AM   Result Value Ref Range    Glucose (POC) 128 (H) 65 - 117 mg/dL    Performed by Yaneth Ramírez I have reviewed the flowsheets. Chart and Pertinent Notes have been reviewed. No change in PMH ,family and social history from Consult note.       Patricia Treadwell MD  [address] [facility]

## 2022-02-21 NOTE — TELEPHONE ENCOUNTER
----- Message from Seda Burrows sent at 2/21/2022  2:23 PM EST -----  Subject: Message to Provider    QUESTIONS  Information for Provider? Pt was in [facility] for low sodium (109) in   December for 10 days. Went to a rehab for lack of strength for 1 month. Went back into hospital Friday Morning for low sodium (109). Her nephew,   Bre Bonilla, is calling to see if Dr. Erasmo Starks is going to come see her at   hospital. Also, asking if she could get physical or occupational therapy   twice per day at the hospital while she is admitted to help with her   strength. Please call Bre Bonilla to confirm. ---------------------------------------------------------------------------  --------------  Stanley KAUR  What is the best way for the office to contact you? OK to leave message on   voicemail  Preferred Call Back Phone Number? [phone]  ---------------------------------------------------------------------------  --------------  SCRIPT ANSWERS  Relationship to Patient? Third Party  Representative Name?  Bre Bonilla
Spoke with Jhoan Guard - nephew of pt (not on HIPAA). Advised him of this. Recommended  while pt was there at [facility] to have them update with who she wanted to allow us to talk with and be able to give information to. He stated pt requested he call me personally to find out if Dr Gilson Liriano was coming by to see her and if he could order PT and OT for her while she was admitted? Advised him while pt is admitted she is under the care of the hospitalist. That they can request this and have them order it if appropriate. He thank me for info and will take cake of getting this done. Advised him to please let pt know we are praying for her speedy recovery.  Will forward to MD.
CXR: bilateral infiltrates c/w covid

## 2022-02-22 NOTE — PROGRESS NOTES
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    - Exacerbation this time is likely from cymbalta and Lasix use +/- one dose of Bactrim   - s/p 3% saline ,  One dose of Tolvaptan so far   - Tolvaptan 30 mg today   - Goal Na : not to exceed 128 by tomorrow am   - Q8HR  Labs  - continue FR 1200 cc/ day     HTN   - restarted Metoprolol but holding norvasc      Chronic A fib  Pulm HTN   Severe TR, Mild MR  JOSE     Care Plan discussed with:  Pt     Interval History:  Seen and examined. She is frustated that she can eat what she wants. She is on chewable diet -- may be we can change that. Still feels tired. BP improving. No new sx     Review of Systems: Review of systems not obtained due to patient factors.     Current Medications:   Current Facility-Administered Medications   Medication Dose Route Frequency    tolvaptan (SAMSCA) tablet 30 mg  30 mg Oral ONCE    neomycin-polymyxin-dexamethasone (DEXACIDIN, MAXITROL) 3.5mg/mL-10,000 unit/mL-0.1 % ophthalmic suspension 1 Drop  1 Drop Both Eyes QID    [Held by provider] amLODIPine (NORVASC) tablet 5 mg  5 mg Oral DAILY    sodium chloride (NS) flush 5-40 mL  5-40 mL IntraVENous Q8H    sodium chloride (NS) flush 5-40 mL  5-40 mL IntraVENous PRN    metoprolol succinate (TOPROL-XL) XL tablet 25 mg  25 mg Oral DAILY    sodium chloride (NS) flush 5-40 mL  5-40 mL IntraVENous Q8H    sodium chloride (NS) flush 5-40 mL  5-40 mL IntraVENous PRN    acetaminophen (TYLENOL) tablet 650 mg  650 mg Oral Q4H PRN    heparin (porcine) injection 5,000 Units  5,000 Units SubCUTAneous Q8H    hydrALAZINE (APRESOLINE) 20 mg/mL injection 20 mg  20 mg IntraVENous Q6H PRN    glucose chewable tablet 16 g  4 Tablet Oral PRN    glucagon (GLUCAGEN) injection 1 mg  1 mg IntraMUSCular PRN    insulin lispro (HUMALOG) injection   SubCUTAneous AC&HS      Allergies   Allergen Reactions    Losartan Angioedema     Tongue swelling      Amoxicillin Other (comments)     \"makes me feel like I\"m going to faint\"    Gabapentin Drowsiness     * pt states this med makes her feel very drowsy , gives her a drunk feeling.  Levaquin [Levofloxacin] Hives and Rash    Lorazepam Other (comments)     Feeling faint.  Lyrica [Pregabalin] Rash    Sertraline Other (comments)     Feeling faint. Objective:  Vitals:    Vitals:    02/22/22 0158 02/22/22 0210 02/22/22 0356 02/22/22 0556   BP: (!) 154/46      Pulse: 60 (!) 59 (!) 52 (!) 51   Resp: 16      Temp: 97.8 °F (36.6 °C)      SpO2: 95%      Weight:  79.1 kg (174 lb 6.1 oz)     Height:         Intake and Output:  No intake/output data recorded. 02/20 1901 - 02/22 0700  In: 500 [P.O.:500]  Out: 1800 [Urine:1800]    Physical Examination:  General:  calm and no distress  HEENT: PERRL,  No Pallor , No Icterus  Neck: Supple,no mass palpable  Lungs : CTA  CVS: RRR, S1 S2 normal, systolic murmur   Abdomen: Soft, NT, BS +  Extremities: trace Edema  Skin: hyperemia of feet   MS: arthritis    Neurologic: confused          []    High complexity decision making was performed  []    Patient is at high-risk of decompensation with multiple organ involvement    Lab Data Personally Reviewed: I have reviewed all the pertinent labs, microbiology data and radiology studies during assessment.     Recent Labs     02/22/22  0208 02/21/22  1834 02/21/22  1246 02/21/22  0253 02/20/22  1856   * 119* 119* 120* 121*   K 4.2 4.3 4.2 4.0 4.2   CL 89* 88* 89* 89* 89*   CO2 26 26 22 26 28   GLU 96 174* 129* 84 193*   BUN 18 18 17 18 18   CREA 0.61 0.65 0.61 0.59 0.70   CA 8.8 8.5 8.4* 8.3* 7.8*     Recent Labs     02/22/22  0208 02/21/22  0253 02/20/22  0020   WBC 10.2 10.5 12.9*   HGB 9.9* 10.0* 10.4*   HCT 28.6* 28.5* 29.7*    304 320     No results found for: SDES  Lab Results   Component Value Date/Time    Culture result: NO GROWTH 5 DAYS 12/20/2021 12:30 AM     Recent Results (from the past 24 hour(s))   GLUCOSE, POC    Collection Time: 02/21/22 11:48 AM   Result Value Ref Range    Glucose (POC) 152 (H) 65 - 117 mg/dL    Performed by Sandy Grimes    METABOLIC PANEL, BASIC    Collection Time: 02/21/22 12:46 PM   Result Value Ref Range    Sodium 119 (LL) 136 - 145 mmol/L    Potassium 4.2 3.5 - 5.1 mmol/L    Chloride 89 (L) 97 - 108 mmol/L    CO2 22 21 - 32 mmol/L    Anion gap 8 5 - 15 mmol/L    Glucose 129 (H) 65 - 100 mg/dL    BUN 17 6 - 20 MG/DL    Creatinine 0.61 0.55 - 1.02 MG/DL    BUN/Creatinine ratio 28 (H) 12 - 20      GFR est AA >60 >60 ml/min/1.73m2    GFR est non-AA >60 >60 ml/min/1.73m2    Calcium 8.4 (L) 8.5 - 10.1 MG/DL   GLUCOSE, POC    Collection Time: 02/21/22  4:36 PM   Result Value Ref Range    Glucose (POC) 206 (H) 65 - 117 mg/dL    Performed by Ade ROBLES)    METABOLIC PANEL, BASIC    Collection Time: 02/21/22  6:34 PM   Result Value Ref Range    Sodium 119 (LL) 136 - 145 mmol/L    Potassium 4.3 3.5 - 5.1 mmol/L    Chloride 88 (L) 97 - 108 mmol/L    CO2 26 21 - 32 mmol/L    Anion gap 5 5 - 15 mmol/L    Glucose 174 (H) 65 - 100 mg/dL    BUN 18 6 - 20 MG/DL    Creatinine 0.65 0.55 - 1.02 MG/DL    BUN/Creatinine ratio 28 (H) 12 - 20      GFR est AA >60 >60 ml/min/1.73m2    GFR est non-AA >60 >60 ml/min/1.73m2    Calcium 8.5 8.5 - 10.1 MG/DL   GLUCOSE, POC    Collection Time: 02/21/22 10:52 PM   Result Value Ref Range    Glucose (POC) 114 65 - 117 mg/dL    Performed by Elise Acuña    CBC WITH AUTOMATED DIFF    Collection Time: 02/22/22  2:08 AM   Result Value Ref Range    WBC 10.2 3.6 - 11.0 K/uL    RBC 3.34 (L) 3.80 - 5.20 M/uL    HGB 9.9 (L) 11.5 - 16.0 g/dL    HCT 28.6 (L) 35.0 - 47.0 %    MCV 85.6 80.0 - 99.0 FL    MCH 29.6 26.0 - 34.0 PG    MCHC 34.6 30.0 - 36.5 g/dL    RDW 15.5 (H) 11.5 - 14.5 %    PLATELET 579 597 - 856 K/uL    MPV 8.9 8.9 - 12.9 FL    NRBC 0.0 0  WBC    ABSOLUTE NRBC 0.00 0.00 - 0.01 K/uL    NEUTROPHILS 75 32 - 75 %    LYMPHOCYTES 12 12 - 49 %    MONOCYTES 11 5 - 13 %    EOSINOPHILS 1 0 - 7 %    BASOPHILS 0 0 - 1 %    IMMATURE GRANULOCYTES 1 (H) 0.0 - 0.5 %    ABS. NEUTROPHILS 7.7 1.8 - 8.0 K/UL    ABS. LYMPHOCYTES 1.2 0.8 - 3.5 K/UL    ABS. MONOCYTES 1.2 (H) 0.0 - 1.0 K/UL    ABS. EOSINOPHILS 0.1 0.0 - 0.4 K/UL    ABS. BASOPHILS 0.0 0.0 - 0.1 K/UL    ABS. IMM. GRANS. 0.1 (H) 0.00 - 0.04 K/UL    DF AUTOMATED     METABOLIC PANEL, BASIC    Collection Time: 02/22/22  2:08 AM   Result Value Ref Range    Sodium 121 (L) 136 - 145 mmol/L    Potassium 4.2 3.5 - 5.1 mmol/L    Chloride 89 (L) 97 - 108 mmol/L    CO2 26 21 - 32 mmol/L    Anion gap 6 5 - 15 mmol/L    Glucose 96 65 - 100 mg/dL    BUN 18 6 - 20 MG/DL    Creatinine 0.61 0.55 - 1.02 MG/DL    BUN/Creatinine ratio 30 (H) 12 - 20      GFR est AA >60 >60 ml/min/1.73m2    GFR est non-AA >60 >60 ml/min/1.73m2    Calcium 8.8 8.5 - 10.1 MG/DL   GLUCOSE, POC    Collection Time: 02/22/22  6:38 AM   Result Value Ref Range    Glucose (POC) 96 65 - 117 mg/dL    Performed by Polly Fuller            I have reviewed the flowsheets. Chart and Pertinent Notes have been reviewed. No change in PMH ,family and social history from Consult note.       [facility]

## 2022-02-22 NOTE — PROGRESS NOTES
Provided pastoral care visit to [facility] patient. Did not include sacramental care.     Saloni Pacheco

## 2022-02-22 NOTE — PROGRESS NOTES
Transition of Care Plan: SNF ([address] has accepted back, but sister declined for her to go back there)     RUR: 13% low     PCP F/U: Fitz Hernandez MD     Disposition: SNF     Transportation: BLS     Main Contact: Wandy Aguila - Sister - [phone] (cell)     1238: Spoke with sister Kalani Au. Discussed with her that therapy is recommending SNF. States she does not want patient to go back to [facility]. This CM has let her know that they have accepted patient back. Unless another facility accepts, patient will be discharged back to [facility]. Sister states she needs to discuss this information with the family. Wanted to know if [facility] was an option. Informed sister that PT/OT only come into the home a couple of times per week and would likely not get nursing care. Sister states she does want patient to go to a facility, but does not want her to go back to [facility]. States she will discuss with family and get back with this CM. Will continue to follow.      Pola Shay RN, CRM

## 2022-02-22 NOTE — PROGRESS NOTES
[facility] [facility] Adult  Hospitalist Group                                                                                          Hospitalist Progress Note  Elzbieta eHrnandez MD  Answering service: [phone] OR [phone] from in house phone        Date of Service:  2022  NAME:  Charmaine Ellis  :  1922  MRN:  [id number]      Admission Summary:   Per H and P\"80 y.o. female who presents with abnormal labs.   Patient is a resident of [facility], history was primarily obtained from patient as well as patient's niece,     Patient has history of hyponatremia, apparently recently patient was diagnosed with UTI, probably was recently started on Bactrim, yesterday had blood work done and was found to have a sodium of 115 and was sent to [address] [place] [address] for further management and evaluation. Patient was recently admitted to the hospital with hyponatremia in 2021, at that point of time patient may have been started on Bactrim too for UTI. Patient currently is resting in bed, appears to be comfortable, niece is at bedside, admits to global weakness but denies any other complaints or problems\". Interval history / Subjective:   Patient seen and examined. 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    -attributed likely due to recent meds -cymbalta,lasix,bactrim  -nephrology following-  Received 3 % NS and now on tolvaptan      # Chronic atrial fib: rate controlled ,not on AC prior to admission    # HTN-BP controlled with current regimen    # DM: glucose controlled     # Severe tricuspid regurgitation   Moderate to severe pulmonary HTN         Code status: full  Prophylaxis: heparin  Care Plan discussed with: patient,nurse  Anticipated Disposition: back to [facility] Problems  Date Reviewed: 2021          Codes Class Noted POA    Hyponatremia ICD-10-CM: E87.1  ICD-9-CM: 276.1  2021 Unknown                Review of Systems:   A comprehensive review of systems was negative except for that written in the HPI. Vital Signs:    Last 24hrs VS reviewed since prior progress note. Most recent are:  Visit Vitals  BP (!) 142/45   Pulse (!) 55   Temp 98.1 °F (36.7 °C)   Resp 14   Ht 4' 11\" (1.499 m)   Wt 79.1 kg (174 lb 6.1 oz)   SpO2 96%   BMI 35.22 kg/m²         Intake/Output Summary (Last 24 hours) at 2/22/2022 1114  Last data filed at 2/22/2022 0210  Gross per 24 hour   Intake    Output 2200 ml   Net -2200 ml        Physical Examination:     I had a face to face encounter with this patient and independently examined them on 2/22/2022 as outlined below:          Constitutional:  appears comfortable   ENT:  Oral mucosa moist,EOMI,anicteric sclera   Resp:  decreased breath sounds at bases, No wheezing. No accessory muscle use. CV:  Irregular rhythm, normal rate, systolic murmur+    GI:  Soft, non distended, obese,non tender. normoactive bowel sounds,    Musculoskeletal:  1+ LE edema,some erythema feet    Neurologic:  hard of hearing,moves all extremities, globallly weak,alert,oriented  X 2            Data Review:    Review and/or order of clinical lab test  Review and/or order of tests in the medicine section of [facility]      Labs:     Recent Labs     02/22/22  0208 02/21/22  0253   WBC 10.2 10.5   HGB 9.9* 10.0*   HCT 28.6* 28.5*    304     Recent Labs     02/22/22  0208 02/21/22  1834 02/21/22  1246   * 119* 119*   K 4.2 4.3 4.2   CL 89* 88* 89*   CO2 26 26 22   BUN 18 18 17   CREA 0.61 0.65 0.61   GLU 96 174* 129*   CA 8.8 8.5 8.4*     No results for input(s): ALT, AP, TBIL, TBILI, TP, ALB, GLOB, GGT, AML, LPSE in the last 72 hours. No lab exists for component: SGOT, GPT, AMYP, HLPSE  No results for input(s): INR, PTP, APTT, INREXT, INREXT in the last 72 hours. No results for input(s): FE, TIBC, PSAT, FERR in the last 72 hours.    No results found for: FOL, RBCF   No results for input(s): PH, PCO2, PO2 in the last 72 hours. No results for input(s): CPK, CKNDX, TROIQ in the last 72 hours.     No lab exists for component: CPKMB  Lab Results   Component Value Date/Time    Cholesterol, total 146 03/21/2016 10:31 AM    HDL Cholesterol 85 03/21/2016 10:31 AM    LDL, calculated 48 03/21/2016 10:31 AM    Triglyceride 64 03/21/2016 10:31 AM     Lab Results   Component Value Date/Time    Glucose (POC) 96 02/22/2022 06:38 AM    Glucose (POC) 114 02/21/2022 10:52 PM    Glucose (POC) 206 (H) 02/21/2022 04:36 PM    Glucose (POC) 152 (H) 02/21/2022 11:48 AM    Glucose (POC) 128 (H) 02/21/2022 07:47 AM     Lab Results   Component Value Date/Time    Color YELLOW/STRAW 02/18/2022 09:11 AM    Appearance CLEAR 02/18/2022 09:11 AM    Specific gravity 1.022 02/18/2022 09:11 AM    pH (UA) 7.0 02/18/2022 09:11 AM    Protein 300 (A) 02/18/2022 09:11 AM    Glucose Negative 02/18/2022 09:11 AM    Ketone 40 (A) 02/18/2022 09:11 AM    Bilirubin Negative 02/18/2022 09:11 AM    Urobilinogen 1.0 02/18/2022 09:11 AM    Nitrites Negative 02/18/2022 09:11 AM    Leukocyte Esterase TRACE (A) 02/18/2022 09:11 AM    Epithelial cells FEW 02/18/2022 09:11 AM    Bacteria Negative 02/18/2022 09:11 AM    WBC 0-4 02/18/2022 09:11 AM    RBC 0-5 02/18/2022 09:11 AM         Medications Reviewed:     Current Facility-Administered Medications   Medication Dose Route Frequency    neomycin-polymyxin-dexamethasone (DEXACIDIN, MAXITROL) 3.5mg/mL-10,000 unit/mL-0.1 % ophthalmic suspension 1 Drop  1 Drop Both Eyes QID    [Held by provider] amLODIPine (NORVASC) tablet 5 mg  5 mg Oral DAILY    sodium chloride (NS) flush 5-40 mL  5-40 mL IntraVENous Q8H    sodium chloride (NS) flush 5-40 mL  5-40 mL IntraVENous PRN    metoprolol succinate (TOPROL-XL) XL tablet 25 mg  25 mg Oral DAILY    sodium chloride (NS) flush 5-40 mL  5-40 mL IntraVENous Q8H    sodium chloride (NS) flush 5-40 mL  5-40 mL IntraVENous PRN    acetaminophen (TYLENOL) tablet 650 mg  650 mg Oral Q4H PRN    heparin (porcine) injection 5,000 Units  5,000 Units SubCUTAneous Q8H    hydrALAZINE (APRESOLINE) 20 mg/mL injection 20 mg  20 mg IntraVENous Q6H PRN    glucose chewable tablet 16 g  4 Tablet Oral PRN    glucagon (GLUCAGEN) injection 1 mg  1 mg IntraMUSCular PRN    insulin lispro (HUMALOG) injection   SubCUTAneous AC&HS     ______________________________________________________________________  EXPECTED LENGTH OF STAY: - - -  ACTUAL LENGTH OF STAY:          4                 Agustín Haines MD

## 2022-02-22 NOTE — PROGRESS NOTES
Physician Progress Note      Don Finley  CSN #:                  [id number]  :                       1922  ADMIT DATE:       2022 8:46 AM  DISCH DATE:  RESPONDING  PROVIDER #:        Amy Lala MD        QUERY TEXT:    Type of Anemia: Please provide further specificity, if known.     Clinical indicators include:  Options provided:  -- Anemia due to acute blood loss  -- Anemia due to chronic blood loss  -- Anemia due to iron deficiency  -- Anemia due to postoperative blood loss  -- Anemia due to chronic disease  -- Other - I will add my own diagnosis  -- Disagree - Not applicable / Not valid  -- Disagree - Clinically Unable to determine / Unknown        PROVIDER RESPONSE TEXT:    Chronic anemia      Electronically signed byPascual Bence MD Daphne Landing MD 2022 4:17 PM

## 2022-02-22 NOTE — PROGRESS NOTES
0800: Bedside and Verbal shift change report given to April Massey (oncoming nurse) by  Go Kowalski nurse). Report included the following information SBAR, Kardex, MAR and Recent Results. 0915: Pt's HR ~50 overnight, yesterday afternoon. Per Dr. Deonte Moulton, to hold Metoprolol 25 mg PO.    1220: Updated sister Derek Stoll on pt condition. 1615: Spoken with sister Derek Stoll again regarding patient's unchanged condition. 1910: Per MD, to leave central line in; drsg changed. Hearing aids put in at family request.    2000: Bedside and Verbal shift change report given to Baldo Santana RN (oncoming nurse) by Colten Montero RN (offgoing nurse). Report included the following information SBAR, Kardex, MAR and Recent Results.

## 2022-02-23 NOTE — ROUTINE PROCESS
Bedside and Verbal shift change report given to Linda (oncoming nurse) by Kyleigh Crews (offgoing nurse). Report included the following information SBAR, Kardex, Intake/Output, Recent Results and Cardiac Rhythm A-fib.

## 2022-02-23 NOTE — PROGRESS NOTES
[address] visit attempted. Mrs. Almendarez was sitting up in her chair. She was sound asleep. Unable to awake her. Prayer for spiritual communion offered for her well-being.     Claudene Libman, SBS, RN, ACSW, LCSW   Page:  [phone]

## 2022-02-23 NOTE — PROGRESS NOTES
[address] [facility] Adult  Hospitalist Group                                                                                          Hospitalist Progress Note  Jenni Land MD  Answering service: [phone] OR [phone] from in house phone        Date of Service:  2022  NAME:  Sharonda Parker  :  1922  MRN:  [id number]      Admission Summary:   Per H and P\"80 y.o. female who presents with abnormal labs.   Patient is a resident of [facility], history was primarily obtained from patient as well as patient's niece,     Patient has history of hyponatremia, apparently recently patient was diagnosed with UTI, probably was recently started on Bactrim, yesterday had blood work done and was found to have a sodium of 115 and was sent to [facility] for further management and evaluation. Patient was recently admitted to the hospital with hyponatremia in 2021, at that point of time patient may have been started on Bactrim too for UTI. Patient currently is resting in bed, appears to be comfortable, niece is at bedside, admits to global weakness but denies any other complaints or problems\". Interval history / Subjective:   Patient seen and examined. She has hearing impairment    She denied any SOB,chest pain. States that her sister is visiting her today     Assessment & Plan:     #Hyponatremia:improving  -due to SIADH    -attributed likely due to recent meds -cymbalta,lasix,bactrim  -nephrology following-  Received 3 % NS and now on tolvaptan      # Chronic atrial fib: metorprolol held due to bradycardia ,not on AC prior to admission    # HTN--150s    # DM: glucose controlled     # Severe tricuspid regurgitation   Moderate to severe pulmonary HTN         Code status: full  Prophylaxis: heparin  Care Plan discussed with: patient,nurse  Anticipated Disposition: back to [facility] Problems  Date Reviewed: 2021          Codes Class Noted POA Hyponatremia ICD-10-CM: E87.1  ICD-9-CM: 276.1  12/20/2021 Unknown                Review of Systems:   A comprehensive review of systems was negative except for that written in the HPI. Vital Signs:    Last 24hrs VS reviewed since prior progress note. Most recent are:  Visit Vitals  BP (!) 151/57   Pulse 65   Temp 98.2 °F (36.8 °C)   Resp 15   Ht 4' 11\" (1.499 m)   Wt 79.1 kg (174 lb 6.1 oz)   SpO2 100%   BMI 35.22 kg/m²         Intake/Output Summary (Last 24 hours) at 2/23/2022 0023  Last data filed at 2/23/2022 0000  Gross per 24 hour   Intake    Output 2500 ml   Net -2500 ml        Physical Examination:     I had a face to face encounter with this patient and independently examined them on 2/23/2022 as outlined below:          Constitutional:  appears comfortable   ENT:  Oral mucosa moist,EOMI,anicteric sclera   Resp:  decreased breath sounds at bases, No wheezing. No accessory muscle use. CV:  Irregular rhythm, normal rate, systolic murmur+    GI:  Soft, non distended, obese,non tender. normoactive bowel sounds,    Musculoskeletal:  1+ LE edema,some erythema feet    Neurologic:  hard of hearing,moves all extremities, globallly weak,alert,oriented  X 2            Data Review:    Review and/or order of clinical lab test  Review and/or order of tests in the medicine section of [facility]      Labs:     Recent Labs     02/22/22  0208 02/21/22  0253   WBC 10.2 10.5   HGB 9.9* 10.0*   HCT 28.6* 28.5*    304     Recent Labs     02/22/22 2124 02/22/22  1145 02/22/22  0208   * 122* 121*   K 4.6 4.7 4.2   CL 92* 91* 89*   CO2 27 25 26   BUN 16 17 18   CREA 0.66 0.70 0.61   * 166* 96   CA 8.6 8.3* 8.8   PHOS 3.1 3.1  --      Recent Labs     02/22/22 2124 02/22/22  1145   ALB 2.8* 2.7*     No results for input(s): INR, PTP, APTT, INREXT, INREXT in the last 72 hours. No results for input(s): FE, TIBC, PSAT, FERR in the last 72 hours.    No results found for: FOL, RBCF   No results for input(s): PH, PCO2, PO2 in the last 72 hours. No results for input(s): CPK, CKNDX, TROIQ in the last 72 hours.     No lab exists for component: CPKMB  Lab Results   Component Value Date/Time    Cholesterol, total 146 03/21/2016 10:31 AM    HDL Cholesterol 85 03/21/2016 10:31 AM    LDL, calculated 48 03/21/2016 10:31 AM    Triglyceride 64 03/21/2016 10:31 AM     Lab Results   Component Value Date/Time    Glucose (POC) 178 (H) 02/22/2022 09:16 PM    Glucose (POC) 140 (H) 02/22/2022 04:56 PM    Glucose (POC) 165 (H) 02/22/2022 12:07 PM    Glucose (POC) 96 02/22/2022 06:38 AM    Glucose (POC) 114 02/21/2022 10:52 PM     Lab Results   Component Value Date/Time    Color YELLOW/STRAW 02/18/2022 09:11 AM    Appearance CLEAR 02/18/2022 09:11 AM    Specific gravity 1.022 02/18/2022 09:11 AM    pH (UA) 7.0 02/18/2022 09:11 AM    Protein 300 (A) 02/18/2022 09:11 AM    Glucose Negative 02/18/2022 09:11 AM    Ketone 40 (A) 02/18/2022 09:11 AM    Bilirubin Negative 02/18/2022 09:11 AM    Urobilinogen 1.0 02/18/2022 09:11 AM    Nitrites Negative 02/18/2022 09:11 AM    Leukocyte Esterase TRACE (A) 02/18/2022 09:11 AM    Epithelial cells FEW 02/18/2022 09:11 AM    Bacteria Negative 02/18/2022 09:11 AM    WBC 0-4 02/18/2022 09:11 AM    RBC 0-5 02/18/2022 09:11 AM         Medications Reviewed:     Current Facility-Administered Medications   Medication Dose Route Frequency    neomycin-polymyxin-dexamethasone (DEXACIDIN, MAXITROL) 3.5mg/mL-10,000 unit/mL-0.1 % ophthalmic suspension 1 Drop  1 Drop Both Eyes QID    [Held by provider] amLODIPine (NORVASC) tablet 5 mg  5 mg Oral DAILY    sodium chloride (NS) flush 5-40 mL  5-40 mL IntraVENous Q8H    sodium chloride (NS) flush 5-40 mL  5-40 mL IntraVENous PRN    metoprolol succinate (TOPROL-XL) XL tablet 25 mg  25 mg Oral DAILY    sodium chloride (NS) flush 5-40 mL  5-40 mL IntraVENous Q8H    sodium chloride (NS) flush 5-40 mL  5-40 mL IntraVENous PRN    acetaminophen (TYLENOL) tablet 650 mg  650 mg Oral Q4H PRN    heparin (porcine) injection 5,000 Units  5,000 Units SubCUTAneous Q8H    hydrALAZINE (APRESOLINE) 20 mg/mL injection 20 mg  20 mg IntraVENous Q6H PRN    glucose chewable tablet 16 g  4 Tablet Oral PRN    glucagon (GLUCAGEN) injection 1 mg  1 mg IntraMUSCular PRN    insulin lispro (HUMALOG) injection   SubCUTAneous AC&HS     ______________________________________________________________________  EXPECTED LENGTH OF STAY: 3d 12h  ACTUAL LENGTH OF STAY:          5                 Isaura Gilmore MD

## 2022-02-23 NOTE — PROGRESS NOTES
[address] [facility] Adult  Hospitalist Group                                                                                          Hospitalist Progress Note  London Loco MD  Answering service: [phone] OR [phone] from in house phone        Date of Service:  2022  NAME:  Florina Knight  :  1922  MRN:  [id number]      Admission Summary:   Per H and P\"80 y.o. female who presents with abnormal labs.   Patient is a resident of [facility], history was primarily obtained from patient as well as patient's niece,     Patient has history of hyponatremia, apparently recently patient was diagnosed with UTI, probably was recently started on Bactrim, yesterday had blood work done and was found to have a sodium of 115 and was sent to [address] [place] [address] for further management and evaluation. Patient was recently admitted to the hospital with hyponatremia in 2021, at that point of time patient may have been started on Bactrim too for UTI. Patient currently is resting in bed, appears to be comfortable, niece is at bedside, admits to global weakness but denies any other complaints or problems\". Interval history / Subjective:   Patient seen and examined. She has hearing impairment    She was sitting in the chair,states that she is doing better     Assessment & Plan:     #Hyponatremia:improving  -due to SIADH    -attributed likely due to recent meds -cymbalta,lasix,bactrim which need to be discontinued at discharge  -nephrology following-  Received 3 % NS and now on tolvaptan      # Chronic atrial fib: metorprolol held due to bradycardia ,not on AC prior to admission    # HTN-resume amlodipine     # DM: glucose controlled     # Severe tricuspid regurgitation   Moderate to severe pulmonary HTN         Code status: full  Prophylaxis: heparin  Care Plan discussed with: patient,nurse  Anticipated Disposition: snf     Hospital Problems  Date Reviewed: 2021          Codes Class Noted POA    Hyponatremia ICD-10-CM: E87.1  ICD-9-CM: 276.1  12/20/2021 Unknown                Review of Systems:   As per HPI      Vital Signs:    Last 24hrs VS reviewed since prior progress note. Most recent are:  Visit Vitals  BP (!) 165/53   Pulse 63   Temp 97.9 °F (36.6 °C)   Resp 16   Ht 4' 11\" (1.499 m)   Wt 80.5 kg (177 lb 7.5 oz)   SpO2 100%   BMI 35.84 kg/m²         Intake/Output Summary (Last 24 hours) at 2/23/2022 3673  Last data filed at 2/23/2022 1824  Gross per 24 hour   Intake 240 ml   Output 4050 ml   Net -3810 ml        Physical Examination:     I had a face to face encounter with this patient and independently examined them on 2/23/2022 as outlined below:          Constitutional:  appears comfortable   ENT:  Oral mucosa moist,EOMI,anicteric sclera   Resp:  decreased breath sounds at bases, No wheezing. No accessory muscle use. CV:  Irregular rhythm, normal rate, systolic murmur+    GI:  Soft, non distended, obese,non tender. normoactive bowel sounds,    Musculoskeletal:  1+ LE edema,some erythema feet    Neurologic:  hard of hearing,moves all extremities, globallly weak,alert,oriented            Data Review:    Review and/or order of clinical lab test  Review and/or order of tests in the medicine section of [facility]      Labs:     Recent Labs     02/22/22  0208 02/21/22  0253   WBC 10.2 10.5   HGB 9.9* 10.0*   HCT 28.6* 28.5*    304     Recent Labs     02/23/22  1301 02/23/22  0510 02/22/22  2124   * 127* 123*   K 4.8 4.5 4.6   CL 97 96* 92*   CO2 29 28 27   BUN 15 14 16   CREA 0.64 0.56 0.66   * 106* 162*   CA 8.7 8.9 8.6   PHOS 3.4 3.3 3.1     Recent Labs     02/23/22  1301 02/23/22  0510 02/22/22  2124   ALB 2.9* 2.9* 2.8*     No results for input(s): INR, PTP, APTT, INREXT, INREXT in the last 72 hours. No results for input(s): FE, TIBC, PSAT, FERR in the last 72 hours. No results found for: FOL, RBCF   No results for input(s): PH, PCO2, PO2 in the last 72 hours.   No results for input(s): CPK, CKNDX, TROIQ in the last 72 hours.     No lab exists for component: CPKMB  Lab Results   Component Value Date/Time    Cholesterol, total 146 03/21/2016 10:31 AM    HDL Cholesterol 85 03/21/2016 10:31 AM    LDL, calculated 48 03/21/2016 10:31 AM    Triglyceride 64 03/21/2016 10:31 AM     Lab Results   Component Value Date/Time    Glucose (POC) 177 (H) 02/23/2022 04:27 PM    Glucose (POC) 178 (H) 02/23/2022 11:16 AM    Glucose (POC) 116 02/23/2022 07:22 AM    Glucose (POC) 178 (H) 02/22/2022 09:16 PM    Glucose (POC) 140 (H) 02/22/2022 04:56 PM     Lab Results   Component Value Date/Time    Color YELLOW/STRAW 02/18/2022 09:11 AM    Appearance CLEAR 02/18/2022 09:11 AM    Specific gravity 1.022 02/18/2022 09:11 AM    pH (UA) 7.0 02/18/2022 09:11 AM    Protein 300 (A) 02/18/2022 09:11 AM    Glucose Negative 02/18/2022 09:11 AM    Ketone 40 (A) 02/18/2022 09:11 AM    Bilirubin Negative 02/18/2022 09:11 AM    Urobilinogen 1.0 02/18/2022 09:11 AM    Nitrites Negative 02/18/2022 09:11 AM    Leukocyte Esterase TRACE (A) 02/18/2022 09:11 AM    Epithelial cells FEW 02/18/2022 09:11 AM    Bacteria Negative 02/18/2022 09:11 AM    WBC 0-4 02/18/2022 09:11 AM    RBC 0-5 02/18/2022 09:11 AM         Medications Reviewed:     Current Facility-Administered Medications   Medication Dose Route Frequency    neomycin-polymyxin-dexamethasone (DEXACIDIN, MAXITROL) 3.5mg/mL-10,000 unit/mL-0.1 % ophthalmic suspension 1 Drop  1 Drop Both Eyes QID    amLODIPine (NORVASC) tablet 5 mg  5 mg Oral DAILY    sodium chloride (NS) flush 5-40 mL  5-40 mL IntraVENous Q8H    sodium chloride (NS) flush 5-40 mL  5-40 mL IntraVENous PRN    [Held by provider] metoprolol succinate (TOPROL-XL) XL tablet 25 mg  25 mg Oral DAILY    sodium chloride (NS) flush 5-40 mL  5-40 mL IntraVENous Q8H    sodium chloride (NS) flush 5-40 mL  5-40 mL IntraVENous PRN    acetaminophen (TYLENOL) tablet 650 mg  650 mg Oral Q4H PRN    heparin (porcine) injection 5,000 Units  5,000 Units SubCUTAneous Q8H    hydrALAZINE (APRESOLINE) 20 mg/mL injection 20 mg  20 mg IntraVENous Q6H PRN    glucose chewable tablet 16 g  4 Tablet Oral PRN    glucagon (GLUCAGEN) injection 1 mg  1 mg IntraMUSCular PRN    insulin lispro (HUMALOG) injection   SubCUTAneous AC&HS     ______________________________________________________________________  EXPECTED LENGTH OF STAY: 3d 12h  ACTUAL LENGTH OF STAY:          5                 Herminia Nissen, MD

## 2022-02-23 NOTE — PROGRESS NOTES
Problem: Mobility Impaired (Adult and Pediatric)  Goal: *Acute Goals and Plan of Care (Insert Text)  Description: FUNCTIONAL STATUS PRIOR TO ADMISSION: Patient was admitted from SNF. Pt confused at time of evaluation and unable to provide reliable prior level of function. Reported using a walker    [address]: Pt admitted from SNF    Physical Therapy Goals  Initiated 2/21/2022  1. Patient will move from supine to sit and sit to supine  in bed with minimal assistance/contact guard assist within 7 day(s). 2.  Patient will transfer from bed to chair and chair to bed with moderate assistance  using the least restrictive device within 7 day(s). 3.  Patient will perform sit to stand with moderate assistance  within 7 day(s). 4.  Patient will ambulate with moderate assistance  for 10 feet with the least restrictive device within 7 day(s). Outcome: Progressing Towards Goal   PHYSICAL THERAPY TREATMENT  Patient: Jose Aguila (33 y.o. female)  Date: 2/23/2022  Diagnosis: Hyponatremia [E87.1] <principal problem not specified>  Procedure(s) (LRB):  INFUSION CATHETER INSERTION (N/A) 4 Days Post-Op  Precautions: Fall  Chart, physical therapy assessment, plan of care and goals were reviewed. ASSESSMENT  Patient continues with skilled PT services and is progressing towards goals. Pt received supine in bed and agreeable to therapy. Pt tolerated session well and making good progress towards goals. Pt continues to be limited by generalized weakness, decreased functional mobility, impaired balance and gait, decrease tolerance to activity, confusion, hard of hearing, increased risk for falls. Pt demonstrated improvement in sit to stand transfers with mod A x 2 and stand pivot transfer to the chair with max A x 2. Pt was able to take small, short small steps to the chair. Pt remained seated in the chair and all needs met.  Pt will continue to benefit from SNF placement upon discharge to continue therapy efforts. Current Level of Function Impacting Discharge (mobility/balance): max A supine to sit EOB, mod A x 2 sit<>stand, max A x 2 stand pivot transfer    Other factors to consider for discharge:          PLAN :  Patient continues to benefit from skilled intervention to address the above impairments. Continue treatment per established plan of care. to address goals. Recommendation for discharge: (in order for the patient to meet his/her long term goals)  Therapy up to 5 days/week in SNF setting    This discharge recommendation:  Has been made in collaboration with the attending provider and/or case management    IF patient discharges home will need the following DME: to be determined (TBD)       SUBJECTIVE:   Patient stated I can't drink from a cup. I need a straw.     OBJECTIVE DATA SUMMARY:   Critical Behavior:  Neurologic State: Alert,Confused  Orientation Level: Oriented to person,Oriented to place,Disoriented to situation,Disoriented to time  Cognition: Decreased attention/concentration,Follows commands,Poor safety awareness,Memory loss  Safety/Judgement: Awareness of environment,Decreased awareness of need for assistance,Decreased awareness of need for safety,Decreased insight into deficits  Functional Mobility Training:  Bed Mobility:     Supine to Sit: Maximum assistance  Sit to Supine:  (in chair)  Scooting: Moderate assistance;Assist x1;Assist x2 (seated)        Transfers:  Sit to Stand: Moderate assistance;Assist x2  Stand to Sit: Moderate assistance;Assist x2        Bed to Chair: Maximum assistance;Assist x2                    Balance:  Sitting: Impaired  Sitting - Static: Fair (occasional)  Sitting - Dynamic: Fair (occasional)  Standing: Impaired; With support  Standing - Static: Poor;Constant support  Standing - Dynamic : Poor;Constant support  Ambulation/Gait Training:                                                        Stairs:               Therapeutic Exercises:     Pain Rating:  Pt denied pain    Activity Tolerance:   Fair    After treatment patient left in no apparent distress:   Sitting in chair, Call bell within reach, Bed / chair alarm activated, and Side rails x 3    COMMUNICATION/COLLABORATION:   The patients plan of care was discussed with: Occupational therapist and Registered nurse.      Jackie Mccormick, PT, DPT   Time Calculation: 18 mins

## 2022-02-23 NOTE — PROGRESS NOTES
Problem: Patient Education: Go to Patient Education Activity  Goal: Patient/Family Education  Outcome: Progressing Towards Goal     Problem: Falls - Risk of  Goal: *Absence of Falls  Description: Document Xin Milton Fall Risk and appropriate interventions in the flowsheet.   Outcome: Progressing Towards Goal  Note: Fall Risk Interventions:  Mobility Interventions: Assess mobility with egress test,Bed/chair exit alarm,Communicate number of staff needed for ambulation/transfer,OT consult for ADLs,Patient to call before getting OOB,PT Consult for mobility concerns,PT Consult for assist device competence,Strengthening exercises (ROM-active/passive)    Mentation Interventions: Adequate sleep, hydration, pain control,Bed/chair exit alarm,Door open when patient unattended,Evaluate medications/consider consulting pharmacy,Eyeglasses and hearing aids,Familiar objects from home,Increase mobility,More frequent rounding,Reorient patient,Room close to nurse's station,Toileting rounds,Update white board    Medication Interventions: Assess postural VS orthostatic hypotension,Bed/chair exit alarm,Evaluate medications/consider consulting pharmacy,Patient to call before getting OOB,Teach patient to arise slowly    Elimination Interventions: Bed/chair exit alarm,Call light in reach,Patient to call for help with toileting needs,Stay With Me (per policy),Toileting schedule/hourly rounds

## 2022-02-23 NOTE — ROUTINE PROCESS
Informed scooby santos np of pt's na level of 123. Informed of receiving tolvaptan 30 mg today. No new orders at present time.

## 2022-02-23 NOTE — ROUTINE PROCESS
Transition of Care Plan: SNF-[facility]  Referrals sent to:  [facility]-Accepted-family denied  [facility]-Denied  [facility]-pending  [facility]-accepted  [address] [place] [facility]-pending     RUR: 13% low     PCP F/U: Citlalli Aranda Caro, MD     Disposition: SNF-[facility]     Transportation: [facility]     Main Contact: xuan PORYAN-Wandy Saravia - Sister - [phone] (cell)    [phone]: Spoke with patient's sister Gage Toro. Let her know that MD will likely discharge tomorrow. Is adamant that she does not want patient to go back to [place]. Referrals have been sent out to other facilities of sister's choice. 1153: Left message for Pat at [facility] and PRAM at [facility] to return call regarding placement. 1243:  [facility] accepted. Will need a rapid prior to discharge    1546: Spoke with sister Gage Toro. Agreeable to [facility] rehab. Paz Bailey RN, CRM    Transition of Care Plan:     The Plan for Transition of Care is related to the following treatment goals: SNF    The Patient and/or patient representative was provided with a choice of provider and agrees  with the discharge plan. Yes [x] No []    A Freedom of choice list was provided with basic dialogue that supports the patient's individualized plan of care/goals and shares the quality data associated with the providers.        Yes [x] No []

## 2022-02-23 NOTE — PROGRESS NOTES
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    - Exacerbation this time is likely from cymbalta and Lasix use +/- one dose of Bactrim   - s/p 3% saline ,  S/p Tolvaptan 30 mg ty. Another dose today  - labs Q12  - continue FR 1200 cc/ day     HTN   - restarted Metoprolol but holding norvasc      Chronic A fib  Pulm HTN   Severe TR, Mild MR  JOSE     Care Plan discussed with:  Pt     Interval History:  SEEN and examined. Several family members at the bedside. Reports significant improvement in mental status. Patient reports feeling better today. Serum sodium is up to 128    Review of Systems: Review of systems not obtained due to patient factors.     Current Medications:   Current Facility-Administered Medications   Medication Dose Route Frequency    neomycin-polymyxin-dexamethasone (DEXACIDIN, MAXITROL) 3.5mg/mL-10,000 unit/mL-0.1 % ophthalmic suspension 1 Drop  1 Drop Both Eyes QID    [Held by provider] amLODIPine (NORVASC) tablet 5 mg  5 mg Oral DAILY    sodium chloride (NS) flush 5-40 mL  5-40 mL IntraVENous Q8H    sodium chloride (NS) flush 5-40 mL  5-40 mL IntraVENous PRN    metoprolol succinate (TOPROL-XL) XL tablet 25 mg  25 mg Oral DAILY    sodium chloride (NS) flush 5-40 mL  5-40 mL IntraVENous Q8H    sodium chloride (NS) flush 5-40 mL  5-40 mL IntraVENous PRN    acetaminophen (TYLENOL) tablet 650 mg  650 mg Oral Q4H PRN    heparin (porcine) injection 5,000 Units  5,000 Units SubCUTAneous Q8H    hydrALAZINE (APRESOLINE) 20 mg/mL injection 20 mg  20 mg IntraVENous Q6H PRN    glucose chewable tablet 16 g  4 Tablet Oral PRN    glucagon (GLUCAGEN) injection 1 mg  1 mg IntraMUSCular PRN    insulin lispro (HUMALOG) injection   SubCUTAneous AC&HS      Allergies   Allergen Reactions    Losartan Angioedema     Tongue swelling      Amoxicillin Other (comments)     \"makes me feel like I\"m going to faint\"    Gabapentin Drowsiness     * pt states this med makes her feel very drowsy , gives her a drunk feeling.  Levaquin [Levofloxacin] Hives and Rash    Lorazepam Other (comments)     Feeling faint.  Lyrica [Pregabalin] Rash    Sertraline Other (comments)     Feeling faint. Objective:  Vitals:    Vitals:    02/23/22 1000 02/23/22 1200 02/23/22 1400 02/23/22 1430   BP: (!) 135/44  (!) 156/108 126/61   Pulse: (!) 53 (!) 52 66    Resp: 14  22    Temp: 98 °F (36.7 °C)  97.9 °F (36.6 °C)    SpO2: 100%      Weight:       Height:         Intake and Output:  02/23 0701 - 02/23 1900  In: 120 [P.O.:120]  Out: -   02/21 1901 - 02/23 0700  In: 120 [P.O.:120]  Out: 3500 [Urine:3500]    Physical Examination:  General:  calm and no distress  HEENT: PERRL,  No Pallor , No Icterus  Neck: Supple,no mass palpable  Lungs : CTA  CVS: RRR, S1 S2 normal, systolic murmur   Abdomen: Soft, NT, BS +  Extremities: trace Edema  Skin: hyperemia of feet   MS: arthritis    Neurologic: confused          []    High complexity decision making was performed  []    Patient is at high-risk of decompensation with multiple organ involvement    Lab Data Personally Reviewed: I have reviewed all the pertinent labs, microbiology data and radiology studies during assessment.     Recent Labs     02/23/22  1301 02/23/22  0510 02/22/22  2124 02/22/22  1145 02/22/22  0208   * 127* 123* 122* 121*   K 4.8 4.5 4.6 4.7 4.2   CL 97 96* 92* 91* 89*   CO2 29 28 27 25 26   * 106* 162* 166* 96   BUN 15 14 16 17 18   CREA 0.64 0.56 0.66 0.70 0.61   CA 8.7 8.9 8.6 8.3* 8.8   PHOS 3.4 3.3 3.1 3.1  --    ALB 2.9* 2.9* 2.8* 2.7*  --      Recent Labs     02/22/22  0208 02/21/22  0253   WBC 10.2 10.5   HGB 9.9* 10.0*   HCT 28.6* 28.5*    304     No results found for: SDES  Lab Results   Component Value Date/Time    Culture result: NO GROWTH 5 DAYS 12/20/2021 12:30 AM     Recent Results (from the past 24 hour(s))   GLUCOSE, POC    Collection Time: 02/22/22  4:56 PM   Result Value Ref Range    Glucose (POC) 140 (H) 65 - 117 mg/dL    Performed by Zohreh POC    Collection Time: 02/22/22  9:16 PM   Result Value Ref Range    Glucose (POC) 178 (H) 65 - 117 mg/dL    Performed by Natalee Yap (Trvlr)    RENAL FUNCTION PANEL    Collection Time: 02/22/22  9:24 PM   Result Value Ref Range    Sodium 123 (L) 136 - 145 mmol/L    Potassium 4.6 3.5 - 5.1 mmol/L    Chloride 92 (L) 97 - 108 mmol/L    CO2 27 21 - 32 mmol/L    Anion gap 4 (L) 5 - 15 mmol/L    Glucose 162 (H) 65 - 100 mg/dL    BUN 16 6 - 20 MG/DL    Creatinine 0.66 0.55 - 1.02 MG/DL    BUN/Creatinine ratio 24 (H) 12 - 20      GFR est AA >60 >60 ml/min/1.73m2    GFR est non-AA >60 >60 ml/min/1.73m2    Calcium 8.6 8.5 - 10.1 MG/DL    Phosphorus 3.1 2.6 - 4.7 MG/DL    Albumin 2.8 (L) 3.5 - 5.0 g/dL   RENAL FUNCTION PANEL    Collection Time: 02/23/22  5:10 AM   Result Value Ref Range    Sodium 127 (L) 136 - 145 mmol/L    Potassium 4.5 3.5 - 5.1 mmol/L    Chloride 96 (L) 97 - 108 mmol/L    CO2 28 21 - 32 mmol/L    Anion gap 3 (L) 5 - 15 mmol/L    Glucose 106 (H) 65 - 100 mg/dL    BUN 14 6 - 20 MG/DL    Creatinine 0.56 0.55 - 1.02 MG/DL    BUN/Creatinine ratio 25 (H) 12 - 20      GFR est AA >60 >60 ml/min/1.73m2    GFR est non-AA >60 >60 ml/min/1.73m2    Calcium 8.9 8.5 - 10.1 MG/DL    Phosphorus 3.3 2.6 - 4.7 MG/DL    Albumin 2.9 (L) 3.5 - 5.0 g/dL   GLUCOSE, POC    Collection Time: 02/23/22  7:22 AM   Result Value Ref Range    Glucose (POC) 116 65 - 117 mg/dL    Performed by Natalee Yap (Trvlr)    GLUCOSE, POC    Collection Time: 02/23/22 11:16 AM   Result Value Ref Range    Glucose (POC) 178 (H) 65 - 117 mg/dL    Performed by Kirk Cordero    RENAL FUNCTION PANEL    Collection Time: 02/23/22  1:01 PM   Result Value Ref Range    Sodium 127 (L) 136 - 145 mmol/L    Potassium 4.8 3.5 - 5.1 mmol/L    Chloride 97 97 - 108 mmol/L    CO2 29 21 - 32 mmol/L    Anion gap 1 (L) 5 - 15 mmol/L    Glucose 136 (H) 65 - 100 mg/dL    BUN 15 6 - 20 MG/DL    Creatinine 0.64 0.55 - 1.02 MG/DL    BUN/Creatinine ratio 23 (H) 12 - 20      GFR est AA >60 >60 ml/min/1.73m2    GFR est non-AA >60 >60 ml/min/1.73m2    Calcium 8.7 8.5 - 10.1 MG/DL    Phosphorus 3.4 2.6 - 4.7 MG/DL    Albumin 2.9 (L) 3.5 - 5.0 g/dL           I have reviewed the flowsheets. Chart and Pertinent Notes have been reviewed. No change in PMH ,family and social history from Consult note.       Oksana Sousa [facility]

## 2022-02-23 NOTE — PROGRESS NOTES
Problem: Self Care Deficits Care Plan (Adult)  Goal: *Acute Goals and Plan of Care (Insert Text)  Description: FUNCTIONAL STATUS PRIOR TO ADMISSION: Patient admitted from SNF, reports she uses a rollator for mobility, assist for all ADLs/IADLs but unclear as to level of assist.      HOME SUPPORT: Admitted from SNF    Occupational Therapy Goals  Initiated 2/21/2022  1. Patient will perform grooming EOB with minimal assistance/contact guard assist within 7 day(s). 2.  Patient will perform anterior neck to thigh bathing with moderate assistance  within 7 day(s). 3.  Patient will perform toilet transfers to/from [facility] with moderate assistance x2 within 7 day(s). 4.  Patient will perform lower body dressing with maximal assistance within 7 day(s). 5.  Patient will perform all aspects of toileting with maximal assistance within 7 day(s). 6.  Patient will participate in upper extremity therapeutic exercise/activities with minimal assistance/contact guard assist for 10 minutes within 7 day(s). 7.  Patient will utilize energy conservation techniques during functional activities with verbal and visual cues within 7 day(s). Outcome: Progressing Towards Goal     OCCUPATIONAL THERAPY TREATMENT  Patient: Saturnino Rubinstein (89 y.o. female)  Date: 2/23/2022  Diagnosis: Hyponatremia [E87.1] <principal problem not specified>  Procedure(s) (LRB):  INFUSION CATHETER INSERTION (N/A) 4 Days Post-Op  Precautions: Fall  Chart, occupational therapy assessment, plan of care, and goals were reviewed. ASSESSMENT  Patient continues with skilled OT services and is progressing towards goals however remains limited by decreased balance, strength, AROM, distal lower body access, coordination (FM>GM), cognition, activity tolerance with OOB mobility, lower body dressing, and feeding tasks completed this session. Patient met with breakfast spilled across her bed, frustrated d/t difficulty with management of cups and food containers. Completed OOB mobility to the chair with improved standing balance and technique with Mod-Max A x2, remains Total A for lower body dressing. Initial Mod A for feeding tasks up in the chair, progressed to SPV with adaptive strategies provided and straws provided for beverages. Considering her PLOF and high level of assist required for all functional activity, continue to recommend d/c to SNF rehab. Current Level of Function Impacting Discharge (ADLs): Min-Total A for ADLs, Mod-Max A x2 for limited OOB mobility    Other factors to consider for discharge: fall risk, assist of 2, PLOF, PMH         PLAN :  Patient continues to benefit from skilled intervention to address the above impairments. Continue treatment per established plan of care to address goals. Recommend with staff: Recommend with nursing, ADLs with assist, OOB to chair 3x/day, and toileting via  bedside commode vs bedpan with 2 assist . Thank you for completing as able in order to maintain patient strength, endurance and independence. Recommendation for discharge: (in order for the patient to meet his/her long term goals)  Therapy up to 5 days/week in SNF setting    This discharge recommendation:  Has been made in collaboration with the attending provider and/or case management    IF patient discharges home will need the following DME: To be determined (TBD)         SUBJECTIVE:   Patient stated This is better, this is the happiest I've been since I've been here and it gives me hope that I can get better.  re: once settled in the chair    OBJECTIVE DATA SUMMARY:   Cognitive/Behavioral Status:  Neurologic State: Alert;Confused  Orientation Level: Oriented to person;Oriented to place; Disoriented to situation;Disoriented to time  Cognition: Decreased attention/concentration; Follows commands;Poor safety awareness;Memory loss  Perception: Appears intact  Perseveration: No perseveration noted  Safety/Judgement: Awareness of environment;Decreased awareness of need for assistance;Decreased awareness of need for safety;Decreased insight into deficits    Functional Mobility and Transfers for ADLs:  Bed Mobility:  Supine to Sit: Maximum assistance  Sit to Supine:  (in chair)  Scooting: Moderate assistance;Assist x1;Assist x2 (seated)    Transfers:  Sit to Stand: Moderate assistance;Assist x2     Bed to Chair: Maximum assistance;Assist x2    Balance:  Sitting: Impaired  Sitting - Static: Fair (occasional)  Sitting - Dynamic: Fair (occasional)  Standing: Impaired; With support  Standing - Static: Poor;Constant support  Standing - Dynamic : Poor;Constant support    ADL Intervention:  Feeding  Feeding Assistance: Moderate assistance  Container Management: Maximum assistance  Food to Mouth: Set-up  Drink to Mouth: Minimum assistance;Supervision (initial Min A for coordination, progressed to SPV)      Lower Body Dressing Assistance  Dressing Assistance: Total assistance(dependent)  Socks: Total assistance (dependent)  Leg Crossed Method Used: No  Position Performed: Long sitting on bed  Cues: Physical assistance; Tactile cues provided;Verbal cues provided;Visual cues provided    Toileting  Toileting Assistance: Total assistance(dependent) (balwinderck & brief)    Cognitive Retraining  Safety/Judgement: Awareness of environment;Decreased awareness of need for assistance;Decreased awareness of need for safety;Decreased insight into deficits    Pain:  None    Activity Tolerance: WNL    After treatment patient left in no apparent distress:   Sitting in chair, Call bell within reach, and Bed / chair alarm activated    COMMUNICATION/COLLABORATION:   The patients plan of care was discussed with: Physical therapist and Registered nurse.      LEVI Antonio, OTR/L  Time Calculation: 19 mins

## 2022-02-24 NOTE — PROGRESS NOTES
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    - Exacerbation this time is likely from cymbalta and Lasix use +/- one dose of Bactrim   - s/p 3% saline ,  Tolvaptan 30 mg today  - labs daily   - continue FR 1200 cc/ day     HTN   - HR low, metoprolol on hold      Chronic A fib  Pulm HTN   Severe TR, Mild MR  JOSE     Care Plan discussed with:  Pt     Interval History:  SEEN and examined. She wants to work w/ PT   MS almost back to baseline   Na at 131     Review of Systems: Review of systems not obtained due to patient factors.     Current Medications:   Current Facility-Administered Medications   Medication Dose Route Frequency    neomycin-polymyxin-dexamethasone (DEXACIDIN, MAXITROL) 3.5mg/mL-10,000 unit/mL-0.1 % ophthalmic suspension 1 Drop  1 Drop Both Eyes QID    amLODIPine (NORVASC) tablet 5 mg  5 mg Oral DAILY    sodium chloride (NS) flush 5-40 mL  5-40 mL IntraVENous Q8H    sodium chloride (NS) flush 5-40 mL  5-40 mL IntraVENous PRN    [Held by provider] metoprolol succinate (TOPROL-XL) XL tablet 25 mg  25 mg Oral DAILY    sodium chloride (NS) flush 5-40 mL  5-40 mL IntraVENous Q8H    sodium chloride (NS) flush 5-40 mL  5-40 mL IntraVENous PRN    acetaminophen (TYLENOL) tablet 650 mg  650 mg Oral Q4H PRN    heparin (porcine) injection 5,000 Units  5,000 Units SubCUTAneous Q8H    hydrALAZINE (APRESOLINE) 20 mg/mL injection 20 mg  20 mg IntraVENous Q6H PRN    glucose chewable tablet 16 g  4 Tablet Oral PRN    glucagon (GLUCAGEN) injection 1 mg  1 mg IntraMUSCular PRN    insulin lispro (HUMALOG) injection   SubCUTAneous AC&HS Allergies   Allergen Reactions    Losartan Angioedema     Tongue swelling      Amoxicillin Other (comments)     \"makes me feel like I\"m going to faint\"    Gabapentin Drowsiness     * pt states this med makes her feel very drowsy , gives her a drunk feeling.  Levaquin [Levofloxacin] Hives and Rash    Lorazepam Other (comments)     Feeling faint.  Lyrica [Pregabalin] Rash    Sertraline Other (comments)     Feeling faint. Objective:  Vitals:    Vitals:    02/24/22 0210 02/24/22 0230 02/24/22 0600 02/24/22 0913   BP: (!) 137/45  (!) 141/59 (!) 163/50   Pulse: (!) 58 (!) 49 81 67   Resp: 14 14 15    Temp: 97.3 °F (36.3 °C)  97.4 °F (36.3 °C)    SpO2: 97% 97% 96%    Weight: 79 kg (174 lb 2.6 oz)      Height:         Intake and Output:  No intake/output data recorded. 02/22 1901 - 02/24 0700  In: 340 [P.O.:340]  Out: 5700 [Urine:5700]    Physical Examination:  General:  calm and no distress  HEENT: PERRL,  No Pallor , No Icterus  Neck: Supple,no mass palpable  Lungs : CTA  CVS: RRR, S1 S2 normal, systolic murmur   Abdomen: Soft, NT, BS +  Extremities: trace Edema  Skin: hyperemia of feet   MS: arthritis    Neurologic: confused          []    High complexity decision making was performed  []    Patient is at high-risk of decompensation with multiple organ involvement    Lab Data Personally Reviewed: I have reviewed all the pertinent labs, microbiology data and radiology studies during assessment.     Recent Labs     02/24/22  0048 02/23/22  1301 02/23/22  0510 02/22/22  2124 02/22/22  1145   * 127* 127* 123* 122*   K 4.9 4.8 4.5 4.6 4.7    97 96* 92* 91*   CO2 28 29 28 27 25   * 136* 106* 162* 166*   BUN 15 15 14 16 17   CREA 0.63 0.64 0.56 0.66 0.70   CA 8.7 8.7 8.9 8.6 8.3*   PHOS 3.5 3.4 3.3 3.1 3.1   ALB 2.7* 2.9* 2.9* 2.8* 2.7*     Recent Labs     02/22/22  0208   WBC 10.2   HGB 9.9*   HCT 28.6*        No results found for: SDES  Lab Results   Component Value Date/Time Culture result: NO GROWTH 5 DAYS 12/20/2021 12:30 AM     Recent Results (from the past 24 hour(s))   GLUCOSE, POC    Collection Time: 02/23/22 11:16 AM   Result Value Ref Range    Glucose (POC) 178 (H) 65 - 117 mg/dL    Performed by Herbert Kaiser    RENAL FUNCTION PANEL    Collection Time: 02/23/22  1:01 PM   Result Value Ref Range    Sodium 127 (L) 136 - 145 mmol/L    Potassium 4.8 3.5 - 5.1 mmol/L    Chloride 97 97 - 108 mmol/L    CO2 29 21 - 32 mmol/L    Anion gap 1 (L) 5 - 15 mmol/L    Glucose 136 (H) 65 - 100 mg/dL    BUN 15 6 - 20 MG/DL    Creatinine 0.64 0.55 - 1.02 MG/DL    BUN/Creatinine ratio 23 (H) 12 - 20      GFR est AA >60 >60 ml/min/1.73m2    GFR est non-AA >60 >60 ml/min/1.73m2    Calcium 8.7 8.5 - 10.1 MG/DL    Phosphorus 3.4 2.6 - 4.7 MG/DL    Albumin 2.9 (L) 3.5 - 5.0 g/dL   GLUCOSE, POC    Collection Time: 02/23/22  4:27 PM   Result Value Ref Range    Glucose (POC) 177 (H) 65 - 117 mg/dL    Performed by Ruthie Hawkins, POC    Collection Time: 02/23/22  9:12 PM   Result Value Ref Range    Glucose (POC) 168 (H) 65 - 117 mg/dL    Performed by Yuli Bender (Trvlr)    RENAL FUNCTION PANEL    Collection Time: 02/24/22 12:48 AM   Result Value Ref Range    Sodium 131 (L) 136 - 145 mmol/L    Potassium 4.9 3.5 - 5.1 mmol/L    Chloride 100 97 - 108 mmol/L    CO2 28 21 - 32 mmol/L    Anion gap 3 (L) 5 - 15 mmol/L    Glucose 128 (H) 65 - 100 mg/dL    BUN 15 6 - 20 MG/DL    Creatinine 0.63 0.55 - 1.02 MG/DL    BUN/Creatinine ratio 24 (H) 12 - 20      GFR est AA >60 >60 ml/min/1.73m2    GFR est non-AA >60 >60 ml/min/1.73m2    Calcium 8.7 8.5 - 10.1 MG/DL    Phosphorus 3.5 2.6 - 4.7 MG/DL    Albumin 2.7 (L) 3.5 - 5.0 g/dL   GLUCOSE, POC    Collection Time: 02/24/22  6:55 AM   Result Value Ref Range    Glucose (POC) 113 65 - 117 mg/dL    Performed by Yuli Bender (Trvlr)            I have reviewed the flowsheets. Chart and Pertinent Notes have been reviewed.    No change in PMH ,family and social history from Consult note.       Patricia Treadwell, [facility]

## 2022-02-24 NOTE — PROGRESS NOTES
Problem: Pressure Injury - Risk of  Goal: *Prevention of pressure injury  Description: Document Isaias Scale and appropriate interventions in the flowsheet. Outcome: Progressing Towards Goal  Note: Pressure Injury Interventions:  Sensory Interventions: Assess changes in LOC,Float heels,Minimize linen layers    Moisture Interventions: Absorbent underpads,Internal/External urinary devices,Maintain skin hydration (lotion/cream),Minimize layers    Activity Interventions: Increase time out of bed,Pressure redistribution bed/mattress(bed type),PT/OT evaluation    Mobility Interventions: Assess need for specialty bed,Pressure redistribution bed/mattress (bed type),PT/OT evaluation    Nutrition Interventions: Document food/fluid/supplement intake,Discuss nutritional consult with provider,Offer support with meals,snacks and hydration    Friction and Shear Interventions: Apply protective barrier, creams and emollients,Lift sheet,HOB 30 degrees or less,Minimize layers                Problem: Falls - Risk of  Goal: *Absence of Falls  Description: Document Polo Fall Risk and appropriate interventions in the flowsheet.   Outcome: Progressing Towards Goal  Note: Fall Risk Interventions:  Mobility Interventions: Assess mobility with egress test,Communicate number of staff needed for ambulation/transfer,OT consult for ADLs,Patient to call before getting OOB,Bed/chair exit alarm,PT Consult for mobility concerns,PT Consult for assist device competence,Strengthening exercises (ROM-active/passive),Utilize walker, cane, or other assistive device    Mentation Interventions: Adequate sleep, hydration, pain control,Bed/chair exit alarm,Door open when patient unattended,Increase mobility,More frequent rounding,Room close to nurse's station,Toileting rounds,Update white board    Medication Interventions: Assess postural VS orthostatic hypotension,Bed/chair exit alarm,Patient to call before getting OOB,Teach patient to arise slowly    Elimination Interventions: Bed/chair exit alarm,Call light in reach,Patient to call for help with toileting needs,Stay With Me (per policy)

## 2022-02-24 NOTE — PROGRESS NOTES
Transition of Care Plan: SNF-[facility]  Referrals sent to:  [facility]-Accepted-family denied  [facility]-Denied  [facility]-pending  [facility]-accepted  [address]-pending     RUR: 14% low     PCP F/U: Nakia Aranda MD     Disposition: SNF-[facility]     Transportation: [facility]     Main Contact: Wandy Aguila - Sister - [phone] (cell)    1153: Met with sister Vignesh Adams at the bedside. Had asked if patient could go to [facility], but explained to her that she did not qualify at this point. Plan is still for patient to [facility] once she is medically ready. Discussed in IDR that she may not be ready until tomorrow. Sister would like to see hospitalist. Notified attending.      Marty Gutierrez RN, CRM

## 2022-02-24 NOTE — PROGRESS NOTES
Provided pastoral care visit to [facility] patient. Did not include sacramental care.     Modesto Begum

## 2022-02-25 NOTE — PROGRESS NOTES
[facility]  Hospitalist Group                                                                                          Hospitalist Progress Note  Vanesa Willams MD  Answering service: [phone] OR [phone] from in house phone        Date of Service:  2022  NAME:  Dulce Camp  :  1922  MRN:  [id number]      Admission Summary:   Per H and P\"80 y.o. female who presents with abnormal labs.   Patient is a resident of [facility], history was primarily obtained from patient as well as patient's niece,     Patient has history of hyponatremia, apparently recently patient was diagnosed with UTI, probably was recently started on Bactrim, yesterday had blood work done and was found to have a sodium of 115 and was sent to [facility] for further management and evaluation. Patient was recently admitted to the hospital with hyponatremia in 2021, at that point of time patient may have been started on Bactrim too for UTI. Patient currently is resting in bed, appears to be comfortable, niece is at bedside, admits to global weakness but denies any other complaints or problems\". Interval history / Subjective:   Patient seen and examined. She has hearing impairment  Patient has no complaints. When I discussed her care, she said to talk to her family as she would not remember.      Assessment & Plan:     #Hyponatremia: Improved, in the low 130s  -due to SIADH    -attributed likely due to recent meds -cymbalta,lasix,bactrim which need to be discontinued at discharge  -nephrology following-  Received 3 % NS and now on tolvaptan      # Chronic atrial fib: metorprolol held due to bradycardia ,[facility]    # HTN-resume amlodipine     # DM: glucose controlled     # Severe tricuspid regurgitation with moderate to severe pulmonary HTN  #Significant peripheral edema  --Echocardiogram in 2021 with normal EF but with the above significant findings  --Diuretic is limited by her acute on chronic hyponatremia/SIADH         Code status: full  Prophylaxis: heparin  Care Plan discussed with: patient,nurse  Anticipated Disposition: snf     Hospital Problems  Date Reviewed: 12/16/2021          Codes Class Noted POA    Hyponatremia ICD-10-CM: E87.1  ICD-9-CM: 276.1  12/20/2021 Unknown                Review of Systems:   As per HPI      Vital Signs:    Last 24hrs VS reviewed since prior progress note. Most recent are:  Visit Vitals  BP (!) 153/52   Pulse 60   Temp 98 °F (36.7 °C)   Resp 19   Ht 4' 11\" (1.499 m)   Wt 79 kg (174 lb 2.6 oz)   SpO2 97%   BMI 35.18 kg/m²         Intake/Output Summary (Last 24 hours) at 2/24/2022 1921  Last data filed at 2/24/2022 1200  Gross per 24 hour   Intake 580 ml   Output 2550 ml   Net -1970 ml        Physical Examination:     I had a face to face encounter with this patient and independently examined them on 2/24/2022 as outlined below:          Constitutional:  Alert, comfortable on room air. HEENT:  Oral mucosa moist,EOMI,anicteric sclera  Ecchymosis on the left neck where she had central line   Resp:  decreased breath sounds at bases, No wheezing. No accessory muscle use. CV:  Irregular rhythm, normal rate, systolic murmur+    GI:  Soft, non distended, obese,non tender.  normoactive bowel sounds,    Musculoskeletal: ++ LE edema,some erythema feet    Neurologic:  hard of hearing,moves all extremities, globallly weak,alert,oriented            Data Review:    Review and/or order of clinical lab test  Review and/or order of tests in the medicine section of CPT      Labs:     Recent Labs     02/22/22  0208   WBC 10.2   HGB 9.9*   HCT 28.6*        Recent Labs     02/24/22  1300 02/24/22  0048 02/23/22  1301   * 131* 127*   K 5.6* 4.9 4.8    100 97   CO2 22 28 29   BUN 15 15 15   CREA 0.71 0.63 0.64   * 128* 136*   CA 8.8 8.7 8.7   PHOS 4.1 3.5 3.4     Recent Labs     02/24/22  1300 02/24/22  0048 02/23/22  1301   ALB 3.0* 2.7* 2.9*     No results for input(s): INR, PTP, APTT, INREXT, INREXT in the last 72 hours. No results for input(s): FE, TIBC, PSAT, FERR in the last 72 hours. No results found for: FOL, RBCF   No results for input(s): PH, PCO2, PO2 in the last 72 hours. No results for input(s): CPK, CKNDX, TROIQ in the last 72 hours.     No lab exists for component: CPKMB  Lab Results   Component Value Date/Time    Cholesterol, total 146 03/21/2016 10:31 AM    HDL Cholesterol 85 03/21/2016 10:31 AM    LDL, calculated 48 03/21/2016 10:31 AM    Triglyceride 64 03/21/2016 10:31 AM     Lab Results   Component Value Date/Time    Glucose (POC) 206 (H) 02/24/2022 04:18 PM    Glucose (POC) 187 (H) 02/24/2022 11:12 AM    Glucose (POC) 113 02/24/2022 06:55 AM    Glucose (POC) 168 (H) 02/23/2022 09:12 PM    Glucose (POC) 177 (H) 02/23/2022 04:27 PM     Lab Results   Component Value Date/Time    Color YELLOW/STRAW 02/18/2022 09:11 AM    Appearance CLEAR 02/18/2022 09:11 AM    Specific gravity 1.022 02/18/2022 09:11 AM    pH (UA) 7.0 02/18/2022 09:11 AM    Protein 300 (A) 02/18/2022 09:11 AM    Glucose Negative 02/18/2022 09:11 AM    Ketone 40 (A) 02/18/2022 09:11 AM    Bilirubin Negative 02/18/2022 09:11 AM    Urobilinogen 1.0 02/18/2022 09:11 AM    Nitrites Negative 02/18/2022 09:11 AM    Leukocyte Esterase TRACE (A) 02/18/2022 09:11 AM    Epithelial cells FEW 02/18/2022 09:11 AM    Bacteria Negative 02/18/2022 09:11 AM    WBC 0-4 02/18/2022 09:11 AM    RBC 0-5 02/18/2022 09:11 AM         Medications Reviewed:     Current Facility-Administered Medications   Medication Dose Route Frequency    neomycin-polymyxin-dexamethasone (DEXACIDIN, MAXITROL) 3.5mg/mL-10,000 unit/mL-0.1 % ophthalmic suspension 1 Drop  1 Drop Both Eyes QID    amLODIPine (NORVASC) tablet 5 mg  5 mg Oral DAILY    sodium chloride (NS) flush 5-40 mL  5-40 mL IntraVENous Q8H    sodium chloride (NS) flush 5-40 mL  5-40 mL IntraVENous PRN    [Held by provider] metoprolol succinate (TOPROL-XL) XL tablet 25 mg  25 mg Oral DAILY    sodium chloride (NS) flush 5-40 mL  5-40 mL IntraVENous Q8H    sodium chloride (NS) flush 5-40 mL  5-40 mL IntraVENous PRN    acetaminophen (TYLENOL) tablet 650 mg  650 mg Oral Q4H PRN    heparin (porcine) injection 5,000 Units  5,000 Units SubCUTAneous Q8H    hydrALAZINE (APRESOLINE) 20 mg/mL injection 20 mg  20 mg IntraVENous Q6H PRN    glucose chewable tablet 16 g  4 Tablet Oral PRN    glucagon (GLUCAGEN) injection 1 mg  1 mg IntraMUSCular PRN    insulin lispro (HUMALOG) injection   SubCUTAneous AC&HS     ______________________________________________________________________  EXPECTED LENGTH OF STAY: 3d 12h  ACTUAL LENGTH OF STAY:          6                 Murray Beck MD

## 2022-02-25 NOTE — PROGRESS NOTES
Problem: Pressure Injury - Risk of  Goal: *Prevention of pressure injury  Description: Document Isaias Scale and appropriate interventions in the flowsheet. Outcome: Progressing Towards Goal  Note: Pressure Injury Interventions:  Sensory Interventions: Assess changes in LOC,Avoid rigorous massage over bony prominences,Check visual cues for pain,Float heels,Keep linens dry and wrinkle-free,Minimize linen layers,Pad between skin to skin    Moisture Interventions: Absorbent underpads,Apply protective barrier, creams and emollients,Check for incontinence Q2 hours and as needed,Internal/External urinary devices,Limit adult briefs,Minimize layers,Moisture barrier    Activity Interventions: Assess need for specialty bed,Increase time out of bed,Pressure redistribution bed/mattress(bed type),PT/OT evaluation    Mobility Interventions: Assess need for specialty bed,Float heels,Pressure redistribution bed/mattress (bed type),PT/OT evaluation    Nutrition Interventions: Document food/fluid/supplement intake,Offer support with meals,snacks and hydration    Friction and Shear Interventions: Apply protective barrier, creams and emollients,Lift sheet,Minimize layers                Problem: Patient Education: Go to Patient Education Activity  Goal: Patient/Family Education  Outcome: Progressing Towards Goal     Problem: Falls - Risk of  Goal: *Absence of Falls  Description: Document Polo Fall Risk and appropriate interventions in the flowsheet.   Outcome: Progressing Towards Goal  Note: Fall Risk Interventions:  Mobility Interventions: Assess mobility with egress test,Bed/chair exit alarm,Strengthening exercises (ROM-active/passive)    Mentation Interventions: Adequate sleep, hydration, pain control,Bed/chair exit alarm,Familiar objects from home,Increase mobility,More frequent rounding,Room close to nurse's station,Toileting rounds,Update white board    Medication Interventions: Bed/chair exit alarm,Patient to call before getting OOB,Teach patient to arise slowly    Elimination Interventions: Bed/chair exit alarm,Call light in reach,Patient to call for help with toileting needs,Toileting schedule/hourly rounds              Problem: Patient Education: Go to Patient Education Activity  Goal: Patient/Family Education  Outcome: Progressing Towards Goal

## 2022-02-25 NOTE — PROGRESS NOTES
[facility] Adult  Hospitalist Group                                                                                          Hospitalist Progress Note  Santosh Harrington MD  Answering service: [phone] or [phone] from in house phone        Date of Service:  2022  NAME:  Blossom Valentine  :  1922  MRN:  [id number]      Admission Summary:   Per H and P\"80 y.o. female who presents with abnormal labs.   Patient is a resident of [facility], history was primarily obtained from patient as well as patient's niece,     Patient has history of hyponatremia, apparently recently patient was diagnosed with UTI, probably was recently started on Bactrim, yesterday had blood work done and was found to have a sodium of 115 and was sent to [facility] for further management and evaluation. Patient was recently admitted to the hospital with hyponatremia in 2021, at that point of time patient may have been started on Bactrim too for UTI. Patient currently is resting in bed, appears to be comfortable, niece is at bedside, admits to global weakness but denies any other complaints or problems\". Interval history / Subjective:   Patient seen and examined. She has hearing impairment  Hard of hearing, lying in bed comfortably, no complaints. Assessment & Plan:     #Hyponatremia: Improved, in the low 130s  -due to SIADH    -attributed likely due to recent meds -cymbalta,lasix,bactrim which need to be discontinued at discharge  -nephrology following-  Received 3 % NS and now on tolvaptan  --Other dose of tolvaptan 15 mg and Lasix 20 mg IV today, .       # Chronic atrial fib: Resume metoprolol, heart rate upper 50s60s,not on AC prior to admission    # HTN-resume amlodipine     # DM: glucose controlled     # Severe tricuspid regurgitation with moderate to severe pulmonary HTN  #Significant peripheral edema  --Echocardiogram in 2021 with normal EF but with the above significant findings  --Diuretic is limited by her acute on chronic hyponatremia/SIADH  --Discussed with renal, received Lasix 20 mg IV x1 on 2/25    Discharge planning  --Patient received another dose of tolvaptan and a dose of IV Lasix today and would like to check BMP in the morning and possible discharge Saturday. Code status: full  Prophylaxis: heparin  Care Plan discussed with: patient,nurse  Anticipated Disposition: snf     Hospital Problems  Date Reviewed: 12/16/2021          Codes Class Noted POA    Hyponatremia ICD-10-CM: E87.1  ICD-9-CM: 276.1  12/20/2021 Unknown                Review of Systems:   As per HPI      Vital Signs:    Last 24hrs VS reviewed since prior progress note. Most recent are:  Visit Vitals  BP (!) 151/66   Pulse 79   Temp 98.4 °F (36.9 °C)   Resp 16   Ht 4' 11\" (1.499 m)   Wt 77.6 kg (171 lb 1.2 oz)   SpO2 96%   BMI 34.55 kg/m²         Intake/Output Summary (Last 24 hours) at 2/25/2022 1257  Last data filed at 2/25/2022 0600  Gross per 24 hour   Intake    Output 1600 ml   Net -1600 ml        Physical Examination:     I had a face to face encounter with this patient and independently examined them on 2/25/2022 as outlined below:          Constitutional:  Alert, comfortable on room air. HEENT:  Oral mucosa moist,EOMI,anicteric sclera  Ecchymosis on the left neck where she had central line   Resp:  decreased breath sounds at bases, No wheezing. No accessory muscle use. CV:  Irregular rhythm, normal rate, systolic murmur+    GI:  Soft, non distended, obese,non tender.  normoactive bowel sounds,    Musculoskeletal: ++ LE edema,some erythema feet    Neurologic:  hard of hearing,moves all extremities, globallly weak,alert,oriented            Data Review:    Review and/or order of clinical lab test  Review and/or order of tests in the medicine section of CPT      Labs:     No results for input(s): WBC, HGB, HCT, PLT, HGBEXT, HCTEXT, PLTEXT, HGBEXT, HCTEXT, PLTEXT in the last 72 hours. Recent Labs     02/25/22  0159 02/24/22  1300 02/24/22  0048 02/23/22  1301 02/23/22  1301   * 130* 131*   < > 127*   K 5.0 5.6* 4.9   < > 4.8    102 100   < > 97   CO2 29 22 28   < > 29   BUN 18 15 15   < > 15   CREA 0.63 0.71 0.63   < > 0.64   * 155* 128*   < > 136*   CA 8.6 8.8 8.7   < > 8.7   PHOS  --  4.1 3.5  --  3.4    < > = values in this interval not displayed. Recent Labs     02/25/22 0159 02/24/22  1300 02/24/22 0048   ALT 37  --   --      --   --    TBILI 0.5  --   --    TP 5.9*  --   --    ALB 2.8* 3.0* 2.7*   GLOB 3.1  --   --      No results for input(s): INR, PTP, APTT, INREXT, INREXT in the last 72 hours. No results for input(s): FE, TIBC, PSAT, FERR in the last 72 hours. No results found for: FOL, RBCF   No results for input(s): PH, PCO2, PO2 in the last 72 hours. No results for input(s): CPK, CKNDX, TROIQ in the last 72 hours.     No lab exists for component: CPKMB  Lab Results   Component Value Date/Time    Cholesterol, total 146 03/21/2016 10:31 AM    HDL Cholesterol 85 03/21/2016 10:31 AM    LDL, calculated 48 03/21/2016 10:31 AM    Triglyceride 64 03/21/2016 10:31 AM     Lab Results   Component Value Date/Time    Glucose (POC) 152 (H) 02/25/2022 11:40 AM    Glucose (POC) 128 (H) 02/25/2022 06:28 AM    Glucose (POC) 174 (H) 02/24/2022 09:28 PM    Glucose (POC) 206 (H) 02/24/2022 04:18 PM    Glucose (POC) 187 (H) 02/24/2022 11:12 AM     Lab Results   Component Value Date/Time    Color YELLOW/STRAW 02/18/2022 09:11 AM    Appearance CLEAR 02/18/2022 09:11 AM    Specific gravity 1.022 02/18/2022 09:11 AM    pH (UA) 7.0 02/18/2022 09:11 AM    Protein 300 (A) 02/18/2022 09:11 AM    Glucose Negative 02/18/2022 09:11 AM    Ketone 40 (A) 02/18/2022 09:11 AM    Bilirubin Negative 02/18/2022 09:11 AM    Urobilinogen 1.0 02/18/2022 09:11 AM    Nitrites Negative 02/18/2022 09:11 AM    Leukocyte Esterase TRACE (A) 02/18/2022 09:11 AM    Epithelial cells FEW 02/18/2022 09:11 AM    Bacteria Negative 02/18/2022 09:11 AM    WBC 0-4 02/18/2022 09:11 AM    RBC 0-5 02/18/2022 09:11 AM         Medications Reviewed:     Current Facility-Administered Medications   Medication Dose Route Frequency    tolvaptan (SAMSCA) tablet (0.5 X 30 MG) 15 mg  15 mg Oral ONCE    neomycin-polymyxin-dexamethasone (DEXACIDIN, MAXITROL) 3.5mg/mL-10,000 unit/mL-0.1 % ophthalmic suspension 1 Drop  1 Drop Both Eyes QID    amLODIPine (NORVASC) tablet 5 mg  5 mg Oral DAILY    sodium chloride (NS) flush 5-40 mL  5-40 mL IntraVENous Q8H    sodium chloride (NS) flush 5-40 mL  5-40 mL IntraVENous PRN    [Held by provider] metoprolol succinate (TOPROL-XL) XL tablet 25 mg  25 mg Oral DAILY    sodium chloride (NS) flush 5-40 mL  5-40 mL IntraVENous Q8H    sodium chloride (NS) flush 5-40 mL  5-40 mL IntraVENous PRN    acetaminophen (TYLENOL) tablet 650 mg  650 mg Oral Q4H PRN    heparin (porcine) injection 5,000 Units  5,000 Units SubCUTAneous Q8H    hydrALAZINE (APRESOLINE) 20 mg/mL injection 20 mg  20 mg IntraVENous Q6H PRN    glucose chewable tablet 16 g  4 Tablet Oral PRN    glucagon (GLUCAGEN) injection 1 mg  1 mg IntraMUSCular PRN    insulin lispro (HUMALOG) injection   SubCUTAneous AC&HS     ______________________________________________________________________  EXPECTED LENGTH OF STAY: 3d 12h  ACTUAL LENGTH OF STAY:          7                 Esdras De La O MD

## 2022-02-25 NOTE — ROUTINE PROCESS
Bedside and Verbal shift change report given to chucky Rock (oncoming nurse) by Kasie Apodaca (offgoing nurse). Report included the following information SBAR, Kardex, Intake/Output, Recent Results and Cardiac Rhythm a-fib.

## 2022-02-25 NOTE — PROGRESS NOTES
Transition of Care Plan: [facility]     RUR: 14% low     PCP F/U: Nakia Aranda MD     Disposition: [facility]-room [address]     Transportation: [facility]-will call for tomorrow     Main Contact: Wandy Aguila - [phone] (cell)    9461: Received call from [facility] checking on status of patient and if she is medically ready to come today. Messaged attending. Waiting to hear back. 1102: RN to call report to [phone]. [facility] request for 1400.     1117: Spoke with patient to let her know transport is going to come take her to [facility] around 2pm. Patient states she would like assistance eating lunch prior to discharge and wants to make sure all her belongings go with her.     [phone] Per attending, MD wants to monitor sodium one more day. Discharge cancelled. Request [facility] will call for tomorrow. 1407: Spoke with [facility]. States patient can come tomorrow if medically ready. Marty Gutierrez RN, CRM    Medicare pt has received, reviewed, and signed 2nd IM letter informing them of their right to appeal the discharge. Signed copy has been placed on pt bedside chart. Transition of Care Plan to SNF/Rehab    SNF/Rehab Transition:  Patient has been accepted to [facility] and meets criteria for admission. Patient will transported by [facility] and expected to leave tomorrow. Communication to Patient/Family:  Met with patient and they are agreeable to the transition plan. Communication to SNF/Rehab:  Bedside RN, Mulu Aiken, has been notified to update the transition plan to the facility and call report (phone number [phone]). Discharge information has been updated on the AVS.     Discharge instructions to be sent to facility. Nursing Please include all hard scripts for controlled substances, med rec and dc summary, and AVS in packet.      Reviewed and confirmed with facility, [address] can manage the patient care needs for the following:     Kevin Null with (X) only those applicable:    Medication:  [x]  Medications will be available at the facility  []  IV Antibiotics   []  Controlled Substance - hard copy to be sent with patient   [x]  Weekly Labs   Documents:  [] Hard RX  [x] MAR  [x] Kardex  [x] AVS  []Transfer Summary  [x]Discharge   Equipment:  []  CPAP/BiPAP  []  Wound Vacuum  []  Cifuentes or Urinary Device  []  PICC/Central Line  []  Nebulizer  []  Ventilator   Treatment:  []Isolation (for MRSA, VRE, etc.)  []Surgical Drain Management  []Tracheostomy Care  []Dressing Changes  []Dialysis with transportation and chair time  []PEG Care  []Oxygen  []Daily Weights for Heart Failure   Dietary:  []Any diet limitations  []Tube Feedings   []Total Parenteral Management (TPN)   Eligible for Medicaid Long Term Services and Supports  Yes:  [] Eligible for medical assistance or will become eligible within 180 days and UAI completed. [] Provider/Patient and/or support system has requested screening. [] UAI copy provided to patient or responsible party  [] UAI unavailable at discharge will send once processed to SNF provider. [] UAI unavailable at discharged mailed to patient  No:   [x] Private pay and is not financially eligible for Medicaid within the next 180 days. [] Reside out-of-state.   [] A residents of a state owned/operated facility that is licensed  by [address] and [employer] or [facility]  [] Enrollment in [facility] hospice services  [] [address]  [] Patient /Family declines to have screening completed or provide financial information for screening     Financial Resources:  Medicaid    [] Initiated and application pending   [] Full coverage     Advanced Care Plan:  []Surrogate Decision Maker of Care  []POA  []Communicated Code Status-Full    Other

## 2022-02-26 NOTE — PROGRESS NOTES
Bedside shift change report given to VIN Rock (oncoming nurse) by Marva Galeazzi, RN (offgoing nurse). Report included the following information sbar  .

## 2022-02-26 NOTE — PROGRESS NOTES
TRANSFER - OUT REPORT:    Verbal report given to [facility] on Cristina Almendarez  being transferred to [facility] for routine progression of care       Report consisted of patients Situation, Background, Assessment and   Recommendations(SBAR). Information from the following report(s) SBAR, Kardex, ED Summary, Procedure Summary, Intake/Output, MAR, Recent Results, Procedure Verification, Quality Measures and Dual Neuro Assessment was reviewed with the receiving nurse. Lines:       Opportunity for questions and clarification was provided.       Patient transported with:

## 2022-02-26 NOTE — DISCHARGE SUMMARY
Discharge Summary         PATIENT ID: Imani Ivory  MRN: [id number]   YOB: 1922    DATE OF ADMISSION: 2/18/2022  8:46 AM    DATE OF DISCHARGE: 2/26/2022    PRIMARY CARE PROVIDER: Donna Morgan MD       ATTENDING PHYSICIAN: Keaton Ortiz MD  DISCHARGING PROVIDER: Torey Gamboa MD     To contact this individual call [phone] and ask the  to page. If unavailable ask to be transferred the Adult Hospitalist Department. CONSULTATIONS: IP CONSULT TO NEPHROLOGY    PROCEDURES/SURGERIES: Procedure(s): INFUSION CATHETER INSERTION    ADMITTING [address] [place] [address] COURSE:     Ms. Imani Ivory presented to the emergency room on 2/18/2022 with abnormal lab, low sodium of 115. She was apparently admitted in December for hyponatremia with bactrim use and reportedly patient was started on Bactrim earlier this week. Patient is a resident of [facility] . Patient has history of hyponatremia, apparently recently patient was diagnosed with UTI, probably was recently started on Bactrim. On blood work done a day prior to presentation she was found to have a sodium of 115 and was sent to [facility] for further management and evaluation. Patient complaint of global weakness but denies any other complaints or problems. #1.  Acute on chronic severe hyponatremia. Sodium in the ED was found  to be 112, it was reported to be 115 prior to admit lab. Patient is known to have SIADH and current acute hyponatremia exacerbated likely due to UTI, Cymbalta and Lasix use and one dose of Bactrim. Urgent nephrologist consultation was obtained when patient presents to the ED and she was started on 3% normal saline. She subsequently required multiple doses of 3% normal saline and tolvaptan with gradual improvement in the sodium level. Lasix, Cymbalta and Bactrim all were discontinued.   Sodium remained stable in the low 130s for the last 2 days and she was cleared for discharge by nephrologist.  She is prescribed tolvaptan 30 mg to be taken Mondays, Wednesdays and Fridays. She will need close outpatient follow-up with the nephrologist, see contact below. Please check BMP on Monday and send results to the nephrologist.     Ref. Range 2/23/2022 05:10 2/23/2022 13:01 2/24/2022 00:48 2/24/2022 13:00 2/25/2022 01:59 2/25/2022 18:26 2/26/2022 02:57 2/26/2022 02:58 2/26/2022 02:58   Sodium Latest Ref Range: 136 - 145 mmol/L 127 (L) 127 (L) 131 (L) 130 (L) 132 (L) 131 (L) 131 (L) 132 (L) 132 (L)             #2.  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 Patient was on Lasix before admission which was stopped due to severe hyponatremia. After discussing with nephrologist, patient received a one-time dose of 20 mg IV Lasix on 2/25. Peripheral edema improved, sodium remained stable at 132. As recommended by nephrologist, Lasix is discontinued however she may need intermittent diuresis as needed in the future if peripheral edema worsens or develops dyspnea/pulmonary edema, but this has to be determined based on close clinical follow-up. --Recent echocardiogram in 11/2021 with normal EF but with the above significant findings  --I discussed with her sister/SOUTHA Leena Rodriguez if she knowledge of these conditions, tricuspid regurg and pulmonary hypertension and if she has seen a cardiologist.  I reviewed with her that these are chronic I suspect it may be reason she is getting furosemide. I discussed with her that given her age she just needs to be followed paying close attention to fluid overload and may need intermittent diuretics but being mindful of the sodium issues. I believe her primary doctor is aware of these as these findings were present in echocardiogram dating back to 2017       #3. Chronic atrial fibrillation: On metoprolol. Patient was not on anticoagulation prior to admission        #4. Hypertension  Continue metoprolol. Metoprolol was held and amlodipine started during this hospitalization. However in the past amlodipine was discontinued due to leg edema and her bradycardia is not severe she was restarted on the metoprolol yesterday. Heart rate today in the 60s. Therefore she is discharged on the metoprolol as before, discontinued amlodipine. #5. Type 2 diabetes mellitus. A1c 6.1. Not on a scheduled diabetic medications. --Please check blood sugar with AC&HS Accu-Cheks, cover with Humalog sliding scale per facility protocol. Check BMP on Monday, 2/28 and send results to nephrologist.Dr Doris Quinones 11    Discharge care plan and recommendations where discussed with patient sister/MPOA Wilder Pallas and questions answered by phone, [phone](JLV)      PENDING TEST RESULTS:   At the time of discharge the following test results are still pending: none    FOLLOW UP APPOINTMENTS:    Follow-up Information     Follow up With Specialties Details Why Contact Info    Jose Juan Burden MD Nephrology   [id number] [facility]  [phone]             ADDITIONAL CARE RECOMMENDATIONS:     DIET: Regular Diet, Clear liquids and Diabetic Diet      OXYGEN / BiPAP SETTINGS: Room air    ACTIVITY: Activity as tolerated and PT/OT Eval and Treat        DISCHARGE MEDICATIONS:   See Medication Reconciliation Form      NOTIFY YOUR PHYSICIAN FOR ANY OF THE FOLLOWING:   Fever over 101 degrees for 24 hours. Chest pain, shortness of breath, fever, chills, nausea, vomiting, diarrhea, change in mentation, falling, weakness, bleeding. Severe pain or pain not relieved by medications. Or, any other signs or symptoms that you may have questions about. DISPOSITION:    Home With:   OT  PT  HH  RN      x SNF/Inpatient Rehab/LTAC    Independent/assisted living    Hospice    Other:       PATIENT CONDITION AT DISCHARGE:     Functional status   x Poor     Deconditioned     Independent      Cognition.   Hard of hearing   x Lucid     Forgetful     Dementia      Catheters/lines (plus indication)    Cifuentes     PICC     PEG    x None      Code status   x  Full code     DNR      PHYSICAL EXAMINATION AT DISCHARGE:    GENERAL:  Patient was seen lying in bed comfortably, on room air with SPO2 98% during this exam.  HEENT:  Normocephalic, atraumatic, non icteric sclerae, non pallor conjuctivae, EOMs intact, PERRLA. NECK: Supple, trachea midline, no adenopathy, no thyromegally or tenderness, no carotid bruit and no JVD. LUNGS:   Vesicular breath sounds bilaterally, no added sounds. On room air  HEART:  Grade 3 systolic murmur on the left lower sternal border. Irregularly irregular heartbeat. ABDOMEB:   Soft, non-tender. Normoactive bowel sounds. No masses,  No organomegaly. EXTREMETIES: +1 pitting pedal edema. NEUROLOGY: Patient is hard of hearing otherwise alert and oriented to PPT, CNII-XII intact. Motor and sensory exam grossly intact. CHRONIC MEDICAL DIAGNOSES:  Problem List as of 2/26/2022 Date Reviewed: 12/16/2021          Codes Class Noted - Resolved    Hyponatremia ICD-10-CM: E87.1  ICD-9-CM: 276.1  12/20/2021 - Present        Severe obesity (BMI 35.0-39. 9) with comorbidity ([facility] ICD-10-CM: E66.01  ICD-9-CM: 278.01  4/25/2018 - Present        Type 2 diabetes mellitus with nephropathy ([facility] ICD-10-CM: E11.21  ICD-9-CM: 250.40, 583.81  12/19/2017 - Present        CHF (congestive heart failure) ([facility] ICD-10-CM: I50.9  ICD-9-CM: 428.0  12/6/2017 - Present        Anxiety ICD-10-CM: F41.9  ICD-9-CM: 300.00  10/2/2017 - Present        Allergic rhinitis ICD-10-CM: J30.9  ICD-9-CM: 477.9  4/11/2016 - Present        Chronic atrial fibrillation ([facility] ICD-10-CM: I48.20  ICD-9-CM: 427.31  4/8/2016 - Present        Advanced directives, counseling/discussion ICD-10-CM: Z71.89  ICD-9-CM: V65.49  3/15/2016 - Present        Type 2 diabetes, controlled, with neuropathy ([facility] ICD-10-CM: E11.40  ICD-9-CM: 250.60, 357.2  1/4/2016 - Present        Essential hypertension, benign ICD-10-CM: I10  ICD-9-CM: 401.1  6/4/2015 - Present        Edema ICD-10-CM: R60.9  ICD-9-CM: 782.3  11/12/2014 - Present    Overview Addendum 12/14/2017  8:24 AM by Yue Velasco MD     4/16 echo normal lvef, lae, mild tr  12/17 echo normal lvef, elisha, mild to mod mr and tr, pa pressure 50mm             Headache(784.0) ICD-10-CM: R51  ICD-9-CM: 784.0  11/12/2014 - Present        Diverticulosis of colon (without mention of hemorrhage) ICD-10-CM: K57.30  ICD-9-CM: 562.10  11/12/2014 - Present        Constipation ICD-10-CM: K59.00  ICD-9-CM: 564.00  11/12/2014 - Present        Cyst of left kidney ICD-10-CM: N28.1  ICD-9-CM: 753.10  11/12/2014 - Present        RESOLVED: Bleeding nose ICD-10-CM: R04.0  ICD-9-CM: 784. 7  Unknown - 9/18/2018        RESOLVED: Diabetes mellitus type 2, controlled, without complications ([facility] [id number]: E11.9  ICD-9-CM: 250.00  1/4/2016 - 3/15/2016        RESOLVED: Elevated blood pressure reading without diagnosis of hypertension ICD-10-CM: R03.0  ICD-9-CM: 796.2  1/21/2015 - 9/1/2015        RESOLVED: Type II or unspecified type diabetes mellitus with unspecified complication, not stated as uncontrolled ICD-10-CM: E11.8  ICD-9-CM: 250.90  11/12/2014 - 11/2/2015                   DISCHARGE MEDICATIONS:  Current Discharge Medication List      START taking these medications    Details   tolvaptan (SAMSCA) 30 mg tab tablet Take 1 Tablet by mouth every Monday, Wednesday, Friday for 30 days. Qty: 12 Tablet, Refills: 0  Start date: 2/28/2022, End date: 3/30/2022         CONTINUE these medications which have NOT CHANGED    Details   mirabegron ER (MYRBETRIQ) 50 mg ER tablet Take 50 mg by mouth daily. fluticasone propionate (FLONASE NA) 1 Spray by Nasal route daily. 1 spary alternating nostrils one time a day      dextromethorphan (Delsym) 30 mg/5 mL liquid Take 60 mg by mouth two (2) times daily as needed for Cough.       tobramycin-dexamethasone (Tobradex ST) drps ophthalmix suspension Administer 1 Drop to both eyes four (4) times daily. metoprolol succinate (TOPROL-XL) 25 mg XL tablet Take 1 Tab by mouth daily. Qty: 90 Tab, Refills: 3    Associated Diagnoses: Essential hypertension, benign      vit A/vit C/vit E/zinc/copper (PRESERVISION AREDS PO) Take  by mouth two (2) times a day. loratadine (Claritin) 10 mg tablet Take 10 mg by mouth daily. glucose blood VI test strips (PRECISION XTRA TEST) strip USE TO TEST BLOOD SUGAR TWICE DAILY. Dx E11.21  Qty: 200 Strip, Refills: 11    Associated Diagnoses: Type 2 diabetes, controlled, with neuropathy (HCC)      cholecalciferol, vitamin D3, (Vitamin D3) 50 mcg (2,000 unit) tab Take  by mouth daily. Lancets misc 1 Package by Does Not Apply route daily. Test glucose daily and as needed Dx type 2 dm E11.42  Qty: 1 Package, Refills: 6         STOP taking these medications       furosemide (LASIX) 40 mg tablet Comments:   Reason for Stopping:         trimethoprim-sulfamethoxazole (Bactrim)  mg per tablet Comments:   Reason for Stopping:         DULoxetine (CYMBALTA) 30 mg capsule Comments:   Reason for Stopping:         potassium chloride SR (KLOR-CON 10) 10 mEq tablet Comments:   Reason for Stopping:             Greater than 40 minutes were spent with the patient on counseling and coordination of care    Signed:    Nazanin Kingston MD  2/26/2022  11:58 AM

## 2022-02-26 NOTE — PROGRESS NOTES
Transitions of Care    RUR: 15%    [facility] and [facility] was called. They can accept Ms. Almendarez today. [facility] was [facility]. Their ETA is 3:15pm.  The discharge packet was initiated. Ms. Priya Dominguez and her family were informed. Their questions were answered. They were informed that if Medicare denies the ambulance, to please appeal the denial with Medicare.     Signed By: Adebayo Stoll LCSW     February 26, 2022

## 2022-02-26 NOTE — DISCHARGE INSTRUCTIONS
Discharge SNF/Rehab Instructions/LTAC       PATIENT ID: Imani Ivory  MRN: [id number]   YOB: 1922    DATE OF ADMISSION: 2/18/2022  8:46 AM    DATE OF DISCHARGE: 2/26/2022    PRIMARY CARE PROVIDER: Donna Morgan MD       ATTENDING PHYSICIAN: Keaton Ortiz MD  DISCHARGING PROVIDER: Torey Gamboa MD     To contact this individual call [phone] and ask the  to page. If unavailable ask to be transferred the Adult Hospitalist Department. CONSULTATIONS: IP CONSULT TO NEPHROLOGY    PROCEDURES/SURGERIES: Procedure(s): INFUSION CATHETER INSERTION    ADMITTING [address] [place] [address] COURSE:     Ms. Imani Ivory presented to the emergency room on 2/18/2022 with abnormal lab, low sodium of 115. She was apparently admitted in December for hyponatremia with bactrim use and reportedly patient was started on Bactrim earlier this week. Patient is a resident of [facility] . Patient has history of hyponatremia, apparently recently patient was diagnosed with UTI, probably was recently started on Bactrim. On blood work done a day prior to presentation she was found to have a sodium of 115 and was sent to [facility] for further management and evaluation. Patient complaint of global weakness but denies any other complaints or problems. #1.  Acute on chronic severe hyponatremia. Sodium in the ED was found  to be 112, it was reported to be 115 prior to admit lab. Patient is known to have SIADH and current acute hyponatremia exacerbated likely due to UTI, Cymbalta and Lasix use and one dose of Bactrim. Urgent nephrologist consultation was obtained when patient presents to the ED and she was started on 3% normal saline. She subsequently required multiple doses of 3% normal saline and tolvaptan with gradual improvement in the sodium level. Lasix, Cymbalta and Bactrim all were discontinued.   Sodium remained stable in the low 130s for the last 2 days and she was cleared for discharge by nephrologist.  She is prescribed tolvaptan 30 mg to be taken Mondays, Wednesdays and Fridays. She will need close outpatient follow-up with the nephrologist, see contact below. Please check BMP on Monday and send results to the nephrologist.     Ref. Range 2/23/2022 05:10 2/23/2022 13:01 2/24/2022 00:48 2/24/2022 13:00 2/25/2022 01:59 2/25/2022 18:26 2/26/2022 02:57 2/26/2022 02:58 2/26/2022 02:58   Sodium Latest Ref Range: 136 - 145 mmol/L 127 (L) 127 (L) 131 (L) 130 (L) 132 (L) 131 (L) 131 (L) 132 (L) 132 (L)             #2.  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 Patient was on Lasix before admission which was stopped due to severe hyponatremia. After discussing with nephrologist, patient received a one-time dose of 20 mg IV Lasix on 2/25. Peripheral edema improved, sodium remained stable at 132. As recommended by nephrologist, Lasix is discontinued however she may need intermittent diuresis as needed in the future if peripheral edema worsens or develops dyspnea/pulmonary edema, but this has to be determined based on close clinical follow-up. --Recent echocardiogram in 11/2021 with normal EF but with the above significant findings  --Recommend daily weight. #3.  Chronic atrial fibrillation: On metoprolol. Patient was not on anticoagulation prior to admission     #4. Hypertension  Continue metoprolol. Metoprolol was held and amlodipine started during this hospitalization. However in the past amlodipine was discontinued due to leg edema and her bradycardia is not severe she was restarted on the metoprolol yesterday. Heart rate today in the 60s. Therefore she is discharged on the metoprolol as before, discontinued amlodipine. #5. Type 2 diabetes mellitus. A1c 6.1. Not on a scheduled diabetic medications. --Please check blood sugar with AC&HS Accu-Cheks, cover with Humalog sliding scale per facility protocol. Discharge care plan was discussed        Check BMP on Monday, 2/28 and send results to nephrologist.Dr Wanda Cook    Discharge care plan and recommendations where discussed with patient sister/MPOA Debbi Hernandez and questions answered by phone, [phone](IPOX)          PENDING TEST RESULTS:   At the time of discharge the following test results are still pending: none    FOLLOW UP APPOINTMENTS:    Follow-up Information     Follow up With Specialties Details Why Contact Info    Gayle Howe MD Nephrology   [address] [facility]  [phone]             ADDITIONAL CARE RECOMMENDATIONS:     DIET: Regular Diet, Clear liquids and Diabetic Diet      OXYGEN / BiPAP SETTINGS: Room air    ACTIVITY: Activity as tolerated and PT/OT Eval and Treat        DISCHARGE MEDICATIONS:   See Medication Reconciliation Form      NOTIFY YOUR PHYSICIAN FOR ANY OF THE FOLLOWING:   Fever over 101 degrees for 24 hours. Chest pain, shortness of breath, fever, chills, nausea, vomiting, diarrhea, change in mentation, falling, weakness, bleeding. Severe pain or pain not relieved by medications. Or, any other signs or symptoms that you may have questions about. DISPOSITION:    Home With:   OT  PT  HH  RN      x SNF/Inpatient Rehab/LTAC    Independent/assisted living    Hospice    Other:       PATIENT CONDITION AT DISCHARGE:     Functional status   x Poor     Deconditioned     Independent      Cognition. Hard of hearing   x Lucid     Forgetful     Dementia      Catheters/lines (plus indication)    Cifuentes     PICC     PEG    x None      Code status   x  Full code     DNR      PHYSICAL EXAMINATION AT DISCHARGE:    GENERAL:  Patient was seen lying in bed comfortably, on room air with SPO2 98% during this exam.  HEENT:  Normocephalic, atraumatic, non icteric sclerae, non pallor conjuctivae, EOMs intact, PERRLA.   NECK: Supple, trachea midline, no adenopathy, no thyromegally or tenderness, no carotid bruit and no JVD. LUNGS:   Vesicular breath sounds bilaterally, no added sounds. On room air  HEART:  Grade 3 systolic murmur on the left lower sternal border. Irregularly irregular heartbeat. ABDOMEB:   Soft, non-tender. Normoactive bowel sounds. No masses,  No organomegaly. EXTREMETIES: +1 pitting pedal edema. NEUROLOGY: Patient is hard of hearing otherwise alert and oriented to PPT, CNII-XII intact. Motor and sensory exam grossly intact. CHRONIC MEDICAL DIAGNOSES:  Problem List as of 2/26/2022 Date Reviewed: 12/16/2021          Codes Class Noted - Resolved    Hyponatremia ICD-10-CM: E87.1  ICD-9-CM: 276.1  12/20/2021 - Present        Severe obesity (BMI 35.0-39. 9) with comorbidity ([facility] ICD-10-CM: E66.01  ICD-9-CM: 278.01  4/25/2018 - Present        Type 2 diabetes mellitus with nephropathy ([facility] ICD-10-CM: E11.21  ICD-9-CM: 250.40, 583.81  12/19/2017 - Present        CHF (congestive heart failure) ([facility] ICD-10-CM: I50.9  ICD-9-CM: 428.0  12/6/2017 - Present        Anxiety ICD-10-CM: F41.9  ICD-9-CM: 300.00  10/2/2017 - Present        Allergic rhinitis ICD-10-CM: J30.9  ICD-9-CM: 477.9  4/11/2016 - Present        Chronic atrial fibrillation ([facility] ICD-10-CM: I48.20  ICD-9-CM: 427.31  4/8/2016 - Present        Advanced directives, counseling/discussion ICD-10-CM: Z71.89  ICD-9-CM: V65.49  3/15/2016 - Present        Type 2 diabetes, controlled, with neuropathy ([facility] ICD-10-CM: E11.40  ICD-9-CM: 250.60, 357.2  1/4/2016 - Present        Essential hypertension, benign ICD-10-CM: I10  ICD-9-CM: 401.1  6/4/2015 - Present        Edema ICD-10-CM: R60.9  ICD-9-CM: 782.3  11/12/2014 - Present    Overview Addendum 12/14/2017  8:24 AM by Brendan Alfred MD     4/16 echo normal lvef, lae, mild tr  12/17 echo normal lvef, elisha, mild to mod mr and tr, pa pressure 50mm             Headache(784.0) ICD-10-CM: R51  ICD-9-CM: 784.0  11/12/2014 - Present        Diverticulosis of colon (without mention of hemorrhage) ICD-10-CM: K57.30  ICD-9-CM: 562.10  11/12/2014 - Present        Constipation ICD-10-CM: K59.00  ICD-9-CM: 564.00  11/12/2014 - Present        Cyst of left kidney ICD-10-CM: N28.1  ICD-9-CM: 753.10  11/12/2014 - Present        RESOLVED: Bleeding nose ICD-10-CM: R04.0  ICD-9-CM: 784. 7  Unknown - 9/18/2018        RESOLVED: Diabetes mellitus type 2, controlled, without complications ([facility] [id number]-50-TQ: E11.9  ICD-9-CM: 250.00  1/4/2016 - 3/15/2016        RESOLVED: Elevated blood pressure reading without diagnosis of hypertension ICD-10-CM: R03.0  ICD-9-CM: 796.2  1/21/2015 - 9/1/2015        RESOLVED: Type II or unspecified type diabetes mellitus with unspecified complication, not stated as uncontrolled ICD-10-CM: E11.8  ICD-9-CM: 250.90  11/12/2014 - 11/2/2015                      Signed:    Janes Glaser MD  2/26/2022  10:58 AM

## 2022-02-26 NOTE — PROGRESS NOTES
Nurse attempted several times to call [facility] [phone]  to give report to Nurse taking patient.  Left on hold for over 15 minutes then was discontacted

## 2022-02-26 NOTE — PROGRESS NOTES
Bedside shift change report given to Jake/RN (oncoming nurse) by Shweta/LPN (offgoing nurse). Report included the following information SBAR, Kardex, ED Summary, Procedure Summary, Intake/Output, MAR, Accordion, Recent Results, Alarm Parameters , Quality Measures and Dual Neuro Assessment.

## 2022-03-03 NOTE — PROGRESS NOTES
Post Acute Facility Update     *The information contained in this note was received during a weekly care coordination call with the post acute facility*    Current Facility: [address] [place] [address] ([facility])  Anticipated Discharge Date: TBD    Facility Nursing Update: no current updates  Facility Social Work Update: plan to return to home with family support  Bundle Program Status: none  Upper Extremity Assistance: Minimal Assistance   Lower Extremity Assistance: Maximum Assistance  Bed Mobility: Maximum Assistance  Transfers: Moderate Assistance  Ambulation: Minimal Assistance  How far (in feet) is the patient ambulating?  10  Device Used: walker  Barriers to Discharge: MONICA ROYN, RN  [facility]

## 2022-03-09 NOTE — PROGRESS NOTES
Post Acute Facility Update     *The information contained in this note was received during a weekly care coordination call with the post acute facility*    Current Facility: [address] [place] [address] ([facility])  Anticipated Discharge Date: TBD    Facility Nursing Update: No current nursing update  Facility Social Work Update: Family is trying to secure custodial placement. Bundle Program Status: none  Upper Extremity Assistance: Minimal Assistance   Lower Extremity Assistance: Minimal Assistance   Bed Mobility: Minimal Assistance   Transfers: Minimal Assistance   Ambulation: Minimal Assistance   How far (in feet) is the patient ambulating?  77 ft  Device Used: Walker  Barriers to Discharge: MONICA Haro LPN Care Coordinator

## 2022-03-22 NOTE — PROGRESS NOTES
Post Acute Facility Update     *The information contained in this note was received during a weekly care coordination call with the post acute facility*    Current Facility: [address] [place] [address] ([facility])  Anticipated Discharge Date: 3/28/2021    Facility Nursing Update: Nursing staff did not provide medical updates. Facility Social Work Update: Discharge plan MONICA. Pt's sister expressed interest in placing her in LTC at facility. Pt expressed thar she wants discharge home. Limited family support to assist in the home. No payer source for LTC placement. SW unclear of pt's finances/ monthly income. Medicaid undetermined. Bundle Program Status: none  Upper Extremity Assistance: Minimal Assistance   Lower Extremity Assistance: Moderate Assistance   Bed Mobility: Minimal Assistance   Transfers: Minimal Assistance   Ambulation: Minimal Assistance   How far (in feet) is the patient ambulating?  5  Device Used: Walker  Barriers to Discharge: MONICA David MSW  [facility]

## 2022-03-22 NOTE — PROGRESS NOTES
Post Acute Facility Update     *The information contained in this note was received during a weekly care coordination call with the post acute facility*    Current Facility: [address] [place] [address] ([facility])  Anticipated Discharge Date: TBD    Facility Nursing Update: no current updates  Facility Social Work Update: family is trying to secure retirement placement  Bundle Program Status: none  Upper Extremity Assistance: Independent  Lower Extremity Assistance: Moderate Assistance   Bed Mobility: Minimal Assistance   Transfers: Contact Guard Assist - hands on patient for balance   Ambulation: Minimal Assistance   How far (in feet) is the patient ambulating?  77 ft  Device Used: walker  Barriers to Discharge: TBD    Taylor JACK, RN  [employer]

## 2022-03-29 NOTE — TELEPHONE ENCOUNTER
----- Message from Ekaterina Kowalski sent at 3/29/2022 11:09 AM EDT -----  Subject: Message to Provider    QUESTIONS  Information for Provider? Chitra Bearden [facility] called requesting for   patients last in office visit summary faxed to [phone]. Would also   like to see if Provider would follow patient for Home Health.  ---------------------------------------------------------------------------  --------------  [address]  What is the best way for the office to contact you? OK to leave message on   voicemail  Preferred Call Back Phone Number? [phone]  ---------------------------------------------------------------------------  --------------  SCRIPT ANSWERS  Relationship to Patient? Third Party  Third Party Type? Home Health Care? Representative Name?  [facility]

## 2022-03-29 NOTE — TELEPHONE ENCOUNTER
----- Message from Hortencia Tidwell sent at 3/29/2022 11:09 AM EDT -----  Subject: Message to Provider    QUESTIONS  Information for Provider? [employer] [facility] [email or web address] called requesting for   patients last in office visit summary faxed to [phone]. Would also   like to see if Provider would follow patient for Home Health.  ---------------------------------------------------------------------------  --------------  [address]  What is the best way for the office to contact you? OK to leave message on   voicemail  Preferred Call Back Phone Number? [phone]  ---------------------------------------------------------------------------  --------------  SCRIPT ANSWERS  Relationship to Patient? Third Party  Third Party Type? Home Health Care? Representative Name?  [facility]

## 2022-03-29 NOTE — TELEPHONE ENCOUNTER
Spoke with Joyce with [facility]. Advised her MD will follow pt. Faxed office note as requested.

## 2022-03-29 NOTE — PROGRESS NOTES
Post Acute Facility Update     *The information contained in this note was received during a weekly care coordination call with the post acute facility*    Current Facility: [address] [place] [address] ([facility])  Anticipated Discharge Date: 3/31/2022    Facility Nursing Update: Medically stable   Facility Social Work Update: Discharge to [facility] [place] [address] with [facility] on 3/31  Bundle Program Status: none  Upper Extremity Assistance: Minimal Assistance   Lower Extremity Assistance: Moderate Assistance   Bed Mobility: Minimal Assistance   Transfers: Minimal Assistance   Ambulation: Contact Guard Assist - hands on patient for balance   How far (in feet) is the patient ambulating?  75  Device Used: walker  Barriers to Discharge: MONICA Tylre, MSW  [facility]

## 2022-04-01 NOTE — TELEPHONE ENCOUNTER
Provider Dr Ann Cuellar agreed to sign order for bed rails however said it would best if a new medication was handled by pcp

## 2022-04-01 NOTE — TELEPHONE ENCOUNTER
----- Message from Abad Quinonez sent at 3/31/2022  5:12 PM EDT -----  Subject: Message to Provider    QUESTIONS  Information for Provider? pt returning call from office, please call back. ---------------------------------------------------------------------------  --------------  [facility] INFO  What is the best way for the office to contact you? Do not leave any   message, patient will call back for answer  Preferred Call Back Phone Number?  [phone]  ---------------------------------------------------------------------------  --------------  SCRIPT ANSWERS  undefined

## 2022-04-01 NOTE — TELEPHONE ENCOUNTER
Facility called stating that patient was in ED recently for anxiety but they did not give her any medication. They would like an order for anxiety and increased aggitation medication faxed to [facility] care pharmacy [phone]  Patient is complaining that she doesn't feel safe in bed without bed rails.  Please fax order for bed rails to facility at [phone]

## 2022-04-01 NOTE — ED NOTES
Report called to [facility] assisted living. Clearance ALLYSON Casey  Informed  of care received at hospital and ALEJANDRA zapata

## 2022-04-01 NOTE — PROGRESS NOTES
Care Management SSED patient:    Transition of Care Plan for patient in ER:       · Disposition: return to [facility] ([phone])  · Transportation: [facility]      Received consult for placement for patient. Patient admitted from [facility]. Per chart she has been at facility for one day. Called and left voicemail. Spoke with patient in room. Patient said normally lives in her home, but has not been there since December 2021 due to this (pointing to left hand/wrist). Patient said when she was at home she lived alone and had a caregiver either 10A to 3P or 11A to 4P. She said she has family (nieces and nephews, great nieces and nephews). She has a family member that lives near her that has dementia and 24 hour caregivers. Patient said the facility was nice, but her room was small, bed was too high, things were not set up like her house, and she did not have her rollator with her. Encouraged patient to give the facility another try as it will take a while to get adjusted and make new friends. Patient is agreeable returning to facility. Asked who could possibly provide transportation to facility for her. She suggested CM call Evangelina Araya. Obtained his numbers from her phone. Spoke with her nephew Evangelina Araya ([phone]; [phone]). Patient was transported to [facility] from [facility] by the [employer] from [facility]. He meet patient there and helped her get situated in the room. He has MD appointments now and all day and will not be able to transport her. Her sister Francine Dominguez is POA and made the arrangements for patient to go to [facility]. Received call from Qamar Aguila at [facility]. Patient was there for only about 5 hours. She asked about patient's medical work up. Updated her. Explained patient has agreed to return. They are not able to pick her up today. Arranged ambulance transport for [facility] via [address] [facility] called and have a crew on site and can come now. Updated RN. Spoke with patient's sister Sharonda Grigsby ([phone] has encrypted call blocking, cell [phone]). She said once patient went to the facility, she wanted to go home. Sister does not have anything set up at home. Patient cannot go home at this time. She can arrange those things next week, but she does not feel this is a safe option for patient as sometimes the caregivers do not show up and there is no back up. Explained patient has agreed to return to facility at this time. I will go back and speak with patient and ask her to please give the facility a week. Then she can speak with her sister after the weekend to decide if she still wants to try to go home. Patient wants      [facility] here to  patient. Met with patient in room. Encouraged her to try the facility for a week. Explained her sister will speak with her after the weekend to see if patient still wants to try to make arrangements for home. Patient agreeable.      BYRON White

## 2022-04-01 NOTE — ED TRIAGE NOTES
Pt arrived to [facility] with EMS reporting facility called stating that she was SOB. Pt has hx of asthma. Pt normally on room air. Pt reports that she recently got into an argument at the facility which caused her SOB. Pt in no respiratory distress. Pt able to complete full sentences with mild coughing.

## 2022-04-04 NOTE — PROGRESS NOTES
Called patient, verified ID with two identifiers. Patient is currently residing at [facility]. Patient is not happy with this arrangement. Staff at [facility] are administering medications. Will need to med rec. Patient is scheduled for [facility] appt 4/6/22 @12:00. Received call from Philip Schneider NP at [facility] who was questioning medications at discharge from [facility] 2/26/22 - lasix, bactrim, cymbalta, K were d/c. No discharge summary from [facility] to reconcile medications. Shad Belcher NP advised she will be seeing patient today at request of family for LE edema. NP will also order labs to be drawn on Tuesday. NP inquired about getting information to PCP. Recommend faxing to [phone]. NP has a BS email. ACM provided contact information for any future needs.

## 2022-04-05 NOTE — TELEPHONE ENCOUNTER
Spoke with Ashley Everett with [facility] - advised her MD has approved request for order for nursing care and wound care.

## 2022-04-05 NOTE — PROGRESS NOTES
Returned call to patient's sister, Karna Habermann, who wanted to advise [facility] of patient's \"strange behavior\". Advised Ms. Manjit, she was not on patient's HIPAA so I could not discuss or provide any information to her. Ms. Gillian  verbalized understanding and wanted to express her concern. She admits patient is having a difficult time adjusting to living at [facility]. Of note, patient has appt with [facility] 4/6/22.

## 2022-04-05 NOTE — PROGRESS NOTES
Called and advised patient of appt 4/6/22 @ 12:00  [facility] and spoke with [place] on 3rd floor and advised of date and time of appointment and pt needing transportation.

## 2022-04-05 NOTE — PROGRESS NOTES
[address] [place] [address] Dr Discharge Note    *The information contained in this note was received during a weekly care coordination call with the post acute facility*      Patient was discharged from [address] [place] [address] ([facility]) on 3/31/2022 to  [facility]. [facility]:   Future Appointments   Date Time Provider Tristan Moncadai   4/6/2022 12:00 PM Gregory Aranda MD [facility]       Home Health/Outpatient orders at discharge: home health care  [address]: At [facility] ordered at discharge: none  [address] received prior to discharge: [address] Team will sign off at this time. Medications were not reconciled and general patient assessment was not completed during this skilled nursing facility outreach. Shlomo Ryan LPN Care Coordinator.

## 2022-04-05 NOTE — TELEPHONE ENCOUNTER
Seen today for physical therapy. Scheduled for 2x a week for 5 weeks then 1x a week for 3 weeks. Occupational therapy will come out again this week and see patient. Would like to add nursing and wound care orders as she has wounds on right tibia. They are weeping so badly her shoes are wet. [place] [id number] [phone]     OK to leave message, it is a secure voicemail.  Voicemail is her daughter saying Shyam Layton a message byrasheed\" per Ramos John

## 2022-04-06 NOTE — TELEPHONE ENCOUNTER
Reason for call:  Can she Tylenol for pain in her back     Is this a new problem: yes     Date of last appointment:  4/6/2022     Can we respond via [facility]: no    Best call back number:  [phone] or [phone]

## 2022-04-06 NOTE — PROGRESS NOTES
Called Tony at [facility] and requested current medication list to be faxed in preparation of today's office visit. Faxed received and forwarded to [facility]. 12:45- called [facility] and inquired where patient was, appt time was 12:00 today. Staff advised they had the wrong address and patient is back at [facility] at this time. Provided address and advised appt will have to be rescheduled. 22

## 2022-04-06 NOTE — TELEPHONE ENCOUNTER
Pt had called for Tylenol. Advised her I would need to speak with nurse to give order for Tylenol. Called Tony at [facility] [phone]. Spoke with Jhonny Pratt [employer] because V[facility] Lisa not available. She advised me to fax order to them at [phone]. Sent to ATTN: Sloan. Confirmation received.

## 2022-04-08 NOTE — ED PROVIDER NOTES
History of hypertension, diabetes, atrial fibrillation, anxiety. She arrives via EMS from one of the nursing facilities and is not exactly sure why she is here. She states that she was told her congestive heart failure might be an issue or possibly a worrisome urinary tract infection. She states that her legs have been chronically swollen and painful. She has chronic dyspnea that might be a little worse than normal.  She denies chest pain, fever, nausea, vomiting. Her appetite has been okay. Past Medical History:   Diagnosis Date    Anxiety     Bleeding nose     Chronic atrial fibrillation (HCC)     Diabetes ([facility] [address]     Hypertension        Past Surgical History:   Procedure Laterality Date    HX CATARACT REMOVAL      HX CHOLECYSTECTOMY      HX HEENT      myringotomy    HX HEENT  2016    basal cell removal -forehead    HX TONSILLECTOMY      childhood    HX TYMPANOSTOMY  2016    left ear    HX TYMPANOSTOMY      bilateral         History reviewed. No pertinent family history.     Social History     Socioeconomic History    Marital status:      Spouse name: Not on file    Number of children: Not on file    Years of education: Not on file    Highest education level: Not on file   Occupational History    Not on file   Tobacco Use    Smoking status: Former Smoker     Packs/day: 1.00     Years: 10.00     Pack years: 10.00     Quit date: 10/12/1993     Years since quittin.5    Smokeless tobacco: Never Used   Vaping Use    Vaping Use: Never used   Substance and Sexual Activity    Alcohol use: Yes     Comment: 1 glass of wine weekly    Drug use: No    Sexual activity: Not Currently   Other Topics Concern    Not on file   Social History Narrative    Not on file     Social Determinants of Health     Financial Resource Strain: Low Risk     Difficulty of Paying Living Expenses: Not hard at all   Food Insecurity: No Food Insecurity    Worried About [address] in the Last Year: Never true   [address] in the Last Year: Never true   Transportation Needs: No Transportation Needs    Lack of Transportation (Medical): No    Lack of Transportation (Non-Medical): No   Physical Activity: Inactive    Days of Exercise per Week: 0 days    Minutes of Exercise per Session: 0 min   Stress:     Feeling of Stress : Not on file   Social Connections:     Frequency of Communication with Friends and Family: Not on file    Frequency of Social Gatherings with Friends and Family: Not on file    Attends Yarsani Services: Not on file    Active Member of Clubs or Organizations: Not on file    Attends Club or Organization Meetings: Not on file    Marital Status: Not on file   Intimate Partner Violence:     Fear of Current or Ex-Partner: Not on file    Emotionally Abused: Not on file    Physically Abused: Not on file    Sexually Abused: Not on file   Housing Stability: Unknown    Unable to Pay for Housing in the Last Year: No    Number of [place] in the Last Year: Not on file    Unstable Housing in the Last Year: No         ALLERGIES: Losartan, Amoxicillin, Gabapentin, Levaquin [levofloxacin], Lorazepam, Lyrica [pregabalin], and Sertraline    Review of Systems   All other systems reviewed and are negative. Vitals:    04/08/22 1617 04/08/22 1619 04/08/22 1620 04/08/22 1621   BP:   (!) 145/64    Pulse: 62 61 60 60   Resp: 17 15 17 17   Temp:   97.7 °F (36.5 °C)    SpO2: 93% 95% 97% 97%   Weight:   74.8 kg (165 lb)    Height:   5' 3\" (1.6 m)             Physical Exam  Vitals and nursing note reviewed. Constitutional:       Appearance: She is well-developed. HENT:      Head: Normocephalic and atraumatic. Eyes:      Conjunctiva/sclera: Conjunctivae normal.   Neck:      Trachea: No tracheal deviation. Cardiovascular:      Rate and Rhythm: Normal rate and regular rhythm. Heart sounds: Normal heart sounds. No murmur heard. No friction rub. No gallop. Pulmonary:      Effort: Pulmonary effort is normal.      Breath sounds: Normal breath sounds. Abdominal:      Palpations: Abdomen is soft. Tenderness: There is no abdominal tenderness. Musculoskeletal:         General: No deformity. Cervical back: Neck supple. Comments: Pitting lower leg edema. Bilateral pedal and lower leg redness. Skin:     General: Skin is warm and dry. Neurological:      Mental Status: She is alert. Comments: oriented          MDM       Procedures    EKG: Atrial fibrillation; rate of 65; right bundle branch block; nonspecific ST, T wave abnormalities. Lizbeth Ayala MD  5:29 PM    Progress Note:  Results, treatment, and follow up plan have been discussed with patient/nephew. Questions were answered. Lizbeth Ayala MD  8:20 PM    Assessment/plan: Presented with leg edema -suspect chronic diastolic dysfunction. Echo last year showed normal systolic function. Reassuring appearance/exam with stable vital signs. Lungs are clear. Chest x-ray shows no acute process. CBC, CMP, UA okay. proBNP is elevated. She had previously been on a diuretic apparently, but it was stopped due to hyponatremia. I am hesitant to restart one. She needs close follow-up with her primary care doctor at the nursing home.   Return precautions discussed  Lizbeth Ayala MD  8:21 PM

## 2022-04-08 NOTE — ED TRIAGE NOTES
EMS told RN pt sent because of abnormal BMI. Pt states that she was just here and that now \"they\" are not giving her the fluid pills that she needs to be on. Pt has bilateral foot swelling.

## 2022-04-09 NOTE — ED NOTES
Patient's adult protective brief changed/pt cleansed of incontinence and placed on purewick. Pt repositioned up in the bed and on monitor. Pt resting at this time with call bell within reach, wheels locked, and side rails up x2.

## 2022-04-12 NOTE — Clinical Note
HISTORY OF PRESENT ILLNESS  Lisset Null is a 80 y.o. female. HPI  Assessment:  Josefa Enriquez is seen following two hospital admissions, multiple ER visits and two rehab stays. She has had multiple problems, including low sodium. Currently she is living at an assisted living facility. She is receiving PT there. Of note, she is fairly agitated today and is not giving a clear history. She is not accompanied by family members or staff from the facility. 1. Hyponatremia. She is followed by nephrology. Labs were done last week and put a request in for that. She had multiple systems issues during her hospitalizations. Her sodium was low in the 110s. This has improved and she is on medication therapy. 2. Chronic problems:  a. [address] diagnosis. See assessment and plan. b. Diabetes is stable on current regimen. c. A-fib is stable. She has been diagnosed with pulmonary hypertension, which is likely contributing to her significant leg swelling. They have been very cautious with her diuretic regimen due to the hyponatremia. Medications were fully reviewed and reconciled. This is an extended visit of high complexity. See telephone note for full instructions that will be relayed to the assisted living facility. Review of Systems   Constitutional: Negative for chills and fever. HENT: Positive for hearing loss. Cardiovascular: Positive for leg swelling. Musculoskeletal: Positive for joint pain. Physical Exam  Cardiovascular:      Rate and Rhythm: Rhythm irregularly irregular. Pulmonary:      Breath sounds: Examination of the right-lower field reveals rales. Examination of the left-lower field reveals rales. Decreased breath sounds and rales present. Musculoskeletal:      Right lower leg: 3+ Edema present. Left lower leg: 3+ Edema present. Psychiatric:         Mood and Affect: Affect is angry. Behavior: Behavior is agitated. Thought Content:  Thought content is paranoid. ASSESSMENT and PLAN  Diagnoses and all orders for this visit:    1. Hyponatremia    2. Essential hypertension, benign    3. Severe obesity (BMI 35.0-39. 9) with comorbidity ([facility] - follow    4. Congestive heart failure, unspecified HF chronicity, unspecified heart failure type ([facility]- Continue current regimen of prescription and / or OTC medications, See cardiologist as directed. 5. Type 2 diabetes, controlled, with neuropathy ([facility]- Continue current regimen of prescription and / or OTC medications     6. Chronic atrial fibrillation [facility])- See cardiologist as directed.      7. Severe anxiety

## 2022-04-12 NOTE — PROGRESS NOTES
HISTORY OF PRESENT ILLNESS  Molly Evangelista is a 80 y.o. female. HPI   Dictation on: 04/12/2022  6:42 PM by: Heavenly WINTERS [[id number]]       Review of Systems   Constitutional: Negative for chills and fever. HENT: Positive for hearing loss. Cardiovascular: Positive for leg swelling. Musculoskeletal: Positive for joint pain. Physical Exam  Cardiovascular:      Rate and Rhythm: Rhythm irregularly irregular. Pulmonary:      Breath sounds: Examination of the right-lower field reveals rales. Examination of the left-lower field reveals rales. Decreased breath sounds and rales present. Musculoskeletal:      Right lower leg: 3+ Edema present. Left lower leg: 3+ Edema present. Psychiatric:         Mood and Affect: Affect is angry. Behavior: Behavior is agitated. Thought Content: Thought content is paranoid. ASSESSMENT and PLAN  Diagnoses and all orders for this visit:    1. Hyponatremia    2. Essential hypertension, benign    3. Severe obesity (BMI 35.0-39. 9) with comorbidity ([address] - follow    4. Congestive heart failure, unspecified HF chronicity, unspecified heart failure type ([address]- Continue current regimen of prescription and / or OTC medications, See cardiologist as directed. 5. Type 2 diabetes, controlled, with neuropathy ([address]- Continue current regimen of prescription and / or OTC medications     6. Chronic atrial fibrillation [facility])- See cardiologist as directed.      7. Severe anxiety

## 2022-04-13 NOTE — PROGRESS NOTES
Assessment:  Kendrick Glaser is seen following two hospital admissions, multiple ER visits and two rehab stays. She has had multiple problems, including low sodium. Currently she is living at an assisted living facility. She is receiving PT there. Of note, she is fairly agitated today and is not giving a clear history. She is not accompanied by family members or staff from the facility. 1. Hyponatremia. She is followed by nephrology. Labs were done last week and put a request in for that. She had multiple systems issues during her hospitalizations. Her sodium was low in the 110s. This has improved and she is on medication therapy. 2. Chronic problems:  a. [address] diagnosis. See assessment and plan. b. Diabetes is stable on current regimen. c. A-fib is stable. She has been diagnosed with pulmonary hypertension, which is likely contributing to her significant leg swelling. They have been very cautious with her diuretic regimen due to the hyponatremia. Medications were fully reviewed and reconciled. This is an extended visit of high complexity. See telephone note for full instructions that will be relayed to the assisted living facility.

## 2022-04-13 NOTE — TELEPHONE ENCOUNTER
Wendy Montoya [facility]) [phone] (M)     Calling for update on patient (caller is patient's sister).  Did not see on [employer]

## 2022-04-13 NOTE — PROGRESS NOTES
Returned call to patient's sister, Robert Nuñez (not on HIPAA). Ms. Diego Knight was inquiring about recent OV and labs. Advised Ms. Saravia that she is not listed on patient's HIPAA form, therefore, I am unable to discuss any PHI with her. Ms. Diego Knight verbalized understanding.    -Called Nephrology and requested latest lab results and progress note. Forwarded to [facility] for review.  -Patient has follow up with nephrology 5/22/22 @1:30 (Dr. Alyx Saldaña)  -Outreach to Otilia Kim NP with Caridad Rios at [facility]. -Scheduled one month follow up with [facility] 5/12/22 @1:30  -Called [facility] and  for nursing staff advising of next appt with [facility] and request that a family member should accompany patient to appt. Provided this writer's contact information and request for return call to confirm appt date, inquire if pt is using night time O2, can staff apply unna boots and if so where to fax the order. 4/15/22:   Marylou Lyman NP to discuss patient and was advised patient is not on night time O2. Last testing was in December 2021 and patient was admitted to hospital right after testing and then had a lengthy stay at [facility]. - At [facility] is in place ([facility] agency) and [facility] RN placed 3 layer wraps on b/l LE evening of 4/12/22 with marked improvement by 4/13/22. HH is working with wound care physician and are monitoring edema.  -Daphnie Ramos said because patient is in [facility] and not SN its best to provide appt dates/times with family and they will need to secure/request transportation if needed. Called patient and was advised by caregiver that pt was asleep. ACM will attempt to contact patient again to advise of appt date/time and request family member accompany her to appointment.    Will advised [facility] of the above

## 2022-04-17 PROBLEM — N17.9 AKI (ACUTE KIDNEY INJURY) (HCC): Status: ACTIVE | Noted: 2022-01-01

## 2022-04-18 NOTE — ED NOTES
Bedside shift change report given to [address]  (oncoming nurse) by Harry Brady (offgoing nurse). Report included the following information SBAR, ED Summary and Recent Results.

## 2022-04-18 NOTE — ED NOTES
Tried to feed and give pt something to drink Md said it was okay. Pt refusing to eat or drink much. Pt still yelling about wanting to go home.   Md aware and ordered dilaudid for pain

## 2022-04-18 NOTE — PROGRESS NOTES
ARELI: Anticipate discharge back to [facility] pending medical progress. Transportation likely BLS. Reason for Admission: JOYCE                  RUR Score:   25%        PCP: First and Last name:  Stella Garcia MD     Name of Practice:   Are you a current patient: Yes/No: yes   Approximate date of last visit:    Can you do a virtual visit with your PCP:              Resources/supports as identified by patient/family:  Supportive extended family                 Top Challenges facing patient (as identified by patient/family and CM): Finances/Medication cost?   none mentioned                 Transportation? None mentioned              Support system or lack thereof? No children, but many extended family members. POA, sister, lives in [place]                     Living arrangements? Lives in [facility], but family believes more extensive skilled nursing care is needed. Self-care/ADLs/Cognition? Needs total care--wheelchair/bed bound          Current Advanced Directive/Advance Care Plan:  DNR      Healthcare Decision Maker:   Click here to complete SMIC including selection of the Healthcare Decision Maker Relationship (ie \"Primary\")      Primary Decision MakerAddibren Grande -  - [phone]    Payor Source Payor: Siria Ching / Plan: Jenice Snellen / Product Type: Medicare /                             Plan for utilizing home health:  N/A                   Transition of Care Plan:  CM met with patient's nephew-in-law, Dewayne Mccord, at bedside. Patient unable to participate in assessment due to altered mental status. Demographics confirmed. Patient resides in [facility], but family member stated patient needs more extensive skilled care. Patient is wheelchair and bed bound. DME: wheelchair. Hx: anxiety, chronic afib, diabetes, HTN. Family member reported patient has had four hospital admissions/ER visits since December.  PCP confirmed and medications administered/provided by facility. CM will continue to follow for discharge needs. Medicare pt has received, reviewed, and signed 1st IM letter informing them of their right to appeal the discharge. Signed copied has been placed on pt bedside chart. Care Management Interventions  PCP Verified by CM:  Yes (Dr. Manisha Gorman)  Mode of Transport at Discharge: S  Transition of Care Consult (CM Consult): Assisted Living  MyChart Signup: No  Discharge Durable Medical Equipment: No  Physical Therapy Consult: Yes  Occupational Therapy Consult: Yes  Speech Therapy Consult: No  Support Systems: Other Family Member(s),Assisted Living  Confirm Follow Up Transport: Other (see comment) (BLS)  The Plan for Transition of Care is Related to the Following Treatment Goals : [facility]  Discharge Location  Patient Expects to be Discharged to[de-identified] Assisted Living     Bethany Cuellar, [facility],[address]  [phone]

## 2022-04-18 NOTE — ROUTINE PROCESS
TRANSFER - OUT REPORT:    Verbal report given to [employer] (name) on Cristina Almendarez  being transferred to [facility](unit) for routine progression of care       Report consisted of patients Situation, Background, Assessment and   Recommendations(SBAR). Information from the following report(s) SBAR, Kardex, ED Summary, Intake/Output, MAR, Accordion and Cardiac Rhythm Afib was reviewed with the receiving nurse. Lines:   Peripheral IV 04/18/22 Right Forearm (Active)        Opportunity for questions and clarification was provided.       Patient transported with:   O2 @ 2 liters  Tech

## 2022-04-18 NOTE — PROGRESS NOTES
Unable to complete required documentation. Family at bedside unable to assist and states that POA is unavailable due to travel.

## 2022-04-18 NOTE — ED NOTES
MD Hendrickson notified that family would like an update for MD. MD also made aware of status of patient and grunting respirations, continued altered mental status (no changes) and hypotension. No new orders at this time.

## 2022-04-18 NOTE — CONSULTS
[facility]   [address] [facility], [address] [place] [address]  Phone: [phone]   [phone] NOTE     Patient: Marlee Mendes MRN: [id number]  PCP: Brooklyn Phillips MD   :     1922  Age:   80 y.o. Sex:  female      Referring physician: Rosario Alexandra MD  Reason for consultation: 80 y.o. female with JOYCE (acute kidney injury) ([facility] [address] [N17.9]  Admission Date: 2022  9:42 PM  LOS: 1 day      ASSESSMENT and PLAN :   JOYCE :  - 2/2 cardiogenic shock, hypotension   - appears to have UTI and sepsis could be contributing   - she is bradycardic, hypotensive and poorly responsive --> asked RN to call RRT and briefly d/w family at bedside and recommended DNR given her age and co -morbidities   - hospitalist team having further d/w family about code status   - not a candidate for dialysis due to severe RV dysfunction     Chronic Hyponatremia   - Na stable     UTI     Chronic A fib  Pulm HTN   Severe TR, Mild MR  JOSE  moderate RV dysfunction   - per primary team       Care Plan discussed with: family at bedside and nursing staff     Thank you for consulting [facility] in the care of your patient. Subjective:   HPI: Marlee Mendes is a 80 y.o.  female who has been admitted to the hospital for worsening SOB and LE edema.  She is unable to give any hx   Hx from family member at bedside   She reportedly had easter lunch with family yesterday and returned to [facility] where she has been for last 2-3 weeks since d/c from [facility] and rehab     She is known to me from prior admission for severe Hyponatremia   She was managed w/ Tolvaptan   She had CHRONIC LE edema due to corpulmonale and was on increasing doses of lasix as out pt       Past Medical Hx:   Past Medical History:   Diagnosis Date    Anxiety     Bleeding nose     Chronic atrial fibrillation ([facility] [address] [facility]     Diabetes ([facility] [address] [facility]     Hypertension         Past Surgical Hx:     Past Surgical History:   Procedure Laterality Date    HX CATARACT REMOVAL      HX CHOLECYSTECTOMY      HX HEENT      myringotomy    HX HEENT  07/2016    basal cell removal -forehead    HX TONSILLECTOMY      childhood    HX TYMPANOSTOMY  07/11/2016    left ear    HX TYMPANOSTOMY      bilateral         Allergies   Allergen Reactions    Losartan Angioedema     Tongue swelling      Levaquin [Levofloxacin] Hives and Rash    Amoxicillin Other (comments)     \"makes me feel like I\"m going to faint\"    Gabapentin Drowsiness     * pt states this med makes her feel very drowsy , gives her a drunk feeling.  Lorazepam Other (comments)     Feeling faint.  Lyrica [Pregabalin] Rash    Sertraline Other (comments)     Feeling faint. Social Hx:  reports that she quit smoking about 28 years ago. She has a 10.00 pack-year smoking history. She has never used smokeless tobacco. She reports current alcohol use. She reports that she does not use drugs. No family history on file.     Review of Systems:  Unable to perform ROS due to AMS     Objective:    Vitals:    Vitals:    04/18/22 1028 04/18/22 1226 04/18/22 1229 04/18/22 1234   BP: (!) 102/47 (!) 69/26 (!) 85/41 98/60   Pulse: 65 (!) 41 (!) 44    Resp: 20      Temp: 97.4 °F (36.3 °C)      SpO2: 99%      Weight:       Height:         I&O's:  04/17 0701 - 04/18 0700  In: -   Out: 20 [Urine:20]  Visit Vitals  BP 98/60   Pulse (!) 44   Temp 97.4 °F (36.3 °C)   Resp 20   Ht 5' 3\" (1.6 m)   Wt 103.4 kg (228 lb)   SpO2 99%   BMI 40.39 kg/m²       Physical Exam:  General:  Unresponsive   HEENT: pale   Neck: JVD +  Lungs : B/L rales   CVS: bradycardic and irregular   Abdomen: Soft, NT, BS +  Extremities:2+ Edema, wrapped  Neurologic: unable to assess 2/2 obtundation   Psych: Unable to assess    Laboratory Results:    Recent Labs     04/18/22  0026 04/17/22  2155    137   K 5.3* 5.5*   * 108   CO2 24 24   * 129*   BUN 73* 71*   CREA 2.36* 2.40* CA 7.7* 7.8*   ALB 3.0* 3.4*   ALT 37 41     Recent Labs     04/18/22  0026 04/17/22  2155   WBC 5.4 5.4   HGB 8.6* 9.1*   HCT 27.2* 29.9*    240     No results found for: SDES  Lab Results   Component Value Date/Time    Culture result: NO GROWTH 5 DAYS 12/20/2021 12:30 AM     Recent Results (from the past 24 hour(s))   EKG, 12 LEAD, INITIAL    Collection Time: 04/17/22  9:54 PM   Result Value Ref Range    Ventricular Rate 59 BPM    QRS Duration 142 ms    Q-T Interval 502 ms    QTC Calculation (Bezet) 496 ms    Calculated R Axis 100 degrees    Calculated T Axis 9 degrees    Diagnosis       Atrial fibrillation with slow ventricular response  Right bundle branch block  Abnormal ECG  When compared with ECG of 08-APR-2022 17:05,  Questionable change in QRS axis     CBC WITH AUTOMATED DIFF    Collection Time: 04/17/22  9:55 PM   Result Value Ref Range    WBC 5.4 3.6 - 11.0 K/uL    RBC 3.27 (L) 3.80 - 5.20 M/uL    HGB 9.1 (L) 11.5 - 16.0 g/dL    HCT 29.9 (L) 35.0 - 47.0 %    MCV 91.4 80.0 - 99.0 FL    MCH 27.8 26.0 - 34.0 PG    MCHC 30.4 30.0 - 36.5 g/dL    RDW 17.2 (H) 11.5 - 14.5 %    PLATELET 118 355 - 202 K/uL    MPV 10.6 8.9 - 12.9 FL    NRBC 0.6 (H) 0  WBC    ABSOLUTE NRBC 0.03 (H) 0.00 - 0.01 K/uL    NEUTROPHILS 77 (H) 32 - 75 %    LYMPHOCYTES 9 (L) 12 - 49 %    MONOCYTES 13 5 - 13 %    EOSINOPHILS 1 0 - 7 %    BASOPHILS 0 0 - 1 %    IMMATURE GRANULOCYTES 0 0.0 - 0.5 %    ABS. NEUTROPHILS 4.1 1.8 - 8.0 K/UL    ABS. LYMPHOCYTES 0.5 (L) 0.8 - 3.5 K/UL    ABS. MONOCYTES 0.7 0.0 - 1.0 K/UL    ABS. EOSINOPHILS 0.1 0.0 - 0.4 K/UL    ABS. BASOPHILS 0.0 0.0 - 0.1 K/UL    ABS. IMM.  GRANS. 0.0 0.00 - 0.04 K/UL    DF SMEAR SCANNED      RBC COMMENTS ANISOCYTOSIS  1+        RBC COMMENTS OVALOCYTES  1+        RBC COMMENTS MACROCYTOSIS  1+       METABOLIC PANEL, COMPREHENSIVE    Collection Time: 04/17/22  9:55 PM   Result Value Ref Range    Sodium 137 136 - 145 mmol/L    Potassium 5.5 (H) 3.5 - 5.1 mmol/L Chloride 108 97 - 108 mmol/L    CO2 24 21 - 32 mmol/L    Anion gap 5 5 - 15 mmol/L    Glucose 129 (H) 65 - 100 mg/dL    BUN 71 (H) 6 - 20 MG/DL    Creatinine 2.40 (H) 0.55 - 1.02 MG/DL    BUN/Creatinine ratio 30 (H) 12 - 20      GFR est AA 23 (L) >60 ml/min/1.73m2    GFR est non-AA 19 (L) >60 ml/min/1.73m2    Calcium 7.8 (L) 8.5 - 10.1 MG/DL    Bilirubin, total 0.4 0.2 - 1.0 MG/DL    ALT (SGPT) 41 12 - 78 U/L    AST (SGOT) 30 15 - 37 U/L    Alk. phosphatase 184 (H) 45 - 117 U/L    Protein, total 7.6 6.4 - 8.2 g/dL    Albumin 3.4 (L) 3.5 - 5.0 g/dL    Globulin 4.2 (H) 2.0 - 4.0 g/dL    A-G Ratio 0.8 (L) 1.1 - 2.2     TROPONIN-HIGH SENSITIVITY    Collection Time: 04/17/22  9:55 PM   Result Value Ref Range    Troponin-High Sensitivity 19 0 - 51 ng/L   SAMPLES BEING HELD    Collection Time: 04/17/22  9:55 PM   Result Value Ref Range    SAMPLES BEING HELD 1RED,1BLU,1BLD(NAVY ISABEL),1(LAV)     COMMENT        Add-on orders for these samples will be processed based on acceptable specimen integrity and analyte stability, which may vary by analyte.    LACTIC ACID    Collection Time: 04/17/22  9:55 PM   Result Value Ref Range    Lactic acid 1.8 0.4 - 2.0 MMOL/L   NT-PRO BNP    Collection Time: 04/17/22 10:15 PM   Result Value Ref Range    NT pro-BNP 1,948 (H) <450 PG/ML   URINALYSIS W/MICROSCOPIC    Collection Time: 04/18/22 12:26 AM   Result Value Ref Range    Color YELLOW/STRAW      Appearance TURBID (A) CLEAR      Specific gravity 1.020 1.003 - 1.030      pH (UA) 5.0 5.0 - 8.0      Protein 100 (A) NEG mg/dL    Glucose Negative NEG mg/dL    Ketone TRACE (A) NEG mg/dL    Bilirubin Negative NEG      Blood MODERATE (A) NEG      Urobilinogen 1.0 0.2 - 1.0 EU/dL    Nitrites Negative NEG      Leukocyte Esterase SMALL (A) NEG      WBC 10-20 0 - 4 /hpf    RBC 10-20 0 - 5 /hpf    Epithelial cells MANY (A) FEW /lpf    Bacteria 1+ (A) NEG /hpf    Hyaline cast 10-20 0 - 5 /lpf   URINE CULTURE HOLD SAMPLE    Collection Time: 04/18/22 12:26 AM    Specimen: Serum; Urine   Result Value Ref Range    Urine culture hold        Urine on hold in Microbiology dept for 2 days. If unpreserved urine is submitted, it cannot be used for addtional testing after 24 hours, recollection will be required. CBC W/O DIFF    Collection Time: 04/18/22 12:26 AM   Result Value Ref Range    WBC 5.4 3.6 - 11.0 K/uL    RBC 3.02 (L) 3.80 - 5.20 M/uL    HGB 8.6 (L) 11.5 - 16.0 g/dL    HCT 27.2 (L) 35.0 - 47.0 %    MCV 90.1 80.0 - 99.0 FL    MCH 28.5 26.0 - 34.0 PG    MCHC 31.6 30.0 - 36.5 g/dL    RDW 17.2 (H) 11.5 - 14.5 %    PLATELET 106 939 - 058 K/uL    MPV 10.4 8.9 - 12.9 FL    NRBC 0.7 (H) 0  WBC    ABSOLUTE NRBC 0.04 (H) 0.00 - 8.38 K/uL   METABOLIC PANEL, COMPREHENSIVE    Collection Time: 04/18/22 12:26 AM   Result Value Ref Range    Sodium 138 136 - 145 mmol/L    Potassium 5.3 (H) 3.5 - 5.1 mmol/L    Chloride 109 (H) 97 - 108 mmol/L    CO2 24 21 - 32 mmol/L    Anion gap 5 5 - 15 mmol/L    Glucose 106 (H) 65 - 100 mg/dL    BUN 73 (H) 6 - 20 MG/DL    Creatinine 2.36 (H) 0.55 - 1.02 MG/DL    BUN/Creatinine ratio 31 (H) 12 - 20      GFR est AA 23 (L) >60 ml/min/1.73m2    GFR est non-AA 19 (L) >60 ml/min/1.73m2    Calcium 7.7 (L) 8.5 - 10.1 MG/DL    Bilirubin, total 0.4 0.2 - 1.0 MG/DL    ALT (SGPT) 37 12 - 78 U/L    AST (SGOT) 25 15 - 37 U/L    Alk.  phosphatase 158 (H) 45 - 117 U/L    Protein, total 7.3 6.4 - 8.2 g/dL    Albumin 3.0 (L) 3.5 - 5.0 g/dL    Globulin 4.3 (H) 2.0 - 4.0 g/dL    A-G Ratio 0.7 (L) 1.1 - 2.2     SODIUM, UR, RANDOM    Collection Time: 04/18/22 12:26 AM   Result Value Ref Range    Sodium,urine random 10 MMOL/L   CREATININE, UR, RANDOM    Collection Time: 04/18/22 12:26 AM   Result Value Ref Range    Creatinine, urine 229.00 mg/dL   GLUCOSE, POC    Collection Time: 04/18/22 12:34 AM   Result Value Ref Range    Glucose (POC) 156 (H) 65 - 117 mg/dL    Performed by Geri Albrecht RN    GLUCOSE, POC    Collection Time: 04/18/22  1:59 AM   Result Value Ref Range    Glucose (POC) 96 65 - 117 mg/dL    Performed by Rosi Roy RN    GLUCOSE, POC    Collection Time: 04/18/22  2:33 AM   Result Value Ref Range    Glucose (POC) 58 (L) 65 - 117 mg/dL    Performed by Rosi Roy RN    GLUCOSE, POC    Collection Time: 04/18/22  3:01 AM   Result Value Ref Range    Glucose (POC) 94 65 - 117 mg/dL    Performed by Rosi Roy RN    GLUCOSE, POC    Collection Time: 04/18/22  3:46 AM   Result Value Ref Range    Glucose (POC) 80 65 - 117 mg/dL    Performed by Jeff Guzman    GLUCOSE, POC    Collection Time: 04/18/22  8:06 AM   Result Value Ref Range    Glucose (POC) 70 65 - 117 mg/dL    Performed by Teri Hernandez    ECHO ADULT COMPLETE    Collection Time: 04/18/22 10:45 AM   Result Value Ref Range    IVSd 1.2 (A) 0.6 - 0.9 cm    LVIDd 4.1 3.9 - 5.3 cm    LVIDs 2.7 cm    LVOT Diameter 1.7 cm    LVPWd 1.0 (A) 0.6 - 0.9 cm    LVOT Peak Gradient 4 mmHg    LVOT Peak Velocity 1.0 m/s    RVIDd 6.6 cm    RV Free Wall Peak S' 8 cm/s    LA Diameter 5.8 cm    AV Area by Peak Velocity 1.1 cm2    AV Peak Gradient 15 mmHg    AV Peak Velocity 2.0 m/s    MV E Velocity 1.02 m/s    LV E' Lateral Velocity 9 cm/s    LV E' Septal Velocity 6 cm/s    PV Peak Gradient 1 mmHg    PV Max Velocity 0.4 m/s    TAPSE 1.3 (A) 1.7 cm    TR Peak Gradient 25 mmHg    TR Max Velocity 2.48 m/s    Aortic Root 2.9 cm   EKG, 12 LEAD, INITIAL    Collection Time: 04/18/22 12:11 PM   Result Value Ref Range    Ventricular Rate 33 BPM    Atrial Rate 31 BPM    QRS Duration 150 ms    Q-T Interval 576 ms    QTC Calculation (Bezet) 426 ms    Calculated R Axis 76 degrees    Diagnosis       Atrial fibrillation  Right bundle branch block  When compared with ECG of 17-APR-2022 21:54,  Vent.  rate has decreased BY  26 BPM  T wave inversion no longer evident in Anterior leads  QT has shortened           Urine dipstick:   Lab Results   Component Value Date/Time    Color YELLOW/STRAW 04/18/2022 12:26 AM Appearance TURBID (A) 04/18/2022 12:26 AM    Specific gravity 1.020 04/18/2022 12:26 AM    pH (UA) 5.0 04/18/2022 12:26 AM    Protein 100 (A) 04/18/2022 12:26 AM    Glucose Negative 04/18/2022 12:26 AM    Ketone TRACE (A) 04/18/2022 12:26 AM    Bilirubin Negative 04/18/2022 12:26 AM    Urobilinogen 1.0 04/18/2022 12:26 AM    Nitrites Negative 04/18/2022 12:26 AM    Leukocyte Esterase SMALL (A) 04/18/2022 12:26 AM    Epithelial cells MANY (A) 04/18/2022 12:26 AM    Bacteria 1+ (A) 04/18/2022 12:26 AM    WBC 10-20 04/18/2022 12:26 AM    RBC 10-20 04/18/2022 12:26 AM       I have reviewed the following: All pertinent labs, microbiology data, radiology imaging for my assessment     Medications list Personally Reviewed   [x]      Yes     []               No       Medications:  Prior to Admission medications    Medication Sig Start Date End Date Taking? Authorizing Provider   acetaminophen (TylenoL) 325 mg tablet Take 650 mg by mouth every six (6) hours as needed for Pain. Provider, Historical   diclofenac (VOLTAREN) 1 % gel Apply  to affected area four (4) times daily. Indications: osteoarthritis of the knee    Provider, Historical   furosemide (Lasix) 20 mg tablet Take 20 mg by mouth daily. Provider, Historical   guaiFENesin (ROBITUSSIN) 100 mg/5 mL liquid Take 100 mg by mouth four (4) times daily as needed for Cough. Provider, Historical   lisinopriL (PRINIVIL, ZESTRIL) 10 mg tablet Take 10 mg by mouth daily. Provider, Historical   metFORMIN (GLUCOPHAGE) 500 mg tablet Take 500 mg by mouth two (2) times daily (with meals). Indications: type 2 diabetes mellitus    Provider, Historical   peg 400-propylene glycol (Systane GeL) 0.4-0.3 % drpg Apply 1 Drop to eye every four (4) hours as needed. Provider, Historical   tolvaptan (SAMSCA) 30 mg tab tablet Take 30 mg by mouth daily. Give 1 mg by mouth daily every Bbkf-Cqxcc-Fbknhzab for hyponatremia.     Provider, Historical   mirabegron ER (MYRBETRIQ) 50 mg ER tablet Take 50 mg by mouth daily. Provider, Historical   fluticasone propionate (FLONASE NA) 1 Spray by Nasal route daily. 1 spary alternating nostrils one time a day    Provider, Historical   vit A/vit C/vit E/zinc/copper (PRESERVISION AREDS PO) Take 1 Tablet by mouth two (2) times a day. Provider, Historical   loratadine (Claritin) 10 mg tablet Take 10 mg by mouth daily. Provider, Historical   glucose blood VI test strips (PRECISION XTRA TEST) strip USE TO TEST BLOOD SUGAR TWICE DAILY. Dx E11.21 2/19/20   Ayush Sutton MD   cholecalciferol, vitamin D3, (Vitamin D3) 50 mcg (2,000 unit) tab Take 1,000 Units by mouth daily. Provider, Historical   Lancets misc 1 Package by Does Not Apply route daily. Test glucose daily and as needed Dx type 2 dm E11.42 4/19/16   Ayush Sutton MD        Thank you for allowing us to participate in the care of this patient. We will follow patient. Please dont hesitate to call with any questions    Devon Dietrich MD  [facility]   [address] [facility], [address] [place] [address]  Phone - [phone]   Fax - [phone]  www. [facility]

## 2022-04-18 NOTE — ED NOTES
Bedside shift change report given to Orin Hunter (oncoming nurse) by Guanaco Alonso (offgoing nurse). Report included the following information SBAR, Kardex, ED Summary and MAR.

## 2022-04-18 NOTE — PROGRESS NOTES
Occupational Therapy    Acknowledge OT order and completed chart review. Patient recently transferred to her room from ED and ECHO tech preparing to complete ECHO. Will hold and continue to follow as appropriate and able. 1300: Patient with rapid response/code blue called. Will continue to hold OT and follow up tomorrow as medically appropriate.      Thank you,    Al Israel, OT

## 2022-04-18 NOTE — H&P
PLEASE NOTE: I HAVE GENERATED THIS NOTE WITH THE ASSISTANCE OF VOICE-RECOGNITION TECHNOLOGY. PLEASE EXCUSE ANY SPELLING, GRAMMATICAL, AND SYNTAX ERRORS YOU MAY FIND. IF YOU NEED CLARIFICATION ON ANYTHING, PLEASE FEEL FREE TO REACH OUT TO ME.  [address] [facility] [facility] Adult  Hospitalist Group  History and Physical - Dr. Tono Underwood    Primary Care Provider: Negra Dos Santos MD  Date of Service:  See Date Note Was Originated    Chief Complaint: Shortness of breath    Subjective:     80 y.o. female presents with several days duration of gradual onset, gradually worsening, severe, constant, shortness of breath that is not associate with any other symptoms. Patient's niece is at bedside said that she has become slightly more altered and that her feet are swelling up patient denies exacerbating features  and denies remitting features. BNP found to be acutely elevated; creatinine was also elevated as well as potassium. Review of Systems:  12 point ROS obtained and otherwise negative, except as per HPI and above. Past Medical History:   Diagnosis Date    Anxiety     Bleeding nose     Chronic atrial fibrillation (HCC)     Diabetes ([facility] [address]     Hypertension       Past Surgical History:   Procedure Laterality Date    HX CATARACT REMOVAL      HX CHOLECYSTECTOMY      HX HEENT      myringotomy    HX HEENT  07/2016    basal cell removal -forehead    HX TONSILLECTOMY      childhood    HX TYMPANOSTOMY  07/11/2016    left ear    HX TYMPANOSTOMY      bilateral     Prior to Admission medications    Medication Sig Start Date End Date Taking? Authorizing Provider   acetaminophen (TylenoL) 325 mg tablet Take 650 mg by mouth every six (6) hours as needed for Pain. Provider, Historical   diclofenac (VOLTAREN) 1 % gel Apply  to affected area four (4) times daily. Indications: osteoarthritis of the knee    Provider, Historical   furosemide (Lasix) 20 mg tablet Take 20 mg by mouth daily. Provider, Historical   guaiFENesin (ROBITUSSIN) 100 mg/5 mL liquid Take 100 mg by mouth four (4) times daily as needed for Cough. Provider, Historical   lisinopriL (PRINIVIL, ZESTRIL) 10 mg tablet Take 10 mg by mouth daily. Provider, Historical   metFORMIN (GLUCOPHAGE) 500 mg tablet Take 500 mg by mouth two (2) times daily (with meals). Indications: type 2 diabetes mellitus    Provider, Historical   peg 400-propylene glycol (Systane GeL) 0.4-0.3 % drpg Apply 1 Drop to eye every four (4) hours as needed. Provider, Historical   tolvaptan (SAMSCA) 30 mg tab tablet Take 30 mg by mouth daily. Give 1 mg by mouth daily every Obxg-Hgsrq-Nyjxvxsx for hyponatremia. Provider, Historical   mirabegron ER (MYRBETRIQ) 50 mg ER tablet Take 50 mg by mouth daily. Provider, Historical   fluticasone propionate (FLONASE NA) 1 Spray by Nasal route daily. 1 spary alternating nostrils one time a day    Provider, Historical   vit A/vit C/vit E/zinc/copper (PRESERVISION AREDS PO) Take 1 Tablet by mouth two (2) times a day. Provider, Historical   loratadine (Claritin) 10 mg tablet Take 10 mg by mouth daily. Provider, Historical   glucose blood VI test strips (PRECISION XTRA TEST) strip USE TO TEST BLOOD SUGAR TWICE DAILY. Dx E11.21 2/19/20   Negra Dos Santos MD   cholecalciferol, vitamin D3, (Vitamin D3) 50 mcg (2,000 unit) tab Take 1,000 Units by mouth daily. Provider, Historical   Lancets misc 1 Package by Does Not Apply route daily. Test glucose daily and as needed Dx type 2 dm E11.42 4/19/16   Macarena Aranda MD     Allergies   Allergen Reactions    Losartan Angioedema     Tongue swelling      Amoxicillin Other (comments)     \"makes me feel like I\"m going to faint\"    Gabapentin Drowsiness     * pt states this med makes her feel very drowsy , gives her a drunk feeling.  Levaquin [Levofloxacin] Hives and Rash    Lorazepam Other (comments)     Feeling faint.     Lyrica [Pregabalin] Rash    Sertraline Other (comments)     Feeling faint. No family history on file. No family history of altered mental status  Social History     Socioeconomic History    Marital status:    Tobacco Use    Smoking status: Former Smoker     Packs/day: 1.00     Years: 10.00     Pack years: 10.00     Quit date: 10/12/1993     Years since quittin.5    Smokeless tobacco: Never Used   Vaping Use    Vaping Use: Never used   Substance and Sexual Activity    Alcohol use: Yes     Comment: 1 glass of wine weekly    Drug use: No    Sexual activity: Not Currently     Social Determinants of Health     Financial Resource Strain: Low Risk     Difficulty of Paying Living Expenses: Not hard at all   Food Insecurity: No Food Insecurity    Worried About Running Out of Food in the Last Year: Never true    Vamshi of Food in the Last Year: Never true   Transportation Needs: No Transportation Needs    Lack of Transportation (Medical): No    Lack of Transportation (Non-Medical):  No   Physical Activity: Inactive    Days of Exercise per Week: 0 days    Minutes of Exercise per Session: 0 min   Housing Stability: Unknown    Unable to Pay for Housing in the Last Year: No    Unstable Housing in the Last Year: No   .    Objective:     Physical Exam:     VS as below    Const'l:          Morbidly obese body habitus, a&o, no acute distress  Head/Neck:       neck supple, no jvd, trachea midline, carotid midline, no cervical/head mass  Eyes:     ofelia, nonicteric sclera, eom intact  ENT:      auditory acuity grossly intact, no nasal deformity  Cardio:           Regular rate regular rhythm, no murmurs/rubs/gallops, no carotid bruit, normal s1, s2  Pulm:     no accessory muscle use, equal normal breath sounds bilaterally, clear tab  Abd:       S NT ND normal bowel sounds x 4 quadrants, no palpable masses  Derm:     no rashes, no ulcers, no lesions  Extr:      No edema, no cyanosis, no calf tenderness, no varicosities  Neuro: cn II-XII grossly intact, muscle strength intact, sensation intact  Psych:   mood agitated, judgement impaired    Data Review: All diagnostic labs and studies have been reviewed. .    Assessment:     Active Problems:    Edema (11/12/2014)      Overview: 4/16 echo normal lvef, lae, mild tr      12/17 echo normal lvef, elisha, mild to mod mr and tr, pa pressure 50mm      Constipation (11/12/2014)      Essential hypertension, benign (6/4/2015)      Chronic atrial fibrillation (HCC) (4/8/2016)      Allergic rhinitis (4/11/2016)      Anxiety (10/2/2017)      CHF (congestive heart failure) ([facility] [address] (12/6/2017)      Type 2 diabetes mellitus with nephropathy ([facility] (12/19/2017)      Severe obesity (BMI 35.0-39. 9) with comorbidity ([facility] (4/25/2018)      Hyponatremia (12/20/2021)      JOYCE (acute kidney injury) ([facility] (4/17/2022)        Plan:     Acute hyperkalemia  -We will give Lokelma and insulin plus dextrose  -We will check magnesium    Acute metabolic encephalopathy  -Seems to most likely secondary to UTI; will order Rocephin  -We will empirically start the patient on Macrobid, she is allergic to penicillins and levofloxacin  -We will obtain labs for acute metabolic encephalopathy    JOYCE  -Patient seen sleep overloaded, that is probably the reason for JOYCE, I will give her some Lasix  -Avoid nephrotoxic medications  -Bladder scan plus straight cath  -Strict TE    Acute hypothermia  -Bear hugger  -Patient is not septic; still, there is concern for her UTI    Diabetes  -Sliding scale insulin  -Hold home oral medications    Essential hypertension  -Continue home lisinopril    Allergies  -Continue home Claritin        Code status was discussed with the patient.   Code status entered as per the orders  Signed By: Mark Mcghee DO

## 2022-04-18 NOTE — PROGRESS NOTES
[address] [facility] visit. Following a Code Blue. Spoke with nephew-in-law who was with Mrs. Almendarez. [facility]  [facility] Prayer offered.     ISIAH Yarbrough, RN, ACSW, LCSW   Page:  [phone]

## 2022-04-18 NOTE — PROGRESS NOTES
Rapid Response called overhead at this time, RRT responding    Patient unresponsive with snoring respirations and being bagged at this time. Heart rate in 30s, pulse palpable, BP 60/40s. Defib pads placed on patient and code blue called overhead at this time for intubation. Patient is currently a full code    Dr. Gross List at bedside speaking with family, ICU MD at bedside. Femoral pulses still palpable, 43 BPM.    Family has decieded to make patient a DNR at this time.  BP 90s/60s, o2 sats 98%,and heart rate 72 BPM    RRT will continue to follow

## 2022-04-18 NOTE — ED NOTES
Pt bs 62 md aware ordered dextrose. Pt is screaming and wants to go home pt is pain. Md aware. Pt and niece are wanting to go home Md aware.

## 2022-04-18 NOTE — PROGRESS NOTES
Physical Therapy - defer  Order received, chart reviewed. Pt just arrived to the unit from the ED, RN currently assessing pt and ECHO waiting to see pt. Will follow up later for evaluation as able/ appropriate.

## 2022-04-18 NOTE — PROGRESS NOTES
Spiritual Care Assessment/Progress Note  [facility]      NAME: Tio Holley      MRN: [id number]  AGE: 80 y.o.  SEX: female  Confucianist Affiliation: Episcopalian   Language: English     4/18/2022     Total Time (in minutes): 19     [facility] through conversation with:         []Patient        [x] Family    [] Friend(s)        Reason for Consult: Code Blue/99     Spiritual beliefs: (Please include comment if needed)     [x] Identifies with a saida tradition:         [x] Supported by a saida community:  Member of [facility]          [] Claims no spiritual orientation:           [] Seeking spiritual identity:                [] Adheres to an individual form of spirituality:           [] Not able to assess:                           Identified resources for coping:      [x] Prayer                               [] Music                  [] Guided Imagery     [] Family/friends                 [] Pet visits     [] Devotional reading                         [] Unknown     [] Other                                           Interventions offered during this visit: (See comments for more details)    Patient Interventions: Crisis     Family/Friend(s): Catharsis/review of pertinent events in supportive environment,Initial Assessment,Normalization of emotional/spiritual concerns,Prayer (actual),Prayer (assurance of),Confucianist beliefs/image of God discussed     Plan of Care:     [x] Support spiritual and/or cultural needs    [] Support AMD and/or advance care planning process      [x] Support grieving process   [x] Coordinate Rites and/or Rituals    [] Coordination with community clergy   [] No spiritual needs identified at this time   [] Detailed Plan of Care below (See Comments)  [] Make referral to Music Therapy  [] Make referral to Pet Therapy     [] Make referral to Addiction services  [] Make referral to [facility]  [] Make referral to Spiritual Care Partner  [] No future visits requested        [] Contact Spiritual Care for further referrals     Comments:  responded Code Blue called for patient. Consulted with staff and offered support to family member (pt's nephew in-law) who was outside of pt's room. Family shared of pt's [address] and desire for pt to be anointed. Collaborated with Luz Maria Vanegas who arrived and offered spoken prayer with family member. Sr Yannick Woodruff coordinated with  for anointing. Explored any additional needs with pt's family. Assured him of ongoing prayers.     Amy Yang [address] [place] [address]  [phone]

## 2022-04-18 NOTE — PROGRESS NOTES
[facility] visit. Tono Malik offered the Sacrament of the Sick at bedside and spoke with Mrs. Almendarez's nephew-in-law. Mrs. Almendarez is a member of [employer].       ISIAH You, RN, ACSW, LCSW  [facility] Page:  [phone]

## 2022-04-18 NOTE — ED PROVIDER NOTES
79-year-old female presents from home via EMS with complaint of shortness of breath. Patient resides in independent living at [facility] in [facility]. According to EMS, she has had gradually worsening shortness of breath over the past few days but worsened today. She also has lower extremity swelling. Denies any chest pain or headaches. No fevers. No vomiting or diarrhea. Patient does have a history of anxiety, atrial fibrillation, diabetes, hypertension. Past Medical History:   Diagnosis Date    Anxiety     Bleeding nose     Chronic atrial fibrillation (HCC)     Diabetes ([facility]     Hypertension        Past Surgical History:   Procedure Laterality Date    HX CATARACT REMOVAL      HX CHOLECYSTECTOMY      HX HEENT      myringotomy    HX HEENT  2016    basal cell removal -forehead    HX TONSILLECTOMY      childhood    HX TYMPANOSTOMY  2016    left ear    HX TYMPANOSTOMY      bilateral         No family history on file.     Social History     Socioeconomic History    Marital status:      Spouse name: Not on file    Number of children: Not on file    Years of education: Not on file    Highest education level: Not on file   Occupational History    Not on file   Tobacco Use    Smoking status: Former Smoker     Packs/day: 1.00     Years: 10.00     Pack years: 10.00     Quit date: 10/12/1993     Years since quittin.5    Smokeless tobacco: Never Used   Vaping Use    Vaping Use: Never used   Substance and Sexual Activity    Alcohol use: Yes     Comment: 1 glass of wine weekly    Drug use: No    Sexual activity: Not Currently   Other Topics Concern    Not on file   Social History Narrative    Not on file     Social Determinants of Health     Financial Resource Strain: Low Risk     Difficulty of Paying Living Expenses: Not hard at all   Food Insecurity: No Food Insecurity    Worried About Running Out of Food in the Last Year: Never true    [address] in the Last Year: Never true   Transportation Needs: No Transportation Needs    Lack of Transportation (Medical): No    Lack of Transportation (Non-Medical): No   Physical Activity: Inactive    Days of Exercise per Week: 0 days    Minutes of Exercise per Session: 0 min   Stress:     Feeling of Stress : Not on file   Social Connections:     Frequency of Communication with Friends and Family: Not on file    Frequency of Social Gatherings with Friends and Family: Not on file    Attends Rastafarian Services: Not on file    Active Member of Clubs or Organizations: Not on file    Attends Club or Organization Meetings: Not on file    Marital Status: Not on file   Intimate Partner Violence:     Fear of Current or Ex-Partner: Not on file    Emotionally Abused: Not on file    Physically Abused: Not on file    Sexually Abused: Not on file   Housing Stability: Unknown    Unable to Pay for Housing in the Last Year: No    Number of [place] in the Last Year: Not on file    Unstable Housing in the Last Year: No         ALLERGIES: Losartan, Amoxicillin, Gabapentin, Levaquin [levofloxacin], Lorazepam, Lyrica [pregabalin], and Sertraline    Review of Systems   Constitutional: Negative for fever. HENT: Negative for facial swelling. Eyes: Negative for visual disturbance. Respiratory: Positive for shortness of breath. Negative for chest tightness. Cardiovascular: Negative for chest pain. Gastrointestinal: Negative for abdominal pain. Genitourinary: Negative for difficulty urinating and dysuria. Musculoskeletal: Negative for arthralgias. Skin: Negative for rash. Neurological: Negative for headaches. Hematological: Negative for adenopathy. Psychiatric/Behavioral: Negative for suicidal ideas.        Vitals:    04/17/22 2152 04/17/22 2153   BP:  (!) 103/50   Pulse:  (!) 54   Resp:  17   Temp:  (!) 94.3 °F (34.6 °C)   SpO2:  90%   Weight: 103.7 kg (228 lb 9.9 oz)             Physical Exam  Vitals and nursing note reviewed. Constitutional:       General: She is not in acute distress. Appearance: She is well-developed. HENT:      Head: Normocephalic and atraumatic. Eyes:      General: No scleral icterus. Conjunctiva/sclera: Conjunctivae normal.      Pupils: Pupils are equal, round, and reactive to light. Cardiovascular:      Rate and Rhythm: Normal rate. Heart sounds: No murmur heard. Pulmonary:      Effort: Pulmonary effort is normal. No respiratory distress. Abdominal:      General: There is no distension. Musculoskeletal:         General: Normal range of motion. Cervical back: Normal range of motion and neck supple. Skin:     General: Skin is warm and dry. Findings: No rash. Neurological:      Mental Status: She is alert and oriented to person, place, and time. [facility]  ED Course as of 04/17/22 2204   Sun Apr 17, 2022 2203 EKG, 12 LEAD, INITIAL  ED EKG interpretation:  Rhythm: a. fib. Rate (approx.): 59. Axis: normal.  ST segment:  No concerning ST elevations or depressions. RBBB. This EKG was interpreted by Bernadette Snell MD,ED Provider. [JM]      ED Course User Index  [JM] Mil Barrientos MD     Perfect Serve Consult for Admission  11:28 PM    ED Room Number: Bing Hall  Patient Name and age: Cristina Almendarez 80 y.o.  female  Working Diagnosis:   1. JOYCE (acute kidney injury) ([address]    2. Acute hyperkalemia    3. SOB (shortness of breath)    4. Pulmonary hypertension ([address]    5. Confusion        COVID-19 Suspicion:  no  Sepsis present:  no  Reassessment needed: no  Code Status:  Full Code  Readmission: no  Isolation Requirements:  no  Recommended Level of Care:  telemetry  Department:  [facility] Adult ED - [phone]  Admitting Provider: Hospitalist    Other:  Increased confusion past few days. Crup to 2.4.  K=5.5. Family now at bedside now describing increased confusion and concern for UTI.      Total critical care time spent exclusive of procedures:  35 minutes.     Procedures

## 2022-04-18 NOTE — PROGRESS NOTES
Rapid response called for unresponsiveness, bradycardia and hypotension. Patient being ambubagged on my arrival, unresponsive, lungs w/ bilateral rhonchi, pulse faint. I saw her early AM and spoke to nephew in law at bedside, no success contacting [facility]. Code blue called for need of emergent intubation. We were finally able to get a hold of the [facility] Chapo Pryor [phone]. I explained to her the current situation with her acute illness and significant chronic illness. She opted not to purse aggressive measures - no CPR, intubation, shocks, etc. Continue other treatments but if she decompensates transition to comfort measures.

## 2022-04-19 ENCOUNTER — TELEPHONE (OUTPATIENT)
Dept: INTERNAL MEDICINE CLINIC | Age: 87
End: 2022-04-19

## 2022-04-19 LAB
BACTERIA SPEC CULT: NORMAL
SERVICE CMNT-IMP: NORMAL

## 2022-04-19 PROCEDURE — 94760 N-INVAS EAR/PLS OXIMETRY 1: CPT

## 2022-04-19 PROCEDURE — 77010033678 HC OXYGEN DAILY

## 2022-04-19 NOTE — TELEPHONE ENCOUNTER
Patient's nephew calling stating that patient passed away last night.  He says there was a woman that had been calling in and checking in for the past three months and that he thought she would like to know    Did not have a name for who it was     Tresa Choi [phone]

## 2022-04-19 NOTE — DISCHARGE SUMMARY
[facility] Adult  Hospitalist Group     Death Discharge Summary   PATIENT ID: Pramod Lopes  MRN: [id number]   YOB: 1922    DATE OF ADMISSION: 2022  9:42 PM    PRIMARY CARE PROVIDER: Horacio Samuels MD   ATTENDING PHYSICIAN: Angie Solitario NP  CONSULTATIONS:   IP CONSULT TO NEPHROLOGY    PROCEDURES/SURGERIES:   * No surgery found *    REASON FOR ADMISSION: <principal problem not specified>     HOSPITAL PROBLEM LIST:  Patient Active Problem List   Diagnosis Code    Edema R60.9    Headache(784.0) R51    Diverticulosis of colon (without mention of hemorrhage) K57.30    Constipation K59.00    Cyst of left kidney N28.1    Essential hypertension, benign I10    Type 2 diabetes, controlled, with neuropathy ([facility] E11.40    Advanced directives, counseling/discussion Z71.89    Chronic atrial fibrillation ([facility]) I48.20    Allergic rhinitis J30.9    Anxiety F41.9    CHF (congestive heart failure) ([facility]) I50.9    Type 2 diabetes mellitus with nephropathy ([facility] E11.21    Severe obesity (BMI 35.0-39. 9) with comorbidity ([facility] E66.01    Hyponatremia E87.1    JOYCE (acute kidney injury) ([facility] N17.9       DATE AND TIME OF DEATH: 2022 at [address] [place] [address]:   Full Code   X DNR    Partial   X Comfort Care     DISCHARGE DIAGNOSES: Cardiopulmonary arrest     Brief HPI and Hospital Course:      Patient was admitted on  for hypokalemia, acute metabolic encephalopathy, joyce. Around 1245 rapid response called for unresponsiveness, bradycardia and hypotension. [facility] and she opted to not purse aggressive measures and if patient was to further decompensate, she should be placed on comfort measure. 2022 1925: Patient . On exam, no heart sounds or breath sounds were noted after 1 minute of auscultation. Pupils were fixed and dilated without pupillary light reflex.      Patient was pronounced dead on 2022      Cause of Death:  Immediate: Cardiopulmonary arrest    Events related to death:  Was code called: NO   notified: NO  Family notified: Hayder Barnard, family present at bedside  Autopsy requested: NO  Death certificate completed: NO  Code Status Prior to Death: DNR     PHYSICAL EXAMINATION AT DISCHARGE:  GEN: No response to verbal or tactile stimuli  HEENT: Pupils fixed, dilated  CV: No cardiac activity or heart sounds appreciated. No carotid pulse. PULM: No respiratory effort or spontaneous respirations. Ext: No peripheral pulses.       Signed:   Ravi Woo NP  Date of Service:  4/19/2022  12:49 AM

## 2022-04-19 NOTE — DEATH NOTE
Death Pronouncement Note      Events prior to patients death:  Called to bedside at 12:46 AM for unresponsive and pulseless patient. On exam, no heart sounds or breath sounds were noted after 1 minute of auscultation. Pupils were fixed and dilated without pupillary light reflex. Patient was pronounced dead on 4/19/2022      Date/Time of death:04/18/2022 @ 1925      Cause:  Immediate: Cardiopulmonary arrest    Underlying cause:   Hospital Problems  Date Reviewed: 4/18/2022          Codes Class Noted POA    JOYCE (acute kidney injury) ([facility] ICD-10-CM: N17.9  ICD-9-CM: 584.9  4/17/2022 Unknown        Hyponatremia ICD-10-CM: E87.1  ICD-9-CM: 276.1  12/20/2021 Yes        Severe obesity (BMI 35.0-39. 9) with comorbidity ([facility] ICD-10-CM: E66.01  ICD-9-CM: 278.01  4/25/2018 Yes        Type 2 diabetes mellitus with nephropathy ([facility] ICD-10-CM: E11.21  ICD-9-CM: 250.40, 583.81  12/19/2017 Yes        CHF (congestive heart failure) ([facility] ICD-10-CM: I50.9  ICD-9-CM: 428.0  12/6/2017 Yes        Anxiety ICD-10-CM: F41.9  ICD-9-CM: 300.00  10/2/2017 Yes        Allergic rhinitis ICD-10-CM: J30.9  ICD-9-CM: 477.9  4/11/2016 Yes        Chronic atrial fibrillation ([facility] ICD-10-CM: I48.20  ICD-9-CM: 427.31  4/8/2016 Yes        Essential hypertension, benign ICD-10-CM: I10  ICD-9-CM: 401.1  6/4/2015 Yes        Edema ICD-10-CM: R60.9  ICD-9-CM: 782.3  11/12/2014 Yes    Overview Addendum 12/14/2017  8:24 AM by David López MD     4/16 echo normal lvef, lae, mild tr  12/17 echo normal lvef, elisha, mild to mod mr and tr, pa pressure 50mm             Constipation ICD-10-CM: K59.00  ICD-9-CM: 564.00  11/12/2014 Yes                Physician Pronouncing Death: Shilo Roach NP      Attending Physician of Record:   Katty Gr MD     Events related to death:  Was code called: NO   notified: NO  Family notified: Jim Cooper.  Family present at bedside  Autopsy requested: NO  Death certificate completed: NO  Code Status Prior to Death: DNR     Discharge summary and death certificate will be completed by: Janice Ewing MD    Date of Service:  4/19/2022

## 2022-04-19 NOTE — PROGRESS NOTES
Physician Progress Note      Marie Hector [facility] #:                  [id number]  :                       1922  ADMIT DATE:       2022 9:42 PM  [address] [place] DATE:        2022 7:25 PM  RESPONDING  PROVIDER #:        Agueda Pride MD          QUERY TEXT:    Patient admitted with JOYCE. Noted documentation of sepsis. In order to support the diagnosis of sepsis, please include additional clinical indicators in your documentation. Or please document if the diagnosis of sepsis has been ruled out after further study. The medical record reflects the following:  Risk Factors: 80 y.o. female presents with several days duration of gradual onset, gradually worsening, severe, constant, shortness of breath  Clinical Indicators: Unresponsive, SOB, JVD +, Hypothermic temp 94.3, Hypotensive b/p 69/26, 85/41, bradycardic HR 41, 44, per Nephro consult  \"JOYCE 2/2 cardiogenic shock, hypotension - appears to have UTI and sepsis could be contributing. U/A +1 bacteria, per H&P \"Patient is not septic;there is concern for her UTI. \"  Treatment: Rocephin, Bear hugger, supplemental O2,  Options provided:  -- Sepsis present on admission as evidenced by, Please document evidence. -- Sepsis not present on admission as evidenced by, Please document evidence. -- Sepsis was ruled out after study  -- Other - I will add my own diagnosis  -- Disagree - Not applicable / Not valid  -- Disagree - Clinically unable to determine / Unknown  -- Refer to Clinical Documentation Reviewer    PROVIDER RESPONSE TEXT:    Provider is clinically unable to determine a response to this query. Query created by:  Trenton Armendariz on 2022 4:11 PM      Electronically signed by:  Agueda Pride MD 2022 1:11 PM

## 2022-05-02 NOTE — PROGRESS NOTES
Physician Progress Note      Cindy Miranda [facility] #:                  [id number]  :                       1922  ADMIT DATE:       2022 9:42 PM  [address] [place] DATE:        2022 7:25 PM  RESPONDING  PROVIDER #:        ZAINAB Brody MD          QUERY TEXT:    Enjoin query:  Based on clinical indicators, the diagnosis of JOYCE noted in the H&P, consult, and DC Summary may be challenged on a clinical basis by an external reviewer. Patient's creatinine was 2.4 and decreased to 2.36, a baseline was not noted, this does not meet KDIGO criteria for JOYCE. In order to support the diagnosis of JOYCE, please include additional clinical indicators in your documentation. Or please document if the diagnosis of JOYCE has been ruled out after further study. The medical record reflects the following:  Risk Factors: 80 y.o. female presents with several days duration of gradual onset, gradually worsening, severe, constant, shortness of breath  Clinical Indicators: Unresponsive, SOB, JVD +, Hypothermic temp 94.3, Hypotensive b/p 69/26, 85/41, bradycardic HR 41, 44, per Nephro consult  \"JOYCE 2/2 cardiogenic shock, hypotension - appears to have UTI and sepsis could be contributing. U/A +1 bacteria, creat 2.4 decreased to 2.36 without baseline  Treatment: Nephrology consulted, Lasix,  supplemental O2, Albumin, IV Abx    Defined by Kidney Disease Improving Global Outcomes (KDIGO) clinical practice guideline for acute kidney injury:  -Increase in SCr by greater than or equal to 0.3 mg/dl within 48 hours; or  -Increase or decrease in SCr to greater than or equal to 1.5 times baseline, which is known or presumed to have occurred within the prior 7 days; or  -Urine volume < 0.5ml/kg/h for 6 hours  Options provided:  -- Acute kidney injury evidenced by, Please document evidence as well as baseline creatinine, if known.   -- Currently resolved acute kidney injury was evidenced by, Please document evidence as well as baseline creatinine, if known. -- Acute kidney injury ruled out after study  -- Other - I will add my own diagnosis  -- Disagree - Not applicable / Not valid  -- Disagree - Clinically unable to determine / Unknown  -- Refer to Clinical Documentation Reviewer    PROVIDER RESPONSE TEXT:    This patient has an acute kidney injury as evidenced by increase in SCr > 0.3 within 48 hours, increase in SCr greater than 1.5 times baseline    Query created by:  Linsey Lubin on 4/22/2022 7:07 AM      Electronically signed by:  Flower Pedro MD 5/2/2022 1:58 PM

## 2023-07-19 NOTE — TELEPHONE ENCOUNTER
[phone]    Please call to discuss her home care therapy
LMTCB
Left Message for a return phone call.
Spoke with pt - she wanted to let us know she has appt with home PT tomorrow.
19-Jul-2023 03:23